# Patient Record
Sex: MALE | Race: BLACK OR AFRICAN AMERICAN | Employment: UNEMPLOYED | URBAN - METROPOLITAN AREA
[De-identification: names, ages, dates, MRNs, and addresses within clinical notes are randomized per-mention and may not be internally consistent; named-entity substitution may affect disease eponyms.]

---

## 2020-07-24 ENCOUNTER — APPOINTMENT (OUTPATIENT)
Dept: GENERAL RADIOLOGY | Age: 65
DRG: 004 | End: 2020-07-24
Payer: MEDICARE

## 2020-07-24 ENCOUNTER — APPOINTMENT (OUTPATIENT)
Dept: CT IMAGING | Age: 65
DRG: 004 | End: 2020-07-24
Payer: MEDICARE

## 2020-07-24 ENCOUNTER — HOSPITAL ENCOUNTER (INPATIENT)
Age: 65
LOS: 22 days | Discharge: NURSING, REHAB OR LONG-TERM CARE | DRG: 004 | End: 2020-08-15
Attending: EMERGENCY MEDICINE | Admitting: INTERNAL MEDICINE
Payer: MEDICARE

## 2020-07-24 PROBLEM — A41.9 SEPSIS (HCC): Status: ACTIVE | Noted: 2020-07-24

## 2020-07-24 LAB
ALBUMIN SERPL-MCNC: 2.3 GM/DL (ref 3.4–5)
ALP BLD-CCNC: 80 IU/L (ref 40–129)
ALT SERPL-CCNC: 16 U/L (ref 10–40)
ANION GAP SERPL CALCULATED.3IONS-SCNC: 15 MMOL/L (ref 4–16)
AST SERPL-CCNC: 67 IU/L (ref 15–37)
BASE EXCESS MIXED: 1.3 (ref 0–1.2)
BASOPHILS ABSOLUTE: 0 K/CU MM
BASOPHILS RELATIVE PERCENT: 0.2 % (ref 0–1)
BILIRUB SERPL-MCNC: 1.2 MG/DL (ref 0–1)
BUN BLDV-MCNC: 13 MG/DL (ref 6–23)
CALCIUM SERPL-MCNC: 8.6 MG/DL (ref 8.3–10.6)
CHLORIDE BLD-SCNC: 98 MMOL/L (ref 99–110)
CO2: 25 MMOL/L (ref 21–32)
COMMENT: ABNORMAL
CREAT SERPL-MCNC: 0.8 MG/DL (ref 0.9–1.3)
DIFFERENTIAL TYPE: ABNORMAL
EOSINOPHILS ABSOLUTE: 0 K/CU MM
EOSINOPHILS RELATIVE PERCENT: 0 % (ref 0–3)
GFR AFRICAN AMERICAN: >60 ML/MIN/1.73M2
GFR NON-AFRICAN AMERICAN: >60 ML/MIN/1.73M2
GLUCOSE BLD-MCNC: 116 MG/DL (ref 70–99)
HCO3 VENOUS: 25 MMOL/L (ref 19–25)
HCT VFR BLD CALC: 32.5 % (ref 42–52)
HEMOGLOBIN: 9.4 GM/DL (ref 13.5–18)
IMMATURE NEUTROPHIL %: 0.9 % (ref 0–0.43)
LACTATE: 3.7 MMOL/L (ref 0.4–2)
LYMPHOCYTES ABSOLUTE: 0.9 K/CU MM
LYMPHOCYTES RELATIVE PERCENT: 5.3 % (ref 24–44)
MAGNESIUM: 2 MG/DL (ref 1.8–2.4)
MCH RBC QN AUTO: 21.6 PG (ref 27–31)
MCHC RBC AUTO-ENTMCNC: 28.9 % (ref 32–36)
MCV RBC AUTO: 74.5 FL (ref 78–100)
MONOCYTES ABSOLUTE: 0.7 K/CU MM
MONOCYTES RELATIVE PERCENT: 3.8 % (ref 0–4)
NUCLEATED RBC %: 0 %
O2 SAT, VEN: 35.3 % (ref 50–70)
PCO2, VEN: 36 MMHG (ref 38–52)
PDW BLD-RTO: 24.5 % (ref 11.7–14.9)
PH VENOUS: 7.45 (ref 7.32–7.42)
PLATELET # BLD: 611 K/CU MM (ref 140–440)
PMV BLD AUTO: 9 FL (ref 7.5–11.1)
PO2, VEN: 23 MMHG (ref 28–48)
POTASSIUM SERPL-SCNC: 3.3 MMOL/L (ref 3.5–5.1)
RBC # BLD: 4.36 M/CU MM (ref 4.6–6.2)
SEGMENTED NEUTROPHILS ABSOLUTE COUNT: 15.9 K/CU MM
SEGMENTED NEUTROPHILS RELATIVE PERCENT: 89.8 % (ref 36–66)
SODIUM BLD-SCNC: 138 MMOL/L (ref 135–145)
TOTAL CK: 556 IU/L (ref 38–174)
TOTAL IMMATURE NEUTOROPHIL: 0.16 K/CU MM
TOTAL NUCLEATED RBC: 0 K/CU MM
TOTAL PROTEIN: 9.4 GM/DL (ref 6.4–8.2)
TROPONIN T: 0.22 NG/ML
WBC # BLD: 17.7 K/CU MM (ref 4–10.5)

## 2020-07-24 PROCEDURE — 80053 COMPREHEN METABOLIC PANEL: CPT

## 2020-07-24 PROCEDURE — 81001 URINALYSIS AUTO W/SCOPE: CPT

## 2020-07-24 PROCEDURE — 87147 CULTURE TYPE IMMUNOLOGIC: CPT

## 2020-07-24 PROCEDURE — 71045 X-RAY EXAM CHEST 1 VIEW: CPT

## 2020-07-24 PROCEDURE — 73501 X-RAY EXAM HIP UNI 1 VIEW: CPT

## 2020-07-24 PROCEDURE — 83735 ASSAY OF MAGNESIUM: CPT

## 2020-07-24 PROCEDURE — 87150 DNA/RNA AMPLIFIED PROBE: CPT

## 2020-07-24 PROCEDURE — 93005 ELECTROCARDIOGRAM TRACING: CPT | Performed by: EMERGENCY MEDICINE

## 2020-07-24 PROCEDURE — 87077 CULTURE AEROBIC IDENTIFY: CPT

## 2020-07-24 PROCEDURE — 02HV33Z INSERTION OF INFUSION DEVICE INTO SUPERIOR VENA CAVA, PERCUTANEOUS APPROACH: ICD-10-PCS | Performed by: EMERGENCY MEDICINE

## 2020-07-24 PROCEDURE — 82550 ASSAY OF CK (CPK): CPT

## 2020-07-24 PROCEDURE — 4500000027

## 2020-07-24 PROCEDURE — 94660 CPAP INITIATION&MGMT: CPT

## 2020-07-24 PROCEDURE — 84484 ASSAY OF TROPONIN QUANT: CPT

## 2020-07-24 PROCEDURE — 82805 BLOOD GASES W/O2 SATURATION: CPT

## 2020-07-24 PROCEDURE — 83880 ASSAY OF NATRIURETIC PEPTIDE: CPT

## 2020-07-24 PROCEDURE — 2580000003 HC RX 258: Performed by: PHYSICIAN ASSISTANT

## 2020-07-24 PROCEDURE — 2000000000 HC ICU R&B

## 2020-07-24 PROCEDURE — 85025 COMPLETE CBC W/AUTO DIFF WBC: CPT

## 2020-07-24 PROCEDURE — 87075 CULTR BACTERIA EXCEPT BLOOD: CPT

## 2020-07-24 PROCEDURE — 87070 CULTURE OTHR SPECIMN AEROBIC: CPT

## 2020-07-24 PROCEDURE — 99285 EMERGENCY DEPT VISIT HI MDM: CPT

## 2020-07-24 PROCEDURE — 87086 URINE CULTURE/COLONY COUNT: CPT

## 2020-07-24 PROCEDURE — 87040 BLOOD CULTURE FOR BACTERIA: CPT

## 2020-07-24 PROCEDURE — 2580000003 HC RX 258: Performed by: NURSE PRACTITIONER

## 2020-07-24 PROCEDURE — 6360000002 HC RX W HCPCS: Performed by: PHYSICIAN ASSISTANT

## 2020-07-24 PROCEDURE — 87186 SC STD MICRODIL/AGAR DIL: CPT

## 2020-07-24 PROCEDURE — 6360000002 HC RX W HCPCS: Performed by: NURSE PRACTITIONER

## 2020-07-24 PROCEDURE — 83605 ASSAY OF LACTIC ACID: CPT

## 2020-07-24 PROCEDURE — 80307 DRUG TEST PRSMV CHEM ANLYZR: CPT

## 2020-07-24 PROCEDURE — 4500000029 HC MAJOR PROCEDURE

## 2020-07-24 PROCEDURE — 93005 ELECTROCARDIOGRAM TRACING: CPT | Performed by: PHYSICIAN ASSISTANT

## 2020-07-24 RX ORDER — POLYETHYLENE GLYCOL 3350 17 G/17G
17 POWDER, FOR SOLUTION ORAL DAILY PRN
Status: DISCONTINUED | OUTPATIENT
Start: 2020-07-24 | End: 2020-08-15 | Stop reason: HOSPADM

## 2020-07-24 RX ORDER — NICOTINE 21 MG/24HR
1 PATCH, TRANSDERMAL 24 HOURS TRANSDERMAL DAILY
Status: DISCONTINUED | OUTPATIENT
Start: 2020-07-25 | End: 2020-08-15 | Stop reason: HOSPADM

## 2020-07-24 RX ORDER — SODIUM CHLORIDE 0.9 % (FLUSH) 0.9 %
10 SYRINGE (ML) INJECTION EVERY 12 HOURS SCHEDULED
Status: DISCONTINUED | OUTPATIENT
Start: 2020-07-24 | End: 2020-08-15 | Stop reason: HOSPADM

## 2020-07-24 RX ORDER — ONDANSETRON 2 MG/ML
4 INJECTION INTRAMUSCULAR; INTRAVENOUS EVERY 6 HOURS PRN
Status: DISCONTINUED | OUTPATIENT
Start: 2020-07-24 | End: 2020-08-15 | Stop reason: HOSPADM

## 2020-07-24 RX ORDER — IPRATROPIUM BROMIDE AND ALBUTEROL SULFATE 2.5; .5 MG/3ML; MG/3ML
1 SOLUTION RESPIRATORY (INHALATION) 4 TIMES DAILY
Status: DISCONTINUED | OUTPATIENT
Start: 2020-07-24 | End: 2020-07-25

## 2020-07-24 RX ORDER — POTASSIUM CHLORIDE 7.45 MG/ML
10 INJECTION INTRAVENOUS ONCE
Status: COMPLETED | OUTPATIENT
Start: 2020-07-24 | End: 2020-07-24

## 2020-07-24 RX ORDER — 0.9 % SODIUM CHLORIDE 0.9 %
1000 INTRAVENOUS SOLUTION INTRAVENOUS ONCE
Status: COMPLETED | OUTPATIENT
Start: 2020-07-24 | End: 2020-07-24

## 2020-07-24 RX ORDER — ONDANSETRON 2 MG/ML
4 INJECTION INTRAMUSCULAR; INTRAVENOUS ONCE
Status: COMPLETED | OUTPATIENT
Start: 2020-07-24 | End: 2020-07-24

## 2020-07-24 RX ORDER — ACETAMINOPHEN 650 MG/1
650 SUPPOSITORY RECTAL EVERY 6 HOURS PRN
Status: DISCONTINUED | OUTPATIENT
Start: 2020-07-24 | End: 2020-08-15 | Stop reason: HOSPADM

## 2020-07-24 RX ORDER — SODIUM CHLORIDE 9 MG/ML
INJECTION, SOLUTION INTRAVENOUS CONTINUOUS
Status: DISCONTINUED | OUTPATIENT
Start: 2020-07-24 | End: 2020-07-26

## 2020-07-24 RX ORDER — SODIUM CHLORIDE 0.9 % (FLUSH) 0.9 %
10 SYRINGE (ML) INJECTION PRN
Status: DISCONTINUED | OUTPATIENT
Start: 2020-07-24 | End: 2020-08-15 | Stop reason: HOSPADM

## 2020-07-24 RX ORDER — PROMETHAZINE HYDROCHLORIDE 25 MG/1
12.5 TABLET ORAL EVERY 6 HOURS PRN
Status: DISCONTINUED | OUTPATIENT
Start: 2020-07-24 | End: 2020-08-15 | Stop reason: HOSPADM

## 2020-07-24 RX ORDER — ACETAMINOPHEN 325 MG/1
650 TABLET ORAL EVERY 6 HOURS PRN
Status: DISCONTINUED | OUTPATIENT
Start: 2020-07-24 | End: 2020-08-15 | Stop reason: HOSPADM

## 2020-07-24 RX ORDER — MORPHINE SULFATE 4 MG/ML
4 INJECTION, SOLUTION INTRAMUSCULAR; INTRAVENOUS ONCE
Status: COMPLETED | OUTPATIENT
Start: 2020-07-24 | End: 2020-07-24

## 2020-07-24 RX ORDER — MORPHINE SULFATE 2 MG/ML
2 INJECTION, SOLUTION INTRAMUSCULAR; INTRAVENOUS EVERY 4 HOURS PRN
Status: DISCONTINUED | OUTPATIENT
Start: 2020-07-24 | End: 2020-08-15 | Stop reason: HOSPADM

## 2020-07-24 RX ORDER — DICYCLOMINE HCL 20 MG
20 TABLET ORAL EVERY 6 HOURS PRN
Status: DISCONTINUED | OUTPATIENT
Start: 2020-07-24 | End: 2020-08-15 | Stop reason: HOSPADM

## 2020-07-24 RX ORDER — DIPHENHYDRAMINE HYDROCHLORIDE 50 MG/ML
12.5 INJECTION INTRAMUSCULAR; INTRAVENOUS NIGHTLY PRN
Status: DISCONTINUED | OUTPATIENT
Start: 2020-07-24 | End: 2020-08-15 | Stop reason: HOSPADM

## 2020-07-24 RX ORDER — ASPIRIN 81 MG/1
324 TABLET, CHEWABLE ORAL ONCE
Status: DISCONTINUED | OUTPATIENT
Start: 2020-07-24 | End: 2020-08-15 | Stop reason: HOSPADM

## 2020-07-24 RX ORDER — PROMETHAZINE HYDROCHLORIDE 25 MG/1
25 TABLET ORAL EVERY 6 HOURS PRN
Status: DISCONTINUED | OUTPATIENT
Start: 2020-07-24 | End: 2020-08-15 | Stop reason: HOSPADM

## 2020-07-24 RX ORDER — VANCOMYCIN HYDROCHLORIDE 1 G/200ML
1000 INJECTION, SOLUTION INTRAVENOUS ONCE
Status: COMPLETED | OUTPATIENT
Start: 2020-07-24 | End: 2020-07-25

## 2020-07-24 RX ORDER — SODIUM CHLORIDE 0.9 % (FLUSH) 0.9 %
10 SYRINGE (ML) INJECTION 2 TIMES DAILY
Status: DISCONTINUED | OUTPATIENT
Start: 2020-07-24 | End: 2020-08-15 | Stop reason: HOSPADM

## 2020-07-24 RX ADMIN — MORPHINE SULFATE 4 MG: 4 INJECTION, SOLUTION INTRAMUSCULAR; INTRAVENOUS at 17:40

## 2020-07-24 RX ADMIN — SODIUM CHLORIDE: 9 INJECTION, SOLUTION INTRAVENOUS at 23:13

## 2020-07-24 RX ADMIN — CEFEPIME HYDROCHLORIDE 2 G: 2 INJECTION, POWDER, FOR SOLUTION INTRAVENOUS at 17:22

## 2020-07-24 RX ADMIN — SODIUM CHLORIDE, PRESERVATIVE FREE 10 ML: 5 INJECTION INTRAVENOUS at 23:15

## 2020-07-24 RX ADMIN — POTASSIUM CHLORIDE 10 MEQ: 7.46 INJECTION, SOLUTION INTRAVENOUS at 23:13

## 2020-07-24 RX ADMIN — SODIUM CHLORIDE 1000 ML: 9 INJECTION, SOLUTION INTRAVENOUS at 17:23

## 2020-07-24 RX ADMIN — ONDANSETRON 4 MG: 2 INJECTION INTRAMUSCULAR; INTRAVENOUS at 17:18

## 2020-07-24 RX ADMIN — VANCOMYCIN HYDROCHLORIDE 1000 MG: 1 INJECTION, SOLUTION INTRAVENOUS at 23:13

## 2020-07-24 RX ADMIN — MORPHINE SULFATE 2 MG: 2 INJECTION, SOLUTION INTRAMUSCULAR; INTRAVENOUS at 23:13

## 2020-07-24 RX ADMIN — ONDANSETRON 4 MG: 2 INJECTION INTRAMUSCULAR; INTRAVENOUS at 23:13

## 2020-07-24 RX ADMIN — SODIUM CHLORIDE, PRESERVATIVE FREE 10 ML: 5 INJECTION INTRAVENOUS at 23:14

## 2020-07-24 ASSESSMENT — PAIN SCALES - GENERAL
PAINLEVEL_OUTOF10: 10

## 2020-07-24 ASSESSMENT — PAIN DESCRIPTION - PAIN TYPE: TYPE: ACUTE PAIN

## 2020-07-24 ASSESSMENT — PAIN DESCRIPTION - ORIENTATION: ORIENTATION: OTHER (COMMENT)

## 2020-07-24 ASSESSMENT — PAIN DESCRIPTION - LOCATION: LOCATION: GENERALIZED

## 2020-07-24 NOTE — ED NOTES
Patient cleaned of stool and urine with soap and water. Sheets were also changed. Patient was pulled up in the bed. Roxann Walker.  NICOLE Nobles  07/24/20 [id number]

## 2020-07-24 NOTE — LETTER
[facility] ICU  [address] [address]  Phone: [phone]             August 3, 2020    Patient:  JANA Dela Cruz   Date of Birth:    Date of Visit:        To Whom It May Concern:    Nanette Dillon was seen in our facility with his father, Mr. Richard Dela Cruz, on 8/3/2020.       Sincerely,       Socorro Mccracken RN         Signature:__________________________________

## 2020-07-24 NOTE — ED PROVIDER NOTES
Allergies    SOCIAL AND FAMILY HISTORY    Social History     Socioeconomic History    Marital status:      Spouse name: None    Number of children: None    Years of education: None    Highest education level: None   Occupational History    None   Social Needs    Financial resource strain: None    Food insecurity     Worry: None     Inability: None    Transportation needs     Medical: None     Non-medical: None   Tobacco Use    Smoking status: Current Every Day Smoker     Packs/day: 1.50     Types: Cigarettes   Substance and Sexual Activity    Alcohol use: No    Drug use: No    Sexual activity: None   Lifestyle    Physical activity     Days per week: None     Minutes per session: None    Stress: None   Relationships    Social connections     Talks on phone: None     Gets together: None     Attends Druze service: None     Active member of club or organization: None     Attends meetings of clubs or organizations: None     Relationship status: None    Intimate partner violence     Fear of current or ex partner: None     Emotionally abused: None     Physically abused: None     Forced sexual activity: None   Other Topics Concern    None   Social History Narrative    None     History reviewed. No pertinent family history. PHYSICAL EXAM    VITAL SIGNS: BP (!) 128/95   Pulse 132   Temp 98.3 °F (36.8 °C)   Resp (!) 53   SpO2 (!) 74%    Constitutional: Frail elderly appearing male  HENT:  Normocephalic, Atraumatic, PERRL. EOMI. Neck/Lymphatics: supple, no JVD, no swollen nodes  Cardiovascular: Tachycardic, normal rhythm  Respiratory:  Nonlabored breathing. Normal breath sounds, No wheezing  Abdomen: Bowel sounds normal, Soft, No tenderness  Musculoskeletal: Moderate swelling to left hip. There is tenderness in bilateral hips. No obvious swelling to right hip. No knee tenderness. Distal sensation and pulses intact.   Integument: Open wounds to bilateral forearms with small amount of purulent drainage. Distal sensation and pulses intact. Neurologic:  Alert & oriented , evaluation of lower extremities limited due to injury. Sensation intact.   Psychiatric:  Affect normal, Mood normal.       Labs:  Results for orders placed or performed during the hospital encounter of 07/24/20   CBC Auto Differential   Result Value Ref Range    WBC 17.7 (H) 4.0 - 10.5 K/CU MM    RBC 4.36 (L) 4.6 - 6.2 M/CU MM    Hemoglobin 9.4 (L) 13.5 - 18.0 GM/DL    Hematocrit 32.5 (L) 42 - 52 %    MCV 74.5 (L) 78 - 100 FL    MCH 21.6 (L) 27 - 31 PG    MCHC 28.9 (L) 32.0 - 36.0 %    RDW 24.5 (H) 11.7 - 14.9 %    Platelets 984 (H) 668 - 440 K/CU MM    MPV 9.0 7.5 - 11.1 FL    Differential Type AUTOMATED DIFFERENTIAL     Segs Relative 89.8 (H) 36 - 66 %    Lymphocytes % 5.3 (L) 24 - 44 %    Monocytes % 3.8 0 - 4 %    Eosinophils % 0.0 0 - 3 %    Basophils % 0.2 0 - 1 %    Segs Absolute 15.9 K/CU MM    Lymphocytes Absolute 0.9 K/CU MM    Monocytes Absolute 0.7 K/CU MM    Eosinophils Absolute 0.0 K/CU MM    Basophils Absolute 0.0 K/CU MM    Nucleated RBC % 0.0 %    Total Nucleated RBC 0.0 K/CU MM    Total Immature Neutrophil 0.16 K/CU MM    Immature Neutrophil % 0.9 (H) 0 - 0.43 %   Comprehensive Metabolic Panel   Result Value Ref Range    Sodium 138 135 - 145 MMOL/L    Potassium 3.3 (L) 3.5 - 5.1 MMOL/L    Chloride 98 (L) 99 - 110 mMol/L    CO2 25 21 - 32 MMOL/L    BUN 13 6 - 23 MG/DL    CREATININE 0.8 (L) 0.9 - 1.3 MG/DL    Glucose 116 (H) 70 - 99 MG/DL    Calcium 8.6 8.3 - 10.6 MG/DL    Alb 2.3 (L) 3.4 - 5.0 GM/DL    Total Protein 9.4 (H) 6.4 - 8.2 GM/DL    Total Bilirubin 1.2 (H) 0.0 - 1.0 MG/DL    ALT 16 10 - 40 U/L    AST 67 (H) 15 - 37 IU/L    Alkaline Phosphatase 80 40 - 129 IU/L    GFR Non-African American >60 >60 mL/min/1.73m2    GFR African American >60 >60 mL/min/1.73m2    Anion Gap 15 4 - 16   Troponin   Result Value Ref Range    Troponin T 0.222 (HH) <0.01 NG/ML   CK   Result Value Ref Range    Total  (H) 38 - 174 IU/L   Lactic Acid, Plasma   Result Value Ref Range    Lactate 3.7 (HH) 0.4 - 2.0 mMOL/L   Blood Gas, Venous   Result Value Ref Range    pH, Nir 7.45 (H) 7.32 - 7.42    pCO2, Nir 36 (L) 38 - 52 mmHG    pO2, Nir 23 (L) 28 - 48 mmHG    Base Exc, Mixed 1.3 (H) 0 - 1.2    HCO3, Venous 25.0 19 - 25 MMOL/L    O2 Sat, Nir 35.3 (L) 50 - 70 %    Comment VBG    EKG 12 Lead   Result Value Ref Range    Ventricular Rate 117 BPM    Atrial Rate 117 BPM    P-R Interval 174 ms    QRS Duration 86 ms    Q-T Interval 328 ms    QTc Calculation (Bazett) 457 ms    P Axis 75 degrees    R Axis 74 degrees    T Axis 64 degrees    Diagnosis       Sinus tachycardia  Nonspecific ST abnormality  Abnormal ECG  No previous ECGs available             EKG      EKG Interpretation  Please see ED physician's note for EKG interpretation        RADIOLOGY    XR HIP RIGHT (1 VIEW)   Final Result   Unremarkable AP pelvis and bilateral hip radiographs. If pain persists or worsens, then additional evaluation with MRI is indicated   to ensure no underlying radiographically occult process such as fracture, AVN   or transient osteoporosis. XR HIP 1 VW W PELVIS LEFT   Final Result   Unremarkable AP pelvis and bilateral hip radiographs. If pain persists or worsens, then additional evaluation with MRI is indicated   to ensure no underlying radiographically occult process such as fracture, AVN   or transient osteoporosis. XR CHEST PORTABLE   Final Result   No acute abnormality detected. CTA PULMONARY W CONTRAST    (Results Pending)   CT ABDOMEN PELVIS W IV CONTRAST    (Results Pending)             ED [facility]      Patient presents as above. Patient denies any chest pain or shortness of breath during exam.  Patient is hypoxic and is on nasal cannula oxygen improves into the 90s. See physician note for EKG reading.   Patient denies head injury, due to patient age, fall and lying on the ground for 2 days CT head is ordered, patient taken for CT any declines this. CBC shows elevated white blood cell count of 17.7, hemoglobin 9.4 and hematocrit 32.5. Lactic acid is 3.7. IV vancomycin and cefepime as ordered. CMP shows mild hypokalemia at 3.3, mild elevation of total bili 1.2 and AST of 67.  Opponent is elevated 0.222. CKs 556. Chest x-ray shows no acute process. Pelvis and bilateral hip x-ray showed no acute osseous abnormality. Aspirin is ordered. Consult to cardiologist Dr. Keyonna Chong who agrees with plan for CT imaging of chest through pelvis and recommends admission to ICU and he will follow along with patient. Patient desaturates into the 70s, nonrebreather is placed and he improves in the 90s. While waiting for CT scan patient desaturates again, BiPAP is placed and patient improves into the 90s. Patient has poor venous access. Consult to hospitalist who accepts admission prior to CT imaging resulting. Hospitalist does request that central line blade placed. See physician note for details. CRITICAL CARE NOTE:  There was a high probability of clinically significant life-threatening deterioration of the patient's condition requiring my urgent intervention due to hypoxia, leukocytosis and elevated lactic acid, elevated troponin. IV fluids, IV antibiotics, BiPAP, consults was performed to address this. Total critical care time is at least  45 minutes. This includes vital sign monitoring, pulse oximetry monitoring, telemetry monitoring, clinical response to the IV medications, reviewing the nursing notes, consultation time, dictation/documentation time, and interpretation of the lab work. This time excludes time spent performing procedures and separately billable procedures and family discussion time. Clinical  IMPRESSION    1. Leukocytosis, unspecified type    2. Pain of both hip joints    3. Elevated troponin    4. Elevated lactic acid level    5.  Hypoxia        Patient admitted    Comment:

## 2020-07-24 NOTE — ED NOTES
Patient placed on bi pap after oxygen level dropped in the 70's after patient removed his NR to cough. Giacomo Curtis.  NICOLE Nobles  07/24/20 [id number]

## 2020-07-24 NOTE — ED NOTES
Unable to perform CT at this time;  Patient's IV is positional and appears infiltrated. CTA is ordered, and cannot be performed using this IV.   On hold until patient gets a new IV or central line that will hold up for a CTA

## 2020-07-24 NOTE — ED TRIAGE NOTES
Pt to ED with complaints of left hip pain after fall two days ago. Pt was 86% on room air, pt placed on O2 by Bayron Hernandez RN and placed in room.

## 2020-07-24 NOTE — ED PROVIDER NOTES
I independently examined and evaluated [address] In brief their history revealed a 60 y.o. male who presents with bilateral hip pain. Onset 2 days ago. Patient states he was using his walker when he tripped and fell. Patient states he was on the ground for 2 days until someone came to help him. Patient states he was having bowel movements on himself. Patient states he uses heroin daily and has not been able to use for the last 2 days and feels like he is going through withdrawal.  Patient is at associate vomiting diarrhea. Patient states he has history of cancer in his abdomen. Patient states he has chronic pain to abdomen and back with no new change. Patient denies head injury or loss consciousness. 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 Their focused exam revealed alert and oriented male cachectic ill-appearing normocephalic atraumatic sclera clear airway dry lungs have coarse breath sounds bilaterally he is tachypneic he is tachycardic regular rhythm 2+ pulses throughout abdomen soft cachectic tender diffusely no rebound guarding or rigidity. Bilateral hip pain left worse than right decrease range of motion of all extremities secondary to pain he is moving all extremities he does have a old wound on his right forearm but no crepitus no drainage. EOMI motion intact bilaterally he is ANO x3 talking complete sentences he does have back pain. ED course: Patient seen with PA please see his note. Patient here with illness apparently complains of hip pain left side after trip and fall has been on the ground for couple days. Patient is a history of abdominal cancer denies being on chemoradiation history of drug use. On arrival patient is ill-appearing he is on oxygen he is tachycardic pain everywhere he is tachypneic.   We did order labs imaging antibiotics fluids etc.  Her labs came back markedly abnormal including troponin elevation white count elevation lactic acid elevation. Again given IV fluids antibiotics we did talk to cardiology we did order imaging of his chest and pelvis we did order CT scans of his head chest and abdomen pelvis etc.  Patient was hypoxic without oxygen here we did have to put him on oxygen eventually had a put him on BiPAP. Patient is refusing CT scans at this time even though we did order them he is ANO x3 had a very long discussion with him he is aware the risk of death he states he does not care  . Patient did have an IV initially but it did blow several times I did have to put a emergent central line and after he consented. I tried in the femoral area first he refused after initial attempts that failed he would like it in his IJ which I did do successfully. X-ray shows good placement no pneumothorax. I did wear appropriate PPE including 95 mask gown and gloves. At this time he is protecting his airway he is tachypneic he has ill-appearing he has a very poor prognosis in my opinion again I did, we did order imaging of his head chest pelvis etc. he is refusing he is capable of making decisions he is aware the risk of death again I had a very long discussion with him about this. Hospital medicine informed as well. X-rays otherwise negative      12 lead EKG per my interpretation #1:  Sinus Tachycardia 117  Axis is   Normal  QTc is  457  There is no specific T wave changes appreciated. There is specific ST wave changes appreciated. ST depression lateral leads. Prior EKG to compare with was not available       12 lead EKG per my interpretation #2:  Sinus Tachycardia 122  Axis is   Right  QTc is  473  There is no specific T wave changes appreciated. There is specific ST wave changes appreciated.  Slight ST depression lateral leads  Prior EKG to compare with was available and similar to previous    Procedure Note - Central Line:  The benefits, risks, and alternatives of central venous access were discussed with the  patient and

## 2020-07-25 ENCOUNTER — ANESTHESIA (OUTPATIENT)
Dept: OPERATING ROOM | Age: 65
DRG: 004 | End: 2020-07-25
Payer: MEDICARE

## 2020-07-25 ENCOUNTER — APPOINTMENT (OUTPATIENT)
Dept: GENERAL RADIOLOGY | Age: 65
DRG: 004 | End: 2020-07-25
Payer: MEDICARE

## 2020-07-25 ENCOUNTER — APPOINTMENT (OUTPATIENT)
Dept: CT IMAGING | Age: 65
DRG: 004 | End: 2020-07-25
Payer: MEDICARE

## 2020-07-25 ENCOUNTER — ANESTHESIA EVENT (OUTPATIENT)
Dept: OPERATING ROOM | Age: 65
DRG: 004 | End: 2020-07-25
Payer: MEDICARE

## 2020-07-25 VITALS
OXYGEN SATURATION: 100 % | TEMPERATURE: 62.3 F | DIASTOLIC BLOOD PRESSURE: 88 MMHG | RESPIRATION RATE: 11 BRPM | SYSTOLIC BLOOD PRESSURE: 111 MMHG

## 2020-07-25 LAB
ALBUMIN SERPL-MCNC: 2.2 GM/DL (ref 3.4–5)
ALBUMIN SERPL-MCNC: 2.2 GM/DL (ref 3.4–5)
ALP BLD-CCNC: 71 IU/L (ref 40–128)
ALP BLD-CCNC: 71 IU/L (ref 40–129)
ALT SERPL-CCNC: 15 U/L (ref 10–40)
ALT SERPL-CCNC: 15 U/L (ref 10–40)
AMPHETAMINES: NEGATIVE
ANION GAP SERPL CALCULATED.3IONS-SCNC: 13 MMOL/L (ref 4–16)
ANISOCYTOSIS: ABNORMAL
APTT: 34.8 SECONDS (ref 25.1–37.1)
AST SERPL-CCNC: 52 IU/L (ref 15–37)
AST SERPL-CCNC: 52 IU/L (ref 15–37)
BACTERIA: ABNORMAL /HPF
BANDED NEUTROPHILS ABSOLUTE COUNT: 2.26 K/CU MM
BANDED NEUTROPHILS RELATIVE PERCENT: 12 % (ref 5–11)
BARBITURATE SCREEN URINE: NEGATIVE
BASE EXCESS: 2 (ref 0–3.3)
BASE EXCESS: 3 (ref 0–3.3)
BENZODIAZEPINE SCREEN, URINE: NEGATIVE
BILIRUB SERPL-MCNC: 0.8 MG/DL (ref 0–1)
BILIRUB SERPL-MCNC: 0.8 MG/DL (ref 0–1)
BILIRUBIN DIRECT: 0.5 MG/DL (ref 0–0.3)
BILIRUBIN URINE: NEGATIVE MG/DL
BILIRUBIN, INDIRECT: 0.3 MG/DL (ref 0–0.7)
BLOOD, URINE: ABNORMAL
BUN BLDV-MCNC: 23 MG/DL (ref 6–23)
CALCIUM IONIZED: 4.08 MG/DL (ref 4.48–5.28)
CALCIUM SERPL-MCNC: 8.4 MG/DL (ref 8.3–10.6)
CANNABINOID SCREEN URINE: NEGATIVE
CARBON MONOXIDE, BLOOD: 2.4 % (ref 0–5)
CHLORIDE BLD-SCNC: 99 MMOL/L (ref 99–110)
CLARITY: ABNORMAL
CO2 CONTENT: 27.8 MMOL/L (ref 19–24)
CO2: 26 MMOL/L (ref 21–32)
COCAINE METABOLITE: ABNORMAL
COLOR: ABNORMAL
COMMENT: ABNORMAL
COMMENT: ABNORMAL
CREAT SERPL-MCNC: 1 MG/DL (ref 0.9–1.3)
DIFFERENTIAL TYPE: ABNORMAL
GFR AFRICAN AMERICAN: >60 ML/MIN/1.73M2
GFR NON-AFRICAN AMERICAN: >60 ML/MIN/1.73M2
GLUCOSE BLD-MCNC: 108 MG/DL (ref 70–99)
GLUCOSE, URINE: NEGATIVE MG/DL
HCO3 ARTERIAL: 26 MMOL/L (ref 18–23)
HCO3 VENOUS: 22.7 MMOL/L (ref 19–25)
HCT VFR BLD CALC: 29.4 % (ref 42–52)
HEMOGLOBIN: 8.5 GM/DL (ref 13.5–18)
HYPOCHROMIA: ABNORMAL
INR BLD: 1.39 INDEX
IONIZED CA: 1.02 MMOL/L (ref 1.12–1.32)
KETONES, URINE: ABNORMAL MG/DL
LACTATE: 1.8 MMOL/L (ref 0.4–2)
LEUKOCYTE ESTERASE, URINE: NEGATIVE
LYMPHOCYTES ABSOLUTE: 0.6 K/CU MM
LYMPHOCYTES RELATIVE PERCENT: 3 % (ref 24–44)
MAGNESIUM: 2.2 MG/DL (ref 1.8–2.4)
MCH RBC QN AUTO: 21.4 PG (ref 27–31)
MCHC RBC AUTO-ENTMCNC: 28.9 % (ref 32–36)
MCV RBC AUTO: 73.9 FL (ref 78–100)
METAMYELOCYTES ABSOLUTE COUNT: 0.19 K/CU MM
METAMYELOCYTES PERCENT: 1 %
METHEMOGLOBIN ARTERIAL: 1 %
MONOCYTES ABSOLUTE: 1.1 K/CU MM
MONOCYTES RELATIVE PERCENT: 6 % (ref 0–4)
MUCUS: ABNORMAL HPF
NITRITE URINE, QUANTITATIVE: NEGATIVE
O2 SAT, VEN: 94.7 % (ref 50–70)
O2 SATURATION: 94.7 % (ref 96–97)
OPIATES, URINE: ABNORMAL
OXYCODONE: NEGATIVE
PCO2 ARTERIAL: 58 MMHG (ref 32–45)
PCO2, VEN: 42 MMHG (ref 38–52)
PDW BLD-RTO: 24.5 % (ref 11.7–14.9)
PH BLOOD: 7.26 (ref 7.34–7.45)
PH VENOUS: 7.34 (ref 7.32–7.42)
PH, URINE: 6 (ref 5–8)
PHENCYCLIDINE, URINE: NEGATIVE
PHOSPHORUS: 3.8 MG/DL (ref 2.5–4.9)
PLATELET # BLD: 549 K/CU MM (ref 140–440)
PMV BLD AUTO: 9.4 FL (ref 7.5–11.1)
PO2 ARTERIAL: 91 MMHG (ref 75–100)
PO2, VEN: 107 MMHG (ref 28–48)
POTASSIUM SERPL-SCNC: 3.7 MMOL/L (ref 3.5–5.1)
PRO-BNP: ABNORMAL PG/ML
PROTEIN UA: 100 MG/DL
PROTHROMBIN TIME: 16.9 SECONDS (ref 11.7–14.5)
RBC # BLD: 3.98 M/CU MM (ref 4.6–6.2)
RBC URINE: 348 /HPF (ref 0–3)
SEGMENTED NEUTROPHILS ABSOLUTE COUNT: 14.6 K/CU MM
SEGMENTED NEUTROPHILS RELATIVE PERCENT: 78 % (ref 36–66)
SODIUM BLD-SCNC: 138 MMOL/L (ref 135–145)
SPECIFIC GRAVITY UA: 1.02 (ref 1–1.03)
SQUAMOUS EPITHELIAL: <1 /HPF
TOTAL CK: 276 IU/L (ref 38–174)
TOTAL PROTEIN: 8 GM/DL (ref 6.4–8.2)
TOTAL PROTEIN: 8 GM/DL (ref 6.4–8.2)
TOXIC GRANULATION: PRESENT
TRICHOMONAS: ABNORMAL /HPF
TROPONIN T: 0.17 NG/ML
TROPONIN T: 0.21 NG/ML
UROBILINOGEN, URINE: 2 MG/DL (ref 0.2–1)
WBC # BLD: 18.8 K/CU MM (ref 4–10.5)
WBC # BLD: ABNORMAL 10*3/UL
WBC UA: 24 /HPF (ref 0–2)

## 2020-07-25 PROCEDURE — 85610 PROTHROMBIN TIME: CPT

## 2020-07-25 PROCEDURE — 2580000003 HC RX 258: Performed by: SURGERY

## 2020-07-25 PROCEDURE — 3700000000 HC ANESTHESIA ATTENDED CARE: Performed by: SURGERY

## 2020-07-25 PROCEDURE — 80053 COMPREHEN METABOLIC PANEL: CPT

## 2020-07-25 PROCEDURE — 6360000002 HC RX W HCPCS: Performed by: NURSE ANESTHETIST, CERTIFIED REGISTERED

## 2020-07-25 PROCEDURE — 6370000000 HC RX 637 (ALT 250 FOR IP): Performed by: NURSE PRACTITIONER

## 2020-07-25 PROCEDURE — 36600 WITHDRAWAL OF ARTERIAL BLOOD: CPT

## 2020-07-25 PROCEDURE — 83605 ASSAY OF LACTIC ACID: CPT

## 2020-07-25 PROCEDURE — 85730 THROMBOPLASTIN TIME PARTIAL: CPT

## 2020-07-25 PROCEDURE — 6360000002 HC RX W HCPCS: Performed by: INTERNAL MEDICINE

## 2020-07-25 PROCEDURE — 2709999900 HC NON-CHARGEABLE SUPPLY: Performed by: SURGERY

## 2020-07-25 PROCEDURE — 94664 DEMO&/EVAL PT USE INHALER: CPT

## 2020-07-25 PROCEDURE — 51702 INSERT TEMP BLADDER CATH: CPT

## 2020-07-25 PROCEDURE — 6360000002 HC RX W HCPCS: Performed by: NURSE PRACTITIONER

## 2020-07-25 PROCEDURE — 84100 ASSAY OF PHOSPHORUS: CPT

## 2020-07-25 PROCEDURE — 86922 COMPATIBILITY TEST ANTIGLOB: CPT

## 2020-07-25 PROCEDURE — 99291 CRITICAL CARE FIRST HOUR: CPT | Performed by: SURGERY

## 2020-07-25 PROCEDURE — 2580000003 HC RX 258: Performed by: NURSE ANESTHETIST, CERTIFIED REGISTERED

## 2020-07-25 PROCEDURE — 49000 EXPLORATION OF ABDOMEN: CPT | Performed by: SURGERY

## 2020-07-25 PROCEDURE — 84484 ASSAY OF TROPONIN QUANT: CPT

## 2020-07-25 PROCEDURE — 2580000003 HC RX 258: Performed by: NURSE PRACTITIONER

## 2020-07-25 PROCEDURE — 86850 RBC ANTIBODY SCREEN: CPT

## 2020-07-25 PROCEDURE — 2000000000 HC ICU R&B

## 2020-07-25 PROCEDURE — 6370000000 HC RX 637 (ALT 250 FOR IP): Performed by: INTERNAL MEDICINE

## 2020-07-25 PROCEDURE — 94002 VENT MGMT INPAT INIT DAY: CPT

## 2020-07-25 PROCEDURE — 2500000003 HC RX 250 WO HCPCS: Performed by: NURSE ANESTHETIST, CERTIFIED REGISTERED

## 2020-07-25 PROCEDURE — 99253 IP/OBS CNSLTJ NEW/EST LOW 45: CPT | Performed by: INTERNAL MEDICINE

## 2020-07-25 PROCEDURE — 94761 N-INVAS EAR/PLS OXIMETRY MLT: CPT

## 2020-07-25 PROCEDURE — 2700000000 HC OXYGEN THERAPY PER DAY

## 2020-07-25 PROCEDURE — 82248 BILIRUBIN DIRECT: CPT

## 2020-07-25 PROCEDURE — 86900 BLOOD TYPING SEROLOGIC ABO: CPT

## 2020-07-25 PROCEDURE — 6360000002 HC RX W HCPCS: Performed by: SURGERY

## 2020-07-25 PROCEDURE — 3600000004 HC SURGERY LEVEL 4 BASE: Performed by: SURGERY

## 2020-07-25 PROCEDURE — 83735 ASSAY OF MAGNESIUM: CPT

## 2020-07-25 PROCEDURE — 99222 1ST HOSP IP/OBS MODERATE 55: CPT | Performed by: INTERNAL MEDICINE

## 2020-07-25 PROCEDURE — 3700000001 HC ADD 15 MINUTES (ANESTHESIA): Performed by: SURGERY

## 2020-07-25 PROCEDURE — 6360000002 HC RX W HCPCS: Performed by: PHYSICIAN ASSISTANT

## 2020-07-25 PROCEDURE — P9045 ALBUMIN (HUMAN), 5%, 250 ML: HCPCS | Performed by: NURSE ANESTHETIST, CERTIFIED REGISTERED

## 2020-07-25 PROCEDURE — 82330 ASSAY OF CALCIUM: CPT

## 2020-07-25 PROCEDURE — 3600000014 HC SURGERY LEVEL 4 ADDTL 15MIN: Performed by: SURGERY

## 2020-07-25 PROCEDURE — 6360000004 HC RX CONTRAST MEDICATION: Performed by: INTERNAL MEDICINE

## 2020-07-25 PROCEDURE — 0DJD0ZZ INSPECTION OF LOWER INTESTINAL TRACT, OPEN APPROACH: ICD-10-PCS | Performed by: SURGERY

## 2020-07-25 PROCEDURE — 82550 ASSAY OF CK (CPK): CPT

## 2020-07-25 PROCEDURE — 71275 CT ANGIOGRAPHY CHEST: CPT

## 2020-07-25 PROCEDURE — 86901 BLOOD TYPING SEROLOGIC RH(D): CPT

## 2020-07-25 PROCEDURE — 7100000000 HC PACU RECOVERY - FIRST 15 MIN

## 2020-07-25 PROCEDURE — 71045 X-RAY EXAM CHEST 1 VIEW: CPT

## 2020-07-25 PROCEDURE — 85027 COMPLETE CBC AUTOMATED: CPT

## 2020-07-25 PROCEDURE — 94660 CPAP INITIATION&MGMT: CPT

## 2020-07-25 PROCEDURE — 85007 BL SMEAR W/DIFF WBC COUNT: CPT

## 2020-07-25 PROCEDURE — 2500000003 HC RX 250 WO HCPCS: Performed by: INTERNAL MEDICINE

## 2020-07-25 PROCEDURE — 82805 BLOOD GASES W/O2 SATURATION: CPT

## 2020-07-25 PROCEDURE — 2580000003 HC RX 258: Performed by: PHYSICIAN ASSISTANT

## 2020-07-25 PROCEDURE — 82803 BLOOD GASES ANY COMBINATION: CPT

## 2020-07-25 PROCEDURE — P9016 RBC LEUKOCYTES REDUCED: HCPCS

## 2020-07-25 PROCEDURE — 74177 CT ABD & PELVIS W/CONTRAST: CPT

## 2020-07-25 RX ORDER — METHADONE HYDROCHLORIDE 10 MG/1
60 TABLET ORAL DAILY
Status: DISCONTINUED | OUTPATIENT
Start: 2020-07-25 | End: 2020-07-26

## 2020-07-25 RX ORDER — DEXAMETHASONE SODIUM PHOSPHATE 4 MG/ML
INJECTION, SOLUTION INTRA-ARTICULAR; INTRALESIONAL; INTRAMUSCULAR; INTRAVENOUS; SOFT TISSUE PRN
Status: DISCONTINUED | OUTPATIENT
Start: 2020-07-25 | End: 2020-07-25 | Stop reason: SDUPTHER

## 2020-07-25 RX ORDER — PROPOFOL 10 MG/ML
INJECTION, EMULSION INTRAVENOUS PRN
Status: DISCONTINUED | OUTPATIENT
Start: 2020-07-25 | End: 2020-07-25 | Stop reason: SDUPTHER

## 2020-07-25 RX ORDER — MIDAZOLAM HYDROCHLORIDE 1 MG/ML
INJECTION INTRAMUSCULAR; INTRAVENOUS PRN
Status: DISCONTINUED | OUTPATIENT
Start: 2020-07-25 | End: 2020-07-25 | Stop reason: SDUPTHER

## 2020-07-25 RX ORDER — PROPOFOL 10 MG/ML
10 INJECTION, EMULSION INTRAVENOUS CONTINUOUS
Status: DISCONTINUED | OUTPATIENT
Start: 2020-07-25 | End: 2020-07-26 | Stop reason: SDUPTHER

## 2020-07-25 RX ORDER — ALBUMIN, HUMAN INJ 5% 5 %
SOLUTION INTRAVENOUS PRN
Status: DISCONTINUED | OUTPATIENT
Start: 2020-07-25 | End: 2020-07-25 | Stop reason: SDUPTHER

## 2020-07-25 RX ORDER — CHLORHEXIDINE GLUCONATE 0.12 MG/ML
15 RINSE ORAL 2 TIMES DAILY
Status: DISCONTINUED | OUTPATIENT
Start: 2020-07-25 | End: 2020-08-15 | Stop reason: HOSPADM

## 2020-07-25 RX ORDER — FENTANYL CITRATE 50 UG/ML
INJECTION, SOLUTION INTRAMUSCULAR; INTRAVENOUS PRN
Status: DISCONTINUED | OUTPATIENT
Start: 2020-07-25 | End: 2020-07-25 | Stop reason: SDUPTHER

## 2020-07-25 RX ORDER — SODIUM CHLORIDE 9 MG/ML
INJECTION, SOLUTION INTRAVENOUS CONTINUOUS PRN
Status: DISCONTINUED | OUTPATIENT
Start: 2020-07-25 | End: 2020-07-25 | Stop reason: SDUPTHER

## 2020-07-25 RX ORDER — ETOMIDATE 2 MG/ML
INJECTION INTRAVENOUS PRN
Status: DISCONTINUED | OUTPATIENT
Start: 2020-07-25 | End: 2020-07-25 | Stop reason: SDUPTHER

## 2020-07-25 RX ORDER — VANCOMYCIN HYDROCHLORIDE 1 G/200ML
1000 INJECTION, SOLUTION INTRAVENOUS EVERY 12 HOURS
Status: DISCONTINUED | OUTPATIENT
Start: 2020-07-25 | End: 2020-07-26

## 2020-07-25 RX ORDER — ONDANSETRON 2 MG/ML
INJECTION INTRAMUSCULAR; INTRAVENOUS PRN
Status: DISCONTINUED | OUTPATIENT
Start: 2020-07-25 | End: 2020-07-25 | Stop reason: SDUPTHER

## 2020-07-25 RX ORDER — ROCURONIUM BROMIDE 10 MG/ML
INJECTION, SOLUTION INTRAVENOUS PRN
Status: DISCONTINUED | OUTPATIENT
Start: 2020-07-25 | End: 2020-07-25 | Stop reason: SDUPTHER

## 2020-07-25 RX ORDER — IPRATROPIUM BROMIDE AND ALBUTEROL SULFATE 2.5; .5 MG/3ML; MG/3ML
1 SOLUTION RESPIRATORY (INHALATION) EVERY 4 HOURS PRN
Status: DISCONTINUED | OUTPATIENT
Start: 2020-07-25 | End: 2020-08-15 | Stop reason: HOSPADM

## 2020-07-25 RX ORDER — 0.9 % SODIUM CHLORIDE 0.9 %
20 INTRAVENOUS SOLUTION INTRAVENOUS ONCE
Status: DISCONTINUED | OUTPATIENT
Start: 2020-07-25 | End: 2020-07-26

## 2020-07-25 RX ORDER — ALBUTEROL SULFATE 90 UG/1
4 AEROSOL, METERED RESPIRATORY (INHALATION) EVERY 4 HOURS
Status: DISCONTINUED | OUTPATIENT
Start: 2020-07-25 | End: 2020-08-15 | Stop reason: HOSPADM

## 2020-07-25 RX ORDER — SUCCINYLCHOLINE/SOD CL,ISO/PF 100 MG/5ML
SYRINGE (ML) INTRAVENOUS PRN
Status: DISCONTINUED | OUTPATIENT
Start: 2020-07-25 | End: 2020-07-25 | Stop reason: SDUPTHER

## 2020-07-25 RX ORDER — METHADONE HYDROCHLORIDE 40 MG/1
60 TABLET ORAL DAILY
Status: DISCONTINUED | OUTPATIENT
Start: 2020-07-25 | End: 2020-07-25

## 2020-07-25 RX ADMIN — FENTANYL CITRATE 200 MCG: 50 INJECTION INTRAMUSCULAR; INTRAVENOUS at 17:44

## 2020-07-25 RX ADMIN — SODIUM CHLORIDE: 9 INJECTION, SOLUTION INTRAVENOUS at 14:46

## 2020-07-25 RX ADMIN — Medication 100 MG: at 17:45

## 2020-07-25 RX ADMIN — IOPAMIDOL 75 ML: 755 INJECTION, SOLUTION INTRAVENOUS at 11:00

## 2020-07-25 RX ADMIN — MIDAZOLAM 2 MG: 1 INJECTION INTRAMUSCULAR; INTRAVENOUS at 18:16

## 2020-07-25 RX ADMIN — SODIUM CHLORIDE, PRESERVATIVE FREE 10 ML: 5 INJECTION INTRAVENOUS at 08:00

## 2020-07-25 RX ADMIN — DEXAMETHASONE SODIUM PHOSPHATE 4 MG: 4 INJECTION, SOLUTION INTRAMUSCULAR; INTRAVENOUS at 18:17

## 2020-07-25 RX ADMIN — ROCURONIUM BROMIDE 50 MG: 10 INJECTION INTRAVENOUS at 17:50

## 2020-07-25 RX ADMIN — DEXMEDETOMIDINE 80 MCG: 100 INJECTION, SOLUTION, CONCENTRATE INTRAVENOUS at 17:57

## 2020-07-25 RX ADMIN — PROPOFOL 10 MCG/KG/MIN: 10 INJECTION, EMULSION INTRAVENOUS at 23:13

## 2020-07-25 RX ADMIN — DIPHENHYDRAMINE HYDROCHLORIDE 12.5 MG: 50 INJECTION, SOLUTION INTRAMUSCULAR; INTRAVENOUS at 02:23

## 2020-07-25 RX ADMIN — ENOXAPARIN SODIUM 60 MG: 60 INJECTION SUBCUTANEOUS at 08:00

## 2020-07-25 RX ADMIN — DEXMEDETOMIDINE 20 MCG: 100 INJECTION, SOLUTION, CONCENTRATE INTRAVENOUS at 18:12

## 2020-07-25 RX ADMIN — VANCOMYCIN HYDROCHLORIDE 1000 MG: 1 INJECTION, SOLUTION INTRAVENOUS at 11:04

## 2020-07-25 RX ADMIN — ETOMIDATE 10 MG: 2 INJECTION, SOLUTION INTRAVENOUS at 17:44

## 2020-07-25 RX ADMIN — ONDANSETRON 4 MG: 2 INJECTION INTRAMUSCULAR; INTRAVENOUS at 04:17

## 2020-07-25 RX ADMIN — SODIUM CHLORIDE, PRESERVATIVE FREE 10 ML: 5 INJECTION INTRAVENOUS at 23:02

## 2020-07-25 RX ADMIN — CEFEPIME HYDROCHLORIDE 2 G: 2 INJECTION, POWDER, FOR SOLUTION INTRAVENOUS at 23:34

## 2020-07-25 RX ADMIN — Medication 25 MCG/HR: at 23:13

## 2020-07-25 RX ADMIN — SODIUM CHLORIDE: 900 INJECTION INTRAVENOUS at 17:29

## 2020-07-25 RX ADMIN — VANCOMYCIN HYDROCHLORIDE 1000 MG: 1 INJECTION, SOLUTION INTRAVENOUS at 23:26

## 2020-07-25 RX ADMIN — PROPOFOL 40 MG: 10 INJECTION, EMULSION INTRAVENOUS at 17:45

## 2020-07-25 RX ADMIN — ALBUMIN (HUMAN) 250 ML: 12.5 INJECTION, SOLUTION INTRAVENOUS at 17:51

## 2020-07-25 RX ADMIN — CEFEPIME HYDROCHLORIDE 2 G: 2 INJECTION, POWDER, FOR SOLUTION INTRAVENOUS at 05:11

## 2020-07-25 RX ADMIN — MIDAZOLAM 2 MG: 1 INJECTION INTRAMUSCULAR; INTRAVENOUS at 17:57

## 2020-07-25 RX ADMIN — ROCURONIUM BROMIDE 20 MG: 10 INJECTION INTRAVENOUS at 18:06

## 2020-07-25 RX ADMIN — SODIUM CHLORIDE, PRESERVATIVE FREE 10 ML: 5 INJECTION INTRAVENOUS at 23:04

## 2020-07-25 RX ADMIN — PHENYLEPHRINE HYDROCHLORIDE 100 MCG/MIN: 10 INJECTION INTRAVENOUS at 23:30

## 2020-07-25 RX ADMIN — MORPHINE SULFATE 2 MG: 2 INJECTION, SOLUTION INTRAMUSCULAR; INTRAVENOUS at 11:04

## 2020-07-25 RX ADMIN — MORPHINE SULFATE 2 MG: 2 INJECTION, SOLUTION INTRAMUSCULAR; INTRAVENOUS at 03:15

## 2020-07-25 RX ADMIN — CHLORHEXIDINE GLUCONATE 0.12% ORAL RINSE 15 ML: 1.2 LIQUID ORAL at 23:14

## 2020-07-25 RX ADMIN — ONDANSETRON 4 MG: 2 INJECTION INTRAMUSCULAR; INTRAVENOUS at 18:14

## 2020-07-25 RX ADMIN — FENTANYL CITRATE 100 MCG: 50 INJECTION INTRAMUSCULAR; INTRAVENOUS at 18:10

## 2020-07-25 RX ADMIN — ALBUMIN (HUMAN) 250 ML: 12.5 INJECTION, SOLUTION INTRAVENOUS at 18:22

## 2020-07-25 ASSESSMENT — PULMONARY FUNCTION TESTS
PIF_VALUE: 14
PIF_VALUE: 12
PIF_VALUE: 13
PIF_VALUE: 11
PIF_VALUE: 13
PIF_VALUE: 11
PIF_VALUE: 12
PIF_VALUE: 3
PIF_VALUE: 13
PIF_VALUE: 11
PIF_VALUE: 13
PIF_VALUE: 12
PIF_VALUE: 13
PIF_VALUE: 5
PIF_VALUE: 15
PIF_VALUE: 16
PIF_VALUE: 15
PIF_VALUE: 14
PIF_VALUE: 12
PIF_VALUE: 11
PIF_VALUE: 13
PIF_VALUE: 0
PIF_VALUE: 13
PIF_VALUE: 0
PIF_VALUE: 12
PIF_VALUE: 14
PIF_VALUE: 11
PIF_VALUE: 12
PIF_VALUE: 6
PIF_VALUE: 0
PIF_VALUE: 12
PIF_VALUE: 16
PIF_VALUE: 4
PIF_VALUE: 12
PIF_VALUE: 13
PIF_VALUE: 12
PIF_VALUE: 0
PIF_VALUE: 13
PIF_VALUE: 0
PIF_VALUE: 1
PIF_VALUE: 13
PIF_VALUE: 11
PIF_VALUE: 12
PIF_VALUE: 0
PIF_VALUE: 11
PIF_VALUE: 0
PIF_VALUE: 13
PIF_VALUE: 14
PIF_VALUE: 0
PIF_VALUE: 18
PIF_VALUE: 11
PIF_VALUE: 0
PIF_VALUE: 11
PIF_VALUE: 13
PIF_VALUE: 0
PIF_VALUE: 0
PIF_VALUE: 13
PIF_VALUE: 13
PIF_VALUE: 0
PIF_VALUE: 12
PIF_VALUE: 4
PIF_VALUE: 0

## 2020-07-25 ASSESSMENT — PAIN SCALES - GENERAL
PAINLEVEL_OUTOF10: 10

## 2020-07-25 ASSESSMENT — PAIN DESCRIPTION - ORIENTATION: ORIENTATION: OTHER (COMMENT)

## 2020-07-25 ASSESSMENT — ENCOUNTER SYMPTOMS: SHORTNESS OF BREATH: 1

## 2020-07-25 ASSESSMENT — PAIN DESCRIPTION - LOCATION: LOCATION: GENERALIZED

## 2020-07-25 ASSESSMENT — LIFESTYLE VARIABLES: SMOKING_STATUS: 1

## 2020-07-25 ASSESSMENT — PAIN DESCRIPTION - PAIN TYPE: TYPE: ACUTE PAIN

## 2020-07-25 NOTE — PROGRESS NOTES
This RN sent perfect serve to Dr Sylwia Rivera, \"2/3 blood cultures came back positive. he is tolerating ICE chips well and would like a diet order if possible. he is also on 2L NC doing well but still breathing like 40-60 times per minute. He is not on COWS scale but has 2mg morphine every 4hrs. \" Dr replied to keep close monitoring but no new orders for now. This RN will continue to monitor.

## 2020-07-25 NOTE — ED NOTES
Irrigated and bandaged both forearms. Cleaned patient and put new linens, pad and depends on him.      Dmitriy White RN  07/24/20 7404

## 2020-07-25 NOTE — ANESTHESIA PRE PROCEDURE
Department of Anesthesiology  Preprocedure Note       Name:  Tomy Ricardo   Age:  60 y.o.  :  1955                                          MRN:  [id number]         Date:  2020      Surgeon: Carolina Haider):  Amari Saunders MD    Procedure: Procedure(s):  LAPAROTOMY EXPLORATORY    Medications prior to admission:   Prior to Admission medications    Medication Sig Start Date End Date Taking? Authorizing Provider   HYDROcodone-acetaminophen (NORCO) 5-325 MG per tablet Take 1-2 tablets by mouth every 4 hours as needed for Pain. 4/4/15   Maddie Dover MD   methadone (METHADOSE) 40 MG disintegrating tablet Take 60 mg by mouth daily.     Historical Provider, MD       Current medications:    Current Facility-Administered Medications   Medication Dose Route Frequency Provider Last Rate Last Dose    vancomycin (VANCOCIN) 1000 mg in dextrose 5% 200 mL IVPB  1,000 mg Intravenous Q12H Amari Saunders MD   Stopped at 20 1204    ipratropium-albuterol (DUONEB) nebulizer solution 1 ampule  1 ampule Inhalation Q4H PRN Monica Alexander MD        0.9 % sodium chloride bolus  20 mL Intravenous Once Amari Saunders MD        phenylephrine (IRENE-SYNEPHRINE) 50 mg in dextrose 5 % 250 mL infusion  100 mcg/min Intravenous Continuous PRN Amari Saunders MD        methadone (METHADOSE) disintegrating tablet 60 mg  60 mg Oral Daily Monica Alexander MD        fentanyl (SUBLIMAZE) 1,000 mcg in sodium chloride 0.9% 100 mL infusion  25 mcg/hr Intravenous Continuous Malinda Dennis MD        propofol injection  10 mcg/kg/min Intravenous Continuous Malinda Dennis MD        chlorhexidine (PERIDEX) 0.12 % solution 15 mL  15 mL Mouth/Throat BID Malinda Dennis MD        albuterol sulfate  (90 Base) MCG/ACT inhaler 4 puff  4 puff Inhalation Q4H Malinda Dennis MD        And    ipratropium (ATROVENT HFA) 17 MCG/ACT inhaler 4 puff  4 puff Inhalation Q4H Malinda Dennis MD        aspirin chewable tablet 324 mg  324 mg Oral Once Deejay Rodriguez MD        iopamidol (ISOVUE-370) 76 % injection 80 mL  80 mL Intravenous ONCE PRN Deejay Rodriguez MD        sodium chloride flush 0.9 % injection 10 mL  10 mL Intravenous BID Monica Alexander MD   10 mL at 07/25/20 0800    sodium chloride flush 0.9 % injection 10 mL  10 mL Intravenous 2 times per day Deejay Rodriguez MD   10 mL at 07/25/20 0800    sodium chloride flush 0.9 % injection 10 mL  10 mL Intravenous PRN Deejay Rodriguez MD        acetaminophen (TYLENOL) tablet 650 mg  650 mg Oral Q6H PRN Deejay Rodriguez MD        Or    acetaminophen (TYLENOL) suppository 650 mg  650 mg Rectal Q6H PRN Deejay Rodriguez MD        polyethylene glycol (GLYCOLAX) packet 17 g  17 g Oral Daily PRN Deejay Rodriguez MD        promethazine (PHENERGAN) tablet 12.5 mg  12.5 mg Oral Q6H PRN Deejay Rodriguez MD        Or    ondansetron (ZOFRAN) injection 4 mg  4 mg Intravenous Q6H PRN Monica Alexander MD   4 mg at 07/25/20 0417    enoxaparin (LOVENOX) injection 60 mg  1 mg/kg Subcutaneous Daily Monica Alexander MD   60 mg at 07/25/20 0800    cefepime (MAXIPIME) 2 g IVPB minibag  2 g Intravenous Q12H Monica Alexander MD   Stopped at 07/25/20 0534    0.9 % sodium chloride infusion   Intravenous Continuous Monica Alexander MD 75 mL/hr at 07/25/20 1446      promethazine (PHENERGAN) tablet 25 mg  25 mg Oral Q6H PRN Deejay Rodriguez MD        nicotine (NICODERM CQ) 21 MG/24HR 1 patch  1 patch Transdermal Daily Monica Alexander MD   1 patch at 07/25/20 0800    diphenhydrAMINE (BENADRYL) injection 12.5 mg  12.5 mg Intravenous Nightly PRN Monica Alexander MD   12.5 mg at 07/25/20 3390    dicyclomine (BENTYL) tablet 20 mg  20 mg Oral Q6H PRN Deejay Rodriguez MD        morphine injection 2 mg  2 mg Intravenous Q4H PRN Deejay Rodriguez MD   2 mg at 07/25/20 1104     Facility-Administered Medications Ordered in Other Encounters   Medication Dose Route Frequency Provider Last Rate Last Dose    fentaNYL (SUBLIMAZE) injection    PRN Tito Love, APRN - CRNA   100 mcg at 07/25/20 1810    propofol injection    PRN Yane Rober, APRN - CRNA   40 mg at 07/25/20 1745    midazolam (VERSED) injection    PRN Yane Rober, APRN - CRNA   2 mg at 07/25/20 1816    succinylcholine chloride (ANECTINE) injection    PRN Yane Rober, APRN - CRNA   100 mg at 07/25/20 1745    rocuronium (ZEMURON) injection    PRN Yane Rober, APRN - CRNA   20 mg at 07/25/20 1806    etomidate (AMIDATE) injection    PRN Yane Rober, APRN - CRNA   10 mg at 07/25/20 1744    dexmedetomidine (PRECEDEX) 200 mcg in sodium chloride 0.9 % 50 mL IV bolus    Continuous PRN Yane Rober, APRN - CRNA   20 mcg at 07/25/20 1812    [facility]etron [facility]) injection    PRN Yane Rober, APRN - CRNA   4 mg at 07/25/20 1814    albumin human 5 % IV solution    PRN Yane Rober, APRN - CRNA   250 mL at 07/25/20 1822    0.9 % sodium chloride infusion    Continuous PRN Yane Rober, APRN - CRNA 100 mL/hr at 07/25/20 1729      dexamethasone (DECADRON) injection    PRN Yane Rober, APRN - CRNA   4 mg at 07/25/20 1817       Allergies:  No Known Allergies    Problem List:    Patient Active Problem List   Diagnosis Code    [facility]is ([facility] A41.9    Leukocytosis D72.829       Past Medical History:        Diagnosis Date    Hypertension        Past Surgical History:        Procedure Laterality Date    BACK SURGERY         Social History:    Social History     Tobacco Use    Smoking status: Current Every Day Smoker     Packs/day: 1.50     Types: Cigarettes   Substance Use Topics    Alcohol use:  No                                Ready to quit: Not Answered  Counseling given: Not Answered      Vital Signs (Current):   Vitals:    07/25/20 1000 07/25/20 1100 07/25/20 1200 07/25/20 1600   BP: (!) 157/115 (!) 157/115 (!) 154/109 (!) 152/107   Pulse: 117 115 111 110   Resp: (!) 42 (!) 46 (!) 35 (!) 41   Temp:   36.4 °C (97.5 °F) 36.4 °C (97.6 °F)   TempSrc:   Oral Oral   SpO2: 100% 100% 96% 100%   Weight:       Height:                                                  BP Readings from Last 3 Encounters:   07/25/20 (!) 152/107   07/25/20 111/88       NPO Status: Time of last liquid consumption: 1100                        Time of last solid consumption: 1100                        Date of last liquid consumption: 07/25/20                        Date of last solid food consumption: 07/22/20    BMI:   Wt Readings from Last 3 Encounters:   07/25/20 127 lb (57.6 kg)     Body mass index is 19.31 kg/m². CBC:   Lab Results   Component Value Date    WBC 18.8 07/25/2020    RBC 3.98 07/25/2020    HGB 8.5 07/25/2020    HCT 29.4 07/25/2020    MCV 73.9 07/25/2020    RDW 24.5 07/25/2020     07/25/2020       CMP:   Lab Results   Component Value Date     07/25/2020    K 3.7 07/25/2020    CL 99 07/25/2020    CO2 26 07/25/2020    BUN 23 07/25/2020    CREATININE 1.0 07/25/2020    GFRAA >60 07/25/2020    LABGLOM >60 07/25/2020    GLUCOSE 108 07/25/2020    PROT 8.0 07/25/2020    PROT 8.0 07/25/2020    PROT 8.4 11/06/2010    CALCIUM 8.4 07/25/2020    BILITOT 0.8 07/25/2020    BILITOT 0.8 07/25/2020    ALKPHOS 71 07/25/2020    ALKPHOS 71 07/25/2020    AST 52 07/25/2020    AST 52 07/25/2020    ALT 15 07/25/2020    ALT 15 07/25/2020       POC Tests: No results for input(s): POCGLU, POCNA, POCK, POCCL, POCBUN, POCHEMO, POCHCT in the last 72 hours.     Coags:   Lab Results   Component Value Date    PROTIME 16.9 07/25/2020    INR 1.39 07/25/2020    APTT 34.8 07/25/2020       HCG (If Applicable): No results found for: PREGTESTUR, PREGSERUM, HCG, HCGQUANT     ABGs: No results found for: PHART, PO2ART, MFL0FGK, UOR4OMY, BEART, J0TRAEXR     Type & Screen (If Applicable):  No results found for: LABABO, LABRH    Drug/Infectious Status (If Applicable):  No results found for: HIV, HEPCAB    COVID-19 Screening (If Applicable): No results found for: COVID19      Anesthesia Evaluation  Patient summary reviewed no history of anesthetic complications:   Airway: Mallampati: II  TM distance: >3 FB   Neck ROM: limited  Mouth opening: < 3 FB Dental:      Comment: Poor dentition, denies loose teeth     Pulmonary:   (+) shortness of breath:  decreased breath sounds,  current smoker                          ROS comment: Positive cocaine, heroin   Cardiovascular:    (+) hypertension:,         Rhythm: regular  Rate: abnormal           Beta Blocker:  Not on Beta Blocker         Neuro/Psych:               GI/Hepatic/Renal:             Endo/Other:                     Abdominal:           Vascular:                                        Anesthesia Plan      general     ASA 4 - emergent       Induction: intravenous. MIPS: Postoperative opioids intended and Postoperative ventilation. Plan discussed with attending.                   MARLENI Lewis - CRNA   7/25/2020

## 2020-07-25 NOTE — ED NOTES
Central line placed in IJ on right side. Xray called for placement confirmation.      Dmitriy White RN  07/24/20 2040

## 2020-07-25 NOTE — PLAN OF CARE
Problem: Falls - Risk of:  Goal: Will remain free from falls  Description: Will remain free from falls  7/25/2020 0829 by Clifton Myers RN  Outcome: Ongoing  7/24/2020 2358 by Kathi Juarez RN  Outcome: Ongoing  Goal: Absence of physical injury  Description: Absence of physical injury  7/25/2020 0829 by Clifton Myers RN  Outcome: Ongoing  7/24/2020 2358 by Kathi Juarez RN  Outcome: Ongoing     Problem: Pain:  Goal: Pain level will decrease  Description: Pain level will decrease  7/25/2020 0829 by Clifton Myers RN  Outcome: Ongoing  7/24/2020 2358 by Kathi Juarez RN  Outcome: Ongoing  Goal: Control of acute pain  Description: Control of acute pain  7/25/2020 0829 by Clifton Myers RN  Outcome: Ongoing  7/24/2020 2358 by Kathi Juarez RN  Outcome: Ongoing  Goal: Control of chronic pain  Description: Control of chronic pain  7/25/2020 0829 by Clifton Myers RN  Outcome: Ongoing  7/24/2020 2358 by Kathi Juarez RN  Outcome: Ongoing     Problem: Diarrhea:  Goal: Bowel elimination is within specified parameters  Description: Bowel elimination is within specified parameters  7/25/2020 0829 by Clifton Myers RN  Outcome: Ongoing  7/24/2020 2358 by Kathi Juarez RN  Outcome: Ongoing  Goal: Passage of soft, formed stool  Description: Passage of soft, formed stool  7/25/2020 0829 by Clifton Myers RN  Outcome: Ongoing  7/24/2020 2358 by Kathi Juarez RN  Outcome: Ongoing  Goal: Establishment of normal bowel function will improve to within specified parameters  Description: Establishment of normal bowel function will improve to within specified parameters  7/25/2020 0829 by Clifton Myers RN  Outcome: Ongoing  7/24/2020 2358 by Kathi Juarez RN  Outcome: Ongoing     Problem: Nausea/Vomiting:  Goal: Absence of nausea/vomiting  Description: Absence of nausea/vomiting  7/25/2020 0829 by Clifton Myers RN  Outcome: Ongoing  7/24/2020 2358 by Kathi Juarez RN  Outcome: No

## 2020-07-25 NOTE — PROGRESS NOTES
Dr Edison Rosales at bedside to educate patient the importance of surgery. Patient is now agreeable to go for surgery, surgery team at bedside to transport patient at this time. Consent signed by patient.

## 2020-07-25 NOTE — ED NOTES
Called the charged nurse for ICU Arabella Roles for [address]. Informed her that the ASA, potassium and vancomycin that had been ordered had not been given to this patient since the central line was established. Pt was cleaned up, new linen, gown and depends had been put on the patient and was taken to CT and then brought straight to ICU since his bed was ready. The saline bolus had finished by the time when got up to ICU.      Favio Whitten RN  07/24/20 [id number]

## 2020-07-25 NOTE — CONSULTS
crackles  CARDIOVASCULAR:  normal S1 and S2, no edema and no JVD  ABDOMEN:  normal bowel sounds, non-distended and no masses palpated, and no tenderness to palpation. No hepatospleenomegaly  LYMPHADENOPATHY:  no axillary or supraclavicular adenopathy. No cervical adnenopathy  PSYCHIATRIC: Oriented to person place and time. No obvious depression or anxiety. MUSCULOSKELETAL: No obvious misalignment or effusion of the joints. No clubbing, cyanosis of the digits. SKIN:  normal skin color, texture, turgor and no redness, warmth, or swelling. No palpable nodules    DATA:    Old records have been reviewed  CBC with Differential:    Lab Results   Component Value Date    WBC 17.7 07/24/2020    RBC 4.36 07/24/2020    HGB 9.4 07/24/2020    HCT 32.5 07/24/2020     07/24/2020    MCV 74.5 07/24/2020    MCH 21.6 07/24/2020    MCHC 28.9 07/24/2020    RDW 24.5 07/24/2020    SEGSPCT 89.8 07/24/2020    LYMPHOPCT 5.3 07/24/2020    MONOPCT 3.8 07/24/2020    EOSPCT 0.8 03/11/2011    BASOPCT 0.2 07/24/2020    MONOSABS 0.7 07/24/2020    LYMPHSABS 0.9 07/24/2020    EOSABS 0.0 07/24/2020    BASOSABS 0.0 07/24/2020    DIFFTYPE AUTOMATED DIFFERENTIAL 07/24/2020     BMP:    Lab Results   Component Value Date     07/24/2020    K 3.3 07/24/2020    CL 98 07/24/2020    CO2 25 07/24/2020    BUN 13 07/24/2020    CREATININE 0.8 07/24/2020    CALCIUM 8.6 07/24/2020    GFRAA >60 07/24/2020    LABGLOM >60 07/24/2020    GLUCOSE 116 07/24/2020     Hepatic Function Panel:    Lab Results   Component Value Date    ALKPHOS 80 07/24/2020    ALT 16 07/24/2020    AST 67 07/24/2020    PROT 9.4 07/24/2020    PROT 8.4 11/06/2010    BILITOT 1.2 07/24/2020     ABG:  No results found for: KYN3LBQ, BEART, V5IKTVLW, PHART, THGBART, LAY8LTD, PO2ART, BFB6SLD    Cultures:   Pending    Radiology Review:  Reviewed    1. No evidence of pulmonary embolic disease.  Motion and streak artifact    limits the exam.    2. Minimal left basilar ground-glass opacity.  Mucous impaction involving    right lower lobe bronchi.  Consider aspiration pneumonitis. 3. Chronic appearing destructive change at the T12-L1 disc space.  Consider    prior infectious spondylitis.  Correlation with lab values may be helpful to    exclude acute discitis and osteomyelitis. 4. Minimal foci of hypoattenuation within the renal cortices, consider    pyelonephritis or small infarcts. 5. Diffuse 3rd spacing. 6. Lucency within the right colonic wall, most likely pseudo pneumatosis. Correlation with lactate levels recommended to exclude bowel ischemia. 7. Large decubitus ulcer involving the right posterior thigh and buttock.  No    defined abscess or evidence of osteomyelitis. 8. Nonobstructing left renal calculus         Assessment/Plan       Patient Active Problem List    Diagnosis Date Noted    Sepsis ([facility] [address] 07/24/2020     Leukocytosis  Anemia  Acute on chronic Resp failure  ? Staph Aureus bacteremia x 1 set  Sacral Decub  S/p fall with right hip pain  Lactic acidosis - improving  Pulmonary edema  Increased troponins likely in the setting of CHF  Smoker   Debility       PLAN  1. Abx  2. F/u C&S  3. Nebs  4. ECHO  5. Serial blood cultures  6. DVT and GI Prophylaxis  7. Keep sats > 92%  8.  Wound care  9.c/w present management    Electronically signed by Gisela Cr MD on 7/24/2020 at 10:51 PM

## 2020-07-25 NOTE — CONSULTS
CNP        nicotine (NICODERM CQ) 21 MG/24HR 1 patch  1 patch Transdermal Daily Lia Gray APRN - CNP   1 patch at 07/25/20 0800    diphenhydrAMINE (BENADRYL) injection 12.5 mg  12.5 mg Intravenous Nightly PRN Lia Gray, APRN - CNP   12.5 mg at 07/25/20 0223    dicyclomine (BENTYL) tablet 20 mg  20 mg Oral Q6H PRN Liaflores Georgesy, APRN - CNP        morphine injection 2 mg  2 mg Intravenous Q4H PRN Lia Fury, APRN - CNP   2 mg at 07/25/20 1104       Allergies:  Patient has no known allergies. Social History:   Social History     Socioeconomic History    Marital status:      Spouse name: None    Number of children: None    Years of education: None    Highest education level: None   Occupational History    None   Social Needs    Financial resource strain: None    Food insecurity     Worry: None     Inability: None    Transportation needs     Medical: None     Non-medical: None   Tobacco Use    Smoking status: Current Every Day Smoker     Packs/day: 1.50     Types: Cigarettes   Substance and Sexual Activity    Alcohol use: No    Drug use: No    Sexual activity: None   Lifestyle    Physical activity     Days per week: None     Minutes per session: None    Stress: None   Relationships    Social connections     Talks on phone: None     Gets together: None     Attends Sikhism service: None     Active member of club or organization: None     Attends meetings of clubs or organizations: None     Relationship status: None    Intimate partner violence     Fear of current or ex partner: None     Emotionally abused: None     Physically abused: None     Forced sexual activity: None   Other Topics Concern    None   Social History Narrative    None       Family History:   History reviewed. No pertinent family history.     REVIEW OFSYSTEMS:    Review of Systems   Unable to perform ROS: Acuity of condition       PHYSICAL EXAM:  Vitals:    07/25/20 1000 07/25/20 1100 07/25/20 1200 07/25/20 1600   BP: (!) 157/115 (!) 157/115 (!) 154/109 (!) 152/107   Pulse: 117 115 111 110   Resp: (!) 42 (!) 46 (!) 35 (!) 41   Temp:   97.5 °F (36.4 °C) 97.6 °F (36.4 °C)   TempSrc:   Oral Oral   SpO2: 100% 100% 96% 100%   Weight:       Height:           Physical Exam  Constitutional:       Appearance: He is well-developed. HENT:      Head: Normocephalic. Eyes:      Pupils: Pupils are equal, round, and reactive to light. Neck:      Musculoskeletal: Normal range of motion and neck supple. Cardiovascular:      Rate and Rhythm: Normal rate. Pulmonary:      Effort: Pulmonary effort is normal.   Abdominal:      General: There is no distension. Palpations: Abdomen is soft. There is no mass. Tenderness: There is abdominal tenderness. There is guarding. There is no rebound. Musculoskeletal: Normal range of motion. Skin:     General: Skin is warm. Neurological:      General: No focal deficit present. Mental Status: He is alert. Motor: Weakness present.            DATA:    CBC with Differential:    Lab Results   Component Value Date    WBC 18.8 07/25/2020    RBC 3.98 07/25/2020    HGB 8.5 07/25/2020    HCT 29.4 07/25/2020     07/25/2020    MCV 73.9 07/25/2020    MCH 21.4 07/25/2020    MCHC 28.9 07/25/2020    RDW 24.5 07/25/2020    SEGSPCT 78.0 07/25/2020    BANDSPCT 12 07/25/2020    LYMPHOPCT 3.0 07/25/2020    MONOPCT 6.0 07/25/2020    EOSPCT 0.8 03/11/2011    BASOPCT 0.2 07/24/2020    MONOSABS 1.1 07/25/2020    LYMPHSABS 0.6 07/25/2020    EOSABS 0.0 07/24/2020    BASOSABS 0.0 07/24/2020    DIFFTYPE MANUAL DIFFERENTIAL 07/25/2020     CMP:    Lab Results   Component Value Date     07/25/2020    K 3.7 07/25/2020    CL 99 07/25/2020    CO2 26 07/25/2020    BUN 23 07/25/2020    CREATININE 1.0 07/25/2020    GFRAA >60 07/25/2020    LABGLOM >60 07/25/2020    GLUCOSE 108 07/25/2020    PROT 8.0 07/25/2020    PROT 8.0 07/25/2020    PROT 8.4 11/06/2010    LABALBU 2.2 07/25/2020    LABALBU 2.2 07/25/2020 CALCIUM 8.4 07/25/2020    BILITOT 0.8 07/25/2020    BILITOT 0.8 07/25/2020    ALKPHOS 71 07/25/2020    ALKPHOS 71 07/25/2020    AST 52 07/25/2020    AST 52 07/25/2020    ALT 15 07/25/2020    ALT 15 07/25/2020       IMPRESSION:        Patient Active Problem List:     Sepsis ([address]     Leukocytosis    Ischemic bowel    PLAN:    Care discussed with patient which he initially refused surgery. After understanding the severity of his condition, patient agreed to proceed with abdominal exploration. We will proceed with exploratory laparotomy and possible bowel resection. Patient is high risk for sepsis due to his history of drug abuse. His prognosis is poor. I will discuss his condition postoperatively with his son which I have obtained the phone number. Branden Winslow MD

## 2020-07-25 NOTE — H&P
palpable. Cap refill <3 sec. Trace peripheral edema. GI -Abdomen is soft non distended and with significant TTP. + BS. No masses or guarding. Rectal exam deferred. No HSM   -No CVA/ flank tenderness. Ann catheter is not present. LYMPH-No palpable cervical lymphadenopathy and no hepatosplenomegaly. No petechiae or ecchymoses. MS -No gross joint deformities. SKIN -Normal coloration, warm, dry. Flaking  B/L LEs. B/L UEs  Large open wounds- stage 3- anterior aspect. Noted purulent drainage/malodorous. Multiple skin tears. Sacral skin tear per nursing- not visualized. NEURO-Cranial nerves appear grossly intact, normal speech, no lateralizing weakness. PSYC-Awake, alert, oriented x 4- person, place, time, situation,  Appropriate affect. 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 Issa     Other Surgical History 11/7/10   Cystoscopy for ann placement     Other Surgical History 11/15/10   Dr. Venice Lucas: I&D lumbar abscess with decompression L1 to S1     Cholecystectomy Laparoscopic Cholangiogram 10/23/2016 N/A CHOLECYSTECTOMY LAPAROSCOPIC CHOLANGIOGRAM performed by Drerick Dumont MD at [place] [address]        Social History:    FAM HX: Reviewed and noncontributory  family history is not on file. Soc HX:   Social History     Socioeconomic History    Marital status:       Spouse name: None    Number of children: None    Years of education: None    Highest education level: None   Occupational History    None   Social Needs    Financial resource strain: None    Food insecurity     Worry: None     Inability: None    Transportation needs     Medical: None     Non-medical: None   Tobacco Use    Smoking status: Current Every Day Smoker     Packs/day: 1.50     Types: Cigarettes   Substance and Sexual Activity    Alcohol use: No    Drug use: No    Sexual activity: None   Lifestyle    Physical activity     Days per week: None     Minutes per session: None    Stress: None   Relationships    Social connections     Talks on phone: None     Gets together: None     Attends Sikhism service: None     Active member of club or organization: None     Attends meetings of clubs or organizations: None     Relationship status: None    Intimate partner violence     Fear of current or ex partner: None     Emotionally abused: None     Physically abused: None     Forced sexual activity: None   Other Topics Concern    None   Social History Narrative    None     TOBACCO:   reports that he has been smoking cigarettes. He has been smoking about 1.50 packs per day. He does not have any smokeless tobacco history on file. ETOH:   reports no history of alcohol use. Drugs:  reports no history of drug use. Allergies: No Known Allergies    Home Medications:     Prior to Admission medications    Medication Sig Start Date End Date Taking? Authorizing Provider   HYDROcodone-acetaminophen (NORCO) 5-325 MG per tablet Take 1-2 tablets by mouth every 4 hours as needed for Pain. 4/4/15   Mojgan Cody MD   methadone (METHADOSE) 40 MG disintegrating tablet Take 60 mg by mouth daily.     Historical Provider, MD         Medications:   Medications:    vancomycin  1,000 mg Intravenous Once    aspirin  324 mg Oral Once    sodium chloride flush  10 mL Intravenous BID    potassium chloride  10 mEq Intravenous Once      Infusions:   PRN Meds: iopamidol, 80 mL, ONCE PRN        Data:     Laboratory this visit:  Reviewed  Recent Labs     07/24/20  1615   WBC 17.7*   HGB 9.4*   HCT 32.5*   *      Recent Labs     07/24/20  1615      K 3.3*   CL 98*   CO2 25   BUN 13   CREATININE 0.8*     Recent Labs     07/24/20  1615   AST 67*   ALT 16   BILITOT 1.2*   ALKPHOS 80     No results for input(s): INR in the last 72 hours. Radiology this visit:  Reviewed. Xr Hip Right (1 View)    Result Date: 7/24/2020  EXAMINATION: ONE XRAY VIEW OF THE RIGHT HIP; ONE XRAY VIEW OF THE PELVIS AND TWO XRAY VIEWS LEFT HIP 7/24/2020 4:55 pm COMPARISON: None. HISTORY: ORDERING SYSTEM PROVIDED HISTORY: fall TECHNOLOGIST PROVIDED HISTORY: Reason for exam:->fall Reason for Exam: pain Acuity: Acute Type of Exam: Initial Mechanism of Injury: patient was in kitchen and fell, was down for 2 days Relevant Medical/Surgical History: na FINDINGS: Unremarkable appearance of the right and left hemipelvis, articulating normally at the SI joints and pubic symphysis. Visualized portions of the right and left femurs are intact align normally at the right and left acetabulum. Right and left hip joints appear well maintained. Unremarkable AP pelvis and bilateral hip radiographs. If pain persists or worsens, then additional evaluation with MRI is indicated to ensure no underlying radiographically occult process such as fracture, AVN or transient osteoporosis. Xr Chest Portable    Result Date: 7/24/2020  EXAMINATION: ONE XRAY VIEW OF THE CHEST 7/24/2020 4:55 pm COMPARISON: None. HISTORY: ORDERING SYSTEM PROVIDED HISTORY: fall TECHNOLOGIST PROVIDED HISTORY: Reason for exam:->fall Reason for Exam: fall Acuity: Acute Type of Exam: Initial Mechanism of Injury: patient fell in the kitchen, was down for 2 days Relevant Medical/Surgical History: na FINDINGS: No pneumothorax or pulmonary contusion or effusion is identified. The heart size is normal.     No acute abnormality detected.      Xr Hip 1 Vw W Pelvis Left    Result Date: 7/24/2020  EXAMINATION: ONE XRAY VIEW OF THE RIGHT HIP; ONE XRAY VIEW OF THE PELVIS AND TWO XRAY VIEWS LEFT HIP 7/24/2020 4:55 pm COMPARISON: None. HISTORY: ORDERING SYSTEM PROVIDED HISTORY: fall TECHNOLOGIST PROVIDED HISTORY: Reason for exam:->fall Reason for Exam: pain Acuity: Acute Type of Exam: Initial Mechanism of Injury: patient was in kitchen and fell, was down for 2 days Relevant Medical/Surgical History: na FINDINGS: Unremarkable appearance of the right and left hemipelvis, articulating normally at the SI joints and pubic symphysis. Visualized portions of the right and left femurs are intact align normally at the right and left acetabulum. Right and left hip joints appear well maintained. Unremarkable AP pelvis and bilateral hip radiographs. If pain persists or worsens, then additional evaluation with MRI is indicated to ensure no underlying radiographically occult process such as fracture, AVN or transient osteoporosis.          EKG this visit:  Reviewed         Electronically signed by MARLNEI Smith CNP on 7/24/2020 at 8:01 PM

## 2020-07-25 NOTE — PROGRESS NOTES
This RN asked Dr Jaren Ortiz if he could come explain the CT results to the patient and that he needed to have surgery. The patient told Dr Jaren Ortiz that he was not going to surgery. This RN notified Dr Domingo Hunter of conversation and asked if Dr Domingo Hunter could please come and explain to patient.

## 2020-07-25 NOTE — CONSULTS
Or  ondansetron (ZOFRAN) injection 4 mg, Q6H PRN  enoxaparin (LOVENOX) injection 60 mg, Daily  cefepime (MAXIPIME) 2 g IVPB minibag, Q12H  0.9 % sodium chloride infusion, Continuous  promethazine (PHENERGAN) tablet 25 mg, Q6H PRN  nicotine (NICODERM CQ) 21 MG/24HR 1 patch, Daily  diphenhydrAMINE (BENADRYL) injection 12.5 mg, Nightly PRN  dicyclomine (BENTYL) tablet 20 mg, Q6H PRN  morphine injection 2 mg, Q4H PRN      Current Facility-Administered Medications   Medication Dose Route Frequency Provider Last Rate Last Dose    vancomycin (VANCOCIN) 1000 mg in dextrose 5% 200 mL IVPB  1,000 mg Intravenous Q12H Polina Sawyer MD   Stopped at 07/25/20 1204    ipratropium-albuterol (DUONEB) nebulizer solution 1 ampule  1 ampule Inhalation Q4H PRN Andrea Addison MD        0.9 % sodium chloride bolus  20 mL Intravenous Once Ilene Watson MD        aspirin chewable tablet 324 mg  324 mg Oral Once Sheldon Moreland APRN - CNP        iopamidol (ISOVUE-370) 76 % injection 80 mL  80 mL Intravenous ONCE PRN Sheldon Moreland, APRN - CNP        sodium chloride flush 0.9 % injection 10 mL  10 mL Intravenous BID Sheldon Aniceto, APRN - CNP   10 mL at 07/25/20 0800    sodium chloride flush 0.9 % injection 10 mL  10 mL Intravenous 2 times per day Sheldon Moreland, APRN - CNP   10 mL at 07/25/20 0800    sodium chloride flush 0.9 % injection 10 mL  10 mL Intravenous PRN Sheldon Cartojordon, APRN - CNP        acetaminophen (TYLENOL) tablet 650 mg  650 mg Oral Q6H PRN Sheldon Cartojordon, APRN - CNP        Or    acetaminophen (TYLENOL) suppository 650 mg  650 mg Rectal Q6H PRN Sheldon Cartojordon, APRN - CNP        polyethylene glycol (GLYCOLAX) packet 17 g  17 g Oral Daily PRN Sheldon Cartojordon, APRN - CNP        promethazine (PHENERGAN) tablet 12.5 mg  12.5 mg Oral Q6H PRN Sheldon Moreland, APRN - CNP        Or    ondansetron (ZOFRAN) injection 4 mg  4 mg Intravenous Q6H PRN MARLENI West - CNP   4 mg at 07/25/20 0417    enoxaparin (LOVENOX) injection 60 mg  1 mg/kg Subcutaneous Daily Aundra Chris, APRN - CNP   60 mg at 07/25/20 0800    cefepime (MAXIPIME) 2 g IVPB minibag  2 g Intravenous Q12H Aundra Chris, APRN - CNP   Stopped at 07/25/20 0534    0.9 % sodium chloride infusion   Intravenous Continuous Aundra Chris, APRN - CNP 75 mL/hr at 07/25/20 1446      promethazine (PHENERGAN) tablet 25 mg  25 mg Oral Q6H PRN Aundra Chris, APRN - CNP        nicotine (NICODERM CQ) 21 MG/24HR 1 patch  1 patch Transdermal Daily Aundra Chris, APRN - CNP   1 patch at 07/25/20 0800    diphenhydrAMINE (BENADRYL) injection 12.5 mg  12.5 mg Intravenous Nightly PRN Aundra Chris, APRN - CNP   12.5 mg at 07/25/20 3643    dicyclomine (BENTYL) tablet 20 mg  20 mg Oral Q6H PRN Aundra Chris, APRN - CNP        morphine injection 2 mg  2 mg Intravenous Q4H PRN Aundra Chris, APRN - CNP   2 mg at 07/25/20 1104     Review of Systems:   · Constitutional: No Fever or Weight Loss   · Eyes: No Decreased Vision  · ENT: No Headaches, Hearing Loss or Vertigo  · Cardiovascular: No chest pain, +dyspnea on exertion, palpitations or loss of consciousness  · Respiratory: No cough or wheezing    · Gastrointestinal: No abdominal pain, appetite loss, blood in stools, constipation, diarrhea or heartburn  · Genitourinary: No dysuria, trouble voiding, or hematuria  · Musculoskeletal:  No gait disturbance, weakness or joint complaints  · Integumentary: No rash or pruritis  · Neurological: No TIA or stroke symptoms  · Psychiatric: No anxiety or depression  · Endocrine: No malaise, fatigue or temperature intolerance  · Hematologic/Lymphatic: No bleeding problems, blood clots or swollen lymph nodes  · Allergic/Immunologic: No nasal congestion or hives  All systems negative except as marked.      ·   ·      Physical Examination:    Vitals:    07/25/20 1200   BP: (!) 154/109   Pulse: 111   Resp: (!) 35   Temp: 97.5 °F (36.4 °C)   SpO2: 96%      Wt Readings from Last 3 Encounters:   07/25/20 127 lb (57.6 kg)     Body mass index is 19.31 kg/m². General Appearance:  mild distress, conversant    Constitutional: cachectic and toxic appearance. HENT:  Normocephalic, Atraumatic, Bilateral external ears normal, Oropharynx moist, No oral exudates, Nose normal. Neck- Normal range of motion, No tenderness, Supple, No stridor,no apical-carotid delay, no carotid bruit  Eyes:  PERRL, EOMI, Conjunctiva normal, No discharge. Respiratory:  Normal breath sounds, No respiratory distress, No wheezing, No chest tenderness. ,no use of accessory muscles, diaphragm movement is normal  Cardiovascular: (PMI) apex non displaced,no lifts no thrills, no s3,no s4, Normal heart rate, Normal rhythm, No murmurs, No rubs, No gallops. Carotid arteries pulse and amplitude are normal no bruit, no abdominal bruit noted ( normal abdominal aorta ausculation), femoral arteries pulse and amplitude are normal no bruit, pedal pulses are normal  GI:  Bowel sounds normal, Soft, No tenderness, No masses, No pulsatile masses, no hepatosplenomegally, no bruits  : External genitalia appear normal, No masses or lesions. No discharge. No CVA tenderness. Musculoskeletal:  Intact distal pulses, No edema, No tenderness, No cyanosis, No clubbing. Good range of motion in all major joints. No tenderness to palpation or major deformities noted. Back- No tenderness. Integument:  Warm, Dry, No erythema, No rash. Skin: no rash, +ulcers   Lymphatic:  No lymphadenopathy noted. Neurologic:  Alert & oriented x 3, Normal motor function, Normal sensory function, No focal deficits noted.    Psychiatric:  Affect normal, Judgment normal, Mood normal.   Lab Review   Recent Labs     07/25/20 0400   WBC 18.8*   HGB 8.5*   HCT 29.4*   *      Recent Labs     07/25/20 0400      K 3.7   CL 99   CO2 26   PHOS 3.8   BUN 23   CREATININE 1.0     Recent Labs     07/25/20 0400   AST 52*  52*   ALT 15  15   BILIDIR 0.5*   BILITOT 0.8  0.8   ALKPHOS 71  71     No results for input(s): TROPONINI in the last 72 hours. No results found for: BNP  Lab Results   Component Value Date    INR 1.39 07/25/2020    PROTIME 16.9 (H) 07/25/2020         EKG:sinus    Chest CT: rt lung mucus plugging, aspiration pneumonitis, ground glass appearance    ECHO:pending  Labs, echo, meds reviewed  Assessment: 60 y. o.year old with PMH of  has a past medical history of Hypertension. Recommendations:    1. Elevated trop: Type II NSTEMI with sepsis and possible pneumonia and bronchitis, will recommend to get echo, add asrpirin  2. Sepsis and pneumonia and UTI: recommend treatment with broad spectrum antibioticds  3. Sacral decubitis: recommend wound consult  4. HTN: stable  5. Hypoxemia: in ICU on BIPAP, recommend to monitor oxygen sat. 6. Health maintenance: exerise and diet  All labs, medications and tests reviewed, continue all other medications of all above medical condition listed as is.          Cameron Rouse MD, 7/25/2020 3:02 PM

## 2020-07-25 NOTE — PROGRESS NOTES
[address]  consulted by Dr. Janeen Montenegro for monitoring and adjustment. Indication for treatment: Sepsis 2/2 chronic wounds  Goal trough: ~ 15 mcg/mL     Pertinent Laboratory Values:   Temp Readings from Last 3 Encounters:   07/24/20 97.7 °F (36.5 °C) (Axillary)     Recent Labs     07/24/20  1615   WBC 17.7*   LACTATE 3.7*     Recent Labs     07/24/20  1615   BUN 13   CREATININE 0.8*     Estimated Creatinine Clearance: 79 mL/min (A) (based on SCr of 0.8 mg/dL (L)). No intake or output data in the 24 hours ending 07/25/20 0123    Pertinent Cultures:  Date    Source    Results  7/24   Wound    Pending  7/24   Urine    Ordered  7/24   Blood    Ordered    Vancomycin level:   TROUGH:  No results for input(s): VANCOTROUGH in the last 72 hours. RANDOM:  No results for input(s): VANCORANDOM in the last 72 hours. Assessment:  WBC and temperature: WBC elevated; afebrile  SCr, BUN, and urine output: Scr = 0.8, normal  Hx of IVDU  Day(s) of therapy:1  Vancomycin level: To be collected    Plan:  Start Vancomycin 1000 mg IVPB every 12 hours  Check trough prior to the 4th dose  Pharmacy will continue to monitor patient and adjust therapy as indicated    Huyen 7/26 @1037    Thank you for the consult.   Davion Velazquez RPh  7/25/2020 1:23 AM

## 2020-07-25 NOTE — PROGRESS NOTES
Spoke with Dr Yamilka Plummer regarding patients decision, Dr Yamilka Plummer states he will come see patient at bedside to speak with patient regarding need for surgery ASAP

## 2020-07-25 NOTE — PLAN OF CARE
Problem: Falls - Risk of:  Goal: Will remain free from falls  Description: Will remain free from falls  Outcome: Ongoing  Goal: Absence of physical injury  Description: Absence of physical injury  Outcome: Ongoing     Problem: Pain:  Goal: Pain level will decrease  Description: Pain level will decrease  Outcome: Ongoing  Goal: Control of acute pain  Description: Control of acute pain  Outcome: Ongoing  Goal: Control of chronic pain  Description: Control of chronic pain  Outcome: Ongoing     Problem: Diarrhea:  Goal: Bowel elimination is within specified parameters  Description: Bowel elimination is within specified parameters  Outcome: Ongoing  Goal: Passage of soft, formed stool  Description: Passage of soft, formed stool  Outcome: Ongoing  Goal: Establishment of normal bowel function will improve to within specified parameters  Description: Establishment of normal bowel function will improve to within specified parameters  Outcome: Ongoing     Problem: Nausea/Vomiting:  Goal: Absence of nausea/vomiting  Description: Absence of nausea/vomiting  Outcome: Ongoing  Goal: Able to drink  Description: Able to drink  Outcome: Ongoing  Goal: Able to eat  Description: Able to eat  Outcome: Ongoing  Goal: Ability to achieve adequate nutritional intake will improve  Description: Ability to achieve adequate nutritional intake will improve  Outcome: Ongoing     Problem: Breathing Pattern - Ineffective:  Goal: Ability to achieve and maintain a regular respiratory rate will improve  Description: Ability to achieve and maintain a regular respiratory rate will improve  Outcome: Ongoing     Problem: Fluid Volume:  Goal: Ability to achieve a balanced intake and output will improve  Description: Ability to achieve a balanced intake and output will improve  Outcome: Ongoing     Problem: Physical Regulation:  Goal: Ability to maintain clinical measurements within normal limits will improve  Description: Ability to maintain clinical measurements within normal limits will improve  Outcome: Ongoing  Goal: Will show no signs and symptoms of electrolyte imbalance  Description: Will show no signs and symptoms of electrolyte imbalance  Outcome: Ongoing

## 2020-07-25 NOTE — PROGRESS NOTES
Patient arrived to room at around 2200. Patient was transferred to unit bed and two person skin assessment was completed with [facility]. Multiple skin issues noticed including dry and flaky skin on bilateral lower extremities, skin tear on Left hip, and open skin wounds scattered on top of bilateral forearms from IV drug use. Patient also has scattered scars, bruising, and scabs. Patient was hooked up to monitor and vital signs were taken. Call light is within reach, will continue to monitor.

## 2020-07-25 NOTE — ANESTHESIA POSTPROCEDURE EVALUATION
Department of Anesthesiology  Postprocedure Note    Patient: Zee Chou  MRN: [id number]  YOB: 1955  Date of evaluation: 7/25/2020  Time:  7:06 PM     Procedure Summary     Date:  07/25/20 Room / Location:  [address] [place] [address]    Anesthesia Start:  1729 Anesthesia Stop:  Kaya Roche    Procedure:  LAPAROTOMY EXPLORATORY (N/A Abdomen) Diagnosis:  (BOWEL OBSTRUCTION)    Surgeon:  Carmen Terrell MD Responsible Provider:  MARLENI Narayanan CRNA    Anesthesia Type:  general ASA Status:  4 - Emergent          Anesthesia Type: No value filed. Gabriella Phase I: Gabriella Score: 4    Gabriella Phase II:      Last vitals: Reviewed and per EMR flowsheets.        Anesthesia Post Evaluation    Patient location during evaluation: ICU  Patient participation: complete - patient cannot participate (intubated, sedated)  Level of consciousness: sedated and ventilated  Airway patency: patent  Nausea & Vomiting: no nausea and no vomiting  Complications: no  Cardiovascular status: hemodynamically stable  Respiratory status: ventilator and intubated  Hydration status: stable  Comments: Pt was remained on ventilator after procedure, never extubated

## 2020-07-25 NOTE — ED NOTES
Unsuccessful attempt of central line in left femoral.  Attempting central line in right side of neck.      Mariposa Adan RN  07/24/20 0086       Mariposa Adan RN  07/24/20 2023

## 2020-07-25 NOTE — ED NOTES
Report was given to [address] [place] [address] Rd S for bed 2108. Pt was changed before we went to CT.      Freddy Campoverde RN  07/24/20 8558

## 2020-07-25 NOTE — PROGRESS NOTES
Patient states he had gold chain on in ER and that staff in ER took it off. I called ER staff and they said they put it in his patient bag, but it is not in their. They stated they will look around for it, but state again that they put it in the bag.

## 2020-07-25 NOTE — PROGRESS NOTES
chloride 75 mL/hr at 07/24/20 2313     PRN Meds: ipratropium-albuterol, 1 ampule, Q4H PRN  iopamidol, 80 mL, ONCE PRN  sodium chloride flush, 10 mL, PRN  acetaminophen, 650 mg, Q6H PRN    Or  acetaminophen, 650 mg, Q6H PRN  polyethylene glycol, 17 g, Daily PRN  promethazine, 12.5 mg, Q6H PRN    Or  ondansetron, 4 mg, Q6H PRN  promethazine, 25 mg, Q6H PRN  diphenhydrAMINE, 12.5 mg, Nightly PRN  dicyclomine, 20 mg, Q6H PRN  morphine, 2 mg, Q4H PRN            Pertinent New Labs & Imaging Studies     CBC with Differential:    Lab Results   Component Value Date    WBC 18.8 07/25/2020    RBC 3.98 07/25/2020    HGB 8.5 07/25/2020    HCT 29.4 07/25/2020     07/25/2020    MCV 73.9 07/25/2020    MCH 21.4 07/25/2020    MCHC 28.9 07/25/2020    RDW 24.5 07/25/2020    SEGSPCT 78.0 07/25/2020    BANDSPCT 12 07/25/2020    LYMPHOPCT 3.0 07/25/2020    MONOPCT 6.0 07/25/2020    EOSPCT 0.8 03/11/2011    BASOPCT 0.2 07/24/2020    MONOSABS 1.1 07/25/2020    LYMPHSABS 0.6 07/25/2020    EOSABS 0.0 07/24/2020    BASOSABS 0.0 07/24/2020    DIFFTYPE MANUAL DIFFERENTIAL 07/25/2020     CMP:    Lab Results   Component Value Date     07/25/2020    K 3.7 07/25/2020    CL 99 07/25/2020    CO2 26 07/25/2020    BUN 23 07/25/2020    CREATININE 1.0 07/25/2020    GFRAA >60 07/25/2020    LABGLOM >60 07/25/2020    GLUCOSE 108 07/25/2020    PROT 8.0 07/25/2020    PROT 8.0 07/25/2020    PROT 8.4 11/06/2010    LABALBU 2.2 07/25/2020    LABALBU 2.2 07/25/2020    CALCIUM 8.4 07/25/2020    BILITOT 0.8 07/25/2020    BILITOT 0.8 07/25/2020    ALKPHOS 71 07/25/2020    ALKPHOS 71 07/25/2020    AST 52 07/25/2020    AST 52 07/25/2020    ALT 15 07/25/2020    ALT 15 07/25/2020     Xr Hip Right (1 View)    Result Date: 7/24/2020  EXAMINATION: ONE XRAY VIEW OF THE RIGHT HIP; ONE XRAY VIEW OF THE PELVIS AND TWO XRAY VIEWS LEFT HIP 7/24/2020 4:55 pm COMPARISON: None.  HISTORY: ORDERING SYSTEM PROVIDED HISTORY: fall TECHNOLOGIST PROVIDED HISTORY: Reason for exam:->fall Reason for Exam: pain Acuity: Acute Type of Exam: Initial Mechanism of Injury: patient was in kitchen and fell, was down for 2 days Relevant Medical/Surgical History: na FINDINGS: Unremarkable appearance of the right and left hemipelvis, articulating normally at the SI joints and pubic symphysis. Visualized portions of the right and left femurs are intact align normally at the right and left acetabulum. Right and left hip joints appear well maintained. Unremarkable AP pelvis and bilateral hip radiographs. If pain persists or worsens, then additional evaluation with MRI is indicated to ensure no underlying radiographically occult process such as fracture, AVN or transient osteoporosis. Ct Abdomen Pelvis W Iv Contrast    Result Date: 7/25/2020  EXAMINATION: CTA OF THE CHEST; CT OF THE ABDOMEN AND PELVIS WITH CONTRAST 7/25/2020 10:48 am TECHNIQUE: CTA of the chest was performed after the administration of intravenous contrast.  Multiplanar reformatted images are provided for review. MIP images are provided for review. Dose modulation, iterative reconstruction, and/or weight based adjustment of the mA/kV was utilized to reduce the radiation dose to as low as reasonably achievable.; CT of the abdomen and pelvis was performed with the administration of intravenous contrast. Multiplanar reformatted images are provided for review. Dose modulation, iterative reconstruction, and/or weight based adjustment of the mA/kV was utilized to reduce the radiation dose to as low as reasonably achievable.  COMPARISON: CT abdomen and pelvis dated 10/27/2010, portable chest radiograph dated 07/24/2010 HISTORY: ORDERING SYSTEM PROVIDED HISTORY: hypoxia TECHNOLOGIST PROVIDED HISTORY: Reason for exam:->hypoxia Reason for Exam: SHORTNESS OF BREATH/  R/O pe; ORDERING SYSTEM PROVIDED HISTORY: Abdominal pain TECHNOLOGIST PROVIDED HISTORY: Reason for exam:->Abdominal pain Reason for Exam: Abdominal pain FINDINGS: Pulmonary Arteries: There is adequate opacification of the pulmonary arteries. The pulmonary arteries are normal caliber. There are no pulmonary arterial filling defects. Streak artifact slightly limits the exam. Mediastinum: A right IJ catheter has its tip in the superior vena cava. The thoracic aorta is of normal caliber. There is no evidence of thoracic aortic dissection. The heart size is within normal limits. There is no pericardial effusion. Lungs/pleura: There is mild centrilobular emphysema. There is minor consolidation at the right lung base posteromedially. There is no pleural effusion, pneumothorax or evidence of edema. There is minimal atelectasis at the left lung base. Motion degrades the quality of the exam.  There are small foci of ground-glass opacity within the left lower lobe medially. There is mucous impaction within right lower lobe bronchi. Soft Tissues/Bones: 3rd spacing is noted. The bones are demineralized. There are endplate destructive changes at T12-L1. These are new when compared to the 2010 exam.  There is associated sclerosis of the vertebral bodies. No paravertebral fluid collection is appreciated. CT abdomen and pelvis: Organs: No focal hepatic abnormality is identified. No focal splenic abnormality is appreciated no focal pancreatic abnormality is identified. The adrenal glands are unremarkable. The gallbladder is absent. The kidneys are not obstructed. There are areas of cortical decreased attenuation within the right kidney. There is a rounded low-attenuation lesion within the left renal lower pole, likely a cyst.  There is nonobstructing left lower pole renal calculus. There are small foci of low attenuation within the left renal cortex. Bowel: The bowel is not obstructed. The appendix is believed to be within normal limits. There are air foci within the posterior wall of the right colon which is most likely reflects pseudo pneumatosis.   There is no mesenteric or portal venous gas. Pelvis: No free fluid is noted in the pelvis. A Singh catheter is in place. There is ectasia of the common iliac arteries. Retroperitoneum: The abdominal aorta is of normal caliber. There is no evidence of free intraperitoneal air. Bones and soft tissues: 3rd spacing is noted. There is a large decubitus ulcer involving the right buttock. A defined abscess is not identified. No bony destruction is appreciated. Postsurgical changes are noted in the lumbar spine. There is ankylosis L2-3. There is degenerative disc disease at L3-4.     1. No evidence of pulmonary embolic disease. Motion and streak artifact limits the exam. 2. Minimal left basilar ground-glass opacity. Mucous impaction involving right lower lobe bronchi. Consider aspiration pneumonitis. 3. Chronic appearing destructive change at the T12-L1 disc space. Consider prior infectious spondylitis. Correlation with lab values may be helpful to exclude acute discitis and osteomyelitis. 4. Minimal foci of hypoattenuation within the renal cortices, consider pyelonephritis or small infarcts. 5. Diffuse 3rd spacing. 6. Lucency within the right colonic wall, most likely pseudo pneumatosis. Correlation with lactate levels recommended to exclude bowel ischemia. 7. Large decubitus ulcer involving the right posterior thigh and buttock. No defined abscess or evidence of osteomyelitis. 8. Nonobstructing left renal calculus. Xr Chest Portable    Result Date: 7/24/2020  EXAMINATION: ONE XRAY VIEW OF THE CHEST 7/24/2020 9:02 pm COMPARISON: 7/24/2020 HISTORY: ORDERING SYSTEM PROVIDED HISTORY: central line placement TECHNOLOGIST PROVIDED HISTORY: Reason for exam:->central line placement Reason for Exam: central line placement Acuity: Acute Type of Exam: Initial Additional signs and symptoms: SOB Relevant Medical/Surgical History: none FINDINGS: Left costophrenic angle clipped from field of view.  Right IJ central venous catheter has been placed with tip in the distal SVC. No pneumothorax. Lungs remain clear. Cardiac and mediastinal silhouettes are stable. Stable mild blunting of right costophrenic angle compatible with trace to small right pleural effusion. Stable appearance to bony structures. Placement of right IJ central venous catheter with tip in the distal SVC. No pneumothorax. Otherwise stable exam with trace to small right pleural effusion. Xr Chest Portable    Result Date: 7/24/2020  EXAMINATION: ONE XRAY VIEW OF THE CHEST 7/24/2020 4:55 pm COMPARISON: None. HISTORY: ORDERING SYSTEM PROVIDED HISTORY: fall TECHNOLOGIST PROVIDED HISTORY: Reason for exam:->fall Reason for Exam: fall Acuity: Acute Type of Exam: Initial Mechanism of Injury: patient fell in the kitchen, was down for 2 days Relevant Medical/Surgical History: na FINDINGS: No pneumothorax or pulmonary contusion or effusion is identified. The heart size is normal.     No acute abnormality detected. Cta Pulmonary W Contrast    Result Date: 7/25/2020  EXAMINATION: CTA OF THE CHEST; CT OF THE ABDOMEN AND PELVIS WITH CONTRAST 7/25/2020 10:48 am TECHNIQUE: CTA of the chest was performed after the administration of intravenous contrast.  Multiplanar reformatted images are provided for review. MIP images are provided for review. Dose modulation, iterative reconstruction, and/or weight based adjustment of the mA/kV was utilized to reduce the radiation dose to as low as reasonably achievable.; CT of the abdomen and pelvis was performed with the administration of intravenous contrast. Multiplanar reformatted images are provided for review. Dose modulation, iterative reconstruction, and/or weight based adjustment of the mA/kV was utilized to reduce the radiation dose to as low as reasonably achievable.  COMPARISON: CT abdomen and pelvis dated 10/27/2010, portable chest radiograph dated 07/24/2010 HISTORY: ORDERING SYSTEM PROVIDED HISTORY: hypoxia TECHNOLOGIST PROVIDED HISTORY: Reason for exam:->hypoxia Reason for Exam: SHORTNESS OF BREATH/  R/O pe; ORDERING SYSTEM PROVIDED HISTORY: Abdominal pain TECHNOLOGIST PROVIDED HISTORY: Reason for exam:->Abdominal pain Reason for Exam: Abdominal pain FINDINGS: Pulmonary Arteries: There is adequate opacification of the pulmonary arteries. The pulmonary arteries are normal caliber. There are no pulmonary arterial filling defects. Streak artifact slightly limits the exam. Mediastinum: A right IJ catheter has its tip in the superior vena cava. The thoracic aorta is of normal caliber. There is no evidence of thoracic aortic dissection. The heart size is within normal limits. There is no pericardial effusion. Lungs/pleura: There is mild centrilobular emphysema. There is minor consolidation at the right lung base posteromedially. There is no pleural effusion, pneumothorax or evidence of edema. There is minimal atelectasis at the left lung base. Motion degrades the quality of the exam.  There are small foci of ground-glass opacity within the left lower lobe medially. There is mucous impaction within right lower lobe bronchi. Soft Tissues/Bones: 3rd spacing is noted. The bones are demineralized. There are endplate destructive changes at T12-L1. These are new when compared to the 2010 exam.  There is associated sclerosis of the vertebral bodies. No paravertebral fluid collection is appreciated. CT abdomen and pelvis: Organs: No focal hepatic abnormality is identified. No focal splenic abnormality is appreciated no focal pancreatic abnormality is identified. The adrenal glands are unremarkable. The gallbladder is absent. The kidneys are not obstructed. There are areas of cortical decreased attenuation within the right kidney. There is a rounded low-attenuation lesion within the left renal lower pole, likely a cyst.  There is nonobstructing left lower pole renal calculus. There are small foci of low attenuation within the left renal cortex. Bowel:  The Exam: pain Acuity: Acute Type of Exam: Initial Mechanism of Injury: patient was in kitchen and fell, was down for 2 days Relevant Medical/Surgical History: na FINDINGS: Unremarkable appearance of the right and left hemipelvis, articulating normally at the SI joints and pubic symphysis. Visualized portions of the right and left femurs are intact align normally at the right and left acetabulum. Right and left hip joints appear well maintained. Unremarkable AP pelvis and bilateral hip radiographs. If pain persists or worsens, then additional evaluation with MRI is indicated to ensure no underlying radiographically occult process such as fracture, AVN or transient osteoporosis.        Assessment and Plan:   Latoya Romero is a 60 y.o.  male  who presents with Fall    Sepsis  Gram-positive cocci bacteremia  Large decubitus ulcer unlikely be the source  Pneumonia on chest x-ray possibly aspiration  Monitor vitals closely  Continue empiric cefepime and vancomycin  Echo was ordered already  Awaiting for sensitivities  Monitored CBC  ID consult placed  Lactic acidosis resolved    Acute on chronic respiratory failure with hypoxemia  Likely from aspiration pneumonia  CTA with Multiple infiltrates on the left side of the lung  Swallow evaluation needed  Pulmonology recommendations appreciated  Continue antibiotics as above     NSTEMI suspected type II injury  Troponin trending down  Echo pending  Continue therapeutic Lovenox  Cardiology recommendations pending    Chronic wounds  Decubitus ulcer  Wound care following     Hypokalemia resolved    History of gastric cancer    Opiate abuse  Will change to Subutex with COWS after his pain is adequately controlled  For now continue morphine    Repeat falls  PT OT  Fall precautions    Diet Diet NPO Effective Now   DVT Prophylaxis [x] Lovenox, []  Heparin, [] SCDs, [] Ambulation   GI Prophylaxis [x] PPI,  [] H2 Blocker,  [] Carafate,  [] Diet/Tube Feeds   Code Status Full Code Disposition Patient requires continued admission due to sepsis   MDM [] Low, [x] Moderate,[]  High     Electronically signed by Brea Carrillo MD on 7/25/2020 at 1:41 PM

## 2020-07-25 NOTE — PROGRESS NOTES
Patient arrived back from [address] [place] [address] at 200. Gabriella score 4. Patient still intubated. This RN to recover patient until night shift RN arrives. SCDs in place. Patient still paralyzed and sedated from surgery per AARON Strickland. Noel Orellana RT at bedside to place on vent. Tube 21 at the lip. OGT at 55 at the lip. Chest Xray and Abd Xray ordered to verify lines. /98.  RR 14. Spo2 100% temp 97.6     Received vent orders and additional orders from Dr. Jose Antonio Bush. Exit Gabriella score 4 at 1900. RT Portia changed out taped tube to a commercial tube raza. Restraints applied and documented at 1909. MIVF restarted at 75 ml/hr. Island dressing in place. No drainage noted. Handoff to nightshift RN, Margoth Rivera by this RN and Lillie Resendiz RN at 0257.

## 2020-07-25 NOTE — ED NOTES
Dr. Ovidio Farfan and Mai De Paz RN, Rachell Candelario RN and a medical student in the room while Dr. Ovidio Farfan trying to put in a central line.      Emilia Redd RN  07/24/20 2005

## 2020-07-25 NOTE — PROGRESS NOTES
Gee AG was perfect served and spoken to verbally of patient's blood pressure being high. She stated she did not want to order anything for it at this time. Will continue to monitor.

## 2020-07-25 NOTE — PROGRESS NOTES
This RN sent perfect serve to Dr Edison Rosales regarding new consult, Dr Edison Rosales called and stated patient needed surgery today and to order 2 units of PRBc to be prepared for surgery. This RN answered all Dr Bill Lawson questions and placed orders. This RN will continue to monitor.

## 2020-07-26 ENCOUNTER — ANESTHESIA (OUTPATIENT)
Dept: ICU | Age: 65
DRG: 004 | End: 2020-07-26
Payer: MEDICARE

## 2020-07-26 ENCOUNTER — ANESTHESIA EVENT (OUTPATIENT)
Dept: ICU | Age: 65
DRG: 004 | End: 2020-07-26
Payer: MEDICARE

## 2020-07-26 ENCOUNTER — APPOINTMENT (OUTPATIENT)
Dept: GENERAL RADIOLOGY | Age: 65
DRG: 004 | End: 2020-07-26
Payer: MEDICARE

## 2020-07-26 ENCOUNTER — APPOINTMENT (OUTPATIENT)
Dept: INTERVENTIONAL RADIOLOGY/VASCULAR | Age: 65
DRG: 004 | End: 2020-07-26
Payer: MEDICARE

## 2020-07-26 LAB
ALBUMIN SERPL-MCNC: 2.1 GM/DL (ref 3.4–5)
ALBUMIN SERPL-MCNC: 2.1 GM/DL (ref 3.4–5)
ALP BLD-CCNC: 49 IU/L (ref 40–128)
ALP BLD-CCNC: 49 IU/L (ref 40–129)
ALT SERPL-CCNC: 11 U/L (ref 10–40)
ALT SERPL-CCNC: 11 U/L (ref 10–40)
ANION GAP SERPL CALCULATED.3IONS-SCNC: 21 MMOL/L (ref 4–16)
ANION GAP SERPL CALCULATED.3IONS-SCNC: 27 MMOL/L (ref 4–16)
APTT: 38.9 SECONDS (ref 25.1–37.1)
AST SERPL-CCNC: 34 IU/L (ref 15–37)
AST SERPL-CCNC: 34 IU/L (ref 15–37)
BASE EXCESS: 16 (ref 0–3.3)
BASE EXCESS: 21 (ref 0–3.3)
BASOPHILS ABSOLUTE: 0 K/CU MM
BASOPHILS RELATIVE PERCENT: 0.1 % (ref 0–1)
BILIRUB SERPL-MCNC: 0.5 MG/DL (ref 0–1)
BILIRUB SERPL-MCNC: 0.5 MG/DL (ref 0–1)
BILIRUBIN DIRECT: 0.3 MG/DL (ref 0–0.3)
BILIRUBIN, INDIRECT: 0.2 MG/DL (ref 0–0.7)
BUN BLDV-MCNC: 28 MG/DL (ref 6–23)
BUN BLDV-MCNC: 30 MG/DL (ref 6–23)
CALCIUM IONIZED: 3.84 MG/DL (ref 4.48–5.28)
CALCIUM IONIZED: 3.84 MG/DL (ref 4.48–5.28)
CALCIUM IONIZED: 4.56 MG/DL (ref 4.48–5.28)
CALCIUM SERPL-MCNC: 8 MG/DL (ref 8.3–10.6)
CALCIUM SERPL-MCNC: 8.4 MG/DL (ref 8.3–10.6)
CARBON MONOXIDE, BLOOD: 3.2 % (ref 0–5)
CARBON MONOXIDE, BLOOD: 4.3 % (ref 0–5)
CHLORIDE BLD-SCNC: 102 MMOL/L (ref 99–110)
CHLORIDE BLD-SCNC: 93 MMOL/L (ref 99–110)
CO2 CONTENT: 13.9 MMOL/L (ref 19–24)
CO2 CONTENT: 7.8 MMOL/L (ref 19–24)
CO2: 13 MMOL/L (ref 21–32)
CO2: 24 MMOL/L (ref 21–32)
COMMENT: ABNORMAL
COMMENT: ABNORMAL
CREAT SERPL-MCNC: 1.5 MG/DL (ref 0.9–1.3)
CREAT SERPL-MCNC: 1.6 MG/DL (ref 0.9–1.3)
DIFFERENTIAL TYPE: ABNORMAL
EOSINOPHILS ABSOLUTE: 0 K/CU MM
EOSINOPHILS RELATIVE PERCENT: 0 % (ref 0–3)
GFR AFRICAN AMERICAN: 53 ML/MIN/1.73M2
GFR AFRICAN AMERICAN: 57 ML/MIN/1.73M2
GFR NON-AFRICAN AMERICAN: 44 ML/MIN/1.73M2
GFR NON-AFRICAN AMERICAN: 47 ML/MIN/1.73M2
GLUCOSE BLD-MCNC: 108 MG/DL (ref 70–99)
GLUCOSE BLD-MCNC: 254 MG/DL (ref 70–99)
GLUCOSE BLD-MCNC: 267 MG/DL (ref 70–99)
HCO3 ARTERIAL: 12.7 MMOL/L (ref 18–23)
HCO3 ARTERIAL: 7.1 MMOL/L (ref 18–23)
HCT VFR BLD CALC: 21.6 % (ref 42–52)
HCT VFR BLD CALC: 26 % (ref 42–52)
HCT VFR BLD CALC: 26 % (ref 42–52)
HEMOCCULT STL QL: POSITIVE
HEMOGLOBIN: 5.6 GM/DL (ref 13.5–18)
HEMOGLOBIN: 7.7 GM/DL (ref 13.5–18)
HEMOGLOBIN: 7.8 GM/DL (ref 13.5–18)
IMMATURE NEUTROPHIL %: 1.5 % (ref 0–0.43)
INR BLD: 1.48 INDEX
IONIZED CA: 0.96 MMOL/L (ref 1.12–1.32)
IONIZED CA: 0.96 MMOL/L (ref 1.12–1.32)
IONIZED CA: 1.14 MMOL/L (ref 1.12–1.32)
LACTATE: 15.5 MMOL/L (ref 0.4–2)
LACTATE: 18.1 MMOL/L (ref 0.4–2)
LACTATE: 21.6 MMOL/L (ref 0.4–2)
LV EF: 60 %
LVEF MODALITY: NORMAL
LYMPHOCYTES ABSOLUTE: 0.8 K/CU MM
LYMPHOCYTES RELATIVE PERCENT: 6.6 % (ref 24–44)
MAGNESIUM: 2.6 MG/DL (ref 1.8–2.4)
MCH RBC QN AUTO: 21.6 PG (ref 27–31)
MCHC RBC AUTO-ENTMCNC: 25.9 % (ref 32–36)
MCV RBC AUTO: 83.4 FL (ref 78–100)
METHEMOGLOBIN ARTERIAL: 1 %
METHEMOGLOBIN ARTERIAL: 1.6 %
MONOCYTES ABSOLUTE: 0.4 K/CU MM
MONOCYTES RELATIVE PERCENT: 3.6 % (ref 0–4)
NUCLEATED RBC %: 0 %
O2 SATURATION: 89.2 % (ref 96–97)
O2 SATURATION: 96.2 % (ref 96–97)
OCCULT BLOOD, OTHER: POSITIVE
PCO2 ARTERIAL: 24 MMHG (ref 32–45)
PCO2 ARTERIAL: 39 MMHG (ref 32–45)
PDW BLD-RTO: 25.5 % (ref 11.7–14.9)
PH BLOOD: 7.08 (ref 7.34–7.45)
PH BLOOD: 7.12 (ref 7.34–7.45)
PHOSPHORUS: 7.9 MG/DL (ref 2.5–4.9)
PLATELET # BLD: 372 K/CU MM (ref 140–440)
PMV BLD AUTO: 9.1 FL (ref 7.5–11.1)
PO2 ARTERIAL: 108 MMHG (ref 75–100)
PO2 ARTERIAL: 70 MMHG (ref 75–100)
POTASSIUM SERPL-SCNC: 3.2 MMOL/L (ref 3.5–5.1)
POTASSIUM SERPL-SCNC: 3.8 MMOL/L (ref 3.5–5.1)
PROTHROMBIN TIME: 18 SECONDS (ref 11.7–14.5)
RBC # BLD: 2.59 M/CU MM (ref 4.6–6.2)
SEGMENTED NEUTROPHILS ABSOLUTE COUNT: 10.4 K/CU MM
SEGMENTED NEUTROPHILS RELATIVE PERCENT: 88.2 % (ref 36–66)
SODIUM BLD-SCNC: 138 MMOL/L (ref 135–145)
SODIUM BLD-SCNC: 142 MMOL/L (ref 135–145)
TOTAL CK: 63 IU/L (ref 38–174)
TOTAL IMMATURE NEUTOROPHIL: 0.18 K/CU MM
TOTAL NUCLEATED RBC: 0 K/CU MM
TOTAL PROTEIN: 6.3 GM/DL (ref 6.4–8.2)
TOTAL PROTEIN: 6.3 GM/DL (ref 6.4–8.2)
TOTAL RETICULOCYTE COUNT: 0.07 K/CU MM
TROPONIN T: 0.28 NG/ML
WBC # BLD: 11.8 K/CU MM (ref 4–10.5)

## 2020-07-26 PROCEDURE — 6360000002 HC RX W HCPCS: Performed by: INTERNAL MEDICINE

## 2020-07-26 PROCEDURE — 6370000000 HC RX 637 (ALT 250 FOR IP): Performed by: INTERNAL MEDICINE

## 2020-07-26 PROCEDURE — 06HM33Z INSERTION OF INFUSION DEVICE INTO RIGHT FEMORAL VEIN, PERCUTANEOUS APPROACH: ICD-10-PCS | Performed by: RADIOLOGY

## 2020-07-26 PROCEDURE — 6370000000 HC RX 637 (ALT 250 FOR IP): Performed by: SURGERY

## 2020-07-26 PROCEDURE — 36556 INSERT NON-TUNNEL CV CATH: CPT

## 2020-07-26 PROCEDURE — 2500000003 HC RX 250 WO HCPCS: Performed by: INTERNAL MEDICINE

## 2020-07-26 PROCEDURE — 2500000003 HC RX 250 WO HCPCS: Performed by: PHYSICIAN ASSISTANT

## 2020-07-26 PROCEDURE — C1894 INTRO/SHEATH, NON-LASER: HCPCS

## 2020-07-26 PROCEDURE — 82248 BILIRUBIN DIRECT: CPT

## 2020-07-26 PROCEDURE — 94750 HC PULMONARY COMPLIANCE STUDY: CPT

## 2020-07-26 PROCEDURE — 94003 VENT MGMT INPAT SUBQ DAY: CPT

## 2020-07-26 PROCEDURE — 80053 COMPREHEN METABOLIC PANEL: CPT

## 2020-07-26 PROCEDURE — 85730 THROMBOPLASTIN TIME PARTIAL: CPT

## 2020-07-26 PROCEDURE — 84484 ASSAY OF TROPONIN QUANT: CPT

## 2020-07-26 PROCEDURE — 94761 N-INVAS EAR/PLS OXIMETRY MLT: CPT

## 2020-07-26 PROCEDURE — 93306 TTE W/DOPPLER COMPLETE: CPT

## 2020-07-26 PROCEDURE — 5A1955Z RESPIRATORY VENTILATION, GREATER THAN 96 CONSECUTIVE HOURS: ICD-10-PCS | Performed by: FAMILY MEDICINE

## 2020-07-26 PROCEDURE — 83735 ASSAY OF MAGNESIUM: CPT

## 2020-07-26 PROCEDURE — 83605 ASSAY OF LACTIC ACID: CPT

## 2020-07-26 PROCEDURE — 03HY32Z INSERTION OF MONITORING DEVICE INTO UPPER ARTERY, PERCUTANEOUS APPROACH: ICD-10-PCS | Performed by: RADIOLOGY

## 2020-07-26 PROCEDURE — 2580000003 HC RX 258: Performed by: PHYSICIAN ASSISTANT

## 2020-07-26 PROCEDURE — 82550 ASSAY OF CK (CPK): CPT

## 2020-07-26 PROCEDURE — 71045 X-RAY EXAM CHEST 1 VIEW: CPT

## 2020-07-26 PROCEDURE — 2580000003 HC RX 258: Performed by: SURGERY

## 2020-07-26 PROCEDURE — 6360000002 HC RX W HCPCS: Performed by: SURGERY

## 2020-07-26 PROCEDURE — 36620 INSERTION CATHETER ARTERY: CPT

## 2020-07-26 PROCEDURE — 85025 COMPLETE CBC W/AUTO DIFF WBC: CPT

## 2020-07-26 PROCEDURE — 85018 HEMOGLOBIN: CPT

## 2020-07-26 PROCEDURE — 82962 GLUCOSE BLOOD TEST: CPT

## 2020-07-26 PROCEDURE — 2580000003 HC RX 258: Performed by: INTERNAL MEDICINE

## 2020-07-26 PROCEDURE — 5A1D90Z PERFORMANCE OF URINARY FILTRATION, CONTINUOUS, GREATER THAN 18 HOURS PER DAY: ICD-10-PCS | Performed by: INTERNAL MEDICINE

## 2020-07-26 PROCEDURE — 37799 UNLISTED PX VASCULAR SURGERY: CPT

## 2020-07-26 PROCEDURE — 2000000000 HC ICU R&B

## 2020-07-26 PROCEDURE — 90945 DIALYSIS ONE EVALUATION: CPT

## 2020-07-26 PROCEDURE — 30243N1 TRANSFUSION OF NONAUTOLOGOUS RED BLOOD CELLS INTO CENTRAL VEIN, PERCUTANEOUS APPROACH: ICD-10-PCS | Performed by: FAMILY MEDICINE

## 2020-07-26 PROCEDURE — 36430 TRANSFUSION BLD/BLD COMPNT: CPT

## 2020-07-26 PROCEDURE — 85014 HEMATOCRIT: CPT

## 2020-07-26 PROCEDURE — 84100 ASSAY OF PHOSPHORUS: CPT

## 2020-07-26 PROCEDURE — 89220 SPUTUM SPECIMEN COLLECTION: CPT

## 2020-07-26 PROCEDURE — 82803 BLOOD GASES ANY COMBINATION: CPT

## 2020-07-26 PROCEDURE — C1752 CATH,HEMODIALYSIS,SHORT-TERM: HCPCS

## 2020-07-26 PROCEDURE — 99024 POSTOP FOLLOW-UP VISIT: CPT | Performed by: SURGERY

## 2020-07-26 PROCEDURE — 36600 WITHDRAWAL OF ARTERIAL BLOOD: CPT

## 2020-07-26 PROCEDURE — 82330 ASSAY OF CALCIUM: CPT

## 2020-07-26 PROCEDURE — 82271 OCCULT BLOOD OTHER SOURCES: CPT

## 2020-07-26 PROCEDURE — P9016 RBC LEUKOCYTES REDUCED: HCPCS

## 2020-07-26 PROCEDURE — 2709999900 HC NON-CHARGEABLE SUPPLY

## 2020-07-26 PROCEDURE — 85610 PROTHROMBIN TIME: CPT

## 2020-07-26 PROCEDURE — 3E043XZ INTRODUCTION OF VASOPRESSOR INTO CENTRAL VEIN, PERCUTANEOUS APPROACH: ICD-10-PCS | Performed by: FAMILY MEDICINE

## 2020-07-26 PROCEDURE — 2700000000 HC OXYGEN THERAPY PER DAY

## 2020-07-26 PROCEDURE — 36620 INSERTION CATHETER ARTERY: CPT | Performed by: NURSE ANESTHETIST, CERTIFIED REGISTERED

## 2020-07-26 PROCEDURE — P9047 ALBUMIN (HUMAN), 25%, 50ML: HCPCS | Performed by: INTERNAL MEDICINE

## 2020-07-26 PROCEDURE — 99291 CRITICAL CARE FIRST HOUR: CPT | Performed by: INTERNAL MEDICINE

## 2020-07-26 PROCEDURE — C1769 GUIDE WIRE: HCPCS

## 2020-07-26 PROCEDURE — 76937 US GUIDE VASCULAR ACCESS: CPT

## 2020-07-26 PROCEDURE — 2500000003 HC RX 250 WO HCPCS

## 2020-07-26 PROCEDURE — 77001 FLUOROGUIDE FOR VEIN DEVICE: CPT

## 2020-07-26 PROCEDURE — 80202 ASSAY OF VANCOMYCIN: CPT

## 2020-07-26 PROCEDURE — 74018 RADEX ABDOMEN 1 VIEW: CPT

## 2020-07-26 PROCEDURE — 36592 COLLECT BLOOD FROM PICC: CPT

## 2020-07-26 PROCEDURE — 80048 BASIC METABOLIC PNL TOTAL CA: CPT

## 2020-07-26 PROCEDURE — 82272 OCCULT BLD FECES 1-3 TESTS: CPT

## 2020-07-26 PROCEDURE — 94640 AIRWAY INHALATION TREATMENT: CPT

## 2020-07-26 PROCEDURE — 6360000002 HC RX W HCPCS: Performed by: PHYSICIAN ASSISTANT

## 2020-07-26 RX ORDER — 0.9 % SODIUM CHLORIDE 0.9 %
20 INTRAVENOUS SOLUTION INTRAVENOUS ONCE
Status: COMPLETED | OUTPATIENT
Start: 2020-07-26 | End: 2020-07-26

## 2020-07-26 RX ORDER — THIAMINE HYDROCHLORIDE 100 MG/ML
200 INJECTION, SOLUTION INTRAMUSCULAR; INTRAVENOUS EVERY 12 HOURS
Status: DISCONTINUED | OUTPATIENT
Start: 2020-07-26 | End: 2020-07-26 | Stop reason: CLARIF

## 2020-07-26 RX ORDER — MAGNESIUM SULFATE 1 G/100ML
1 INJECTION INTRAVENOUS PRN
Status: DISCONTINUED | OUTPATIENT
Start: 2020-07-26 | End: 2020-07-30

## 2020-07-26 RX ORDER — ALBUMIN (HUMAN) 12.5 G/50ML
25 SOLUTION INTRAVENOUS EVERY 6 HOURS
Status: DISCONTINUED | OUTPATIENT
Start: 2020-07-26 | End: 2020-07-27

## 2020-07-26 RX ORDER — 0.9 % SODIUM CHLORIDE 0.9 %
2000 INTRAVENOUS SOLUTION INTRAVENOUS PRN
Status: DISCONTINUED | OUTPATIENT
Start: 2020-07-26 | End: 2020-07-30

## 2020-07-26 RX ORDER — PROPOFOL 10 MG/ML
10 INJECTION, EMULSION INTRAVENOUS
Status: DISCONTINUED | OUTPATIENT
Start: 2020-07-26 | End: 2020-08-12 | Stop reason: ALTCHOICE

## 2020-07-26 RX ORDER — POTASSIUM CHLORIDE 29.8 MG/ML
20 INJECTION INTRAVENOUS PRN
Status: DISCONTINUED | OUTPATIENT
Start: 2020-07-26 | End: 2020-07-30

## 2020-07-26 RX ADMIN — VASOPRESSIN 0.04 UNITS/MIN: 20 INJECTION INTRAVENOUS at 03:49

## 2020-07-26 RX ADMIN — SODIUM BICARBONATE: 84 INJECTION, SOLUTION INTRAVENOUS at 16:50

## 2020-07-26 RX ADMIN — ALBUTEROL SULFATE 4 PUFF: 90 AEROSOL, METERED RESPIRATORY (INHALATION) at 11:47

## 2020-07-26 RX ADMIN — CLINDAMYCIN PHOSPHATE 900 MG: 150 INJECTION, SOLUTION INTRAVENOUS at 22:46

## 2020-07-26 RX ADMIN — IPRATROPIUM BROMIDE 4 PUFF: 17 AEROSOL, METERED RESPIRATORY (INHALATION) at 08:02

## 2020-07-26 RX ADMIN — IPRATROPIUM BROMIDE 4 PUFF: 17 AEROSOL, METERED RESPIRATORY (INHALATION) at 19:24

## 2020-07-26 RX ADMIN — PROPOFOL 10 MCG/KG/MIN: 10 INJECTION, EMULSION INTRAVENOUS at 17:14

## 2020-07-26 RX ADMIN — HYDROCORTISONE SODIUM SUCCINATE 100 MG: 100 INJECTION, POWDER, FOR SOLUTION INTRAMUSCULAR; INTRAVENOUS at 21:16

## 2020-07-26 RX ADMIN — ALBUTEROL SULFATE 4 PUFF: 90 AEROSOL, METERED RESPIRATORY (INHALATION) at 00:14

## 2020-07-26 RX ADMIN — EPINEPHRINE 1 MCG/MIN: 1 INJECTION, SOLUTION, CONCENTRATE INTRAVENOUS at 08:41

## 2020-07-26 RX ADMIN — Medication 2 MCG/MIN: at 03:58

## 2020-07-26 RX ADMIN — CEFEPIME HYDROCHLORIDE 2 G: 2 INJECTION, POWDER, FOR SOLUTION INTRAVENOUS at 21:17

## 2020-07-26 RX ADMIN — SODIUM BICARBONATE 50 MEQ: 84 INJECTION INTRAVENOUS at 14:17

## 2020-07-26 RX ADMIN — IPRATROPIUM BROMIDE 4 PUFF: 17 AEROSOL, METERED RESPIRATORY (INHALATION) at 16:27

## 2020-07-26 RX ADMIN — Medication 2000 ML/HR: at 22:10

## 2020-07-26 RX ADMIN — ALBUTEROL SULFATE 4 PUFF: 90 AEROSOL, METERED RESPIRATORY (INHALATION) at 23:55

## 2020-07-26 RX ADMIN — THIAMINE HYDROCHLORIDE 200 MG: 100 INJECTION, SOLUTION INTRAMUSCULAR; INTRAVENOUS at 23:09

## 2020-07-26 RX ADMIN — PHENYLEPHRINE HYDROCHLORIDE 300 MCG/MIN: 10 INJECTION INTRAVENOUS at 04:21

## 2020-07-26 RX ADMIN — CEFEPIME HYDROCHLORIDE 2 G: 2 INJECTION, POWDER, FOR SOLUTION INTRAVENOUS at 13:24

## 2020-07-26 RX ADMIN — IPRATROPIUM BROMIDE 4 PUFF: 17 AEROSOL, METERED RESPIRATORY (INHALATION) at 23:55

## 2020-07-26 RX ADMIN — PHENYLEPHRINE HYDROCHLORIDE 250 MCG/MIN: 10 INJECTION INTRAVENOUS at 12:14

## 2020-07-26 RX ADMIN — SODIUM CHLORIDE: 9 INJECTION, SOLUTION INTRAVENOUS at 04:22

## 2020-07-26 RX ADMIN — ENOXAPARIN SODIUM 60 MG: 60 INJECTION SUBCUTANEOUS at 10:34

## 2020-07-26 RX ADMIN — Medication 2000 ML/HR: at 16:19

## 2020-07-26 RX ADMIN — EPINEPHRINE 26 MCG/MIN: 1 INJECTION, SOLUTION, CONCENTRATE INTRAVENOUS at 12:36

## 2020-07-26 RX ADMIN — IPRATROPIUM BROMIDE 4 PUFF: 17 AEROSOL, METERED RESPIRATORY (INHALATION) at 04:01

## 2020-07-26 RX ADMIN — Medication 500 ML/HR: at 16:19

## 2020-07-26 RX ADMIN — IPRATROPIUM BROMIDE 4 PUFF: 17 AEROSOL, METERED RESPIRATORY (INHALATION) at 11:48

## 2020-07-26 RX ADMIN — SODIUM CHLORIDE, PRESERVATIVE FREE 10 ML: 5 INJECTION INTRAVENOUS at 10:35

## 2020-07-26 RX ADMIN — SODIUM CHLORIDE 20 ML: 9 INJECTION, SOLUTION INTRAVENOUS at 09:09

## 2020-07-26 RX ADMIN — ALBUMIN (HUMAN) 25 G: 0.25 INJECTION, SOLUTION INTRAVENOUS at 18:15

## 2020-07-26 RX ADMIN — Medication 30 MCG/MIN: at 14:48

## 2020-07-26 RX ADMIN — CHLORHEXIDINE GLUCONATE 0.12% ORAL RINSE 15 ML: 1.2 LIQUID ORAL at 21:15

## 2020-07-26 RX ADMIN — SODIUM BICARBONATE 50 MEQ: 84 INJECTION INTRAVENOUS at 10:04

## 2020-07-26 RX ADMIN — CHLORHEXIDINE GLUCONATE 0.12% ORAL RINSE 15 ML: 1.2 LIQUID ORAL at 10:34

## 2020-07-26 RX ADMIN — SODIUM BICARBONATE 50 MEQ: 84 INJECTION INTRAVENOUS at 10:05

## 2020-07-26 RX ADMIN — SODIUM CHLORIDE: 9 INJECTION, SOLUTION INTRAVENOUS at 03:57

## 2020-07-26 RX ADMIN — THIAMINE HYDROCHLORIDE 200 MG: 100 INJECTION, SOLUTION INTRAMUSCULAR; INTRAVENOUS at 12:07

## 2020-07-26 RX ADMIN — ALBUTEROL SULFATE 4 PUFF: 90 AEROSOL, METERED RESPIRATORY (INHALATION) at 19:24

## 2020-07-26 RX ADMIN — IPRATROPIUM BROMIDE 4 PUFF: 17 AEROSOL, METERED RESPIRATORY (INHALATION) at 00:15

## 2020-07-26 RX ADMIN — VANCOMYCIN HYDROCHLORIDE 1000 MG: 1 INJECTION, SOLUTION INTRAVENOUS at 10:34

## 2020-07-26 RX ADMIN — CALCIUM GLUCONATE 2 G: 98 INJECTION, SOLUTION INTRAVENOUS at 12:07

## 2020-07-26 RX ADMIN — VASOPRESSIN 0.04 UNITS/MIN: 20 INJECTION INTRAVENOUS at 20:00

## 2020-07-26 RX ADMIN — VASOPRESSIN 0.04 UNITS/MIN: 20 INJECTION INTRAVENOUS at 10:50

## 2020-07-26 RX ADMIN — HYDROCORTISONE SODIUM SUCCINATE 100 MG: 100 INJECTION, POWDER, FOR SOLUTION INTRAMUSCULAR; INTRAVENOUS at 10:35

## 2020-07-26 RX ADMIN — ALBUTEROL SULFATE 4 PUFF: 90 AEROSOL, METERED RESPIRATORY (INHALATION) at 08:02

## 2020-07-26 RX ADMIN — PHENYLEPHRINE HYDROCHLORIDE 300 MCG/MIN: 10 INJECTION INTRAVENOUS at 09:56

## 2020-07-26 RX ADMIN — SODIUM CHLORIDE, PRESERVATIVE FREE 10 ML: 5 INJECTION INTRAVENOUS at 21:15

## 2020-07-26 RX ADMIN — CLINDAMYCIN PHOSPHATE 900 MG: 150 INJECTION, SOLUTION INTRAVENOUS at 15:22

## 2020-07-26 RX ADMIN — Medication 2000 ML/HR: at 19:10

## 2020-07-26 RX ADMIN — ALBUTEROL SULFATE 4 PUFF: 90 AEROSOL, METERED RESPIRATORY (INHALATION) at 16:27

## 2020-07-26 RX ADMIN — SODIUM BICARBONATE: 84 INJECTION, SOLUTION INTRAVENOUS at 10:45

## 2020-07-26 RX ADMIN — ALBUMIN (HUMAN) 25 G: 0.25 INJECTION, SOLUTION INTRAVENOUS at 14:18

## 2020-07-26 RX ADMIN — ALBUTEROL SULFATE 4 PUFF: 90 AEROSOL, METERED RESPIRATORY (INHALATION) at 04:01

## 2020-07-26 RX ADMIN — Medication 50 MEQ: at 15:24

## 2020-07-26 ASSESSMENT — PULMONARY FUNCTION TESTS
PIF_VALUE: 27
PIF_VALUE: 17
PIF_VALUE: 16
PIF_VALUE: 17
PIF_VALUE: 24
PIF_VALUE: 16
PIF_VALUE: 19
PIF_VALUE: 20
PIF_VALUE: 16
PIF_VALUE: 17
PIF_VALUE: 19
PIF_VALUE: 20
PIF_VALUE: 18
PIF_VALUE: 21
PIF_VALUE: 22
PIF_VALUE: 21

## 2020-07-26 NOTE — PROGRESS NOTES
Pulmonary and Critical Care  Progress Note      VITALS:  BP (!) 84/54   Pulse 130   Temp 99 °F (37.2 °C)   Resp 20   Ht 5' 8\" (1.727 m)   Wt 127 lb (57.6 kg)   SpO2 97%   BMI 19.31 kg/m²     Subjective:   CHIEF COMPLAINT :Fall with right hip pain     HPI:                The patient is a 60 y.o. male with significant past medical history of HTN,. Sacral decub, MSSA endocarditis, Septic PE, Hep C,  presents with complaints of fall and right hip pain. He was on the floor for more than 2 days. His CK is mildly elevated. He had mild lactic acidosis. His PBNPT is 11,000 with mildly elevated troponins. He is on 3 grams of Heroin per day. His U tocx was positive for Cocaine and Opiates. He had no fever, no headaches, no chest pain, no palpitations, no n/v, no diaphoresis. He has no sick exposure, no recent travel. His 1 blood culture set is positive for Staph. He is getting abx and IV fluids. He is lying in the the bed. He is in mild resp distress. He looks weak and fragile    Objective:   PHYSICAL EXAM:    LUNGS:Occasional basal crackles  Abd-soft, BS+,NT  Ext - no pedal edema  CVS-s1s2, no murmurs      DATA:    CBC:  Recent Labs     07/24/20 1615 07/25/20  0400 07/26/20  0555   WBC 17.7* 18.8* 11.8*   RBC 4.36* 3.98* 2.59*   HGB 9.4* 8.5* 5.6*   HCT 32.5* 29.4* 21.6*   * 549* 372   MCV 74.5* 73.9* 83.4   MCH 21.6* 21.4* 21.6*   MCHC 28.9* 28.9* 25.9*   RDW 24.5* 24.5* 25.5*   SEGSPCT 89.8* 78.0* 88.2*   BANDSPCT  --  12*  --       BMP:  Recent Labs     07/24/20  1615 07/25/20  0400 07/26/20  0555    138 142   K 3.3* 3.7 3.8   CL 98* 99 102   CO2 25 26 13*   BUN 13 23 28*   CREATININE 0.8* 1.0 1.5*   CALCIUM 8.6 8.4 8.4   GLUCOSE 116* 108* 108*      ABG:  Recent Labs     07/25/20 2000 07/26/20  0600   PH 7.26* 7.08*   PO2ART 91 70*   RFO2MGG 58.0* 24.0*   O2SAT 94.7* 89.2*     BNP  No results found for: BNP   D-Dimer:  No results found for: DDIMER   1.  Radiology: reviewed      Assessment/Plan     Patient

## 2020-07-26 NOTE — PROGRESS NOTES
Today's plan: severe septic and hypovolumic shock, stat echo ordered, Nursing supervisior called, remains intubated post explatory lapratomy, recommend to transfuse RBC, remains on multiple pressors      Admit Date:  7/24/2020    Subjective: intubated      Chief complaints on admission  Chief Complaint   Patient presents with    Fall     85% o2 sat in triage    Hip Pain         History of present illness:Chaim is a 60 y. o.year old who  presents with had concerns including Fall (85% o2 sat in triage) and Hip Pain. Past medical history:    has a past medical history of Hypertension. Past surgical history:   has a past surgical history that includes back surgery. Social History:   reports that he has been smoking cigarettes. He has been smoking about 1.50 packs per day. He does not have any smokeless tobacco history on file. He reports that he does not drink alcohol or use drugs. Family history:  family history is not on file. No Known Allergies      Objective:   BP (!) 74/47   Pulse 76   Temp 94.5 °F (34.7 °C)   Resp 29   Ht 5' 8\" (1.727 m)   Wt 127 lb (57.6 kg)   SpO2 92%   BMI 19.31 kg/m²       Intake/Output Summary (Last 24 hours) at 7/26/2020 1015  Last data filed at 7/26/2020 8161  Gross per 24 hour   Intake 3360 ml   Output 1445 ml   Net 1915 ml       TELEMETRY: sinus    Physical Exam:  Constitutional:  Toxic apperancem, cachectic  HENT:  Normocephalic, Atraumatic, Bilateral external ears normal, Oropharynx moist, No oral exudates, Nose normal. Neck- Normal range of motion, No tenderness, Supple, No stridor. Eyes:  PERRL, EOMI, Conjunctiva normal, No discharge. Respiratory:  Normal breath sounds, No respiratory distress, No wheezing, No chest tenderness. ,no use of accessory muscles, diaphragm movement is normal  Cardiovascular: (PMI) apex non displaced,no lifts no thrills, no s3,no s4, Normal heart rate, Normal rhythm, No murmurs, No rubs, No gallops.  Carotid arteries pulse and amplitude are normal no bruit, no abdominal bruit noted ( normal abdominal aorta ausculation), femoral arteries pulse and amplitude are normal no bruit, pedal pulses are normal  GI:  Bowel sounds normal, Soft, No tenderness, No masses, No pulsatile masses. : External genitalia appear normal, No masses or lesions. No discharge. No CVA tenderness. Musculoskeletal:  Intact distal pulses, No edema, No tenderness, No cyanosis, No clubbing. Good range of motion in all major joints. No tenderness to palpation or major deformities noted. Back- No tenderness. Integument: + sacral ulcer Warm, Dry, No erythema, No rash. Lymphatic:  No lymphadenopathy noted.    Neurologic:  intubated     Medications:    sodium chloride  20 mL Intravenous Once    vancomycin  1,000 mg Intravenous Q12H    sodium chloride  20 mL Intravenous Once    chlorhexidine  15 mL Mouth/Throat BID    albuterol sulfate HFA  4 puff Inhalation Q4H    And    ipratropium  4 puff Inhalation Q4H    methadone  60 mg Oral Daily    aspirin  324 mg Oral Once    sodium chloride flush  10 mL Intravenous BID    sodium chloride flush  10 mL Intravenous 2 times per day    enoxaparin  1 mg/kg Subcutaneous Daily    cefepime  2 g Intravenous Q12H    nicotine  1 patch Transdermal Daily      vasopressin (Septic Shock) infusion 0.04 Units/min (07/26/20 0349)    norepinephrine 30 mcg/min (07/26/20 0830)    EPINEPHrine 6 mcg/min (07/26/20 0910)    phenylephrine (IRENE-SYNEPHRINE) 50mg/250mL infusion 300 mcg/min (07/26/20 0956)    IV infusion builder      fentanyl Stopped (07/26/20 0526)    propofol Stopped (07/26/20 0400)     ipratropium-albuterol, iopamidol, sodium chloride flush, acetaminophen **OR** acetaminophen, polyethylene glycol, promethazine **OR** ondansetron, promethazine, diphenhydrAMINE, dicyclomine, morphine    Lab Data:  CBC:   Recent Labs     07/24/20  1615 07/25/20  0400 07/26/20  0555   WBC 17.7* 18.8* 11.8*   HGB 9.4* 8.5* 5.6*   HCT 32.5* 29.4* 21.6*   MCV 74.5* 73.9* 83.4   * 549* 372     BMP:   Recent Labs     07/24/20  1615 07/25/20  0400 07/26/20  0555    138 142   K 3.3* 3.7 3.8   CL 98* 99 102   CO2 25 26 13*   PHOS  --  3.8 7.9*   BUN 13 23 28*   CREATININE 0.8* 1.0 1.5*     LIVER PROFILE:   Recent Labs     07/24/20  1615 07/25/20  0400 07/26/20  0555   AST 67* 52*  52* 34  34   ALT 16 15  15 11  11   BILIDIR  --  0.5* 0.3   BILITOT 1.2* 0.8  0.8 0.5  0.5   ALKPHOS 80 71  71 49  49     PT/INR:   Recent Labs     07/25/20  0400 07/26/20  0555   PROTIME 16.9* 18.0*   INR 1.39 1.48     APTT:   Recent Labs     07/25/20  0400 07/26/20  0555   APTT 34.8 38.9*     BNP:  No results for input(s): BNP in the last 72 hours. TROPONIN: @TROPONINI:3@      Assessment:  60 y. o.year old who is admitted for          Plan:  1. Shock: ON MULTIPLE pressors,echo ordered, recommend Red blood cell transfusions, IVF, patient is very critical, may not make it  2. S/p explatory lap;' as per sx  3. Severe anemia: recommend GI evaluation  4. Elevated trop: Type II NSTEMI with sepsis and possible pneumonia and bronchitis, echo today   5. Sepsis and pneumonia and UTI: recommend treatment with broad spectrum antibioticds  6. Sacral decubitis: recommend wound consult  7. Critical care 35 mins  Hypoxemia: in ICU on BIPAP, recommend to monitor oxygen sat. all other medications of all above medical condition listed as is.       Jaida Howard MD 7/26/2020 10:15 AM

## 2020-07-26 NOTE — PROGRESS NOTES
Patient is in septic shock, requiring 4 pressors at this time. Dr Senthil Tadeo said the surgery was un eventful yesterday with not much of blood loss. He has severe metabolic acidosis on ABG, given 3 amp Bicarb, started on Bicarb drip, nephrology consulted for JERAMIE and low urine out put. DR Ashley Pina at bed side to get stat ECHO. His hemoglobin dropped to 5.4, ordered 2 units transfusion. Tried to reach son at [phone](RO called by Dr Senthil Tadeo for obtaining consent yesterday) and [phone](VHGIXJ in chart), not successful. Added Solucartef and Thiamine. Discussed with nurse at bed side.       Electronically signed by Ankush Mart MD on 7/26/2020 at 10:24 AM

## 2020-07-26 NOTE — PROGRESS NOTES
07/26/20 0406   Vent Information   Vent Type 980   Vent Mode AC/VC   Vt Ordered 450 mL   Rate Set 14 bmp   Peak Flow 50 L/min   Pressure Support 0 cmH20   FiO2  40 %   Sensitivity 3   PEEP/CPAP 5   I Time/ I Time % 0 s   Humidification Source HME   Vent Patient Data   High Peep/I Pressure 0   Peak Inspiratory Pressure 21 cmH2O   Mean Airway Pressure 11 cmH20   Rate Measured 26 br/min   Vt Exhaled 637 mL   Minute Volume 11.8 Liters   I:E Ratio 1:2.20   Cough/Sputum   Sputum How Obtained Endotracheal;Suctioned   $Obtained Sample $Induced Sputum   Cough Productive   Sputum Amount Moderate   Sputum Color Creamy; Yellow   Tenacity Thick   Spontaneous Breathing Trial (SBT) RT Doc   Pulse 92   Additional Respiratory  Assessments   Resp 26   Alarm Settings   High Pressure Alarm 40 cmH2O   Delay Alarm 20 sec(s)   Low Minute Volume Alarm 2.5 L/min   Apnea (secs) 20 secs   High Respiratory Rate 40 br/min   Low Exhaled Vt  250 mL   ETT (adult)   Placement Date/Time: 07/25/20 5338   Preoxygenation: Yes  Mask Ventilation: Mask ventilation not attempted (0)  Technique: Direct laryngoscopy  Type: Cuffed  Tube Size: 7 mm  Laryngoscope: Mac  Blade Size: 3  Location: Oral  Grade View: Full view of t. ..    Secured at 23 cm   Measured From Lips   ET Placement Right   Secured By Commercial tube raza   Site Condition Dry   Cuff Pressure   (minimal leak)

## 2020-07-26 NOTE — PROGRESS NOTES
NEPHROLOGY:  - PATIENT IS VERY CRITICALLY ILL  - ON MANY PRESSORS, AT LEAST 4  - WILL NOT TOLERATE CRRT IN THIS CURRENT STATE  - GIVE A BICARB DRIP FOR THE METABOLIC ACIDOSIS  - MONITOR IONIZED CALCIUM AND REPLETE AS NEEDED, WHILE ON BICARB GTT  - GIVE ALBUMIN  - PATIENT IN SEPTIC SHOCK WITH ISCHEMIC BOWEL S/P OR  - SUPPORTIVE MGMT  - PROGNOSIS GUARDED

## 2020-07-26 NOTE — PROGRESS NOTES
OLVIN AG messaged via perfect serve:    UO all night is only 125. ..dark lit. Have been increasing Levo. All night has needed Tucker gtt, that maxed, then Vaso started, then Levo started and still increasing. HR instead of being 120s is now 80s, good improvement. Temp is 33.9. Warming blanket is on pt.

## 2020-07-26 NOTE — PROGRESS NOTES
CT SCAN OF ABD/PEL W/O CONTRAST IS TO BE DONE AT A LATER DATE  NICOLE BASHIR SAID THERE WAS A NOTE FROM ORDERING PHYSICIAN THAT CT SCAN IS TO BE DONE WHEN PT IS MORE STABLE  PT IS UNSTABLE  E Hiren Ambrocio RN [id number]

## 2020-07-26 NOTE — PROGRESS NOTES
Messaged Perla Ocampo via perfect serve.      ABGs redone tonight:    Ref Range & Units  07/25/20 2000   pH, Bld  7.34 - 7.45  7. 26Low      pCO2, Arterial  32 - 45 MMHG  58.0High      pO2, Arterial  75 - 100 MMHG  91    Base Excess  0 - 3.3  2    Comment: MINUS    HCO3, Arterial  18 - 23 MMOL/L  26.0High      CO2 Content  19 - 24 MMOL/L  27.8High      O2 Sat  96 - 97 %  94.7Low      Carbon Monoxide, Blood  0 - 5 %  2.4    Methemoglobin, Arterial  <1.5 %  1.0    Comment   AC 14  PEEP 5 50%      Current vent settings: SIMV rate 14, peep 5, tv 450, O2 50%      XR done to confirm OG and ETT placement:  Impression    1. Endotracheal tube 4.5 cm above the navid. 2. Tip and side port of the enteric tube in the gastric body.     3. No acute process in the chest.    4. Bowel-gas pattern compatible with ileus.

## 2020-07-26 NOTE — PROGRESS NOTES
Spoke to Dr Orin Hendrix via phone. Discussed pt thoroughly. Order to d/c renata. Order to start epi gtt. Wants warming blanket in addition to clive hugger.

## 2020-07-26 NOTE — PROGRESS NOTES
Dr. Sylwia Rivera at bedside, multiple attempts made to contact pts son from number listed in Epic and additional phone number given previously from pt. Therefor unable to update family on pt condition.

## 2020-07-26 NOTE — PROGRESS NOTES
Pt noted to be profusely diaphoretic, Temp 37.3 warming blanket turned off at this time, HR noted to be 130s. EPOC . Dr. Ayaka Collier at bedside and notified.

## 2020-07-26 NOTE — PROGRESS NOTES
Pr Susana Denis, advance ETT by 2cm and recheck CXR    Alondra Mitchell called to come and advance tube

## 2020-07-26 NOTE — CONSULTS
Nephrology Service Consultation        [address], [address] [address]  Phone: [phone]  Office Hours: 8:30AM - 4:30PM  Monday - Friday           Patient:  Ben Harmon  MRN: [id number]  Consulting physician:  Pranav Mitchell,*  Reason for Consult: elevated creatinine      PCP: No primary care provider on file. HISTORY OF PRESENT ILLNESS:   The patient is a 60 y.o. male presented with a fall. He was found to have ischemic bowel ob ct a/p an was taken to the or. Renal consult for  metabolic acidosis. He is also oliguric. He is on many pressors. hgb was down to 5 this morning    REVIEW OF SYSTEMS:  Can not obtain due to mental status    Past Medical History:        Diagnosis Date    Hypertension        Past Surgical History:        Procedure Laterality Date    BACK SURGERY         Medications:   Prior to Admission medications    Medication Sig Start Date End Date Taking? Authorizing Provider   HYDROcodone-acetaminophen (NORCO) 5-325 MG per tablet Take 1-2 tablets by mouth every 4 hours as needed for Pain. 4/4/15   Lucia Valderrama MD   methadone (METHADOSE) 40 MG disintegrating tablet Take 60 mg by mouth daily. Historical Provider, MD        Allergies:  Patient has no known allergies. Social History:   TOBACCO:   reports that he has been smoking cigarettes. He has been smoking about 1.50 packs per day. He does not have any smokeless tobacco history on file. ETOH:   reports no history of alcohol use. OCCUPATION:      Family History:   History reviewed. No pertinent family history.         Physical Exam:    Vitals: BP (!) 84/54   Pulse 130   Temp 99 °F (37.2 °C)   Resp 20   Ht 5' 8\" (1.727 m)   Wt 127 lb (57.6 kg)   SpO2 97%   BMI 19.31 kg/m²   General appearance: in no acute distress, appears stated age  Skin: Skin color, texture, turgor normal. No rashes or lesions  HEENT: normocephalic, atraumatic, et tube in place  Neck: supple, trachea midline  Lungs: , breathing comfortably on mv  Heart[de-identified] regular rate and rhythm, S1, S2 normal,  Abdomen: soft, ventral incision is covered with dressing  Extremities: extremities normal, atraumatic, no cyanosis or edema  Neurologic: sedated  Psychiatric: mood and affect can not be assessed    CBC:   Recent Labs     07/24/20 1615 07/25/20  0400 07/26/20  0555   WBC 17.7* 18.8* 11.8*   HGB 9.4* 8.5* 5.6*   * 549* 372     BMP:    Recent Labs     07/24/20 1615 07/25/20  0400 07/26/20  0555    138 142   K 3.3* 3.7 3.8   CL 98* 99 102   CO2 25 26 13*   BUN 13 23 28*   CREATININE 0.8* 1.0 1.5*   GLUCOSE 116* 108* 108*     Hepatic:   Recent Labs     07/24/20 1615 07/25/20  0400 07/26/20  0555   AST 67* 52*  52* 34  34   ALT 16 15  15 11  11   BILITOT 1.2* 0.8  0.8 0.5  0.5   ALKPHOS 80 71  71 49  49     Troponin: No results for input(s): TROPONINI in the last 72 hours. BNP: No results for input(s): BNP in the last 72 hours. Lipids: No results for input(s): CHOL, HDL in the last 72 hours. Invalid input(s): LDLCALCU  ABGs:   Lab Results   Component Value Date    PO2ART 70 07/26/2020    OSZ4JPZ 24.0 07/26/2020     INR:   Recent Labs     07/25/20 0400 07/26/20  0555   INR 1.39 1.48       I/O last 3 completed shifts:   In: 5232 [I.V.:3360]  Out: 1979 [Urine:1025; Emesis/NG output:410; Blood:10]      I/O this shift:  In: -   Out: 210 [Emesis/NG output:210]     -----------------------------------------------------------------      Assessment and Recommendations     Patient Active Problem List    Diagnosis Date Noted    Leukocytosis     Sepsis ([facility] [address] 07/24/2020       JERAMIE FROM ATN  ANION GAP METABOLIC ACIDOSIS  LACTIC ACIDOSIS  ISCHEMIC BOWEL S/P SURGERY YESTERDAY  STAPH BACTEREMIA  ACUTE HYPOXIC RESP FAILURE  HEROIN AND COCAINE USE    BP IS BETTER THAN EARLIER THIS AM SO WILL START CRRT  WILL CONSULT IR FOR TEMP HD CATH  CRITICALLY ILL  GUARDED PROGNOSIS

## 2020-07-26 NOTE — PLAN OF CARE
Problem: Falls - Risk of:  Goal: Will remain free from falls  Description: Will remain free from falls  Outcome: Ongoing  Goal: Absence of physical injury  Description: Absence of physical injury  Outcome: Ongoing     Problem: Pain:  Goal: Pain level will decrease  Description: Pain level will decrease  Outcome: Ongoing  Goal: Control of acute pain  Description: Control of acute pain  Outcome: Ongoing  Goal: Control of chronic pain  Description: Control of chronic pain  Outcome: Ongoing     Problem: Diarrhea:  Goal: Bowel elimination is within specified parameters  Description: Bowel elimination is within specified parameters  Outcome: Ongoing  Goal: Passage of soft, formed stool  Description: Passage of soft, formed stool  Outcome: Ongoing  Goal: Establishment of normal bowel function will improve to within specified parameters  Description: Establishment of normal bowel function will improve to within specified parameters  Outcome: Ongoing     Problem: Nausea/Vomiting:  Goal: Absence of nausea/vomiting  Description: Absence of nausea/vomiting  Outcome: Ongoing  Goal: Able to drink  Description: Able to drink  Outcome: Ongoing  Goal: Able to eat  Description: Able to eat  Outcome: Ongoing  Goal: Ability to achieve adequate nutritional intake will improve  Description: Ability to achieve adequate nutritional intake will improve  Outcome: Ongoing     Problem: Breathing Pattern - Ineffective:  Goal: Ability to achieve and maintain a regular respiratory rate will improve  Description: Ability to achieve and maintain a regular respiratory rate will improve  Outcome: Ongoing     Problem: Fluid Volume:  Goal: Ability to achieve a balanced intake and output will improve  Description: Ability to achieve a balanced intake and output will improve  Outcome: Ongoing     Problem: Physical Regulation:  Goal: Ability to maintain clinical measurements within normal limits will improve  Description: Ability to maintain clinical measurements within normal limits will improve  Outcome: Ongoing  Goal: Will show no signs and symptoms of electrolyte imbalance  Description: Will show no signs and symptoms of electrolyte imbalance  Outcome: Ongoing     Problem: Restraint Use - Nonviolent/Non-Self-Destructive Behavior:  Goal: Absence of restraint indications  Description: Absence of restraint indications  Outcome: Ongoing  Goal: Absence of restraint-related injury  Description: Absence of restraint-related injury  Outcome: Ongoing     Problem: Skin Integrity:  Goal: Will show no infection signs and symptoms  Description: Will show no infection signs and symptoms  Outcome: Ongoing  Goal: Absence of new skin breakdown  Description: Absence of new skin breakdown  Outcome: Ongoing     Problem: Discharge Planning:  Goal: Participates in care planning  Description: Participates in care planning  Outcome: Ongoing  Goal: Discharged to appropriate level of care  Description: Discharged to appropriate level of care  Outcome: Ongoing     Problem: Airway Clearance - Ineffective:  Goal: Ability to maintain a clear airway will improve  Description: Ability to maintain a clear airway will improve  Outcome: Ongoing     Problem: Anxiety/Stress:  Goal: Level of anxiety will decrease  Description: Level of anxiety will decrease  Outcome: Ongoing     Problem: Aspiration:  Goal: Absence of aspiration  Description: Absence of aspiration  Outcome: Ongoing     Problem:  Bowel Function - Altered:  Goal: Bowel elimination is within specified parameters  Description: Bowel elimination is within specified parameters  Outcome: Ongoing     Problem: Cardiac Output - Decreased:  Goal: Hemodynamic stability will improve  Description: Hemodynamic stability will improve  Outcome: Ongoing     Problem: Fluid Volume - Imbalance:  Goal: Absence of imbalanced fluid volume signs and symptoms  Description: Absence of imbalanced fluid volume signs and symptoms  Outcome: Ongoing     Problem: Gas Exchange - Impaired:  Goal: Levels of oxygenation will improve  Description: Levels of oxygenation will improve  Outcome: Ongoing     Problem: Mental Status - Impaired:  Goal: Mental status will be restored to baseline  Description: Mental status will be restored to baseline  Outcome: Ongoing     Problem: Nutrition Deficit:  Goal: Ability to achieve adequate nutritional intake will improve  Description: Ability to achieve adequate nutritional intake will improve  Outcome: Ongoing     Problem: Pain:  Goal: Pain level will decrease  Description: Pain level will decrease  Outcome: Ongoing  Goal: Recognizes and communicates pain  Description: Recognizes and communicates pain  Outcome: Ongoing  Goal: Control of acute pain  Description: Control of acute pain  Outcome: Ongoing  Goal: Control of chronic pain  Description: Control of chronic pain  Outcome: Ongoing     Problem: Serum Glucose Level - Abnormal:  Goal: Ability to maintain appropriate glucose levels will improve to within specified parameters  Description: Ability to maintain appropriate glucose levels will improve to within specified parameters  Outcome: Ongoing     Problem: Skin Integrity - Impaired:  Goal: Will show no infection signs and symptoms  Description: Will show no infection signs and symptoms  Outcome: Ongoing  Goal: Absence of new skin breakdown  Description: Absence of new skin breakdown  Outcome: Ongoing     Problem: Sleep Pattern Disturbance:  Goal: Appears well-rested  Description: Appears well-rested  Outcome: Ongoing     Problem: Tissue Perfusion, Altered:  Goal: Circulatory function within specified parameters  Description: Circulatory function within specified parameters  Outcome: Ongoing     Problem: Tissue Perfusion - Cardiopulmonary, Altered:  Goal: Absence of angina  Description: Absence of angina  Outcome: Ongoing  Goal: Hemodynamic stability will improve  Description: Hemodynamic stability will improve  Outcome: Ongoing

## 2020-07-26 NOTE — PROGRESS NOTES
Tucker gtt maxed out, BP decreasing. Srinivasa AG messaged via perfect serve. Order placed for vaso gtt.

## 2020-07-26 NOTE — PROGRESS NOTES
Discussed pt condition with Dr. Matheus Agarwal MD to notify pts family of events overnight and current. See notes and flowsheets for details.

## 2020-07-26 NOTE — PROGRESS NOTES
Once    sodium chloride flush  10 mL Intravenous BID    sodium chloride flush  10 mL Intravenous 2 times per day    enoxaparin  1 mg/kg Subcutaneous Daily    cefepime  2 g Intravenous Q12H    nicotine  1 patch Transdermal Daily      Infusions:    vasopressin (Septic Shock) infusion 0.04 Units/min (07/26/20 1050)    norepinephrine 30 mcg/min (07/26/20 0830)    EPINEPHrine 26 mcg/min (07/26/20 1236)    phenylephrine (IRENE-SYNEPHRINE) 50mg/250mL infusion 100 mcg/min (07/26/20 1321)    IV infusion builder 200 mL/hr at 07/26/20 1157    prismaSATE BGK 4/2.5      prismaSATE BGK 4/2.5      prismaSATE BGK 4/2.5      fentanyl Stopped (07/26/20 0526)    propofol Stopped (07/26/20 0400)     PRN Meds: sodium chloride, 2,000 mL, PRN  potassium chloride, 20 mEq, PRN  magnesium sulfate, 1 g, PRN  calcium gluconate, 1 g, PRN    Or  calcium gluconate, 2 g, PRN    Or  calcium gluconate, 3 g, PRN    Or  calcium gluconate, 4 g, PRN  sodium phosphate IVPB, 6 mmol, PRN    Or  sodium phosphate IVPB, 12 mmol, PRN    Or  sodium phosphate IVPB, 18 mmol, PRN    Or  sodium phosphate IVPB, 24 mmol, PRN  ipratropium-albuterol, 1 ampule, Q4H PRN  iopamidol, 80 mL, ONCE PRN  sodium chloride flush, 10 mL, PRN  acetaminophen, 650 mg, Q6H PRN    Or  acetaminophen, 650 mg, Q6H PRN  polyethylene glycol, 17 g, Daily PRN  promethazine, 12.5 mg, Q6H PRN    Or  ondansetron, 4 mg, Q6H PRN  promethazine, 25 mg, Q6H PRN  diphenhydrAMINE, 12.5 mg, Nightly PRN  dicyclomine, 20 mg, Q6H PRN  morphine, 2 mg, Q4H PRN      Pertinent New Labs & Imaging Studies     CBC with Differential:    Lab Results   Component Value Date    WBC 11.8 07/26/2020    RBC 2.59 07/26/2020    HGB 5.6 07/26/2020    HCT 21.6 07/26/2020     07/26/2020    MCV 83.4 07/26/2020    MCH 21.6 07/26/2020    MCHC 25.9 07/26/2020    RDW 25.5 07/26/2020    SEGSPCT 88.2 07/26/2020    BANDSPCT 12 07/25/2020    LYMPHOPCT 6.6 07/26/2020    MONOPCT 3.6 07/26/2020    EOSPCT 0.8 03/11/2011    BASOPCT 0.1 07/26/2020    MONOSABS 0.4 07/26/2020    LYMPHSABS 0.8 07/26/2020    EOSABS 0.0 07/26/2020    BASOSABS 0.0 07/26/2020    DIFFTYPE AUTOMATED DIFFERENTIAL 07/26/2020     CMP:    Lab Results   Component Value Date     07/26/2020    K 3.8 07/26/2020     07/26/2020    CO2 13 07/26/2020    BUN 28 07/26/2020    CREATININE 1.5 07/26/2020    GFRAA 57 07/26/2020    LABGLOM 47 07/26/2020    GLUCOSE 108 07/26/2020    PROT 6.3 07/26/2020    PROT 6.3 07/26/2020    PROT 8.4 11/06/2010    LABALBU 2.1 07/26/2020    LABALBU 2.1 07/26/2020    CALCIUM 8.4 07/26/2020    BILITOT 0.5 07/26/2020    BILITOT 0.5 07/26/2020    ALKPHOS 49 07/26/2020    ALKPHOS 49 07/26/2020    AST 34 07/26/2020    AST 34 07/26/2020    ALT 11 07/26/2020    ALT 11 07/26/2020     Xr Hip Right (1 View)    Result Date: 7/24/2020  EXAMINATION: ONE XRAY VIEW OF THE RIGHT HIP; ONE XRAY VIEW OF THE PELVIS AND TWO XRAY VIEWS LEFT HIP 7/24/2020 4:55 pm COMPARISON: None. HISTORY: ORDERING SYSTEM PROVIDED HISTORY: fall TECHNOLOGIST PROVIDED HISTORY: Reason for exam:->fall Reason for Exam: pain Acuity: Acute Type of Exam: Initial Mechanism of Injury: patient was in kitchen and fell, was down for 2 days Relevant Medical/Surgical History: na FINDINGS: Unremarkable appearance of the right and left hemipelvis, articulating normally at the SI joints and pubic symphysis. Visualized portions of the right and left femurs are intact align normally at the right and left acetabulum. Right and left hip joints appear well maintained. Unremarkable AP pelvis and bilateral hip radiographs. If pain persists or worsens, then additional evaluation with MRI is indicated to ensure no underlying radiographically occult process such as fracture, AVN or transient osteoporosis.      Ct Abdomen Pelvis W Iv Contrast    Result Date: 7/25/2020  EXAMINATION: CTA OF THE CHEST; CT OF THE ABDOMEN AND PELVIS WITH CONTRAST 7/25/2020 10:48 am TECHNIQUE: CTA of the chest was performed after the administration of intravenous contrast.  Multiplanar reformatted images are provided for review. MIP images are provided for review. Dose modulation, iterative reconstruction, and/or weight based adjustment of the mA/kV was utilized to reduce the radiation dose to as low as reasonably achievable.; CT of the abdomen and pelvis was performed with the administration of intravenous contrast. Multiplanar reformatted images are provided for review. Dose modulation, iterative reconstruction, and/or weight based adjustment of the mA/kV was utilized to reduce the radiation dose to as low as reasonably achievable. COMPARISON: CT abdomen and pelvis dated 10/27/2010, portable chest radiograph dated 07/24/2010 HISTORY: ORDERING SYSTEM PROVIDED HISTORY: hypoxia TECHNOLOGIST PROVIDED HISTORY: Reason for exam:->hypoxia Reason for Exam: SHORTNESS OF BREATH/  R/O pe; ORDERING SYSTEM PROVIDED HISTORY: Abdominal pain TECHNOLOGIST PROVIDED HISTORY: Reason for exam:->Abdominal pain Reason for Exam: Abdominal pain FINDINGS: Pulmonary Arteries: There is adequate opacification of the pulmonary arteries. The pulmonary arteries are normal caliber. There are no pulmonary arterial filling defects. Streak artifact slightly limits the exam. Mediastinum: A right IJ catheter has its tip in the superior vena cava. The thoracic aorta is of normal caliber. There is no evidence of thoracic aortic dissection. The heart size is within normal limits. There is no pericardial effusion. Lungs/pleura: There is mild centrilobular emphysema. There is minor consolidation at the right lung base posteromedially. There is no pleural effusion, pneumothorax or evidence of edema. There is minimal atelectasis at the left lung base. Motion degrades the quality of the exam.  There are small foci of ground-glass opacity within the left lower lobe medially. There is mucous impaction within right lower lobe bronchi.  Soft Tissues/Bones: 3rd spacing is noted. The bones are demineralized. There are endplate destructive changes at T12-L1. These are new when compared to the 2010 exam.  There is associated sclerosis of the vertebral bodies. No paravertebral fluid collection is appreciated. CT abdomen and pelvis: Organs: No focal hepatic abnormality is identified. No focal splenic abnormality is appreciated no focal pancreatic abnormality is identified. The adrenal glands are unremarkable. The gallbladder is absent. The kidneys are not obstructed. There are areas of cortical decreased attenuation within the right kidney. There is a rounded low-attenuation lesion within the left renal lower pole, likely a cyst.  There is nonobstructing left lower pole renal calculus. There are small foci of low attenuation within the left renal cortex. Bowel: The bowel is not obstructed. The appendix is believed to be within normal limits. There are air foci within the posterior wall of the right colon which is most likely reflects pseudo pneumatosis. There is no mesenteric or portal venous gas. Pelvis: No free fluid is noted in the pelvis. A Singh catheter is in place. There is ectasia of the common iliac arteries. Retroperitoneum: The abdominal aorta is of normal caliber. There is no evidence of free intraperitoneal air. Bones and soft tissues: 3rd spacing is noted. There is a large decubitus ulcer involving the right buttock. A defined abscess is not identified. No bony destruction is appreciated. Postsurgical changes are noted in the lumbar spine. There is ankylosis L2-3. There is degenerative disc disease at L3-4.     1. No evidence of pulmonary embolic disease. Motion and streak artifact limits the exam. 2. Minimal left basilar ground-glass opacity. Mucous impaction involving right lower lobe bronchi. Consider aspiration pneumonitis. 3. Chronic appearing destructive change at the T12-L1 disc space.   Consider prior infectious spondylitis. Correlation with lab values may be helpful to exclude acute discitis and osteomyelitis. 4. Minimal foci of hypoattenuation within the renal cortices, consider pyelonephritis or small infarcts. 5. Diffuse 3rd spacing. 6. Lucency within the right colonic wall, most likely pseudo pneumatosis. Correlation with lactate levels recommended to exclude bowel ischemia. 7. Large decubitus ulcer involving the right posterior thigh and buttock. No defined abscess or evidence of osteomyelitis. 8. Nonobstructing left renal calculus. Xr Chest Portable    Result Date: 7/24/2020  EXAMINATION: ONE XRAY VIEW OF THE CHEST 7/24/2020 9:02 pm COMPARISON: 7/24/2020 HISTORY: ORDERING SYSTEM PROVIDED HISTORY: central line placement TECHNOLOGIST PROVIDED HISTORY: Reason for exam:->central line placement Reason for Exam: central line placement Acuity: Acute Type of Exam: Initial Additional signs and symptoms: SOB Relevant Medical/Surgical History: none FINDINGS: Left costophrenic angle clipped from field of view. Right IJ central venous catheter has been placed with tip in the distal SVC. No pneumothorax. Lungs remain clear. Cardiac and mediastinal silhouettes are stable. Stable mild blunting of right costophrenic angle compatible with trace to small right pleural effusion. Stable appearance to bony structures. Placement of right IJ central venous catheter with tip in the distal SVC. No pneumothorax. Otherwise stable exam with trace to small right pleural effusion. Xr Chest Portable    Result Date: 7/24/2020  EXAMINATION: ONE XRAY VIEW OF THE CHEST 7/24/2020 4:55 pm COMPARISON: None.  HISTORY: ORDERING SYSTEM PROVIDED HISTORY: fall TECHNOLOGIST PROVIDED HISTORY: Reason for exam:->fall Reason for Exam: fall Acuity: Acute Type of Exam: Initial Mechanism of Injury: patient fell in the kitchen, was down for 2 days Relevant Medical/Surgical History: na FINDINGS: No pneumothorax or pulmonary contusion or effusion is identified. The heart size is normal.     No acute abnormality detected. Cta Pulmonary W Contrast    Result Date: 7/25/2020  EXAMINATION: CTA OF THE CHEST; CT OF THE ABDOMEN AND PELVIS WITH CONTRAST 7/25/2020 10:48 am TECHNIQUE: CTA of the chest was performed after the administration of intravenous contrast.  Multiplanar reformatted images are provided for review. MIP images are provided for review. Dose modulation, iterative reconstruction, and/or weight based adjustment of the mA/kV was utilized to reduce the radiation dose to as low as reasonably achievable.; CT of the abdomen and pelvis was performed with the administration of intravenous contrast. Multiplanar reformatted images are provided for review. Dose modulation, iterative reconstruction, and/or weight based adjustment of the mA/kV was utilized to reduce the radiation dose to as low as reasonably achievable. COMPARISON: CT abdomen and pelvis dated 10/27/2010, portable chest radiograph dated 07/24/2010 HISTORY: ORDERING SYSTEM PROVIDED HISTORY: hypoxia TECHNOLOGIST PROVIDED HISTORY: Reason for exam:->hypoxia Reason for Exam: SHORTNESS OF BREATH/  R/O pe; ORDERING SYSTEM PROVIDED HISTORY: Abdominal pain TECHNOLOGIST PROVIDED HISTORY: Reason for exam:->Abdominal pain Reason for Exam: Abdominal pain FINDINGS: Pulmonary Arteries: There is adequate opacification of the pulmonary arteries. The pulmonary arteries are normal caliber. There are no pulmonary arterial filling defects. Streak artifact slightly limits the exam. Mediastinum: A right IJ catheter has its tip in the superior vena cava. The thoracic aorta is of normal caliber. There is no evidence of thoracic aortic dissection. The heart size is within normal limits. There is no pericardial effusion. Lungs/pleura: There is mild centrilobular emphysema. There is minor consolidation at the right lung base posteromedially. There is no pleural effusion, pneumothorax or evidence of edema.   There is minimal atelectasis at the left lung base. Motion degrades the quality of the exam.  There are small foci of ground-glass opacity within the left lower lobe medially. There is mucous impaction within right lower lobe bronchi. Soft Tissues/Bones: 3rd spacing is noted. The bones are demineralized. There are endplate destructive changes at T12-L1. These are new when compared to the 2010 exam.  There is associated sclerosis of the vertebral bodies. No paravertebral fluid collection is appreciated. CT abdomen and pelvis: Organs: No focal hepatic abnormality is identified. No focal splenic abnormality is appreciated no focal pancreatic abnormality is identified. The adrenal glands are unremarkable. The gallbladder is absent. The kidneys are not obstructed. There are areas of cortical decreased attenuation within the right kidney. There is a rounded low-attenuation lesion within the left renal lower pole, likely a cyst.  There is nonobstructing left lower pole renal calculus. There are small foci of low attenuation within the left renal cortex. Bowel: The bowel is not obstructed. The appendix is believed to be within normal limits. There are air foci within the posterior wall of the right colon which is most likely reflects pseudo pneumatosis. There is no mesenteric or portal venous gas. Pelvis: No free fluid is noted in the pelvis. A Singh catheter is in place. There is ectasia of the common iliac arteries. Retroperitoneum: The abdominal aorta is of normal caliber. There is no evidence of free intraperitoneal air. Bones and soft tissues: 3rd spacing is noted. There is a large decubitus ulcer involving the right buttock. A defined abscess is not identified. No bony destruction is appreciated. Postsurgical changes are noted in the lumbar spine. There is ankylosis L2-3. There is degenerative disc disease at L3-4.     1. No evidence of pulmonary embolic disease.   Motion and streak artifact limits the exam. 2. Minimal left basilar ground-glass opacity. Mucous impaction involving right lower lobe bronchi. Consider aspiration pneumonitis. 3. Chronic appearing destructive change at the T12-L1 disc space. Consider prior infectious spondylitis. Correlation with lab values may be helpful to exclude acute discitis and osteomyelitis. 4. Minimal foci of hypoattenuation within the renal cortices, consider pyelonephritis or small infarcts. 5. Diffuse 3rd spacing. 6. Lucency within the right colonic wall, most likely pseudo pneumatosis. Correlation with lactate levels recommended to exclude bowel ischemia. 7. Large decubitus ulcer involving the right posterior thigh and buttock. No defined abscess or evidence of osteomyelitis. 8. Nonobstructing left renal calculus. Xr Hip 1 Vw W Pelvis Left    Result Date: 7/24/2020  EXAMINATION: ONE XRAY VIEW OF THE RIGHT HIP; ONE XRAY VIEW OF THE PELVIS AND TWO XRAY VIEWS LEFT HIP 7/24/2020 4:55 pm COMPARISON: None. HISTORY: ORDERING SYSTEM PROVIDED HISTORY: fall TECHNOLOGIST PROVIDED HISTORY: Reason for exam:->fall Reason for Exam: pain Acuity: Acute Type of Exam: Initial Mechanism of Injury: patient was in kitchen and fell, was down for 2 days Relevant Medical/Surgical History: na FINDINGS: Unremarkable appearance of the right and left hemipelvis, articulating normally at the SI joints and pubic symphysis. Visualized portions of the right and left femurs are intact align normally at the right and left acetabulum. Right and left hip joints appear well maintained. Unremarkable AP pelvis and bilateral hip radiographs. If pain persists or worsens, then additional evaluation with MRI is indicated to ensure no underlying radiographically occult process such as fracture, AVN or transient osteoporosis.        Assessment and Plan:   Jorge Rosenberg is a 60 y.o.  male  who presents with Fall    Septic shock  Gram-positive cocci bacteremia  Possible TSS  ulcers unlikely be the source  Pneumonia on chest x-ray possibly aspiration  Monitor vitals closely  ECHO was ordered already  Awaiting for sensitivities  Monitored CBC  ID consult pending  Lactic acidosis resolved  Continue Vancomycin and Cefepime  Added Clindamycin  S/p exploratory laparotomy, surgery said was un eventful    HAGMA  JERAMIE likely ATN  Pre renal JERAMIE  Nephrology consulted, said would proceed with CRRT  HCO3 infusion and as needed bolus    Acute on chronic respiratory failure with hypoxemia  Likely from aspiration pneumonia  He was intubated after procedure  CTA with Multiple infiltrates on the left side of the lung  Pulmonology recommendations appreciated  Continue antibiotics as above   CT abdomen ordered by Dr Tyrel Lopez to r/o retroperitoneal bleeding    Acute blood loss anemia  Not sure about source  Ordered hemo occult  2 units transfusion ordered  Will monitor [place]    NSTEMI suspected type II injury  Troponin trending down  Echo pending  Continue therapeutic Lovenox  Cardiology recommendations pending    Chronic wounds  Decubitus ulcer  Wound care following     Hypokalemia resolved    History of gastric cancer likely stage 4    Opiate abuse    Repeat falls    Patient is in septic shock, requiring 4 pressors at this time. Dr Bigg Merino said the surgery was un eventful yesterday with not much of blood loss. He has severe metabolic acidosis on ABG, given 3 amp Bicarb, started on Bicarb drip, nephrology consulted for JERAMIE and low urine out put. DR Kye Monsivais at bed side to get stat ECHO. His hemoglobin dropped to 5.4, ordered 2 units transfusion. Tried to reach son at [phone]([facility] called by Dr Bigg Merino for obtaining consent yesterday) and [phone](XAIYPT in chart), not successful. Added Solucartef and Thiamine. Discussed with nurse at bed side. Prognosis is very poor. Dr Tyrel Lopez ordered Palliative care consult.     Critical time spent on patient ~60 min  Diet Diet NPO Effective Now   DVT Prophylaxis [] Lovenox, []  Heparin, [] SCDs, [x] Hold chemical prophylaxis   GI Prophylaxis [x] PPI,  [] H2 Blocker,  [] Carafate,  [] Diet/Tube Feeds   Code Status Full Code   Disposition Patient requires continued admission due to septic shock   MDM [] Low, [] Moderate,[x]  High     Electronically signed by Maksim Johnson MD on 7/26/2020 at 1:27 PM

## 2020-07-26 NOTE — PROGRESS NOTES
Arnol AG at bedside. Vaso on and not increasing BP. Order placed for levo gtt since pt did not have ischemic bowel.

## 2020-07-26 NOTE — ANESTHESIA PROCEDURE NOTES
Arterial Line:    An arterial line was placed using ultrasound guidance, in the ICU for the following indication(s): continuous blood pressure monitoring. A 20 gauge (size), (length), Arrow (type) catheter was placed, into the right radial artery, secured by tape and Tegaderm. Events:  patient tolerated procedure well with no complications and EBL < 5mL.   7/26/2020 8:56 AM7/26/2020 9:41 AM  Resident/CRNA: MARLENI Shah - CRNA  Performed: Resident/CRNA   Preanesthetic Checklist  Completed: patient identified, site marked, surgical consent, pre-op evaluation, timeout performed, IV checked, risks and benefits discussed, monitors and equipment checked, anesthesia consent given, oxygen available and patient being monitored

## 2020-07-26 NOTE — PROGRESS NOTES
ETT advanced by 2cm to 23 at the lip. OG staying at 55 at the lip.      Will recheck CXR for placement

## 2020-07-26 NOTE — PROGRESS NOTES
[address] [facility]  consulted by Dr. Collin Reyes for monitoring and adjustment. Indication for treatment: Septic shock, GPC bacteremia, Pneumonia, IVDU  Goal trough: 15 mcg/mL     Pertinent Laboratory Values:   Temp Readings from Last 3 Encounters:   07/26/20 99 °F (37.2 °C)   07/25/20 (!) 62.3 °F (16.8 °C)     Recent Labs     07/24/20  1615 07/25/20  0400 07/26/20  0555 07/26/20  1045 07/26/20  1300   WBC 17.7* 18.8* 11.8*  --   --    LACTATE 3.7* 1.8  --  21.6* 18.1*     Recent Labs     07/24/20  1615 07/25/20  0400 07/26/20  0555   BUN 13 23 28*   CREATININE 0.8* 1.0 1.5*     Estimated Creatinine Clearance: 42 mL/min (A) (based on SCr of 1.5 mg/dL (H)). Intake/Output Summary (Last 24 hours) at 7/26/2020 1417  Last data filed at 7/26/2020 1149  Gross per 24 hour   Intake 3360 ml   Output 1655 ml   Net 1705 ml       Pertinent Cultures:  Date    Source    Results  7/24   Wound    Ordered  7/24   Urine    Ordered  7/24   Blood    MSSA     Vancomycin level:   TROUGH:  No results for input(s): VANCOTROUGH in the last 72 hours. RANDOM:  No results for input(s): VANCORANDOM in the last 72 hours. Assessment:  · WBC and temperature: WBC elevated (started hydrocortisone); afebrile  · SCr, BUN, and urine output: CRRT started  · Day(s) of therapy: 3   · Vancomycin level: to be collected  · On four pressors, hydrocortisone, thiamine, and vit C     Plan:  · Mr. Aye Crystal was started on vancomycin 1000 mg q12h   · Renal function has worsened, starting CRRT   · Will proceed with intermittent dosing based on levels 2/2 RRT   · Random level tomorrow AM  · Pharmacy will continue to monitor patient and adjust therapy as indicated    Libby Elizabeth SCHEDULED FOR 7/27 @0600    Thank you for the consult.   Linwood Connell, [address]  7/26/2020 2:17 PM

## 2020-07-26 NOTE — PROGRESS NOTES
E. O. PA aware that CXR came back and states ETT is now 3.5 cm above navid. says \"increasing right basilar airspace opacity. \"    Will see what AM ABGs show and continue With the atb and pressors at this time.

## 2020-07-26 NOTE — PROGRESS NOTES
07/26/20 0803   Vent Information   $Ventilation $Subsequent Day   Skin Assessment Clean, dry, & intact   Vent Type 980   Vent Mode AC/VC   Vt Ordered 450 mL   Rate Set 14 bmp   Peak Flow 50 L/min   Pressure Support 0 cmH20   FiO2  40 %   Sensitivity 3   PEEP/CPAP 5   I Time/ I Time % 0 s   Vent Patient Data   High Peep/I Pressure 0   Peak Inspiratory Pressure 20 cmH2O   Mean Airway Pressure 12 cmH20   Rate Measured 31 br/min   Vt Exhaled 458 mL   Minute Volume 14 Liters   I:E Ratio 1.10:1   Plateau Pressure 17 ZDM38   Static Compliance 29 mL/cmH2O   Cough/Sputum   Sputum How Obtained Endotracheal   $Obtained Sample $Induced Sputum   Cough Productive   Frequency Infrequent   Sputum Amount Moderate   Spontaneous Breathing Trial (SBT) RT Doc   Pulse 90   Additional Respiratory  Assessments   Resp (!) 31   Alarm Settings   High Pressure Alarm 40 cmH2O   Press Low Alarm 10 cmH2O   Delay Alarm 20 sec(s)   Low Minute Volume Alarm 2.5 L/min   Apnea (secs) 20 secs   High Respiratory Rate 40 br/min   Low Exhaled Vt  250 mL   Patient Observation   Observations 7.0. ETT (adult)   Placement Date/Time: 07/25/20 3058   Preoxygenation: Yes  Mask Ventilation: Mask ventilation not attempted (0)  Technique: Direct laryngoscopy  Type: Cuffed  Tube Size: 7 mm  Laryngoscope: Mac  Blade Size: 3  Location: Oral  Grade View: Full view of t. ..    Secured at 23 cm   Measured From Lips   Secured By Commercial tube raza   Site Condition Dry

## 2020-07-26 NOTE — PROGRESS NOTES
General Surgery- [facility] & CLINICS Day: 3    ChiefComplaint on Admission: ischemic bowel      Subjective:     Kayden Alfonso is a 60 y.o. male with POD #1 sp negative exp lap . Pt remained on vent post op. He is hypotensive and on pressors this am Tolerating Diet NPO Effective Now. - BM.     ROS:  Review of Systems   Unable to perform ROS: Intubated       Allergies  Patient has no known allergies. Diagnosis Date    Hypertension        Objective:     Vitals:    20 1151   BP:    Pulse: 130   Resp: 20   Temp:    SpO2: 97%       TEMPERATURE:  Current - Temp: 99 °F (37.2 °C); Max - Temp  Av.2 °F (35.7 °C)  Min: 62.3 °F (16.8 °C)  Max: 99 °F (37.2 °C)    I/O this shift:  In: -   Out: 210 [Emesis/NG output:210]I/O last 3 completed shifts: In: 2958 [I.V.:3360]  Out: 4564 [Urine:1025; Emesis/NG output:410; Blood:10]      Physical Exam:    Physical Exam  Constitutional:       General: He is not in acute distress. Neck:      Musculoskeletal: Neck supple. Pulmonary:      Effort: Pulmonary effort is normal.   Abdominal:      General: There is no distension. Palpations: Abdomen is soft. Skin:     General: Skin is warm. Neurological:      Mental Status: He is alert.              Scheduled Meds:   hydrocortisone sodium succinate PF  100 mg Intravenous Q8H    thiamine (VITAMIN B1) IVPB  200 mg Intravenous Q12H    calcium gluconate IVPB  2 g Intravenous Once    albumin human  25 g Intravenous Q6H    vancomycin  1,000 mg Intravenous Q12H    sodium chloride  20 mL Intravenous Once    chlorhexidine  15 mL Mouth/Throat BID    albuterol sulfate HFA  4 puff Inhalation Q4H    And    ipratropium  4 puff Inhalation Q4H    methadone  60 mg Oral Daily    aspirin  324 mg Oral Once    sodium chloride flush  10 mL Intravenous BID    sodium chloride flush  10 mL Intravenous 2 times per day    enoxaparin  1 mg/kg Subcutaneous Daily    cefepime  2 g Intravenous Q12H    nicotine  1 patch Transdermal Daily     Continuous Infusions:   vasopressin (Septic Shock) infusion 0.04 Units/min (07/26/20 1050)    norepinephrine 30 mcg/min (07/26/20 0830)    EPINEPHrine 26 mcg/min (07/26/20 1010)    phenylephrine (IRENE-SYNEPHRINE) 50mg/250mL infusion 300 mcg/min (07/26/20 0956)    IV infusion builder 200 mL/hr at 07/26/20 1157    fentanyl Stopped (07/26/20 0526)    propofol Stopped (07/26/20 0400)     PRN Meds:ipratropium-albuterol, iopamidol, sodium chloride flush, acetaminophen **OR** acetaminophen, polyethylene glycol, promethazine **OR** ondansetron, promethazine, diphenhydrAMINE, dicyclomine, morphine      Labs/Imaging Results:   Lab Results   Component Value Date    WBC 11.8 (H) 07/26/2020    HGB 5.6 (LL) 07/26/2020    HCT 21.6 (L) 07/26/2020    MCV 83.4 07/26/2020     07/26/2020     Lab Results   Component Value Date     07/26/2020    K 3.8 07/26/2020     07/26/2020    CO2 13 (L) 07/26/2020    BUN 28 (H) 07/26/2020    CREATININE 1.5 (H) 07/26/2020    GLUCOSE 108 (H) 07/26/2020    CALCIUM 8.4 07/26/2020    PROT 6.3 (L) 07/26/2020    PROT 6.3 (L) 07/26/2020    LABALBU 2.1 (L) 07/26/2020    LABALBU 2.1 (L) 07/26/2020    BILITOT 0.5 07/26/2020    BILITOT 0.5 07/26/2020    ALKPHOS 49 07/26/2020    ALKPHOS 49 07/26/2020    AST 34 07/26/2020    AST 34 07/26/2020    ALT 11 07/26/2020    ALT 11 07/26/2020    LABGLOM 47 (L) 07/26/2020    GFRAA 57 (L) 07/26/2020       Assessment:     Patient Active Problem List:     Sepsis ([facility] [address]     Leukocytosis      Post ob anemia    Plan:       There is a drop in hemoglobin probably dilutional(min bld loss intra-op)  Getting bld txf now  Prognosis is guarded        Ricardo Rodriguez MD

## 2020-07-26 NOTE — PROGRESS NOTES
Messaged Dr Cleaning Bending via perfect serve:     \"need another pressor. max on renata, vaso, and now levo is at 20 and max is 30. need another. thanks  over 12 hours.  Temp is now 33.8\"

## 2020-07-26 NOTE — PROGRESS NOTES
Messaged Perla Ocampo via perfect serve. BP 79/64 (71). renata gtt ordered, about to start. order parameters are to keep MAP >65. want to also have parameter for SBP >90?      Order parameters updated per [facility] Keith

## 2020-07-26 NOTE — PROGRESS NOTES
Multiple attempts made to contact pts son to receive consent for PRBC's. [facility] and filled out emergency blood consent form. See soft chart for details.

## 2020-07-26 NOTE — PROGRESS NOTES
Upon performing wound care on pts bilateral forearms previously noted as \"scattered abrasions\" in charting, wounds noted to be large open areas with notable drainage. Dr. Evon Gordon and Dr. Dave Pete updated on findings. Per Dr. Evon Gordon apply xeroform, ABD, and kerlix to sites and will be in to see in the morning. Wound care consult placed as well. See wound documentation for details.

## 2020-07-26 NOTE — PROGRESS NOTES
Arrived to the patients bedside for a temporary femoral dialysis catheter insertion. Consent for medical necisity was obtained by Dr Penelope Davis and Dr Sunitha Rosas. The patient is prepped and draped in his bed. Dr Penelope Davis performing the procedure.     1450  Timeout complete  1451  Procedure started  4864  Procedure finished    Sterile dressing loki;ied by IR tech  Report given to Marsha Infante RN at bedside    STAT KUB ordered

## 2020-07-26 NOTE — PROGRESS NOTES
PHARMACY CONSULT FOR RENAL DOSING PER DR PAVON    RENAL LAB EVALUATION  Estimated Creatinine Clearance: 42 mL/min (A) (based on SCr of 1.5 mg/dL (H)). Recent Labs     07/24/20  1615 07/25/20  0400 07/26/20  0555   BUN 13 23 28*   CREATININE 0.8* 1.0 1.5*     Patient starting on CRRT. Cefepime dose adjusted to 2g q8h IVPB. Enoxaparin 1 mg/kg currently on hold, when resumed will need to consider heparin therapy as enoxaparin dosing has not been studied in CRRT. Vancomycin dose adjusted based on levels.      Thank you for the consult,    Frutoso Givens [address]  7/26/2020  2:27 PM

## 2020-07-27 ENCOUNTER — APPOINTMENT (OUTPATIENT)
Dept: GENERAL RADIOLOGY | Age: 65
DRG: 004 | End: 2020-07-27
Payer: MEDICARE

## 2020-07-27 LAB
ALBUMIN SERPL-MCNC: 3.2 GM/DL (ref 3.4–5)
ALBUMIN SERPL-MCNC: 3.2 GM/DL (ref 3.4–5)
ALP BLD-CCNC: 60 IU/L (ref 40–129)
ALP BLD-CCNC: 60 IU/L (ref 40–129)
ALT SERPL-CCNC: 207 U/L (ref 10–40)
ALT SERPL-CCNC: 207 U/L (ref 10–40)
AMMONIA: 31 UMOL/L (ref 16–60)
ANION GAP SERPL CALCULATED.3IONS-SCNC: 10 MMOL/L (ref 4–16)
ANION GAP SERPL CALCULATED.3IONS-SCNC: 11 MMOL/L (ref 4–16)
ANION GAP SERPL CALCULATED.3IONS-SCNC: 12 MMOL/L (ref 4–16)
ANION GAP SERPL CALCULATED.3IONS-SCNC: 15 MMOL/L (ref 4–16)
ANISOCYTOSIS: ABNORMAL
APTT: 42.4 SECONDS (ref 25.1–37.1)
AST SERPL-CCNC: 2470 IU/L (ref 8–33)
AST SERPL-CCNC: 2470 IU/L (ref 8–33)
BANDED NEUTROPHILS ABSOLUTE COUNT: 1.57 K/CU MM
BANDED NEUTROPHILS RELATIVE PERCENT: 10 % (ref 5–11)
BASE EXCESS MIXED: 5.1 (ref 0–1.2)
BASE EXCESS MIXED: 6.7 (ref 0–1.2)
BASE EXCESS MIXED: 8 (ref 0–1.2)
BILIRUB SERPL-MCNC: 1.8 MG/DL (ref 0–1)
BILIRUB SERPL-MCNC: 1.8 MG/DL (ref 0–1)
BILIRUBIN DIRECT: 1.1 MG/DL (ref 0–0.3)
BILIRUBIN, INDIRECT: 0.7 MG/DL (ref 0–0.7)
BUN BLDV-MCNC: 14 MG/DL (ref 6–23)
BUN BLDV-MCNC: 15 MG/DL (ref 6–23)
BUN BLDV-MCNC: 18 MG/DL (ref 6–23)
BUN BLDV-MCNC: 24 MG/DL (ref 6–23)
CALCIUM IONIZED: 3.96 MG/DL (ref 4.48–5.28)
CALCIUM IONIZED: 4.16 MG/DL (ref 4.48–5.28)
CALCIUM IONIZED: 4.16 MG/DL (ref 4.48–5.28)
CALCIUM SERPL-MCNC: 7.7 MG/DL (ref 8.3–10.6)
CALCIUM SERPL-MCNC: 7.9 MG/DL (ref 8.3–10.6)
CALCIUM SERPL-MCNC: 8.3 MG/DL (ref 8.3–10.6)
CALCIUM SERPL-MCNC: 8.4 MG/DL (ref 8.3–10.6)
CARBON MONOXIDE, BLOOD: 1.5 % (ref 0–5)
CARBON MONOXIDE, BLOOD: 1.5 % (ref 0–5)
CARBON MONOXIDE, BLOOD: 1.8 % (ref 0–5)
CHLORIDE BLD-SCNC: 100 MMOL/L (ref 99–110)
CHLORIDE BLD-SCNC: 98 MMOL/L (ref 99–110)
CHLORIDE BLD-SCNC: 98 MMOL/L (ref 99–110)
CHLORIDE BLD-SCNC: 99 MMOL/L (ref 99–110)
CO2 CONTENT: 28.8 MMOL/L (ref 19–24)
CO2 CONTENT: 29.7 MMOL/L (ref 19–24)
CO2 CONTENT: 31.7 MMOL/L (ref 19–24)
CO2: 25 MMOL/L (ref 21–32)
CO2: 27 MMOL/L (ref 21–32)
CO2: 28 MMOL/L (ref 21–32)
CO2: 28 MMOL/L (ref 21–32)
COMMENT: ABNORMAL
CREAT SERPL-MCNC: 1.1 MG/DL (ref 0.9–1.3)
CREAT SERPL-MCNC: 1.2 MG/DL (ref 0.9–1.3)
CREAT SERPL-MCNC: 1.2 MG/DL (ref 0.9–1.3)
CREAT SERPL-MCNC: 1.4 MG/DL (ref 0.9–1.3)
CULTURE: ABNORMAL
DIFFERENTIAL TYPE: ABNORMAL
DOSE AMOUNT: ABNORMAL
DOSE AMOUNT: NORMAL
DOSE TIME: ABNORMAL
DOSE TIME: NORMAL
EKG ATRIAL RATE: 117 BPM
EKG ATRIAL RATE: 122 BPM
EKG DIAGNOSIS: NORMAL
EKG DIAGNOSIS: NORMAL
EKG P AXIS: 74 DEGREES
EKG P AXIS: 75 DEGREES
EKG P-R INTERVAL: 156 MS
EKG P-R INTERVAL: 174 MS
EKG Q-T INTERVAL: 328 MS
EKG Q-T INTERVAL: 332 MS
EKG QRS DURATION: 86 MS
EKG QRS DURATION: 92 MS
EKG QTC CALCULATION (BAZETT): 457 MS
EKG QTC CALCULATION (BAZETT): 473 MS
EKG R AXIS: 74 DEGREES
EKG R AXIS: 90 DEGREES
EKG T AXIS: 63 DEGREES
EKG T AXIS: 64 DEGREES
EKG VENTRICULAR RATE: 117 BPM
EKG VENTRICULAR RATE: 122 BPM
GFR AFRICAN AMERICAN: >60 ML/MIN/1.73M2
GFR NON-AFRICAN AMERICAN: 51 ML/MIN/1.73M2
GFR NON-AFRICAN AMERICAN: >60 ML/MIN/1.73M2
GLUCOSE BLD-MCNC: 134 MG/DL (ref 70–99)
GLUCOSE BLD-MCNC: 138 MG/DL (ref 70–99)
GLUCOSE BLD-MCNC: 139 MG/DL (ref 70–99)
GLUCOSE BLD-MCNC: 195 MG/DL (ref 70–99)
HAV IGM SER IA-ACNC: NON REACTIVE
HBV SURFACE AB TITR SER: <3.5 {TITER}
HCO3 ARTERIAL: 27.8 MMOL/L (ref 18–23)
HCO3 ARTERIAL: 28.7 MMOL/L (ref 18–23)
HCO3 ARTERIAL: 30.6 MMOL/L (ref 18–23)
HCT VFR BLD CALC: 21 % (ref 42–52)
HCT VFR BLD CALC: 23.7 % (ref 42–52)
HCT VFR BLD CALC: 24 % (ref 42–52)
HCT VFR BLD CALC: 24.1 % (ref 42–52)
HEMOGLOBIN: 6.6 GM/DL (ref 13.5–18)
HEMOGLOBIN: 7.4 GM/DL (ref 13.5–18)
HEMOGLOBIN: 7.4 GM/DL (ref 13.5–18)
HEMOGLOBIN: 7.6 GM/DL (ref 13.5–18)
HEPATITIS B CORE IGM ANTIBODY: NON REACTIVE
HEPATITIS B SURFACE ANTIGEN: NON REACTIVE
HEPATITIS C ANTIBODY: ABNORMAL
HYPERSEGMENTED NEUTROPHILS: PRESENT
INR BLD: 1.9 INDEX
IONIZED CA: 0.99 MMOL/L (ref 1.12–1.32)
IONIZED CA: 1.04 MMOL/L (ref 1.12–1.32)
IONIZED CA: 1.04 MMOL/L (ref 1.12–1.32)
LACTATE: 2.9 MMOL/L (ref 0.4–2)
LYMPHOCYTES ABSOLUTE: 0.2 K/CU MM
LYMPHOCYTES RELATIVE PERCENT: 1 % (ref 24–44)
Lab: ABNORMAL
Lab: ABNORMAL
MAGNESIUM: 2.2 MG/DL (ref 1.8–2.4)
MAGNESIUM: 2.3 MG/DL (ref 1.8–2.4)
MAGNESIUM: 2.3 MG/DL (ref 1.8–2.4)
MAGNESIUM: 2.4 MG/DL (ref 1.8–2.4)
MCH RBC QN AUTO: 25 PG (ref 27–31)
MCHC RBC AUTO-ENTMCNC: 31.4 % (ref 32–36)
MCV RBC AUTO: 79.5 FL (ref 78–100)
METHEMOGLOBIN ARTERIAL: 0.8 %
METHEMOGLOBIN ARTERIAL: 0.8 %
METHEMOGLOBIN ARTERIAL: 1.3 %
MICROCYTES: ABNORMAL
MONOCYTES ABSOLUTE: 0.2 K/CU MM
MONOCYTES RELATIVE PERCENT: 1 % (ref 0–4)
O2 SATURATION: 96.2 % (ref 96–97)
O2 SATURATION: 96.5 % (ref 96–97)
O2 SATURATION: 97.5 % (ref 96–97)
PCO2 ARTERIAL: 32 MMHG (ref 32–45)
PCO2 ARTERIAL: 34 MMHG (ref 32–45)
PCO2 ARTERIAL: 35 MMHG (ref 32–45)
PDW BLD-RTO: 23.1 % (ref 11.7–14.9)
PH BLOOD: 7.52 (ref 7.34–7.45)
PH BLOOD: 7.55 (ref 7.34–7.45)
PH BLOOD: 7.56 (ref 7.34–7.45)
PHOSPHORUS: 1.8 MG/DL (ref 2.5–4.9)
PHOSPHORUS: 2.1 MG/DL (ref 2.5–4.9)
PHOSPHORUS: 2.3 MG/DL (ref 2.5–4.9)
PHOSPHORUS: 3.3 MG/DL (ref 2.5–4.9)
PLATELET # BLD: 240 K/CU MM (ref 140–440)
PMV BLD AUTO: 9.8 FL (ref 7.5–11.1)
PO2 ARTERIAL: 141 MMHG (ref 75–100)
PO2 ARTERIAL: 88 MMHG (ref 75–100)
PO2 ARTERIAL: 92 MMHG (ref 75–100)
POLYCHROMASIA: ABNORMAL
POTASSIUM SERPL-SCNC: 3.6 MMOL/L (ref 3.5–5.1)
POTASSIUM SERPL-SCNC: 3.7 MMOL/L (ref 3.5–5.1)
POTASSIUM SERPL-SCNC: 3.7 MMOL/L (ref 3.5–5.1)
POTASSIUM SERPL-SCNC: 3.8 MMOL/L (ref 3.5–5.1)
PROTHROMBIN TIME: 23.1 SECONDS (ref 11.7–14.5)
RBC # BLD: 2.64 M/CU MM (ref 4.6–6.2)
SEGMENTED NEUTROPHILS ABSOLUTE COUNT: 13.7 K/CU MM
SEGMENTED NEUTROPHILS RELATIVE PERCENT: 88 % (ref 36–66)
SODIUM BLD-SCNC: 136 MMOL/L (ref 135–145)
SODIUM BLD-SCNC: 138 MMOL/L (ref 135–145)
SODIUM BLD-SCNC: 138 MMOL/L (ref 135–145)
SODIUM BLD-SCNC: 139 MMOL/L (ref 135–145)
SPECIMEN: ABNORMAL
SPECIMEN: ABNORMAL
TOTAL CK: 456 IU/L (ref 38–174)
TOTAL PROTEIN: 6.4 GM/DL (ref 6.4–8.2)
TOTAL PROTEIN: 6.4 GM/DL (ref 6.4–8.2)
VANCOMYCIN RANDOM: 23.9 UG/ML
VANCOMYCIN TROUGH: 31.9 UG/ML (ref 10–20)
WBC # BLD: 15.7 K/CU MM (ref 4–10.5)

## 2020-07-27 PROCEDURE — 94640 AIRWAY INHALATION TREATMENT: CPT

## 2020-07-27 PROCEDURE — 2580000003 HC RX 258: Performed by: INTERNAL MEDICINE

## 2020-07-27 PROCEDURE — 86708 HEPATITIS A ANTIBODY: CPT

## 2020-07-27 PROCEDURE — 36430 TRANSFUSION BLD/BLD COMPNT: CPT

## 2020-07-27 PROCEDURE — 85610 PROTHROMBIN TIME: CPT

## 2020-07-27 PROCEDURE — 6360000002 HC RX W HCPCS: Performed by: SPECIALIST

## 2020-07-27 PROCEDURE — 37799 UNLISTED PX VASCULAR SURGERY: CPT

## 2020-07-27 PROCEDURE — 93010 ELECTROCARDIOGRAM REPORT: CPT | Performed by: INTERNAL MEDICINE

## 2020-07-27 PROCEDURE — 6360000002 HC RX W HCPCS: Performed by: INTERNAL MEDICINE

## 2020-07-27 PROCEDURE — 99291 CRITICAL CARE FIRST HOUR: CPT | Performed by: INTERNAL MEDICINE

## 2020-07-27 PROCEDURE — C9113 INJ PANTOPRAZOLE SODIUM, VIA: HCPCS | Performed by: SPECIALIST

## 2020-07-27 PROCEDURE — 84100 ASSAY OF PHOSPHORUS: CPT

## 2020-07-27 PROCEDURE — 2500000003 HC RX 250 WO HCPCS: Performed by: INTERNAL MEDICINE

## 2020-07-27 PROCEDURE — 84145 PROCALCITONIN (PCT): CPT

## 2020-07-27 PROCEDURE — 99254 IP/OBS CNSLTJ NEW/EST MOD 60: CPT | Performed by: INTERNAL MEDICINE

## 2020-07-27 PROCEDURE — 80202 ASSAY OF VANCOMYCIN: CPT

## 2020-07-27 PROCEDURE — 82803 BLOOD GASES ANY COMBINATION: CPT

## 2020-07-27 PROCEDURE — 80048 BASIC METABOLIC PNL TOTAL CA: CPT

## 2020-07-27 PROCEDURE — 82550 ASSAY OF CK (CPK): CPT

## 2020-07-27 PROCEDURE — 87040 BLOOD CULTURE FOR BACTERIA: CPT

## 2020-07-27 PROCEDURE — 94761 N-INVAS EAR/PLS OXIMETRY MLT: CPT

## 2020-07-27 PROCEDURE — 71045 X-RAY EXAM CHEST 1 VIEW: CPT

## 2020-07-27 PROCEDURE — 99222 1ST HOSP IP/OBS MODERATE 55: CPT | Performed by: OBSTETRICS & GYNECOLOGY

## 2020-07-27 PROCEDURE — 2580000003 HC RX 258: Performed by: SURGERY

## 2020-07-27 PROCEDURE — 99213 OFFICE O/P EST LOW 20 MIN: CPT

## 2020-07-27 PROCEDURE — 90945 DIALYSIS ONE EVALUATION: CPT

## 2020-07-27 PROCEDURE — 2700000000 HC OXYGEN THERAPY PER DAY

## 2020-07-27 PROCEDURE — 86803 HEPATITIS C AB TEST: CPT

## 2020-07-27 PROCEDURE — 85014 HEMATOCRIT: CPT

## 2020-07-27 PROCEDURE — 85018 HEMOGLOBIN: CPT

## 2020-07-27 PROCEDURE — P9047 ALBUMIN (HUMAN), 25%, 50ML: HCPCS | Performed by: INTERNAL MEDICINE

## 2020-07-27 PROCEDURE — 87340 HEPATITIS B SURFACE AG IA: CPT

## 2020-07-27 PROCEDURE — 2500000003 HC RX 250 WO HCPCS

## 2020-07-27 PROCEDURE — 86709 HEPATITIS A IGM ANTIBODY: CPT

## 2020-07-27 PROCEDURE — 99233 SBSQ HOSP IP/OBS HIGH 50: CPT | Performed by: INTERNAL MEDICINE

## 2020-07-27 PROCEDURE — 83735 ASSAY OF MAGNESIUM: CPT

## 2020-07-27 PROCEDURE — 87522 HEPATITIS C REVRS TRNSCRPJ: CPT

## 2020-07-27 PROCEDURE — 86705 HEP B CORE ANTIBODY IGM: CPT

## 2020-07-27 PROCEDURE — P9016 RBC LEUKOCYTES REDUCED: HCPCS

## 2020-07-27 PROCEDURE — 2000000000 HC ICU R&B

## 2020-07-27 PROCEDURE — 86704 HEP B CORE ANTIBODY TOTAL: CPT

## 2020-07-27 PROCEDURE — 80053 COMPREHEN METABOLIC PANEL: CPT

## 2020-07-27 PROCEDURE — 82330 ASSAY OF CALCIUM: CPT

## 2020-07-27 PROCEDURE — 89220 SPUTUM SPECIMEN COLLECTION: CPT

## 2020-07-27 PROCEDURE — 85730 THROMBOPLASTIN TIME PARTIAL: CPT

## 2020-07-27 PROCEDURE — 82140 ASSAY OF AMMONIA: CPT

## 2020-07-27 PROCEDURE — 94003 VENT MGMT INPAT SUBQ DAY: CPT

## 2020-07-27 PROCEDURE — 82248 BILIRUBIN DIRECT: CPT

## 2020-07-27 PROCEDURE — 85007 BL SMEAR W/DIFF WBC COUNT: CPT

## 2020-07-27 PROCEDURE — 6370000000 HC RX 637 (ALT 250 FOR IP): Performed by: INTERNAL MEDICINE

## 2020-07-27 PROCEDURE — 85027 COMPLETE CBC AUTOMATED: CPT

## 2020-07-27 PROCEDURE — 2500000003 HC RX 250 WO HCPCS: Performed by: PHYSICIAN ASSISTANT

## 2020-07-27 PROCEDURE — 6370000000 HC RX 637 (ALT 250 FOR IP): Performed by: SURGERY

## 2020-07-27 PROCEDURE — 83605 ASSAY OF LACTIC ACID: CPT

## 2020-07-27 PROCEDURE — 2720000010 HC SURG SUPPLY STERILE

## 2020-07-27 PROCEDURE — 94750 HC PULMONARY COMPLIANCE STUDY: CPT

## 2020-07-27 PROCEDURE — 86706 HEP B SURFACE ANTIBODY: CPT

## 2020-07-27 RX ORDER — CEFAZOLIN SODIUM 2 G/50ML
2 SOLUTION INTRAVENOUS EVERY 8 HOURS
Status: DISCONTINUED | OUTPATIENT
Start: 2020-07-27 | End: 2020-07-27 | Stop reason: CLARIF

## 2020-07-27 RX ORDER — 0.9 % SODIUM CHLORIDE 0.9 %
20 INTRAVENOUS SOLUTION INTRAVENOUS ONCE
Status: COMPLETED | OUTPATIENT
Start: 2020-07-27 | End: 2020-07-27

## 2020-07-27 RX ORDER — PANTOPRAZOLE SODIUM 40 MG/10ML
40 INJECTION, POWDER, LYOPHILIZED, FOR SOLUTION INTRAVENOUS DAILY
Status: DISCONTINUED | OUTPATIENT
Start: 2020-07-27 | End: 2020-08-15 | Stop reason: HOSPADM

## 2020-07-27 RX ORDER — VANCOMYCIN HYDROCHLORIDE 1 G/200ML
1000 INJECTION, SOLUTION INTRAVENOUS ONCE
Status: COMPLETED | OUTPATIENT
Start: 2020-07-27 | End: 2020-07-27

## 2020-07-27 RX ORDER — CEFAZOLIN SODIUM 2 G/100ML
2 INJECTION, SOLUTION INTRAVENOUS EVERY 8 HOURS
Status: DISCONTINUED | OUTPATIENT
Start: 2020-07-27 | End: 2020-07-28

## 2020-07-27 RX ORDER — 0.9 % SODIUM CHLORIDE 0.9 %
20 INTRAVENOUS SOLUTION INTRAVENOUS ONCE
Status: DISCONTINUED | OUTPATIENT
Start: 2020-07-27 | End: 2020-07-27

## 2020-07-27 RX ADMIN — IPRATROPIUM BROMIDE 4 PUFF: 17 AEROSOL, METERED RESPIRATORY (INHALATION) at 12:36

## 2020-07-27 RX ADMIN — Medication 500 ML/HR: at 13:48

## 2020-07-27 RX ADMIN — CEFEPIME HYDROCHLORIDE 2 G: 2 INJECTION, POWDER, FOR SOLUTION INTRAVENOUS at 14:58

## 2020-07-27 RX ADMIN — HYDROCORTISONE SODIUM SUCCINATE 100 MG: 100 INJECTION, POWDER, FOR SOLUTION INTRAMUSCULAR; INTRAVENOUS at 20:30

## 2020-07-27 RX ADMIN — CEFAZOLIN SODIUM 2 G: 2 INJECTION, SOLUTION INTRAVENOUS at 21:34

## 2020-07-27 RX ADMIN — CALCIUM GLUCONATE 1 G: 98 INJECTION, SOLUTION INTRAVENOUS at 15:40

## 2020-07-27 RX ADMIN — SODIUM CHLORIDE, PRESERVATIVE FREE 10 ML: 5 INJECTION INTRAVENOUS at 20:31

## 2020-07-27 RX ADMIN — CLINDAMYCIN PHOSPHATE 900 MG: 150 INJECTION, SOLUTION INTRAVENOUS at 06:35

## 2020-07-27 RX ADMIN — CALCIUM GLUCONATE 1 G: 98 INJECTION, SOLUTION INTRAVENOUS at 01:29

## 2020-07-27 RX ADMIN — SODIUM CHLORIDE, PRESERVATIVE FREE 10 ML: 5 INJECTION INTRAVENOUS at 20:36

## 2020-07-27 RX ADMIN — CALCIUM GLUCONATE 2 G: 98 INJECTION, SOLUTION INTRAVENOUS at 09:10

## 2020-07-27 RX ADMIN — SODIUM PHOSPHATE, MONOBASIC, MONOHYDRATE 12 MMOL: 276; 142 INJECTION, SOLUTION INTRAVENOUS at 17:45

## 2020-07-27 RX ADMIN — IPRATROPIUM BROMIDE 4 PUFF: 17 AEROSOL, METERED RESPIRATORY (INHALATION) at 07:33

## 2020-07-27 RX ADMIN — SODIUM PHOSPHATE, MONOBASIC, MONOHYDRATE 6 MMOL: 276; 142 INJECTION, SOLUTION INTRAVENOUS at 08:39

## 2020-07-27 RX ADMIN — Medication 1200 ML/HR: at 09:18

## 2020-07-27 RX ADMIN — THIAMINE HYDROCHLORIDE 200 MG: 100 INJECTION, SOLUTION INTRAMUSCULAR; INTRAVENOUS at 11:36

## 2020-07-27 RX ADMIN — SODIUM CHLORIDE, PRESERVATIVE FREE 10 ML: 5 INJECTION INTRAVENOUS at 08:59

## 2020-07-27 RX ADMIN — Medication 25 MCG/HR: at 04:44

## 2020-07-27 RX ADMIN — ALBUMIN (HUMAN) 25 G: 0.25 INJECTION, SOLUTION INTRAVENOUS at 00:24

## 2020-07-27 RX ADMIN — ALBUTEROL SULFATE 4 PUFF: 90 AEROSOL, METERED RESPIRATORY (INHALATION) at 04:28

## 2020-07-27 RX ADMIN — CHLORHEXIDINE GLUCONATE 0.12% ORAL RINSE 15 ML: 1.2 LIQUID ORAL at 08:54

## 2020-07-27 RX ADMIN — Medication 500 ML/HR: at 03:03

## 2020-07-27 RX ADMIN — IPRATROPIUM BROMIDE 4 PUFF: 17 AEROSOL, METERED RESPIRATORY (INHALATION) at 04:28

## 2020-07-27 RX ADMIN — IPRATROPIUM BROMIDE 4 PUFF: 17 AEROSOL, METERED RESPIRATORY (INHALATION) at 15:51

## 2020-07-27 RX ADMIN — ALBUTEROL SULFATE 4 PUFF: 90 AEROSOL, METERED RESPIRATORY (INHALATION) at 07:33

## 2020-07-27 RX ADMIN — Medication 500 ML/HR: at 13:38

## 2020-07-27 RX ADMIN — Medication 8 MCG/MIN: at 02:28

## 2020-07-27 RX ADMIN — SODIUM CHLORIDE 20 ML: 9 INJECTION, SOLUTION INTRAVENOUS at 11:29

## 2020-07-27 RX ADMIN — HYDROCORTISONE SODIUM SUCCINATE 100 MG: 100 INJECTION, POWDER, FOR SOLUTION INTRAMUSCULAR; INTRAVENOUS at 11:36

## 2020-07-27 RX ADMIN — CEFEPIME HYDROCHLORIDE 2 G: 2 INJECTION, POWDER, FOR SOLUTION INTRAVENOUS at 06:35

## 2020-07-27 RX ADMIN — PANTOPRAZOLE SODIUM 40 MG: 40 INJECTION, POWDER, FOR SOLUTION INTRAVENOUS at 16:56

## 2020-07-27 RX ADMIN — SODIUM PHOSPHATE, MONOBASIC, MONOHYDRATE 6 MMOL: 276; 142 INJECTION, SOLUTION INTRAVENOUS at 23:37

## 2020-07-27 RX ADMIN — VANCOMYCIN HYDROCHLORIDE 1000 MG: 1 INJECTION, SOLUTION INTRAVENOUS at 15:40

## 2020-07-27 RX ADMIN — Medication 2000 ML/HR: at 00:24

## 2020-07-27 RX ADMIN — Medication 2000 ML/HR: at 03:07

## 2020-07-27 RX ADMIN — THIAMINE HYDROCHLORIDE 200 MG: 100 INJECTION, SOLUTION INTRAMUSCULAR; INTRAVENOUS at 22:24

## 2020-07-27 RX ADMIN — HYDROCORTISONE SODIUM SUCCINATE 100 MG: 100 INJECTION, POWDER, FOR SOLUTION INTRAMUSCULAR; INTRAVENOUS at 02:28

## 2020-07-27 RX ADMIN — ALBUTEROL SULFATE 4 PUFF: 90 AEROSOL, METERED RESPIRATORY (INHALATION) at 15:51

## 2020-07-27 RX ADMIN — CLINDAMYCIN PHOSPHATE 900 MG: 150 INJECTION, SOLUTION INTRAVENOUS at 14:59

## 2020-07-27 RX ADMIN — Medication 500 ML/HR: at 03:11

## 2020-07-27 RX ADMIN — ALBUTEROL SULFATE 4 PUFF: 90 AEROSOL, METERED RESPIRATORY (INHALATION) at 12:36

## 2020-07-27 RX ADMIN — Medication 2000 ML/HR: at 05:30

## 2020-07-27 RX ADMIN — PROPOFOL 20 MCG/KG/MIN: 10 INJECTION, EMULSION INTRAVENOUS at 08:42

## 2020-07-27 RX ADMIN — CALCIUM GLUCONATE 1 G: 98 INJECTION, SOLUTION INTRAVENOUS at 23:32

## 2020-07-27 RX ADMIN — PROPOFOL 20 MCG/KG/MIN: 10 INJECTION, EMULSION INTRAVENOUS at 19:15

## 2020-07-27 RX ADMIN — CHLORHEXIDINE GLUCONATE 0.12% ORAL RINSE 15 ML: 1.2 LIQUID ORAL at 20:48

## 2020-07-27 RX ADMIN — ALBUMIN (HUMAN) 25 G: 0.25 INJECTION, SOLUTION INTRAVENOUS at 06:34

## 2020-07-27 RX ADMIN — Medication 1200 ML/HR: at 22:22

## 2020-07-27 ASSESSMENT — PULMONARY FUNCTION TESTS
PIF_VALUE: 19
PIF_VALUE: 23
PIF_VALUE: 20
PIF_VALUE: 17
PIF_VALUE: 19
PIF_VALUE: 16
PIF_VALUE: 19
PIF_VALUE: 16
PIF_VALUE: 19
PIF_VALUE: 21
PIF_VALUE: 19
PIF_VALUE: 17
PIF_VALUE: 18
PIF_VALUE: 19

## 2020-07-27 NOTE — PLAN OF CARE
Problem: Fluid Volume:  Goal: Ability to achieve a balanced intake and output will improve  Description: Ability to achieve a balanced intake and output will improve  Outcome: Ongoing  Goal: Will show no signs or symptoms of fluid imbalance  Description: Will show no signs or symptoms of fluid imbalance  Outcome: Ongoing     Problem: Skin Integrity:  Goal: Will show no infection signs and symptoms  Description: Will show no infection signs and symptoms  Outcome: Ongoing  Goal: Absence of new skin breakdown  Description: Absence of new skin breakdown  Outcome: Ongoing  Goal: Status of oral mucous membranes will improve  Description: Status of oral mucous membranes will improve  Outcome: Ongoing  Goal: Skin integrity will be maintained  Description: Skin integrity will be maintained  Outcome: Ongoing     Problem: Infection - Ventilator-Associated Pneumonia:  Goal: Prevent a ventilator associated event (VAE)  Description: Prevent a ventilator associated event (VAE)  Outcome: Ongoing  Goal: Absence of pulmonary infection  Description: Absence of pulmonary infection  Outcome: Ongoing     Problem: Health Behavior:  Goal: Ability to identify and utilize available support systems will improve  Description: Ability to identify and utilize available support systems will improve  Outcome: Ongoing  Goal: Compliance with therapeutic regimen will improve  Description: Compliance with therapeutic regimen will improve  Outcome: Ongoing  Goal: Ability to keep healthcare appointments will improve  Description: Ability to keep healthcare appointments will improve  Outcome: Ongoing

## 2020-07-27 NOTE — PROGRESS NOTES
Comprehensive Nutrition Assessment    Type and Reason for Visit:  Consult, Initial    Nutrition Recommendations/Plan: recommend continuing NPO with a goal of tolerating EN initiation within the next 24-48 hr    Nutrition Assessment:  pt sedated on vent, noted off pressors, POD #1 ex lap, +rectal tube, +CRRT    Malnutrition Assessment:  Malnutrition Status:  Insufficient data    Context:  Chronic Illness      Estimated Daily Nutrient Needs:  Energy (kcal):  4429-6501([facility] 2003b); Weight Used for Energy Requirements:  Current     Protein (g):  135-169(2-2.5 g/kg, CRRT); Weight Used for Protein Requirements:  Current        Fluid (ml/day):  (fluid management per nephrology); Nutrition Related Findings:  Labs; Phos 2.1, WBC 15.7, Hbg 6.6      Wounds:  (decubitus ulcers noted)       Current Nutrition Therapies:    Diet NPO Effective Now    Anthropometric Measures:  · Height: 5' 7.99\" (172.7 cm)  · Current Body Weight: 149 lb 4 oz (67.7 kg)   · Admission Body Weight: 149 lb 4 oz (67.7 kg)      · Ideal Body Weight: 154 lbs;  ·  % Ideal Body Weight 96.9 %   · BMI: 22.7  · Adjusted Body Weight: No Adjustment    · BMI Categories: Normal Weight (BMI 18.5-24. 9)       Nutrition Diagnosis:   · Inadequate oral intake related to impaired respiratory funtion as evidenced by NPO or clear liquid status due to medical condition    Nutrition Interventions:   Food and/or Nutrient Delivery:  Continue NPO  Nutrition Education/Counseling:  Education not indicated   Coordination of Nutrition Care:  No recommendation at this time, Continued Inpatient Monitoring    Goals:  pt will tolerate initiation of EN within the next 24-48 hr       Nutrition Monitoring and Evaluation:   Physical Signs/Symptoms Outcomes:  Hemodynamic Status, Biochemical Data     Discharge Planning:     Too soon to determine     Electronically signed by Cecelia Griffin, MS, RD, LD on 7/27/20 at 1:18 PM EDT    Contact: [phone]

## 2020-07-27 NOTE — PROGRESS NOTES
General Surgery- [facility] & CLINICS Day: 4    Chief Complaint on Admission: sepsis       Subjective:     Patient seen and examined. Doing well, off pressors, remains intubated on CRRT. Nursing reports some minimal blood from the rectal tube. Allergies  Patient has no known allergies. Diagnosis Date    Hypertension        Objective:     Vitals:    20 1117   BP: 124/66   Pulse: 99   Resp: 24   Temp: 96.8 °F (36 °C)   SpO2: 100%       TEMPERATURE:  Current -Temp: 96.8 °F (36 °C); Max - Temp  Av.4 °F (36.3 °C)  Min: 93.2 °F (34 °C)  Max: 98.8 °F (37.1 °C)    I/O this shift:  In: 653 [I.V.:653]  Out: 468 I/O last 3 completed shifts: In: 56 [I.V.:610]  Out: 2343 [Emesis/NG output:275]      Physical Exam:  Physical Exam  Constitutional:       Appearance: He is ill-appearing. Comments: Intubated and sedated   HENT:      Head: Normocephalic. Right Ear: External ear normal.      Left Ear: External ear normal.      Nose: Nose normal.      Mouth/Throat:      Comments: OGT in place to LIWS, 400cc gastric output in the last 4 hrs. Cardiovascular:      Rate and Rhythm: Normal rate and regular rhythm. Comments: Off pressor support. On CRRT. Pulmonary:      Effort: Pulmonary effort is normal.      Comments: Intubated, sating well  Abdominal:      Comments: Incisions well approximated with staples, minimal surrounding ecchymosis, no surrounding erythema or induration. Abd soft, appropriately tender to palpation, non-distended, no rebound. Rectal tube in place with scant bloody output. Genitourinary:     Penis: Normal.       Comments: Singh in place  Skin:     General: Skin is warm and dry.    Neurological:      Comments: sedated           Scheduled Meds:   vancomycin  1,000 mg Intravenous Once    hydrocortisone sodium succinate PF  100 mg Intravenous Q8H    thiamine (VITAMIN B1) IVPB  200 mg Intravenous Q12H    clindamycin (CLEOCIN) IV  900 mg Intravenous Q8H    vancomycin (VANCOCIN) intermittent dosing (placeholder)   Other RX Placeholder    cefepime  2 g Intravenous Q8H    chlorhexidine  15 mL Mouth/Throat BID    albuterol sulfate HFA  4 puff Inhalation Q4H    And    ipratropium  4 puff Inhalation Q4H    aspirin  324 mg Oral Once    sodium chloride flush  10 mL Intravenous BID    sodium chloride flush  10 mL Intravenous 2 times per day    [Held by provider] enoxaparin  1 mg/kg Subcutaneous Daily    nicotine  1 patch Transdermal Daily     ContinuousInfusions:   fentanyl 25 mcg/hr (07/27/20 0444)    vasopressin (Septic Shock) infusion Stopped (07/27/20 0500)    norepinephrine Stopped (07/27/20 0439)    prismaSATE BGK 4/2.5 500 mL/hr (07/27/20 0303)    prismaSATE BGK 4/2.5 500 mL/hr (07/27/20 0311)    prismaSATE BGK 4/2.5 1,200 mL/hr (07/27/20 2426)    propofol 20 mcg/kg/min (07/27/20 0842)     PRN Meds:sodium chloride, potassium chloride, magnesium sulfate, calcium gluconate **OR** calcium gluconate **OR** calcium gluconate **OR** calcium gluconate, sodium phosphate IVPB **OR** sodium phosphate IVPB **OR** sodium phosphate IVPB **OR** sodium phosphate IVPB, ipratropium-albuterol, iopamidol, sodium chloride flush, acetaminophen **OR** acetaminophen, polyethylene glycol, promethazine **OR** ondansetron, promethazine, diphenhydrAMINE, dicyclomine, morphine      Labs/Imaging Results:   Lab Results   Component Value Date    WBC 15.7 (H) 07/27/2020    HGB 6.6 (LL) 07/27/2020    HCT 21.0 (L) 07/27/2020    MCV 79.5 07/27/2020     07/27/2020     Lab Results   Component Value Date     07/27/2020    K 3.6 07/27/2020     07/27/2020    CO2 27 07/27/2020    BUN 18 07/27/2020    CREATININE 1.2 07/27/2020    GLUCOSE 138 (H) 07/27/2020    CALCIUM 7.7 (L) 07/27/2020    PROT 6.4 07/27/2020    PROT 6.4 07/27/2020    LABALBU 3.2 (L) 07/27/2020    LABALBU 3.2 (L) 07/27/2020    BILITOT 1.8 (H) 07/27/2020    BILITOT 1.8 (H) 07/27/2020    ALKPHOS 60 07/27/2020    ALKPHOS 60 07/27/2020    AST 2,470 (H) 07/27/2020    AST 2,470 (H) 07/27/2020     (H) 07/27/2020     (H) 07/27/2020    LABGLOM >60 07/27/2020    GFRAA >60 07/27/2020       :       62yoM with sepsis of unknown origin, concern for pneumatosis intestinalis on CT. POD1 s/p negative ex lap. Still with anemia, intubated off pressors, on CRRT. Plan:     - OK to remove rectal tube if not having diarrhea. Blood possibly d/t trauma vs sloughing  - Anemia: Not from surgical source. Could be d/t GI mucosal sloughing.  Management / work-up per primary team   - Continue OGT while intubated  - Continue NPO status post-extubation  - Medical management per primary team     D/w Dr. Abdon Thomas MD

## 2020-07-27 NOTE — PROGRESS NOTES
Pulmonary and Critical Care  Progress Note      VITALS:  BP (!) 95/55   Pulse 86   Temp 93.2 °F (34 °C)   Resp 25   Ht 5' 8\" (1.727 m)   Wt 149 lb 4 oz (67.7 kg)   SpO2 100%   BMI 22.69 kg/m²     Subjective:   CHIEF COMPLAINT :Fall with right hip pain     HPI:                The patient is a 60 y.o. male with significant past medical history of HTN,. Sacral decub, MSSA endocarditis, Septic PE, Hep C,  presents with complaints of fall and right hip pain. He was on the floor for more than 2 days. His CK is mildly elevated. He had mild lactic acidosis. His PBNPT is 11,000 with mildly elevated troponins. He is on 3 grams of Heroin per day. His U tocx was positive for Cocaine and Opiates. He had no fever, no headaches, no chest pain, no palpitations, no n/v, no diaphoresis. He has no sick exposure, no recent travel. His 1 blood culture set is positive for Staph. He is getting abx and IV fluids. He is on the vent and getting CRRT. Objective:   PHYSICAL EXAM:    LUNGS:Occasional basal crackles  Abd-soft, BS+,NT  Ext - no pedal edema  CVS-s1s2, no murmurs      DATA:    CBC:  Recent Labs     07/25/20  0400 07/26/20  0555  07/26/20  1923 07/26/20  2359 07/27/20  0620   WBC 18.8* 11.8*  --   --   --  15.7*   RBC 3.98* 2.59*  --   --   --  2.64*   HGB 8.5* 5.6*   < > 7.8* 7.4* 6.6*   HCT 29.4* 21.6*   < > 26.0* 24.0* 21.0*   * 372  --   --   --  240   MCV 73.9* 83.4  --   --   --  79.5   MCH 21.4* 21.6*  --   --   --  25.0*   MCHC 28.9* 25.9*  --   --   --  31.4*   RDW 24.5* 25.5*  --   --   --  23.1*   SEGSPCT 78.0* 88.2*  --   --   --  88.0*   BANDSPCT 12*  --   --   --   --  10    < > = values in this interval not displayed.       BMP:  Recent Labs     07/26/20  1923 07/26/20  2359 07/27/20  0620    138 139   K 3.2* 3.7 3.6   CL 93* 98* 100   CO2 24 25 27   BUN 30* 24* 18   CREATININE 1.6* 1.4* 1.2   CALCIUM 8.0* 7.9* 7.7*   GLUCOSE 267* 195* 138*      ABG:  Recent Labs     07/26/20  0600 07/26/20  1315 07/27/20  0600   PH 7.08* 7.12* 7.52*  7.56*   PO2ART 70* 108* 88  141*   FJS7QJC 24.0* 39.0 34.0  32.0   O2SAT 89.2* 96.2 96.5  97.5*     BNP  No results found for: BNP   D-Dimer:  No results found for: DDIMER   1. Radiology: Reviewed      Assessment/Plan     Patient Active Problem List    Diagnosis Date Noted    Leukocytosis     Sepsis ([facility] [address] 07/24/2020   Patient is on the vent\ and sedated. He has bloody stools. He dropped his Hb and is getting PRBC. Septic shock  Hemorrhagic shock  Acute hypoxic resp failure  Mild coagulopathy  Shock Liver  Lactic acidosis- improving\  VDRF  Anemia likely LGI bleed  Hypokalemia  Hypophosphatemia  Staph Aureus bacteremia  Severe Pulmonary HTN  Occult blood positive  JERAMIE likely sec to ATN on CRRT         1. 1 unit PRBC  2. F/u H&H  3. GI consult  4. Keep sats > 92%  5. CRRT per renal  6. K phos  7. CBC, CMP in am  8. Mg , Phos in am  9. CXR in am  10. Abx  11. F/u C&S  12. I/O chart  13. NPO for now  14. SCD's  15. GI Prophylaxis  16. Wean off pressors for a MAP > 65 mmhg  17. OP work up of Pulmonary HTN  18. Prognosis guarded  19. SAT and SBT trial when stable                                                                                                  Discussed with nursing and more than 30 minutes of CC time spent on the patient  No follow-ups on file.     Electronically signed by Estrella Sykes MD on 7/27/2020 at 11:16 AM

## 2020-07-27 NOTE — PROGRESS NOTES
Pt gurgling around ETT. Added 1ml of air at a time until the gurgling stopped for a total of 3 ml of air added to the cuff of the ETT. Will continue to monitor closely.

## 2020-07-27 NOTE — PROGRESS NOTES
PHARMACY CONSULT FOR RENAL DOSING PER DR PAVON    RENAL LAB EVALUATION  Estimated Creatinine Clearance: 61 mL/min (based on SCr of 1.2 mg/dL).   Recent Labs     07/26/20  1923 07/26/20  2359 07/27/20  0620   BUN 30* 24* 18   CREATININE 1.6* 1.4* 1.2     Patient continued on CRRT    Continue Cefepime 2g q8h IVPB    Enoxaparin 1 mg/kg currently on hold, when resumed will need to consider heparin therapy as enoxaparin dosing has not been studied in CRRT    Vancomycin dose adjusted based on levels    Thank you for the consult,    Shala Briceño, PharmD, [facility]

## 2020-07-27 NOTE — PROGRESS NOTES
Bridger RT at bedside. ETT ,aking gurgling sound again. Pt suctioned orally and via ETT by RN. RT put in 1 ml air into the ETT cuff. No more abnormal sounds. Will continue to monitor.

## 2020-07-27 NOTE — PROGRESS NOTES
Nephrology  Dialysis Note        [address], [address] [facility], [facility] [address]  Phone: [phone]  Office Hours: 8:30AM - 4:30PM  Monday - Friday       [facility]  MD Magen Qiu DO [address],  [place], [facility]  PHONE: [phone]  FAX: [phone]      PROCEDURE:  Patient seen during CRRT      PHYSICIAN:  SAVANAH      INDICATION:  Acute tubular necrosis      RX:  See dialysis flowsheet for specifics on access, blood flow rate, dialysate baths, duration of dialysis, anticoagulation and other technical information.       COMMENTS:    CONTINUE CRRT  START FLUID REMOVAL  DECREASE DIALYSATE  CRITICALLY ILL    Electronically signed by Xin Landers DO on 7/27/2020 at 6:57 AM    [facility]  MD M[address] Qiu DO [address]  [place], [facility]  PHONE: [phone]  FAX: [phone]

## 2020-07-27 NOTE — PROGRESS NOTES
[address] [facility]  consulted by Dr. Ana Epperson for monitoring and adjustment. Indication for treatment: Septic shock, GPC bacteremia, Pneumonia, IVDU  Goal trough: 15 mcg/mL     Pertinent Laboratory Values:   Temp Readings from Last 3 Encounters:   07/27/20 93.2 °F (34 °C)   07/25/20 (!) 62.3 °F (16.8 °C)     Recent Labs     07/25/20  0400 07/26/20  0555  07/26/20  1300 07/26/20  1532 07/27/20  0620   WBC 18.8* 11.8*  --   --   --  15.7*   LACTATE 1.8  --    < > 18.1* 15.5* 2.9*    < > = values in this interval not displayed. Recent Labs     07/26/20  1923 07/26/20  2359 07/27/20  0620   BUN 30* 24* 18   CREATININE 1.6* 1.4* 1.2     Estimated Creatinine Clearance: 61 mL/min (based on SCr of 1.2 mg/dL).     Intake/Output Summary (Last 24 hours) at 7/27/2020 1024  Last data filed at 7/27/2020 1004  Gross per 24 hour   Intake 1263 ml   Output 2534 ml   Net -1271 ml       Pertinent Cultures:  Date    Source    Results  7/24   Wound    Pending  7/24   Urine    Pending  7/24   Blood    MSSA     Vancomycin level:   TROUGH:    Recent Labs     07/26/20  2300   VANCOTROUGH 31.9*     RANDOM:    Recent Labs     07/27/20  0620   VANCORANDOM 23.9       Assessment:  · WBC and temperature: WBC elevated (receiving hydrocortisone); hypothermic 2/2 CRRT  · SCr, BUN, and urine output: CRRT continued  · Day(s) of therapy: 4  · Vancomycin level: 23.9, remains elevated  · On 2 pressors, hydrocortisone, thiamine    Plan:  · Vancomycin level remains elevated @ 23.9  · Based on trends, patient has been clearing vancomycin @ ~1 mg/dL/hr and level predicted to be ~15 around 1400 this afternoon  · Plan to re-dose with vancomycin 1000 mg IVPB x1 at 1400  · Continue intermittent dosing based on levels 2/2 RRT   · Repeat next random level tomorrow @ noon  · Pharmacy will continue to monitor patient and adjust therapy as indicated    VANCOMYCIN TROUGH SCHEDULED FOR 7/28 @ 1200    Thank you for the consult,  Sabina Dean

## 2020-07-27 NOTE — PROGRESS NOTES
Today's plan: blood pressure is better with albumin and blood transfusion, ECHO is normal, will continue supportive care      Admit Date:  7/24/2020    Subjective: intubated      Chief complaints on admission  Chief Complaint   Patient presents with    Fall     85% o2 sat in triage    Hip Pain         History of present illness:Chaim is a 60 y. o.year old who  presents with had concerns including Fall (85% o2 sat in triage) and Hip Pain. Past medical history:    has a past medical history of Hypertension. Past surgical history:   has a past surgical history that includes back surgery; IR NONTUNNELED VASCULAR CATHETER > 5 YEARS (7/26/2020); and laparotomy (N/A, 7/25/2020). Social History:   reports that he has been smoking cigarettes. He has been smoking about 1.50 packs per day. He does not have any smokeless tobacco history on file. He reports that he does not drink alcohol or use drugs. Family history:  family history is not on file. No Known Allergies      Objective:   /66   Pulse 99   Temp 96.8 °F (36 °C)   Resp 24   Ht 5' 8\" (1.727 m)   Wt 149 lb 4 oz (67.7 kg)   SpO2 100%   BMI 22.69 kg/m²       Intake/Output Summary (Last 24 hours) at 7/27/2020 1223  Last data filed at 7/27/2020 1202  Gross per 24 hour   Intake 1263 ml   Output 2601 ml   Net -1338 ml       TELEMETRY: sinus    Physical Exam:  Constitutional:  Toxic apperancem, cachectic  HENT:  Normocephalic, Atraumatic, Bilateral external ears normal, Oropharynx moist, No oral exudates, Nose normal. Neck- Normal range of motion, No tenderness, Supple, No stridor. Eyes:  PERRL, EOMI, Conjunctiva normal, No discharge. Respiratory:  Normal breath sounds, No respiratory distress, No wheezing, No chest tenderness. ,no use of accessory muscles, diaphragm movement is normal  Cardiovascular: (PMI) apex non displaced,no lifts no thrills, no s3,no s4, Normal heart rate, Normal rhythm, No murmurs, No rubs, No gallops.  Carotid arteries pulse and amplitude are normal no bruit, no abdominal bruit noted ( normal abdominal aorta ausculation), femoral arteries pulse and amplitude are normal no bruit, pedal pulses are normal  GI:  Bowel sounds normal, Soft, No tenderness, No masses, No pulsatile masses. : External genitalia appear normal, No masses or lesions. No discharge. No CVA tenderness. Musculoskeletal:  Intact distal pulses, No edema, No tenderness, No cyanosis, No clubbing. Good range of motion in all major joints. No tenderness to palpation or major deformities noted. Back- No tenderness. Integument: + sacral ulcer Warm, Dry, No erythema, No rash. Lymphatic:  No lymphadenopathy noted.    Neurologic:  intubated     Medications:    vancomycin  1,000 mg Intravenous Once    hydrocortisone sodium succinate PF  100 mg Intravenous Q8H    thiamine (VITAMIN B1) IVPB  200 mg Intravenous Q12H    clindamycin (CLEOCIN) IV  900 mg Intravenous Q8H    vancomycin (VANCOCIN) intermittent dosing (placeholder)   Other RX Placeholder    cefepime  2 g Intravenous Q8H    chlorhexidine  15 mL Mouth/Throat BID    albuterol sulfate HFA  4 puff Inhalation Q4H    And    ipratropium  4 puff Inhalation Q4H    aspirin  324 mg Oral Once    sodium chloride flush  10 mL Intravenous BID    sodium chloride flush  10 mL Intravenous 2 times per day    [Held by provider] enoxaparin  1 mg/kg Subcutaneous Daily    nicotine  1 patch Transdermal Daily      fentanyl 25 mcg/hr (07/27/20 0444)    vasopressin (Septic Shock) infusion Stopped (07/27/20 0500)    norepinephrine Stopped (07/27/20 0439)    prismaSATE BGK 4/2.5 500 mL/hr (07/27/20 0303)    prismaSATE BGK 4/2.5 500 mL/hr (07/27/20 0311)    prismaSATE BGK 4/2.5 1,200 mL/hr (07/27/20 4042)    propofol 20 mcg/kg/min (07/27/20 0842)     sodium chloride, potassium chloride, magnesium sulfate, calcium gluconate **OR** calcium gluconate **OR** calcium gluconate **OR** calcium gluconate, sodium phosphate IVPB **OR** sodium phosphate IVPB **OR** sodium phosphate IVPB **OR** sodium phosphate IVPB, ipratropium-albuterol, iopamidol, sodium chloride flush, acetaminophen **OR** acetaminophen, polyethylene glycol, promethazine **OR** ondansetron, promethazine, diphenhydrAMINE, dicyclomine, morphine    Lab Data:  CBC:   Recent Labs     07/25/20  0400 07/26/20 0555 07/26/20 1923 07/26/20 2359 07/27/20 0620   WBC 18.8* 11.8*  --   --   --  15.7*   HGB 8.5* 5.6*   < > 7.8* 7.4* 6.6*   HCT 29.4* 21.6*   < > 26.0* 24.0* 21.0*   MCV 73.9* 83.4  --   --   --  79.5   * 372  --   --   --  240    < > = values in this interval not displayed. BMP:   Recent Labs     07/26/20  0555 07/26/20 1923 07/26/20 2359 07/27/20  0000 07/27/20 0620    138 138  --  139   K 3.8 3.2* 3.7  --  3.6    93* 98*  --  100   CO2 13* 24 25  --  27   PHOS 7.9*  --   --  3.3 2.1*   BUN 28* 30* 24*  --  18   CREATININE 1.5* 1.6* 1.4*  --  1.2     LIVER PROFILE:   Recent Labs     07/25/20 0400 07/26/20 0555 07/27/20 0620   AST 52*  52* 34  34 2,470*  2,470*   ALT 15  15 11  11 207*  207*   BILIDIR 0.5* 0.3 1.1*   BILITOT 0.8  0.8 0.5  0.5 1.8*  1.8*   ALKPHOS 71  71 49  49 60  60     PT/INR:   Recent Labs     07/25/20 0400 07/26/20 0555 07/27/20 0620   PROTIME 16.9* 18.0* 23.1*   INR 1.39 1.48 1.90     APTT:   Recent Labs     07/25/20  0400 07/26/20  0555 07/27/20  0620   APTT 34.8 38.9* 42.4*     BNP:  No results for input(s): BNP in the last 72 hours. TROPONIN: @TROPONINI:3@      Assessment:  60 y. o.year old who is admitted for          Plan:  1. Shock: Off MULTIPLE pressors,echo is normal, recommend Red blood cell transfusions, IVF, patient is very critical, may not make it  2. S/p explatory lap;' as per sx  3. Severe anemia: recommend GI evaluation  4. Elevated trop: Type II NSTEMI with sepsis and possible pneumonia and bronchitis, echo today   5.  Sepsis and pneumonia and UTI: recommend treatment with broad spectrum antibioticds  6. Sacral decubitis: recommend wound consult  Hypoxemia: in ICU on BIPAP, recommend to monitor oxygen sat. all other medications of all above medical condition listed as is.       Trupti Lewis MD 7/27/2020 12:23 PM

## 2020-07-27 NOTE — PROGRESS NOTES
Per Report per doc, wean Levo gtt down to 10 first, then wean off vaso, once vaso gtt is off we can then wean Levo gtt off.

## 2020-07-27 NOTE — OP NOTE
Procedure Note:      Patient ID:  Matteo Anderson  [id number]  25 y.o.  1955    Indications: This is a 72-year-old heroin abuser patient who presented with sepsis. Patient was found down and had multiple wounds on his skin. Patient was complaining of abdominal pain and CT was obtained. CT scan showed pneumatosis of the cecal area. He was tachycardic and showing signs of symptoms of guarding. I proceeded with exploratory laparotomy. Pre-operative Diagnosis: Ischemic bowel    Post-operative Diagnosis: Negative laparotomy    Procedure: Exploratory laparotomy with lysis adhesion    Surgeon: Jacky Yoder MD    Findings: Viable cecum in the rest of bowels    Estimated Blood Loss:  Minimal           Total IV Fluids: 500 ml            Complications:  None; patient tolerated the procedure well. Disposition: PACU - hemodynamically stable. Condition: stable    Procedure Details   The patient was seen in the Holding Room. The risks, benefits, complications, treatment options, and expected outcomes were discussed with the patient. The possibilities of reaction to medication, pulmonary aspiration, perforation of viscus, bleeding, recurrent infection, finding a normal colon, the need for additional procedures, failure to diagnose a condition, and creating a complication requiring transfusion or operation were discussed with the patient. The patient concurred with the proposed plan, giving informed consent. The patient was taken to the operating room, identified and the procedure verified as the consent form. A Time Out was held and the above information confirmed. Procedure Description    The patient was brought into the operating room placed supine. After induction of anesthesia the abdomen was prepped and draped in a sterile fashion. Singh catheter was inserted aseptically. A midline incision was made using the scalpel.  A Bovie cautery was used to divided the subcutaneous tissue and the peritoneum was entered without incidence. Abdominal wall adhesions were encountered. There is minimal adhesions. Patient has had previous cholecystectomy. There is mild adhesions of the right upper quadrant. The right colon was mobilized from the white line of [place] medially. The right colon and cecum appeared pink and viable. There is no fluid in the abdomen. The transverse colon and descending colon appeared within normal limits. The small bowel appeared within normal limits. At this point the abdominal wall fascia was closed using #1 the PDS. Skin was stapled. The patient remains on the vent and was subsequently transferred to the ICU in stable condition.         Rey Mccollum MD

## 2020-07-27 NOTE — CONSULTS
[address] [place] [address], [address] [place] [address]                                  CONSULTATION    PATIENT NAME: Elda Nava                      :        1955  MED REC NO:   [id number]                          ROOM:       2108  ACCOUNT NO:   [de-identified]                           ADMIT DATE: 2020  PROVIDER:     Lauro Dorado MD    CONSULT DATE:  2020    CHIEF COMPLAINT:  1. History of anemia with rectal bleeding, etiology to be determined. 2.  Abnormal LFTs. HISTORY OF PRESENT ILLNESS:  The patient is a 81-year-old Replaced by [facility]  American gentleman, with past medical history significant for  hypertension, active IV drug abuse with multiple abscesses in the upper  extremity, who presented to the emergency room on 2020 after a  fall with hip pain and was noted to be septic. The patient had a CAT  scan done, which showed findings consistent with ischemic bowel with  pneumatosis and the patient underwent emergency exploratory laparotomy  by Dr. Casey Dumont, which was negative. The colon and the small bowel were  negative for ischemia. The patient also upon admission was noted to be  anemic with hemoglobin of 9.4 gm percent and then on 2020, it  dropped to 5.6 gm percent. He was transfused with 2 units of packed  RBCs and today the patient's hemoglobin is 6.8 gm percent. The patient  is passing a small amount of bright red blood per rectum. The patient  is intubated on a ventilator after the exploratory laparotomy. There is  no history of hematemesis. Orogastric tube is in place, which is  draining greenish brown material.  The patient has not had a recent EGD  or colonoscopy done. The patient's LFTs upon admission were within normal limits, but today,  the patient's LFTs have worsened with an AST of 2470, ALT of 207,  alkaline phosphatase of 60, and bilirubin of 1.8.   The patient had a CAT  scan done of the abdomen and bleeding. 2.  Abnormal LFTs, most likely secondary to recent fall and being  unconscious, rule out ischemic hepatopathy. RECOMMENDATIONS:  1. Agree with present management. 2.  We will monitor the patient's LFTs. 3.  We will check hepatitis profile also for A, B and C.  4.  The patient will need to join a drug rehab program after he  recuperates from this surgery. 5.  The patient will need a colonoscopy, EGD and a small bowel  evaluation after he recuperates from his surgery. He has a part of  workup of his anemia and GI bleeding. 6.  We will also start the patient on Protonix 40 mg q.12 h.  7.  Transfuse on a p.r.n. basis to keep hemoglobin above 7 gm percent.         Doe Arauz MD    D: 07/27/2020 15:15:31       T: 07/27/2020 16:09:54     [email or web address]  Job#: [id number]     Doc#: [id number]    CC:

## 2020-07-27 NOTE — CONSULTS
[facility] Continence Nurse  Consult Note       Jeanne Walter  AGE: 60 y.o. GENDER: male  : 1955  TODAY'S DATE:  2020    Subjective:     Reason for  Evaluation and Assessment: wound assessment      Jeanne Walter is a 60 y.o. male referred by:   [x] Physician  [] Nursing  [] Other:     Wound Identification:  Wound Type: pressure and traumatic  Contributing Factors: chronic pressure, decreased mobility, malnutrition, non-adherence and substance abuse        PAST MEDICAL HISTORY        Diagnosis Date    Hypertension        PAST SURGICAL HISTORY    Past Surgical History:   Procedure Laterality Date    BACK SURGERY      IR NONTUNNELED VASCULAR CATHETER  2020    IR NONTUNNELED VASCULAR CATHETER 2020 [facility] SPECIAL PROCEDURES    LAPAROTOMY N/A 2020    LAPAROTOMY EXPLORATORY performed by aYneli Ball MD at [address] [place] [address]    History reviewed. No pertinent family history. SOCIAL HISTORY    Social History     Tobacco Use    Smoking status: Current Every Day Smoker     Packs/day: 1.50     Types: Cigarettes   Substance Use Topics    Alcohol use: No    Drug use: No       ALLERGIES    No Known Allergies    MEDICATIONS    No current facility-administered medications on file prior to encounter. Current Outpatient Medications on File Prior to Encounter   Medication Sig Dispense Refill    HYDROcodone-acetaminophen (NORCO) 5-325 MG per tablet Take 1-2 tablets by mouth every 4 hours as needed for Pain. 15 tablet 0    methadone (METHADOSE) 40 MG disintegrating tablet Take 60 mg by mouth daily.            Objective:      /66   Pulse 95   Temp 96.8 °F (36 °C)   Resp 25   Ht 5' 7.99\" (1.727 m)   Wt 149 lb 4 oz (67.7 kg)   SpO2 100%   BMI 22.70 kg/m²   Hugh Risk Score: Hugh Scale Score: 10    LABS    CBC:   Lab Results   Component Value Date    WBC 15.7 2020    RBC 2.64 2020    HGB 7.6 2020    HCT 24.1 2020    MCV 79.5 2020 MCH 25.0 07/27/2020    MCHC 31.4 07/27/2020    RDW 23.1 07/27/2020     07/27/2020    MPV 9.8 07/27/2020     CMP:    Lab Results   Component Value Date     07/27/2020    K 3.6 07/27/2020     07/27/2020    CO2 27 07/27/2020    BUN 18 07/27/2020    CREATININE 1.2 07/27/2020    GFRAA >60 07/27/2020    LABGLOM >60 07/27/2020    GLUCOSE 138 07/27/2020    PROT 6.4 07/27/2020    PROT 6.4 07/27/2020    PROT 8.4 11/06/2010    LABALBU 3.2 07/27/2020    LABALBU 3.2 07/27/2020    CALCIUM 7.7 07/27/2020    BILITOT 1.8 07/27/2020    BILITOT 1.8 07/27/2020    ALKPHOS 60 07/27/2020    ALKPHOS 60 07/27/2020    AST 2,470 07/27/2020    AST 2,470 07/27/2020     07/27/2020     07/27/2020     Albumin:    Lab Results   Component Value Date    LABALBU 3.2 07/27/2020    LABALBU 3.2 07/27/2020     PT/INR:    Lab Results   Component Value Date    PROTIME 23.1 07/27/2020    INR 1.90 07/27/2020     HgBA1c:  No results found for: LABA1C      Assessment:     Patient Active Problem List   Diagnosis    Sepsis ([facility] [address]    Leukocytosis       Measurements:  Wound 07/26/20 Arm Left; Lower (Active)   Wound Other 07/27/20 1120   Dressing Status Changed 07/27/20 1120   Dressing Changed Changed/New 07/27/20 1120   Dressing/Treatment Xeroform;ABD;Other (comment) 07/27/20 0400   Wound Cleansed Rinsed/Irrigated with saline 07/27/20 1120   Dressing Change Due 07/27/20 07/27/20 0400   Wound Length (cm) 13 cm 07/27/20 1120   Wound Width (cm) 5 cm 07/27/20 1120   Wound Depth (cm) 0.3 cm 07/27/20 1120   Wound Surface Area (cm^2) 65 cm^2 07/27/20 1120   Wound Volume (cm^3) 19.5 cm^3 07/27/20 1120   Distance Tunneling (cm) 0 cm 07/27/20 1120   Tunneling Position ___ O'Clock 0 07/27/20 1120   Undermining Starts ___ O'Clock 0 07/27/20 1120   Undermining Ends___ O'Clock 0 07/27/20 1120   Undermining [place] Distance (cm) 0 07/27/20 1120   Wound Assessment Yellow;Pink 07/27/20 1120   Drainage Amount Moderate 07/27/20 1120   Drainage Description Serous; Serosanguinous 07/27/20 1120   Odor None 07/27/20 1120   Margins Defined edges 07/27/20 1120   Exposed structure Muscle 07/27/20 0000   Digna-wound Assessment Dark edges; Black 07/27/20 1120   Non-staged Wound Description Full thickness 07/27/20 1120   [place]%Wound Bed 80 07/27/20 1120   Yellow%Wound Bed 20 07/27/20 1120   Number of days: 0       Wound 07/26/20 Arm Right; Lower (Active)   Wound Other 07/27/20 1120   Dressing Status Changed 07/27/20 1120   Dressing Changed Changed/New 07/27/20 1120   Dressing/Treatment Xeroform;ABD;Other (comment) 07/27/20 0400   Wound Cleansed Rinsed/Irrigated with saline 07/27/20 1120   Dressing Change Due 07/27/20 07/27/20 0400   Wound Length (cm) 17.8 cm 07/27/20 1120   Wound Width (cm) 10.5 cm 07/27/20 1120   Wound Depth (cm) 0.3 cm 07/27/20 1120   Wound Surface Area (cm^2) 186.9 cm^2 07/27/20 1120   Wound Volume (cm^3) 56.07 cm^3 07/27/20 1120   Distance Tunneling (cm) 0 cm 07/27/20 1120   Tunneling Position ___ O'Clock 0 07/27/20 1120   Undermining Starts ___ O'Clock 0 07/27/20 1120   Undermining Ends___ O'Clock 0 07/27/20 1120   Undermining Maxium Distance (cm) 0 07/27/20 1120   Wound Assessment Black; Yellow;Pink 07/27/20 1120   Drainage Amount Moderate 07/27/20 1120   Drainage Description Serosanguinous 07/27/20 1120   Odor None 07/27/20 1120   Margins Defined edges 07/27/20 1120   Exposed structure Muscle 07/27/20 0000   Digna-wound Assessment Dark edges;Dry;Black 07/27/20 1120   Non-staged Wound Description Full thickness 07/27/20 1120   [place]%Wound Bed 60 07/27/20 1120   Yellow%Wound Bed 20 07/27/20 1120   Black%Wound Bed 20 07/27/20 1120   Number of days: 0       Wound 07/27/20 Shoulder Left (Active)   Wound Traumatic 07/27/20 1120   Dressing Status Changed 07/27/20 1120   Dressing Changed Changed/New 07/27/20 1120   Wound Cleansed Rinsed/Irrigated with saline 07/27/20 1120   Wound Length (cm) 2.3 cm 07/27/20 1120   Wound Width (cm) 4.2 cm 07/27/20 1120 Wound Depth (cm) 0.1 cm 07/27/20 1120   Wound Surface Area (cm^2) 9.66 cm^2 07/27/20 1120   Wound Volume (cm^3) 0.97 cm^3 07/27/20 1120   Distance Tunneling (cm) 0 cm 07/27/20 1120   Tunneling Position ___ O'Clock 0 07/27/20 1120   Undermining Starts ___ O'Clock 0 07/27/20 1120   Undermining Ends___ O'Clock 0 07/27/20 1120   Undermining Maxium Distance (cm) 0 07/27/20 1120   Wound Assessment Pink 07/27/20 1120   Drainage Amount Small 07/27/20 1120   Drainage Description Serous 07/27/20 1120   Odor None 07/27/20 1120   Margins Defined edges 07/27/20 1120   Digna-wound Assessment Intact 07/27/20 1120   Non-staged Wound Description Full thickness 07/27/20 1120   [place]%Wound Bed 100 07/27/20 1120   Number of days: 0       Wound 07/27/20 Hip Left (Active)   Wound Pressure Stage  2 07/27/20 1120   Dressing Status Changed 07/27/20 1120   Dressing Changed Changed/New 07/27/20 1120   Wound Cleansed Rinsed/Irrigated with saline 07/27/20 1120   Wound Length (cm) 4 cm 07/27/20 1120   Wound Width (cm) 2.3 cm 07/27/20 1120   Wound Depth (cm) 0.1 cm 07/27/20 1120   Wound Surface Area (cm^2) 9.2 cm^2 07/27/20 1120   Wound Volume (cm^3) 0.92 cm^3 07/27/20 1120   Distance Tunneling (cm) 0 cm 07/27/20 1120   Tunneling Position ___ O'Clock 0 07/27/20 1120   Undermining Starts ___ O'Clock 0 07/27/20 1120   Undermining Ends___ O'Clock 0 07/27/20 1120   Undermining Maxium Distance (cm) 0 07/27/20 1120   Wound Assessment Pink;Red 07/27/20 1120   Drainage Amount Small 07/27/20 1120   Drainage Description Serosanguinous 07/27/20 1120   Odor None 07/27/20 1120   Margins Defined edges 07/27/20 1120   Digna-wound Assessment Intact 07/27/20 1120   Non-staged Wound Description Partial thickness 07/27/20 1120   [place]%Wound Bed 50 07/27/20 1120   Red%Wound Bed 50 07/27/20 1120   Number of days: 0       Wound 07/27/20 Sacrum (Active)   Wound Pressure Stage  2 07/27/20 1120   Dressing Status Changed 07/27/20 1120   Dressing Changed Changed/New 07/27/20 1120   Wound Cleansed Rinsed/Irrigated with saline 07/27/20 1120   Wound Length (cm) 2.5 cm 07/27/20 1120   Wound Width (cm) 0.5 cm 07/27/20 1120   Wound Depth (cm) 0.1 cm 07/27/20 1120   Wound Surface Area (cm^2) 1.25 cm^2 07/27/20 1120   Wound Volume (cm^3) 0.12 cm^3 07/27/20 1120   Distance Tunneling (cm) 0 cm 07/27/20 1120   Tunneling Position ___ O'Clock 0 07/27/20 1120   Undermining Starts ___ O'Clock 0 07/27/20 1120   Undermining Ends___ O'Clock 0 07/27/20 1120   Undermining Maxium Distance (cm) 0 07/27/20 1120   Wound Assessment Red;Pink 07/27/20 1120   Drainage Amount Small 07/27/20 1120   Drainage Description Serosanguinous 07/27/20 1120   Odor None 07/27/20 1120   Margins Defined edges 07/27/20 1120   Digna-wound Assessment Intact 07/27/20 1120   Non-staged Wound Description Partial thickness 07/27/20 1120   [place]%Wound Bed 50 07/27/20 1120   Red%Wound Bed 50 07/27/20 1120   Number of days: 0       Response to treatment:  Well tolerated by patient. Pain Assessment:  Severity:  none  Quality of pain: na  Wound Pain Timing/Severity: na  Premedicated: no    Plan:     Plan of Care: Incision Abdomen Mid-Dressing/Treatment: Dry dressing  Wound 07/26/20 Arm Left; Lower-Dressing/Treatment: (aquacel AG, abd, kerlix)  Wound 07/26/20 Arm Right; Lower-Dressing/Treatment: (aquacel ag, abd, kerlix)  Wound 07/27/20 Shoulder Left-Dressing/Treatment: (collagen, optifoam border)  Wound 07/27/20 Hip Left-Dressing/Treatment: (puracol, optifoam border)  Wound 07/27/20 Sacrum-Dressing/Treatment: (puracol, optifoam border)     Pt in bed. Nurse with pt. Intubated. Wounds to bilateral lower arms probable from IV drug use. Edges dark with dry eschar. Wounds mostly pink. Pt found on left side per nurse. Wounds noted to left hip/left shoulder/sacrum. Wounds pictured and measured. Treatment as above. Bilateral lower legs dry. Heels intact. Positioned to left side with heels floated with pillow support.  Pt is at high risk for skin breakdown AEB Hugh. Follow Hugh orders. Specialty Bed Required : yes  [x] Low Air Loss   [x] Pressure Redistribution  [] Fluid Immersion  [] Bariatric  [] Total Pressure Relief  [] Other:     Discharge Plan:  Placement for patient upon discharge: tbd  Hospice Care: no  Patient appropriate for Outpatient [address]: [facility]    Patient/Caregiver Teaching:  Level of patient/caregiver understanding able to:   Not able to voice understanding at this time.         Electronically signed by Blake Luna RN,  on 7/27/2020 at 3:48 PM

## 2020-07-27 NOTE — CONSULTS
Infectious Disease Consult Note  2020   Patient Name: Zee Chou : 1955   Impression   Septic shock secondary to MSSA bacteremia due to bilateral upper extremity wound infection  o Heroin IDU with bilateral arm wounds  o ? Vertebral osteomyelitis as CT reports destructive changes at T12-L1   Rhabdomyolysis   JERAMIE with ATN  o On CRRT   Acute liver injury on chronic hepatitis C  o AST elevation out of proportion to ALT, perhaps secondary to rhabdomyolysis. ? Myocardial injury   Acute respiratory failure   NSTEMI type 2   Chronic tobacco use.  Multi-morbidity: per PMHx  Plan:   Therapeutic: d/c vancomycin and clindamycin, start cefazolin   Diagnostic: blood cx q 48 h, HIV screen, T-spot and Hepatitis C RNA, JASPAL   F/u test:     Thank you for allowing me to consult in the care of this patient.  ------------------------  REASON FOR CONSULT: Infective syndrome   Requested by: Dr. Brigido Thorpe  History obtained from chart review,as the patient is intubated  HPI:Patient is a 60 y.o. -American male with hypertension, hepatitis C, active heroin user who was admitted 2020 for further evaluation and management of bilateral hip pain that started 2 days prior to admission. He reports that he was walking with his walker tripped and fell. He was on the fall for 2 days before someone could help him. He also reported chronic abdominal pain secondary to an stage IV gastric cancer . He received a clinical diagnosis of sepsis suspected to be secondary to bilateral upper extremity wounds, acute on chronic respiratory failure, elevated troponin. On admission was noted to have purulent malodorous drainage from his upper extremity wound. He was treated empirically with vancomycin and cefepime. Later on clindamycin was added on .  2 out of 2 sets of blood cultures were positive on 2020. Transthoracic echocardiogram showed moderate to severe tricuspid regurgitation.   No obvious vegetation seen. ? Infectious diseases service was consulted to evaluate the pt, and recommend further investigative and therapeutic measures. Review and summary of old records:  ROS: Other systems reviewed Including eyes, ENT, respiratory, cardiovascular, GI, , dermatologic, neurologic, psych, hem/lymphatic, musculoskeletal and endocrine were negative other than what is mentioned above. Unable to obtain; pt on vent  Patient Active Problem List    Diagnosis Date Noted    Leukocytosis     Sepsis ([address] 07/24/2020     Past Medical History:   Diagnosis Date    Hypertension       Past Surgical History:   Procedure Laterality Date    BACK SURGERY      IR NONTUNNELED VASCULAR CATHETER  7/26/2020    IR NONTUNNELED VASCULAR CATHETER 7/26/2020 [facility] SPECIAL PROCEDURES    LAPAROTOMY N/A 7/25/2020    LAPAROTOMY EXPLORATORY performed by Amari Saunders MD at [facility]      History reviewed. No pertinent family history. Infectious disease related family history - not contibutory. SOCIAL HISTORY  Social History     Tobacco Use    Smoking status: Current Every Day Smoker     Packs/day: 1.50     Types: Cigarettes   Substance Use Topics    Alcohol use: No       [facility] Occupation:   No recent travel of significance.  No recent unusual exposures.  NO pets   ? ALLERGIES  No Known Allergies   MEDICATIONS  Reviewed and are per the chart/EMR. ? Antibiotics:   Vancomycin   Cefepime   Clindamycin   ?  -------------------------------------------------------------------------------------------------------------------    Vital Signs:  Vitals:    07/27/20 1117   BP: 124/66   Pulse: 99   Resp: 24   Temp: 96.8 °F (36 °C)   SpO2: 100%         Exam:    VS: noted; wt 67.7 kg  Gen: Intubated, sedated and mechanically ventilated  Skin: no stigmata of endocarditis  Wounds: Bilateral forearm dressing C/D/I  HEMT: AT/NC, OG tube, ETT  Eyes: PERRL  Neck: Supple. Trachea midline. No LAD. Chest: no distress and CTA.  Good air movement. Heart: RRR and no MRG. Abd: soft, non-distended, no tenderness, no hepatomegaly. Normoactive bowel sounds. Ext: no clubbing, cyanosis, or edema  Catheter Site: right IJ CVC, right femoral vein vascath without erythema or tenderness  Neuro: sedated    ? Diagnostic Studies: reviewed  ? CT angiogram of the chest, abdomen and pelvis 7/25/2020   Impression 1. No evidence of pulmonary embolic disease. Motion and streak artifact limits the exam. 2. Minimal left basilar ground-glass opacity. Mucous impaction involving right lower lobe bronchi. Consider aspiration pneumonitis. 3. Chronic appearing destructive change at the T12-L1 disc space. Consider prior infectious spondylitis. Correlation with lab values may be helpful to exclude acute discitis and osteomyelitis. 4. Minimal foci of hypoattenuation within the renal cortices, consider pyelonephritis or small infarcts. 5. Diffuse 3rd spacing. 6. Lucency within the right colonic wall, most likely pseudo pneumatosis. Correlation with lactate levels recommended to exclude bowel ischemia. 7. Large decubitus ulcer involving the right posterior thigh and buttock. No defined abscess or evidence of osteomyelitis. 8. Nonobstructing left renal calculus. ?  I have examined this patient and available medical records on this date and have made the above observations, conclusions and recommendations.   Electronically signed by: Electronically signed by Fina Dorman MD on 7/27/2020 at 12:14 PM

## 2020-07-27 NOTE — CONSULTS
Palliative Medicine Consultation    Reason for Consult:      _X____ Advance Care Planning  _X____Transition of Care Planning  _X____ Psychosocial Support  _____ Symptom Management:     Recommendations:    1. Continue with the excellent care of this patient with IVDU and sepsis. 2.  Attempted to call the son but there was no answer. Let a message to call my office. 3.  If patient improves needs advance directives completed. Requesting Physician:  Dr. Beth Ramos:  Status post fall    History Obtained From:  electronic medical record, NICOLE Veloz    HISTORY OF PRESENT ILLNESS:    60year old male who presented to the ED after a fall. He was presumed down for about 2 days. On presentation the patient was tachypneic and hypoxic and placed on bipap. The patient is a chronic IVDU of about 3 grams of heroin per day. He was noted to have multiple wounds on his upper extremities and decubitus ulcers as well. .  It was noted that he has advanced gastric cancer but I could not document this. The patient has an ECHO which did not show any vegetation but moderate to severe TR and severe pulmonary hypertension. The patient had been on three pressors which have been weaned off. There was concern for an acute abdomen and the patient underwent surgery but the abdomen was normal.  Palliative Care was asked to see the patient for goals of care and support.               Past Medical History:        Diagnosis Date    Hypertension        Past Surgical History:        Procedure Laterality Date    BACK SURGERY      IR NONTUNNELED VASCULAR CATHETER  7/26/2020    IR NONTUNNELED VASCULAR CATHETER 7/26/2020 [facility] SPECIAL PROCEDURES    LAPAROTOMY N/A 7/25/2020    LAPAROTOMY EXPLORATORY performed by Ilene Watson MD at [facility] OR       Current Medications:    Current Facility-Administered Medications: fentanyl (SUBLIMAZE) 1,000 mcg in sodium chloride 0.9% 100 mL infusion, 25 mcg/hr, Intravenous, Continuous  vancomycin (VANCOCIN) 1000 mg in dextrose 5% 200 mL IVPB, 1,000 mg, Intravenous, Once  vasopressin 20 Units in dextrose 5 % 100 mL infusion, 0.04 Units/min, Intravenous, Continuous  norepinephrine (LEVOPHED) 16 mg in dextrose 5% 250 mL infusion, 2 mcg/min, Intravenous, Continuous  hydrocortisone sodium succinate PF (SOLU-CORTEF) injection 100 mg, 100 mg, Intravenous, Q8H  thiamine (B-1) 200 mg in sodium chloride 0.9 % 100 mL IVPB, 200 mg, Intravenous, Q12H  clindamycin (CLEOCIN) 900 mg in dextrose 5 % 50 mL IVPB, 900 mg, Intravenous, Q8H  0.9 % sodium chloride bolus, 2,000 mL, Intravenous, PRN  prismaSATE BGK 4/2.5 dialysis solution, , Dialysis, Continuous  prismaSATE BGK 4/2.5 dialysis solution, , Dialysis, Continuous  potassium chloride 20 mEq/50 mL IVPB (Central Line), 20 mEq, Intravenous, PRN  magnesium sulfate 1 g in dextrose 5% 100 mL IVPB, 1 g, Intravenous, PRN  calcium gluconate 1 g in dextrose 5 % 100 mL IVPB, 1 g, Intravenous, PRN **OR** calcium gluconate 2 g in dextrose 5 % 100 mL IVPB, 2 g, Intravenous, PRN **OR** calcium gluconate 3 g in dextrose 5 % 100 mL IVPB, 3 g, Intravenous, PRN **OR** calcium gluconate 4 g in dextrose 5 % 100 mL IVPB, 4 g, Intravenous, PRN  sodium phosphate 6 mmol in dextrose 5 % 250 mL IVPB, 6 mmol, Intravenous, PRN **OR** sodium phosphate 12 mmol in dextrose 5 % 250 mL IVPB, 12 mmol, Intravenous, PRN **OR** sodium phosphate 18 mmol in dextrose 5 % 500 mL IVPB, 18 mmol, Intravenous, PRN **OR** sodium phosphate 24 mmol in dextrose 5 % 500 mL IVPB, 24 mmol, Intravenous, PRN  prismaSATE BGK 4/2.5 dialysis solution, , Dialysis, Continuous  vancomycin (VANCOCIN) intermittent dosing (placeholder), , Other, RX Placeholder  cefepime (MAXIPIME) 2 g IVPB minibag, 2 g, Intravenous, Q8H  propofol injection, 10 mcg/kg/min, Intravenous, Titrated  ipratropium-albuterol (DUONEB) nebulizer solution 1 ampule, 1 ampule, Inhalation, Q4H PRN  chlorhexidine (PERIDEX) 0.12 % solution 15 mL, 15 mL, Mouth/Throat, CBC:   Lab Results   Component Value Date    WBC 15.7 07/27/2020    RBC 2.64 07/27/2020    HGB 6.6 07/27/2020    HCT 21.0 07/27/2020    MCV 79.5 07/27/2020    MCH 25.0 07/27/2020    MCHC 31.4 07/27/2020    RDW 23.1 07/27/2020     07/27/2020    MPV 9.8 07/27/2020     CMP:    Lab Results   Component Value Date     07/27/2020    K 3.6 07/27/2020     07/27/2020    CO2 27 07/27/2020    BUN 18 07/27/2020    CREATININE 1.2 07/27/2020    GFRAA >60 07/27/2020    LABGLOM >60 07/27/2020    GLUCOSE 138 07/27/2020    PROT 6.4 07/27/2020    PROT 6.4 07/27/2020    PROT 8.4 11/06/2010    LABALBU 3.2 07/27/2020    LABALBU 3.2 07/27/2020    CALCIUM 7.7 07/27/2020    BILITOT 1.8 07/27/2020    BILITOT 1.8 07/27/2020    ALKPHOS 60 07/27/2020    ALKPHOS 60 07/27/2020    AST 2,470 07/27/2020    AST 2,470 07/27/2020     07/27/2020     07/27/2020     Albumin:    Lab Results   Component Value Date    LABALBU 3.2 07/27/2020    LABALBU 3.2 07/27/2020     CT ABD/Pelvis: 07/25/2020  1. No evidence of pulmonary embolic disease.  Motion and streak artifact    limits the exam.    2. Minimal left basilar ground-glass opacity.  Mucous impaction involving    right lower lobe bronchi.  Consider aspiration pneumonitis. 3. Chronic appearing destructive change at the T12-L1 disc space.  Consider    prior infectious spondylitis.  Correlation with lab values may be helpful to    exclude acute discitis and osteomyelitis. 4. Minimal foci of hypoattenuation within the renal cortices, consider    pyelonephritis or small infarcts. 5. Diffuse 3rd spacing. 6. Lucency within the right colonic wall, most likely pseudo pneumatosis. Correlation with lactate levels recommended to exclude bowel ischemia. 7. Large decubitus ulcer involving the right posterior thigh and buttock.  No    defined abscess or evidence of osteomyelitis. 8. Nonobstructing left renal calculus.       ECHO: 07/26/2020  Summary   Technically difficult study due to patient's intubation and tachycardia. Left ventricular systolic function is hyperdynamic. Ejection fraction is visually estimated at >60%. Small left ventricular cavity consistent with hypovolemia. Mild left ventricular hypertrophy. Moderate to severe tricuspid regurgitation. RVSP is 67 mmHg, consistent with severe PHTN. No evidence of any pericardial effusion. No obvious vegetations. IMPRESSION:    60year old IVDU admitted with sepsis, intubated and sedated for Palliative Care encounter. I tried to call the patient's son but there was not answer and I left a message for him to call me back in my office to discuss his father's goals at this point. The patient does not have advance directives but if he improves we can get them completed before he is discharged.       Electronically signed by Basilio Aase, MD on 7/27/2020 at 12:34 PM

## 2020-07-27 NOTE — PROGRESS NOTES
Hospitalist Progress Note      Name:  Keon Ruby /Age/Sex: 1955  (60 y.o. male)   MRN & CSN:  [id number] & [id number] Admission Date/Time: 2020  3:22 PM   Location:  A PCP: No primary care provider on file. Hospital Day: 4    History of Present Illness:     Chief Complaint: Keon Ruby is a 60 y.o.  male  who presents with fall     The patient seen and examined at bedside. He is currently on 4 pressors. Off sedation. Ten point ROS reviewed negative, unless as noted above    Objective:        Intake/Output Summary (Last 24 hours) at 2020 1534  Last data filed at 2020 1517  Gross per 24 hour   Intake 1263 ml   Output 2889 ml   Net -1626 ml      Vitals:   Vitals:    20 1503   BP:    Pulse: 95   Resp: 25   Temp:    SpO2: 100%     Physical Exam:   General Appearance: Intubated and sedated  Cardiovascular: normal rate, regular rhythm, normal S1 and S2  Pulmonary/Chest: Decreased breath sounds bilaterally  Abdomen: soft, non-tender, non-distended, normal bowel sounds, no masses   Extremities: no cyanosis, clubbing or edema, pulse   Skin: ulcers of the skin  Head: normocephalic and atraumatic  Eyes: pupils equal, round, and reactive to light  Neck: supple and non-tender without mass, no thyromegaly   Musculoskeletal: normal range of motion, no joint swelling, deformity or tenderness  Neurological: Intubated and sedated    Medications:   Medications:    vancomycin  1,000 mg Intravenous Once    pantoprazole  40 mg Intravenous Daily    hydrocortisone sodium succinate PF  100 mg Intravenous Q8H    thiamine (VITAMIN B1) IVPB  200 mg Intravenous Q12H    clindamycin (CLEOCIN) IV  900 mg Intravenous Q8H    vancomycin (VANCOCIN) intermittent dosing (placeholder)   Other RX Placeholder    cefepime  2 g Intravenous Q8H    chlorhexidine  15 mL Mouth/Throat BID    albuterol sulfate HFA  4 puff Inhalation Q4H    And    ipratropium  4 puff Inhalation Q4H    aspirin 324 mg Oral Once    sodium chloride flush  10 mL Intravenous BID    sodium chloride flush  10 mL Intravenous 2 times per day    [Held by provider] enoxaparin  1 mg/kg Subcutaneous Daily    nicotine  1 patch Transdermal Daily      Infusions:    fentanyl 25 mcg/hr (07/27/20 0444)    vasopressin (Septic Shock) infusion Stopped (07/27/20 0500)    norepinephrine Stopped (07/27/20 0439)    prismaSATE BGK 4/2.5 500 mL/hr (07/27/20 1338)    prismaSATE BGK 4/2.5 500 mL/hr (07/27/20 1348)    prismaSATE BGK 4/2.5 1,200 mL/hr (07/27/20 0918)    propofol 20 mcg/kg/min (07/27/20 0842)     PRN Meds: sodium chloride, 2,000 mL, PRN  potassium chloride, 20 mEq, PRN  magnesium sulfate, 1 g, PRN  calcium gluconate, 1 g, PRN    Or  calcium gluconate, 2 g, PRN    Or  calcium gluconate, 3 g, PRN    Or  calcium gluconate, 4 g, PRN  sodium phosphate IVPB, 6 mmol, PRN    Or  sodium phosphate IVPB, 12 mmol, PRN    Or  sodium phosphate IVPB, 18 mmol, PRN    Or  sodium phosphate IVPB, 24 mmol, PRN  ipratropium-albuterol, 1 ampule, Q4H PRN  iopamidol, 80 mL, ONCE PRN  sodium chloride flush, 10 mL, PRN  acetaminophen, 650 mg, Q6H PRN    Or  acetaminophen, 650 mg, Q6H PRN  polyethylene glycol, 17 g, Daily PRN  promethazine, 12.5 mg, Q6H PRN    Or  ondansetron, 4 mg, Q6H PRN  promethazine, 25 mg, Q6H PRN  diphenhydrAMINE, 12.5 mg, Nightly PRN  dicyclomine, 20 mg, Q6H PRN  morphine, 2 mg, Q4H PRN      Pertinent New Labs & Imaging Studies     CBC with Differential:    Lab Results   Component Value Date    WBC 15.7 07/27/2020    RBC 2.64 07/27/2020    HGB 6.6 07/27/2020    HCT 21.0 07/27/2020     07/27/2020    MCV 79.5 07/27/2020    MCH 25.0 07/27/2020    MCHC 31.4 07/27/2020    RDW 23.1 07/27/2020    SEGSPCT 88.0 07/27/2020    BANDSPCT 10 07/27/2020    LYMPHOPCT 1.0 07/27/2020    MONOPCT 1.0 07/27/2020    EOSPCT 0.8 03/11/2011    BASOPCT 0.1 07/26/2020    MONOSABS 0.2 07/27/2020    LYMPHSABS 0.2 07/27/2020    EOSABS 0.0 07/26/2020 provided for review. MIP images are provided for review. Dose modulation, iterative reconstruction, and/or weight based adjustment of the mA/kV was utilized to reduce the radiation dose to as low as reasonably achievable.; CT of the abdomen and pelvis was performed with the administration of intravenous contrast. Multiplanar reformatted images are provided for review. Dose modulation, iterative reconstruction, and/or weight based adjustment of the mA/kV was utilized to reduce the radiation dose to as low as reasonably achievable. COMPARISON: CT abdomen and pelvis dated 10/27/2010, portable chest radiograph dated 07/24/2010 HISTORY: ORDERING SYSTEM PROVIDED HISTORY: hypoxia TECHNOLOGIST PROVIDED HISTORY: Reason for exam:->hypoxia Reason for Exam: SHORTNESS OF BREATH/  R/O pe; ORDERING SYSTEM PROVIDED HISTORY: Abdominal pain TECHNOLOGIST PROVIDED HISTORY: Reason for exam:->Abdominal pain Reason for Exam: Abdominal pain FINDINGS: Pulmonary Arteries: There is adequate opacification of the pulmonary arteries. The pulmonary arteries are normal caliber. There are no pulmonary arterial filling defects. Streak artifact slightly limits the exam. Mediastinum: A right IJ catheter has its tip in the superior vena cava. The thoracic aorta is of normal caliber. There is no evidence of thoracic aortic dissection. The heart size is within normal limits. There is no pericardial effusion. Lungs/pleura: There is mild centrilobular emphysema. There is minor consolidation at the right lung base posteromedially. There is no pleural effusion, pneumothorax or evidence of edema. There is minimal atelectasis at the left lung base. Motion degrades the quality of the exam.  There are small foci of ground-glass opacity within the left lower lobe medially. There is mucous impaction within right lower lobe bronchi. Soft Tissues/Bones: 3rd spacing is noted. The bones are demineralized. There are endplate destructive changes at T12-L1. These are new when compared to the 2010 exam.  There is associated sclerosis of the vertebral bodies. No paravertebral fluid collection is appreciated. CT abdomen and pelvis: Organs: No focal hepatic abnormality is identified. No focal splenic abnormality is appreciated no focal pancreatic abnormality is identified. The adrenal glands are unremarkable. The gallbladder is absent. The kidneys are not obstructed. There are areas of cortical decreased attenuation within the right kidney. There is a rounded low-attenuation lesion within the left renal lower pole, likely a cyst.  There is nonobstructing left lower pole renal calculus. There are small foci of low attenuation within the left renal cortex. Bowel: The bowel is not obstructed. The appendix is believed to be within normal limits. There are air foci within the posterior wall of the right colon which is most likely reflects pseudo pneumatosis. There is no mesenteric or portal venous gas. Pelvis: No free fluid is noted in the pelvis. A Singh catheter is in place. There is ectasia of the common iliac arteries. Retroperitoneum: The abdominal aorta is of normal caliber. There is no evidence of free intraperitoneal air. Bones and soft tissues: 3rd spacing is noted. There is a large decubitus ulcer involving the right buttock. A defined abscess is not identified. No bony destruction is appreciated. Postsurgical changes are noted in the lumbar spine. There is ankylosis L2-3. There is degenerative disc disease at L3-4.     1. No evidence of pulmonary embolic disease. Motion and streak artifact limits the exam. 2. Minimal left basilar ground-glass opacity. Mucous impaction involving right lower lobe bronchi. Consider aspiration pneumonitis. 3. Chronic appearing destructive change at the T12-L1 disc space. Consider prior infectious spondylitis. Correlation with lab values may be helpful to exclude acute discitis and osteomyelitis.  4. Minimal foci of hypoattenuation within the renal cortices, consider pyelonephritis or small infarcts. 5. Diffuse 3rd spacing. 6. Lucency within the right colonic wall, most likely pseudo pneumatosis. Correlation with lactate levels recommended to exclude bowel ischemia. 7. Large decubitus ulcer involving the right posterior thigh and buttock. No defined abscess or evidence of osteomyelitis. 8. Nonobstructing left renal calculus. Xr Chest Portable    Result Date: 7/24/2020  EXAMINATION: ONE XRAY VIEW OF THE CHEST 7/24/2020 9:02 pm COMPARISON: 7/24/2020 HISTORY: ORDERING SYSTEM PROVIDED HISTORY: central line placement TECHNOLOGIST PROVIDED HISTORY: Reason for exam:->central line placement Reason for Exam: central line placement Acuity: Acute Type of Exam: Initial Additional signs and symptoms: SOB Relevant Medical/Surgical History: none FINDINGS: Left costophrenic angle clipped from field of view. Right IJ central venous catheter has been placed with tip in the distal SVC. No pneumothorax. Lungs remain clear. Cardiac and mediastinal silhouettes are stable. Stable mild blunting of right costophrenic angle compatible with trace to small right pleural effusion. Stable appearance to bony structures. Placement of right IJ central venous catheter with tip in the distal SVC. No pneumothorax. Otherwise stable exam with trace to small right pleural effusion. Xr Chest Portable    Result Date: 7/24/2020  EXAMINATION: ONE XRAY VIEW OF THE CHEST 7/24/2020 4:55 pm COMPARISON: None. HISTORY: ORDERING SYSTEM PROVIDED HISTORY: fall TECHNOLOGIST PROVIDED HISTORY: Reason for exam:->fall Reason for Exam: fall Acuity: Acute Type of Exam: Initial Mechanism of Injury: patient fell in the kitchen, was down for 2 days Relevant Medical/Surgical History: na FINDINGS: No pneumothorax or pulmonary contusion or effusion is identified. The heart size is normal.     No acute abnormality detected.      Cta Pulmonary W Contrast    Result Date: 7/25/2020  EXAMINATION: CTA OF THE CHEST; CT OF THE ABDOMEN AND PELVIS WITH CONTRAST 7/25/2020 10:48 am TECHNIQUE: CTA of the chest was performed after the administration of intravenous contrast.  Multiplanar reformatted images are provided for review. MIP images are provided for review. Dose modulation, iterative reconstruction, and/or weight based adjustment of the mA/kV was utilized to reduce the radiation dose to as low as reasonably achievable.; CT of the abdomen and pelvis was performed with the administration of intravenous contrast. Multiplanar reformatted images are provided for review. Dose modulation, iterative reconstruction, and/or weight based adjustment of the mA/kV was utilized to reduce the radiation dose to as low as reasonably achievable. COMPARISON: CT abdomen and pelvis dated 10/27/2010, portable chest radiograph dated 07/24/2010 HISTORY: ORDERING SYSTEM PROVIDED HISTORY: hypoxia TECHNOLOGIST PROVIDED HISTORY: Reason for exam:->hypoxia Reason for Exam: SHORTNESS OF BREATH/  R/O pe; ORDERING SYSTEM PROVIDED HISTORY: Abdominal pain TECHNOLOGIST PROVIDED HISTORY: Reason for exam:->Abdominal pain Reason for Exam: Abdominal pain FINDINGS: Pulmonary Arteries: There is adequate opacification of the pulmonary arteries. The pulmonary arteries are normal caliber. There are no pulmonary arterial filling defects. Streak artifact slightly limits the exam. Mediastinum: A right IJ catheter has its tip in the superior vena cava. The thoracic aorta is of normal caliber. There is no evidence of thoracic aortic dissection. The heart size is within normal limits. There is no pericardial effusion. Lungs/pleura: There is mild centrilobular emphysema. There is minor consolidation at the right lung base posteromedially. There is no pleural effusion, pneumothorax or evidence of edema. There is minimal atelectasis at the left lung base.   Motion degrades the quality of the exam.  There are small foci of ground-glass opacity within the left lower lobe medially. There is mucous impaction within right lower lobe bronchi. Soft Tissues/Bones: 3rd spacing is noted. The bones are demineralized. There are endplate destructive changes at T12-L1. These are new when compared to the 2010 exam.  There is associated sclerosis of the vertebral bodies. No paravertebral fluid collection is appreciated. CT abdomen and pelvis: Organs: No focal hepatic abnormality is identified. No focal splenic abnormality is appreciated no focal pancreatic abnormality is identified. The adrenal glands are unremarkable. The gallbladder is absent. The kidneys are not obstructed. There are areas of cortical decreased attenuation within the right kidney. There is a rounded low-attenuation lesion within the left renal lower pole, likely a cyst.  There is nonobstructing left lower pole renal calculus. There are small foci of low attenuation within the left renal cortex. Bowel: The bowel is not obstructed. The appendix is believed to be within normal limits. There are air foci within the posterior wall of the right colon which is most likely reflects pseudo pneumatosis. There is no mesenteric or portal venous gas. Pelvis: No free fluid is noted in the pelvis. A Singh catheter is in place. There is ectasia of the common iliac arteries. Retroperitoneum: The abdominal aorta is of normal caliber. There is no evidence of free intraperitoneal air. Bones and soft tissues: 3rd spacing is noted. There is a large decubitus ulcer involving the right buttock. A defined abscess is not identified. No bony destruction is appreciated. Postsurgical changes are noted in the lumbar spine. There is ankylosis L2-3. There is degenerative disc disease at L3-4.     1. No evidence of pulmonary embolic disease. Motion and streak artifact limits the exam. 2. Minimal left basilar ground-glass opacity. Mucous impaction involving right lower lobe bronchi.   Consider vitals closely  ECHO with hyperdynamic LV, likely dehydration  Awaiting for sensitivities  Monitored CBC  ID consult pending  Lactic acidosis improved after CRRT  Continue Vancomycin,Clindamycin and Cefepime   S/p exploratory laparotomy, surgery said was un eventful    HAGMA  JERAMIE likely ATN  Pre renal JERAMIE  Nephrology consulted, on CRRT  HCO3 infusion discontinued by nephrology    Acute on chronic respiratory failure with hypoxemia  Likely from aspiration pneumonia  He was intubated after procedure  CTA with Multiple infiltrates on the left side of the lung  Pulmonology recommendations appreciated  Continue antibiotics as above   CT abdomen ordered by Dr Miguel Sanchez which ruled out any hemorrhage  Hemooccult positive, GI consulted    Acute blood loss anemia  Not sure about source  Ordered hemo occult  2 units transfusion ordered  Will monitor [facility]    NSTEMI suspected type II injury  Troponin trending down  Echo pending  Holding therapeutic Lovenox due to dropping Hb  Cardiology recommendations appreciated    Chronic wounds  Decubitus ulcer  Wound care following     Elevated Liver enzymes  Likely in the setting of Hypotensive shock  Monitor CMP     Hypokalemia resolved    History of gastric cancer likely stage 4    Opiate abuse    Repeat falls    Diet Diet NPO Effective Now   DVT Prophylaxis [] Lovenox, []  Heparin, [] SCDs, [x] Hold chemical prophylaxis   GI Prophylaxis [x] PPI,  [] H2 Blocker,  [] Carafate,  [] Diet/Tube Feeds   Code Status Full Code   Disposition Patient requires continued admission due to septic shock   MDM [] Low, [] Moderate,[x]  High     Electronically signed by Walter Sewell MD on 7/27/2020 at 3:34 PM

## 2020-07-27 NOTE — PROGRESS NOTES
Son's phone number obtained from pts cell last night. Updated on emergency contacts. Per report, has not been updated since prior to surgery. Previous contact number in the computer would not connect, was attempted multiple times yesterday. Son needs called today with update. Henry Frankel RN aware.

## 2020-07-28 ENCOUNTER — APPOINTMENT (OUTPATIENT)
Dept: GENERAL RADIOLOGY | Age: 65
DRG: 004 | End: 2020-07-28
Payer: MEDICARE

## 2020-07-28 PROBLEM — B95.61 MSSA BACTEREMIA: Status: ACTIVE | Noted: 2020-07-28

## 2020-07-28 PROBLEM — R78.81 MSSA BACTEREMIA: Status: ACTIVE | Noted: 2020-07-28

## 2020-07-28 LAB
ALBUMIN SERPL-MCNC: 3.2 GM/DL (ref 3.4–5)
ALBUMIN SERPL-MCNC: 3.2 GM/DL (ref 3.4–5)
ALP BLD-CCNC: 70 IU/L (ref 40–128)
ALP BLD-CCNC: 70 IU/L (ref 40–129)
ALT SERPL-CCNC: 136 U/L (ref 10–40)
ALT SERPL-CCNC: 136 U/L (ref 10–40)
ANION GAP SERPL CALCULATED.3IONS-SCNC: 10 MMOL/L (ref 4–16)
ANISOCYTOSIS: ABNORMAL
APTT: 36.3 SECONDS (ref 25.1–37.1)
AST SERPL-CCNC: 1315 IU/L (ref 15–37)
AST SERPL-CCNC: 1315 IU/L (ref 15–37)
BANDED NEUTROPHILS ABSOLUTE COUNT: 1 K/CU MM
BANDED NEUTROPHILS RELATIVE PERCENT: 6 % (ref 5–11)
BASE EXCESS MIXED: 3.5 (ref 0–1.2)
BILIRUB SERPL-MCNC: 1.5 MG/DL (ref 0–1)
BILIRUB SERPL-MCNC: 1.5 MG/DL (ref 0–1)
BILIRUBIN DIRECT: 1 MG/DL (ref 0–0.3)
BILIRUBIN, INDIRECT: 0.5 MG/DL (ref 0–0.7)
BUN BLDV-MCNC: 13 MG/DL (ref 6–23)
BUN BLDV-MCNC: 13 MG/DL (ref 6–23)
BUN BLDV-MCNC: 14 MG/DL (ref 6–23)
BUN BLDV-MCNC: 14 MG/DL (ref 6–23)
CALCIUM IONIZED: 4.28 MG/DL (ref 4.48–5.28)
CALCIUM IONIZED: 4.36 MG/DL (ref 4.48–5.28)
CALCIUM IONIZED: 4.48 MG/DL (ref 4.48–5.28)
CALCIUM SERPL-MCNC: 8.2 MG/DL (ref 8.3–10.6)
CALCIUM SERPL-MCNC: 8.2 MG/DL (ref 8.3–10.6)
CALCIUM SERPL-MCNC: 8.3 MG/DL (ref 8.3–10.6)
CALCIUM SERPL-MCNC: 8.5 MG/DL (ref 8.3–10.6)
CARBON MONOXIDE, BLOOD: 1.9 % (ref 0–5)
CHLORIDE BLD-SCNC: 100 MMOL/L (ref 99–110)
CHLORIDE BLD-SCNC: 100 MMOL/L (ref 99–110)
CHLORIDE BLD-SCNC: 98 MMOL/L (ref 99–110)
CHLORIDE BLD-SCNC: 99 MMOL/L (ref 99–110)
CO2 CONTENT: 29 MMOL/L (ref 19–24)
CO2: 26 MMOL/L (ref 21–32)
CO2: 27 MMOL/L (ref 21–32)
CO2: 27 MMOL/L (ref 21–32)
CO2: 28 MMOL/L (ref 21–32)
COMMENT: ABNORMAL
CREAT SERPL-MCNC: 1.1 MG/DL (ref 0.9–1.3)
CREAT SERPL-MCNC: 1.2 MG/DL (ref 0.9–1.3)
CREAT SERPL-MCNC: 1.2 MG/DL (ref 0.9–1.3)
CREAT SERPL-MCNC: 1.3 MG/DL (ref 0.9–1.3)
CULTURE: ABNORMAL
DIFFERENTIAL TYPE: ABNORMAL
DOSE AMOUNT: NORMAL
DOSE AMOUNT: NORMAL
DOSE TIME: NORMAL
DOSE TIME: NORMAL
GFR AFRICAN AMERICAN: >60 ML/MIN/1.73M2
GFR NON-AFRICAN AMERICAN: 56 ML/MIN/1.73M2
GFR NON-AFRICAN AMERICAN: >60 ML/MIN/1.73M2
GLUCOSE BLD-MCNC: 131 MG/DL (ref 70–99)
GLUCOSE BLD-MCNC: 132 MG/DL (ref 70–99)
GLUCOSE BLD-MCNC: 135 MG/DL (ref 70–99)
GLUCOSE BLD-MCNC: 136 MG/DL (ref 70–99)
HBV SURFACE AB TITR SER: <3.5 {TITER}
HCO3 ARTERIAL: 27.8 MMOL/L (ref 18–23)
HCT VFR BLD CALC: 22.3 % (ref 42–52)
HCT VFR BLD CALC: 22.8 % (ref 42–52)
HCT VFR BLD CALC: 23.6 % (ref 42–52)
HCT VFR BLD CALC: 24.3 % (ref 42–52)
HEMOGLOBIN: 7 GM/DL (ref 13.5–18)
HEMOGLOBIN: 7.3 GM/DL (ref 13.5–18)
HEMOGLOBIN: 7.5 GM/DL (ref 13.5–18)
HEMOGLOBIN: 7.5 GM/DL (ref 13.5–18)
HEPATITIS B SURFACE ANTIGEN: NON REACTIVE
HIGH SENSITIVE C-REACTIVE PROTEIN: 55.9 MG/L
HIV SCREEN: NON REACTIVE
INR BLD: 1.4 INDEX
IONIZED CA: 1.07 MMOL/L (ref 1.12–1.32)
IONIZED CA: 1.09 MMOL/L (ref 1.12–1.32)
IONIZED CA: 1.12 MMOL/L (ref 1.12–1.32)
LYMPHOCYTES ABSOLUTE: 0.2 K/CU MM
LYMPHOCYTES RELATIVE PERCENT: 1 % (ref 24–44)
Lab: ABNORMAL
MAGNESIUM: 2.3 MG/DL (ref 1.8–2.4)
MAGNESIUM: 2.4 MG/DL (ref 1.8–2.4)
MAGNESIUM: 2.4 MG/DL (ref 1.8–2.4)
MCH RBC QN AUTO: 26 PG (ref 27–31)
MCHC RBC AUTO-ENTMCNC: 31.8 % (ref 32–36)
MCV RBC AUTO: 81.9 FL (ref 78–100)
METHEMOGLOBIN ARTERIAL: 0.9 %
NUCLEATED RED BLOOD CELLS: 1
O2 SATURATION: 97.2 % (ref 96–97)
PCO2 ARTERIAL: 40 MMHG (ref 32–45)
PDW BLD-RTO: 22.9 % (ref 11.7–14.9)
PH BLOOD: 7.45 (ref 7.34–7.45)
PHOSPHORUS: 2.3 MG/DL (ref 2.5–4.9)
PHOSPHORUS: 2.4 MG/DL (ref 2.5–4.9)
PHOSPHORUS: 2.7 MG/DL (ref 2.5–4.9)
PLATELET # BLD: 134 K/CU MM (ref 140–440)
PLT MORPHOLOGY: ABNORMAL
PMV BLD AUTO: 9.8 FL (ref 7.5–11.1)
PO2 ARTERIAL: 126 MMHG (ref 75–100)
POLYCHROMASIA: ABNORMAL
POTASSIUM SERPL-SCNC: 3.9 MMOL/L (ref 3.5–5.1)
POTASSIUM SERPL-SCNC: 4 MMOL/L (ref 3.5–5.1)
POTASSIUM SERPL-SCNC: 4.1 MMOL/L (ref 3.5–5.1)
POTASSIUM SERPL-SCNC: 4.2 MMOL/L (ref 3.5–5.1)
PROCALCITONIN: 1.65
PROTHROMBIN TIME: 17 SECONDS (ref 11.7–14.5)
RBC # BLD: 2.88 M/CU MM (ref 4.6–6.2)
RBC # BLD: ABNORMAL 10*6/UL
RPR: NON REACTIVE
SEGMENTED NEUTROPHILS ABSOLUTE COUNT: 15.5 K/CU MM
SEGMENTED NEUTROPHILS RELATIVE PERCENT: 93 % (ref 36–66)
SODIUM BLD-SCNC: 135 MMOL/L (ref 135–145)
SODIUM BLD-SCNC: 136 MMOL/L (ref 135–145)
SODIUM BLD-SCNC: 137 MMOL/L (ref 135–145)
SODIUM BLD-SCNC: 137 MMOL/L (ref 135–145)
SPECIMEN: ABNORMAL
TOTAL CK: 364 IU/L (ref 38–174)
TOTAL PROTEIN: 6.6 GM/DL (ref 6.4–8.2)
TOTAL PROTEIN: 6.6 GM/DL (ref 6.4–8.2)
VANCOMYCIN RANDOM: 22 UG/ML
VANCOMYCIN RANDOM: 24.3 UG/ML
WBC # BLD: 16.7 K/CU MM (ref 4–10.5)

## 2020-07-28 PROCEDURE — 6360000002 HC RX W HCPCS: Performed by: INTERNAL MEDICINE

## 2020-07-28 PROCEDURE — 86481 TB AG RESPONSE T-CELL SUSP: CPT

## 2020-07-28 PROCEDURE — 2580000003 HC RX 258: Performed by: INTERNAL MEDICINE

## 2020-07-28 PROCEDURE — 85007 BL SMEAR W/DIFF WBC COUNT: CPT

## 2020-07-28 PROCEDURE — 87340 HEPATITIS B SURFACE AG IA: CPT

## 2020-07-28 PROCEDURE — 82803 BLOOD GASES ANY COMBINATION: CPT

## 2020-07-28 PROCEDURE — 85610 PROTHROMBIN TIME: CPT

## 2020-07-28 PROCEDURE — 94003 VENT MGMT INPAT SUBQ DAY: CPT

## 2020-07-28 PROCEDURE — 89220 SPUTUM SPECIMEN COLLECTION: CPT

## 2020-07-28 PROCEDURE — 2580000003 HC RX 258: Performed by: SURGERY

## 2020-07-28 PROCEDURE — 82550 ASSAY OF CK (CPK): CPT

## 2020-07-28 PROCEDURE — 2500000003 HC RX 250 WO HCPCS: Performed by: PHYSICIAN ASSISTANT

## 2020-07-28 PROCEDURE — 80202 ASSAY OF VANCOMYCIN: CPT

## 2020-07-28 PROCEDURE — 84100 ASSAY OF PHOSPHORUS: CPT

## 2020-07-28 PROCEDURE — 2700000000 HC OXYGEN THERAPY PER DAY

## 2020-07-28 PROCEDURE — 82330 ASSAY OF CALCIUM: CPT

## 2020-07-28 PROCEDURE — 99233 SBSQ HOSP IP/OBS HIGH 50: CPT | Performed by: INTERNAL MEDICINE

## 2020-07-28 PROCEDURE — 94640 AIRWAY INHALATION TREATMENT: CPT

## 2020-07-28 PROCEDURE — 87389 HIV-1 AG W/HIV-1&-2 AB AG IA: CPT

## 2020-07-28 PROCEDURE — 2720000010 HC SURG SUPPLY STERILE

## 2020-07-28 PROCEDURE — 6360000002 HC RX W HCPCS: Performed by: SPECIALIST

## 2020-07-28 PROCEDURE — 6370000000 HC RX 637 (ALT 250 FOR IP): Performed by: SURGERY

## 2020-07-28 PROCEDURE — 85730 THROMBOPLASTIN TIME PARTIAL: CPT

## 2020-07-28 PROCEDURE — 94761 N-INVAS EAR/PLS OXIMETRY MLT: CPT

## 2020-07-28 PROCEDURE — 94750 HC PULMONARY COMPLIANCE STUDY: CPT

## 2020-07-28 PROCEDURE — 86706 HEP B SURFACE ANTIBODY: CPT

## 2020-07-28 PROCEDURE — 85018 HEMOGLOBIN: CPT

## 2020-07-28 PROCEDURE — 86141 C-REACTIVE PROTEIN HS: CPT

## 2020-07-28 PROCEDURE — 85027 COMPLETE CBC AUTOMATED: CPT

## 2020-07-28 PROCEDURE — 90945 DIALYSIS ONE EVALUATION: CPT

## 2020-07-28 PROCEDURE — 99232 SBSQ HOSP IP/OBS MODERATE 35: CPT | Performed by: INTERNAL MEDICINE

## 2020-07-28 PROCEDURE — 6370000000 HC RX 637 (ALT 250 FOR IP): Performed by: INTERNAL MEDICINE

## 2020-07-28 PROCEDURE — 36592 COLLECT BLOOD FROM PICC: CPT

## 2020-07-28 PROCEDURE — 2000000000 HC ICU R&B

## 2020-07-28 PROCEDURE — 99024 POSTOP FOLLOW-UP VISIT: CPT | Performed by: SURGERY

## 2020-07-28 PROCEDURE — 80048 BASIC METABOLIC PNL TOTAL CA: CPT

## 2020-07-28 PROCEDURE — 71045 X-RAY EXAM CHEST 1 VIEW: CPT

## 2020-07-28 PROCEDURE — 83735 ASSAY OF MAGNESIUM: CPT

## 2020-07-28 PROCEDURE — 86592 SYPHILIS TEST NON-TREP QUAL: CPT

## 2020-07-28 PROCEDURE — 2500000003 HC RX 250 WO HCPCS: Performed by: INTERNAL MEDICINE

## 2020-07-28 PROCEDURE — C9113 INJ PANTOPRAZOLE SODIUM, VIA: HCPCS | Performed by: SPECIALIST

## 2020-07-28 PROCEDURE — 37799 UNLISTED PX VASCULAR SURGERY: CPT

## 2020-07-28 PROCEDURE — 82248 BILIRUBIN DIRECT: CPT

## 2020-07-28 PROCEDURE — 80053 COMPREHEN METABOLIC PANEL: CPT

## 2020-07-28 PROCEDURE — 85014 HEMATOCRIT: CPT

## 2020-07-28 RX ORDER — CEFAZOLIN SODIUM 2 G/100ML
2 INJECTION, SOLUTION INTRAVENOUS EVERY 12 HOURS
Status: DISCONTINUED | OUTPATIENT
Start: 2020-07-28 | End: 2020-07-31

## 2020-07-28 RX ADMIN — Medication 1200 ML/HR: at 15:53

## 2020-07-28 RX ADMIN — CALCIUM GLUCONATE 1 G: 98 INJECTION, SOLUTION INTRAVENOUS at 11:55

## 2020-07-28 RX ADMIN — PROPOFOL 30.09 MCG/KG/MIN: 10 INJECTION, EMULSION INTRAVENOUS at 11:14

## 2020-07-28 RX ADMIN — Medication 1200 ML/HR: at 02:36

## 2020-07-28 RX ADMIN — SODIUM PHOSPHATE, MONOBASIC, MONOHYDRATE 6 MMOL: 276; 142 INJECTION, SOLUTION INTRAVENOUS at 21:00

## 2020-07-28 RX ADMIN — SODIUM PHOSPHATE, MONOBASIC, MONOHYDRATE 6 MMOL: 276; 142 INJECTION, SOLUTION INTRAVENOUS at 13:00

## 2020-07-28 RX ADMIN — Medication 500 ML/HR: at 10:15

## 2020-07-28 RX ADMIN — SODIUM CHLORIDE, PRESERVATIVE FREE 10 ML: 5 INJECTION INTRAVENOUS at 08:37

## 2020-07-28 RX ADMIN — Medication 1200 ML/HR: at 21:18

## 2020-07-28 RX ADMIN — PROPOFOL 25 MCG/KG/MIN: 10 INJECTION, EMULSION INTRAVENOUS at 21:17

## 2020-07-28 RX ADMIN — HYDROCORTISONE SODIUM SUCCINATE 100 MG: 100 INJECTION, POWDER, FOR SOLUTION INTRAMUSCULAR; INTRAVENOUS at 03:00

## 2020-07-28 RX ADMIN — ALBUTEROL SULFATE 4 PUFF: 90 AEROSOL, METERED RESPIRATORY (INHALATION) at 07:50

## 2020-07-28 RX ADMIN — IPRATROPIUM BROMIDE 4 PUFF: 17 AEROSOL, METERED RESPIRATORY (INHALATION) at 07:50

## 2020-07-28 RX ADMIN — PROPOFOL 35 MCG/KG/MIN: 10 INJECTION, EMULSION INTRAVENOUS at 05:18

## 2020-07-28 RX ADMIN — ALBUTEROL SULFATE 4 PUFF: 90 AEROSOL, METERED RESPIRATORY (INHALATION) at 12:06

## 2020-07-28 RX ADMIN — SODIUM CHLORIDE, PRESERVATIVE FREE 10 ML: 5 INJECTION INTRAVENOUS at 22:35

## 2020-07-28 RX ADMIN — HYDROCORTISONE SODIUM SUCCINATE 100 MG: 100 INJECTION, POWDER, FOR SOLUTION INTRAMUSCULAR; INTRAVENOUS at 19:22

## 2020-07-28 RX ADMIN — Medication 500 ML/HR: at 00:00

## 2020-07-28 RX ADMIN — CALCIUM GLUCONATE 1 G: 98 INJECTION, SOLUTION INTRAVENOUS at 19:58

## 2020-07-28 RX ADMIN — THIAMINE HYDROCHLORIDE 200 MG: 100 INJECTION, SOLUTION INTRAMUSCULAR; INTRAVENOUS at 23:39

## 2020-07-28 RX ADMIN — CEFAZOLIN SODIUM 2 G: 2 INJECTION, SOLUTION INTRAVENOUS at 06:06

## 2020-07-28 RX ADMIN — CALCIUM GLUCONATE 1 G: 98 INJECTION, SOLUTION INTRAVENOUS at 06:36

## 2020-07-28 RX ADMIN — Medication 500 ML/HR: at 21:18

## 2020-07-28 RX ADMIN — ALBUTEROL SULFATE 4 PUFF: 90 AEROSOL, METERED RESPIRATORY (INHALATION) at 20:48

## 2020-07-28 RX ADMIN — Medication 1200 ML/HR: at 11:02

## 2020-07-28 RX ADMIN — CEFAZOLIN SODIUM 2 G: 2 INJECTION, SOLUTION INTRAVENOUS at 19:15

## 2020-07-28 RX ADMIN — Medication 1200 ML/HR: at 06:51

## 2020-07-28 RX ADMIN — IPRATROPIUM BROMIDE 4 PUFF: 17 AEROSOL, METERED RESPIRATORY (INHALATION) at 12:06

## 2020-07-28 RX ADMIN — IPRATROPIUM BROMIDE 4 PUFF: 17 AEROSOL, METERED RESPIRATORY (INHALATION) at 20:50

## 2020-07-28 RX ADMIN — Medication 500 ML/HR: at 00:08

## 2020-07-28 RX ADMIN — IPRATROPIUM BROMIDE 4 PUFF: 17 AEROSOL, METERED RESPIRATORY (INHALATION) at 16:31

## 2020-07-28 RX ADMIN — HYDROCORTISONE SODIUM SUCCINATE 100 MG: 100 INJECTION, POWDER, FOR SOLUTION INTRAMUSCULAR; INTRAVENOUS at 11:15

## 2020-07-28 RX ADMIN — Medication 25 MCG/HR: at 15:36

## 2020-07-28 RX ADMIN — CHLORHEXIDINE GLUCONATE 0.12% ORAL RINSE 15 ML: 1.2 LIQUID ORAL at 20:15

## 2020-07-28 RX ADMIN — METRONIDAZOLE 500 MG: 500 INJECTION, SOLUTION INTRAVENOUS at 22:34

## 2020-07-28 RX ADMIN — THIAMINE HYDROCHLORIDE 200 MG: 100 INJECTION, SOLUTION INTRAMUSCULAR; INTRAVENOUS at 10:16

## 2020-07-28 RX ADMIN — SODIUM CHLORIDE, PRESERVATIVE FREE 10 ML: 5 INJECTION INTRAVENOUS at 22:34

## 2020-07-28 RX ADMIN — CHLORHEXIDINE GLUCONATE 0.12% ORAL RINSE 15 ML: 1.2 LIQUID ORAL at 07:53

## 2020-07-28 RX ADMIN — ALBUTEROL SULFATE 4 PUFF: 90 AEROSOL, METERED RESPIRATORY (INHALATION) at 16:30

## 2020-07-28 RX ADMIN — METRONIDAZOLE 500 MG: 500 INJECTION, SOLUTION INTRAVENOUS at 14:51

## 2020-07-28 RX ADMIN — Medication 500 ML/HR: at 10:16

## 2020-07-28 RX ADMIN — PANTOPRAZOLE SODIUM 40 MG: 40 INJECTION, POWDER, FOR SOLUTION INTRAVENOUS at 08:36

## 2020-07-28 ASSESSMENT — PULMONARY FUNCTION TESTS
PIF_VALUE: 21
PIF_VALUE: 22
PIF_VALUE: 33
PIF_VALUE: 18
PIF_VALUE: 25
PIF_VALUE: 20
PIF_VALUE: 31
PIF_VALUE: 20
PIF_VALUE: 19
PIF_VALUE: 21
PIF_VALUE: 27
PIF_VALUE: 26
PIF_VALUE: 22
PIF_VALUE: 23
PIF_VALUE: 24
PIF_VALUE: 23
PIF_VALUE: 22
PIF_VALUE: 19
PIF_VALUE: 23
PIF_VALUE: 22
PIF_VALUE: 25

## 2020-07-28 NOTE — PROGRESS NOTES
Pulmonary and Critical Care  Progress Note      VITALS:  /85   Pulse 107   Temp 98.1 °F (36.7 °C) (Rectal)   Resp 17   Ht 5' 7.99\" (1.727 m)   Wt 149 lb 4 oz (67.7 kg)   SpO2 100%   BMI 22.70 kg/m²     Subjective:   CHIEF COMPLAINT :Fall with right hip pain     HPI:                The patient is a 60 y.o. male with significant past medical history of HTN,. Sacral decub, MSSA endocarditis, Septic PE, Hep C,  presents with complaints of fall and right hip pain. He was on the floor for more than 2 days. His CK is mildly elevated. He had mild lactic acidosis. His PBNPT is 11,000 with mildly elevated troponins. He is on 3 grams of Heroin per day. His U tocx was positive for Cocaine and Opiates. He had no fever, no headaches, no chest pain, no palpitations, no n/v, no diaphoresis. He has no sick exposure, no recent travel. His 1 blood culture set is positive for Staph. He is getting abx and IV fluids. He is on the vent and getting CRRT    Objective:   PHYSICAL EXAM:    LUNGS:Occasional basal crackles  Abd-soft, BS+,NT  Ext - no pedal edema  CVS-s1s2, no murmurs      DATA:    CBC:  Recent Labs     07/26/20  0555  07/27/20  0620  07/28/20  0300 07/28/20  0630 07/28/20  0945   WBC 11.8*  --  15.7*  --   --  16.7*  --    RBC 2.59*  --  2.64*  --   --  2.88*  --    HGB 5.6*   < > 6.6*   < > 7.5* 7.5* 7.3*   HCT 21.6*   < > 21.0*   < > 24.3* 23.6* 22.8*     --  240  --   --  134*  --    MCV 83.4  --  79.5  --   --  81.9  --    MCH 21.6*  --  25.0*  --   --  26.0*  --    MCHC 25.9*  --  31.4*  --   --  31.8*  --    RDW 25.5*  --  23.1*  --   --  22.9*  --    NRBC  --   --   --   --   --  1  --    SEGSPCT 88.2*  --  88.0*  --   --  93.0*  --    BANDSPCT  --   --  10  --   --  6  --     < > = values in this interval not displayed.       BMP:  Recent Labs     07/28/20  0300 07/28/20  0630 07/28/20  0945    135 137   K 3.9 4.0 4.1   CL 99 98* 100   CO2 28 27 27   BUN 14 13 13   CREATININE 1.2 1.1 1.2   CALCIUM 8.5 8.2* 8.3   GLUCOSE 135* 131* 132*      ABG:  Recent Labs     07/27/20  0600 07/27/20  1600 07/28/20  0600   PH 7.52*  7.56* 7.55* 7.45   PO2ART 88  141* 92 126*   ENL1XBM 34.0  32.0 35.0 40.0   O2SAT 96.5  97.5* 96.2 97.2*     BNP  No results found for: BNP   D-Dimer:  No results found for: DDIMER   1. Radiology: Reviewed      Assessment/Plan     Patient Active Problem List    Diagnosis Date Noted    Ischemic bowel disease ([facility] [address]     Leukocytosis     Sepsis ([facility] [address] 07/24/2020   Patient is on the vent and sedated. He is off pressors. He has no urine output. He is on CRRT. His H&H has been stable. Septic shock- improved  Hemorrhagic shock- improved  Acute hypoxic resp failure  Mild coagulopathy  Shock Liver  Lactic acidosis- improving\  VDRF  Anemia likely LGI bleed  Hypokalemia  Hypophosphatemia  Staph Aureus bacteremia  Severe Pulmonary HTN  Occult blood positive  JERAMIE likely sec to ATN on CRRT       1. CRRT  2. Abx  3. F/u C&S  4. F/u H&H  5. Tube feeds if OK with GI  6. Keep sats > 92%  7. PT/OT  8. C/w present management  No follow-ups on file.     Electronically signed by Mounika Landis MD on 7/28/2020 at 11:19 AM

## 2020-07-28 NOTE — PROGRESS NOTES
Nephrology  Dialysis Note        [address], [address] [facility], [place] [address]  Phone: [phone]  Office Hours: 8:30AM - 4:30PM  Monday - Friday       [facility]  Marcy Shone, MD Margueritte Clubs, DO Onel Hylton,  Marquise Brar  [facility], [place] [address]  PHONE: [phone]  FAX: [phone]      PROCEDURE:  Patient seen during CVVHDF      PHYSICIAN:  Jeramy      INDICATION:  Acute tubular necrosis      RX:  See dialysis flowsheet for specifics on access, blood flow rate, dialysate baths, duration of dialysis, anticoagulation and other technical information.       COMMENTS:    - Continue crrt  - continue fluid removal    Electronically signed by Kirby Vallejo DO on 7/28/2020 at 8:09 MD Jose Luis Moraes, DO Onel Hylton,  Marquise rachel  [facility], [place] [address]  PHONE: [phone]  FAX: [phone]

## 2020-07-28 NOTE — PROGRESS NOTES
Comprehensive Nutrition Assessment    Type and Reason for Visit:  Reassess    Nutrition Recommendations/Plan:   · Continue NPO until GI cleared for EN    Nutrition Assessment:  Pt continues with GI bleed. Will assess tomorrow the ability to start EN     Malnutrition Assessment:  Malnutrition Status:  Insufficient data    Context:  Chronic Illness     Findings of the 6 clinical characteristics of malnutrition:  Energy Intake:  Unable to assess  Weight Loss:  Unable to assess     Body Fat Loss:  Unable to assess     Muscle Mass Loss:  Unable to assess    Fluid Accumulation:  No significant fluid accumulation Generalized   Strength:  Not Performed    Estimated Daily Nutrient Needs:  Energy (kcal):  1763; Weight Used for Energy Requirements:  Current     Protein (g):  135-169(2-2.5 g/kg, CRRT); Weight Used for Protein Requirements:  Current        Fluid (ml/day):  (fluid management per nephrology)    Nutrition Related Findings:  Labs; Phos 2.1, WBC 15.7, Hbg 6.6      Wounds:  Pressure Ulcer, Stage II       Current Nutrition Therapies:    Diet NPO Effective Now    Anthropometric Measures:  · Height: 5' 7.99\" (172.7 cm)  · Current Body Weight: 149 lb 4 oz (67.7 kg)   · Admission Body Weight: 149 lb 4 oz (67.7 kg)    · Usual Body Weight: AMBER  · Ideal Body Weight: 154 lbs; % Ideal Body Weight 96.9 %   · BMI: 22.7  · BMI Categories: Normal Weight (BMI 18.5-24. 9)       Nutrition Diagnosis:   · Inadequate oral intake related to impaired respiratory funtion as evidenced by NPO or clear liquid status due to medical condition, intubation      Nutrition Interventions:   Food and/or Nutrient Delivery:  Start Tube Feeding  Nutrition Education/Counseling:  Education not indicated   Coordination of Nutrition Care:  No recommendation at this time, Continued Inpatient Monitoring    Goals:  pt will tolerate initiation of EN within the next 24-48 hr       Nutrition Monitoring and Evaluation:   Food/Nutrient Intake Outcomes: Enteral Nutrition Intake/Tolerance  Physical Signs/Symptoms Outcomes:  Biochemical Data, Skin, Weight, Hemodynamic Status, Fluid Status or Edema     Discharge Planning:     Too soon to determine     Electronically signed by Jerrye Prader, RD, LD on 0/81/58 at 3:18 PM EDT    Contact: [phone]

## 2020-07-28 NOTE — PROGRESS NOTES
Tomy Ricardo is a 60 y.o. male patient intubated    Current Facility-Administered Medications   Medication Dose Route Frequency Provider Last Rate Last Dose    fentanyl (SUBLIMAZE) 1,000 mcg in sodium chloride 0.9% 100 mL infusion  25 mcg/hr Intravenous Continuous Minal Montaño PA-C 2.5 mL/hr at 07/27/20 2300 25 mcg/hr at 07/27/20 2300    pantoprazole (PROTONIX) injection 40 mg  40 mg Intravenous Daily Kenneth Stephens MD   40 mg at 07/27/20 1656    ceFAZolin (ANCEF) 2 g in dextrose 4 % 100 mL IVPB (premix)  2 g Intravenous Dk Myers  mL/hr at 07/28/20 0606 2 g at 07/28/20 0606    vasopressin 20 Units in dextrose 5 % 100 mL infusion  0.04 Units/min Intravenous Continuous Maksim Johnson MD   Stopped at 07/27/20 0500    norepinephrine (LEVOPHED) 16 mg in dextrose 5% 250 mL infusion  2 mcg/min Intravenous Continuous Minal Montaño PA-C   Stopped at 07/27/20 0439    hydrocortisone sodium succinate PF (SOLU-CORTEF) injection 100 mg  100 mg Intravenous Q8H Maksim Johnson MD   100 mg at 07/28/20 0300    thiamine (B-1) 200 mg in sodium chloride 0.9 % 100 mL IVPB  200 mg Intravenous Q12H Maksim Johnson MD   Stopped at 07/27/20 2306    0.9 % sodium chloride bolus  2,000 mL Intravenous PRN Christelle Sarkar [place], DO        prismaSATE BGK 4/2.5 dialysis solution   Dialysis Continuous Christelle Sarkar [place],  mL/hr at 07/28/20 0000 500 mL/hr at 07/28/20 0000    prismaSATE BGK 4/2.5 dialysis solution   Dialysis Continuous Adecammie Sarkar [place],  mL/hr at 07/28/20 0008 500 mL/hr at 07/28/20 0008    potassium chloride 20 mEq/50 mL IVPB (Central Line)  20 mEq Intravenous PRN Christelle Sarkar [place], DO        magnesium sulfate 1 g in dextrose 5% 100 mL IVPB  1 g Intravenous PRN Christelle Sarkar [place], DO        calcium gluconate 1 g in dextrose 5 % 100 mL IVPB  1 g Intravenous PRN Christelle Deshpande DO 0 mL/hr at 07/28/20 0041 1 g at 07/28/20 0636    Or    calcium gluconate 2 g in dextrose 5 % 100 mL IVPB  2 g Intravenous PRN Adeleye Antonina [place], DO        Or    calcium gluconate 3 g in dextrose 5 % 100 mL IVPB  3 g Intravenous PRN Adeleye Antonina [place], DO        Or    calcium gluconate 4 g in dextrose 5 % 100 mL IVPB  4 g Intravenous PRN Adeleye Antonina [place], DO        sodium phosphate 6 mmol in dextrose 5 % 250 mL IVPB  6 mmol Intravenous PRN Adeleye Antonina [place], DO   Stopped at 07/28/20 0153    Or    sodium phosphate 12 mmol in dextrose 5 % 250 mL IVPB  12 mmol Intravenous PRN Adeleye Antonina [place], DO   Stopped at 07/27/20 2228    Or    sodium phosphate 18 mmol in dextrose 5 % 500 mL IVPB  18 mmol Intravenous PRN Adeleye Antonina [place], DO        Or    sodium phosphate 24 mmol in dextrose 5 % 500 mL IVPB  24 mmol Intravenous PRN Adeleye Antonina [place], DO        prismaSATE BGK 4/2.5 dialysis solution   [facility] [place] 1,200 mL/hr at 07/28/20 0651 1,200 mL/hr at 07/28/20 0651    vancomycin (VANCOCIN) intermittent dosing (placeholder)   Other RX Placeholder Shanda Ryan MD        propofol injection  10 mcg/kg/min Intravenous Titrated Shanda Ryan MD 10.4 mL/hr at 07/28/20 0554 30 mcg/kg/min at 07/28/20 0554    ipratropium-albuterol (DUONEB) nebulizer solution 1 ampule  1 ampule Inhalation Q4H PRN Eleanor Jacobson MD        chlorhexidine (PERIDEX) 0.12 % solution 15 mL  15 mL Mouth/Throat BID Jennifer Campbell MD   15 mL at 07/27/20 2048    albuterol sulfate  (90 Base) MCG/ACT inhaler 4 puff  4 puff Inhalation Q4H Jennifer Campbell MD   4 puff at 07/27/20 1551    And    ipratropium (ATROVENT HFA) 17 MCG/ACT inhaler 4 puff  4 puff Inhalation Q4H Jennifer Campbell MD   4 puff at 07/27/20 1551    aspirin chewable tablet 324 mg  324 mg Oral Once Monica Alexander MD        iopamidol (ISOVUE-370) 76 % injection 80 mL  80 mL Intravenous ONCE PRN Eleanor Jacobson MD        sodium chloride flush 0.9 % injection 10 mL  10 mL Intravenous BID Luisa Kwok MD   10 mL at 07/27/20 2036    sodium chloride flush 0.9 % injection 10 mL  10 mL Intravenous 2 times per day Luisa Kwok MD   10 mL at 07/27/20 2031    sodium chloride flush 0.9 % injection 10 mL  10 mL Intravenous PRN Luisa Kwok MD        acetaminophen (TYLENOL) tablet 650 mg  650 mg Oral Q6H PRN Luisa Kwok MD        Or    acetaminophen (TYLENOL) suppository 650 mg  650 mg Rectal Q6H PRN Luisa Kwok MD        polyethylene glycol (GLYCOLAX) packet 17 g  17 g Oral Daily PRN Luisa Kwok MD        promethazine (PHENERGAN) tablet 12.5 mg  12.5 mg Oral Q6H PRN Luisa Kwok MD        Or    ondansetron (ZOFRAN) injection 4 mg  4 mg Intravenous Q6H PRN Monica Alexander MD   4 mg at 07/25/20 0417    [Held by provider] enoxaparin (LOVENOX) injection 60 mg  1 mg/kg Subcutaneous Daily Monica Alexander MD   60 mg at 07/26/20 1034    promethazine (PHENERGAN) tablet 25 mg  25 mg Oral Q6H PRN Luisa Kwok MD        nicotine (NICODERM CQ) 21 MG/24HR 1 patch  1 patch Transdermal Daily Monica Alexander MD   1 patch at 07/27/20 0854    diphenhydrAMINE (BENADRYL) injection 12.5 mg  12.5 mg Intravenous Nightly PRN Monica Alexander MD   12.5 mg at 07/25/20 6688    dicyclomine (BENTYL) tablet 20 mg  20 mg Oral Q6H PRN Luisa Kwok MD        morphine injection 2 mg  2 mg Intravenous Q4H PRN Luisa Kwok MD   2 mg at 07/25/20 1104     No Known Allergies  Active Problems:    Sepsis ([facility])    Leukocytosis    Ischemic bowel disease ([facility])  Resolved Problems:    * No resolved hospital problems. *    Blood pressure 119/67, pulse 88, temperature 95.7 °F (35.4 °C), temperature source Rectal, resp. rate 20, height 5' 7.99\" (1.727 m), weight 149 lb 4 oz (67.7 kg), SpO2 100 %. Subjective:  Diet:  NPO. Objective:  General Appearance:  General patient appearance: pressors. Vital signs: (most recent): Blood pressure 119/85, pulse 106, temperature 98.6 °F (37 °C), temperature source Rectal, resp.  rate 17, height 5' 7.99\" (1.727 m), weight 149 lb 4 oz (67.7 kg), SpO2 100 %. Vital signs are normal.  (Pressors). HEENT: Normal HEENT exam.    Lungs:  Normal effort. There are decreased breath sounds. Heart: Normal rate. Abdomen: Abdomen is soft. Hypoactive bowel sounds. (Surgical dressing). Extremities: Decreased range of motion. Neurological: (Sedated). Pupils:  Pupils are equal.   Skin:  Warm.   (Bilateral UE ulcers and wound large)    Assessment & Plan  Septic shock with gram pos MSSA bacteremia 2/2 bilateral UE wound and staph UTI/pyelonephritis  -repeat bld cx pending  -urine cx with staph and CT with possible pyelonephritis  -off pressors and on solucortef  Acute on chronic resp failure with suspected aspiration pneumonia  -CT PE neg for PE  -albuterol, IV vanc ancef  -Echo severe HTN, hyperdynamic EF  due to dehydration  JERAMIE 2/2 ATN  -CRRT  Skin wounds/right post thigh/buttock and bilateral UE  -no abscess or osteo as per imaging  -wound cx with staph  Destructive lesion T12-L1  -possible prior infectious spondyliits  Pneumonatosis intestinalis POD #3  -s/p ex lap and neg  Severe PCM  -NPO  Acute bld loss anemia  -hemoccult pos  -s/p 2PRBC  -CT abd neg  -lovenox held  Shock liver with chornic hep C  -hepatitis panel neg and follow LFTs  Gastric cancer stage 4  Polysubstance use  -UDS cocaine and opiate  -will need counseling once stable    Critical care time 30min from 10-1030am  Carlo Purvis MD  7/28/2020

## 2020-07-28 NOTE — PROGRESS NOTES
07/28/20 0445   Vent Information   Vent Type 980   Vent Mode AC/VC   Vt Ordered 450 mL   Rate Set 14 bmp   Peak Flow 50 L/min   Pressure Support 0 cmH20   FiO2  30 %   Sensitivity 3   PEEP/CPAP 5   I Time/ I Time % 0 s   Humidification Source HME   Nitric Oxide/Epoprostenol In Use? No   Vent Patient Data   High Peep/I Pressure 0   Peak Inspiratory Pressure 31 cmH2O   Mean Airway Pressure 13 cmH20   Rate Measured 22 br/min   Vt Exhaled 502 mL   Minute Volume 10.8 Liters   I:E Ratio 1:1.50   Plateau Pressure 19 RPE78   Static Compliance 33 mL/cmH2O   Spontaneous Breathing Trial (SBT) RT Doc   Pulse 86   Additional Respiratory  Assessments   Resp 25   Alarm Settings   High Pressure Alarm 40 cmH2O   Delay Alarm 20 sec(s)   Low Minute Volume Alarm 2.5 L/min   Apnea (secs) 20 secs   High Respiratory Rate 40 br/min   Low Exhaled Vt  250 mL   Patient Observation   Observations Pt is getting a bath   ETT (adult)   Placement Date/Time: 07/25/20 5397   Preoxygenation: Yes  Mask Ventilation: Mask ventilation not attempted (0)  Technique: Direct laryngoscopy  Type: Cuffed  Tube Size: 7 mm  Laryngoscope: Mac  Blade Size: 3  Location: Oral  Grade View: Full view of t. ..    Secured at 23 cm   Measured From Lips   ET Placement Left   Secured By Commercial tube raza   Site Condition Dry

## 2020-07-28 NOTE — PROGRESS NOTES
Infectious Disease Progress Note  2020   Patient Name: Nabil Kennedy : 1955       Reason for visit: F/u MSSA bacteremia secondary to infected wounds. ? History:? Interval history noted  Intubated and on mechanical ventilation and renal replacement therapy  Physical Exam:  Vital Signs: /85   Pulse 101   Temp 98.6 °F (37 °C) (Rectal)   Resp 25   Ht 5' 7.99\" (1.727 m)   Wt 149 lb 4 oz (67.7 kg)   SpO2 100%   BMI 22.70 kg/m²     Gen: Intubated, sedated and mechanically ventilated  Skin: no stigmata of endocarditis  Wounds: Bilateral forearm dressing C/D/I  HEMT: AT/NC, OG tube, ETT  Eyes: PERRL  Neck: Supple. Trachea midline. No LAD. Chest: no distress and CTA. Good air movement. Heart: RRR and no MRG. Abd: soft, non-distended, no tenderness, no hepatomegaly. Normoactive bowel sounds. Ext: no clubbing, cyanosis, or edema  Catheter Site: right IJ CVC, right femoral vein vascath without erythema or tenderness  Neuro: sedated     Radiologic / Imaging / TESTING  No results found.      Labs:    Recent Results (from the past 24 hour(s))   Hemoglobin and hematocrit, blood    Collection Time: 20  3:12 PM   Result Value Ref Range    Hemoglobin 7.6 (L) 13.5 - 18.0 GM/DL    Hematocrit 24.1 (L) 42 - 52 %   Basic metabolic panel    Collection Time: 20  3:12 PM   Result Value Ref Range    Sodium 138 135 - 145 MMOL/L    Potassium 3.7 3.5 - 5.1 MMOL/L    Chloride 99 99 - 110 mMol/L    CO2 28 21 - 32 MMOL/L    Anion Gap 11 4 - 16    BUN 15 6 - 23 MG/DL    CREATININE 1.1 0.9 - 1.3 MG/DL    Glucose 139 (H) 70 - 99 MG/DL    Calcium 8.4 8.3 - 10.6 MG/DL    GFR Non-African American >60 >60 mL/min/1.73m2    GFR African American >60 >60 mL/min/1.73m2   Phosphorus    Collection Time: 20  3:12 PM   Result Value Ref Range    Phosphorus 1.8 (L) 2.5 - 4.9 MG/DL   Calcium, Ionized    Collection Time: 20  3:12 PM   Result Value Ref Range    Ionized Ca 1.04 (L) 1.12 - 1.32 mMOL/L    Calcium, Ion 4.16 (L) 4.48 - 5.28 MG/DL   Blood gas, arterial    Collection Time: 07/27/20  4:00 PM   Result Value Ref Range    pH, Bld 7.55 (H) 7.34 - 7.45    pCO2, Arterial 35.0 32 - 45 MMHG    pO2, Arterial 92 75 - 100 MMHG    Base Exc, Mixed 8 (H) 0 - 1.2    HCO3, Arterial 30.6 (H) 18 - 23 MMOL/L    CO2 Content 31.7 (H) 19 - 24 MMOL/L    O2 Sat 96.2 96 - 97 %    Carbon Monoxide, Blood 1.8 0 - 5 %    Methemoglobin, Arterial 1.3 <1.5 %    Comment AC 14/450/30%/+5    Ammonia    Collection Time: 07/27/20  4:40 PM   Result Value Ref Range    Ammonia 31 16 - 60 UMOL/L   Hemoglobin and hematocrit, blood    Collection Time: 07/27/20  9:00 PM   Result Value Ref Range    Hemoglobin 7.4 (L) 13.5 - 18.0 GM/DL    Hematocrit 23.7 (L) 42 - 52 %   Procalcitonin    Collection Time: 07/27/20  9:00 PM   Result Value Ref Range    Procalcitonin 1.65    Critical Care Panel    Collection Time: 07/27/20  9:00 PM   Result Value Ref Range    Sodium 136 135 - 145 MMOL/L    Potassium 3.8 3.5 - 5.1 MMOL/L    Chloride 98 (L) 99 - 110 mMol/L    CO2 28 21 - 32 MMOL/L    Anion Gap 10 4 - 16    BUN 14 6 - 23 MG/DL    CREATININE 1.2 0.9 - 1.3 MG/DL    Glucose 134 (H) 70 - 99 MG/DL    Calcium 8.3 8.3 - 10.6 MG/DL    GFR Non-African American >60 >60 mL/min/1.73m2    GFR African American >60 >60 mL/min/1.73m2    Phosphorus 2.3 (L) 2.5 - 4.9 MG/DL    Magnesium 2.4 1.8 - 2.4 mg/dl   Calcium, Ionized    Collection Time: 07/27/20  9:00 PM   Result Value Ref Range    Ionized Ca 1.04 (L) 1.12 - 1.32 mMOL/L    Calcium, Ion 4.16 (L) 4.48 - 5.28 MG/DL   HIV ANTIGEN/ANTIBODY    Collection Time: 07/28/20  2:50 AM   Result Value Ref Range    HIV Screen NON REACTIVE NON REACTIVE   RPR Reflex to Titer and TPPA    Collection Time: 07/28/20  2:50 AM   Result Value Ref Range    RPR NON REACTIVE NON REACTIVE   Hemoglobin and hematocrit, blood    Collection Time: 07/28/20  3:00 AM   Result Value Ref Range    Hemoglobin 7.5 (L) 13.5 - 18.0 GM/DL    Hematocrit 24.3 (L) 42 - 52 % CK    Collection Time: 07/28/20  3:00 AM   Result Value Ref Range    Total  (H) 38 - 174 IU/L   Hepatitis B Surface Antigen    Collection Time: 07/28/20  3:00 AM   Result Value Ref Range    Hepatitis B Surface Ag NON REACTIVE NON REACTIVE   Hepatitis B Surface Antibody    Collection Time: 07/28/20  3:00 AM   Result Value Ref Range    Hep B S Ab <3.5    C-Reactive Protein    Collection Time: 07/28/20  3:00 AM   Result Value Ref Range    CRP, High Sensitivity 55.9 mg/L   Critical Care Panel    Collection Time: 07/28/20  3:00 AM   Result Value Ref Range    Sodium 137 135 - 145 MMOL/L    Potassium 3.9 3.5 - 5.1 MMOL/L    Chloride 99 99 - 110 mMol/L    CO2 28 21 - 32 MMOL/L    Anion Gap 10 4 - 16    BUN 14 6 - 23 MG/DL    CREATININE 1.2 0.9 - 1.3 MG/DL    Glucose 135 (H) 70 - 99 MG/DL    Calcium 8.5 8.3 - 10.6 MG/DL    GFR Non-African American >60 >60 mL/min/1.73m2    GFR African American >60 >60 mL/min/1.73m2    Phosphorus 2.7 2.5 - 4.9 MG/DL    Magnesium 2.4 1.8 - 2.4 mg/dl   Calcium, Ionized    Collection Time: 07/28/20  3:00 AM   Result Value Ref Range    Ionized Ca 1.09 (L) 1.12 - 1.32 mMOL/L    Calcium, Ion 4.36 (L) 4.48 - 5.28 MG/DL   Blood gases    Collection Time: 07/28/20  6:00 AM   Result Value Ref Range    pH, Bld 7.45 7.34 - 7.45    pCO2, Arterial 40.0 32 - 45 MMHG    pO2, Arterial 126 (H) 75 - 100 MMHG    Base Exc, Mixed 3.5 (H) 0 - 1.2    HCO3, Arterial 27.8 (H) 18 - 23 MMOL/L    CO2 Content 29.0 (H) 19 - 24 MMOL/L    O2 Sat 97.2 (H) 96 - 97 %    Carbon Monoxide, Blood 1.9 0 - 5 %    Methemoglobin, Arterial 0.9 <1.5 %    Comment AC 14  PEEP 5 30%    CBC auto differential    Collection Time: 07/28/20  6:30 AM   Result Value Ref Range    WBC 16.7 (H) 4.0 - 10.5 K/CU MM    RBC 2.88 (L) 4.6 - 6.2 M/CU MM    Hemoglobin 7.5 (L) 13.5 - 18.0 GM/DL    Hematocrit 23.6 (L) 42 - 52 %    MCV 81.9 78 - 100 FL    MCH 26.0 (L) 27 - 31 PG    MCHC 31.8 (L) 32.0 - 36.0 %    RDW 22.9 (H) 11.7 - 14.9 % Platelets 661 (L) 070 - 440 K/CU MM    MPV 9.8 7.5 - 11.1 FL    Bands Relative 6 5 - 11 %    Segs Relative 93.0 (H) 36 - 66 %    Lymphocytes % 1.0 (L) 24 - 44 %    nRBC 1     Bands Absolute 1.00 K/CU MM    Segs Absolute 15.5 K/CU MM    Lymphocytes Absolute 0.2 K/CU MM    RBC Morphology FEW     Differential Type MANUAL DIFFERENTIAL     Anisocytosis 2+     Polychromasia 1+     PLT Morphology FEW    Comprehensive Metabolic Panel w/ Reflex to MG    Collection Time: 07/28/20  6:30 AM   Result Value Ref Range    Sodium 135 135 - 145 MMOL/L    Potassium 4.0 3.5 - 5.1 MMOL/L    Chloride 98 (L) 99 - 110 mMol/L    CO2 27 21 - 32 MMOL/L    BUN 13 6 - 23 MG/DL    CREATININE 1.1 0.9 - 1.3 MG/DL    Glucose 131 (H) 70 - 99 MG/DL    Calcium 8.2 (L) 8.3 - 10.6 MG/DL    Alb 3.2 (L) 3.4 - 5.0 GM/DL    Total Protein 6.6 6.4 - 8.2 GM/DL    Total Bilirubin 1.5 (H) 0.0 - 1.0 MG/DL     (H) 10 - 40 U/L    AST 1,315 (H) 15 - 37 IU/L    Alkaline Phosphatase 70 40 - 128 IU/L    GFR Non-African American >60 >60 mL/min/1.73m2    GFR African American >60 >60 mL/min/1.73m2    Anion Gap 10 4 - 16   PT    Collection Time: 07/28/20  6:30 AM   Result Value Ref Range    Protime 17.0 (H) 11.7 - 14.5 SECONDS    INR 1.40 INDEX   PTT    Collection Time: 07/28/20  6:30 AM   Result Value Ref Range    aPTT 36.3 25.1 - 37.1 SECONDS   Hepatic Function Panel    Collection Time: 07/28/20  6:30 AM   Result Value Ref Range    Alb 3.2 (L) 3.4 - 5.0 GM/DL    Total Bilirubin 1.5 (H) 0.0 - 1.0 MG/DL    Bilirubin, Direct 1.0 (H) 0.0 - 0.3 MG/DL    Bilirubin, Indirect 0.5 0 - 0.7 MG/DL    Alkaline Phosphatase 70 40 - 129 IU/L    AST 1,315 (H) 15 - 37 IU/L     (H) 10 - 40 U/L    Total Protein 6.6 6.4 - 8.2 GM/DL   Vancomycin, random    Collection Time: 07/28/20  6:30 AM   Result Value Ref Range    Vancomycin Rm 22.0 UG/ML    DOSE AMOUNT DOSE AMT.  GIVEN - 1000     DOSE TIME DOSE TIME GIVEN - 1034    Hemoglobin and hematocrit, blood    Collection Time: 07/28/20  9:45 AM   Result Value Ref Range    Hemoglobin 7.3 (L) 13.5 - 18.0 GM/DL    Hematocrit 22.8 (L) 42 - 52 %   Critical Care Panel    Collection Time: 07/28/20  9:45 AM   Result Value Ref Range    Sodium 137 135 - 145 MMOL/L    Potassium 4.1 3.5 - 5.1 MMOL/L    Chloride 100 99 - 110 mMol/L    CO2 27 21 - 32 MMOL/L    Anion Gap 10 4 - 16    BUN 13 6 - 23 MG/DL    CREATININE 1.2 0.9 - 1.3 MG/DL    Glucose 132 (H) 70 - 99 MG/DL    Calcium 8.3 8.3 - 10.6 MG/DL    GFR Non-African American >60 >60 mL/min/1.73m2    GFR African American >60 >60 mL/min/1.73m2    Phosphorus 2.4 (L) 2.5 - 4.9 MG/DL    Magnesium 2.4 1.8 - 2.4 mg/dl   Calcium, Ionized    Collection Time: 07/28/20  9:45 AM   Result Value Ref Range    Ionized Ca 1.12 1.12 - 1.32 mMOL/L    Calcium, Ion 4.48 4.48 - 5.28 MG/DL     CULTURE results: Invalid input(s): BLOOD CULTURE,  URINE CULTURE, SURGICAL CULTURE    Diagnosis:  Patient Active Problem List   Diagnosis    Sepsis ([facility]    Leukocytosis    Ischemic bowel disease ([facility])    MSSA bacteremia       Active Problems  Active Problems:    Sepsis ([facility] [address]    Leukocytosis    Ischemic bowel disease ([facility])    MSSA bacteremia  Resolved Problems:    * No resolved hospital problems. *      Impression and plan   Clinical status: Not on vasopressors, continues on CRRT. Diagnosis of ischemic bowel was considered but exploratory laparotomy on 7/25 was negative, adhesions were lysed. .  Lactate is on a downward trend.  Therapeutic: Continue cefazolin, start metronidazole.    Diagnostic: Trend CRP, procalcitonin   F/u: Blood culture, hepatitis C RNA   Other:   Summary:      Electronically signed by: Electronically signed by Citlalli Forrest MD on 7/28/2020 at 2:23 PM

## 2020-07-28 NOTE — PLAN OF CARE
Problem: Falls - Risk of:  Goal: Will remain free from falls  Description: Will remain free from falls  7/28/2020 1017 by Milton Izquierdo RN  Outcome: Ongoing  7/28/2020 0011 by Mikie Heredia RN  Outcome: Ongoing  Goal: Absence of physical injury  Description: Absence of physical injury  7/28/2020 1017 by Milton Izquierdo RN  Outcome: Ongoing  7/28/2020 0011 by Mikie Heredia RN  Outcome: Ongoing     Problem: Pain:  Goal: Pain level will decrease  Description: Pain level will decrease  7/28/2020 1017 by Milton Izquierdo RN  Outcome: Ongoing  7/28/2020 0011 by Mikie Heredia RN  Outcome: Ongoing  Goal: Control of acute pain  Description: Control of acute pain  7/28/2020 1017 by Milton Izquierdo RN  Outcome: Ongoing  7/28/2020 0011 by Mikie Heredia RN  Outcome: Ongoing  Goal: Control of chronic pain  Description: Control of chronic pain  7/28/2020 1017 by Milton Izquierdo RN  Outcome: Ongoing  7/28/2020 0011 by Mikie Heredia RN  Outcome: Ongoing     Problem: Diarrhea:  Goal: Bowel elimination is within specified parameters  Description: Bowel elimination is within specified parameters  7/28/2020 1017 by Milton Izquierdo RN  Outcome: Ongoing  7/28/2020 0011 by Mikie Heredia RN  Outcome: Ongoing  Goal: Passage of soft, formed stool  Description: Passage of soft, formed stool  7/28/2020 1017 by Milton Izquierdo RN  Outcome: Ongoing  7/28/2020 0011 by Mikie Heredia RN  Outcome: Ongoing  Goal: Establishment of normal bowel function will improve to within specified parameters  Description: Establishment of normal bowel function will improve to within specified parameters  7/28/2020 1017 by Milton Izquierdo RN  Outcome: Ongoing  7/28/2020 0011 by Mikie Heredia RN  Outcome: Ongoing     Problem: Nausea/Vomiting:  Goal: Absence of nausea/vomiting  Description: Absence of nausea/vomiting  7/28/2020 1017 by Milton Izquierdo RN  Outcome: Ongoing  7/28/2020 0011 by Mikie Heredia RN  Outcome: Ongoing  Goal: Able to drink  Description: Able to drink  7/28/2020 1017 by Alyssa Hernandez RN  Outcome: Ongoing  7/28/2020 0011 by Russell Keys RN  Outcome: Ongoing  Goal: Able to eat  Description: Able to eat  7/28/2020 1017 by Alyssa Hernandez RN  Outcome: Ongoing  7/28/2020 0011 by Russell Keys RN  Outcome: Ongoing  Goal: Ability to achieve adequate nutritional intake will improve  Description: Ability to achieve adequate nutritional intake will improve  7/28/2020 1017 by Alyssa Hernandez RN  Outcome: Ongoing  7/28/2020 0011 by Russell Keys RN  Outcome: Ongoing     Problem: Breathing Pattern - Ineffective:  Goal: Ability to achieve and maintain a regular respiratory rate will improve  Description: Ability to achieve and maintain a regular respiratory rate will improve  7/28/2020 1017 by Alyssa Hernandez RN  Outcome: Ongoing  7/28/2020 0011 by Russell Keys RN  Outcome: Ongoing     Problem: Fluid Volume:  Goal: Ability to achieve a balanced intake and output will improve  Description: Ability to achieve a balanced intake and output will improve  7/28/2020 1017 by Alyssa Hernandez RN  Outcome: Ongoing  7/28/2020 0011 by Russell Keys RN  Outcome: Ongoing  Goal: Will show no signs or symptoms of fluid imbalance  Description: Will show no signs or symptoms of fluid imbalance  7/28/2020 1017 by Alyssa Hernandez RN  Outcome: Ongoing  7/28/2020 0011 by Russell Keys RN  Outcome: Ongoing     Problem: Physical Regulation:  Goal: Ability to maintain clinical measurements within normal limits will improve  Description: Ability to maintain clinical measurements within normal limits will improve  7/28/2020 1017 by Alyssa Hernandez RN  Outcome: Ongoing  7/28/2020 0011 by Russell Keys RN  Outcome: Ongoing  Goal: Will show no signs and symptoms of electrolyte imbalance  Description: Will show no signs and symptoms of electrolyte imbalance  7/28/2020 1017 by Alyssa Hernandez RN  Outcome: Ongoing  7/28/2020 0011 by Russell Keys RN  Outcome: Ongoing     Problem: Restraint Use - Nonviolent/Non-Self-Destructive Behavior:  Goal: Absence of restraint indications  Description: Absence of restraint indications  7/28/2020 1017 by Millie Devlin RN  Outcome: Ongoing  7/28/2020 0011 by Brigid River RN  Outcome: Ongoing  Goal: Absence of restraint-related injury  Description: Absence of restraint-related injury  7/28/2020 1017 by Millie Devlin RN  Outcome: Ongoing  7/28/2020 0011 by Brigid River RN  Outcome: Ongoing     Problem: Skin Integrity:  Goal: Will show no infection signs and symptoms  Description: Will show no infection signs and symptoms  7/28/2020 1017 by Millie Devlin RN  Outcome: Ongoing  7/28/2020 0011 by Brigid River RN  Outcome: Ongoing  Goal: Absence of new skin breakdown  Description: Absence of new skin breakdown  7/28/2020 1017 by Millie Devlin RN  Outcome: Ongoing  7/28/2020 0011 by Brigid River RN  Outcome: Ongoing  Goal: Status of oral mucous membranes will improve  Description: Status of oral mucous membranes will improve  7/28/2020 1017 by Millie Devlin RN  Outcome: Ongoing  7/28/2020 0011 by Brigid River RN  Outcome: Ongoing  Goal: Skin integrity will be maintained  Description: Skin integrity will be maintained  7/28/2020 1017 by Millie Devlin RN  Outcome: Ongoing  7/28/2020 0011 by Brigid River RN  Outcome: Ongoing     Problem: Discharge Planning:  Goal: Participates in care planning  Description: Participates in care planning  7/28/2020 1017 by Millie Devlin RN  Outcome: Ongoing  7/28/2020 0011 by Brigid River RN  Outcome: Ongoing  Goal: Discharged to appropriate level of care  Description: Discharged to appropriate level of care  7/28/2020 1017 by Millie Devlin RN  Outcome: Ongoing  7/28/2020 0011 by Brigid River RN  Outcome: Ongoing     Problem: Airway Clearance - Ineffective:  Goal: Ability to maintain a clear airway will improve  Description: Ability to maintain a clear airway will improve  7/28/2020 1017 by Tremaine Oakes RN  Outcome: Ongoing  7/28/2020 0011 by Yannick Jarquin RN  Outcome: Ongoing     Problem: Anxiety/Stress:  Goal: Level of anxiety will decrease  Description: Level of anxiety will decrease  7/28/2020 1017 by Tremaine Oakes RN  Outcome: Ongoing  7/28/2020 0011 by Yannick Jarquin RN  Outcome: Ongoing     Problem: Aspiration:  Goal: Absence of aspiration  Description: Absence of aspiration  7/28/2020 1017 by Tremaine Oakes RN  Outcome: Ongoing  7/28/2020 0011 by Yannick Jarquin RN  Outcome: Ongoing     Problem:  Bowel Function - Altered:  Goal: Bowel elimination is within specified parameters  Description: Bowel elimination is within specified parameters  7/28/2020 1017 by Tremaine Oakes RN  Outcome: Ongoing  7/28/2020 0011 by Yannick Jarquin RN  Outcome: Ongoing     Problem: Cardiac Output - Decreased:  Goal: Hemodynamic stability will improve  Description: Hemodynamic stability will improve  7/28/2020 1017 by Tremaine Oakes RN  Outcome: Ongoing  7/28/2020 0011 by Yannick Jarquin RN  Outcome: Ongoing     Problem: Fluid Volume - Imbalance:  Goal: Absence of imbalanced fluid volume signs and symptoms  Description: Absence of imbalanced fluid volume signs and symptoms  7/28/2020 1017 by Tremaine Oakes RN  Outcome: Ongoing  7/28/2020 0011 by Yannick Jarquin RN  Outcome: Ongoing     Problem: Gas Exchange - Impaired:  Goal: Levels of oxygenation will improve  Description: Levels of oxygenation will improve  7/28/2020 1017 by Tremaine Oakes RN  Outcome: Ongoing  7/28/2020 0011 by Yannick Jarquin RN  Outcome: Ongoing     Problem: Mental Status - Impaired:  Goal: Mental status will be restored to baseline  Description: Mental status will be restored to baseline  7/28/2020 1017 by Tremaine Oakes RN  Outcome: Ongoing  7/28/2020 0011 by Yannick Jarquin RN  Outcome: Ongoing     Problem: Nutrition Deficit:  Goal: Ability to achieve adequate nutritional intake will improve  Description: Ability to achieve adequate nutritional intake will improve  7/28/2020 1017 by Gildardo Ron RN  Outcome: Ongoing  7/28/2020 0011 by Shaquille Ro RN  Outcome: Ongoing     Problem: Pain:  Goal: Pain level will decrease  Description: Pain level will decrease  7/28/2020 1017 by Gildardo Ron RN  Outcome: Ongoing  7/28/2020 0011 by Shaquille Ro RN  Outcome: Ongoing  Goal: Recognizes and communicates pain  Description: Recognizes and communicates pain  7/28/2020 1017 by Gildardo Ron RN  Outcome: Ongoing  7/28/2020 0011 by Shaquille Ro RN  Outcome: Ongoing  Goal: Control of acute pain  Description: Control of acute pain  7/28/2020 1017 by Gildardo Ron RN  Outcome: Ongoing  7/28/2020 0011 by Shaquille Ro RN  Outcome: Ongoing  Goal: Control of chronic pain  Description: Control of chronic pain  7/28/2020 1017 by Gildardo Ron RN  Outcome: Ongoing  7/28/2020 0011 by Shaquille Ro RN  Outcome: Ongoing     Problem: Serum Glucose Level - Abnormal:  Goal: Ability to maintain appropriate glucose levels will improve to within specified parameters  Description: Ability to maintain appropriate glucose levels will improve to within specified parameters  7/28/2020 1017 by Gildardo Ron RN  Outcome: Ongoing  7/28/2020 0011 by Shaquille Ro RN  Outcome: Ongoing  Goal: Ability to maintain appropriate glucose levels will improve  Description: Ability to maintain appropriate glucose levels will improve  7/28/2020 1017 by Gildardo Ron RN  Outcome: Ongoing  7/28/2020 0011 by Shaquille Ro RN  Outcome: Ongoing     Problem: Skin Integrity - Impaired:  Goal: Will show no infection signs and symptoms  Description: Will show no infection signs and symptoms  7/28/2020 1017 by Gildardo Ron RN  Outcome: Ongoing  7/28/2020 0011 by Shaquille Ro RN  Outcome: Ongoing  Goal: Absence of new skin breakdown  Description: Absence of new skin breakdown  7/28/2020 1017 by Gildardo Ron RN  Outcome: Ongoing  7/28/2020 0011 by Komal Dumont RN  Outcome: Ongoing     Problem: Sleep Pattern Disturbance:  Goal: Appears well-rested  Description: Appears well-rested  7/28/2020 1017 by Mara Meza RN  Outcome: Ongoing  7/28/2020 0011 by Komal Dumont RN  Outcome: Ongoing     Problem: Tissue Perfusion, Altered:  Goal: Circulatory function within specified parameters  Description: Circulatory function within specified parameters  7/28/2020 1017 by Mara Meza RN  Outcome: Ongoing  7/28/2020 0011 by Komal Dumont RN  Outcome: Ongoing     Problem: Tissue Perfusion - Cardiopulmonary, Altered:  Goal: Absence of angina  Description: Absence of angina  7/28/2020 1017 by Mara Meza RN  Outcome: Ongoing  7/28/2020 0011 by Komal Dumont RN  Outcome: Ongoing  Goal: Hemodynamic stability will improve  Description: Hemodynamic stability will improve  7/28/2020 1017 by Mara Meza RN  Outcome: Ongoing  7/28/2020 0011 by Komal Dumont RN  Outcome: Ongoing     Problem: Infection - Ventilator-Associated Pneumonia:  Goal: Absence of pulmonary infection  Description: Absence of pulmonary infection  7/28/2020 1017 by Mara Meza RN  Outcome: Ongoing  7/28/2020 0011 by Komal Dumont RN  Outcome: Ongoing     Problem: Venous Thromboembolism:  Goal: Will show no signs or symptoms of venous thromboembolism  Description: Will show no signs or symptoms of venous thromboembolism  7/28/2020 1017 by Mara Meza RN  Outcome: Ongoing  7/28/2020 0011 by Komal Dumont RN  Outcome: Ongoing  Goal: Absence of signs or symptoms of impaired coagulation  Description: Absence of signs or symptoms of impaired coagulation  7/28/2020 1017 by Mara Meza RN  Outcome: Ongoing  7/28/2020 0011 by Komal Dumont RN  Outcome: Ongoing

## 2020-07-28 NOTE — PROGRESS NOTES
Today's plan: blood pressure is better with albumin and blood transfusion, ECHO is normal, will continue supportive care, will sign off      Admit Date:  7/24/2020    Subjective: intubated      Chief complaints on admission  Chief Complaint   Patient presents with    Fall     85% o2 sat in triage    Hip Pain         History of present illness:Chaim is a 60 y. o.year old who  presents with had concerns including Fall (85% o2 sat in triage) and Hip Pain. Past medical history:    has a past medical history of Hypertension. Past surgical history:   has a past surgical history that includes back surgery; IR NONTUNNELED VASCULAR CATHETER > 5 YEARS (7/26/2020); and laparotomy (N/A, 7/25/2020). Social History:   reports that he has been smoking cigarettes. He has been smoking about 1.50 packs per day. He does not have any smokeless tobacco history on file. He reports that he does not drink alcohol or use drugs. Family history:  family history is not on file. No Known Allergies      Objective:   /85   Pulse 107   Temp 98.1 °F (36.7 °C) (Rectal)   Resp 17   Ht 5' 7.99\" (1.727 m)   Wt 149 lb 4 oz (67.7 kg)   SpO2 100%   BMI 22.70 kg/m²       Intake/Output Summary (Last 24 hours) at 7/28/2020 1113  Last data filed at 7/28/2020 1007  Gross per 24 hour   Intake 2063.2 ml   Output 3464 ml   Net -1400.8 ml       TELEMETRY: sinus    Physical Exam:  Constitutional:  Toxic apperancem, cachectic  HENT:  Normocephalic, Atraumatic, Bilateral external ears normal, Oropharynx moist, No oral exudates, Nose normal. Neck- Normal range of motion, No tenderness, Supple, No stridor. Eyes:  PERRL, EOMI, Conjunctiva normal, No discharge. Respiratory:  Normal breath sounds, No respiratory distress, No wheezing, No chest tenderness. ,no use of accessory muscles, diaphragm movement is normal  Cardiovascular: (PMI) apex non displaced,no lifts no thrills, no s3,no s4, Normal heart rate, Normal rhythm, No murmurs, No rubs, No gallops. Carotid arteries pulse and amplitude are normal no bruit, no abdominal bruit noted ( normal abdominal aorta ausculation), femoral arteries pulse and amplitude are normal no bruit, pedal pulses are normal  GI:  Bowel sounds normal, Soft, No tenderness, No masses, No pulsatile masses. : External genitalia appear normal, No masses or lesions. No discharge. No CVA tenderness. Musculoskeletal:  Intact distal pulses, No edema, No tenderness, No cyanosis, No clubbing. Good range of motion in all major joints. No tenderness to palpation or major deformities noted. Back- No tenderness. Integument: + sacral ulcer Warm, Dry, No erythema, No rash. Lymphatic:  No lymphadenopathy noted.    Neurologic:  intubated     Medications:    pantoprazole  40 mg Intravenous Daily    ceFAZolin  2 g Intravenous Q8H    hydrocortisone sodium succinate PF  100 mg Intravenous Q8H    thiamine (VITAMIN B1) IVPB  200 mg Intravenous Q12H    vancomycin (VANCOCIN) intermittent dosing (placeholder)   Other RX Placeholder    chlorhexidine  15 mL Mouth/Throat BID    albuterol sulfate HFA  4 puff Inhalation Q4H    And    ipratropium  4 puff Inhalation Q4H    aspirin  324 mg Oral Once    sodium chloride flush  10 mL Intravenous BID    sodium chloride flush  10 mL Intravenous 2 times per day    [Held by provider] enoxaparin  1 mg/kg Subcutaneous Daily    nicotine  1 patch Transdermal Daily      fentanyl 25 mcg/hr (07/27/20 2300)    vasopressin (Septic Shock) infusion Stopped (07/27/20 0500)    norepinephrine Stopped (07/27/20 0439)    prismaSATE BGK 4/2.5 500 mL/hr (07/28/20 1016)    prismaSATE BGK 4/2.5 500 mL/hr (07/28/20 1015)    prismaSATE BGK 4/2.5 1,200 mL/hr (07/28/20 1102)    propofol 30 mcg/kg/min (07/28/20 0554)     sodium chloride, potassium chloride, magnesium sulfate, calcium gluconate **OR** calcium gluconate **OR** calcium gluconate **OR** calcium gluconate, sodium phosphate IVPB **OR** sodium phosphate IVPB **OR** sodium phosphate IVPB **OR** sodium phosphate IVPB, ipratropium-albuterol, iopamidol, sodium chloride flush, acetaminophen **OR** acetaminophen, polyethylene glycol, promethazine **OR** ondansetron, promethazine, diphenhydrAMINE, dicyclomine, morphine    Lab Data:  CBC:   Recent Labs     07/26/20  0555  07/27/20  0620 07/28/20  0300 07/28/20 0630 07/28/20  0945   WBC 11.8*  --  15.7*  --   --  16.7*  --    HGB 5.6*   < > 6.6*   < > 7.5* 7.5* 7.3*   HCT 21.6*   < > 21.0*   < > 24.3* 23.6* 22.8*   MCV 83.4  --  79.5  --   --  81.9  --      --  240  --   --  134*  --     < > = values in this interval not displayed. BMP:   Recent Labs     07/27/20  2100 07/28/20  0300 07/28/20  0630 07/28/20  0945    137 135 137   K 3.8 3.9 4.0 4.1   CL 98* 99 98* 100   CO2 28 28 27 27   PHOS 2.3* 2.7  --  2.4*   BUN 14 14 13 13   CREATININE 1.2 1.2 1.1 1.2     LIVER PROFILE:   Recent Labs     07/26/20  0555 07/27/20 0620 07/28/20  0630   AST 34  34 2,470*  2,470* 1,315*  1,315*   ALT 11  11 207*  207* 136*  136*   BILIDIR 0.3 1.1* 1.0*   BILITOT 0.5  0.5 1.8*  1.8* 1.5*  1.5*   ALKPHOS 49  49 60  60 70  70     PT/INR:   Recent Labs     07/26/20  0555 07/27/20 0620 07/28/20 0630   PROTIME 18.0* 23.1* 17.0*   INR 1.48 1.90 1.40     APTT:   Recent Labs     07/26/20  0555 07/27/20  0620 07/28/20  0630   APTT 38.9* 42.4* 36.3     BNP:  No results for input(s): BNP in the last 72 hours. TROPONIN: @TROPONINI:3@      Assessment:  60 y. o.year old who is admitted for          Plan:  1. Shock: Off MULTIPLE pressors,echo is normal, recommend Red blood cell transfusions, IVF, patient is very critical, may not make it  2. S/p explatory lap;' as per sx  3. Severe anemia: recommend GI evaluation  4. Elevated trop: Type II NSTEMI with sepsis and possible pneumonia and bronchitis, echo is normal  5. Sepsis and pneumonia and UTI: recommend treatment with broad spectrum antibioticds  6.  Sacral decubitis: recommend wound consult  Hypoxemia: in ICU on BIPAP, recommend to monitor oxygen sat. all other medications of all above medical condition listed as is.       Zachary Mayer MD 7/28/2020 11:13 AM

## 2020-07-29 ENCOUNTER — APPOINTMENT (OUTPATIENT)
Dept: GENERAL RADIOLOGY | Age: 65
DRG: 004 | End: 2020-07-29
Payer: MEDICARE

## 2020-07-29 LAB
ABO/RH: NORMAL
ALBUMIN SERPL-MCNC: 3.2 GM/DL (ref 3.4–5)
ALBUMIN SERPL-MCNC: 3.2 GM/DL (ref 3.4–5)
ALP BLD-CCNC: 80 IU/L (ref 40–128)
ALP BLD-CCNC: 80 IU/L (ref 40–129)
ALT SERPL-CCNC: 41 U/L (ref 10–40)
ALT SERPL-CCNC: 41 U/L (ref 10–40)
ANION GAP SERPL CALCULATED.3IONS-SCNC: 10 MMOL/L (ref 4–16)
ANION GAP SERPL CALCULATED.3IONS-SCNC: 12 MMOL/L (ref 4–16)
ANION GAP SERPL CALCULATED.3IONS-SCNC: 16 MMOL/L (ref 4–16)
ANION GAP SERPL CALCULATED.3IONS-SCNC: 17 MMOL/L (ref 4–16)
ANTIBODY SCREEN: NEGATIVE
APTT: 30.6 SECONDS (ref 25.1–37.1)
AST SERPL-CCNC: 458 IU/L (ref 15–37)
AST SERPL-CCNC: 458 IU/L (ref 15–37)
BASE EXCESS MIXED: 2.3 (ref 0–1.2)
BASE EXCESS: 7 (ref 0–3.3)
BASOPHILS ABSOLUTE: 0 K/CU MM
BASOPHILS RELATIVE PERCENT: 0.1 % (ref 0–1)
BILIRUB SERPL-MCNC: 1.1 MG/DL (ref 0–1)
BILIRUB SERPL-MCNC: 1.1 MG/DL (ref 0–1)
BILIRUBIN DIRECT: 0.8 MG/DL (ref 0–0.3)
BILIRUBIN, INDIRECT: 0.3 MG/DL (ref 0–0.7)
BUN BLDV-MCNC: 15 MG/DL (ref 6–23)
BUN BLDV-MCNC: 16 MG/DL (ref 6–23)
BUN BLDV-MCNC: 17 MG/DL (ref 6–23)
BUN BLDV-MCNC: 18 MG/DL (ref 6–23)
CALCIUM IONIZED: 4.28 MG/DL (ref 4.48–5.28)
CALCIUM IONIZED: 4.28 MG/DL (ref 4.48–5.28)
CALCIUM IONIZED: 4.48 MG/DL (ref 4.48–5.28)
CALCIUM IONIZED: 4.56 MG/DL (ref 4.48–5.28)
CALCIUM SERPL-MCNC: 8.4 MG/DL (ref 8.3–10.6)
CALCIUM SERPL-MCNC: 8.5 MG/DL (ref 8.3–10.6)
CALCIUM SERPL-MCNC: 8.8 MG/DL (ref 8.3–10.6)
CALCIUM SERPL-MCNC: 8.9 MG/DL (ref 8.3–10.6)
CARBON MONOXIDE, BLOOD: 1.4 % (ref 0–5)
CARBON MONOXIDE, BLOOD: 2 % (ref 0–5)
CHLORIDE BLD-SCNC: 98 MMOL/L (ref 99–110)
CHLORIDE BLD-SCNC: 98 MMOL/L (ref 99–110)
CHLORIDE BLD-SCNC: 99 MMOL/L (ref 99–110)
CHLORIDE BLD-SCNC: 99 MMOL/L (ref 99–110)
CO2 CONTENT: 18.9 MMOL/L (ref 19–24)
CO2 CONTENT: 25.7 MMOL/L (ref 19–24)
CO2: 20 MMOL/L (ref 21–32)
CO2: 21 MMOL/L (ref 21–32)
CO2: 24 MMOL/L (ref 21–32)
CO2: 25 MMOL/L (ref 21–32)
COMMENT: ABNORMAL
COMMENT: ABNORMAL
COMPONENT: NORMAL
CREAT SERPL-MCNC: 1.1 MG/DL (ref 0.9–1.3)
CREAT SERPL-MCNC: 1.2 MG/DL (ref 0.9–1.3)
CROSSMATCH RESULT: NORMAL
DIFFERENTIAL TYPE: ABNORMAL
DOSE AMOUNT: NORMAL
DOSE TIME: NORMAL
EOSINOPHILS ABSOLUTE: 0 K/CU MM
EOSINOPHILS RELATIVE PERCENT: 0 % (ref 0–3)
GFR AFRICAN AMERICAN: >60 ML/MIN/1.73M2
GFR NON-AFRICAN AMERICAN: >60 ML/MIN/1.73M2
GLUCOSE BLD-MCNC: 125 MG/DL (ref 70–99)
GLUCOSE BLD-MCNC: 136 MG/DL (ref 70–99)
GLUCOSE BLD-MCNC: 161 MG/DL (ref 70–99)
GLUCOSE BLD-MCNC: 169 MG/DL (ref 70–99)
HCO3 ARTERIAL: 17.9 MMOL/L (ref 18–23)
HCO3 ARTERIAL: 24.7 MMOL/L (ref 18–23)
HCT VFR BLD CALC: 21.6 % (ref 42–52)
HCT VFR BLD CALC: 28.6 % (ref 42–52)
HEMOGLOBIN: 6.8 GM/DL (ref 13.5–18)
HEMOGLOBIN: 8.8 GM/DL (ref 13.5–18)
HEPATITIS B CORE TOTAL ANTIBODY: POSITIVE
HIGH SENSITIVE C-REACTIVE PROTEIN: 37.1 MG/L
IMMATURE NEUTROPHIL %: 1.2 % (ref 0–0.43)
INR BLD: 1.31 INDEX
IONIZED CA: 1.07 MMOL/L (ref 1.12–1.32)
IONIZED CA: 1.07 MMOL/L (ref 1.12–1.32)
IONIZED CA: 1.12 MMOL/L (ref 1.12–1.32)
IONIZED CA: 1.14 MMOL/L (ref 1.12–1.32)
LYMPHOCYTES ABSOLUTE: 0.9 K/CU MM
LYMPHOCYTES RELATIVE PERCENT: 5.4 % (ref 24–44)
MAGNESIUM: 2.4 MG/DL (ref 1.8–2.4)
MAGNESIUM: 2.4 MG/DL (ref 1.8–2.4)
MAGNESIUM: 2.5 MG/DL (ref 1.8–2.4)
MAGNESIUM: 2.6 MG/DL (ref 1.8–2.4)
MCH RBC QN AUTO: 26.3 PG (ref 27–31)
MCHC RBC AUTO-ENTMCNC: 31.5 % (ref 32–36)
MCV RBC AUTO: 83.4 FL (ref 78–100)
METHEMOGLOBIN ARTERIAL: 0.8 %
METHEMOGLOBIN ARTERIAL: 1.4 %
MONOCYTES ABSOLUTE: 0.8 K/CU MM
MONOCYTES RELATIVE PERCENT: 4.6 % (ref 0–4)
NUCLEATED RBC %: 0.5 %
O2 SATURATION: 96.5 % (ref 96–97)
O2 SATURATION: 96.8 % (ref 96–97)
PCO2 ARTERIAL: 31 MMHG (ref 32–45)
PCO2 ARTERIAL: 34 MMHG (ref 32–45)
PDW BLD-RTO: 23.6 % (ref 11.7–14.9)
PH BLOOD: 7.33 (ref 7.34–7.45)
PH BLOOD: 7.51 (ref 7.34–7.45)
PHOSPHORUS: 2.5 MG/DL (ref 2.5–4.9)
PHOSPHORUS: 2.6 MG/DL (ref 2.5–4.9)
PHOSPHORUS: 2.9 MG/DL (ref 2.5–4.9)
PHOSPHORUS: 3.7 MG/DL (ref 2.5–4.9)
PLATELET # BLD: 139 K/CU MM (ref 140–440)
PMV BLD AUTO: 9.8 FL (ref 7.5–11.1)
PO2 ARTERIAL: 132 MMHG (ref 75–100)
PO2 ARTERIAL: 148 MMHG (ref 75–100)
POTASSIUM SERPL-SCNC: 4.3 MMOL/L (ref 3.5–5.1)
POTASSIUM SERPL-SCNC: 4.5 MMOL/L (ref 3.5–5.1)
POTASSIUM SERPL-SCNC: 4.9 MMOL/L (ref 3.5–5.1)
POTASSIUM SERPL-SCNC: 5.1 MMOL/L (ref 3.5–5.1)
PROTHROMBIN TIME: 15.9 SECONDS (ref 11.7–14.5)
RBC # BLD: 2.59 M/CU MM (ref 4.6–6.2)
SEGMENTED NEUTROPHILS ABSOLUTE COUNT: 14.9 K/CU MM
SEGMENTED NEUTROPHILS RELATIVE PERCENT: 88.7 % (ref 36–66)
SODIUM BLD-SCNC: 134 MMOL/L (ref 135–145)
SODIUM BLD-SCNC: 134 MMOL/L (ref 135–145)
SODIUM BLD-SCNC: 135 MMOL/L (ref 135–145)
SODIUM BLD-SCNC: 136 MMOL/L (ref 135–145)
STATUS: NORMAL
TOTAL CK: 104 IU/L (ref 38–174)
TOTAL IMMATURE NEUTOROPHIL: 0.2 K/CU MM
TOTAL NUCLEATED RBC: 0.1 K/CU MM
TOTAL PROTEIN: 7 GM/DL (ref 6.4–8.2)
TOTAL PROTEIN: 7 GM/DL (ref 6.4–8.2)
TRANSFUSION STATUS: NORMAL
UNIT DIVISION: 0
UNIT NUMBER: NORMAL
VANCOMYCIN RANDOM: 15 UG/ML
WBC # BLD: 16.8 K/CU MM (ref 4–10.5)

## 2020-07-29 PROCEDURE — 2580000003 HC RX 258: Performed by: INTERNAL MEDICINE

## 2020-07-29 PROCEDURE — APPNB15 APP NON BILLABLE TIME 0-15 MINS: Performed by: PHYSICIAN ASSISTANT

## 2020-07-29 PROCEDURE — 71045 X-RAY EXAM CHEST 1 VIEW: CPT

## 2020-07-29 PROCEDURE — 80048 BASIC METABOLIC PNL TOTAL CA: CPT

## 2020-07-29 PROCEDURE — 99024 POSTOP FOLLOW-UP VISIT: CPT | Performed by: PHYSICIAN ASSISTANT

## 2020-07-29 PROCEDURE — 6370000000 HC RX 637 (ALT 250 FOR IP): Performed by: SURGERY

## 2020-07-29 PROCEDURE — 82803 BLOOD GASES ANY COMBINATION: CPT

## 2020-07-29 PROCEDURE — 6360000002 HC RX W HCPCS: Performed by: SPECIALIST

## 2020-07-29 PROCEDURE — 6360000002 HC RX W HCPCS: Performed by: INTERNAL MEDICINE

## 2020-07-29 PROCEDURE — 84100 ASSAY OF PHOSPHORUS: CPT

## 2020-07-29 PROCEDURE — 83735 ASSAY OF MAGNESIUM: CPT

## 2020-07-29 PROCEDURE — 2580000003 HC RX 258: Performed by: NURSE PRACTITIONER

## 2020-07-29 PROCEDURE — 85025 COMPLETE CBC W/AUTO DIFF WBC: CPT

## 2020-07-29 PROCEDURE — 2500000003 HC RX 250 WO HCPCS: Performed by: INTERNAL MEDICINE

## 2020-07-29 PROCEDURE — 85018 HEMOGLOBIN: CPT

## 2020-07-29 PROCEDURE — 82550 ASSAY OF CK (CPK): CPT

## 2020-07-29 PROCEDURE — 80202 ASSAY OF VANCOMYCIN: CPT

## 2020-07-29 PROCEDURE — 90945 DIALYSIS ONE EVALUATION: CPT

## 2020-07-29 PROCEDURE — 85610 PROTHROMBIN TIME: CPT

## 2020-07-29 PROCEDURE — 99233 SBSQ HOSP IP/OBS HIGH 50: CPT | Performed by: INTERNAL MEDICINE

## 2020-07-29 PROCEDURE — 86850 RBC ANTIBODY SCREEN: CPT

## 2020-07-29 PROCEDURE — P9016 RBC LEUKOCYTES REDUCED: HCPCS

## 2020-07-29 PROCEDURE — 94750 HC PULMONARY COMPLIANCE STUDY: CPT

## 2020-07-29 PROCEDURE — 86922 COMPATIBILITY TEST ANTIGLOB: CPT

## 2020-07-29 PROCEDURE — 94640 AIRWAY INHALATION TREATMENT: CPT

## 2020-07-29 PROCEDURE — 82330 ASSAY OF CALCIUM: CPT

## 2020-07-29 PROCEDURE — C9113 INJ PANTOPRAZOLE SODIUM, VIA: HCPCS | Performed by: SPECIALIST

## 2020-07-29 PROCEDURE — 86901 BLOOD TYPING SEROLOGIC RH(D): CPT

## 2020-07-29 PROCEDURE — 82248 BILIRUBIN DIRECT: CPT

## 2020-07-29 PROCEDURE — 89220 SPUTUM SPECIMEN COLLECTION: CPT

## 2020-07-29 PROCEDURE — 80053 COMPREHEN METABOLIC PANEL: CPT

## 2020-07-29 PROCEDURE — 2580000003 HC RX 258: Performed by: SURGERY

## 2020-07-29 PROCEDURE — 94003 VENT MGMT INPAT SUBQ DAY: CPT

## 2020-07-29 PROCEDURE — 99231 SBSQ HOSP IP/OBS SF/LOW 25: CPT | Performed by: OBSTETRICS & GYNECOLOGY

## 2020-07-29 PROCEDURE — 36430 TRANSFUSION BLD/BLD COMPNT: CPT

## 2020-07-29 PROCEDURE — 6370000000 HC RX 637 (ALT 250 FOR IP): Performed by: INTERNAL MEDICINE

## 2020-07-29 PROCEDURE — 86900 BLOOD TYPING SEROLOGIC ABO: CPT

## 2020-07-29 PROCEDURE — 85730 THROMBOPLASTIN TIME PARTIAL: CPT

## 2020-07-29 PROCEDURE — 85014 HEMATOCRIT: CPT

## 2020-07-29 PROCEDURE — 31720 CLEARANCE OF AIRWAYS: CPT

## 2020-07-29 PROCEDURE — 2700000000 HC OXYGEN THERAPY PER DAY

## 2020-07-29 PROCEDURE — 2000000000 HC ICU R&B

## 2020-07-29 PROCEDURE — 37799 UNLISTED PX VASCULAR SURGERY: CPT

## 2020-07-29 PROCEDURE — 87040 BLOOD CULTURE FOR BACTERIA: CPT

## 2020-07-29 PROCEDURE — 94761 N-INVAS EAR/PLS OXIMETRY MLT: CPT

## 2020-07-29 PROCEDURE — 86141 C-REACTIVE PROTEIN HS: CPT

## 2020-07-29 RX ORDER — HEPARIN SODIUM 1000 [USP'U]/ML
3000 INJECTION, SOLUTION INTRAVENOUS; SUBCUTANEOUS PRN
Status: DISCONTINUED | OUTPATIENT
Start: 2020-07-29 | End: 2020-08-15 | Stop reason: HOSPADM

## 2020-07-29 RX ORDER — 0.9 % SODIUM CHLORIDE 0.9 %
250 INTRAVENOUS SOLUTION INTRAVENOUS ONCE
Status: COMPLETED | OUTPATIENT
Start: 2020-07-29 | End: 2020-07-29

## 2020-07-29 RX ADMIN — METRONIDAZOLE 500 MG: 500 INJECTION, SOLUTION INTRAVENOUS at 23:26

## 2020-07-29 RX ADMIN — Medication 500 ML/HR: at 09:58

## 2020-07-29 RX ADMIN — SODIUM CHLORIDE, PRESERVATIVE FREE 10 ML: 5 INJECTION INTRAVENOUS at 08:46

## 2020-07-29 RX ADMIN — METRONIDAZOLE 500 MG: 500 INJECTION, SOLUTION INTRAVENOUS at 06:48

## 2020-07-29 RX ADMIN — VANCOMYCIN HYDROCHLORIDE 1250 MG: 5 INJECTION, POWDER, LYOPHILIZED, FOR SOLUTION INTRAVENOUS at 15:31

## 2020-07-29 RX ADMIN — PANTOPRAZOLE SODIUM 40 MG: 40 INJECTION, POWDER, FOR SOLUTION INTRAVENOUS at 08:41

## 2020-07-29 RX ADMIN — IPRATROPIUM BROMIDE 4 PUFF: 17 AEROSOL, METERED RESPIRATORY (INHALATION) at 12:05

## 2020-07-29 RX ADMIN — IPRATROPIUM BROMIDE 4 PUFF: 17 AEROSOL, METERED RESPIRATORY (INHALATION) at 04:45

## 2020-07-29 RX ADMIN — THIAMINE HYDROCHLORIDE 200 MG: 100 INJECTION, SOLUTION INTRAMUSCULAR; INTRAVENOUS at 10:58

## 2020-07-29 RX ADMIN — IPRATROPIUM BROMIDE 4 PUFF: 17 AEROSOL, METERED RESPIRATORY (INHALATION) at 16:13

## 2020-07-29 RX ADMIN — CEFAZOLIN SODIUM 2 G: 2 INJECTION, SOLUTION INTRAVENOUS at 05:46

## 2020-07-29 RX ADMIN — IPRATROPIUM BROMIDE 4 PUFF: 17 AEROSOL, METERED RESPIRATORY (INHALATION) at 01:11

## 2020-07-29 RX ADMIN — Medication 1200 ML/HR: at 19:18

## 2020-07-29 RX ADMIN — CEFAZOLIN SODIUM 2 G: 2 INJECTION, SOLUTION INTRAVENOUS at 18:18

## 2020-07-29 RX ADMIN — HYDROCORTISONE SODIUM SUCCINATE 100 MG: 100 INJECTION, POWDER, FOR SOLUTION INTRAMUSCULAR; INTRAVENOUS at 18:20

## 2020-07-29 RX ADMIN — CALCIUM GLUCONATE 1 G: 98 INJECTION, SOLUTION INTRAVENOUS at 04:04

## 2020-07-29 RX ADMIN — SODIUM CHLORIDE, PRESERVATIVE FREE 10 ML: 5 INJECTION INTRAVENOUS at 20:12

## 2020-07-29 RX ADMIN — THIAMINE HYDROCHLORIDE 200 MG: 100 INJECTION, SOLUTION INTRAMUSCULAR; INTRAVENOUS at 22:36

## 2020-07-29 RX ADMIN — ALBUTEROL SULFATE 4 PUFF: 90 AEROSOL, METERED RESPIRATORY (INHALATION) at 16:13

## 2020-07-29 RX ADMIN — HYDROCORTISONE SODIUM SUCCINATE 100 MG: 100 INJECTION, POWDER, FOR SOLUTION INTRAMUSCULAR; INTRAVENOUS at 04:00

## 2020-07-29 RX ADMIN — Medication 1200 ML/HR: at 22:56

## 2020-07-29 RX ADMIN — ALBUTEROL SULFATE 4 PUFF: 90 AEROSOL, METERED RESPIRATORY (INHALATION) at 01:09

## 2020-07-29 RX ADMIN — CHLORHEXIDINE GLUCONATE 0.12% ORAL RINSE 15 ML: 1.2 LIQUID ORAL at 08:41

## 2020-07-29 RX ADMIN — Medication 500 ML/HR: at 19:18

## 2020-07-29 RX ADMIN — CALCIUM GLUCONATE 1 G: 98 INJECTION, SOLUTION INTRAVENOUS at 22:56

## 2020-07-29 RX ADMIN — IPRATROPIUM BROMIDE 4 PUFF: 17 AEROSOL, METERED RESPIRATORY (INHALATION) at 07:51

## 2020-07-29 RX ADMIN — Medication 1200 ML/HR: at 14:14

## 2020-07-29 RX ADMIN — PROPOFOL 30 MCG/KG/MIN: 10 INJECTION, EMULSION INTRAVENOUS at 07:16

## 2020-07-29 RX ADMIN — Medication 500 ML/HR: at 06:30

## 2020-07-29 RX ADMIN — SODIUM CHLORIDE 250 ML: 9 INJECTION, SOLUTION INTRAVENOUS at 08:46

## 2020-07-29 RX ADMIN — IPRATROPIUM BROMIDE 4 PUFF: 17 AEROSOL, METERED RESPIRATORY (INHALATION) at 20:37

## 2020-07-29 RX ADMIN — ALBUTEROL SULFATE 4 PUFF: 90 AEROSOL, METERED RESPIRATORY (INHALATION) at 04:43

## 2020-07-29 RX ADMIN — ALBUTEROL SULFATE 4 PUFF: 90 AEROSOL, METERED RESPIRATORY (INHALATION) at 20:37

## 2020-07-29 RX ADMIN — Medication 500 ML/HR: at 00:30

## 2020-07-29 RX ADMIN — CHLORHEXIDINE GLUCONATE 0.12% ORAL RINSE 15 ML: 1.2 LIQUID ORAL at 20:10

## 2020-07-29 RX ADMIN — Medication 1200 ML/HR: at 01:24

## 2020-07-29 RX ADMIN — SODIUM CHLORIDE, PRESERVATIVE FREE 10 ML: 5 INJECTION INTRAVENOUS at 20:11

## 2020-07-29 RX ADMIN — ALBUTEROL SULFATE 4 PUFF: 90 AEROSOL, METERED RESPIRATORY (INHALATION) at 07:51

## 2020-07-29 RX ADMIN — HYDROCORTISONE SODIUM SUCCINATE 100 MG: 100 INJECTION, POWDER, FOR SOLUTION INTRAMUSCULAR; INTRAVENOUS at 10:51

## 2020-07-29 RX ADMIN — Medication 1200 ML/HR: at 09:58

## 2020-07-29 RX ADMIN — ALBUTEROL SULFATE 4 PUFF: 90 AEROSOL, METERED RESPIRATORY (INHALATION) at 12:05

## 2020-07-29 RX ADMIN — METRONIDAZOLE 500 MG: 500 INJECTION, SOLUTION INTRAVENOUS at 14:14

## 2020-07-29 RX ADMIN — PROPOFOL 10 MCG/KG/MIN: 10 INJECTION, EMULSION INTRAVENOUS at 23:48

## 2020-07-29 RX ADMIN — Medication 500 ML/HR: at 20:15

## 2020-07-29 ASSESSMENT — PULMONARY FUNCTION TESTS
PIF_VALUE: 30
PIF_VALUE: 25
PIF_VALUE: 32
PIF_VALUE: 28
PIF_VALUE: 33
PIF_VALUE: 23
PIF_VALUE: 28
PIF_VALUE: 24
PIF_VALUE: 20
PIF_VALUE: 25
PIF_VALUE: 34
PIF_VALUE: 25
PIF_VALUE: 32
PIF_VALUE: 27
PIF_VALUE: 24
PIF_VALUE: 27
PIF_VALUE: 29
PIF_VALUE: 25
PIF_VALUE: 30
PIF_VALUE: 35
PIF_VALUE: 25
PIF_VALUE: 27
PIF_VALUE: 27
PIF_VALUE: 33
PIF_VALUE: 22

## 2020-07-29 NOTE — PROGRESS NOTES
Infectious Disease Progress Note  2020   Patient Name: Naya Martínez : 1955       Reason for visit: F/u MSSA bacteremia secondary to infected wounds. ? History:? Interval history noted  Intubated and on mechanical ventilation and renal replacement therapy  Physical Exam:  Vital Signs: /80   Pulse 95   Temp 97.2 °F (36.2 °C) (Rectal)   Resp 24   Ht 5' 7.99\" (1.727 m)   Wt 149 lb 4 oz (67.7 kg)   SpO2 100%   BMI 22.70 kg/m²     Gen: Intubated, sedated and mechanically ventilated  Skin: no stigmata of endocarditis  Wounds: Bilateral forearm dressing C/D/I  HEMT: AT/NC, OG tube, ETT  Eyes: PERRL  Neck: Supple. Trachea midline. No LAD. Chest: no distress and CTA. Good air movement. Heart: RRR and no MRG. Abd: soft, non-distended, no tenderness, no hepatomegaly. Normoactive bowel sounds. Ext: no clubbing, cyanosis, or edema  Catheter Site: right IJ CVC, right femoral vein vascath without erythema or tenderness  Neuro: sedated     Radiologic / Imaging / TESTING  No results found.      Labs:    Recent Results (from the past 24 hour(s))   Hemoglobin and hematocrit, blood    Collection Time: 20  9:45 AM   Result Value Ref Range    Hemoglobin 7.3 (L) 13.5 - 18.0 GM/DL    Hematocrit 22.8 (L) 42 - 52 %   Critical Care Panel    Collection Time: 20  9:45 AM   Result Value Ref Range    Sodium 137 135 - 145 MMOL/L    Potassium 4.1 3.5 - 5.1 MMOL/L    Chloride 100 99 - 110 mMol/L    CO2 27 21 - 32 MMOL/L    Anion Gap 10 4 - 16    BUN 13 6 - 23 MG/DL    CREATININE 1.2 0.9 - 1.3 MG/DL    Glucose 132 (H) 70 - 99 MG/DL    Calcium 8.3 8.3 - 10.6 MG/DL    GFR Non-African American >60 >60 mL/min/1.73m2    GFR African American >60 >60 mL/min/1.73m2    Phosphorus 2.4 (L) 2.5 - 4.9 MG/DL    Magnesium 2.4 1.8 - 2.4 mg/dl   Calcium, Ionized    Collection Time: 20  9:45 AM   Result Value Ref Range    Ionized Ca 1.12 1.12 - 1.32 mMOL/L    Calcium, Ion 4.48 4.48 - 5.28 MG/DL   Vancomycin, random Collection Time: 07/28/20 12:00 PM   Result Value Ref Range    Vancomycin Rm 24.3 UG/ML    DOSE AMOUNT DOSE AMT.  GIVEN - 1000     DOSE TIME DOSE TIME GIVEN - 1400    Calcium, Ionized    Collection Time: 07/28/20  5:15 PM   Result Value Ref Range    Ionized Ca 1.07 (L) 1.12 - 1.32 mMOL/L    Calcium, Ion 4.28 (L) 4.48 - 5.28 MG/DL   Critical Care Panel    Collection Time: 07/28/20  5:15 PM   Result Value Ref Range    Sodium 136 135 - 145 MMOL/L    Potassium 4.2 3.5 - 5.1 MMOL/L    Chloride 100 99 - 110 mMol/L    CO2 26 21 - 32 MMOL/L    Anion Gap 10 4 - 16    BUN 14 6 - 23 MG/DL    CREATININE 1.3 0.9 - 1.3 MG/DL    Glucose 136 (H) 70 - 99 MG/DL    Calcium 8.2 (L) 8.3 - 10.6 MG/DL    GFR Non- 56 (L) >60 mL/min/1.73m2    GFR African American >60 >60 mL/min/1.73m2    Phosphorus 2.3 (L) 2.5 - 4.9 MG/DL    Magnesium 2.3 1.8 - 2.4 mg/dl   Hemoglobin and hematocrit, blood    Collection Time: 07/28/20  5:15 PM   Result Value Ref Range    Hemoglobin 7.0 (L) 13.5 - 18.0 GM/DL    Hematocrit 22.3 (L) 42 - 52 %   Calcium, Ionized    Collection Time: 07/29/20 12:50 AM   Result Value Ref Range    Ionized Ca 1.07 (L) 1.12 - 1.32 mMOL/L    Calcium, Ion 4.28 (L) 4.48 - 5.28 MG/DL   Critical Care Panel    Collection Time: 07/29/20 12:50 AM   Result Value Ref Range    Sodium 134 (L) 135 - 145 MMOL/L    Potassium 4.3 3.5 - 5.1 MMOL/L    Chloride 99 99 - 110 mMol/L    CO2 25 21 - 32 MMOL/L    Anion Gap 10 4 - 16    BUN 15 6 - 23 MG/DL    CREATININE 1.2 0.9 - 1.3 MG/DL    Glucose 161 (H) 70 - 99 MG/DL    Calcium 8.4 8.3 - 10.6 MG/DL    GFR Non-African American >60 >60 mL/min/1.73m2    GFR African American >60 >60 mL/min/1.73m2    Phosphorus 2.5 2.5 - 4.9 MG/DL    Magnesium 2.4 1.8 - 2.4 mg/dl   CBC auto differential    Collection Time: 07/29/20  5:50 AM   Result Value Ref Range    WBC 16.8 (H) 4.0 - 10.5 K/CU MM    RBC 2.59 (L) 4.6 - 6.2 M/CU MM    Hemoglobin 6.8 (LL) 13.5 - 18.0 GM/DL    Hematocrit 21.6 (L) 42 - 52 %    MCV 83.4 78 - 100 FL    MCH 26.3 (L) 27 - 31 PG    MCHC 31.5 (L) 32.0 - 36.0 %    RDW 23.6 (H) 11.7 - 14.9 %    Platelets 010 (L) 496 - 440 K/CU MM    MPV 9.8 7.5 - 11.1 FL    Differential Type AUTOMATED DIFFERENTIAL     Segs Relative 88.7 (H) 36 - 66 %    Lymphocytes % 5.4 (L) 24 - 44 %    Monocytes % 4.6 (H) 0 - 4 %    Eosinophils % 0.0 0 - 3 %    Basophils % 0.1 0 - 1 %    Segs Absolute 14.9 K/CU MM    Lymphocytes Absolute 0.9 K/CU MM    Monocytes Absolute 0.8 K/CU MM    Eosinophils Absolute 0.0 K/CU MM    Basophils Absolute 0.0 K/CU MM    Nucleated RBC % 0.5 %    Total Nucleated RBC 0.1 K/CU MM    Total Immature Neutrophil 0.20 K/CU MM    Immature Neutrophil % 1.2 (H) 0 - 0.43 %   PT    Collection Time: 07/29/20  5:50 AM   Result Value Ref Range    Protime 15.9 (H) 11.7 - 14.5 SECONDS    INR 1.31 INDEX   PTT    Collection Time: 07/29/20  5:50 AM   Result Value Ref Range    aPTT 30.6 25.1 - 37.1 SECONDS   Blood gases    Collection Time: 07/29/20  6:00 AM   Result Value Ref Range    pH, Bld 7.51 (H) 7.34 - 7.45    pCO2, Arterial 31.0 (L) 32 - 45 MMHG    pO2, Arterial 148 (H) 75 - 100 MMHG    Base Exc, Mixed 2.3 (H) 0 - 1.2    HCO3, Arterial 24.7 (H) 18 - 23 MMOL/L    CO2 Content 25.7 (H) 19 - 24 MMOL/L    O2 Sat 96.5 96 - 97 %    Carbon Monoxide, Blood 1.4 0 - 5 %    Methemoglobin, Arterial 1.4 <1.5 %    Comment AC 14  PEEP 5 30%      CULTURE results: Invalid input(s): BLOOD CULTURE,  URINE CULTURE, SURGICAL CULTURE    Diagnosis:  Patient Active Problem List   Diagnosis    Sepsis ([facility] [address]    Leukocytosis    Ischemic bowel disease (HCC)    MSSA bacteremia       Active Problems  Active Problems:    Sepsis (HCC)    Leukocytosis    Ischemic bowel disease (HCC)    MSSA bacteremia  Resolved Problems:    * No resolved hospital problems. *      Impression and plan   Clinical status: Not on vasopressors, continues on CRRT.   Diagnosis of ischemic bowel was considered but exploratory laparotomy on 7/25 was negative, adhesions were lysed. 1 of 2 blood cx from 7/27: [facility]   Therapeutic: Continue cefazolin and metronidazole.  Start vancomycin    Diagnostic: Trend CRP, procalcitonin, repeat blood cx   F/u: Blood culture (7/27/2020), hepatitis C RNA   Other:   Summary:      Electronically signed by: Electronically signed by Monserrat León MD on 7/29/2020 at 6:45 AM

## 2020-07-29 NOTE — PROGRESS NOTES
General Surgery- [facility] Day: 6    Chief Complaint on Admission: Sepsis      Subjective:     Jorge Rosenberg is a 60 y.o. male with 4 Days Post-Op status post exploratory laparotomy with negative findings. Patient remains on the vent although he is off pressors. RN at bedside reports no drainage from midline incision. Tolerating Diet NPO Effective Now. + BM.     ROS:  Review of Systems   Unable to perform ROS: Intubated       Allergies  Patient has no known allergies. Diagnosis Date    Hypertension        Objective:     Vitals:    20 1207   BP:    Pulse: 107   Resp: (!) 37   Temp:    SpO2: 100%       TEMPERATURE:  Current - Temp: 99.3 °F (37.4 °C); Max - Temp  Av.5 °F (36.9 °C)  Min: 97.1 °F (36.2 °C)  Max: 99.3 °F (37.4 °C)    I/O this shift:  In: -   Out: 917 I/O last 3 completed shifts: In: 1563.8 [I.V.:563.8; IV Piggyback:1000]  Out: 4447 [Urine:14; Emesis/NG output:60]      Physical Exam:  Physical Exam  Constitutional:       Appearance: He is well-developed. Interventions: He is sedated and intubated. HENT:      Head: Normocephalic. Eyes:      Pupils: Pupils are equal, round, and reactive to light. Cardiovascular:      Rate and Rhythm: Normal rate. Pulmonary:      Effort: Pulmonary effort is normal. He is intubated. Abdominal:      General: There is no distension. Palpations: Abdomen is soft. There is no mass. Tenderness: There is no abdominal tenderness. There is no guarding or rebound. Comments: Midline incision well approximated with staples in place. No drainage noted. No evidence of infection.               Scheduled Meds:   ceFAZolin  2 g Intravenous Q12H    metroNIDAZOLE  500 mg Intravenous Q8H    pantoprazole  40 mg Intravenous Daily    hydrocortisone sodium succinate PF  100 mg Intravenous Q8H    thiamine (VITAMIN B1) IVPB  200 mg Intravenous Q12H    chlorhexidine  15 mL Mouth/Throat BID    albuterol sulfate HFA  4 puff Inhalation Q4H    And    ipratropium  4 puff Inhalation Q4H    aspirin  324 mg Oral Once    sodium chloride flush  10 mL Intravenous BID    sodium chloride flush  10 mL Intravenous 2 times per day    [Held by provider] enoxaparin  1 mg/kg Subcutaneous Daily    nicotine  1 patch Transdermal Daily     Continuous Infusions:   fentanyl 25 mcg/hr (07/28/20 9996)    vasopressin (Septic Shock) infusion Stopped (07/27/20 0500)    norepinephrine Stopped (07/27/20 0439)    prismaSATE BGK 4/2.5 500 mL/hr (07/29/20 0958)    prismaSATE BGK 4/2.5 500 mL/hr (07/29/20 0630)    prismaSATE BGK 4/2.5 1,200 mL/hr (07/29/20 2310)    propofol 30 mcg/kg/min (07/29/20 0920)     PRN Meds:sodium chloride, potassium chloride, magnesium sulfate, calcium gluconate **OR** calcium gluconate **OR** calcium gluconate **OR** calcium gluconate, sodium phosphate IVPB **OR** sodium phosphate IVPB **OR** sodium phosphate IVPB **OR** sodium phosphate IVPB, ipratropium-albuterol, iopamidol, sodium chloride flush, acetaminophen **OR** acetaminophen, polyethylene glycol, promethazine **OR** ondansetron, promethazine, diphenhydrAMINE, dicyclomine, morphine      Labs/Imaging Results:   Lab Results   Component Value Date    WBC 16.8 (H) 07/29/2020    HGB 6.8 (LL) 07/29/2020    HCT 21.6 (L) 07/29/2020    MCV 83.4 07/29/2020     (L) 07/29/2020     Lab Results   Component Value Date     (L) 07/29/2020    K 4.5 07/29/2020    CL 98 (L) 07/29/2020    CO2 24 07/29/2020    BUN 16 07/29/2020    CREATININE 1.2 07/29/2020    GLUCOSE 169 (H) 07/29/2020    CALCIUM 8.8 07/29/2020    PROT 7.0 07/29/2020    PROT 7.0 07/29/2020    LABALBU 3.2 (L) 07/29/2020    LABALBU 3.2 (L) 07/29/2020    BILITOT 1.1 (H) 07/29/2020    BILITOT 1.1 (H) 07/29/2020    ALKPHOS 80 07/29/2020    ALKPHOS 80 07/29/2020     (H) 07/29/2020     (H) 07/29/2020    ALT 41 (H) 07/29/2020    ALT 41 (H) 07/29/2020    LABGLOM >60 07/29/2020    GFRAA >60 07/29/2020       Assessment: Patient Active Problem List:     Sepsis ([facility] [address]     Leukocytosis     Ischemic bowel disease ([facility] [address]     MSSA bacteremia    Plan:     Continue weaning vent per pulmonary  Need to start tube feeds using the OG-tube - Awaiting Dietary TF recommndations. TF has not been started. Bilateral upper extremity wounds: continue local wound care using Santyl.       Tyrel Tracy PA-C

## 2020-07-29 NOTE — PROGRESS NOTES
[facility]  consulted by Dr. Madhav Morris for monitoring and adjustment. Indication for treatment: MSSA bacteremia, GPB in blood   Goal trough: 10-15 mcg/mL     Pertinent Laboratory Values:   Temp Readings from Last 3 Encounters:   07/29/20 99.3 °F (37.4 °C) (Rectal)   07/25/20 (!) 62.3 °F (16.8 °C)     Recent Labs     07/26/20  1532 07/27/20  0620 07/28/20  0630 07/29/20  0550   WBC  --  15.7* 16.7* 16.8*   LACTATE 15.5* 2.9*  --   --      Recent Labs     07/28/20  1715 07/29/20  0050 07/29/20  0550   BUN 14 15 16   CREATININE 1.3 1.2 1.2     Estimated Creatinine Clearance: 61 mL/min (based on SCr of 1.2 mg/dL). Intake/Output Summary (Last 24 hours) at 7/29/2020 1313  Last data filed at 7/29/2020 1204  Gross per 24 hour   Intake 1563.84 ml   Output 3998 ml   Net -2434.16 ml       Pertinent Cultures:  Date    Source    Results  7/24   Blood    MSSA  7/27   Blood    GPB- ID and sensitivity pending     Vancomycin level:   TROUGH:    Recent Labs     07/26/20  2300   VANCOTROUGH 31.9*     RANDOM:    Recent Labs     07/28/20  0630 07/28/20  1200 07/29/20  0550   VANCORANDOM 22.0 24.3 15.0       Assessment:  · WBC and temperature: WBC elevated despite therapy with cefazolin, afebrile  · SCr, BUN, and urine output: CVVHD-F continues   · Day(s) of therapy: 1   · Vancomycin level: to be collected    Plan:  · Intermittent dosing based on levels 2/2 RRT   · Vancomycin 1250 mg x 1 (20 mg/kg)   · Random concentration tomorrow AM   · Pharmacy will continue to monitor patient and adjust therapy as indicated    Thank you for the consult.   James Young [place]  7/29/2020 1:13 PM

## 2020-07-29 NOTE — PROGRESS NOTES
Palliative Care  I see patient for symptom management and goals of care related to IVDU and sepsis. Patient intubated and sedated. RN is just beginning a sedation vacation to see how the patient does. Physical Exam:  Vitals:    20 1317   BP:    Pulse:    Resp:    Temp: 99.3 °F (37.4 °C)   SpO2:      GENERAL: intubated and sedate  HEENT: No cervical adenopathy, MM moist, PERRL  COR: hyperdynamic chest  LUNGS: diminished breath coarse breath souds  ABD: softly distended, +BS  SKIN: wounds bandaged. PSYCH: intubated and sedated  NEURO: CN II-XII grossly intact    I attempted to call the patient's son again and left a message to call me back. The patient continues to be in critical condition. I have seen in multiple notes that the patient has stage IV gastric cancer but have been unable to document this diagnosis from any imaging or medical records. 14:30  I spoke with the patient's son Belkis Petty. He states that that his mother  and he is an only child. I asked if he was ok making decisions for his father and he stated \"yes. \"  I asked him to think about resuscitation efforts and what he feels his father would want at this point. I will meet Beliks Petty in the patient's room on Friday at 10 am as the son is currently out of town. Patient was seen with total face to face time of 15 minutes. More than 50% of this visit was counseling and education regarding palliative care as well as counseling on preventative health maintenance follow-up.   Electronically signed by Juana Ramirez MD on 2020 at 2:06 PM

## 2020-07-29 NOTE — PROGRESS NOTES
Comprehensive Nutrition Assessment    Type and Reason for Visit:  Reassess    Nutrition Recommendations/Plan:   Will order the following EN regimen:  Vital High Protein @ 75 ml/hr which will provide 1800 kcal and 157 g of protein, will closely monitor GI tolerance, sedation rate, poc     Nutrition Assessment:  received message from RN, pt ready for TF per Dr. Magan Gong, remains sedated on vent, +propofol ggt, +CRRT    Malnutrition Assessment:  Malnutrition Status:  Insufficient data    Context:  Acute Illness     Findings of the 6 clinical characteristics of malnutrition:  Energy Intake:  Unable to assess  Weight Loss:  Unable to assess     Body Fat Loss:  Unable to assess     Muscle Mass Loss:  Unable to assess    Fluid Accumulation:  Unable to assess     Strength:  Not Performed    Estimated Daily Nutrient Needs:  Energy (kcal):  1806([facility] 2003b); Weight Used for Energy Requirements:  Current     Protein (g):  140-175(2.0-2.5 g/kg); Fluid (ml/day):  (fluid management per nephrology);      Nutrition Related Findings:  Labs: elevated LFTs, Glucose 169, WBC 16.8, Hbg 6.8      Wounds:  Multiple       Current Nutrition Therapies:    Diet NPO Effective Now    Anthropometric Measures:  · Height: 5' 7.99\" (172.7 cm)  · Current Body Weight: 149 lb 4 oz (67.7 kg)   · Admission Body Weight: 149 lb 4 oz (67.7 kg)    · Usual Body Weight:       · Ideal Body Weight: 154 lbs; % Ideal Body Weight 96.9 %   · BMI: 22.7  · Adjusted Body Weight:  ; No Adjustment   · Adjusted BMI:      · BMI Categories: Normal Weight (BMI 22.0 to 24.9) age over 72       Nutrition Diagnosis:   · Inadequate oral intake related to impaired respiratory funtion as evidenced by intubation      Nutrition Interventions:   Food and/or Nutrient Delivery:  Start Tube Feeding  Nutrition Education/Counseling:  No recommendation at this time   Coordination of Nutrition Care:  Continued Inpatient Monitoring    Goals:  will tolerate EN initiation within the next 24 hr       Nutrition Monitoring and Evaluation:   Behavioral-Environmental Outcomes:      Food/Nutrient Intake Outcomes:  Enteral Nutrition Intake/Tolerance  Physical Signs/Symptoms Outcomes:  Biochemical Data, Weight     Discharge Planning:     Too soon to determine     Electronically signed by Amrik Tavares MS, RD, LD on 7/29/20 at 1:30 PM EDT    Contact: [phone]

## 2020-07-29 NOTE — PROGRESS NOTES
Pulmonary and Critical Care  Progress Note      VITALS:  BP 90/64   Pulse 106   Temp 99.3 °F (37.4 °C)   Resp 14   Ht 5' 7.99\" (1.727 m)   Wt 149 lb 4 oz (67.7 kg)   SpO2 100%   BMI 22.70 kg/m²     Subjective:   CHIEF COMPLAINT :Fall with right hip pain     HPI:                The patient is a 60 y.o. male with significant past medical history of HTN,. Sacral decub, MSSA endocarditis, Septic PE, Hep C,  presents with complaints of fall and right hip pain. He was on the floor for more than 2 days. His CK is mildly elevated. He had mild lactic acidosis. His PBNPT is 11,000 with mildly elevated troponins. He is on 3 grams of Heroin per day. His U tocx was positive for Cocaine and Opiates. He had no fever, no headaches, no chest pain, no palpitations, no n/v, no diaphoresis. He has no sick exposure, no recent travel. His 1 blood culture set is positive for Staph. He is getting abx and IV fluids. He is on the vent and getting CRRT    Objective:   PHYSICAL EXAM:    LUNGS:Occasional basal crackles  Abd-soft, BS+,NT  Ext - no pedal edema  CVS-s1s2, no murmurs      DATA:    CBC:  Recent Labs     07/27/20  0620  07/28/20  0630 07/28/20  0945 07/28/20  1715 07/29/20  0550   WBC 15.7*  --  16.7*  --   --  16.8*   RBC 2.64*  --  2.88*  --   --  2.59*   HGB 6.6*   < > 7.5* 7.3* 7.0* 6.8*   HCT 21.0*   < > 23.6* 22.8* 22.3* 21.6*     --  134*  --   --  139*   MCV 79.5  --  81.9  --   --  83.4   MCH 25.0*  --  26.0*  --   --  26.3*   MCHC 31.4*  --  31.8*  --   --  31.5*   RDW 23.1*  --  22.9*  --   --  23.6*   NRBC  --   --  1  --   --   --    SEGSPCT 88.0*  --  93.0*  --   --  88.7*   BANDSPCT 10  --  6  --   --   --     < > = values in this interval not displayed.       BMP:  Recent Labs     07/28/20  1715 07/29/20  0050 07/29/20  0550    134* 134*   K 4.2 4.3 4.5    99 98*   CO2 26 25 24   BUN 14 15 16   CREATININE 1.3 1.2 1.2   CALCIUM 8.2* 8.4 8.8   GLUCOSE 136* 161* 169*      ABG:  Recent Labs 07/27/20  1600 07/28/20  0600 07/29/20  0600   PH 7.55* 7.45 7.51*   PO2ART 92 126* 148*   ROP4DTK 35.0 40.0 31.0*   O2SAT 96.2 97.2* 96.5     BNP  No results found for: BNP   D-Dimer:  No results found for: DDIMER   1. Radiology: Reviewed      Assessment/Plan     Patient Active Problem List    Diagnosis Date Noted    MSSA bacteremia 07/28/2020    Ischemic bowel disease ([facility] [address]     Leukocytosis     Sepsis ([facility] [address] 07/24/2020   Patient is on the vent and sedated. He is off pressors. He has no urine output. He is on CRRT. His H&H has been stable.     Septic shock- improved  Hemorrhagic shock- improved  Acute hypoxic resp failure  Mild coagulopathy  Shock Liver  Lactic acidosis- improving  VDRF  Anemia likely LGI bleed  Hypokalemia  Hypophosphatemia  Staph Aureus bacteremia  Severe Pulmonary HTN  Occult blood positive  JERAMIE likely sec to ATN on CRRT       1. HD per renal  2. Abx  3. F/U C&S  4. Tube feeds  5 Keep sats > 92%  6. PT/OT  7. SAT and SBT trial when stable  8.  C/w present management    Electronically signed by Gisela Cr MD on 7/29/2020 at 11:46 AM

## 2020-07-29 NOTE — PROGRESS NOTES
Nephrology  Dialysis Note        [address], [address] [address]  Phone: [phone]  Office Hours: 8:30AM - 4:30PM  Monday - Friday       [facility]  MD Darron Leyva, DO Onel Hylton,  Marquise rachel  [facility], [place] [address]  PHONE: [phone]  FAX: [phone]      PROCEDURE:  Patient seen during CVVHDF      PHYSICIAN:  SAVANAH      INDICATION:  Acute tubular necrosis      RX:  See dialysis flowsheet for specifics on access, blood flow rate, dialysate baths, duration of dialysis, anticoagulation and other technical information.       COMMENTS:  CONTINUE CRRT  CRITICALLY ILL    Electronically signed by Jonny Love DO on 7/29/2020 at 7:06 AM    [address],  [facility], [place] [address]  PHONE: [phone]  FAX: [phone]

## 2020-07-29 NOTE — PLAN OF CARE
Problem: Falls - Risk of:  Goal: Will remain free from falls  Description: Will remain free from falls  7/29/2020 0925 by Semaj Doss RN  Outcome: Ongoing  7/29/2020 0925 by Semaj Doss RN  Outcome: Ongoing  Goal: Absence of physical injury  Description: Absence of physical injury  7/29/2020 0925 by Semaj Doss RN  Outcome: Ongoing  7/29/2020 0925 by Semaj Doss RN  Outcome: Ongoing     Problem: Pain:  Goal: Pain level will decrease  Description: Pain level will decrease  7/29/2020 0925 by Semaj Doss RN  Outcome: Ongoing  7/29/2020 0925 by Semaj Doss RN  Outcome: Ongoing  Goal: Control of acute pain  Description: Control of acute pain  7/29/2020 0925 by Semaj Doss RN  Outcome: Ongoing  7/29/2020 0925 by Semaj Doss RN  Outcome: Ongoing  Goal: Control of chronic pain  Description: Control of chronic pain  7/29/2020 0925 by Semaj Doss RN  Outcome: Ongoing  7/29/2020 0925 by Semaj Doss RN  Outcome: Ongoing     Problem: Diarrhea:  Goal: Bowel elimination is within specified parameters  Description: Bowel elimination is within specified parameters  7/29/2020 0925 by Semaj Doss RN  Outcome: Ongoing  7/29/2020 0925 by Semaj Doss RN  Outcome: Ongoing  Goal: Passage of soft, formed stool  Description: Passage of soft, formed stool  7/29/2020 0925 by Semaj Doss RN  Outcome: Ongoing  7/29/2020 0925 by Semaj Doss RN  Outcome: Ongoing  Goal: Establishment of normal bowel function will improve to within specified parameters  Description: Establishment of normal bowel function will improve to within specified parameters  7/29/2020 0925 by Semaj Doss RN  Outcome: Ongoing  7/29/2020 0925 by Semaj Doss RN  Outcome: Ongoing     Problem: Nausea/Vomiting:  Goal: Absence of nausea/vomiting  Description: Absence of nausea/vomiting  7/29/2020 0925 by Semaj Doss RN  Outcome: Ongoing  7/29/2020 0925 by Semaj Doss RN  Outcome: Ongoing  Goal: Able to drink  Description: Able to drink  7/29/2020 9990 by Candice Iyer RN  Outcome: Ongoing  7/29/2020 0925 by Candice Iyer RN  Outcome: Ongoing  Goal: Able to eat  Description: Able to eat  7/29/2020 0925 by Candice Iyer RN  Outcome: Ongoing  7/29/2020 0925 by Candice Iyer RN  Outcome: Ongoing  Goal: Ability to achieve adequate nutritional intake will improve  Description: Ability to achieve adequate nutritional intake will improve  7/29/2020 0925 by Candice Iyer RN  Outcome: Ongoing  7/29/2020 0925 by Candice Iyer RN  Outcome: Ongoing     Problem: Breathing Pattern - Ineffective:  Goal: Ability to achieve and maintain a regular respiratory rate will improve  Description: Ability to achieve and maintain a regular respiratory rate will improve  7/29/2020 0925 by Candice Iyer RN  Outcome: Ongoing  7/29/2020 0925 by Candice Iyer RN  Outcome: Ongoing     Problem: Fluid Volume:  Goal: Ability to achieve a balanced intake and output will improve  Description: Ability to achieve a balanced intake and output will improve  7/29/2020 0925 by Candice Iyer RN  Outcome: Ongoing  7/29/2020 0925 by Candice Iyer RN  Outcome: Ongoing  Goal: Will show no signs or symptoms of fluid imbalance  Description: Will show no signs or symptoms of fluid imbalance  7/29/2020 0925 by Candice Iyer RN  Outcome: Ongoing  7/29/2020 0925 by Candice Iyer RN  Outcome: Ongoing     Problem: Physical Regulation:  Goal: Ability to maintain clinical measurements within normal limits will improve  Description: Ability to maintain clinical measurements within normal limits will improve  7/29/2020 0925 by Candice Iyer RN  Outcome: Ongoing  7/29/2020 0925 by Candice Iyer RN  Outcome: Ongoing  Goal: Will show no signs and symptoms of electrolyte imbalance  Description: Will show no signs and symptoms of electrolyte imbalance  7/29/2020 0925 by Candice Iyer RN  Outcome: Ongoing  7/29/2020 0925 by Eleanor Ritchie RN  Outcome: Ongoing     Problem: Restraint Use - Nonviolent/Non-Self-Destructive Behavior:  Goal: Absence of restraint indications  Description: Absence of restraint indications  7/29/2020 0925 by Eleanor Ritchie RN  Outcome: Ongoing  7/29/2020 0925 by Eleanor Ritchie RN  Outcome: Ongoing  Goal: Absence of restraint-related injury  Description: Absence of restraint-related injury  7/29/2020 0925 by Eleanor Ritchie RN  Outcome: Ongoing  7/29/2020 0925 by Eleanor Ritchie RN  Outcome: Ongoing     Problem: Skin Integrity:  Goal: Will show no infection signs and symptoms  Description: Will show no infection signs and symptoms  7/29/2020 0925 by Eleanor Ritchie RN  Outcome: Ongoing  7/29/2020 0925 by Eleanor Ritchie RN  Outcome: Ongoing  Goal: Absence of new skin breakdown  Description: Absence of new skin breakdown  7/29/2020 0925 by Eleanor Ritchie RN  Outcome: Ongoing  7/29/2020 0925 by Eleanor Ritchie RN  Outcome: Ongoing  Goal: Status of oral mucous membranes will improve  Description: Status of oral mucous membranes will improve  Outcome: Ongoing  Goal: Skin integrity will be maintained  Description: Skin integrity will be maintained  Outcome: Ongoing     Problem: Discharge Planning:  Goal: Participates in care planning  Description: Participates in care planning  Outcome: Ongoing  Goal: Discharged to appropriate level of care  Description: Discharged to appropriate level of care  Outcome: Ongoing     Problem: Airway Clearance - Ineffective:  Goal: Ability to maintain a clear airway will improve  Description: Ability to maintain a clear airway will improve  Outcome: Ongoing     Problem: Anxiety/Stress:  Goal: Level of anxiety will decrease  Description: Level of anxiety will decrease  Outcome: Ongoing     Problem: Aspiration:  Goal: Absence of aspiration  Description: Absence of aspiration  Outcome: Ongoing     Problem:  Bowel Function - Altered:  Goal: Bowel elimination is within specified parameters  Description: Bowel elimination is within specified parameters  Outcome: Ongoing     Problem: Cardiac Output - Decreased:  Goal: Hemodynamic stability will improve  Description: Hemodynamic stability will improve  Outcome: Ongoing     Problem: Fluid Volume - Imbalance:  Goal: Absence of imbalanced fluid volume signs and symptoms  Description: Absence of imbalanced fluid volume signs and symptoms  Outcome: Ongoing     Problem: Gas Exchange - Impaired:  Goal: Levels of oxygenation will improve  Description: Levels of oxygenation will improve  Outcome: Ongoing     Problem: Mental Status - Impaired:  Goal: Mental status will be restored to baseline  Description: Mental status will be restored to baseline  Outcome: Ongoing     Problem: Nutrition Deficit:  Goal: Ability to achieve adequate nutritional intake will improve  Description: Ability to achieve adequate nutritional intake will improve  7/29/2020 0925 by Bradley Elaine RN  Outcome: Ongoing  7/29/2020 0925 by Bradley Elaine RN  Outcome: Ongoing     Problem: Pain:  Goal: Pain level will decrease  Description: Pain level will decrease  7/29/2020 0925 by Bradley Elaine RN  Outcome: Ongoing  7/29/2020 0925 by Bradley Elaine RN  Outcome: Ongoing  Goal: Recognizes and communicates pain  Description: Recognizes and communicates pain  Outcome: Ongoing  Goal: Control of acute pain  Description: Control of acute pain  Outcome: Ongoing  Goal: Control of chronic pain  Description: Control of chronic pain  Outcome: Ongoing     Problem: Serum Glucose Level - Abnormal:  Goal: Ability to maintain appropriate glucose levels will improve to within specified parameters  Description: Ability to maintain appropriate glucose levels will improve to within specified parameters  Outcome: Ongoing  Goal: Ability to maintain appropriate glucose levels will improve  Description: Ability to maintain appropriate glucose levels will

## 2020-07-29 NOTE — PROGRESS NOTES
Latoya Romero is a 60 y.o. male patient intubated    Current Facility-Administered Medications   Medication Dose Route Frequency Provider Last Rate Last Dose    0.9 % sodium chloride infusion 250 mL  250 mL Intravenous Once MARLENI Hammonds - CNP        ceFAZolin (ANCEF) 2 g in dextrose 4 % 100 mL IVPB (premix)  2 g Intravenous Q12H Shania Meehan MD   Stopped at 07/29/20 0616    metronidazole (FLAGYL) 500 mg in NaCl 100 mL IVPB premix  500 mg Intravenous Q8H Shania Meehan  mL/hr at 07/29/20 0648 500 mg at 07/29/20 0648    fentanyl (SUBLIMAZE) 1,000 mcg in sodium chloride 0.9% 100 mL infusion  25 mcg/hr Intravenous Continuous Gr[employer] Lackey PA-C 2.5 mL/hr at 07/28/20 2326 25 mcg/hr at 07/28/20 2326    pantoprazole (PROTONIX) injection 40 mg  40 mg Intravenous Daily Alessandra Woodall MD   40 mg at 07/28/20 0836    vasopressin 20 Units in dextrose 5 % 100 mL infusion  0.04 Units/min Intravenous Continuous Marin Rolle MD   Stopped at 07/27/20 0500    norepinephrine (LEVOPHED) 16 mg in dextrose 5% 250 mL infusion  2 mcg/min Intravenous Continuous Grupo Lackey PA-C   Stopped at 07/27/20 0439    hydrocortisone sodium succinate PF (SOLU-CORTEF) injection 100 mg  100 mg Intravenous Q8H Marin Rolle MD   100 mg at 07/29/20 0400    thiamine (B-1) 200 mg in sodium chloride 0.9 % 100 mL IVPB  200 mg Intravenous Q12H Marin Rolle MD   Stopped at 07/29/20 0009    0.9 % sodium chloride bolus  2,000 mL Intravenous PRN Adejosee Antonina [place], DO        prismaSATE BGK 4/2.5 dialysis solution   Dialysis Continuous Adecammie Sarkar Edon,  mL/hr at 07/29/20 0030 500 mL/hr at 07/29/20 0030    Marcola [place]anna 4/2.5 dialysis solution   Dialysis Continuous Adecammie Sarkar [place],  mL/hr at 07/28/20 2118 500 mL/hr at 07/28/20 2118    potassium chloride 20 mEq/50 mL IVPB (Central Line)  20 mEq Intravenous PRN Christelle Deshpande,         magnesium sulfate 1 g in dextrose 5% 100 ONCE PRN Yaneli Ball MD        sodium chloride flush 0.9 % injection 10 mL  10 mL Intravenous BID Monica Alexander MD   10 mL at 07/28/20 2235    sodium chloride flush 0.9 % injection 10 mL  10 mL Intravenous 2 times per day Yaneli Ball MD   10 mL at 07/28/20 2234    sodium chloride flush 0.9 % injection 10 mL  10 mL Intravenous PRN Yaneli Ball MD        acetaminophen (TYLENOL) tablet 650 mg  650 mg Oral Q6H PRN Yaneli Ball MD        Or    acetaminophen (TYLENOL) suppository 650 mg  650 mg Rectal Q6H PRN Yaneli Ball MD        polyethylene glycol (GLYCOLAX) packet 17 g  17 g Oral Daily PRN Yaneli Ball MD        promethazine (PHENERGAN) tablet 12.5 mg  12.5 mg Oral Q6H PRN Yaneli Ball MD        Or    ondansetron (ZOFRAN) injection 4 mg  4 mg Intravenous Q6H PRN Monica Alexander MD   4 mg at 07/25/20 0417    [Held by provider] enoxaparin (LOVENOX) injection 60 mg  1 mg/kg Subcutaneous Daily Monica Alexander MD   60 mg at 07/26/20 1034    promethazine (PHENERGAN) tablet 25 mg  25 mg Oral Q6H PRN Yaneli Ball MD        nicotine (NICODERM CQ) 21 MG/24HR 1 patch  1 patch Transdermal Daily Monica Alexander MD   1 patch at 07/28/20 0838    diphenhydrAMINE (BENADRYL) injection 12.5 mg  12.5 mg Intravenous Nightly PRN Monica Alexander MD   12.5 mg at 07/25/20 2191    dicyclomine (BENTYL) tablet 20 mg  20 mg Oral Q6H PRN Yaneli Ball MD        morphine injection 2 mg  2 mg Intravenous Q4H PRN Yaneli Ball MD   2 mg at 07/25/20 1104     No Known Allergies  Active Problems:    Sepsis ([facility] [address]    Leukocytosis    Ischemic bowel disease ([facility] [address]    MSSA bacteremia  Resolved Problems:    * No resolved hospital problems. *    Blood pressure 102/80, pulse 102, temperature 97.2 °F (36.2 °C), temperature source Rectal, resp. rate 22, height 5' 7.99\" (1.727 m), weight 149 lb 4 oz (67.7 kg), SpO2 100 %. Subjective:  Diet:  NPO. Objective:  General Appearance:  General patient appearance: pressors.     Vital signs: (most recent): Blood pressure 102/80, pulse 102, temperature 97.2 °F (36.2 °C), temperature source Rectal, resp. rate 22, height 5' 7.99\" (1.727 m), weight 149 lb 4 oz (67.7 kg), SpO2 100 %. Vital signs are normal.  (Pressors). HEENT: Normal HEENT exam.    Lungs:  Normal effort. There are decreased breath sounds. Heart: Normal rate. Abdomen: Abdomen is soft. Hypoactive bowel sounds. (Surgical dressing). Extremities: Decreased range of motion. Neurological: (Sedated). Pupils:  Pupils are equal.   Skin:  Warm.   (Bilateral UE ulcers and wound large)    Assessment & Plan  Septic shock with gram pos MSSA bacteremia 2/2 bilateral UE wound and staph UTI/pyelonephritis  -repeat bld cx pending  -urine cx with staph and CT with possible pyelonephritis  -off pressors and on solucortef  Acute on chronic resp failure with suspected aspiration pneumonia  -CT PE neg for PE  -albuterol, IV flagyl, ancef  -Echo severe HTN, hyperdynamic EF  due to dehydration  JERAMIE 2/2 ATN  -CRRT  Skin wounds/right post thigh/buttock and bilateral UE  -no abscess or osteo as per imaging  -wound cx with staph  Destructive lesion T12-L1  -possible prior infectious spondyliits  Pneumonatosis intestinalis POD #4  -s/p ex lap and neg  Severe PCM  -NPO  Acute bld loss anemia  -hemoccult pos  -s/p 2PRBC and another PRBC today  -CT abd neg  -lovenox held and GI  Consulted  -IV PPI  Shock liver with chornic hep C  -hepatitis panel neg and follow LFTs  Gastric cancer stage 4  Polysubstance use  -UDS cocaine and opiate  -will need counseling once stable    Critical care time 30min from 10-1030am  Nahun Mcginnis MD  7/29/2020

## 2020-07-30 ENCOUNTER — APPOINTMENT (OUTPATIENT)
Dept: GENERAL RADIOLOGY | Age: 65
DRG: 004 | End: 2020-07-30
Payer: MEDICARE

## 2020-07-30 LAB
ALBUMIN SERPL-MCNC: 2.9 GM/DL (ref 3.4–5)
ALP BLD-CCNC: 81 IU/L (ref 40–129)
ALT SERPL-CCNC: 15 U/L (ref 10–40)
ANION GAP SERPL CALCULATED.3IONS-SCNC: 13 MMOL/L (ref 4–16)
APTT: 33.3 SECONDS (ref 25.1–37.1)
AST SERPL-CCNC: 194 IU/L (ref 15–37)
BASE EXCESS MIXED: 1.7 (ref 0–1.2)
BASOPHILS ABSOLUTE: 0 K/CU MM
BASOPHILS RELATIVE PERCENT: 0.2 % (ref 0–1)
BILIRUB SERPL-MCNC: 2.1 MG/DL (ref 0–1)
BILIRUBIN DIRECT: 1.6 MG/DL (ref 0–0.3)
BILIRUBIN, INDIRECT: 0.5 MG/DL (ref 0–0.7)
BUN BLDV-MCNC: 26 MG/DL (ref 6–23)
CALCIUM IONIZED: 4.56 MG/DL (ref 4.48–5.28)
CALCIUM SERPL-MCNC: 8.6 MG/DL (ref 8.3–10.6)
CARBON MONOXIDE, BLOOD: 2 % (ref 0–5)
CHLORIDE BLD-SCNC: 98 MMOL/L (ref 99–110)
CO2 CONTENT: 25.2 MMOL/L (ref 19–24)
CO2: 23 MMOL/L (ref 21–32)
COMMENT: ABNORMAL
CREAT SERPL-MCNC: 1.2 MG/DL (ref 0.9–1.3)
CULTURE: ABNORMAL
CULTURE: ABNORMAL
DIFFERENTIAL TYPE: ABNORMAL
DOSE AMOUNT: NORMAL
DOSE TIME: NORMAL
EOSINOPHILS ABSOLUTE: 0 K/CU MM
EOSINOPHILS RELATIVE PERCENT: 0 % (ref 0–3)
GFR AFRICAN AMERICAN: >60 ML/MIN/1.73M2
GFR NON-AFRICAN AMERICAN: >60 ML/MIN/1.73M2
GLUCOSE BLD-MCNC: 162 MG/DL (ref 70–99)
HAV AB SERPL IA-ACNC: NEGATIVE
HCO3 ARTERIAL: 24.2 MMOL/L (ref 18–23)
HCT VFR BLD CALC: 25.7 % (ref 42–52)
HEMOGLOBIN: 8.1 GM/DL (ref 13.5–18)
HIGH SENSITIVE C-REACTIVE PROTEIN: 32.4 MG/L
IMMATURE NEUTROPHIL %: 1.8 % (ref 0–0.43)
INR BLD: 1.76 INDEX
IONIZED CA: 1.14 MMOL/L (ref 1.12–1.32)
LYMPHOCYTES ABSOLUTE: 1.5 K/CU MM
LYMPHOCYTES RELATIVE PERCENT: 7.7 % (ref 24–44)
Lab: ABNORMAL
MAGNESIUM: 2.5 MG/DL (ref 1.8–2.4)
MCH RBC QN AUTO: 26.9 PG (ref 27–31)
MCHC RBC AUTO-ENTMCNC: 31.5 % (ref 32–36)
MCV RBC AUTO: 85.4 FL (ref 78–100)
METHEMOGLOBIN ARTERIAL: 1.2 %
MONOCYTES ABSOLUTE: 1.7 K/CU MM
MONOCYTES RELATIVE PERCENT: 8.7 % (ref 0–4)
NUCLEATED RBC %: 2.5 %
O2 SATURATION: 96.2 % (ref 96–97)
PCO2 ARTERIAL: 31 MMHG (ref 32–45)
PDW BLD-RTO: 21.2 % (ref 11.7–14.9)
PH BLOOD: 7.5 (ref 7.34–7.45)
PHOSPHORUS: 2.6 MG/DL (ref 2.5–4.9)
PLATELET # BLD: 141 K/CU MM (ref 140–440)
PMV BLD AUTO: 10.2 FL (ref 7.5–11.1)
PO2 ARTERIAL: 122 MMHG (ref 75–100)
POTASSIUM SERPL-SCNC: 4.5 MMOL/L (ref 3.5–5.1)
PROCALCITONIN: 1.76
PROTHROMBIN TIME: 21.4 SECONDS (ref 11.7–14.5)
RBC # BLD: 3.01 M/CU MM (ref 4.6–6.2)
SEGMENTED NEUTROPHILS ABSOLUTE COUNT: 15.9 K/CU MM
SEGMENTED NEUTROPHILS RELATIVE PERCENT: 81.6 % (ref 36–66)
SODIUM BLD-SCNC: 134 MMOL/L (ref 135–145)
SPECIMEN: ABNORMAL
TOTAL CK: 88 IU/L (ref 38–174)
TOTAL IMMATURE NEUTOROPHIL: 0.35 K/CU MM
TOTAL NUCLEATED RBC: 0.5 K/CU MM
TOTAL PROTEIN: 7 GM/DL (ref 6.4–8.2)
VANCOMYCIN RANDOM: 20.8 UG/ML
WBC # BLD: 19.5 K/CU MM (ref 4–10.5)

## 2020-07-30 PROCEDURE — 80048 BASIC METABOLIC PNL TOTAL CA: CPT

## 2020-07-30 PROCEDURE — APPNB30 APP NON BILLABLE TIME 0-30 MINS: Performed by: PHYSICIAN ASSISTANT

## 2020-07-30 PROCEDURE — 84100 ASSAY OF PHOSPHORUS: CPT

## 2020-07-30 PROCEDURE — 94750 HC PULMONARY COMPLIANCE STUDY: CPT

## 2020-07-30 PROCEDURE — 85025 COMPLETE CBC W/AUTO DIFF WBC: CPT

## 2020-07-30 PROCEDURE — 71045 X-RAY EXAM CHEST 1 VIEW: CPT

## 2020-07-30 PROCEDURE — C9113 INJ PANTOPRAZOLE SODIUM, VIA: HCPCS | Performed by: SPECIALIST

## 2020-07-30 PROCEDURE — 84145 PROCALCITONIN (PCT): CPT

## 2020-07-30 PROCEDURE — 2500000003 HC RX 250 WO HCPCS: Performed by: PHYSICIAN ASSISTANT

## 2020-07-30 PROCEDURE — 85730 THROMBOPLASTIN TIME PARTIAL: CPT

## 2020-07-30 PROCEDURE — 6360000002 HC RX W HCPCS: Performed by: INTERNAL MEDICINE

## 2020-07-30 PROCEDURE — 6370000000 HC RX 637 (ALT 250 FOR IP): Performed by: INTERNAL MEDICINE

## 2020-07-30 PROCEDURE — 94640 AIRWAY INHALATION TREATMENT: CPT

## 2020-07-30 PROCEDURE — 2000000000 HC ICU R&B

## 2020-07-30 PROCEDURE — 80202 ASSAY OF VANCOMYCIN: CPT

## 2020-07-30 PROCEDURE — 86141 C-REACTIVE PROTEIN HS: CPT

## 2020-07-30 PROCEDURE — 99024 POSTOP FOLLOW-UP VISIT: CPT | Performed by: PHYSICIAN ASSISTANT

## 2020-07-30 PROCEDURE — 2580000003 HC RX 258: Performed by: INTERNAL MEDICINE

## 2020-07-30 PROCEDURE — 85610 PROTHROMBIN TIME: CPT

## 2020-07-30 PROCEDURE — 82550 ASSAY OF CK (CPK): CPT

## 2020-07-30 PROCEDURE — 2580000003 HC RX 258: Performed by: SURGERY

## 2020-07-30 PROCEDURE — 89220 SPUTUM SPECIMEN COLLECTION: CPT

## 2020-07-30 PROCEDURE — 94761 N-INVAS EAR/PLS OXIMETRY MLT: CPT

## 2020-07-30 PROCEDURE — 83735 ASSAY OF MAGNESIUM: CPT

## 2020-07-30 PROCEDURE — 2500000003 HC RX 250 WO HCPCS: Performed by: INTERNAL MEDICINE

## 2020-07-30 PROCEDURE — 6370000000 HC RX 637 (ALT 250 FOR IP): Performed by: SURGERY

## 2020-07-30 PROCEDURE — 31720 CLEARANCE OF AIRWAYS: CPT

## 2020-07-30 PROCEDURE — 82330 ASSAY OF CALCIUM: CPT

## 2020-07-30 PROCEDURE — 99233 SBSQ HOSP IP/OBS HIGH 50: CPT | Performed by: INTERNAL MEDICINE

## 2020-07-30 PROCEDURE — 6360000002 HC RX W HCPCS: Performed by: SPECIALIST

## 2020-07-30 PROCEDURE — 82803 BLOOD GASES ANY COMBINATION: CPT

## 2020-07-30 PROCEDURE — 2700000000 HC OXYGEN THERAPY PER DAY

## 2020-07-30 PROCEDURE — 94003 VENT MGMT INPAT SUBQ DAY: CPT

## 2020-07-30 PROCEDURE — 80076 HEPATIC FUNCTION PANEL: CPT

## 2020-07-30 RX ORDER — VANCOMYCIN HYDROCHLORIDE 1 G/200ML
1000 INJECTION, SOLUTION INTRAVENOUS ONCE
Status: COMPLETED | OUTPATIENT
Start: 2020-07-30 | End: 2020-07-30

## 2020-07-30 RX ADMIN — ALBUTEROL SULFATE 4 PUFF: 90 AEROSOL, METERED RESPIRATORY (INHALATION) at 23:40

## 2020-07-30 RX ADMIN — ALBUTEROL SULFATE 4 PUFF: 90 AEROSOL, METERED RESPIRATORY (INHALATION) at 04:30

## 2020-07-30 RX ADMIN — METRONIDAZOLE 500 MG: 500 INJECTION, SOLUTION INTRAVENOUS at 22:18

## 2020-07-30 RX ADMIN — CHLORHEXIDINE GLUCONATE 0.12% ORAL RINSE 15 ML: 1.2 LIQUID ORAL at 21:44

## 2020-07-30 RX ADMIN — SODIUM CHLORIDE, PRESERVATIVE FREE 10 ML: 5 INJECTION INTRAVENOUS at 09:02

## 2020-07-30 RX ADMIN — ALBUTEROL SULFATE 4 PUFF: 90 AEROSOL, METERED RESPIRATORY (INHALATION) at 15:18

## 2020-07-30 RX ADMIN — ALBUTEROL SULFATE 4 PUFF: 90 AEROSOL, METERED RESPIRATORY (INHALATION) at 19:34

## 2020-07-30 RX ADMIN — SODIUM CHLORIDE, PRESERVATIVE FREE 10 ML: 5 INJECTION INTRAVENOUS at 21:45

## 2020-07-30 RX ADMIN — METRONIDAZOLE 500 MG: 500 INJECTION, SOLUTION INTRAVENOUS at 07:01

## 2020-07-30 RX ADMIN — SODIUM CHLORIDE, PRESERVATIVE FREE 10 ML: 5 INJECTION INTRAVENOUS at 08:04

## 2020-07-30 RX ADMIN — PROPOFOL 10 MCG/KG/MIN: 10 INJECTION, EMULSION INTRAVENOUS at 23:59

## 2020-07-30 RX ADMIN — Medication 500 ML/HR: at 06:38

## 2020-07-30 RX ADMIN — Medication 1200 ML/HR: at 03:11

## 2020-07-30 RX ADMIN — IPRATROPIUM BROMIDE 4 PUFF: 17 AEROSOL, METERED RESPIRATORY (INHALATION) at 10:58

## 2020-07-30 RX ADMIN — METRONIDAZOLE 500 MG: 500 INJECTION, SOLUTION INTRAVENOUS at 18:37

## 2020-07-30 RX ADMIN — HYDROCORTISONE SODIUM SUCCINATE 100 MG: 100 INJECTION, POWDER, FOR SOLUTION INTRAMUSCULAR; INTRAVENOUS at 03:18

## 2020-07-30 RX ADMIN — PANTOPRAZOLE SODIUM 40 MG: 40 INJECTION, POWDER, FOR SOLUTION INTRAVENOUS at 08:05

## 2020-07-30 RX ADMIN — HEPARIN SODIUM 3000 UNITS: 1000 INJECTION, SOLUTION INTRAVENOUS; SUBCUTANEOUS at 08:05

## 2020-07-30 RX ADMIN — IPRATROPIUM BROMIDE 4 PUFF: 17 AEROSOL, METERED RESPIRATORY (INHALATION) at 19:34

## 2020-07-30 RX ADMIN — CEFAZOLIN SODIUM 2 G: 2 INJECTION, SOLUTION INTRAVENOUS at 22:14

## 2020-07-30 RX ADMIN — IPRATROPIUM BROMIDE 4 PUFF: 17 AEROSOL, METERED RESPIRATORY (INHALATION) at 01:00

## 2020-07-30 RX ADMIN — Medication 50 MCG/HR: at 09:17

## 2020-07-30 RX ADMIN — ALBUTEROL SULFATE 4 PUFF: 90 AEROSOL, METERED RESPIRATORY (INHALATION) at 07:22

## 2020-07-30 RX ADMIN — ALBUTEROL SULFATE 4 PUFF: 90 AEROSOL, METERED RESPIRATORY (INHALATION) at 01:00

## 2020-07-30 RX ADMIN — CEFAZOLIN SODIUM 2 G: 2 INJECTION, SOLUTION INTRAVENOUS at 06:29

## 2020-07-30 RX ADMIN — IPRATROPIUM BROMIDE 4 PUFF: 17 AEROSOL, METERED RESPIRATORY (INHALATION) at 07:22

## 2020-07-30 RX ADMIN — ALBUTEROL SULFATE 4 PUFF: 90 AEROSOL, METERED RESPIRATORY (INHALATION) at 10:58

## 2020-07-30 RX ADMIN — PROPOFOL 25 MCG/KG/MIN: 10 INJECTION, EMULSION INTRAVENOUS at 11:18

## 2020-07-30 RX ADMIN — SODIUM CHLORIDE, PRESERVATIVE FREE 10 ML: 5 INJECTION INTRAVENOUS at 21:44

## 2020-07-30 RX ADMIN — VANCOMYCIN HYDROCHLORIDE 1000 MG: 1 INJECTION, SOLUTION INTRAVENOUS at 11:43

## 2020-07-30 RX ADMIN — HYDROCORTISONE SODIUM SUCCINATE 100 MG: 100 INJECTION, POWDER, FOR SOLUTION INTRAMUSCULAR; INTRAVENOUS at 18:37

## 2020-07-30 RX ADMIN — IPRATROPIUM BROMIDE 4 PUFF: 17 AEROSOL, METERED RESPIRATORY (INHALATION) at 04:30

## 2020-07-30 RX ADMIN — CHLORHEXIDINE GLUCONATE 0.12% ORAL RINSE 15 ML: 1.2 LIQUID ORAL at 08:04

## 2020-07-30 RX ADMIN — Medication 500 ML/HR: at 03:56

## 2020-07-30 RX ADMIN — THIAMINE HYDROCHLORIDE 200 MG: 100 INJECTION, SOLUTION INTRAMUSCULAR; INTRAVENOUS at 11:43

## 2020-07-30 RX ADMIN — THIAMINE HYDROCHLORIDE 200 MG: 100 INJECTION, SOLUTION INTRAMUSCULAR; INTRAVENOUS at 21:46

## 2020-07-30 RX ADMIN — IPRATROPIUM BROMIDE 4 PUFF: 17 AEROSOL, METERED RESPIRATORY (INHALATION) at 15:18

## 2020-07-30 RX ADMIN — HYDROCORTISONE SODIUM SUCCINATE 100 MG: 100 INJECTION, POWDER, FOR SOLUTION INTRAMUSCULAR; INTRAVENOUS at 10:03

## 2020-07-30 RX ADMIN — IPRATROPIUM BROMIDE 4 PUFF: 17 AEROSOL, METERED RESPIRATORY (INHALATION) at 23:40

## 2020-07-30 ASSESSMENT — PULMONARY FUNCTION TESTS
PIF_VALUE: 21
PIF_VALUE: 21
PIF_VALUE: 18
PIF_VALUE: 22
PIF_VALUE: 20
PIF_VALUE: 19
PIF_VALUE: 18
PIF_VALUE: 18
PIF_VALUE: 17
PIF_VALUE: 19
PIF_VALUE: 21
PIF_VALUE: 19
PIF_VALUE: 21
PIF_VALUE: 23
PIF_VALUE: 21
PIF_VALUE: 18
PIF_VALUE: 21
PIF_VALUE: 19
PIF_VALUE: 18
PIF_VALUE: 18
PIF_VALUE: 26
PIF_VALUE: 21
PIF_VALUE: 18
PIF_VALUE: 21
PIF_VALUE: 22
PIF_VALUE: 23
PIF_VALUE: 23
PIF_VALUE: 22
PIF_VALUE: 19
PIF_VALUE: 21
PIF_VALUE: 21
PIF_VALUE: 19
PIF_VALUE: 24
PIF_VALUE: 20
PIF_VALUE: 21
PIF_VALUE: 21

## 2020-07-30 ASSESSMENT — PAIN SCALES - GENERAL
PAINLEVEL_OUTOF10: 0
PAINLEVEL_OUTOF10: 0

## 2020-07-30 NOTE — PROGRESS NOTES
Primo Kessler is a 60 y.o. male patient intubated    Current Facility-Administered Medications   Medication Dose Route Frequency Provider Last Rate Last Dose    vancomycin (VANCOCIN) 1000 mg in dextrose 5% 200 mL IVPB  1,000 mg Intravenous Once Mary Quiroz  mL/hr at 07/30/20 1143 1,000 mg at 07/30/20 1143    vancomycin (VANCOCIN) intermittent dosing (placeholder)   Other RX Placeholder Mary Quiroz MD        heparin (porcine) injection 3,000 Units  3,000 Units Intravenous PRN Christelle Deshpande DO   3,000 Units at 07/30/20 0805    ceFAZolin (ANCEF) 2 g in dextrose 4 % 100 mL IVPB (premix)  2 g Intravenous Q12H Mary Quiroz MD   Stopped at 07/30/20 0701    metronidazole (FLAGYL) 500 mg in NaCl 100 mL IVPB premix  500 mg Intravenous Raysa Castro MD   Stopped at 07/30/20 0801    fentanyl (SUBLIMAZE) 1,000 mcg in sodium chloride 0.9% 100 mL infusion  25 mcg/hr Intravenous Continuous Paula Schroeder PA-C 5 mL/hr at 07/30/20 0917 50 mcg/hr at 07/30/20 0917    pantoprazole (PROTONIX) injection 40 mg  40 mg Intravenous Daily Francia Gray MD   40 mg at 07/30/20 0805    hydrocortisone sodium succinate PF (SOLU-CORTEF) injection 100 mg  100 mg Intravenous Q8H Reece Schlatter, MD   100 mg at 07/30/20 1003    thiamine (B-1) 200 mg in sodium chloride 0.9 % 100 mL IVPB  200 mg Intravenous Q12H Reece Schlatter,  mL/hr at 07/30/20 1143 200 mg at 07/30/20 1143    propofol injection  10 mcg/kg/min Intravenous Titrated Reece Schlatter, MD 8.6 mL/hr at 07/30/20 1118 25 mcg/kg/min at 07/30/20 1118    ipratropium-albuterol (DUONEB) nebulizer solution 1 ampule  1 ampule Inhalation Q4H PRN Pebbles Coreas MD        chlorhexidine (PERIDEX) 0.12 % solution 15 mL  15 mL Mouth/Throat BID Jonathan Elliott MD   15 mL at 07/30/20 0804    albuterol sulfate  (90 Base) MCG/ACT inhaler 4 puff  4 puff Inhalation Q4H Jonathan Elliott MD   4 puff at 07/30/20 1058    And    temperature 98.8 °F (37.1 °C), temperature source Rectal, resp. rate 22, height 5' 7.99\" (1.727 m), weight 149 lb 4 oz (67.7 kg), SpO2 100 %. Subjective:  Diet:  NPO. Objective:  General Appearance:  General patient appearance: pressors. Vital signs: (most recent): Blood pressure 100/71, pulse 99, temperature 98.8 °F (37.1 °C), temperature source Rectal, resp. rate 22, height 5' 7.99\" (1.727 m), weight 149 lb 4 oz (67.7 kg), SpO2 100 %. Vital signs are normal.  (Pressors). HEENT: Normal HEENT exam.    Lungs:  Normal effort. There are decreased breath sounds. Heart: Normal rate. Abdomen: Abdomen is soft. Hypoactive bowel sounds. (Surgical dressing). Extremities: Decreased range of motion. Neurological: (Sedated). Pupils:  Pupils are equal.   Skin:  Warm.   (Bilateral UE ulcers and wound large)    Assessment & Plan  Septic shock with gram pos MSSA bacteremia 2/2 bilateral UE wound and staph UTI/pyelonephritis  -repeat bld cx pending  -urine cx with staph and CT with possible pyelonephritis  -off pressors and on solucortef  Acute on chronic resp failure with suspected aspiration pneumonia  -CT PE neg for PE  -albuterol, IV flagyl, ancef  -Echo severe HTN, hyperdynamic EF  due to dehydration  JERAMIE 2/2 ATN  -CRRT  Skin wounds/right post thigh/buttock and bilateral UE  -no abscess or osteo as per imaging  -wound cx with staph  Destructive lesion T12-L1  -possible prior infectious spondyliits  Pneumonatosis intestinalis POD #5  -s/p ex lap and neg  Severe PCM  -NPO  Acute bld loss anemia  -hemoccult pos  -s/p 3PRBC  -CT abd neg  -lovenox held and GI  Consulted  -IV PPI  Shock liver with chornic hep C  -hepatitis panel neg and follow LFTs  Gastric cancer stage 4  Polysubstance use  -UDS cocaine and opiate  -will need counseling once stable      Palliative care meeting with son as critically ill  Critical care time 30min from 10-1030am  Jamie Vila MD  7/30/2020

## 2020-07-30 NOTE — PROGRESS NOTES
General Surgery- [facility] Day: 7    Chief Complaint on Admission: Sepsis      Subjective:     Kathie Espinoza is a 60 y.o. male with 5 Days Post-Op status post exploratory laparotomy with negative findings. Patient remains on the vent although he is off pressors. CRRT stopped this AM. Tolerating DIET TUBE FEED CONTINUOUS/CYCLIC NPO; Low Calorie High Protein (Vital High Protein); Orogastric; Continuous; 20; 75; 24  Dietary Nutrition Supplements: Snack (see comment). + BM.     ROS:  Review of Systems   Unable to perform ROS: Intubated       Allergies  Patient has no known allergies. Diagnosis Date    Hypertension        Objective:     Vitals:    20 1200   BP: 113/76   Pulse: 96   Resp: 23   Temp: 98.2 °F (36.8 °C)   SpO2: 100%       TEMPERATURE:  Current - Temp: 98.2 °F (36.8 °C); Max - Temp  Av °F (36.7 °C)  Min: 96.8 °F (36 °C)  Max: 98.8 °F (37.1 °C)    I/O this shift: In: 514.5 [I.V.:74.5; NG/GT:140; IV Piggyback:300]  Out: - I/O last 3 completed shifts: In: 1603.3 [I.V.:219.8; Blood:389.6; NG/GT:144; IV Piggyback:850]  Out: 5274 [Emesis/NG output:50]      Physical Exam:  Physical Exam  Constitutional:       Appearance: He is well-developed. Interventions: He is sedated and intubated. HENT:      Head: Normocephalic. Eyes:      Pupils: Pupils are equal, round, and reactive to light. Cardiovascular:      Rate and Rhythm: Normal rate. Pulmonary:      Effort: Pulmonary effort is normal. He is intubated. Abdominal:      General: There is no distension. Palpations: Abdomen is soft. There is no mass. Tenderness: There is no abdominal tenderness. There is no guarding or rebound. Comments: Midline incision well approximated with staples in place. No active drainage noted. No evidence of infection. Some dry s/s drainage noted on dressing.               Scheduled Meds:   vancomycin (VANCOCIN) intermittent dosing (placeholder)   Other RX Placeholder    ceFAZolin 2 g Intravenous Q12H    metroNIDAZOLE  500 mg Intravenous Q8H    pantoprazole  40 mg Intravenous Daily    hydrocortisone sodium succinate PF  100 mg Intravenous Q8H    thiamine (VITAMIN B1) IVPB  200 mg Intravenous Q12H    chlorhexidine  15 mL Mouth/Throat BID    albuterol sulfate HFA  4 puff Inhalation Q4H    And    ipratropium  4 puff Inhalation Q4H    aspirin  324 mg Oral Once    sodium chloride flush  10 mL Intravenous BID    sodium chloride flush  10 mL Intravenous 2 times per day    [Held by provider] enoxaparin  1 mg/kg Subcutaneous Daily    nicotine  1 patch Transdermal Daily     Continuous Infusions:   fentanyl 50 mcg/hr (07/30/20 0917)    propofol 25 mcg/kg/min (07/30/20 1118)     PRN Meds:heparin (porcine), ipratropium-albuterol, iopamidol, sodium chloride flush, acetaminophen **OR** acetaminophen, polyethylene glycol, promethazine **OR** ondansetron, promethazine, diphenhydrAMINE, dicyclomine, morphine      Labs/Imaging Results:   Lab Results   Component Value Date    WBC 19.5 (H) 07/30/2020    HGB 8.1 (L) 07/30/2020    HCT 25.7 (L) 07/30/2020    MCV 85.4 07/30/2020     07/30/2020     Lab Results   Component Value Date     (L) 07/30/2020    K 4.5 07/30/2020    CL 98 (L) 07/30/2020    CO2 23 07/30/2020    BUN 26 (H) 07/30/2020    CREATININE 1.2 07/30/2020    GLUCOSE 162 (H) 07/30/2020    CALCIUM 8.6 07/30/2020    PROT 7.0 07/30/2020    LABALBU 2.9 (L) 07/30/2020    BILITOT 2.1 (H) 07/30/2020    ALKPHOS 81 07/30/2020     (H) 07/30/2020    ALT 15 07/30/2020    LABGLOM >60 07/30/2020    GFRAA >60 07/30/2020       Assessment:     Patient Active Problem List:     Sepsis ([facility] [address]     Leukocytosis     Ischemic bowel disease (HCC)     MSSA bacteremia    Plan:     Continue weaning vent per pulmonary  Tolerating TF well. Bilateral upper extremity wounds: continue local wound care using Santyl. Will continue to follow approximately every other day. Medical mgmt per primary.

## 2020-07-30 NOTE — PROGRESS NOTES
Result Value Ref Range    pH, Bld 7.50 (H) 7.34 - 7.45    pCO2, Arterial 31.0 (L) 32 - 45 MMHG    pO2, Arterial 122 (H) 75 - 100 MMHG    Base Exc, Mixed 1.7 (H) 0 - 1.2    HCO3, Arterial 24.2 (H) 18 - 23 MMOL/L    CO2 Content 25.2 (H) 19 - 24 MMOL/L    O2 Sat 96.2 96 - 97 %    Carbon Monoxide, Blood 2.0 0 - 5 %    Methemoglobin, Arterial 1.2 <1.5 %    Comment AC14 450 100 5PEEP      CULTURE results: Invalid input(s): BLOOD CULTURE,  URINE CULTURE, SURGICAL CULTURE    Diagnosis:  Patient Active Problem List   Diagnosis    Sepsis (HCC)    Leukocytosis    Ischemic bowel disease (HCC)    MSSA bacteremia       Active Problems  Active Problems:    Sepsis (HCC)    Leukocytosis    Ischemic bowel disease (HCC)    MSSA bacteremia  Resolved Problems:    * No resolved hospital problems. *      Impression and plan   Clinical status: Not on vasopressors, continues on CRRT. Diagnosis of ischemic bowel was considered but exploratory laparotomy on 7/25 was negative, adhesions were lysed. 1 of 2 blood cx from 7/27: Bacillus. May be contaminant.  Therapeutic: Continue cefazolin and metronidazole. continue vancomycin    Diagnostic: Trend CRP, procalcitonin, repeat blood cx   F/u: Blood culture (7/29/2020), hepatitis C RNA   Other:   Summary:      Electronically signed by: Electronically signed by Avinash Lucero MD on 7/30/2020 at 10:45 AM

## 2020-07-30 NOTE — PROGRESS NOTES
Pulmonary and Critical Care  Progress Note    Subjective: The patient is sedated on vent. Shortness of breath none  Chest pain none  Addressing respiratory complaints Patient is negative for  hemoptysis and cyanosis  CONSTITUTIONAL:  negative for fevers and chills      Past Medical History:     has a past medical history of Hypertension. has a past surgical history that includes back surgery; IR NONTUNNELED VASCULAR CATHETER > 5 YEARS (7/26/2020); and laparotomy (N/A, 7/25/2020). reports that he has been smoking cigarettes. He has been smoking about 1.50 packs per day. He does not have any smokeless tobacco history on file. He reports that he does not drink alcohol or use drugs. Family history:  family history is not on file. No Known Allergies  Social History:    Reviewed; no changes    Objective:   PHYSICAL EXAM:        VITALS:  /78   Pulse 101   Temp 98.2 °F (36.8 °C) (Rectal)   Resp 24   Ht 5' 7.99\" (1.727 m)   Wt 149 lb 4 oz (67.7 kg)   SpO2 100%   BMI 22.70 kg/m²     24HR INTAKE/OUTPUT:      Intake/Output Summary (Last 24 hours) at 7/30/2020 1705  Last data filed at 7/30/2020 0701  Gross per 24 hour   Intake 867.5 ml   Output 370 ml   Net 497.5 ml       CONSTITUTIONAL:  somnolent  LUNGS:  decreased breath sounds, occ basilar crackles. CARDIOVASCULAR:  normal S1 and S2 and negative JVD. DATA:    CBC:  Recent Labs     07/28/20  0630  07/29/20  0550 07/29/20  2200 07/30/20  0505   WBC 16.7*  --  16.8*  --  19.5*   RBC 2.88*  --  2.59*  --  3.01*   HGB 7.5*   < > 6.8* 8.8* 8.1*   HCT 23.6*   < > 21.6* 28.6* 25.7*   *  --  139*  --  141   MCV 81.9  --  83.4  --  85.4   MCH 26.0*  --  26.3*  --  26.9*   MCHC 31.8*  --  31.5*  --  31.5*   RDW 22.9*  --  23.6*  --  21.2*   NRBC 1  --   --   --   --    SEGSPCT 93.0*  --  88.7*  --  81.6*   BANDSPCT 6  --   --   --   --     < > = values in this interval not displayed.       BMP:  Recent Labs     07/29/20  1320 07/29/20 2050 07/30/20  0500    135 134*   K 4.9 5.1 4.5   CL 99 98* 98*   CO2 21 20* 23   BUN 17 18 26*   CREATININE 1.1 1.2 1.2   CALCIUM 8.5 8.9 8.6   GLUCOSE 136* 125* 162*      ABG:  Recent Labs     07/29/20  0600 07/29/20  1800 07/30/20  0600   PH 7.51* 7.33* 7.50*   PO2ART 148* 132* 122*   MVY9LSP 31.0* 34.0 31.0*   O2SAT 96.5 96.8 96.2     Lab Results   Component Value Date    PROBNP 11,142 (H) 07/24/2020     No results found for: [facility]    Radiology Review:  Pertinent images / reports were reviewed as a part of this visit. Assessment:     Patient Active Problem List   Diagnosis    Sepsis ([facility] [address]    Leukocytosis    Ischemic bowel disease ([facility] [address]    MSSA bacteremia       Plan:   1. Cont present vent support. 2. A/B as per ID.       Cliff Sesay MD  7/30/2020  5:05 PM

## 2020-07-30 NOTE — FLOWSHEET NOTE
Called lab due to results of chemistries not completed and were sent at [address] [place] [address]. Dr. Kristine Rivera aware of chemistries awaiting to be resulted. As long as labs are stable may D/C CRRT.

## 2020-07-30 NOTE — PROGRESS NOTES
Nephrology  Dialysis Note        [address], [address] [facility], [place] [address]  Phone: [phone]  Office Hours: 8:30AM - 4:30PM  Monday - Friday       [facility]  Leandra Dupes, MD Selma Schwab, DO [address],  [facility], [place] [address]  PHONE: [phone]  FAX: [phone]      PROCEDURE:  Patient seen during CVVHDF      PHYSICIAN:  SAVANAH      INDICATION:  End-stage renal disease, SEVERE METABOLIC ACIDOSIS      RX:  See dialysis flowsheet for specifics on access, blood flow rate, dialysate baths, duration of dialysis, anticoagulation and other technical information.       COMMENTS:  CONTINUE CVVHDF   AWAITING AM LABS AND AS LONG AS STABLE THEN DC CRRT  CRITICALLY ILL    Electronically signed by Eduarda Hopper DO on 7/30/2020 at 6:50 AM    [address], [place] Selma Schwab, DO [address],  [facility], [place] [address]  PHONE: [phone]  FAX: [phone]

## 2020-07-31 ENCOUNTER — APPOINTMENT (OUTPATIENT)
Dept: CT IMAGING | Age: 65
DRG: 004 | End: 2020-07-31
Payer: MEDICARE

## 2020-07-31 ENCOUNTER — APPOINTMENT (OUTPATIENT)
Dept: GENERAL RADIOLOGY | Age: 65
DRG: 004 | End: 2020-07-31
Payer: MEDICARE

## 2020-07-31 LAB
ALBUMIN SERPL-MCNC: 2.6 GM/DL (ref 3.4–5)
ALP BLD-CCNC: 89 IU/L (ref 40–129)
ALT SERPL-CCNC: <5 U/L (ref 10–40)
ANION GAP SERPL CALCULATED.3IONS-SCNC: 14 MMOL/L (ref 4–16)
APTT: 32.5 SECONDS (ref 25.1–37.1)
AST SERPL-CCNC: 92 IU/L (ref 15–37)
BASE EXCESS: 1 (ref 0–3.3)
BASOPHILS ABSOLUTE: 0.1 K/CU MM
BASOPHILS RELATIVE PERCENT: 0.2 % (ref 0–1)
BILIRUB SERPL-MCNC: 1.1 MG/DL (ref 0–1)
BUN BLDV-MCNC: 67 MG/DL (ref 6–23)
CALCIUM SERPL-MCNC: 8.9 MG/DL (ref 8.3–10.6)
CARBON MONOXIDE, BLOOD: 1.8 % (ref 0–5)
CHLORIDE BLD-SCNC: 101 MMOL/L (ref 99–110)
CO2 CONTENT: 23.6 MMOL/L (ref 19–24)
CO2: 21 MMOL/L (ref 21–32)
COMMENT: ABNORMAL
CREAT SERPL-MCNC: 2.5 MG/DL (ref 0.9–1.3)
CULTURE: ABNORMAL
CULTURE: ABNORMAL
DIFFERENTIAL TYPE: ABNORMAL
DOSE AMOUNT: NORMAL
DOSE TIME: NORMAL
EOSINOPHILS ABSOLUTE: 0 K/CU MM
EOSINOPHILS RELATIVE PERCENT: 0 % (ref 0–3)
GFR AFRICAN AMERICAN: 32 ML/MIN/1.73M2
GFR NON-AFRICAN AMERICAN: 26 ML/MIN/1.73M2
GLUCOSE BLD-MCNC: 218 MG/DL (ref 70–99)
HCO3 ARTERIAL: 22.6 MMOL/L (ref 18–23)
HCT VFR BLD CALC: 21.4 % (ref 42–52)
HCT VFR BLD CALC: 23.9 % (ref 42–52)
HCT VFR BLD CALC: 24.2 % (ref 42–52)
HCV QUANTITATIVE: ABNORMAL
HEMOGLOBIN: 6.8 GM/DL (ref 13.5–18)
HEMOGLOBIN: 7.4 GM/DL (ref 13.5–18)
HEMOGLOBIN: 7.7 GM/DL (ref 13.5–18)
HEP CRNA PCR QNT INTERP: DETECTED
HEPATITIS C RNA PCR QUANT: 6.62 LOG IU/ML
IMMATURE NEUTROPHIL %: 1.6 % (ref 0–0.43)
INR BLD: 1.42 INDEX
LYMPHOCYTES ABSOLUTE: 0.8 K/CU MM
LYMPHOCYTES RELATIVE PERCENT: 2.7 % (ref 24–44)
Lab: ABNORMAL
MCH RBC QN AUTO: 27.2 PG (ref 27–31)
MCHC RBC AUTO-ENTMCNC: 31 % (ref 32–36)
MCV RBC AUTO: 87.9 FL (ref 78–100)
METHEMOGLOBIN ARTERIAL: 1 %
MONOCYTES ABSOLUTE: 1.9 K/CU MM
MONOCYTES RELATIVE PERCENT: 6.4 % (ref 0–4)
NUCLEATED RBC %: 1.1 %
O2 SATURATION: 96.8 % (ref 96–97)
PCO2 ARTERIAL: 34 MMHG (ref 32–45)
PDW BLD-RTO: 22.3 % (ref 11.7–14.9)
PH BLOOD: 7.43 (ref 7.34–7.45)
PLATELET # BLD: 147 K/CU MM (ref 140–440)
PMV BLD AUTO: 9.5 FL (ref 7.5–11.1)
PO2 ARTERIAL: 109 MMHG (ref 75–100)
POTASSIUM SERPL-SCNC: 4 MMOL/L (ref 3.5–5.1)
PROTHROMBIN TIME: 17.2 SECONDS (ref 11.7–14.5)
RBC # BLD: 2.72 M/CU MM (ref 4.6–6.2)
SEGMENTED NEUTROPHILS ABSOLUTE COUNT: 26.7 K/CU MM
SEGMENTED NEUTROPHILS RELATIVE PERCENT: 89.1 % (ref 36–66)
SODIUM BLD-SCNC: 136 MMOL/L (ref 135–145)
SPECIMEN: ABNORMAL
TOTAL CK: 44 IU/L (ref 38–174)
TOTAL IMMATURE NEUTOROPHIL: 0.48 K/CU MM
TOTAL NUCLEATED RBC: 0.3 K/CU MM
TOTAL PROTEIN: 6.8 GM/DL (ref 6.4–8.2)
VANCOMYCIN RANDOM: 35.7 UG/ML
WBC # BLD: 30 K/CU MM (ref 4–10.5)

## 2020-07-31 PROCEDURE — 86927 PLASMA FRESH FROZEN: CPT

## 2020-07-31 PROCEDURE — 6360000002 HC RX W HCPCS: Performed by: SPECIALIST

## 2020-07-31 PROCEDURE — 85730 THROMBOPLASTIN TIME PARTIAL: CPT

## 2020-07-31 PROCEDURE — 80202 ASSAY OF VANCOMYCIN: CPT

## 2020-07-31 PROCEDURE — C9113 INJ PANTOPRAZOLE SODIUM, VIA: HCPCS | Performed by: SPECIALIST

## 2020-07-31 PROCEDURE — 6370000000 HC RX 637 (ALT 250 FOR IP): Performed by: INTERNAL MEDICINE

## 2020-07-31 PROCEDURE — 6360000002 HC RX W HCPCS: Performed by: INTERNAL MEDICINE

## 2020-07-31 PROCEDURE — 99233 SBSQ HOSP IP/OBS HIGH 50: CPT | Performed by: INTERNAL MEDICINE

## 2020-07-31 PROCEDURE — 94003 VENT MGMT INPAT SUBQ DAY: CPT

## 2020-07-31 PROCEDURE — P9016 RBC LEUKOCYTES REDUCED: HCPCS

## 2020-07-31 PROCEDURE — 6370000000 HC RX 637 (ALT 250 FOR IP): Performed by: NURSE PRACTITIONER

## 2020-07-31 PROCEDURE — 2580000003 HC RX 258: Performed by: SURGERY

## 2020-07-31 PROCEDURE — 30243L1 TRANSFUSION OF NONAUTOLOGOUS FRESH PLASMA INTO CENTRAL VEIN, PERCUTANEOUS APPROACH: ICD-10-PCS | Performed by: FAMILY MEDICINE

## 2020-07-31 PROCEDURE — 36430 TRANSFUSION BLD/BLD COMPNT: CPT

## 2020-07-31 PROCEDURE — 74176 CT ABD & PELVIS W/O CONTRAST: CPT

## 2020-07-31 PROCEDURE — 2000000000 HC ICU R&B

## 2020-07-31 PROCEDURE — 2500000003 HC RX 250 WO HCPCS: Performed by: INTERNAL MEDICINE

## 2020-07-31 PROCEDURE — 82803 BLOOD GASES ANY COMBINATION: CPT

## 2020-07-31 PROCEDURE — 6370000000 HC RX 637 (ALT 250 FOR IP): Performed by: SURGERY

## 2020-07-31 PROCEDURE — 6360000004 HC RX CONTRAST MEDICATION: Performed by: HOSPITALIST

## 2020-07-31 PROCEDURE — 85610 PROTHROMBIN TIME: CPT

## 2020-07-31 PROCEDURE — 80053 COMPREHEN METABOLIC PANEL: CPT

## 2020-07-31 PROCEDURE — 85018 HEMOGLOBIN: CPT

## 2020-07-31 PROCEDURE — 2580000003 HC RX 258: Performed by: INTERNAL MEDICINE

## 2020-07-31 PROCEDURE — 71045 X-RAY EXAM CHEST 1 VIEW: CPT

## 2020-07-31 PROCEDURE — 94761 N-INVAS EAR/PLS OXIMETRY MLT: CPT

## 2020-07-31 PROCEDURE — P9017 PLASMA 1 DONOR FRZ W/IN 8 HR: HCPCS

## 2020-07-31 PROCEDURE — 74174 CTA ABD&PLVS W/CONTRAST: CPT

## 2020-07-31 PROCEDURE — 36592 COLLECT BLOOD FROM PICC: CPT

## 2020-07-31 PROCEDURE — 86901 BLOOD TYPING SEROLOGIC RH(D): CPT

## 2020-07-31 PROCEDURE — 94640 AIRWAY INHALATION TREATMENT: CPT

## 2020-07-31 PROCEDURE — 2700000000 HC OXYGEN THERAPY PER DAY

## 2020-07-31 PROCEDURE — 86850 RBC ANTIBODY SCREEN: CPT

## 2020-07-31 PROCEDURE — 82550 ASSAY OF CK (CPK): CPT

## 2020-07-31 PROCEDURE — 85025 COMPLETE CBC W/AUTO DIFF WBC: CPT

## 2020-07-31 PROCEDURE — 2580000003 HC RX 258: Performed by: HOSPITALIST

## 2020-07-31 PROCEDURE — 89220 SPUTUM SPECIMEN COLLECTION: CPT

## 2020-07-31 PROCEDURE — 85014 HEMATOCRIT: CPT

## 2020-07-31 PROCEDURE — 86900 BLOOD TYPING SEROLOGIC ABO: CPT

## 2020-07-31 PROCEDURE — 99232 SBSQ HOSP IP/OBS MODERATE 35: CPT | Performed by: OBSTETRICS & GYNECOLOGY

## 2020-07-31 RX ORDER — 0.9 % SODIUM CHLORIDE 0.9 %
20 INTRAVENOUS SOLUTION INTRAVENOUS ONCE
Status: COMPLETED | OUTPATIENT
Start: 2020-07-31 | End: 2020-07-31

## 2020-07-31 RX ORDER — MIDODRINE HYDROCHLORIDE 5 MG/1
10 TABLET ORAL ONCE
Status: COMPLETED | OUTPATIENT
Start: 2020-07-31 | End: 2020-07-31

## 2020-07-31 RX ADMIN — HYDROCORTISONE SODIUM SUCCINATE 100 MG: 100 INJECTION, POWDER, FOR SOLUTION INTRAMUSCULAR; INTRAVENOUS at 11:56

## 2020-07-31 RX ADMIN — IPRATROPIUM BROMIDE 4 PUFF: 17 AEROSOL, METERED RESPIRATORY (INHALATION) at 15:25

## 2020-07-31 RX ADMIN — METRONIDAZOLE 500 MG: 500 INJECTION, SOLUTION INTRAVENOUS at 15:38

## 2020-07-31 RX ADMIN — CEFAZOLIN SODIUM 2 G: 2 INJECTION, SOLUTION INTRAVENOUS at 08:58

## 2020-07-31 RX ADMIN — SODIUM CHLORIDE 20 ML: 9 INJECTION, SOLUTION INTRAVENOUS at 06:24

## 2020-07-31 RX ADMIN — SODIUM CHLORIDE, PRESERVATIVE FREE 10 ML: 5 INJECTION INTRAVENOUS at 08:57

## 2020-07-31 RX ADMIN — SODIUM CHLORIDE 20 ML: 9 INJECTION, SOLUTION INTRAVENOUS at 18:10

## 2020-07-31 RX ADMIN — ALBUTEROL SULFATE 4 PUFF: 90 AEROSOL, METERED RESPIRATORY (INHALATION) at 11:16

## 2020-07-31 RX ADMIN — CHLORHEXIDINE GLUCONATE 0.12% ORAL RINSE 15 ML: 1.2 LIQUID ORAL at 08:59

## 2020-07-31 RX ADMIN — METRONIDAZOLE 500 MG: 500 INJECTION, SOLUTION INTRAVENOUS at 07:32

## 2020-07-31 RX ADMIN — HYDROCORTISONE SODIUM SUCCINATE 100 MG: 100 INJECTION, POWDER, FOR SOLUTION INTRAMUSCULAR; INTRAVENOUS at 20:27

## 2020-07-31 RX ADMIN — IPRATROPIUM BROMIDE 4 PUFF: 17 AEROSOL, METERED RESPIRATORY (INHALATION) at 07:38

## 2020-07-31 RX ADMIN — METRONIDAZOLE 500 MG: 500 INJECTION, SOLUTION INTRAVENOUS at 23:12

## 2020-07-31 RX ADMIN — IPRATROPIUM BROMIDE 4 PUFF: 17 AEROSOL, METERED RESPIRATORY (INHALATION) at 03:57

## 2020-07-31 RX ADMIN — SODIUM CHLORIDE, PRESERVATIVE FREE 10 ML: 5 INJECTION INTRAVENOUS at 08:58

## 2020-07-31 RX ADMIN — ALBUTEROL SULFATE 4 PUFF: 90 AEROSOL, METERED RESPIRATORY (INHALATION) at 03:57

## 2020-07-31 RX ADMIN — IOPAMIDOL 100 ML: 755 INJECTION, SOLUTION INTRAVENOUS at 17:57

## 2020-07-31 RX ADMIN — CHLORHEXIDINE GLUCONATE 0.12% ORAL RINSE 15 ML: 1.2 LIQUID ORAL at 20:27

## 2020-07-31 RX ADMIN — PROPOFOL 25 MCG/KG/MIN: 10 INJECTION, EMULSION INTRAVENOUS at 23:11

## 2020-07-31 RX ADMIN — PROPOFOL 20 MCG/KG/MIN: 10 INJECTION, EMULSION INTRAVENOUS at 11:24

## 2020-07-31 RX ADMIN — ALBUTEROL SULFATE 4 PUFF: 90 AEROSOL, METERED RESPIRATORY (INHALATION) at 15:25

## 2020-07-31 RX ADMIN — HYDROCORTISONE SODIUM SUCCINATE 100 MG: 100 INJECTION, POWDER, FOR SOLUTION INTRAMUSCULAR; INTRAVENOUS at 03:56

## 2020-07-31 RX ADMIN — THIAMINE HYDROCHLORIDE 200 MG: 100 INJECTION, SOLUTION INTRAMUSCULAR; INTRAVENOUS at 11:56

## 2020-07-31 RX ADMIN — Medication 75 MCG/HR: at 13:20

## 2020-07-31 RX ADMIN — PANTOPRAZOLE SODIUM 40 MG: 40 INJECTION, POWDER, FOR SOLUTION INTRAVENOUS at 08:56

## 2020-07-31 RX ADMIN — SODIUM CHLORIDE, PRESERVATIVE FREE 10 ML: 5 INJECTION INTRAVENOUS at 20:28

## 2020-07-31 RX ADMIN — ALBUTEROL SULFATE 4 PUFF: 90 AEROSOL, METERED RESPIRATORY (INHALATION) at 19:27

## 2020-07-31 RX ADMIN — MIDODRINE HYDROCHLORIDE 10 MG: 5 TABLET ORAL at 00:50

## 2020-07-31 RX ADMIN — IPRATROPIUM BROMIDE 4 PUFF: 17 AEROSOL, METERED RESPIRATORY (INHALATION) at 19:27

## 2020-07-31 RX ADMIN — THIAMINE HYDROCHLORIDE 200 MG: 100 INJECTION, SOLUTION INTRAMUSCULAR; INTRAVENOUS at 23:11

## 2020-07-31 RX ADMIN — SODIUM CHLORIDE, PRESERVATIVE FREE 10 ML: 5 INJECTION INTRAVENOUS at 20:27

## 2020-07-31 RX ADMIN — IPRATROPIUM BROMIDE 4 PUFF: 17 AEROSOL, METERED RESPIRATORY (INHALATION) at 11:17

## 2020-07-31 RX ADMIN — ALBUTEROL SULFATE 4 PUFF: 90 AEROSOL, METERED RESPIRATORY (INHALATION) at 07:38

## 2020-07-31 ASSESSMENT — PULMONARY FUNCTION TESTS
PIF_VALUE: 26
PIF_VALUE: 25
PIF_VALUE: 19
PIF_VALUE: 21
PIF_VALUE: 22
PIF_VALUE: 25
PIF_VALUE: 25
PIF_VALUE: 23
PIF_VALUE: 24
PIF_VALUE: 20
PIF_VALUE: 24
PIF_VALUE: 31
PIF_VALUE: 27
PIF_VALUE: 21
PIF_VALUE: 24
PIF_VALUE: 22
PIF_VALUE: 19
PIF_VALUE: 24
PIF_VALUE: 19
PIF_VALUE: 17
PIF_VALUE: 21
PIF_VALUE: 19
PIF_VALUE: 22
PIF_VALUE: 21
PIF_VALUE: 21
PIF_VALUE: 19
PIF_VALUE: 18
PIF_VALUE: 25
PIF_VALUE: 20
PIF_VALUE: 21
PIF_VALUE: 18
PIF_VALUE: 22
PIF_VALUE: 19
PIF_VALUE: 20
PIF_VALUE: 17
PIF_VALUE: 24
PIF_VALUE: 18
PIF_VALUE: 20
PIF_VALUE: 27
PIF_VALUE: 25
PIF_VALUE: 20
PIF_VALUE: 20
PIF_VALUE: 18
PIF_VALUE: 18
PIF_VALUE: 19
PIF_VALUE: 19
PIF_VALUE: 22
PIF_VALUE: 22
PIF_VALUE: 23
PIF_VALUE: 22
PIF_VALUE: 21
PIF_VALUE: 22
PIF_VALUE: 21
PIF_VALUE: 20
PIF_VALUE: 21
PIF_VALUE: 19
PIF_VALUE: 22
PIF_VALUE: 18
PIF_VALUE: 21
PIF_VALUE: 22
PIF_VALUE: 20
PIF_VALUE: 22
PIF_VALUE: 21

## 2020-07-31 ASSESSMENT — PAIN SCALES - GENERAL
PAINLEVEL_OUTOF10: 0

## 2020-07-31 NOTE — PROGRESS NOTES
Pulmonary and Critical Care  Progress Note      VITALS:  BP (!) 140/86   Pulse 79   Temp 96.4 °F (35.8 °C) (Rectal) Comment: warming blanket on, set to 37.0  Resp 21   Ht 5' 7.99\" (1.727 m)   Wt 134 lb 11.2 oz (61.1 kg)   SpO2 100%   BMI 20.49 kg/m²     Subjective:   Chief complaint: Sepsis, ischemic bowel disease with exploratory laparotomy and resection, MSSA bacteremia, acute hypoxemic respiratory failure, end-stage renal disease with severe metabolic acidosis, history of drug abuse  Mild resp distress  Remains orally intubated and on mechanical ventilation  Remains sedated on propofol and fentanyl for vent sedation  Palliative care seeing patient  Patient awakens on sedation vacation but no purposeful movement or follow commands    Objective:   PHYSICAL EXAM:    LUNGS: Coarse breath sounds bilaterally and a few scattered rhonchi.   No wheezing noted  Sodium 136, potassium 4.0, creatinine 2.5 with a BUN of 67  WBC 30.0 with hemoglobin 7.4  Most recent blood cultures negative but bacillus in 1 blood culture suggesting contamination  Arterial blood gasses on assist control rate 14, 30% FiO2 with 5 of PEEP with pH 7.43, PCO2 34, PO2 109  DATA:    CBC:  Recent Labs     07/29/20  0550 07/29/20 2200 07/30/20  0505 07/31/20  0505   WBC 16.8*  --  19.5* 30.0*   RBC 2.59*  --  3.01* 2.72*   HGB 6.8* 8.8* 8.1* 7.4*   HCT 21.6* 28.6* 25.7* 23.9*   *  --  141 147   MCV 83.4  --  85.4 87.9   MCH 26.3*  --  26.9* 27.2   MCHC 31.5*  --  31.5* 31.0*   RDW 23.6*  --  21.2* 22.3*   SEGSPCT 88.7*  --  81.6* 89.1*      BMP:  Recent Labs     07/29/20 2050 07/30/20  0500 07/31/20  0505    134* 136   K 5.1 4.5 4.0   CL 98* 98* 101   CO2 20* 23 21   BUN 18 26* 67*   CREATININE 1.2 1.2 2.5*   CALCIUM 8.9 8.6 8.9   GLUCOSE 125* 162* 218*      ABG:  Recent Labs     07/29/20  1800 07/30/20  0600 07/31/20  0600   PH 7.33* 7.50* 7.43   PO2ART 132* 122* 109*   LSY2MKC 34.0 31.0* 34.0   O2SAT 96.8 96.2 96.8     BNP  No results found for: BNP   D-Dimer:  No results found for: DDIMER   1. Radiology: Chest x-ray today  The endotracheal tube, nasogastric tube and right IJ catheter are in place. There is no pneumothorax, edema or focal consolidation.  The heart size is   normal.  The bony thorax is grossly intact. Impression:      No significant interval change.  No new abnormality.  No change in life   support. Assessment:     Patient Active Problem List   Diagnosis    Sepsis ([facility] [address]    Leukocytosis    Ischemic bowel disease ([facility] [address]    MSSA bacteremia       Plan:   1. continue present treatment  2. Wean assist-control rate to 12  3. Retest sedation vacation and if patient becomes more appropriate then consider weaning protocol  4. Continue aggressive bronchopulmonary toilet with bronchial secretion suctioning  5.  Overall prognosis guarded    Governor Camron MATOS  7/31/2020  9:37 AM

## 2020-07-31 NOTE — PROGRESS NOTES
Received phone call from Dr. Colton Scheuermann. CTA abd shows no active bleeding, no intervention from surgical standpoint. Blood transfusing at this time.

## 2020-07-31 NOTE — PROGRESS NOTES
output:350]  I/O this shift:  In: 267 [Blood:267]  Out: -     Intake/Output Summary (Last 24 hours) at 7/31/2020 0808  Last data filed at 7/31/2020 5182  Gross per 24 hour   Intake 2704.39 ml   Output 350 ml   Net 2354.39 ml       CBC:   Recent Labs     07/29/20  0550 07/29/20  2200 07/30/20  0505 07/31/20  0505   WBC 16.8*  --  19.5* 30.0*   HGB 6.8* 8.8* 8.1* 7.4*   *  --  141 147       BMP:    Recent Labs     07/29/20 2050 07/30/20  0500 07/31/20  0505    134* 136   K 5.1 4.5 4.0   CL 98* 98* 101   CO2 20* 23 21   BUN 18 26* 67*   CREATININE 1.2 1.2 2.5*   GLUCOSE 125* 162* 218*     Hepatic:   Recent Labs     07/29/20  0550 07/30/20  0505 07/31/20  0505   *  458* 194* 92*   ALT 41*  41* 15 <5*   BILITOT 1.1*  1.1* 2.1* 1.1*   ALKPHOS 80  80 81 89     Troponin: No results for input(s): TROPONINI in the last 72 hours. BNP: No results for input(s): BNP in the last 72 hours. Lipids: No results for input(s): CHOL, HDL in the last 72 hours.     Invalid input(s): LDLCALCU  ABGs:   Lab Results   Component Value Date    PO2ART 109 07/31/2020    LTN7EEX 34.0 07/31/2020     INR:   Recent Labs     07/29/20  0550 07/30/20  0505 07/31/20  0505   INR 1.31 1.76 1.42       Objective:   Vitals: BP (!) 186/73 Comment: arterial  Pulse 81   Temp 96.8 °F (36 °C) (Rectal)   Resp 22   Ht 5' 7.99\" (1.727 m)   Wt 134 lb 11.2 oz (61.1 kg)   SpO2 100%   BMI 20.49 kg/m²   General appearance: , in no acute distress  HEENT: normocephalic, atraumatic, et  tube in place  Neck: supple, trachea midline  Lungs: breathing comfortably on mv  Heart[de-identified] regular rate and rhythm, S1, S2 normal,  Abdomen:  non distended,  Extremities: no leg  edema      Assessment and Plan:      Leukocytosis      Sepsis ([facility] [address] 07/24/2020        JERAMIE FROM ATN; CRRT FROM FROM 7/5 TO 7/8  ANION GAP METABOLIC ACIDOSIS  LACTIC ACIDOSIS  ISCHEMIC BOWEL S/P SURGERY YESTERDAY  STAPH BACTEREMIA  ACUTE HYPOXIC RESP FAILURE  HEROIN AND COCAINE USE  Ischemic hepatitis    Suggest:  REMAINS ANURIC  WILL PLAN HD ON Saturday  AVOID NEPHROTOXINS                  Electronically signed by Santy Matthew DO on 7/31/2020 at 8:08 MD Jak Connors DO [address],  Marquise RIZZO [facility] [address]  PHONE: [phone]  FAX: [phone]

## 2020-07-31 NOTE — PROGRESS NOTES
ABD dressing changed, had mod amount of dark red drainage noted. Incision oozing/small steady stream of bright red blood between 8 and 9 staple. 23 staples all together. Pressure being held x 5 minutes. Saturated 4  4x4's with bright red drainage. This nurse will notify physician.   Corrie Glass RN

## 2020-07-31 NOTE — PROGRESS NOTES
Perfect serve sent to Charlotte Rodriguez NP regarding ABD incisional bleeding. Waiting for response. Continuing to hold pressure on ABD incision.    Cristofer Dennison RN

## 2020-07-31 NOTE — PROGRESS NOTES
Dr. Bigg Merino notified by dialing number provided by perfect serve.  update on condition given. Orders received.   Rodo Kinney RN

## 2020-07-31 NOTE — PROGRESS NOTES
[address] [facility]  consulted by Dr. Alcira Kelsey for monitoring and adjustment. Indication for treatment: MSSA bacteremia, GPB in blood   Goal trough: 10-15 mcg/mL     Pertinent Laboratory Values:   Temp Readings from Last 3 Encounters:   07/31/20 96.4 °F (35.8 °C)   07/25/20 (!) 62.3 °F (16.8 °C)     Recent Labs     07/29/20  0550 07/30/20  0505 07/31/20  0505   WBC 16.8* 19.5* 30.0*     Recent Labs     07/29/20  2050 07/30/20  0500 07/31/20  0505   BUN 18 26* 67*   CREATININE 1.2 1.2 2.5*     Estimated Creatinine Clearance: 26 mL/min (A) (based on SCr of 2.5 mg/dL (H)). Intake/Output Summary (Last 24 hours) at 7/31/2020 1210  Last data filed at 7/31/2020 0945  Gross per 24 hour   Intake 3063.39 ml   Output 350 ml   Net 2713.39 ml       Pertinent Cultures:  Date    Source    Results  7/24   Blood    MSSA  7/27   Blood    GPB- ID and sensitivity pending  7/29   Blood    NGTD     Vancomycin level:   TROUGH:    No results for input(s): VANCOTROUGH in the last 72 hours. RANDOM:    Recent Labs     07/29/20  0550 07/30/20  0505 07/31/20  0505   VANCORANDOM 15.0 20.8 35.7       Assessment:  · WBC and temperature: WBC increased; hypothermic  · SCr, BUN, and urine output:   · CRRT stopped  · Patient anuric, HD planned for tomorrow  · Day(s) of therapy: 8  · Vancomycin level: 35.7, supra-therapeutic 18h post-dose    Plan:  · Continue intermittent dosing based on levels 2/2 RRT   · Level this AM is elevated. Hold additional vancomycin at this time. · HD is planned for tomorrow AM. Repeat a level tomorrow and re-dose after HD if needed.   · Pharmacy will continue to monitor patient and adjust therapy as indicated    Thank you for the consult,  Lowell Marley, [address]  7/31/2020 12:10 PM

## 2020-07-31 NOTE — PLAN OF CARE
Problem: Falls - Risk of:  Goal: Will remain free from falls  Description: Will remain free from falls  Outcome: Ongoing  Goal: Absence of physical injury  Description: Absence of physical injury  Outcome: Ongoing     Problem: Pain:  Description: Pain management should include both nonpharmacologic and pharmacologic interventions.   Goal: Pain level will decrease  Description: Pain level will decrease  Outcome: Ongoing  Goal: Control of acute pain  Description: Control of acute pain  Outcome: Ongoing  Goal: Control of chronic pain  Description: Control of chronic pain  Outcome: Ongoing     Problem: Diarrhea:  Goal: Bowel elimination is within specified parameters  Description: Bowel elimination is within specified parameters  Outcome: Ongoing  Goal: Passage of soft, formed stool  Description: Passage of soft, formed stool  Outcome: Ongoing  Goal: Establishment of normal bowel function will improve to within specified parameters  Description: Establishment of normal bowel function will improve to within specified parameters  Outcome: Ongoing     Problem: Nausea/Vomiting:  Goal: Absence of nausea/vomiting  Description: Absence of nausea/vomiting  Outcome: Ongoing  Goal: Able to drink  Description: Able to drink  Outcome: Ongoing  Goal: Able to eat  Description: Able to eat  Outcome: Ongoing  Goal: Ability to achieve adequate nutritional intake will improve  Description: Ability to achieve adequate nutritional intake will improve  Outcome: Ongoing     Problem: Breathing Pattern - Ineffective:  Goal: Ability to achieve and maintain a regular respiratory rate will improve  Description: Ability to achieve and maintain a regular respiratory rate will improve  Outcome: Ongoing     Problem: Fluid Volume:  Goal: Ability to achieve a balanced intake and output will improve  Description: Ability to achieve a balanced intake and output will improve  Outcome: Ongoing  Goal: Will show no signs or symptoms of fluid Ongoing     Problem: Fluid Volume - Imbalance:  Goal: Absence of imbalanced fluid volume signs and symptoms  Description: Absence of imbalanced fluid volume signs and symptoms  Outcome: Ongoing     Problem: Gas Exchange - Impaired:  Goal: Levels of oxygenation will improve  Description: Levels of oxygenation will improve  Outcome: Ongoing     Problem: Mental Status - Impaired:  Goal: Mental status will be restored to baseline  Description: Mental status will be restored to baseline  Outcome: Ongoing     Problem: Nutrition Deficit:  Goal: Ability to achieve adequate nutritional intake will improve  Description: Ability to achieve adequate nutritional intake will improve  Outcome: Ongoing     Problem: Pain:  Description: Pain management should include both nonpharmacologic and pharmacologic interventions.   Goal: Pain level will decrease  Description: Pain level will decrease  Outcome: Ongoing  Goal: Recognizes and communicates pain  Description: Recognizes and communicates pain  Outcome: Ongoing  Goal: Control of acute pain  Description: Control of acute pain  Outcome: Ongoing  Goal: Control of chronic pain  Description: Control of chronic pain  Outcome: Ongoing     Problem: Serum Glucose Level - Abnormal:  Goal: Ability to maintain appropriate glucose levels will improve to within specified parameters  Description: Ability to maintain appropriate glucose levels will improve to within specified parameters  Outcome: Ongoing  Goal: Ability to maintain appropriate glucose levels will improve  Description: Ability to maintain appropriate glucose levels will improve  Outcome: Ongoing     Problem: Skin Integrity - Impaired:  Goal: Will show no infection signs and symptoms  Description: Will show no infection signs and symptoms  Outcome: Ongoing  Goal: Absence of new skin breakdown  Description: Absence of new skin breakdown  Outcome: Ongoing     Problem: Sleep Pattern Disturbance:  Goal: Appears well-rested  Description: Appears well-rested  Outcome: Ongoing     Problem: Tissue Perfusion, Altered:  Goal: Circulatory function within specified parameters  Description: Circulatory function within specified parameters  Outcome: Ongoing     Problem: Tissue Perfusion - Cardiopulmonary, Altered:  Goal: Absence of angina  Description: Absence of angina  Outcome: Ongoing  Goal: Hemodynamic stability will improve  Description: Hemodynamic stability will improve  Outcome: Ongoing     Problem: Infection - Ventilator-Associated Pneumonia:  Goal: Absence of pulmonary infection  Description: Absence of pulmonary infection  Outcome: Ongoing     Problem: Venous Thromboembolism:  Goal: Will show no signs or symptoms of venous thromboembolism  Description: Will show no signs or symptoms of venous thromboembolism  Outcome: Ongoing  Goal: Absence of signs or symptoms of impaired coagulation  Description: Absence of signs or symptoms of impaired coagulation  Outcome: Ongoing

## 2020-07-31 NOTE — PROGRESS NOTES
next 24 hr       Nutrition Monitoring and Evaluation:   Food/Nutrient Intake Outcomes:  Enteral Nutrition Intake/Tolerance  Physical Signs/Symptoms Outcomes:  Biochemical Data, Weight     Discharge Planning:     Too soon to determine     Electronically signed by Stan Shen RD, NORMAN on 5/54/90 at 10:58 AM EDT    Contact: [phone]

## 2020-07-31 NOTE — PROGRESS NOTES
07/31/20 0358   Vent Information   Vent Mode AC/VC   Vt Ordered 450 mL   Rate Set 14 bmp   Peak Flow 50 L/min   Pressure Support 0 cmH20   FiO2  30 %   SpO2 100 %   SpO2/FiO2 ratio 333.33   Sensitivity 3   PEEP/CPAP 5   I Time/ I Time % 0 s   Humidification Source HME   Nitric Oxide/Epoprostenol In Use? No   Vent Patient Data   High Peep/I Pressure 0   Peak Inspiratory Pressure 27 cmH2O   Mean Airway Pressure 13 cmH20   Rate Measured 28 br/min   Vt Exhaled 401 mL   Minute Volume 12 Liters   I:E Ratio 1.00:1   Cough/Sputum   Sputum How Obtained Endotracheal   Sputum Amount Small   Sputum Color Creamy   Tenacity Thick   Spontaneous Breathing Trial (SBT) RT Doc   Pulse 102   Breath Sounds   Right Upper Lobe Diminished   Right Middle Lobe Diminished   Right Lower Lobe Diminished   Left Upper Lobe Diminished   Left Lower Lobe Diminished   Additional Respiratory  Assessments   Resp 27   Position Semi-Salvador's   Oral Care Completed? No   Subglottic Suction Done? Yes   Alarm Settings   High Pressure Alarm 40 cmH2O   Low Minute Volume Alarm 2.5 L/min   Apnea (secs) 20 secs   High Respiratory Rate 40 br/min   Low Exhaled Vt  250 mL   ETT (adult)   Placement Date/Time: 07/25/20 1748   Preoxygenation: Yes  Mask Ventilation: Mask ventilation not attempted (0)  Technique: Direct laryngoscopy  Type: Cuffed  Tube Size: 7 mm  Laryngoscope: Mac  Blade Size: 3  Location: Oral  Grade View: Full view of t. ..    Secured at 23 cm   Measured From [address] [place] [address],[address] By [employer] tube raza   Site Condition Dry   Cuff Pressure 30 cm H2O

## 2020-07-31 NOTE — CARE COORDINATION
Patient remains critical. Son wishes to continue full code status. Anticipate trach/PEG when appropriate.  Warren Mcwilliams RN

## 2020-07-31 NOTE — PROGRESS NOTES
mg Oral Once Harvey Izquierdo MD        iopamidol (ISOVUE-370) 76 % injection 80 mL  80 mL Intravenous ONCE PRN Harvey Izquierdo MD        sodium chloride flush 0.9 % injection 10 mL  10 mL Intravenous BID Monica Alexander MD   10 mL at 07/31/20 0858    sodium chloride flush 0.9 % injection 10 mL  10 mL Intravenous 2 times per day Harvey Izquierdo MD   10 mL at 07/31/20 0857    sodium chloride flush 0.9 % injection 10 mL  10 mL Intravenous PRN Harvey Izquierdo MD        acetaminophen (TYLENOL) tablet 650 mg  650 mg Oral Q6H PRN Harvey Izquierdo MD        Or    acetaminophen (TYLENOL) suppository 650 mg  650 mg Rectal Q6H PRN Harvey Izquierdo MD        polyethylene glycol (GLYCOLAX) packet 17 g  17 g Oral Daily PRN Harvey Izquierdo MD        promethazine (PHENERGAN) tablet 12.5 mg  12.5 mg Oral Q6H PRN Harvey Izquierdo MD        Or    ondansetron (ZOFRAN) injection 4 mg  4 mg Intravenous Q6H PRN Monica Alexander MD   4 mg at 07/25/20 0417    [Held by provider] enoxaparin (LOVENOX) injection 60 mg  1 mg/kg Subcutaneous Daily Harvey Izquierdo MD   Stopped at 07/30/20 0900    promethazine (PHENERGAN) tablet 25 mg  25 mg Oral Q6H PRN Harvey Izquierdo MD        nicotine (NICODERM CQ) 21 MG/24HR 1 patch  1 patch Transdermal Daily Monica Alexander MD   1 patch at 07/31/20 0856    diphenhydrAMINE (BENADRYL) injection 12.5 mg  12.5 mg Intravenous Nightly PRN Monica Alexander MD   12.5 mg at 07/25/20 6210    dicyclomine (BENTYL) tablet 20 mg  20 mg Oral Q6H PRN Harvey Izquierdo MD        morphine injection 2 mg  2 mg Intravenous Q4H PRN Harvey Izquierdo MD   2 mg at 07/25/20 1104     No Known Allergies  Active Problems:    Sepsis ([address]    Leukocytosis    MSSA bacteremia  Resolved Problems:    * No resolved hospital problems. *    Blood pressure (!) 140/86, pulse 82, temperature 96.4 °F (35.8 °C), resp. rate 21, height 5' 8\" (1.727 m), weight 134 lb 11.2 oz (61.1 kg), SpO2 100 %. Subjective:  Diet:  NPO.       Objective:  General Appearance:  General patient appearance: pressors. Vital signs: (most recent): Blood pressure (!) 140/86, pulse 82, temperature 96.4 °F (35.8 °C), resp. rate 21, height 5' 8\" (1.727 m), weight 134 lb 11.2 oz (61.1 kg), SpO2 100 %. Vital signs are normal.  (Pressors). HEENT: Normal HEENT exam.    Lungs:  Normal effort. There are decreased breath sounds. Heart: Normal rate. Abdomen: Abdomen is soft. Hypoactive bowel sounds. (Staples and mild bloody drainage). Extremities: Decreased range of motion. Neurological: (Sedated). Pupils:  Pupils are equal.   Skin:  Warm.   (Bilateral UE ulcers and wound large)    Assessment & Plan  Septic shock with gram pos MSSA bacteremia 2/2 bilateral UE wound and staph UTI/pyelonephritis  -repeat bld cx with baciullus but suspect contaminant  -urine cx with staph and CT with possible pyelonephritis  -off pressors and on solucortef  Acute on chronic resp failure with suspected aspiration pneumonia  -CT PE neg for PE  -albuterol, IV flagyl, ancef  -Echo severe HTN, hyperdynamic EF  due to dehydration  JERAMIE 2/2 ATN  -CRRT  Skin wounds/right post thigh/buttock and bilateral UE  -no abscess or osteo as per imaging  -wound cx with staph  Destructive lesion T12-L1  -possible prior infectious spondyliits  Pneumonatosis intestinalis POD #6  -s/p ex lap and neg  Severe PCM  -NPO  Acute bld loss anemia  -hemoccult pos  -s/p 3PRBC  -CT abd neg  -lovenox held and GI  Consulted  -IV PPI  Shock liver with chornic hep C  -hepatitis panel neg and follow LFTs  Gastric cancer stage 4  Polysubstance use  -UDS cocaine and opiate  -will need counseling once stable      Leukocytosis increasing and if worsening tomorrow then consider repeat imaging  Critical care time 30min from 10-1030am  Debi Álvarez MD  7/31/2020

## 2020-07-31 NOTE — PROGRESS NOTES
Impression    1. Moderate volume hemoperitoneum. 2. Small pneumoperitoneum, likely postoperative. 3. Possible mild wall thickening of the colon diffusely.  Clinical    correlation advised. 4. Similar decubitus ulcer in the right ischial region. 5. Similar irregularity of the disc space at T12-L1. 6. Nonobstructive left nephrolithiasis. Critical results were called by Dr. Maxwell Aceves to [facility] on 7/31/2020 at    17:09.                CT results called to Dr. Alan Blankenship, on call for Dr. eLesa Frederick. Patient en route now to f/u CTA with Akira Avila RT and transport.

## 2020-07-31 NOTE — PROGRESS NOTES
Infectious Disease Progress Note  2020   Patient Name: Keon Ruby : 1955       Reason for visit: F/u MSSA bacteremia secondary to infected wounds. ? History:? Interval history noted  Intubated and on mechanical ventilation and renal replacement therapy  Physical Exam:  Vital Signs: BP (!) 157/65   Pulse 85   Temp 97 °F (36.1 °C) (Rectal)   Resp 21   Ht 5' 8\" (1.727 m)   Wt 134 lb 11.2 oz (61.1 kg)   SpO2 100%   BMI 20.48 kg/m²     Gen: Intubated, sedated and mechanically ventilated  Skin: no stigmata of endocarditis  Wounds: Bilateral forearm dressing C/D/I  HEMT: AT/NC, OG tube, ETT  Eyes: PERRL  Neck: Supple. Trachea midline. No LAD. Chest: no distress and CTA. Good air movement. Heart: RRR and no MRG. Abd: soft, non-distended, no tenderness, no hepatomegaly. Normoactive bowel sounds. Ext: no clubbing, cyanosis, or edema  Catheter Site: right IJ CVC, right femoral vein vascath without erythema or tenderness  Neuro: sedated     Radiologic / Imaging / TESTING  No results found.      Labs:    Recent Results (from the past 24 hour(s))   PREPARE FRESH FROZEN PLASMA, 2 Units    Collection Time: 20  5:00 AM   Result Value Ref Range    Unit Number [id number]     Component FRESH PLASMA     Unit Divison 00     Status ISSUED     Transfusion Status OK TO TRANSFUSE     Unit Number [id number]     Component FRESH PLASMA     Unit Divison 00     Status ISSUED     Transfusion Status OK TO TRANSFUSE    CBC auto differential    Collection Time: 20  5:05 AM   Result Value Ref Range    WBC 30.0 (H) 4.0 - 10.5 K/CU MM    RBC 2.72 (L) 4.6 - 6.2 M/CU MM    Hemoglobin 7.4 (L) 13.5 - 18.0 GM/DL    Hematocrit 23.9 (L) 42 - 52 %    MCV 87.9 78 - 100 FL    MCH 27.2 27 - 31 PG    MCHC 31.0 (L) 32.0 - 36.0 %    RDW 22.3 (H) 11.7 - 14.9 %    Platelets 851 704 - 560 K/CU MM    MPV 9.5 7.5 - 11.1 FL    Differential Type AUTOMATED DIFFERENTIAL     Segs Relative 89.1 (H) 36 - 66 %    Lymphocytes % 2.7 (L) 24 - 44 %    Monocytes % 6.4 (H) 0 - 4 %    Eosinophils % 0.0 0 - 3 %    Basophils % 0.2 0 - 1 %    Segs Absolute 26.7 K/CU MM    Lymphocytes Absolute 0.8 K/CU MM    Monocytes Absolute 1.9 K/CU MM    Eosinophils Absolute 0.0 K/CU MM    Basophils Absolute 0.1 K/CU MM    Nucleated RBC % 1.1 %    Total Nucleated RBC 0.3 K/CU MM    Total Immature Neutrophil 0.48 K/CU MM    Immature Neutrophil % 1.6 (H) 0 - 0.43 %   CK    Collection Time: 07/31/20  5:05 AM   Result Value Ref Range    Total CK 44 38 - 174 IU/L   PT    Collection Time: 07/31/20  5:05 AM   Result Value Ref Range    Protime 17.2 (H) 11.7 - 14.5 SECONDS    INR 1.42 INDEX   PTT    Collection Time: 07/31/20  5:05 AM   Result Value Ref Range    aPTT 32.5 25.1 - 37.1 SECONDS   Vancomycin, random    Collection Time: 07/31/20  5:05 AM   Result Value Ref Range    Vancomycin Rm 35.7 UG/ML    DOSE AMOUNT DOSE AMT.  GIVEN - 1000     DOSE TIME DOSE TIME GIVEN - 1034    Comprehensive Metabolic Panel    Collection Time: 07/31/20  5:05 AM   Result Value Ref Range    Sodium 136 135 - 145 MMOL/L    Potassium 4.0 3.5 - 5.1 MMOL/L    Chloride 101 99 - 110 mMol/L    CO2 21 21 - 32 MMOL/L    BUN 67 (H) 6 - 23 MG/DL    CREATININE 2.5 (H) 0.9 - 1.3 MG/DL    Glucose 218 (H) 70 - 99 MG/DL    Calcium 8.9 8.3 - 10.6 MG/DL    Alb 2.6 (L) 3.4 - 5.0 GM/DL    Total Protein 6.8 6.4 - 8.2 GM/DL    Total Bilirubin 1.1 (H) 0.0 - 1.0 MG/DL    ALT <5 (L) 10 - 40 U/L    AST 92 (H) 15 - 37 IU/L    Alkaline Phosphatase 89 40 - 129 IU/L    GFR Non- 26 (L) >60 mL/min/1.73m2    GFR  32 (L) >60 mL/min/1.73m2    Anion Gap 14 4 - 16   Blood gases    Collection Time: 07/31/20  6:00 AM   Result Value Ref Range    pH, Bld 7.43 7.34 - 7.45    pCO2, Arterial 34.0 32 - 45 MMHG    pO2, Arterial 109 (H) 75 - 100 MMHG    Base Excess 1 0 - 3.3    HCO3, Arterial 22.6 18 - 23 MMOL/L    CO2 Content 23.6 19 - 24 MMOL/L    O2 Sat 96.8 96 - 97 %    Carbon Monoxide, Blood 1.8 0 - 5 % Methemoglobin, Arterial 1.0 <1.5 %    Comment 14 450 .30 P5      CULTURE results: Invalid input(s): BLOOD CULTURE,  URINE CULTURE, SURGICAL CULTURE    Diagnosis:  Patient Active Problem List   Diagnosis    Sepsis ([facility] [address]    Leukocytosis    Ischemic bowel disease ([facility] [address]    MSSA bacteremia       Active Problems  Active Problems:    Sepsis ([facility] [address]    Leukocytosis    MSSA bacteremia  Resolved Problems:    * No resolved hospital problems. *      Impression and plan   Clinical status: Not on vasopressors, however, leukocytosis has worsened. 1 of 2 blood cx from 7/27: Bacillus. May be contaminant. Both feet have evidence cutaneous evidence of thrombi.  ? Alfreida Jamaican, ? endocarditis   Diarrhea: on tube feeds, ? C diff,    Therapeutic: d/c cefazolin, continue vancomycin and metronidazole; star cefepime   Diagnostic: check C diff, Trend CRP, procalcitonin, repeat blood cx, c diff   F/u: Blood culture (7/29/2020), hepatitis C RNA   Other:   Summary:      Electronically signed by: Electronically signed by Citlalli Forrest MD on 7/31/2020 at 2:01 PM

## 2020-08-01 ENCOUNTER — APPOINTMENT (OUTPATIENT)
Dept: GENERAL RADIOLOGY | Age: 65
DRG: 004 | End: 2020-08-01
Payer: MEDICARE

## 2020-08-01 LAB
ALBUMIN SERPL-MCNC: 2.8 GM/DL (ref 3.4–5)
ALP BLD-CCNC: 84 IU/L (ref 40–128)
ALT SERPL-CCNC: <5 U/L (ref 10–40)
ANION GAP SERPL CALCULATED.3IONS-SCNC: 16 MMOL/L (ref 4–16)
APTT: 30.8 SECONDS (ref 25.1–37.1)
AST SERPL-CCNC: 54 IU/L (ref 15–37)
BASE EXCESS: 2 (ref 0–3.3)
BASOPHILS ABSOLUTE: 0.1 K/CU MM
BASOPHILS RELATIVE PERCENT: 0.2 % (ref 0–1)
BILIRUB SERPL-MCNC: 1.3 MG/DL (ref 0–1)
BUN BLDV-MCNC: 101 MG/DL (ref 6–23)
CALCIUM SERPL-MCNC: 9.3 MG/DL (ref 8.3–10.6)
CARBON MONOXIDE, BLOOD: 2.1 % (ref 0–5)
CHLORIDE BLD-SCNC: 98 MMOL/L (ref 99–110)
CO2 CONTENT: 23.5 MMOL/L (ref 19–24)
CO2: 22 MMOL/L (ref 21–32)
COMPONENT: NORMAL
COMPONENT: NORMAL
CREAT SERPL-MCNC: 3.5 MG/DL (ref 0.9–1.3)
CULTURE: NORMAL
DAT IGG: POSITIVE
DIFFERENTIAL TYPE: ABNORMAL
DIFFERENTIAL TYPE: ABNORMAL
DOSE AMOUNT: NORMAL
DOSE TIME: NORMAL
EOSINOPHILS ABSOLUTE: 0 K/CU MM
EOSINOPHILS RELATIVE PERCENT: 0 % (ref 0–3)
FIBRINOGEN LEVEL: 160 MG/DL (ref 196.9–442.1)
FOLATE: 8.2 NG/ML (ref 3.1–17.5)
GFR AFRICAN AMERICAN: 22 ML/MIN/1.73M2
GFR NON-AFRICAN AMERICAN: 18 ML/MIN/1.73M2
GLUCOSE BLD-MCNC: 149 MG/DL (ref 70–99)
HCO3 ARTERIAL: 22.4 MMOL/L (ref 18–23)
HCT VFR BLD CALC: 25.4 % (ref 42–52)
HEMOGLOBIN: 7.9 GM/DL (ref 13.5–18)
HYPOCHROMIA: ABNORMAL
IMMATURE NEUTROPHIL %: 2.1 % (ref 0–0.43)
INR BLD: 1.25 INDEX
LACTATE DEHYDROGENASE: 713 IU/L (ref 120–246)
LYMPHOCYTES ABSOLUTE: 0.7 K/CU MM
LYMPHOCYTES RELATIVE PERCENT: 2.4 % (ref 24–44)
Lab: NORMAL
MCH RBC QN AUTO: 27.6 PG (ref 27–31)
MCHC RBC AUTO-ENTMCNC: 31.1 % (ref 32–36)
MCV RBC AUTO: 88.8 FL (ref 78–100)
METHEMOGLOBIN ARTERIAL: 1.4 %
MONOCYTES ABSOLUTE: 1.6 K/CU MM
MONOCYTES RELATIVE PERCENT: 5.7 % (ref 0–4)
NUCLEATED RBC %: 0.4 %
O2 SATURATION: 96.2 % (ref 96–97)
OVALOCYTES: ABNORMAL
PAPPENHEIMER BODIES: PRESENT
PCO2 ARTERIAL: 37 MMHG (ref 32–45)
PDW BLD-RTO: 21.4 % (ref 11.7–14.9)
PH BLOOD: 7.39 (ref 7.34–7.45)
PLATELET # BLD: 132 K/CU MM (ref 140–440)
PMV BLD AUTO: 10.3 FL (ref 7.5–11.1)
PO2 ARTERIAL: 106 MMHG (ref 75–100)
POLYCHROMASIA: ABNORMAL
POTASSIUM SERPL-SCNC: 5.1 MMOL/L (ref 3.5–5.1)
PROCALCITONIN: 2.61
PROTHROMBIN TIME: 15.1 SECONDS (ref 11.7–14.5)
RBC # BLD: 2.86 M/CU MM (ref 4.6–6.2)
RBC # BLD: ABNORMAL 10*6/UL
RETICULOCYTE COUNT PCT: 4.1 % (ref 0.2–2.2)
SEGMENTED NEUTROPHILS ABSOLUTE COUNT: 24.8 K/CU MM
SEGMENTED NEUTROPHILS RELATIVE PERCENT: 89.6 % (ref 36–66)
SODIUM BLD-SCNC: 136 MMOL/L (ref 135–145)
SPECIMEN: NORMAL
STATUS: NORMAL
STATUS: NORMAL
TARGET CELLS: ABNORMAL
TOTAL CK: 38 IU/L (ref 38–174)
TOTAL IMMATURE NEUTOROPHIL: 0.58 K/CU MM
TOTAL NUCLEATED RBC: 0.1 K/CU MM
TOTAL PROTEIN: 7 GM/DL (ref 6.4–8.2)
TRANSFUSION STATUS: NORMAL
TRANSFUSION STATUS: NORMAL
UNIT DIVISION: 0
UNIT DIVISION: 0
UNIT NUMBER: NORMAL
UNIT NUMBER: NORMAL
VANCOMYCIN RANDOM: 31.6 UG/ML
VITAMIN B-12: 1631 PG/ML (ref 211–911)
WBC # BLD: 27.7 K/CU MM (ref 4–10.5)

## 2020-08-01 PROCEDURE — 85045 AUTOMATED RETICULOCYTE COUNT: CPT

## 2020-08-01 PROCEDURE — 85610 PROTHROMBIN TIME: CPT

## 2020-08-01 PROCEDURE — 6370000000 HC RX 637 (ALT 250 FOR IP): Performed by: INTERNAL MEDICINE

## 2020-08-01 PROCEDURE — 82607 VITAMIN B-12: CPT

## 2020-08-01 PROCEDURE — 6360000002 HC RX W HCPCS: Performed by: SPECIALIST

## 2020-08-01 PROCEDURE — 2000000000 HC ICU R&B

## 2020-08-01 PROCEDURE — 2700000000 HC OXYGEN THERAPY PER DAY

## 2020-08-01 PROCEDURE — 86157 COLD AGGLUTININ TITER: CPT

## 2020-08-01 PROCEDURE — 94003 VENT MGMT INPAT SUBQ DAY: CPT

## 2020-08-01 PROCEDURE — 36592 COLLECT BLOOD FROM PICC: CPT

## 2020-08-01 PROCEDURE — 6360000002 HC RX W HCPCS: Performed by: NURSE PRACTITIONER

## 2020-08-01 PROCEDURE — 85384 FIBRINOGEN ACTIVITY: CPT

## 2020-08-01 PROCEDURE — 99233 SBSQ HOSP IP/OBS HIGH 50: CPT | Performed by: INTERNAL MEDICINE

## 2020-08-01 PROCEDURE — 99255 IP/OBS CONSLTJ NEW/EST HI 80: CPT | Performed by: INTERNAL MEDICINE

## 2020-08-01 PROCEDURE — 82550 ASSAY OF CK (CPK): CPT

## 2020-08-01 PROCEDURE — 94640 AIRWAY INHALATION TREATMENT: CPT

## 2020-08-01 PROCEDURE — 85730 THROMBOPLASTIN TIME PARTIAL: CPT

## 2020-08-01 PROCEDURE — 6370000000 HC RX 637 (ALT 250 FOR IP): Performed by: SURGERY

## 2020-08-01 PROCEDURE — 2580000003 HC RX 258: Performed by: SURGERY

## 2020-08-01 PROCEDURE — 2580000003 HC RX 258: Performed by: INTERNAL MEDICINE

## 2020-08-01 PROCEDURE — 86880 COOMBS TEST DIRECT: CPT

## 2020-08-01 PROCEDURE — 83615 LACTATE (LD) (LDH) ENZYME: CPT

## 2020-08-01 PROCEDURE — 2500000003 HC RX 250 WO HCPCS: Performed by: INTERNAL MEDICINE

## 2020-08-01 PROCEDURE — 6360000002 HC RX W HCPCS: Performed by: INTERNAL MEDICINE

## 2020-08-01 PROCEDURE — 80053 COMPREHEN METABOLIC PANEL: CPT

## 2020-08-01 PROCEDURE — 85025 COMPLETE CBC W/AUTO DIFF WBC: CPT

## 2020-08-01 PROCEDURE — 94761 N-INVAS EAR/PLS OXIMETRY MLT: CPT

## 2020-08-01 PROCEDURE — 94750 HC PULMONARY COMPLIANCE STUDY: CPT

## 2020-08-01 PROCEDURE — C9113 INJ PANTOPRAZOLE SODIUM, VIA: HCPCS | Performed by: SPECIALIST

## 2020-08-01 PROCEDURE — 84145 PROCALCITONIN (PCT): CPT

## 2020-08-01 PROCEDURE — 80202 ASSAY OF VANCOMYCIN: CPT

## 2020-08-01 PROCEDURE — 82746 ASSAY OF FOLIC ACID SERUM: CPT

## 2020-08-01 PROCEDURE — 83010 ASSAY OF HAPTOGLOBIN QUANT: CPT

## 2020-08-01 PROCEDURE — 89220 SPUTUM SPECIMEN COLLECTION: CPT

## 2020-08-01 PROCEDURE — 99024 POSTOP FOLLOW-UP VISIT: CPT | Performed by: SURGERY

## 2020-08-01 PROCEDURE — 71045 X-RAY EXAM CHEST 1 VIEW: CPT

## 2020-08-01 PROCEDURE — 82803 BLOOD GASES ANY COMBINATION: CPT

## 2020-08-01 RX ORDER — METHYLPREDNISOLONE SODIUM SUCCINATE 125 MG/2ML
60 INJECTION, POWDER, LYOPHILIZED, FOR SOLUTION INTRAMUSCULAR; INTRAVENOUS DAILY
Status: DISCONTINUED | OUTPATIENT
Start: 2020-08-01 | End: 2020-08-09

## 2020-08-01 RX ORDER — HEPARIN SODIUM 1000 [USP'U]/ML
4000 INJECTION, SOLUTION INTRAVENOUS; SUBCUTANEOUS
Status: DISPENSED | OUTPATIENT
Start: 2020-08-01 | End: 2020-08-02

## 2020-08-01 RX ADMIN — THIAMINE HYDROCHLORIDE 200 MG: 100 INJECTION, SOLUTION INTRAMUSCULAR; INTRAVENOUS at 12:36

## 2020-08-01 RX ADMIN — HYDROCORTISONE SODIUM SUCCINATE 100 MG: 100 INJECTION, POWDER, FOR SOLUTION INTRAMUSCULAR; INTRAVENOUS at 03:52

## 2020-08-01 RX ADMIN — CHLORHEXIDINE GLUCONATE 0.12% ORAL RINSE 15 ML: 1.2 LIQUID ORAL at 09:04

## 2020-08-01 RX ADMIN — PROPOFOL 25 MCG/KG/MIN: 10 INJECTION, EMULSION INTRAVENOUS at 07:53

## 2020-08-01 RX ADMIN — METHYLPREDNISOLONE SODIUM SUCCINATE 60 MG: 125 INJECTION, POWDER, FOR SOLUTION INTRAMUSCULAR; INTRAVENOUS at 18:26

## 2020-08-01 RX ADMIN — IPRATROPIUM BROMIDE 4 PUFF: 17 AEROSOL, METERED RESPIRATORY (INHALATION) at 11:20

## 2020-08-01 RX ADMIN — Medication 75 MCG/HR: at 03:52

## 2020-08-01 RX ADMIN — HYDROCORTISONE SODIUM SUCCINATE 100 MG: 100 INJECTION, POWDER, FOR SOLUTION INTRAMUSCULAR; INTRAVENOUS at 12:37

## 2020-08-01 RX ADMIN — METRONIDAZOLE 500 MG: 500 INJECTION, SOLUTION INTRAVENOUS at 14:09

## 2020-08-01 RX ADMIN — IPRATROPIUM BROMIDE 4 PUFF: 17 AEROSOL, METERED RESPIRATORY (INHALATION) at 00:32

## 2020-08-01 RX ADMIN — ALBUTEROL SULFATE 4 PUFF: 90 AEROSOL, METERED RESPIRATORY (INHALATION) at 11:19

## 2020-08-01 RX ADMIN — IPRATROPIUM BROMIDE 4 PUFF: 17 AEROSOL, METERED RESPIRATORY (INHALATION) at 20:19

## 2020-08-01 RX ADMIN — CHLORHEXIDINE GLUCONATE 0.12% ORAL RINSE 15 ML: 1.2 LIQUID ORAL at 20:38

## 2020-08-01 RX ADMIN — METRONIDAZOLE 500 MG: 500 INJECTION, SOLUTION INTRAVENOUS at 23:30

## 2020-08-01 RX ADMIN — Medication 75 MCG/HR: at 18:08

## 2020-08-01 RX ADMIN — THIAMINE HYDROCHLORIDE 200 MG: 100 INJECTION, SOLUTION INTRAMUSCULAR; INTRAVENOUS at 23:35

## 2020-08-01 RX ADMIN — PANTOPRAZOLE SODIUM 40 MG: 40 INJECTION, POWDER, FOR SOLUTION INTRAVENOUS at 09:04

## 2020-08-01 RX ADMIN — ALBUTEROL SULFATE 4 PUFF: 90 AEROSOL, METERED RESPIRATORY (INHALATION) at 15:22

## 2020-08-01 RX ADMIN — SODIUM CHLORIDE, PRESERVATIVE FREE 10 ML: 5 INJECTION INTRAVENOUS at 20:38

## 2020-08-01 RX ADMIN — IPRATROPIUM BROMIDE 4 PUFF: 17 AEROSOL, METERED RESPIRATORY (INHALATION) at 04:05

## 2020-08-01 RX ADMIN — ALBUTEROL SULFATE 4 PUFF: 90 AEROSOL, METERED RESPIRATORY (INHALATION) at 20:18

## 2020-08-01 RX ADMIN — IPRATROPIUM BROMIDE 4 PUFF: 17 AEROSOL, METERED RESPIRATORY (INHALATION) at 15:23

## 2020-08-01 RX ADMIN — ALBUTEROL SULFATE 4 PUFF: 90 AEROSOL, METERED RESPIRATORY (INHALATION) at 04:05

## 2020-08-01 RX ADMIN — ALBUTEROL SULFATE 4 PUFF: 90 AEROSOL, METERED RESPIRATORY (INHALATION) at 00:32

## 2020-08-01 RX ADMIN — ALBUTEROL SULFATE 4 PUFF: 90 AEROSOL, METERED RESPIRATORY (INHALATION) at 07:38

## 2020-08-01 RX ADMIN — SODIUM CHLORIDE, PRESERVATIVE FREE 10 ML: 5 INJECTION INTRAVENOUS at 09:05

## 2020-08-01 RX ADMIN — IPRATROPIUM BROMIDE 4 PUFF: 17 AEROSOL, METERED RESPIRATORY (INHALATION) at 07:39

## 2020-08-01 RX ADMIN — PROPOFOL 25 MCG/KG/MIN: 10 INJECTION, EMULSION INTRAVENOUS at 18:07

## 2020-08-01 RX ADMIN — SODIUM CHLORIDE, PRESERVATIVE FREE 10 ML: 5 INJECTION INTRAVENOUS at 20:39

## 2020-08-01 ASSESSMENT — PULMONARY FUNCTION TESTS
PIF_VALUE: 17
PIF_VALUE: 21
PIF_VALUE: 22
PIF_VALUE: 18
PIF_VALUE: 21
PIF_VALUE: 20
PIF_VALUE: 21
PIF_VALUE: 21
PIF_VALUE: 18
PIF_VALUE: 18
PIF_VALUE: 20
PIF_VALUE: 17
PIF_VALUE: 21
PIF_VALUE: 21
PIF_VALUE: 18
PIF_VALUE: 20
PIF_VALUE: 18
PIF_VALUE: 17
PIF_VALUE: 21
PIF_VALUE: 20
PIF_VALUE: 18
PIF_VALUE: 18
PIF_VALUE: 21
PIF_VALUE: 18
PIF_VALUE: 17
PIF_VALUE: 21
PIF_VALUE: 17
PIF_VALUE: 22
PIF_VALUE: 20
PIF_VALUE: 18
PIF_VALUE: 21
PIF_VALUE: 18
PIF_VALUE: 21
PIF_VALUE: 18
PIF_VALUE: 17

## 2020-08-01 ASSESSMENT — PAIN SCALES - GENERAL: PAINLEVEL_OUTOF10: 0

## 2020-08-01 NOTE — CONSULTS
Laterality Date    BACK SURGERY      IR NONTUNNELED VASCULAR CATHETER  7/26/2020    IR NONTUNNELED VASCULAR CATHETER 7/26/2020 [facility] SPECIAL PROCEDURES    LAPAROTOMY N/A 7/25/2020    LAPAROTOMY EXPLORATORY performed by Gildardo Ledezma MD at [facility] OR       Current Medications:    Medications    Scheduled Medications:    heparin (porcine)  4,000 Units Intercatheter Once in dialysis    cefepime  2 g Intravenous Once per day on Tue Thu Sat    vancomycin (VANCOCIN) intermittent dosing (placeholder)   Other RX Placeholder    metroNIDAZOLE  500 mg Intravenous Q8H    pantoprazole  40 mg Intravenous Daily    hydrocortisone sodium succinate PF  100 mg Intravenous Q8H    thiamine (VITAMIN B1) IVPB  200 mg Intravenous Q12H    chlorhexidine  15 mL Mouth/Throat BID    albuterol sulfate HFA  4 puff Inhalation Q4H    And    ipratropium  4 puff Inhalation Q4H    aspirin  324 mg Oral Once    sodium chloride flush  10 mL Intravenous BID    sodium chloride flush  10 mL Intravenous 2 times per day    [Held by provider] enoxaparin  1 mg/kg Subcutaneous Daily    nicotine  1 patch Transdermal Daily     PRN Medications: heparin (porcine), ipratropium-albuterol, iopamidol, sodium chloride flush, acetaminophen **OR** acetaminophen, polyethylene glycol, promethazine **OR** ondansetron, promethazine, diphenhydrAMINE, dicyclomine, morphine    Allergies:  Patient has no known allergies. FAMILY HISTORY    History reviewed. No pertinent family history. SOCIAL HISTORY    Social History     Socioeconomic History    Marital status:       Spouse name: None    Number of children: None    Years of education: None    Highest education level: None   Occupational History    None   Social Needs    Financial resource strain: None    Food insecurity     Worry: None     Inability: None    Transportation needs     Medical: None     Non-medical: None   Tobacco Use    Smoking status: Current Every Day Smoker     Packs/day: 1.50 but PTT normal, fibrinogen low. May have underlying DIC. Will review the peripheral smear. Note antigen screen negative but ANISHA positive. May have in vivo hemolysis. Reviewed the medications. Requested extended ANISHA panel, but not sure whether helpful as antigen screen negative. Cold agglutinin screen ordered but unlikely the cause. PNH flow ordered. Recommend methylprednisone 1 mg/kg. Transfusion support as needed. Consider cryoprecipitate if actively bleeding, anemia worsening. Hold all antiplatelet and anticoagulation. R/o nutritional def.     leukocytosis most probably secondary to infection. Flow cytometry ordered. Continue other medical care    This plan was discussed with Dr Brunilda Simmonds and NS    We will continue to follow the patient. Thank you for allowing us to participate in the care of this patient.     Poor prognosis     Osorio Gordon MD, 8/1/2020, 5:14 PM

## 2020-08-01 NOTE — PROGRESS NOTES
While turning patient during bath to change the sheets, the patient's ABD dressing with a large amount of bright red blood that saturated patients gown. Pressure held x 5 min and pressure dressing applied. This nurse will continue to monitor.   Kat Love RN

## 2020-08-01 NOTE — PROGRESS NOTES
2 gold tops, 2 red top, 1 purple, 1 green, 2 blue tops were sent to the lab for labx ordered by Dr. Bibi Núñez.

## 2020-08-01 NOTE — PROGRESS NOTES
[address] [place] [address]  Phone: [phone]  Office Hours: 8:30AM - 4:30PM  Monday - Friday      Nephrology  Dialysis Note        PROCEDURE:  Patient seen during hemodialysis      PHYSICIAN:  PK      INDICATION:  Acute renal failure, Azotemia, Metabolic acidosis (Anion gap)      RX:  See dialysis flowsheet for specifics on access, blood flow rate, dialysate baths, duration of dialysis, anticoagulation and other technical information.       COMMENTS:  Covering for Dr Naman Chandler

## 2020-08-01 NOTE — PROGRESS NOTES
Pulmonary and Critical Care  Progress Note      VITALS:  /73   Pulse 89   Temp 97.5 °F (36.4 °C) (Rectal)   Resp 18   Ht 5' 8\" (1.727 m)   Wt 134 lb 11.2 oz (61.1 kg)   SpO2 99%   BMI 20.48 kg/m²     Subjective:   CHIEF COMPLAINT :Fall with right hip pain     HPI:                The patient is a 60 y.o. male with significant past medical history of HTN,. Sacral decub, MSSA endocarditis, Septic PE, Hep C,  presents with complaints of fall and right hip pain. He was on the floor for more than 2 days. His CK is mildly elevated. He had mild lactic acidosis. His PBNPT is 11,000 with mildly elevated troponins. He is on 3 grams of Heroin per day. His U tocx was positive for Cocaine and Opiates. He had no fever, no headaches, no chest pain, no palpitations, no n/v, no diaphoresis. He has no sick exposure, no recent travel. His 1 blood culture set is positive for Staph. He is getting abx and IV fluids. He is on the vent and getting CRRT    Objective:   PHYSICAL EXAM:    LUNGS:Occasional basal crackles  Abd-soft, BS+,NT  Ext - no pedal edema  CVS-s1s2, no murmurs      DATA:    CBC:  Recent Labs     07/30/20  0505 07/31/20  0505 07/31/20  1533 07/31/20  2141 08/01/20  0510   WBC 19.5* 30.0*  --   --  27.7*   RBC 3.01* 2.72*  --   --  2.86*   HGB 8.1* 7.4* 6.8* 7.7* 7.9*   HCT 25.7* 23.9* 21.4* 24.2* 25.4*    147  --   --  132*   MCV 85.4 87.9  --   --  88.8   MCH 26.9* 27.2  --   --  27.6   MCHC 31.5* 31.0*  --   --  31.1*   RDW 21.2* 22.3*  --   --  21.4*   SEGSPCT 81.6* 89.1*  --   --  89.6*      BMP:  Recent Labs     07/30/20  0500 07/31/20  0505 08/01/20  0510   * 136 136   K 4.5 4.0 5.1   CL 98* 101 98*   CO2 23 21 22   BUN 26* 67* 101*   CREATININE 1.2 2.5* 3.5*   CALCIUM 8.6 8.9 9.3   GLUCOSE 162* 218* 149*      ABG:  Recent Labs     07/30/20  0600 07/31/20  0600 08/01/20  0600   PH 7.50* 7.43 7.39   PO2ART 122* 109* 106*   BQG3SWQ 31.0* 34.0 37.0   O2SAT 96.2 96.8 96.2     BNP  No results found for: BNP   D-Dimer:  No results found for: DDIMER   1. Radiology: Reviewed      Assessment/Plan     Patient Active Problem List    Diagnosis Date Noted    MSSA bacteremia 07/28/2020    Ischemic bowel disease ([facility]     Leukocytosis     Sepsis ([facility] 07/24/2020   Patient is on the vent and sedated. He is off pressors. His H&H has been stable     Septic shock- improved  Hemorrhagic shock- improved  Acute hypoxic resp failure  Mild coagulopathy- improved  Shock Liver- improved  Lactic acidosis- improved  VDRF  Anemia likely GI bleed  ? Staph Aureus bacteremia  Severe Pulmonary HTN  Occult blood positive  JERAMIE likely sec to ATN  Bilateral Arm wounds       1. Abx  2. F/u C&S  3. Tube feeds  4. SAT and SBT trial in am  5. PT/OT  6. F/u H&H  7. CBC, BMP in am  8. Prognosis guarded  9. C/w present management  10. Wound care  No follow-ups on file.     Electronically signed by Filiberto Garnica MD on 8/1/2020 at 12:07 PM

## 2020-08-01 NOTE — PROGRESS NOTES
Nena Whitehead is a 60 y.o. male patient intubated    Current Facility-Administered Medications   Medication Dose Route Frequency Provider Last Rate Last Dose    cefepime (MAXIPIME) 2 g IVPB minibag  2 g Intravenous Once per day on Tue Thu Sat Alem Rosas MD        vancomycin Georgie Zaragoza) intermittent dosing (placeholder)   Other RX Placeholder Alem Rosas MD        heparin (porcine) injection 3,000 Units  3,000 Units Intravenous PRN Christelle Deshpande DO   3,000 Units at 07/30/20 0805    metronidazole (FLAGYL) 500 mg in NaCl 100 mL IVPB premix  500 mg Intravenous Stephanie High MD   Stopped at 08/01/20 0012    fentanyl (SUBLIMAZE) 1,000 mcg in sodium chloride 0.9% 100 mL infusion  25 mcg/hr Intravenous Continuous Pinky Bond MD 7.5 mL/hr at 08/01/20 0352 75 mcg/hr at 08/01/20 0352    pantoprazole (PROTONIX) injection 40 mg  40 mg Intravenous Daily Kenneth Stephens MD   40 mg at 08/01/20 0904    hydrocortisone sodium succinate PF (SOLU-CORTEF) injection 100 mg  100 mg Intravenous Q8H Nkechi Ocampo MD   100 mg at 08/01/20 0352    thiamine (B-1) 200 mg in sodium chloride 0.9 % 100 mL IVPB  200 mg Intravenous Q12H Nkechi Ocampo MD   Stopped at 07/31/20 2341    propofol injection  10 mcg/kg/min Intravenous Titrated Nkechi Ocampo MD 8.6 mL/hr at 08/01/20 0753 25 mcg/kg/min at 08/01/20 0753    ipratropium-albuterol (DUONEB) nebulizer solution 1 ampule  1 ampule Inhalation Q4H PRN Harvey Izquierdo MD        chlorhexidine (PERIDEX) 0.12 % solution 15 mL  15 mL Mouth/Throat BID Pinky Bond MD   15 mL at 08/01/20 0904    albuterol sulfate  (90 Base) MCG/ACT inhaler 4 puff  4 puff Inhalation Q4H Pinky Bond MD   4 puff at 08/01/20 1119    And    ipratropium (ATROVENT HFA) 17 MCG/ACT inhaler 4 puff  4 puff Inhalation Q4H Pinky Bond MD   4 puff at 08/01/20 1120    aspirin chewable tablet 324 mg  324 mg Oral Once MD Angy Macario iopamidol (ISOVUE-370) 76 % injection 80 mL  80 mL Intravenous ONCE PRN Omayra Narvaez MD        sodium chloride flush 0.9 % injection 10 mL  10 mL Intravenous BID Omayra Narvaez MD   10 mL at 07/31/20 2028    sodium chloride flush 0.9 % injection 10 mL  10 mL Intravenous 2 times per day Omayra Narvaez MD   10 mL at 08/01/20 0905    sodium chloride flush 0.9 % injection 10 mL  10 mL Intravenous PRN Omayra Narvaez MD        acetaminophen (TYLENOL) tablet 650 mg  650 mg Oral Q6H PRN Omayra Narvaez MD        Or    acetaminophen (TYLENOL) suppository 650 mg  650 mg Rectal Q6H PRN Omayra Narvaez MD        polyethylene glycol (GLYCOLAX) packet 17 g  17 g Oral Daily PRN Omayra Narvaez MD        promethazine (PHENERGAN) tablet 12.5 mg  12.5 mg Oral Q6H PRN Omyara Narvaez MD        Or    ondansetron (ZOFRAN) injection 4 mg  4 mg Intravenous Q6H PRN Monica Alexander MD   4 mg at 07/25/20 0417    [Held by provider] enoxaparin (LOVENOX) injection 60 mg  1 mg/kg Subcutaneous Daily Omayra Narvaez MD   Stopped at 07/30/20 0900    promethazine (PHENERGAN) tablet 25 mg  25 mg Oral Q6H PRN Omayra Narvaez MD        nicotine (NICODERM CQ) 21 MG/24HR 1 patch  1 patch Transdermal Daily Monica Alexander MD   1 patch at 08/01/20 0904    diphenhydrAMINE (BENADRYL) injection 12.5 mg  12.5 mg Intravenous Nightly PRN Monica Alexander MD   12.5 mg at 07/25/20 3852    dicyclomine (BENTYL) tablet 20 mg  20 mg Oral Q6H PRN Omayra Narvaez MD        morphine injection 2 mg  2 mg Intravenous Q4H PRN Omayra Narvaez MD   2 mg at 07/25/20 1104     No Known Allergies  Active Problems:    Sepsis ([facility] [address]    Leukocytosis    MSSA bacteremia  Resolved Problems:    * No resolved hospital problems. *    Blood pressure 117/73, pulse 89, temperature 97.5 °F (36.4 °C), temperature source Rectal, resp. rate 18, height 5' 8\" (1.727 m), weight 134 lb 11.2 oz (61.1 kg), SpO2 99 %. Subjective:  Symptoms:  Worsening. Diet:  NPO.       Objective:  General Appearance: General patient appearance: pressors. Vital signs: (most recent): Blood pressure 117/73, pulse 89, temperature 97.5 °F (36.4 °C), temperature source Rectal, resp. rate 18, height 5' 8\" (1.727 m), weight 134 lb 11.2 oz (61.1 kg), SpO2 99 %. Vital signs are normal.  (Pressors). HEENT: Normal HEENT exam.    Lungs:  Normal effort. There are decreased breath sounds. Heart: Normal rate. Abdomen: Abdomen is soft. Hypoactive bowel sounds. (Staples and mild bloody drainage). Extremities: Decreased range of motion. Neurological: (Sedated). Pupils:  Pupils are equal.   Skin:  Warm. (Bilateral UE ulcers and wound large. Mild purpura foot)    Assessment & Plan  Septic shock with gram pos MSSA bacteremia 2/2 bilateral UE wound and staph UTI/pyelonephritis  -repeat bld cx with baciullus but suspect contaminant  -urine cx with staph and CT with possible pyelonephritis  -off pressors and on solucortef  Acute on chronic resp failure with suspected aspiration pneumonia  -CT PE neg for PE  -albuterol, IV flagyl, cefepime, vanc  -Echo severe HTN, hyperdynamic EF  due to dehydration  JERAMIE 2/2 ATN  -CRRT  Skin wounds/right post thigh/buttock and bilateral UE  -no abscess or osteo as per imaging  -wound cx with staph  Destructive lesion T12-L1  -possible prior infectious spondyliits  Pneumonatosis intestinalis POD #7  -s/p ex lap and neg  Severe PCM  -NPO  Acute bld loss anemia with suspected hemolytic anemia/hemoperitoneum  -hemoccult pos  -s/p 3PRBC  -CT abd with hemoperitoneum with no active extravastion  -lovenox held and GI  Consulted and hematology  -ANISHA positive, elevated retic and LDH and haptoglobin and cyroglobulin ordered  -IV PPI  Shock liver with chornic hep C  -hepatitis panel neg and follow LFTs  Gastric cancer stage 4  Polysubstance use  -UDS cocaine and opiate  -will need counseling once stable        Initial labs with marissa positive and elevated retic and LDH consistent with hemolytic anemia. Awaiting haptoglobin and add cryoglobulin with underlying hep C    Critical care time 30min from 10-1030am  Carlo Purvis MD  8/1/2020

## 2020-08-01 NOTE — PROGRESS NOTES
General Surgery- [facility] Day: 9    Chief Complaint on Admission: Sepsis      Subjective:     Had bleeding from incision site yesterday and had sutures placed at bedside. Went for CT A/P and CTA. Did receive blood products. Had dressing changed at 4 am with some soaking. Since that time, dressing is C/D/I.     ROS:  Review of Systems   Unable to perform ROS: Intubated       Allergies  Patient has no known allergies. Diagnosis Date    Hypertension        Objective:     Vitals:    20 1122   BP:    Pulse:    Resp: 18   Temp:    SpO2: 99%       TEMPERATURE:  Current - Temp: 97.5 °F (36.4 °C); Max - Temp  Av.4 °F (36.3 °C)  Min: 96.8 °F (36 °C)  Max: 98.2 °F (36.8 °C)    No intake/output data recorded. I/O last 3 completed shifts: In: 3.9 [I.V.:489.9; Blood:989; NG/GT:170; IV Piggyback:445]  Out: 210 [Emesis/NG output:210]      Physical Exam:  Physical Exam  Constitutional:       Appearance: He is well-developed. Interventions: He is sedated and intubated. HENT:      Head: Normocephalic. Eyes:      Pupils: Pupils are equal, round, and reactive to light. Cardiovascular:      Rate and Rhythm: Normal rate. Pulmonary:      Effort: Pulmonary effort is normal. He is intubated. Abdominal:      General: There is no distension. Palpations: Abdomen is soft. There is no mass. Tenderness: There is no abdominal tenderness. There is no guarding or rebound. Comments: Midline incision well approximated with staples in place. Very minimal s/s drainage when pressure applied.  Dressing is C/D/I and was changed around 430 am (7 hours ago)             Scheduled Meds:   cefepime  2 g Intravenous Once per day on  Sat    vancomycin (VANCOCIN) intermittent dosing (placeholder)   Other RX Placeholder    metroNIDAZOLE  500 mg Intravenous Q8H    pantoprazole  40 mg Intravenous Daily    hydrocortisone sodium succinate PF  100 mg Intravenous Q8H    thiamine (VITAMIN B1)

## 2020-08-01 NOTE — PROGRESS NOTES
[address] [facility]  consulted by Dr. Zoey Villa for monitoring and adjustment. Indication for treatment: MSSA bacteremia, GPB in blood   Goal trough: 10-15 mcg/mL     Pertinent Laboratory Values:   Temp Readings from Last 3 Encounters:   08/01/20 97.5 °F (36.4 °C) (Rectal)   07/25/20 (!) 62.3 °F (16.8 °C)     Recent Labs     07/30/20  0505 07/31/20  0505 08/01/20  0510   WBC 19.5* 30.0* 27.7*     Recent Labs     07/30/20  0500 07/31/20  0505 08/01/20  0510   BUN 26* 67* 101*   CREATININE 1.2 2.5* 3.5*     Estimated Creatinine Clearance: 19 mL/min (A) (based on SCr of 3.5 mg/dL (H)). Intake/Output Summary (Last 24 hours) at 8/1/2020 1210  Last data filed at 8/1/2020 0528  Gross per 24 hour   Intake 1437.89 ml   Output 210 ml   Net 1227.89 ml       Pertinent Cultures:  Date    Source    Results  7/24   Blood    MSSA  7/27   Blood    GPB- ID and sensitivity pending  7/29   Blood    NGTD     Vancomycin level:   TROUGH:    No results for input(s): VANCOTROUGH in the last 72 hours. RANDOM:    Recent Labs     07/30/20  0505 07/31/20  0505 08/01/20  0510   VANCORANDOM 20.8 35.7 31.6       Assessment:  · WBC and temperature: WBC slightly improved, pt afebrile  · SCr, BUN, and urine output:   · CRRT stopped  · Patient anuric, HD session today and scheduled for Tues-Thurs-Sat  · Day(s) of therapy: 9  · Vancomycin level: 7/31 - 35.7, supra-therapeutic 18h post-dose                                       8/1 - 31.6, supra-therapeutic    Plan:  · Continue intermittent dosing based on levels 2/2 RRT   · Level this AM remains elevated. Continue to Hold additional vancomycin at this time. · Will follow the vanco random level daily. · Pharmacy will continue to monitor patient and adjust therapy as indicated    [address] 8/2 @06:00    Thank you for the consult,  Kayce Macedo Milks  8/1/2020 12:10 PM

## 2020-08-01 NOTE — PLAN OF CARE
imbalance  Description: Will show no signs or symptoms of fluid imbalance  Outcome: Ongoing     Problem: Physical Regulation:  Goal: Ability to maintain clinical measurements within normal limits will improve  Description: Ability to maintain clinical measurements within normal limits will improve  Outcome: Ongoing  Goal: Will show no signs and symptoms of electrolyte imbalance  Description: Will show no signs and symptoms of electrolyte imbalance  Outcome: Ongoing     Problem: Skin Integrity:  Goal: Will show no infection signs and symptoms  Description: Will show no infection signs and symptoms  Outcome: Ongoing  Goal: Absence of new skin breakdown  Description: Absence of new skin breakdown  Outcome: Ongoing  Goal: Status of oral mucous membranes will improve  Description: Status of oral mucous membranes will improve  Outcome: Ongoing  Goal: Skin integrity will be maintained  Description: Skin integrity will be maintained  Outcome: Ongoing     Problem: Discharge Planning:  Goal: Participates in care planning  Description: Participates in care planning  Outcome: Ongoing  Goal: Discharged to appropriate level of care  Description: Discharged to appropriate level of care  Outcome: Ongoing     Problem: Airway Clearance - Ineffective:  Goal: Ability to maintain a clear airway will improve  Description: Ability to maintain a clear airway will improve  Outcome: Ongoing     Problem: Anxiety/Stress:  Goal: Level of anxiety will decrease  Description: Level of anxiety will decrease  Outcome: Ongoing     Problem: Aspiration:  Goal: Absence of aspiration  Description: Absence of aspiration  Outcome: Ongoing     Problem:  Bowel Function - Altered:  Goal: Bowel elimination is within specified parameters  Description: Bowel elimination is within specified parameters  Outcome: Ongoing     Problem: Cardiac Output - Decreased:  Goal: Hemodynamic stability will improve  Description: Hemodynamic stability will improve  Outcome: Appears well-rested  Outcome: Ongoing     Problem: Tissue Perfusion, Altered:  Goal: Circulatory function within specified parameters  Description: Circulatory function within specified parameters  Outcome: Ongoing     Problem: Tissue Perfusion - Cardiopulmonary, Altered:  Goal: Absence of angina  Description: Absence of angina  Outcome: Ongoing  Goal: Hemodynamic stability will improve  Description: Hemodynamic stability will improve  Outcome: Ongoing     Problem: Infection - Ventilator-Associated Pneumonia:  Goal: Absence of pulmonary infection  Description: Absence of pulmonary infection  Outcome: Ongoing     Problem: Venous Thromboembolism:  Goal: Will show no signs or symptoms of venous thromboembolism  Description: Will show no signs or symptoms of venous thromboembolism  Outcome: Ongoing  Goal: Absence of signs or symptoms of impaired coagulation  Description: Absence of signs or symptoms of impaired coagulation  Outcome: Ongoing

## 2020-08-02 LAB
ABO/RH: NORMAL
ALBUMIN SERPL-MCNC: 2.9 GM/DL (ref 3.4–5)
ALP BLD-CCNC: 86 IU/L (ref 40–128)
ALT SERPL-CCNC: <5 U/L (ref 10–40)
ANION GAP SERPL CALCULATED.3IONS-SCNC: 15 MMOL/L (ref 4–16)
ANTIBODY SCREEN: NEGATIVE
APTT: 28 SECONDS (ref 25.1–37.1)
AST SERPL-CCNC: 39 IU/L (ref 15–37)
BASE EXCESS: 3 (ref 0–3.3)
BASOPHILS ABSOLUTE: 0 K/CU MM
BASOPHILS RELATIVE PERCENT: 0.2 % (ref 0–1)
BILIRUB SERPL-MCNC: 1.5 MG/DL (ref 0–1)
BUN BLDV-MCNC: 67 MG/DL (ref 6–23)
CALCIUM SERPL-MCNC: 9 MG/DL (ref 8.3–10.6)
CARBON MONOXIDE, BLOOD: 1.8 % (ref 0–5)
CHLORIDE BLD-SCNC: 101 MMOL/L (ref 99–110)
CO2 CONTENT: 22.8 MMOL/L (ref 19–24)
CO2: 22 MMOL/L (ref 21–32)
COMMENT: ABNORMAL
COMPONENT: NORMAL
CREAT SERPL-MCNC: 2.8 MG/DL (ref 0.9–1.3)
CROSSMATCH RESULT: NORMAL
DIFFERENTIAL TYPE: ABNORMAL
DOSE AMOUNT: NORMAL
DOSE TIME: NORMAL
EOSINOPHILS ABSOLUTE: 0 K/CU MM
EOSINOPHILS RELATIVE PERCENT: 0 % (ref 0–3)
GFR AFRICAN AMERICAN: 28 ML/MIN/1.73M2
GFR NON-AFRICAN AMERICAN: 23 ML/MIN/1.73M2
GLUCOSE BLD-MCNC: 136 MG/DL (ref 70–99)
HCO3 ARTERIAL: 21.7 MMOL/L (ref 18–23)
HCT VFR BLD CALC: 27 % (ref 42–52)
HEMOGLOBIN: 8.1 GM/DL (ref 13.5–18)
IMMATURE NEUTROPHIL %: 1.3 % (ref 0–0.43)
INR BLD: 1.16 INDEX
LYMPHOCYTES ABSOLUTE: 0.5 K/CU MM
LYMPHOCYTES RELATIVE PERCENT: 2.5 % (ref 24–44)
MCH RBC QN AUTO: 27.1 PG (ref 27–31)
MCHC RBC AUTO-ENTMCNC: 30 % (ref 32–36)
MCV RBC AUTO: 90.3 FL (ref 78–100)
METHEMOGLOBIN ARTERIAL: 1.1 %
MONOCYTES ABSOLUTE: 0.8 K/CU MM
MONOCYTES RELATIVE PERCENT: 4.1 % (ref 0–4)
O2 SATURATION: 96 % (ref 96–97)
PCO2 ARTERIAL: 35 MMHG (ref 32–45)
PDW BLD-RTO: 22 % (ref 11.7–14.9)
PH BLOOD: 7.4 (ref 7.34–7.45)
PLATELET # BLD: 158 K/CU MM (ref 140–440)
PMV BLD AUTO: 10.5 FL (ref 7.5–11.1)
PO2 ARTERIAL: 108 MMHG (ref 75–100)
POTASSIUM SERPL-SCNC: 5 MMOL/L (ref 3.5–5.1)
PROTHROMBIN TIME: 14.1 SECONDS (ref 11.7–14.5)
RBC # BLD: 2.99 M/CU MM (ref 4.6–6.2)
SEGMENTED NEUTROPHILS ABSOLUTE COUNT: 17.5 K/CU MM
SEGMENTED NEUTROPHILS RELATIVE PERCENT: 91.9 % (ref 36–66)
SODIUM BLD-SCNC: 138 MMOL/L (ref 135–145)
STATUS: NORMAL
TOTAL CK: 44 IU/L (ref 38–174)
TOTAL IMMATURE NEUTOROPHIL: 0.25 K/CU MM
TOTAL PROTEIN: 7.1 GM/DL (ref 6.4–8.2)
TRANSFUSION STATUS: NORMAL
UNIT DIVISION: 0
UNIT NUMBER: NORMAL
VANCOMYCIN RANDOM: 24.1 UG/ML
WBC # BLD: 19 K/CU MM (ref 4–10.5)

## 2020-08-02 PROCEDURE — 82550 ASSAY OF CK (CPK): CPT

## 2020-08-02 PROCEDURE — 80053 COMPREHEN METABOLIC PANEL: CPT

## 2020-08-02 PROCEDURE — 99233 SBSQ HOSP IP/OBS HIGH 50: CPT | Performed by: INTERNAL MEDICINE

## 2020-08-02 PROCEDURE — 94640 AIRWAY INHALATION TREATMENT: CPT

## 2020-08-02 PROCEDURE — 2580000003 HC RX 258: Performed by: SURGERY

## 2020-08-02 PROCEDURE — 89220 SPUTUM SPECIMEN COLLECTION: CPT

## 2020-08-02 PROCEDURE — 85730 THROMBOPLASTIN TIME PARTIAL: CPT

## 2020-08-02 PROCEDURE — 85025 COMPLETE CBC W/AUTO DIFF WBC: CPT

## 2020-08-02 PROCEDURE — 2500000003 HC RX 250 WO HCPCS: Performed by: INTERNAL MEDICINE

## 2020-08-02 PROCEDURE — 99024 POSTOP FOLLOW-UP VISIT: CPT | Performed by: SURGERY

## 2020-08-02 PROCEDURE — 85610 PROTHROMBIN TIME: CPT

## 2020-08-02 PROCEDURE — 6370000000 HC RX 637 (ALT 250 FOR IP): Performed by: INTERNAL MEDICINE

## 2020-08-02 PROCEDURE — 94761 N-INVAS EAR/PLS OXIMETRY MLT: CPT

## 2020-08-02 PROCEDURE — 6360000002 HC RX W HCPCS: Performed by: SPECIALIST

## 2020-08-02 PROCEDURE — 2700000000 HC OXYGEN THERAPY PER DAY

## 2020-08-02 PROCEDURE — 94003 VENT MGMT INPAT SUBQ DAY: CPT

## 2020-08-02 PROCEDURE — 6360000002 HC RX W HCPCS: Performed by: INTERNAL MEDICINE

## 2020-08-02 PROCEDURE — C9113 INJ PANTOPRAZOLE SODIUM, VIA: HCPCS | Performed by: SPECIALIST

## 2020-08-02 PROCEDURE — 6360000002 HC RX W HCPCS: Performed by: NURSE PRACTITIONER

## 2020-08-02 PROCEDURE — 80202 ASSAY OF VANCOMYCIN: CPT

## 2020-08-02 PROCEDURE — 6370000000 HC RX 637 (ALT 250 FOR IP): Performed by: SURGERY

## 2020-08-02 PROCEDURE — 6370000000 HC RX 637 (ALT 250 FOR IP): Performed by: HOSPITALIST

## 2020-08-02 PROCEDURE — 2580000003 HC RX 258: Performed by: INTERNAL MEDICINE

## 2020-08-02 PROCEDURE — 82803 BLOOD GASES ANY COMBINATION: CPT

## 2020-08-02 PROCEDURE — 2000000000 HC ICU R&B

## 2020-08-02 RX ORDER — SCOLOPAMINE TRANSDERMAL SYSTEM 1 MG/1
1 PATCH, EXTENDED RELEASE TRANSDERMAL
Status: DISCONTINUED | OUTPATIENT
Start: 2020-08-02 | End: 2020-08-15 | Stop reason: HOSPADM

## 2020-08-02 RX ADMIN — ALBUTEROL SULFATE 4 PUFF: 90 AEROSOL, METERED RESPIRATORY (INHALATION) at 15:40

## 2020-08-02 RX ADMIN — SODIUM CHLORIDE, PRESERVATIVE FREE 10 ML: 5 INJECTION INTRAVENOUS at 10:34

## 2020-08-02 RX ADMIN — IPRATROPIUM BROMIDE 4 PUFF: 17 AEROSOL, METERED RESPIRATORY (INHALATION) at 11:38

## 2020-08-02 RX ADMIN — METRONIDAZOLE 500 MG: 500 INJECTION, SOLUTION INTRAVENOUS at 10:32

## 2020-08-02 RX ADMIN — ALBUTEROL SULFATE 4 PUFF: 90 AEROSOL, METERED RESPIRATORY (INHALATION) at 07:41

## 2020-08-02 RX ADMIN — ALBUTEROL SULFATE 4 PUFF: 90 AEROSOL, METERED RESPIRATORY (INHALATION) at 20:26

## 2020-08-02 RX ADMIN — THIAMINE HYDROCHLORIDE 200 MG: 100 INJECTION, SOLUTION INTRAMUSCULAR; INTRAVENOUS at 22:48

## 2020-08-02 RX ADMIN — ALBUTEROL SULFATE 4 PUFF: 90 AEROSOL, METERED RESPIRATORY (INHALATION) at 01:18

## 2020-08-02 RX ADMIN — CHLORHEXIDINE GLUCONATE 0.12% ORAL RINSE 15 ML: 1.2 LIQUID ORAL at 10:33

## 2020-08-02 RX ADMIN — METHYLPREDNISOLONE SODIUM SUCCINATE 60 MG: 125 INJECTION, POWDER, FOR SOLUTION INTRAMUSCULAR; INTRAVENOUS at 10:33

## 2020-08-02 RX ADMIN — ALBUTEROL SULFATE 4 PUFF: 90 AEROSOL, METERED RESPIRATORY (INHALATION) at 04:28

## 2020-08-02 RX ADMIN — IPRATROPIUM BROMIDE 4 PUFF: 17 AEROSOL, METERED RESPIRATORY (INHALATION) at 07:42

## 2020-08-02 RX ADMIN — SODIUM CHLORIDE, PRESERVATIVE FREE 10 ML: 5 INJECTION INTRAVENOUS at 20:57

## 2020-08-02 RX ADMIN — METRONIDAZOLE 500 MG: 500 INJECTION, SOLUTION INTRAVENOUS at 15:21

## 2020-08-02 RX ADMIN — SODIUM CHLORIDE, PRESERVATIVE FREE 10 ML: 5 INJECTION INTRAVENOUS at 20:58

## 2020-08-02 RX ADMIN — PROPOFOL 25 MCG/KG/MIN: 10 INJECTION, EMULSION INTRAVENOUS at 22:48

## 2020-08-02 RX ADMIN — ALBUTEROL SULFATE 4 PUFF: 90 AEROSOL, METERED RESPIRATORY (INHALATION) at 11:37

## 2020-08-02 RX ADMIN — CHLORHEXIDINE GLUCONATE 0.12% ORAL RINSE 15 ML: 1.2 LIQUID ORAL at 20:57

## 2020-08-02 RX ADMIN — Medication 75 MCG/HR: at 23:10

## 2020-08-02 RX ADMIN — IPRATROPIUM BROMIDE 4 PUFF: 17 AEROSOL, METERED RESPIRATORY (INHALATION) at 20:26

## 2020-08-02 RX ADMIN — THIAMINE HYDROCHLORIDE 200 MG: 100 INJECTION, SOLUTION INTRAMUSCULAR; INTRAVENOUS at 15:21

## 2020-08-02 RX ADMIN — METRONIDAZOLE 500 MG: 500 INJECTION, SOLUTION INTRAVENOUS at 22:48

## 2020-08-02 RX ADMIN — IPRATROPIUM BROMIDE 4 PUFF: 17 AEROSOL, METERED RESPIRATORY (INHALATION) at 01:18

## 2020-08-02 RX ADMIN — Medication 75 MCG/HR: at 12:43

## 2020-08-02 RX ADMIN — PROPOFOL 25 MCG/KG/MIN: 10 INJECTION, EMULSION INTRAVENOUS at 10:43

## 2020-08-02 RX ADMIN — IPRATROPIUM BROMIDE 4 PUFF: 17 AEROSOL, METERED RESPIRATORY (INHALATION) at 04:28

## 2020-08-02 RX ADMIN — IPRATROPIUM BROMIDE 4 PUFF: 17 AEROSOL, METERED RESPIRATORY (INHALATION) at 15:41

## 2020-08-02 RX ADMIN — PANTOPRAZOLE SODIUM 40 MG: 40 INJECTION, POWDER, FOR SOLUTION INTRAVENOUS at 10:33

## 2020-08-02 ASSESSMENT — PULMONARY FUNCTION TESTS
PIF_VALUE: 20
PIF_VALUE: 18
PIF_VALUE: 20
PIF_VALUE: 21
PIF_VALUE: 19
PIF_VALUE: 19
PIF_VALUE: 15
PIF_VALUE: 15
PIF_VALUE: 18
PIF_VALUE: 19
PIF_VALUE: 19

## 2020-08-02 NOTE — PROGRESS NOTES
Naya Martínez is a 60 y.o. male patient intubated and frothy secretion noted.  Opens eyes     Current Facility-Administered Medications   Medication Dose Route Frequency Provider Last Rate Last Dose    scopolamine (TRANSDERM-SCOP) transdermal patch 1 patch  1 patch Transdermal Q72H Kathy Chávez MD   1 patch at 08/02/20 1035    methylPREDNISolone sodium (SOLU-MEDROL) injection 60 mg  60 mg Intravenous Daily Cierra Villalta, APRN - CNP   60 mg at 08/02/20 1033    cefepime (MAXIPIME) 2 g IVPB minibag  2 g Intravenous Once per day on Tue Thu Sat Avinash Lucero MD   Stopped at 08/01/20 1300    vancomycin (VANCOCIN) intermittent dosing (placeholder)   Other RX Placeholder Avinash Lucero MD        heparin (porcine) injection 3,000 Units  3,000 Units Intravenous PRN Christelle Deshpande DO   3,000 Units at 07/30/20 0805    metronidazole (FLAGYL) 500 mg in NaCl 100 mL IVPB premix  500 mg Intravenous Q8H Avinash Lucero  mL/hr at 08/02/20 1032 500 mg at 08/02/20 1032    fentanyl (SUBLIMAZE) 1,000 mcg in sodium chloride 0.9% 100 mL infusion  25 mcg/hr Intravenous Continuous Igor Langley MD 2.5 mL/hr at 08/02/20 0823 25 mcg/hr at 08/02/20 0823    pantoprazole (PROTONIX) injection 40 mg  40 mg Intravenous Daily Kenneth Stephens MD   40 mg at 08/02/20 1033    thiamine (B-1) 200 mg in sodium chloride 0.9 % 100 mL IVPB  200 mg Intravenous Q12H Sathish Quintero MD   Stopped at 08/02/20 0005    propofol injection  10 mcg/kg/min Intravenous Titrated Sathish Quintero MD 8.6 mL/hr at 08/02/20 1043 25 mcg/kg/min at 08/02/20 1043    ipratropium-albuterol (DUONEB) nebulizer solution 1 ampule  1 ampule Inhalation Q4H PRN Charisse Johnson MD        chlorhexidine (PERIDEX) 0.12 % solution 15 mL  15 mL Mouth/Throat BID Igor Langley MD   15 mL at 08/02/20 1033    albuterol sulfate  (90 Base) MCG/ACT inhaler 4 puff  4 puff Inhalation Q4H Igor Langley MD   4 puff at 08/02/20 0741    And    ipratropium (ATROVENT HFA) 17 MCG/ACT inhaler 4 puff  4 puff Inhalation Q4H Rae Lindo MD   4 puff at 08/02/20 3038    aspirin chewable tablet 324 mg  324 mg Oral Once Julisa Mckee MD        iopamidol (ISOVUE-370) 76 % injection 80 mL  80 mL Intravenous ONCE PRN Julisa Mckee MD        sodium chloride flush 0.9 % injection 10 mL  10 mL Intravenous BID Julisa Mckee MD   10 mL at 08/01/20 2039    sodium chloride flush 0.9 % injection 10 mL  10 mL Intravenous 2 times per day Julisa Mckee MD   10 mL at 08/02/20 1034    sodium chloride flush 0.9 % injection 10 mL  10 mL Intravenous PRN Julisa Mckee MD        acetaminophen (TYLENOL) tablet 650 mg  650 mg Oral Q6H PRN Julisa Mckee MD        Or    acetaminophen (TYLENOL) suppository 650 mg  650 mg Rectal Q6H PRN Julisa Mckee MD        polyethylene glycol (GLYCOLAX) packet 17 g  17 g Oral Daily PRN Julisa Mckee MD        promethazine (PHENERGAN) tablet 12.5 mg  12.5 mg Oral Q6H PRN Julisa Mckee MD        Or    ondansetron (ZOFRAN) injection 4 mg  4 mg Intravenous Q6H PRN Julisa Mckee MD   4 mg at 07/25/20 0417    [Held by provider] enoxaparin (LOVENOX) injection 60 mg  1 mg/kg Subcutaneous Daily Julisa Mckee MD   Stopped at 07/30/20 0900    promethazine (PHENERGAN) tablet 25 mg  25 mg Oral Q6H PRN Julisa Mckee MD        nicotine (NICODERM CQ) 21 MG/24HR 1 patch  1 patch Transdermal Daily Monica Alexander MD   1 patch at 08/02/20 1034    diphenhydrAMINE (BENADRYL) injection 12.5 mg  12.5 mg Intravenous Nightly PRN Monica Alexander MD   12.5 mg at 07/25/20 0223    dicyclomine (BENTYL) tablet 20 mg  20 mg Oral Q6H PRN Julisa Mckee MD        morphine injection 2 mg  2 mg Intravenous Q4H PRN Julisa Mckee MD   2 mg at 07/25/20 1104     No Known Allergies  Active Problems:    Sepsis ([address]    Leukocytosis    MSSA bacteremia  Resolved Problems:    * No resolved hospital problems.  *    Blood pressure 112/74, pulse 94, temperature 97.9 °F (36.6 °C), temperature source Rectal, resp. rate (!) 32, height 5' 8\" (1.727 m), weight 134 lb 11.2 oz (61.1 kg), SpO2 99 %. Subjective:  Symptoms:  Worsening. Diet:  NPO. Objective:  General Appearance:  General patient appearance: pressors. Vital signs: (most recent): Blood pressure 112/74, pulse 94, temperature 97.9 °F (36.6 °C), temperature source Rectal, resp. rate (!) 32, height 5' 8\" (1.727 m), weight 134 lb 11.2 oz (61.1 kg), SpO2 99 %. Vital signs are normal.  (Pressors). HEENT: Normal HEENT exam.    Lungs:  Normal effort. There are decreased breath sounds. Heart: Normal rate. Abdomen: Abdomen is soft. Hypoactive bowel sounds. (Dressing). Extremities: Decreased range of motion. Neurological: (Opens eyes today). Pupils:  Pupils are equal.   Skin:  Warm. (Bilateral UE ulcers and wound large.  Mild purpura foot)    Assessment & Plan  Septic shock with gram pos MSSA bacteremia 2/2 bilateral UE wound and staph UTI/pyelonephritis  -repeat bld cx with baciullus but suspect contaminant  -urine cx with staph and CT with possible pyelonephritis  -off pressors   Acute on chronic resp failure with suspected aspiration pneumonia  -CT PE neg for PE  -albuterol, IV flagyl, cefepime, vanc  -Echo severe HTN, hyperdynamic EF  due to dehydration  JERAMIE 2/2 ATN  -CRRT  Skin wounds/right post thigh/buttock and bilateral UE  -no abscess or osteo as per imaging  -wound cx with staph  Destructive lesion T12-L1  -possible prior infectious spondyliits  Pneumonatosis intestinalis POD #8  -s/p ex lap and neg  Severe PCM  -NPO  Acute bld loss anemia with suspected hemolytic anemia/DIC/hemoperitoneum  -hemoccult pos  -s/p 3PRBC  -CT abd with hemoperitoneum with no active extravastion  -lovenox held and GI  Consulted and hematology  -ANISHA positive, elevated retic and LDH and haptoglobin and cyroglobulin ordered  -IV PPI  -on solumedrol 1mg/kg  Shock liver with chornic hep C  -hepatitis panel neg and follow LFTs  Gastric cancer stage 4  Polysubstance use  -UDS cocaine and opiate  -will need counseling once stable          Monitor and Hb stable. If hb drops again then cryoprecipitate as fibrinogen is low. Work up for anemia in process.      Critical care time 30min from 10-1030am  Nahun Mcginnis MD  8/2/2020

## 2020-08-02 NOTE — PROGRESS NOTES
Nephrology Progress Note        [address], [address] [facility], [facility] [address]  Phone: [phone]  Office Hours: 8:30AM - 4:30PM  Monday - Friday       [facility]  Acacia Dockery MD  [address], [address],  [place] [address]  PHONE: [phone]  FAX: [phone]    8/2/2020 10:13 AM  Subjective:   Admit Date: 7/24/2020  PCP: No primary care provider on file. Interval History: oligo anuric  Back on sedation as he became tachypneic  Intubated  Covering for Dr Kyara Higgins: DIET TUBE FEED CONTINUOUS/CYCLIC NPO; Low Calorie High Protein (Vital High Protein);  Orogastric; Continuous; 20; 75; 24  Dietary Nutrition Supplements: Snack (see comment)      Data:   Scheduled Meds:   scopolamine  1 patch Transdermal Q72H    methylPREDNISolone  60 mg Intravenous Daily    cefepime  2 g Intravenous Once per day on Tue Thu Sat    vancomycin (VANCOCIN) intermittent dosing (placeholder)   Other RX Placeholder    metroNIDAZOLE  500 mg Intravenous Q8H    pantoprazole  40 mg Intravenous Daily    thiamine (VITAMIN B1) IVPB  200 mg Intravenous Q12H    chlorhexidine  15 mL Mouth/Throat BID    albuterol sulfate HFA  4 puff Inhalation Q4H    And    ipratropium  4 puff Inhalation Q4H    aspirin  324 mg Oral Once    sodium chloride flush  10 mL Intravenous BID    sodium chloride flush  10 mL Intravenous 2 times per day    [Held by provider] enoxaparin  1 mg/kg Subcutaneous Daily    nicotine  1 patch Transdermal Daily     Continuous Infusions:   fentanyl 25 mcg/hr (08/02/20 0823)    propofol 20 mcg/kg/min (08/02/20 0837)     PRN Meds:heparin (porcine), ipratropium-albuterol, iopamidol, sodium chloride flush, acetaminophen **OR** acetaminophen, polyethylene glycol, promethazine **OR** ondansetron, promethazine, diphenhydrAMINE, dicyclomine, morphine  I/O last 3 completed shifts:  In: -   Out: 250 [Emesis/NG output:250]  No intake/output data recorded. Intake/Output Summary (Last 24 hours) at 8/2/2020 1013  Last data filed at 8/2/2020 0000  Gross per 24 hour   Intake --   Output 250 ml   Net -250 ml       CBC:   Recent Labs     07/31/20  0505  07/31/20  2141 08/01/20  0510 08/02/20  0436   WBC 30.0*  --   --  27.7* 19.0*   HGB 7.4*   < > 7.7* 7.9* 8.1*     --   --  132* 158    < > = values in this interval not displayed. BMP:    Recent Labs     07/31/20  0505 08/01/20  0510 08/02/20  0436    136 138   K 4.0 5.1 5.0    98* 101   CO2 21 22 22   BUN 67* 101* 67*   CREATININE 2.5* 3.5* 2.8*   GLUCOSE 218* 149* 136*     Hepatic:   Recent Labs     07/31/20  0505 08/01/20  0510 08/02/20  0436   AST 92* 54* 39*   ALT <5* <5* <5*   BILITOT 1.1* 1.3* 1.5*   ALKPHOS 89 84 86     Troponin: No results for input(s): TROPONINI in the last 72 hours. BNP: No results for input(s): BNP in the last 72 hours. Lipids: No results for input(s): CHOL, HDL in the last 72 hours.     Invalid input(s): LDLCALCU  ABGs:   Lab Results   Component Value Date    PO2ART 108 08/02/2020    NMX5KGD 35.0 08/02/2020     INR:   Recent Labs     07/31/20  0505 08/01/20  0510 08/02/20 0436   INR 1.42 1.25 1.16       Objective:   Vitals: /74   Pulse 94   Temp 97.9 °F (36.6 °C) (Rectal)   Resp (!) 32   Ht 5' 8\" (1.727 m)   Wt 134 lb 11.2 oz (61.1 kg)   SpO2 99%   BMI 20.48 kg/m²   General appearance: , in no acute distress  HEENT: normocephalic, atraumatic, et  tube in place  Neck: supple, trachea midline  Lungs: breathing comfortably on mv  Heart[de-identified] regular rate and rhythm, S1, S2 normal,  Abdomen:  non distended,  Extremities: no leg  edema      Assessment and Plan:      Leukocytosis      Sepsis ([facility] [address] 07/24/2020        JERAMIE FROM ATN; CRRT FROM FROM 7/5 TO 7/8  ANION GAP METABOLIC ACIDOSIS  LACTIC ACIDOSIS  ISCHEMIC BOWEL S/P SURGERY YESTERDAY  STAPH BACTEREMIA  ACUTE HYPOXIC RESP FAILURE  HEROIN AND COCAINE USE  Ischemic hepatitis    Suggest:  Cont

## 2020-08-02 NOTE — PROGRESS NOTES
[address] [facility]  consulted by Dr. Thang Cedeno for monitoring and adjustment. Indication for treatment: MSSA bacteremia, GPB in blood   Goal trough: 10-15 mcg/mL     Pertinent Laboratory Values:   Temp Readings from Last 3 Encounters:   08/02/20 97.9 °F (36.6 °C) (Rectal)   07/25/20 (!) 62.3 °F (16.8 °C)     Recent Labs     07/31/20  0505 08/01/20  0510 08/02/20  0436   WBC 30.0* 27.7* 19.0*     Recent Labs     07/31/20  0505 08/01/20  0510 08/02/20  0436   BUN 67* 101* 67*   CREATININE 2.5* 3.5* 2.8*     Estimated Creatinine Clearance: 24 mL/min (A) (based on SCr of 2.8 mg/dL (H)). Intake/Output Summary (Last 24 hours) at 8/2/2020 1113  Last data filed at 8/2/2020 0000  Gross per 24 hour   Intake --   Output 250 ml   Net -250 ml       Pertinent Cultures:  Date    Source    Results  7/24   Blood    MSSA  7/27   Blood    GPB- ID and sensitivity pending  7/29   Blood    NGTD     Vancomycin level:   TROUGH:    No results for input(s): VANCOTROUGH in the last 72 hours. RANDOM:    Recent Labs     07/31/20  0505 08/01/20  0510 08/02/20  0436   VANCORANDOM 35.7 31.6 24.1       Assessment:  · WBC and temperature: WBC improved down to 19, pt remains afebrile  · SCr, BUN, and urine output:   · CRRT stopped  · Patient anuric, HD session yesterday and scheduled for Tues-Thurs-Sat  · Day(s) of therapy: 10  · Vancomycin level: 7/31 - 35.7, supra-therapeutic 18h post-dose                                       8/1 - 31.6, supra-therapeutic                                       8/2 - 24.1, supra-therapeutic    Plan:  · Continue intermittent dosing based on levels 2/2 RRT   · Level this AM remains elevated. Continue to Hold additional vancomycin at this time. · Will follow the vanco random level daily. · Pharmacy will continue to monitor patient and adjust therapy as indicated    [address] 8/3 @06:00    Thank you for the consult,  Bety Championly  8/2/2020 11:13 AM

## 2020-08-02 NOTE — PROGRESS NOTES
Patient with increased respiratory rate, low tidal volumes, and increased agitation. Placed back on AC vent settings.

## 2020-08-02 NOTE — PROGRESS NOTES
General Surgery- [facility] Day: 10    Chief Complaint on Admission: Sepsis      Subjective:     Events reviewed. Seen by Monique. Concern that bleeding is from DIC 2/2 infection  Attempted to wean from vent but was unable. ROS:  Review of Systems   Unable to perform ROS: Intubated       Allergies  Patient has no known allergies. Diagnosis Date    Hypertension        Objective:     Vitals:    20 0803   BP: 112/74   Pulse: 94   Resp: (!) 32   Temp: 97.9 °F (36.6 °C)   SpO2: 99%       TEMPERATURE:  Current - Temp: 97.9 °F (36.6 °C); Max - Temp  Av.8 °F (36.6 °C)  Min: 97.5 °F (36.4 °C)  Max: 98 °F (36.7 °C)    No intake/output data recorded. I/O last 3 completed shifts:  In: -   Out: 250 [Emesis/NG output:250]      Physical Exam:  Physical Exam  Constitutional:       Appearance: He is well-developed. Interventions: He is sedated and intubated. HENT:      Head: Normocephalic. Eyes:      Pupils: Pupils are equal, round, and reactive to light. Cardiovascular:      Rate and Rhythm: Normal rate. Pulmonary:      Effort: Pulmonary effort is normal. He is intubated. Abdominal:      General: There is no distension. Palpations: Abdomen is soft. There is no mass. Tenderness: There is no abdominal tenderness. There is no guarding or rebound. Comments: Midline incision well approximated with staples in place.  Dressing C/D/I              Scheduled Meds:   scopolamine  1 patch Transdermal Q72H    methylPREDNISolone  60 mg Intravenous Daily    cefepime  2 g Intravenous Once per day on  Sat    vancomycin (VANCOCIN) intermittent dosing (placeholder)   Other RX Placeholder    metroNIDAZOLE  500 mg Intravenous Q8H    pantoprazole  40 mg Intravenous Daily    thiamine (VITAMIN B1) IVPB  200 mg Intravenous Q12H    chlorhexidine  15 mL Mouth/Throat BID    albuterol sulfate HFA  4 puff Inhalation Q4H    And    ipratropium  4 puff Inhalation Q4H    aspirin  324 mg Oral Once    sodium chloride flush  10 mL Intravenous BID    sodium chloride flush  10 mL Intravenous 2 times per day    [Held by provider] enoxaparin  1 mg/kg Subcutaneous Daily    nicotine  1 patch Transdermal Daily     Continuous Infusions:   fentanyl 25 mcg/hr (08/02/20 0823)    propofol 25 mcg/kg/min (08/02/20 1043)     PRN Meds:heparin (porcine), ipratropium-albuterol, iopamidol, sodium chloride flush, acetaminophen **OR** acetaminophen, polyethylene glycol, promethazine **OR** ondansetron, promethazine, diphenhydrAMINE, dicyclomine, morphine      Labs/Imaging Results:   Lab Results   Component Value Date    WBC 19.0 (H) 08/02/2020    HGB 8.1 (L) 08/02/2020    HCT 27.0 (L) 08/02/2020    MCV 90.3 08/02/2020     08/02/2020     Lab Results   Component Value Date     08/02/2020    K 5.0 08/02/2020     08/02/2020    CO2 22 08/02/2020    BUN 67 (H) 08/02/2020    CREATININE 2.8 (H) 08/02/2020    GLUCOSE 136 (H) 08/02/2020    CALCIUM 9.0 08/02/2020    PROT 7.1 08/02/2020    LABALBU 2.9 (L) 08/02/2020    BILITOT 1.5 (H) 08/02/2020    ALKPHOS 86 08/02/2020    AST 39 (H) 08/02/2020    ALT <5 (L) 08/02/2020    LABGLOM 23 (L) 08/02/2020    GFRAA 28 (L) 08/02/2020       Assessment:     Patient Active Problem List:     Sepsis ([facility] [address]     Leukocytosis     Ischemic bowel disease ([facility] [address]     MSSA bacteremia    Plan:     Continue weaning vent per pulmonary  Bilateral upper extremity wounds: continue local wound care using Santyl. Medical mgmt per primary. Transfuse PRN  Appreciate Heme eval and recs. Plan noted for Palliative Care to meet with son last Friday at 10 am; however, son did not show up. Agree with meeting to establish goals of care. Per nurse, son may come in today.      Kip Frye MD

## 2020-08-02 NOTE — PROGRESS NOTES
Pulmonary and Critical Care  Progress Note      VITALS:  /74   Pulse 78   Temp 97.9 °F (36.6 °C) (Rectal)   Resp 18   Ht 5' 8\" (1.727 m)   Wt 134 lb 11.2 oz (61.1 kg)   SpO2 100%   BMI 20.48 kg/m²     Subjective:   CHIEF COMPLAINT :Fall with right hip pain     HPI:                The patient is a 60 y.o. male with significant past medical history of  HTN,. Sacral decub, MSSA endocarditis, Septic PE, Hep C,  presents with complaints of fall and right hip pain. He was on the floor for more than 2 days. His CK is mildly elevated. He had mild lactic acidosis. His PBNPT is 11,000 with mildly elevated troponins. He is on 3 grams of Heroin per day. His U tocx was positive for Cocaine and Opiates. He had no fever, no headaches, no chest pain, no palpitations, no n/v, no diaphoresis. He has no sick exposure, no recent travel. His 1 blood culture set is positive for Staph. He is getting abx and IV fluids. He is on the vent and off CRRT    Objective:   PHYSICAL EXAM:    LUNGS:Occasional basal crackles  Abd-soft, BS+,NT  Ext - no pedal edema  CVS-s1s2, no murmurs      DATA:    CBC:  Recent Labs     07/31/20  0505  07/31/20  2141 08/01/20  0510 08/02/20  0436   WBC 30.0*  --   --  27.7* 19.0*   RBC 2.72*  --   --  2.86* 2.99*   HGB 7.4*   < > 7.7* 7.9* 8.1*   HCT 23.9*   < > 24.2* 25.4* 27.0*     --   --  132* 158   MCV 87.9  --   --  88.8 90.3   MCH 27.2  --   --  27.6 27.1   MCHC 31.0*  --   --  31.1* 30.0*   RDW 22.3*  --   --  21.4* 22.0*   SEGSPCT 89.1*  --   --  89.6* 91.9*    < > = values in this interval not displayed.       BMP:  Recent Labs     07/31/20  0505 08/01/20  0510 08/02/20  0436    136 138   K 4.0 5.1 5.0    98* 101   CO2 21 22 22   BUN 67* 101* 67*   CREATININE 2.5* 3.5* 2.8*   CALCIUM 8.9 9.3 9.0   GLUCOSE 218* 149* 136*      ABG:  Recent Labs     07/31/20  0600 08/01/20  0600 08/02/20  0600   PH 7.43 7.39 7.40   PO2ART 109* 106* 108*   XYJ9UMK 34.0 37.0 35.0   O2SAT 96.8 96.2 96.0     BNP  No results found for: BNP   D-Dimer:  No results found for: DDIMER   1. Radiology: None      Assessment/Plan     Patient Active Problem List    Diagnosis Date Noted    MSSA bacteremia 07/28/2020    Ischemic bowel disease ([facility] [address]     Leukocytosis     Sepsis ([facility] [address] 07/24/2020     Patient is on the vent and sedated. He is off pressors. His H&H has been stable     Septic shock- improved  Hemorrhagic shock- improved  Acute hypoxic resp failure  Mild coagulopathy- improved  Shock Liver- improved  Lactic acidosis- improved  VDRF  Anemia likely GI bleed  ? Staph Aureus bacteremia  Severe Pulmonary HTN  Occult blood positive  JERAMIE likely sec to ATN  Bilateral Arm wounds       1. Abx  2. Wound care  3. F/u H&H  4. SAT and SBT trial now  5. HD Perrenal  6. Prognosis guarded  7. CBC. BMP in am  8. C/w present management  No follow-ups on file.     Electronically signed by Hesham Shaikh MD on 8/2/2020 at 11:41 AM

## 2020-08-03 PROBLEM — B18.2 CHRONIC HEPATITIS C WITHOUT HEPATIC COMA (HCC): Status: ACTIVE | Noted: 2020-08-03

## 2020-08-03 LAB
ALBUMIN SERPL-MCNC: 2.9 GM/DL (ref 3.4–5)
ALP BLD-CCNC: 86 IU/L (ref 40–128)
ALT SERPL-CCNC: <5 U/L (ref 10–40)
ANION GAP SERPL CALCULATED.3IONS-SCNC: 18 MMOL/L (ref 4–16)
APTT: 29.4 SECONDS (ref 25.1–37.1)
AST SERPL-CCNC: 34 IU/L (ref 15–37)
BASE EXCESS: 5 (ref 0–3.3)
BASOPHILS ABSOLUTE: 0 K/CU MM
BASOPHILS RELATIVE PERCENT: 0.1 % (ref 0–1)
BILIRUB SERPL-MCNC: 1.4 MG/DL (ref 0–1)
BUN BLDV-MCNC: 98 MG/DL (ref 6–23)
CALCIUM SERPL-MCNC: 8.8 MG/DL (ref 8.3–10.6)
CARBON MONOXIDE, BLOOD: 1.9 % (ref 0–5)
CHLORIDE BLD-SCNC: 100 MMOL/L (ref 99–110)
CLOSTRIDIUM DIFFICILE, PCR: NORMAL
CO2 CONTENT: 21 MMOL/L (ref 19–24)
CO2: 20 MMOL/L (ref 21–32)
COMMENT: ABNORMAL
CREAT SERPL-MCNC: 3.9 MG/DL (ref 0.9–1.3)
CULTURE: NORMAL
CULTURE: NORMAL
DIFFERENTIAL TYPE: ABNORMAL
DOSE AMOUNT: NORMAL
DOSE TIME: NORMAL
EOSINOPHILS ABSOLUTE: 0 K/CU MM
EOSINOPHILS RELATIVE PERCENT: 0 % (ref 0–3)
GFR AFRICAN AMERICAN: 19 ML/MIN/1.73M2
GFR NON-AFRICAN AMERICAN: 16 ML/MIN/1.73M2
GLUCOSE BLD-MCNC: 112 MG/DL (ref 70–99)
GLUCOSE BLD-MCNC: 151 MG/DL (ref 70–99)
GLUCOSE BLD-MCNC: 99 MG/DL (ref 70–99)
HCO3 ARTERIAL: 19.9 MMOL/L (ref 18–23)
HCT VFR BLD CALC: 26.4 % (ref 42–52)
HEMOGLOBIN: 8.2 GM/DL (ref 13.5–18)
IMMATURE NEUTROPHIL %: 1 % (ref 0–0.43)
INR BLD: 1.2 INDEX
LYMPHOCYTES ABSOLUTE: 0.7 K/CU MM
LYMPHOCYTES RELATIVE PERCENT: 3.4 % (ref 24–44)
Lab: NORMAL
Lab: NORMAL
MCH RBC QN AUTO: 27.9 PG (ref 27–31)
MCHC RBC AUTO-ENTMCNC: 31.1 % (ref 32–36)
MCV RBC AUTO: 89.8 FL (ref 78–100)
METHEMOGLOBIN ARTERIAL: 1.5 %
MONOCYTES ABSOLUTE: 1.4 K/CU MM
MONOCYTES RELATIVE PERCENT: 7.3 % (ref 0–4)
NUCLEATED RBC %: 0.2 %
O2 SATURATION: 95.4 % (ref 96–97)
PCO2 ARTERIAL: 36 MMHG (ref 32–45)
PDW BLD-RTO: 22.4 % (ref 11.7–14.9)
PH BLOOD: 7.35 (ref 7.34–7.45)
PLATELET # BLD: 176 K/CU MM (ref 140–440)
PMV BLD AUTO: 9.9 FL (ref 7.5–11.1)
PO2 ARTERIAL: 100 MMHG (ref 75–100)
POTASSIUM SERPL-SCNC: 3.7 MMOL/L (ref 3.5–5.1)
POTASSIUM SERPL-SCNC: 5.7 MMOL/L (ref 3.5–5.1)
PROTHROMBIN TIME: 14.5 SECONDS (ref 11.7–14.5)
RBC # BLD: 2.94 M/CU MM (ref 4.6–6.2)
SEGMENTED NEUTROPHILS ABSOLUTE COUNT: 17.3 K/CU MM
SEGMENTED NEUTROPHILS RELATIVE PERCENT: 88.2 % (ref 36–66)
SODIUM BLD-SCNC: 138 MMOL/L (ref 135–145)
SPECIMEN: NORMAL
SPECIMEN: NORMAL
TB CELL IMMUNE MEASURE: NORMAL
TOTAL CK: 27 IU/L (ref 38–174)
TOTAL IMMATURE NEUTOROPHIL: 0.2 K/CU MM
TOTAL NUCLEATED RBC: 0 K/CU MM
TOTAL PROTEIN: 6.8 GM/DL (ref 6.4–8.2)
VANCOMYCIN RANDOM: 23.5 UG/ML
WBC # BLD: 19.6 K/CU MM (ref 4–10.5)

## 2020-08-03 PROCEDURE — 99233 SBSQ HOSP IP/OBS HIGH 50: CPT | Performed by: INTERNAL MEDICINE

## 2020-08-03 PROCEDURE — 80202 ASSAY OF VANCOMYCIN: CPT

## 2020-08-03 PROCEDURE — 94003 VENT MGMT INPAT SUBQ DAY: CPT

## 2020-08-03 PROCEDURE — 2500000003 HC RX 250 WO HCPCS: Performed by: INTERNAL MEDICINE

## 2020-08-03 PROCEDURE — 6360000002 HC RX W HCPCS: Performed by: NURSE PRACTITIONER

## 2020-08-03 PROCEDURE — 99231 SBSQ HOSP IP/OBS SF/LOW 25: CPT | Performed by: OBSTETRICS & GYNECOLOGY

## 2020-08-03 PROCEDURE — 2500000003 HC RX 250 WO HCPCS: Performed by: HOSPITALIST

## 2020-08-03 PROCEDURE — 82803 BLOOD GASES ANY COMBINATION: CPT

## 2020-08-03 PROCEDURE — 2580000003 HC RX 258: Performed by: INTERNAL MEDICINE

## 2020-08-03 PROCEDURE — 85610 PROTHROMBIN TIME: CPT

## 2020-08-03 PROCEDURE — C9113 INJ PANTOPRAZOLE SODIUM, VIA: HCPCS | Performed by: SPECIALIST

## 2020-08-03 PROCEDURE — 6370000000 HC RX 637 (ALT 250 FOR IP): Performed by: SURGERY

## 2020-08-03 PROCEDURE — 85730 THROMBOPLASTIN TIME PARTIAL: CPT

## 2020-08-03 PROCEDURE — 87324 CLOSTRIDIUM AG IA: CPT

## 2020-08-03 PROCEDURE — 85025 COMPLETE CBC W/AUTO DIFF WBC: CPT

## 2020-08-03 PROCEDURE — 94750 HC PULMONARY COMPLIANCE STUDY: CPT

## 2020-08-03 PROCEDURE — APPNB30 APP NON BILLABLE TIME 0-30 MINS: Performed by: PHYSICIAN ASSISTANT

## 2020-08-03 PROCEDURE — 89220 SPUTUM SPECIMEN COLLECTION: CPT

## 2020-08-03 PROCEDURE — 94640 AIRWAY INHALATION TREATMENT: CPT

## 2020-08-03 PROCEDURE — 6370000000 HC RX 637 (ALT 250 FOR IP): Performed by: INTERNAL MEDICINE

## 2020-08-03 PROCEDURE — 84132 ASSAY OF SERUM POTASSIUM: CPT

## 2020-08-03 PROCEDURE — 6370000000 HC RX 637 (ALT 250 FOR IP): Performed by: HOSPITALIST

## 2020-08-03 PROCEDURE — 6360000002 HC RX W HCPCS: Performed by: INTERNAL MEDICINE

## 2020-08-03 PROCEDURE — 80053 COMPREHEN METABOLIC PANEL: CPT

## 2020-08-03 PROCEDURE — 82550 ASSAY OF CK (CPK): CPT

## 2020-08-03 PROCEDURE — 82962 GLUCOSE BLOOD TEST: CPT

## 2020-08-03 PROCEDURE — 94761 N-INVAS EAR/PLS OXIMETRY MLT: CPT

## 2020-08-03 PROCEDURE — 99232 SBSQ HOSP IP/OBS MODERATE 35: CPT | Performed by: INTERNAL MEDICINE

## 2020-08-03 PROCEDURE — 2700000000 HC OXYGEN THERAPY PER DAY

## 2020-08-03 PROCEDURE — 2580000003 HC RX 258: Performed by: SURGERY

## 2020-08-03 PROCEDURE — 6370000000 HC RX 637 (ALT 250 FOR IP): Performed by: NURSE PRACTITIONER

## 2020-08-03 PROCEDURE — 2580000003 HC RX 258: Performed by: NURSE PRACTITIONER

## 2020-08-03 PROCEDURE — 2000000000 HC ICU R&B

## 2020-08-03 PROCEDURE — 6360000002 HC RX W HCPCS: Performed by: SPECIALIST

## 2020-08-03 PROCEDURE — 99024 POSTOP FOLLOW-UP VISIT: CPT | Performed by: PHYSICIAN ASSISTANT

## 2020-08-03 RX ORDER — DEXTROSE MONOHYDRATE 25 G/50ML
25 INJECTION, SOLUTION INTRAVENOUS ONCE
Status: COMPLETED | OUTPATIENT
Start: 2020-08-03 | End: 2020-08-03

## 2020-08-03 RX ORDER — NICOTINE POLACRILEX 4 MG
15 LOZENGE BUCCAL PRN
Status: DISCONTINUED | OUTPATIENT
Start: 2020-08-03 | End: 2020-08-15 | Stop reason: HOSPADM

## 2020-08-03 RX ORDER — MIDODRINE HYDROCHLORIDE 5 MG/1
10 TABLET ORAL ONCE
Status: COMPLETED | OUTPATIENT
Start: 2020-08-03 | End: 2020-08-03

## 2020-08-03 RX ORDER — HEPARIN SODIUM 1000 [USP'U]/ML
4000 INJECTION, SOLUTION INTRAVENOUS; SUBCUTANEOUS
Status: DISPENSED | OUTPATIENT
Start: 2020-08-03 | End: 2020-08-03

## 2020-08-03 RX ORDER — DEXTROSE MONOHYDRATE 50 MG/ML
100 INJECTION, SOLUTION INTRAVENOUS PRN
Status: DISCONTINUED | OUTPATIENT
Start: 2020-08-03 | End: 2020-08-15 | Stop reason: HOSPADM

## 2020-08-03 RX ORDER — DEXTROSE MONOHYDRATE 25 G/50ML
12.5 INJECTION, SOLUTION INTRAVENOUS PRN
Status: DISCONTINUED | OUTPATIENT
Start: 2020-08-03 | End: 2020-08-15 | Stop reason: HOSPADM

## 2020-08-03 RX ADMIN — SODIUM CHLORIDE, PRESERVATIVE FREE 10 ML: 5 INJECTION INTRAVENOUS at 08:18

## 2020-08-03 RX ADMIN — SODIUM BICARBONATE 25 MEQ: 84 INJECTION INTRAVENOUS at 08:14

## 2020-08-03 RX ADMIN — IPRATROPIUM BROMIDE 4 PUFF: 17 AEROSOL, METERED RESPIRATORY (INHALATION) at 01:20

## 2020-08-03 RX ADMIN — IPRATROPIUM BROMIDE 4 PUFF: 17 AEROSOL, METERED RESPIRATORY (INHALATION) at 12:05

## 2020-08-03 RX ADMIN — PANTOPRAZOLE SODIUM 40 MG: 40 INJECTION, POWDER, FOR SOLUTION INTRAVENOUS at 08:15

## 2020-08-03 RX ADMIN — METHYLPREDNISOLONE SODIUM SUCCINATE 60 MG: 125 INJECTION, POWDER, FOR SOLUTION INTRAMUSCULAR; INTRAVENOUS at 08:16

## 2020-08-03 RX ADMIN — THIAMINE HYDROCHLORIDE 200 MG: 100 INJECTION, SOLUTION INTRAMUSCULAR; INTRAVENOUS at 11:45

## 2020-08-03 RX ADMIN — ALBUTEROL SULFATE 4 PUFF: 90 AEROSOL, METERED RESPIRATORY (INHALATION) at 12:03

## 2020-08-03 RX ADMIN — IPRATROPIUM BROMIDE 4 PUFF: 17 AEROSOL, METERED RESPIRATORY (INHALATION) at 21:22

## 2020-08-03 RX ADMIN — METRONIDAZOLE 500 MG: 500 INJECTION, SOLUTION INTRAVENOUS at 23:14

## 2020-08-03 RX ADMIN — Medication 25 MCG/HR: at 11:36

## 2020-08-03 RX ADMIN — PROPOFOL 35 MCG/KG/MIN: 10 INJECTION, EMULSION INTRAVENOUS at 18:19

## 2020-08-03 RX ADMIN — SODIUM CHLORIDE, PRESERVATIVE FREE 10 ML: 5 INJECTION INTRAVENOUS at 20:48

## 2020-08-03 RX ADMIN — ALBUTEROL SULFATE 4 PUFF: 90 AEROSOL, METERED RESPIRATORY (INHALATION) at 21:21

## 2020-08-03 RX ADMIN — IPRATROPIUM BROMIDE 4 PUFF: 17 AEROSOL, METERED RESPIRATORY (INHALATION) at 05:17

## 2020-08-03 RX ADMIN — MIDODRINE HYDROCHLORIDE 10 MG: 5 TABLET ORAL at 08:15

## 2020-08-03 RX ADMIN — THIAMINE HYDROCHLORIDE 200 MG: 100 INJECTION, SOLUTION INTRAMUSCULAR; INTRAVENOUS at 23:14

## 2020-08-03 RX ADMIN — ALBUTEROL SULFATE 4 PUFF: 90 AEROSOL, METERED RESPIRATORY (INHALATION) at 05:17

## 2020-08-03 RX ADMIN — DEXTROSE MONOHYDRATE 25 G: 25 INJECTION, SOLUTION INTRAVENOUS at 06:56

## 2020-08-03 RX ADMIN — ALBUTEROL SULFATE 4 PUFF: 90 AEROSOL, METERED RESPIRATORY (INHALATION) at 15:21

## 2020-08-03 RX ADMIN — IPRATROPIUM BROMIDE 4 PUFF: 17 AEROSOL, METERED RESPIRATORY (INHALATION) at 07:20

## 2020-08-03 RX ADMIN — PROPOFOL 25 MCG/KG/MIN: 10 INJECTION, EMULSION INTRAVENOUS at 08:13

## 2020-08-03 RX ADMIN — IPRATROPIUM BROMIDE 4 PUFF: 17 AEROSOL, METERED RESPIRATORY (INHALATION) at 15:21

## 2020-08-03 RX ADMIN — CHLORHEXIDINE GLUCONATE 0.12% ORAL RINSE 15 ML: 1.2 LIQUID ORAL at 08:15

## 2020-08-03 RX ADMIN — INSULIN HUMAN 10 UNITS: 100 INJECTION, SOLUTION PARENTERAL at 06:55

## 2020-08-03 RX ADMIN — CHLORHEXIDINE GLUCONATE 0.12% ORAL RINSE 15 ML: 1.2 LIQUID ORAL at 20:47

## 2020-08-03 RX ADMIN — ALBUTEROL SULFATE 4 PUFF: 90 AEROSOL, METERED RESPIRATORY (INHALATION) at 01:20

## 2020-08-03 RX ADMIN — SODIUM CHLORIDE, PRESERVATIVE FREE 10 ML: 5 INJECTION INTRAVENOUS at 20:47

## 2020-08-03 RX ADMIN — CEFEPIME HYDROCHLORIDE 2 G: 2 INJECTION, POWDER, FOR SOLUTION INTRAVENOUS at 14:44

## 2020-08-03 RX ADMIN — METRONIDAZOLE 500 MG: 500 INJECTION, SOLUTION INTRAVENOUS at 14:48

## 2020-08-03 RX ADMIN — ALBUTEROL SULFATE 4 PUFF: 90 AEROSOL, METERED RESPIRATORY (INHALATION) at 07:27

## 2020-08-03 RX ADMIN — METRONIDAZOLE 500 MG: 500 INJECTION, SOLUTION INTRAVENOUS at 07:00

## 2020-08-03 ASSESSMENT — PULMONARY FUNCTION TESTS
PIF_VALUE: 20
PIF_VALUE: 25
PIF_VALUE: 23
PIF_VALUE: 21
PIF_VALUE: 23
PIF_VALUE: 21
PIF_VALUE: 24
PIF_VALUE: 18
PIF_VALUE: 24
PIF_VALUE: 18
PIF_VALUE: 16
PIF_VALUE: 22
PIF_VALUE: 17
PIF_VALUE: 21
PIF_VALUE: 24
PIF_VALUE: 21
PIF_VALUE: 19
PIF_VALUE: 19
PIF_VALUE: 21
PIF_VALUE: 22
PIF_VALUE: 19
PIF_VALUE: 21
PIF_VALUE: 21
PIF_VALUE: 16
PIF_VALUE: 24
PIF_VALUE: 23
PIF_VALUE: 18
PIF_VALUE: 23
PIF_VALUE: 23
PIF_VALUE: 22
PIF_VALUE: 22
PIF_VALUE: 21
PIF_VALUE: 20

## 2020-08-03 ASSESSMENT — PAIN SCALES - GENERAL
PAINLEVEL_OUTOF10: 0
PAINLEVEL_OUTOF10: 0

## 2020-08-03 NOTE — PROGRESS NOTES
Nephrology  Dialysis Note        [address], [address] [place] [address]  Phone: [phone]  Office Hours: 8:30AM - 4:30PM  Monday - Friday       [facility]  MD Marv Lara DO [address],  [facility]  PHONE: [phone]  FAX: [phone]      PROCEDURE:  Patient seen during hemodialysis      PHYSICIAN:  SAVANAH      INDICATION:  Acute tubular necrosis      RX:  See dialysis flowsheet for specifics on access, blood flow rate, dialysate baths, duration of dialysis, anticoagulation and other technical information.       COMMENTS:  CONTINUE HD  SET TO LOSE NO FLUID DUE TO LOW BP    Electronically signed by Katharina Mena DO on 8/3/2020 at 8:58 AM    MD Marv Rivas DO [address] ,  [facility] [address] [place], [facility] [address]  PHONE: [phone]  FAX: [phone]

## 2020-08-03 NOTE — PROGRESS NOTES
Palliative Care  I see patient for symptom management and goals of care related to  IVDU and sepsis with MSSA, severe metabolic acidosis and JERAMIE 2/2 ATN. Patient is intubated and sedated  Physical Exam:  Vitals:    08/03/20 1208   BP:    Pulse:    Resp: 23   Temp:    SpO2: 100%     GENERAL: lying in bed intubated  HEENT: No cervical adenopathy, MM moist, PERRL  COR: RRR  LUNGS: diminished breath sounds  ABD: soft, ND/NT, dressing dry  SKIN: no rashes  PSYCH: intubated and sedated does not follow commands  NEURO: CN II-XII grossly intact    Heme Onc was consulted over the weekend for hemolytic anemia and felt the patient may have underlying DIC. Patient with increasing leukocytosis as well. I spoke with the patient's son again today. He would like a little more time to make further decisions regarding his father's goals of care. He was appreciative of the physicians who met with him and explained what was happening with his father. We will continue to follow for goals of care and family support. Patient was seen with total face to face time of 15 minutes. More than 50% of this visit was counseling and education regarding palliative care as well as counseling on preventative health maintenance follow-up.   Electronically signed by Sonia Moise MD on 8/3/2020 at 2:25 PM

## 2020-08-03 NOTE — PROGRESS NOTES
5.7*   CL 98* 101 100   CO2 22 22 20*   * 67* 98*   CREATININE 3.5* 2.8* 3.9*   GLUCOSE 149* 136* 112*     Hepatic:   Recent Labs     08/01/20  0510 08/02/20  0436 08/03/20  0445   AST 54* 39* 34   ALT <5* <5* <5*   BILITOT 1.3* 1.5* 1.4*   ALKPHOS 84 86 86     INR:   Recent Labs     08/01/20  0510 08/02/20  0436 08/03/20  0445   INR 1.25 1.16 1.20         RADIOLOGY REPORTS  Noted    ASSESSMENT & RECOMMENDATIONS:   Anemia and mild thrombocytopenia: PT slightly elevated but PTT normal, fibrinogen low. May have underlying DIC. Will review the peripheral smear. Note antigen screen negative but ANISHA positive. May have in vivo hemolysis. Reviewed the medications. Requested extended ANISHA panel is pending, but not sure whether helpful as antigen screen negative. Cold agglutinin screen ordered but unlikely the cause. PNH flow ordered. Recommend methylprednisone 1 mg/kg. Transfusion support as needed. Consider cryoprecipitate if actively bleeding, anemia worsening. Hold all antiplatelet and anticoagulation. No  nutritional def.      leukocytosis most probably secondary to infection. Flow cytometry pending to r/o LPN.  LDH elevated     Continue other medical care    We will continue to follow the patient.     Thank you for allowing us to participate in the care of this patient.     Prognosis remains gaurded     Brandt Guzman MD, 8/3/2020, 8:27 AM

## 2020-08-03 NOTE — PROGRESS NOTES
08/03/20 0524   Vent Information   Vent Type 980   Vent Mode AC/VC   Vt Ordered 450 mL   Rate Set 14 bmp   Peak Flow 50 L/min   Pressure Support 0 cmH20   FiO2  30 %   SpO2 100 %   SpO2/FiO2 ratio 333.33   Sensitivity 3   PEEP/CPAP 5   I Time/ I Time % 0 s   Humidification Source HME   Nitric Oxide/Epoprostenol In Use? No   Vent Patient Data   High Peep/I Pressure 0   Peak Inspiratory Pressure 18 cmH2O   Mean Airway Pressure 9.6 cmH20   Rate Measured 19 br/min   Vt Exhaled 386 mL   Minute Volume 8.74 Liters   I:E Ratio 1:1.60   Plateau Pressure 10 SCM55   Static Compliance 32 mL/cmH2O   Spontaneous Breathing Trial (SBT) RT Doc   Pulse 98   Additional Respiratory  Assessments   Resp 18   Alarm Settings   High Pressure Alarm 40 cmH2O   Delay Alarm 20 sec(s)   Low Minute Volume Alarm 4 L/min   Apnea (secs) 20 secs   High Respiratory Rate 40 br/min   Low Exhaled Vt  250 mL   Patient Observation   Observations RRT changed ETT to the left side   ETT (adult)   Placement Date/Time: 07/25/20 8211   Preoxygenation: Yes  Mask Ventilation: Mask ventilation not attempted (0)  Technique: Direct laryngoscopy  Type: Cuffed  Tube Size: 7 mm  Laryngoscope: Mac  Blade Size: 3  Location: Oral  Grade View: Full view of t. ..    Secured at 23 cm   Measured From Lips   ET Placement Left   Secured By Commercial tube raza   Site Condition Dry

## 2020-08-03 NOTE — PROGRESS NOTES
Patient is now on VC/SIMV RR 8, PS 10, FIO2 30%, PEEP 5. Spontaneous RR is between 18-22, [address] 450's, SpO2 97%.  Will monitor closely

## 2020-08-03 NOTE — PROGRESS NOTES
Latoya Romero is a 60 y.o. male patient intubated and son at bedside    Current Facility-Administered Medications   Medication Dose Route Frequency Provider Last Rate Last Dose    glucose (GLUTOSE) 40 % oral gel 15 g  15 g Oral PRN Salina Dapilah, APRN - CNP        dextrose 50 % IV solution  12.5 g Intravenous PRN Salina Dapilah, APRN - CNP        glucagon (rDNA) injection 1 mg  1 mg Intramuscular PRN Salina Dapilah, APRN - CNP        dextrose 5 % solution  100 mL/hr Intravenous PRN Salina Dapilah, APRN - CNP        scopolamine (TRANSDERM-SCOP) transdermal patch 1 patch  1 patch Transdermal Q72H Luis M Godinez MD   1 patch at 08/02/20 1035    methylPREDNISolone sodium (SOLU-MEDROL) injection 60 mg  60 mg Intravenous Daily Cierra Villalta APRN - CNP   60 mg at 08/02/20 1033    cefepime (MAXIPIME) 2 g IVPB minibag  2 g Intravenous Once per day on Tue Thu Sat Shania Meehan MD   Stopped at 08/01/20 1300    vancomycin (VANCOCIN) intermittent dosing (placeholder)   Other RX Placeholder Shania Meehan MD        heparin (porcine) injection 3,000 Units  3,000 Units Intravenous PRN Christelle Deshpande DO   3,000 Units at 07/30/20 0805    metronidazole (FLAGYL) 500 mg in NaCl 100 mL IVPB premix  500 mg Intravenous Criss Loya MD   Stopped at 08/03/20 0137    fentanyl (SUBLIMAZE) 1,000 mcg in sodium chloride 0.9% 100 mL infusion  25 mcg/hr Intravenous Continuous Lisette Duque MD 7.5 mL/hr at 08/02/20 2310 75 mcg/hr at 08/02/20 2310    pantoprazole (PROTONIX) injection 40 mg  40 mg Intravenous Daily Kenneth Stephens MD   40 mg at 08/02/20 1033    thiamine (B-1) 200 mg in sodium chloride 0.9 % 100 mL IVPB  200 mg Intravenous Q12H Marin Rolle MD   Stopped at 08/03/20 0137    propofol injection  10 mcg/kg/min Intravenous Titrated Marin Rolle MD 8.6 mL/hr at 08/02/20 2248 25 mcg/kg/min at 08/02/20 2248    ipratropium-albuterol (DUONEB) nebulizer solution 1 ampule  1 ampule Inhalation Q4H PRN Letha Angel MD        chlorhexidine (PERIDEX) 0.12 % solution 15 mL  15 mL Mouth/Throat BID Libbie Boas, MD   15 mL at 08/02/20 2057    albuterol sulfate  (90 Base) MCG/ACT inhaler 4 puff  4 puff Inhalation Q4H Libbie Boas, MD   4 puff at 08/03/20 0517    And    ipratropium (ATROVENT HFA) 17 MCG/ACT inhaler 4 puff  4 puff Inhalation Q4H Libbie Boas, MD   4 puff at 08/03/20 0517    aspirin chewable tablet 324 mg  324 mg Oral Once Letha Angel MD        iopamidol (ISOVUE-370) 76 % injection 80 mL  80 mL Intravenous ONCE PRN Letha Angel MD        sodium chloride flush 0.9 % injection 10 mL  10 mL Intravenous BID Monica Alexander MD   10 mL at 08/02/20 2058    sodium chloride flush 0.9 % injection 10 mL  10 mL Intravenous 2 times per day Letha Angel MD   10 mL at 08/02/20 2057    sodium chloride flush 0.9 % injection 10 mL  10 mL Intravenous PRN Letha Angel MD        acetaminophen (TYLENOL) tablet 650 mg  650 mg Oral Q6H PRN Letha Angel MD        Or    acetaminophen (TYLENOL) suppository 650 mg  650 mg Rectal Q6H PRN Letha Angel MD        polyethylene glycol (GLYCOLAX) packet 17 g  17 g Oral Daily PRN Letha Angel MD        promethazine (PHENERGAN) tablet 12.5 mg  12.5 mg Oral Q6H PRN Letha Angel MD        Or    ondansetron (ZOFRAN) injection 4 mg  4 mg Intravenous Q6H PRN Monica Alexander MD   4 mg at 07/25/20 0417    [Held by provider] enoxaparin (LOVENOX) injection 60 mg  1 mg/kg Subcutaneous Daily Letha Angel MD   Stopped at 07/30/20 0900    promethazine (PHENERGAN) tablet 25 mg  25 mg Oral Q6H PRN Letha Angel MD        nicotine (NICODERM CQ) 21 MG/24HR 1 patch  1 patch Transdermal Daily Monica Alexander MD   1 patch at 08/02/20 1034    diphenhydrAMINE (BENADRYL) injection 12.5 mg  12.5 mg Intravenous Nightly PRN Monica Alexander MD   12.5 mg at 07/25/20 0223    dicyclomine (BENTYL) tablet 20 mg  20 mg Oral Q6H PRN Letha Angel MD       [place] morphine injection 2 mg  2 mg Intravenous Q4H PRN Shwetha Islas MD   2 mg at 07/25/20 1104     No Known Allergies  Active Problems:    Sepsis ([address]    Leukocytosis    MSSA bacteremia  Resolved Problems:    * No resolved hospital problems. *    Blood pressure 108/77, pulse 98, temperature 98.1 °F (36.7 °C), temperature source Rectal, resp. rate 18, height 5' 8\" (1.727 m), weight 134 lb 11.2 oz (61.1 kg), SpO2 100 %. Subjective:  Diet:  NPO. Objective:  General Appearance:  General patient appearance: pressors. Vital signs: (most recent): Blood pressure 108/77, pulse 98, temperature 98.1 °F (36.7 °C), temperature source Rectal, resp. rate 18, height 5' 8\" (1.727 m), weight 134 lb 11.2 oz (61.1 kg), SpO2 100 %. Vital signs are normal.  (Pressors). HEENT: Normal HEENT exam.    Lungs:  Normal effort. There are decreased breath sounds. Heart: Normal rate. Abdomen: Abdomen is soft. Hypoactive bowel sounds. (Dressing). Extremities: Decreased range of motion. Neurological: (Opens eyes today). Pupils:  Pupils are equal.   Skin:  Warm. (Bilateral UE ulcers and wound large.  Mild purpura foot)    Assessment & Plan  Septic shock with gram pos MSSA bacteremia 2/2 bilateral UE wound and staph UTI/pyelonephritis  -repeat bld cx with baciullus but suspect contaminant  -urine cx with staph and CT with possible pyelonephritis  -off pressors   Acute on chronic resp failure with suspected aspiration pneumonia  -CT PE neg for PE  -albuterol, IV flagyl, cefepime, vanc  -Echo severe HTN, hyperdynamic EF  due to dehydration  JERAMIE 2/2 ATN  -CRRT  Skin wounds/right post thigh/buttock and bilateral UE  -no abscess or osteo as per imaging  -wound cx with staph  Destructive lesion T12-L1  -possible prior infectious spondyliits  Pneumonatosis intestinalis POD #9  -s/p ex lap and neg  Severe PCM  -NPO  Acute bld loss anemia with suspected hemolytic anemia/DIC/hemoperitoneum  -hemoccult pos  -s/p 3PRBC  -CT abd with hemoperitoneum with no active extravastion  -lovenox held and GI  Consulted and hematology  -ANISHA positive, elevated retic and LDH and haptoglobin and cyroglobulin ordered  -if Hb drops again then transfuse cyroprecipitate as fibrinogen is low  -IV PPI  -on solumedrol 1mg/kg  Shock liver with chornic hep C  -hepatitis panel neg and follow LFTs  Gastric cancer stage 4  Polysubstance use  -UDS cocaine and opiate  -will need counseling once stable          Had meeting with son and discussed poor prognosis of his father. This was too much too handle for him and he will wait for a decision. Updated palliative care.    Critical care time 30min from 10-1030am  Mireille Mckenna MD  8/3/2020

## 2020-08-03 NOTE — PROGRESS NOTES
Immature Neutrophil % 1.0 (H) 0 - 0.43 %   CK    Collection Time: 08/03/20  4:45 AM   Result Value Ref Range    Total CK 27 (L) 38 - 174 IU/L   PT    Collection Time: 08/03/20  4:45 AM   Result Value Ref Range    Protime 14.5 11.7 - 14.5 SECONDS    INR 1.20 INDEX   PTT    Collection Time: 08/03/20  4:45 AM   Result Value Ref Range    aPTT 29.4 25.1 - 37.1 SECONDS   Vancomycin, random    Collection Time: 08/03/20  4:45 AM   Result Value Ref Range    Vancomycin Rm 23.5 UG/ML    DOSE AMOUNT DOSE AMT.  GIVEN - 1000     DOSE TIME DOSE TIME GIVEN - 1034    Comprehensive Metabolic Panel    Collection Time: 08/03/20  4:45 AM   Result Value Ref Range    Sodium 138 135 - 145 MMOL/L    Potassium 5.7 (HH) 3.5 - 5.1 MMOL/L    Chloride 100 99 - 110 mMol/L    CO2 20 (L) 21 - 32 MMOL/L    BUN 98 (H) 6 - 23 MG/DL    CREATININE 3.9 (H) 0.9 - 1.3 MG/DL    Glucose 112 (H) 70 - 99 MG/DL    Calcium 8.8 8.3 - 10.6 MG/DL    Alb 2.9 (L) 3.4 - 5.0 GM/DL    Total Protein 6.8 6.4 - 8.2 GM/DL    Total Bilirubin 1.4 (H) 0.0 - 1.0 MG/DL    ALT <5 (L) 10 - 40 U/L    AST 34 15 - 37 IU/L    Alkaline Phosphatase 86 40 - 128 IU/L    GFR Non- 16 (L) >60 mL/min/1.73m2    GFR  19 (L) >60 mL/min/1.73m2    Anion Gap 18 (H) 4 - 16   Blood gases    Collection Time: 08/03/20  6:00 AM   Result Value Ref Range    pH, Bld 7.35 7.34 - 7.45    pCO2, Arterial 36.0 32 - 45 MMHG    pO2, Arterial 100 75 - 100 MMHG    Base Excess 5 (H) 0 - 3.3    HCO3, Arterial 19.9 18 - 23 MMOL/L    CO2 Content 21.0 19 - 24 MMOL/L    O2 Sat 95.4 (L) 96 - 97 %    Carbon Monoxide, Blood 1.9 0 - 5 %    Methemoglobin, Arterial 1.5 (H) <1.5 %    Comment AC14 450 30% 5peep    C difficile Molecular/PCR    Collection Time: 08/03/20 11:00 AM    Specimen: Stool   Result Value Ref Range    Clostridium difficile, PCR PATIENT IS NEGATIVE FOR C DIFFICILE PATIENT IS NEGATIVE FOR C DIFFICILE   Potassium w/ Reflex to Magnesium    Collection Time: 08/03/20 11:30 AM

## 2020-08-03 NOTE — PROGRESS NOTES
[id number] [facility]  consulted by Dr. Laura Sena for monitoring and adjustment. Indication for treatment: MSSA bacteremia  Goal trough: 10-15 mcg/mL     Pertinent Laboratory Values:   Temp Readings from Last 3 Encounters:   08/03/20 98.1 °F (36.7 °C) (Rectal)   07/25/20 (!) 62.3 °F (16.8 °C)     Recent Labs     08/01/20  0510 08/02/20  0436 08/03/20  0445   WBC 27.7* 19.0* 19.6*     Recent Labs     08/01/20  0510 08/02/20  0436 08/03/20  0445   * 67* 98*   CREATININE 3.5* 2.8* 3.9*     Estimated Creatinine Clearance: 17 mL/min (A) (based on SCr of 3.9 mg/dL (H)). Intake/Output Summary (Last 24 hours) at 8/3/2020 0916  Last data filed at 8/3/2020 0400  Gross per 24 hour   Intake --   Output 150 ml   Net -150 ml       Pertinent Cultures:  Date    Source    Results  7/24   Blood    MSSA  7/27   Blood    Bacillus (1 of 2)  7/29   Blood    NGTD     Vancomycin level:   TROUGH:    No results for input(s): VANCOTROUGH in the last 72 hours.   RANDOM:    Recent Labs     08/01/20  0510 08/02/20  0436 08/03/20  0445   VANCORANDOM 31.6 24.1 23.5       Assessment:  · WBC and temperature: WBC improving, afebrile  · SCr, BUN, and urine output:   · CRRT stopped- transition to HD Mon/Wed/Fri   · Days of therapy: 11   · Vancomycin level: 23.5    Plan:  · Continue intermittent dosing based on levels 2/2 RRT   · Received vancomycin 1000 mg on 7/30   · Levels have cleared very slowly since CRRT stopped, hopefully HD today will help   · Repeat level Wed AM pre-HD   · Pharmacy will continue to monitor patient and adjust therapy as indicated    [email or web address] 8/5 @06:00    Thank you for the consult,  Rose Merrill, [address]  8/3/2020 9:16 AM

## 2020-08-03 NOTE — PROGRESS NOTES
Date Noted    MSSA bacteremia 07/28/2020    Ischemic bowel disease ([address]     Leukocytosis     Sepsis ([address] 07/24/2020   Patient is on the vent and sedated. Hyperkalemia  Patient is on the vent and sedated. He is off pressors. His H&H has been stable     Septic shock- improved  Hemorrhagic shock- improved  Acute hypoxic resp failure  Mild coagulopathy- improved  Shock Liver- improved  Lactic acidosis- improved  VDRF  Anemia likely GI bleed  ? Staph Aureus bacteremia  Severe Pulmonary HTN  Occult blood positive  JERAMIE likely sec to ATN  Bilateral Arm wounds       1. HD Per renal  2. Wound care  3. Abx  4. F/u C&S  5. CBC, BMP in am  6. Tube feeds till extubation  7. Prognosis guarded  8. C/w present management  9. SAT and SBT trial when stable  No follow-ups on file.     Electronically signed by Dheeraj Simms MD on 8/3/2020 at 11:24 AM

## 2020-08-03 NOTE — PROGRESS NOTES
General Surgery- [facility] Day: 11    Chief Complaint on Admission: Sepsis      Subjective:     Pt has been seen by Monique. Concern that bleeding is from DIC 2/2 infection  Attempted to wean from vent this weekend but was unable to tolerate. Continues to have some bloody drainage from incision. ROS:  Review of Systems   Unable to perform ROS: Intubated       Allergies  Patient has no known allergies. Diagnosis Date    Hypertension        Objective:     Vitals:    20 1000   BP: 97/75   Pulse: 90   Resp: 22   Temp:    SpO2: 99%       TEMPERATURE:  Current - Temp: 98.1 °F (36.7 °C); Max - Temp  Av.8 °F (36.6 °C)  Min: 97.5 °F (36.4 °C)  Max: 98.1 °F (36.7 °C)    I/O this shift:  In: 30 [NG/GT:30]  Out: - I/O last 3 completed shifts:  In: -   Out: 150 [Emesis/NG output:150]      Physical Exam:  Physical Exam  Constitutional:       Appearance: He is well-developed. Interventions: He is sedated and intubated. HENT:      Head: Normocephalic. Eyes:      Pupils: Pupils are equal, round, and reactive to light. Cardiovascular:      Rate and Rhythm: Normal rate. Pulmonary:      Effort: Pulmonary effort is normal. He is intubated. Abdominal:      General: There is no distension. Palpations: Abdomen is soft. There is no mass. Tenderness: There is no abdominal tenderness. There is no guarding or rebound. Comments: Midline incision well approximated with staples in place. Dressing with bloody drainage noted. Small amount of bleeding noted around staples.              Scheduled Meds:   heparin (porcine)  4,000 Units Intercatheter Once in dialysis    cefepime  2 g Intravenous Once per day on     scopolamine  1 patch Transdermal Q72H    methylPREDNISolone  60 mg Intravenous Daily    vancomycin (VANCOCIN) intermittent dosing (placeholder)   Other RX Placeholder    metroNIDAZOLE  500 mg Intravenous Q8H    pantoprazole  40 mg Intravenous Daily    thiamine (VITAMIN B1) IVPB  200 mg Intravenous Q12H    chlorhexidine  15 mL Mouth/Throat BID    albuterol sulfate HFA  4 puff Inhalation Q4H    And    ipratropium  4 puff Inhalation Q4H    aspirin  324 mg Oral Once    sodium chloride flush  10 mL Intravenous BID    sodium chloride flush  10 mL Intravenous 2 times per day    [Held by provider] enoxaparin  1 mg/kg Subcutaneous Daily    nicotine  1 patch Transdermal Daily     Continuous Infusions:   dextrose      fentanyl 50 mcg/hr (08/03/20 0758)    propofol 25 mcg/kg/min (08/03/20 0813)     PRN Meds:glucose, dextrose, glucagon (rDNA), dextrose, heparin (porcine), ipratropium-albuterol, iopamidol, sodium chloride flush, acetaminophen **OR** acetaminophen, polyethylene glycol, promethazine **OR** ondansetron, promethazine, diphenhydrAMINE, dicyclomine, morphine      Labs/Imaging Results:   Lab Results   Component Value Date    WBC 19.6 (H) 08/03/2020    HGB 8.2 (L) 08/03/2020    HCT 26.4 (L) 08/03/2020    MCV 89.8 08/03/2020     08/03/2020     Lab Results   Component Value Date     08/03/2020    K 5.7 (HH) 08/03/2020     08/03/2020    CO2 20 (L) 08/03/2020    BUN 98 (H) 08/03/2020    CREATININE 3.9 (H) 08/03/2020    GLUCOSE 112 (H) 08/03/2020    CALCIUM 8.8 08/03/2020    PROT 6.8 08/03/2020    LABALBU 2.9 (L) 08/03/2020    BILITOT 1.4 (H) 08/03/2020    ALKPHOS 86 08/03/2020    AST 34 08/03/2020    ALT <5 (L) 08/03/2020    LABGLOM 16 (L) 08/03/2020    GFRAA 19 (L) 08/03/2020       Assessment:     Patient Active Problem List:     Sepsis ([facility] [facility]     Leukocytosis     Ischemic bowel disease ([facility] [address] [facility]     MSSA bacteremia    Plan:     Continue weaning vent per pulmonary  Bilateral upper extremity wounds: continue local wound care using Santyl. Medical mgmt per primary. Transfuse PRN  Appreciate Heme eval and recs. Noted plan for palliative care to meet with son to establish goals of care.     Ashlee Olmstead PA-C

## 2020-08-04 ENCOUNTER — APPOINTMENT (OUTPATIENT)
Dept: CT IMAGING | Age: 65
DRG: 004 | End: 2020-08-04
Payer: MEDICARE

## 2020-08-04 ENCOUNTER — APPOINTMENT (OUTPATIENT)
Dept: GENERAL RADIOLOGY | Age: 65
DRG: 004 | End: 2020-08-04
Payer: MEDICARE

## 2020-08-04 LAB
ALBUMIN SERPL-MCNC: 2.8 GM/DL (ref 3.4–5)
ALP BLD-CCNC: 85 IU/L (ref 40–128)
ALT SERPL-CCNC: <5 U/L (ref 10–40)
ANION GAP SERPL CALCULATED.3IONS-SCNC: 14 MMOL/L (ref 4–16)
APTT: 28.7 SECONDS (ref 25.1–37.1)
AST SERPL-CCNC: 36 IU/L (ref 15–37)
BASE EXCESS: 4 (ref 0–3.3)
BASOPHILS ABSOLUTE: 0 K/CU MM
BASOPHILS RELATIVE PERCENT: 0.1 % (ref 0–1)
BILIRUB SERPL-MCNC: 1.3 MG/DL (ref 0–1)
BUN BLDV-MCNC: 74 MG/DL (ref 6–23)
CALCIUM SERPL-MCNC: 8.7 MG/DL (ref 8.3–10.6)
CARBON MONOXIDE, BLOOD: 2.1 % (ref 0–5)
CHLORIDE BLD-SCNC: 101 MMOL/L (ref 99–110)
CO2 CONTENT: 21.8 MMOL/L (ref 19–24)
CO2: 21 MMOL/L (ref 21–32)
COMMENT: ABNORMAL
CREAT SERPL-MCNC: 2.7 MG/DL (ref 0.9–1.3)
DIFFERENTIAL TYPE: ABNORMAL
DOSE AMOUNT: NORMAL
DOSE TIME: NORMAL
EOSINOPHILS ABSOLUTE: 0 K/CU MM
EOSINOPHILS RELATIVE PERCENT: 0 % (ref 0–3)
GFR AFRICAN AMERICAN: 29 ML/MIN/1.73M2
GFR NON-AFRICAN AMERICAN: 24 ML/MIN/1.73M2
GLUCOSE BLD-MCNC: 150 MG/DL (ref 70–99)
GLUCOSE BLD-MCNC: 196 MG/DL (ref 70–99)
HAPTOGLOBIN: 105 MG/DL (ref 30–200)
HCO3 ARTERIAL: 20.7 MMOL/L (ref 18–23)
HCT VFR BLD CALC: 25.9 % (ref 42–52)
HEMOGLOBIN: 8.1 GM/DL (ref 13.5–18)
IMMATURE NEUTROPHIL %: 0.9 % (ref 0–0.43)
INR BLD: 1.2 INDEX
LYMPHOCYTES ABSOLUTE: 0.7 K/CU MM
LYMPHOCYTES RELATIVE PERCENT: 3.5 % (ref 24–44)
MCH RBC QN AUTO: 28.2 PG (ref 27–31)
MCHC RBC AUTO-ENTMCNC: 31.3 % (ref 32–36)
MCV RBC AUTO: 90.2 FL (ref 78–100)
METHEMOGLOBIN ARTERIAL: 1.6 %
MONOCYTES ABSOLUTE: 1.4 K/CU MM
MONOCYTES RELATIVE PERCENT: 6.9 % (ref 0–4)
NUCLEATED RBC %: 0.1 %
O2 SATURATION: 95.6 % (ref 96–97)
PCO2 ARTERIAL: 35 MMHG (ref 32–45)
PDW BLD-RTO: 22.2 % (ref 11.7–14.9)
PH BLOOD: 7.38 (ref 7.34–7.45)
PLATELET # BLD: 200 K/CU MM (ref 140–440)
PMV BLD AUTO: 9.9 FL (ref 7.5–11.1)
PO2 ARTERIAL: 116 MMHG (ref 75–100)
POTASSIUM SERPL-SCNC: 4.8 MMOL/L (ref 3.5–5.1)
PROTHROMBIN TIME: 14.5 SECONDS (ref 11.7–14.5)
RBC # BLD: 2.87 M/CU MM (ref 4.6–6.2)
SEGMENTED NEUTROPHILS ABSOLUTE COUNT: 18.1 K/CU MM
SEGMENTED NEUTROPHILS RELATIVE PERCENT: 88.6 % (ref 36–66)
SODIUM BLD-SCNC: 136 MMOL/L (ref 135–145)
TOTAL CK: 30 IU/L (ref 38–174)
TOTAL IMMATURE NEUTOROPHIL: 0.19 K/CU MM
TOTAL NUCLEATED RBC: 0 K/CU MM
TOTAL PROTEIN: 6.6 GM/DL (ref 6.4–8.2)
TOTAL RETICULOCYTE COUNT: 0.16 K/CU MM
VANCOMYCIN RANDOM: 18.9 UG/ML
WBC # BLD: 20.4 K/CU MM (ref 4–10.5)

## 2020-08-04 PROCEDURE — 2580000003 HC RX 258: Performed by: SURGERY

## 2020-08-04 PROCEDURE — 82962 GLUCOSE BLOOD TEST: CPT

## 2020-08-04 PROCEDURE — 94003 VENT MGMT INPAT SUBQ DAY: CPT

## 2020-08-04 PROCEDURE — 99233 SBSQ HOSP IP/OBS HIGH 50: CPT | Performed by: INTERNAL MEDICINE

## 2020-08-04 PROCEDURE — 85610 PROTHROMBIN TIME: CPT

## 2020-08-04 PROCEDURE — 94761 N-INVAS EAR/PLS OXIMETRY MLT: CPT

## 2020-08-04 PROCEDURE — 82550 ASSAY OF CK (CPK): CPT

## 2020-08-04 PROCEDURE — 85730 THROMBOPLASTIN TIME PARTIAL: CPT

## 2020-08-04 PROCEDURE — 99232 SBSQ HOSP IP/OBS MODERATE 35: CPT | Performed by: INTERNAL MEDICINE

## 2020-08-04 PROCEDURE — 74176 CT ABD & PELVIS W/O CONTRAST: CPT

## 2020-08-04 PROCEDURE — 99231 SBSQ HOSP IP/OBS SF/LOW 25: CPT | Performed by: OBSTETRICS & GYNECOLOGY

## 2020-08-04 PROCEDURE — 94640 AIRWAY INHALATION TREATMENT: CPT

## 2020-08-04 PROCEDURE — 2700000000 HC OXYGEN THERAPY PER DAY

## 2020-08-04 PROCEDURE — 99024 POSTOP FOLLOW-UP VISIT: CPT | Performed by: SURGERY

## 2020-08-04 PROCEDURE — 80053 COMPREHEN METABOLIC PANEL: CPT

## 2020-08-04 PROCEDURE — 71045 X-RAY EXAM CHEST 1 VIEW: CPT

## 2020-08-04 PROCEDURE — 85025 COMPLETE CBC W/AUTO DIFF WBC: CPT

## 2020-08-04 PROCEDURE — C9113 INJ PANTOPRAZOLE SODIUM, VIA: HCPCS | Performed by: SPECIALIST

## 2020-08-04 PROCEDURE — 82803 BLOOD GASES ANY COMBINATION: CPT

## 2020-08-04 PROCEDURE — 71250 CT THORAX DX C-: CPT

## 2020-08-04 PROCEDURE — 6370000000 HC RX 637 (ALT 250 FOR IP): Performed by: INTERNAL MEDICINE

## 2020-08-04 PROCEDURE — 2500000003 HC RX 250 WO HCPCS: Performed by: INTERNAL MEDICINE

## 2020-08-04 PROCEDURE — 89220 SPUTUM SPECIMEN COLLECTION: CPT

## 2020-08-04 PROCEDURE — 6370000000 HC RX 637 (ALT 250 FOR IP): Performed by: SURGERY

## 2020-08-04 PROCEDURE — 2000000000 HC ICU R&B

## 2020-08-04 PROCEDURE — 2580000003 HC RX 258: Performed by: INTERNAL MEDICINE

## 2020-08-04 PROCEDURE — 6360000002 HC RX W HCPCS: Performed by: INTERNAL MEDICINE

## 2020-08-04 PROCEDURE — 6360000002 HC RX W HCPCS: Performed by: SPECIALIST

## 2020-08-04 PROCEDURE — 80202 ASSAY OF VANCOMYCIN: CPT

## 2020-08-04 PROCEDURE — 6360000002 HC RX W HCPCS: Performed by: NURSE PRACTITIONER

## 2020-08-04 PROCEDURE — 94750 HC PULMONARY COMPLIANCE STUDY: CPT

## 2020-08-04 RX ADMIN — IPRATROPIUM BROMIDE 4 PUFF: 17 AEROSOL, METERED RESPIRATORY (INHALATION) at 18:58

## 2020-08-04 RX ADMIN — ALBUTEROL SULFATE 4 PUFF: 90 AEROSOL, METERED RESPIRATORY (INHALATION) at 15:44

## 2020-08-04 RX ADMIN — IPRATROPIUM BROMIDE 4 PUFF: 17 AEROSOL, METERED RESPIRATORY (INHALATION) at 00:26

## 2020-08-04 RX ADMIN — ALBUTEROL SULFATE 4 PUFF: 90 AEROSOL, METERED RESPIRATORY (INHALATION) at 00:25

## 2020-08-04 RX ADMIN — ALBUTEROL SULFATE 4 PUFF: 90 AEROSOL, METERED RESPIRATORY (INHALATION) at 11:15

## 2020-08-04 RX ADMIN — PROPOFOL 35 MCG/KG/MIN: 10 INJECTION, EMULSION INTRAVENOUS at 18:35

## 2020-08-04 RX ADMIN — ALBUTEROL SULFATE 4 PUFF: 90 AEROSOL, METERED RESPIRATORY (INHALATION) at 04:22

## 2020-08-04 RX ADMIN — IPRATROPIUM BROMIDE 4 PUFF: 17 AEROSOL, METERED RESPIRATORY (INHALATION) at 23:40

## 2020-08-04 RX ADMIN — PROPOFOL 35 MCG/KG/MIN: 10 INJECTION, EMULSION INTRAVENOUS at 01:53

## 2020-08-04 RX ADMIN — PROPOFOL 35 MCG/KG/MIN: 10 INJECTION, EMULSION INTRAVENOUS at 08:56

## 2020-08-04 RX ADMIN — SODIUM CHLORIDE, PRESERVATIVE FREE 10 ML: 5 INJECTION INTRAVENOUS at 21:30

## 2020-08-04 RX ADMIN — THIAMINE HYDROCHLORIDE 200 MG: 100 INJECTION, SOLUTION INTRAMUSCULAR; INTRAVENOUS at 10:52

## 2020-08-04 RX ADMIN — METRONIDAZOLE 500 MG: 500 INJECTION, SOLUTION INTRAVENOUS at 15:53

## 2020-08-04 RX ADMIN — IPRATROPIUM BROMIDE 4 PUFF: 17 AEROSOL, METERED RESPIRATORY (INHALATION) at 15:44

## 2020-08-04 RX ADMIN — CHLORHEXIDINE GLUCONATE 0.12% ORAL RINSE 15 ML: 1.2 LIQUID ORAL at 21:30

## 2020-08-04 RX ADMIN — ALBUTEROL SULFATE 4 PUFF: 90 AEROSOL, METERED RESPIRATORY (INHALATION) at 07:06

## 2020-08-04 RX ADMIN — THIAMINE HYDROCHLORIDE 200 MG: 100 INJECTION, SOLUTION INTRAMUSCULAR; INTRAVENOUS at 22:35

## 2020-08-04 RX ADMIN — CHLORHEXIDINE GLUCONATE 0.12% ORAL RINSE 15 ML: 1.2 LIQUID ORAL at 08:58

## 2020-08-04 RX ADMIN — METHYLPREDNISOLONE SODIUM SUCCINATE 60 MG: 125 INJECTION, POWDER, FOR SOLUTION INTRAMUSCULAR; INTRAVENOUS at 08:58

## 2020-08-04 RX ADMIN — SODIUM CHLORIDE, PRESERVATIVE FREE 10 ML: 5 INJECTION INTRAVENOUS at 08:59

## 2020-08-04 RX ADMIN — SODIUM CHLORIDE, PRESERVATIVE FREE 10 ML: 5 INJECTION INTRAVENOUS at 08:58

## 2020-08-04 RX ADMIN — ALBUTEROL SULFATE 4 PUFF: 90 AEROSOL, METERED RESPIRATORY (INHALATION) at 23:39

## 2020-08-04 RX ADMIN — IPRATROPIUM BROMIDE 4 PUFF: 17 AEROSOL, METERED RESPIRATORY (INHALATION) at 07:07

## 2020-08-04 RX ADMIN — PANTOPRAZOLE SODIUM 40 MG: 40 INJECTION, POWDER, FOR SOLUTION INTRAVENOUS at 08:59

## 2020-08-04 RX ADMIN — IPRATROPIUM BROMIDE 4 PUFF: 17 AEROSOL, METERED RESPIRATORY (INHALATION) at 04:23

## 2020-08-04 RX ADMIN — METRONIDAZOLE 500 MG: 500 INJECTION, SOLUTION INTRAVENOUS at 07:04

## 2020-08-04 RX ADMIN — IPRATROPIUM BROMIDE 4 PUFF: 17 AEROSOL, METERED RESPIRATORY (INHALATION) at 11:15

## 2020-08-04 RX ADMIN — ALBUTEROL SULFATE 4 PUFF: 90 AEROSOL, METERED RESPIRATORY (INHALATION) at 18:58

## 2020-08-04 RX ADMIN — METRONIDAZOLE 500 MG: 500 INJECTION, SOLUTION INTRAVENOUS at 23:30

## 2020-08-04 ASSESSMENT — PULMONARY FUNCTION TESTS
PIF_VALUE: 22
PIF_VALUE: 21
PIF_VALUE: 22
PIF_VALUE: 23
PIF_VALUE: 21
PIF_VALUE: 22
PIF_VALUE: 23
PIF_VALUE: 21
PIF_VALUE: 22
PIF_VALUE: 23
PIF_VALUE: 21
PIF_VALUE: 23
PIF_VALUE: 22
PIF_VALUE: 25
PIF_VALUE: 22
PIF_VALUE: 24

## 2020-08-04 ASSESSMENT — PAIN SCALES - GENERAL: PAINLEVEL_OUTOF10: 0

## 2020-08-04 NOTE — PROGRESS NOTES
[address] [facility]  consulted by Dr. Shahrzad Manzanares for monitoring and adjustment. Indication for treatment: MSSA bacteremia, GPB in blood   Goal trough: 10-15 mcg/mL     Pertinent Laboratory Values:   Temp Readings from Last 3 Encounters:   08/04/20 98.4 °F (36.9 °C) (Rectal)   07/25/20 (!) 62.3 °F (16.8 °C)     Recent Labs     08/02/20  0436 08/03/20  0445 08/04/20  0420   WBC 19.0* 19.6* 20.4*     Recent Labs     08/02/20  0436 08/03/20  0445 08/04/20  0420   BUN 67* 98* 74*   CREATININE 2.8* 3.9* 2.7*     Estimated Creatinine Clearance: 27 mL/min (A) (based on SCr of 2.7 mg/dL (H)). Intake/Output Summary (Last 24 hours) at 8/4/2020 1047  Last data filed at 8/4/2020 0546  Gross per 24 hour   Intake 1808.07 ml   Output --   Net 1808.07 ml       Pertinent Cultures:  Date    Source    Results  7/24   Blood    MSSA  7/27   Blood    Bacillus (1 of 2)  7/29   Blood    NGTD     Vancomycin level:   TROUGH:    No results for input(s): VANCOTROUGH in the last 72 hours.   RANDOM:    Recent Labs     08/02/20  0436 08/03/20  0445 08/04/20  0420   VANCORANDOM 24.1 23.5 18.9       Assessment:  · WBC and temperature: WBC improving, afebrile  · SCr, BUN, and urine output:   · CRRT stopped- transition to HD Mon/Wed/Fri   · Days of therapy: 12  · Vancomycin level: 18.9    Plan:  · Continue intermittent dosing based on levels 2/2 RRT   · Received vancomycin 1000 mg on 7/30   · Levels have cleared very slowly since CRRT stopped  · Repeat level Wed AM pre-HD   · Pharmacy will continue to monitor patient and adjust therapy as indicated    VANCOMYCIN RANDOM LEVEL SCHEDULED FOR 8/5 @06:00    Thank you for the consult,  Tunde Rodriguez, [address]  8/4/2020 10:47 AM

## 2020-08-04 NOTE — PROGRESS NOTES
Comprehensive Nutrition Assessment    Type and Reason for Visit:  Reassess    Nutrition Recommendations/Plan:   Advance to goal rate as tolerated  Vital High Protein @ 70 ml/hr will provide 1680 kcal and 147 g protein  Will continue to monitor GI tolerance    Nutrition Assessment:  pt remains sedated on vent, +rectal tube, TF currently running @ 60 ml/hr per documentation, noted plan for HD tomorrow    Malnutrition Assessment:  Malnutrition Status:  Insufficient data    Context:  Acute Illness       Estimated Daily Nutrient Needs:  Energy (kcal):  3470;  ([place])  Protein (g):140-175  (2.0-2.5 g/kg); Fluid (ml/day):  (fluid management per nephrology); Nutrition Related Findings:      Wounds:  Multiple, Pressure Ulcer, Stage II(+unstageable pressure ulcer noted)       Current Nutrition Therapies:    DIET TUBE FEED CONTINUOUS/CYCLIC NPO; Low Calorie High Protein (Vital High Protein); Orogastric; Continuous; 20; 75; 24  Dietary Nutrition Supplements: Snack (see comment)    Anthropometric Measures:  · Height: 5' 7.99\" (172.7 cm)  · Current Body Weight: 147 lb 11.3 oz (67 kg)   · Admission Body Weight: 149 lb 4 oz (67.7 kg)    · Ideal Body Weight: 154 lbs;  ·  % Ideal Body Weight 95.9 %   · BMI: 22.5  · Adjusted Body Weight:  ; No Adjustment   · BMI Categories: Normal Weight (BMI 18.5-24. 9)       Nutrition Diagnosis:   · Inadequate oral intake related to impaired respiratory funtion as evidenced by intubation    Nutrition Interventions:   Food and/or Nutrient Delivery:  Change   Current Tube Feeding Regimen  Nutrition Education/Counseling:  No recommendation at this time   Coordination of Nutrition Care:  Continued Inpatient Monitoring    Goals:  pt will meet greater than 75% of estimated nutrient needs via EN       Nutrition Monitoring and Evaluation:   Food/Nutrient Intake Outcomes:  Enteral Nutrition Intake/Tolerance  Physical Signs/Symptoms Outcomes:  Biochemical Data, Weight, GI Status, Hemodynamic Status     Discharge Planning:     Too soon to determine     Electronically signed by Judd Dunaway MS, RD, LD on 8/4/20 at 2:12 PM EDT    Contact: [phone]

## 2020-08-04 NOTE — PROGRESS NOTES
Infectious Disease Progress Note  2020   Patient Name: Naya Martínez : 1955       Reason for visit: F/u MSSA bacteremia secondary to infected wounds. ? History:? Interval history noted  Intubated and on mechanical ventilation and renal replacement therapy  Physical Exam:  Vital Signs: /87   Pulse 106   Temp 98.4 °F (36.9 °C) (Rectal)   Resp 28   Ht 5' 8\" (1.727 m)   Wt 147 lb 11.3 oz (67 kg)   SpO2 100%   BMI 22.46 kg/m²     Gen: Intubated, sedated and mechanically ventilated  Skin: no stigmata of endocarditis  Wounds: Bilateral forearm dressing C/D/I  HEMT: AT/NC, OG tube, ETT  Eyes: PERRL  Neck: Supple. Trachea midline. No LAD. Chest: no distress and CTA. Good air movement. Heart: RRR and no MRG. Abd: midline abdominal dressing soaked with blood  Ext: no clubbing, cyanosis, or edema  Catheter Site: right IJ CVC, right femoral vein vascath without erythema or tenderness  Neuro: sedated     Radiologic / Imaging / TESTING  No results found.      Labs:    Recent Results (from the past 24 hour(s))   POCT Glucose    Collection Time: 20 11:32 AM   Result Value Ref Range    POC Glucose 99 70 - 99 MG/DL   POCT Glucose    Collection Time: 20  9:10 PM   Result Value Ref Range    POC Glucose 151 (H) 70 - 99 MG/DL   CBC auto differential    Collection Time: 20  4:20 AM   Result Value Ref Range    WBC 20.4 (H) 4.0 - 10.5 K/CU MM    RBC 2.87 (L) 4.6 - 6.2 M/CU MM    Hemoglobin 8.1 (L) 13.5 - 18.0 GM/DL    Hematocrit 25.9 (L) 42 - 52 %    MCV 90.2 78 - 100 FL    MCH 28.2 27 - 31 PG    MCHC 31.3 (L) 32.0 - 36.0 %    RDW 22.2 (H) 11.7 - 14.9 %    Platelets 252 868 - 735 K/CU MM    MPV 9.9 7.5 - 11.1 FL    Differential Type AUTOMATED DIFFERENTIAL     Segs Relative 88.6 (H) 36 - 66 %    Lymphocytes % 3.5 (L) 24 - 44 %    Monocytes % 6.9 (H) 0 - 4 %    Eosinophils % 0.0 0 - 3 %    Basophils % 0.1 0 - 1 %    Segs Absolute 18.1 K/CU MM    Lymphocytes Absolute 0.7 K/CU MM    Monocytes Absolute 1.4 K/CU MM    Eosinophils Absolute 0.0 K/CU MM    Basophils Absolute 0.0 K/CU MM    Nucleated RBC % 0.1 %    Total Nucleated RBC 0.0 K/CU MM    TRC 0.1556 K/CU MM    Total Immature Neutrophil 0.19 K/CU MM    Immature Neutrophil % 0.9 (H) 0 - 0.43 %   CK    Collection Time: 08/04/20  4:20 AM   Result Value Ref Range    Total CK 30 (L) 38 - 174 IU/L   PT    Collection Time: 08/04/20  4:20 AM   Result Value Ref Range    Protime 14.5 11.7 - 14.5 SECONDS    INR 1.20 INDEX   PTT    Collection Time: 08/04/20  4:20 AM   Result Value Ref Range    aPTT 28.7 25.1 - 37.1 SECONDS   Vancomycin, random    Collection Time: 08/04/20  4:20 AM   Result Value Ref Range    Vancomycin Rm 18.9 UG/ML    DOSE AMOUNT DOSE AMT.  GIVEN - 1000     DOSE TIME DOSE TIME GIVEN - 1034    Comprehensive Metabolic Panel    Collection Time: 08/04/20  4:20 AM   Result Value Ref Range    Sodium 136 135 - 145 MMOL/L    Potassium 4.8 3.5 - 5.1 MMOL/L    Chloride 101 99 - 110 mMol/L    CO2 21 21 - 32 MMOL/L    BUN 74 (H) 6 - 23 MG/DL    CREATININE 2.7 (H) 0.9 - 1.3 MG/DL    Glucose 150 (H) 70 - 99 MG/DL    Calcium 8.7 8.3 - 10.6 MG/DL    Alb 2.8 (L) 3.4 - 5.0 GM/DL    Total Protein 6.6 6.4 - 8.2 GM/DL    Total Bilirubin 1.3 (H) 0.0 - 1.0 MG/DL    ALT <5 (L) 10 - 40 U/L    AST 36 15 - 37 IU/L    Alkaline Phosphatase 85 40 - 128 IU/L    GFR Non- 24 (L) >60 mL/min/1.73m2    GFR  29 (L) >60 mL/min/1.73m2    Anion Gap 14 4 - 16   Blood gas, arterial    Collection Time: 08/04/20  6:00 AM   Result Value Ref Range    pH, Bld 7.38 7.34 - 7.45    pCO2, Arterial 35.0 32 - 45 MMHG    pO2, Arterial 116 (H) 75 - 100 MMHG    Base Excess 4 (H) 0 - 3.3    HCO3, Arterial 20.7 18 - 23 MMOL/L    CO2 Content 21.8 19 - 24 MMOL/L    O2 Sat 95.6 (L) 96 - 97 %    Carbon Monoxide, Blood 2.1 0 - 5 %    Methemoglobin, Arterial 1.6 (H) <1.5 %    Comment AC14 450 .30 P5      CULTURE results: Invalid input(s): BLOOD CULTURE,  URINE CULTURE, SURGICAL CULTURE    Diagnosis:  Patient Active Problem List   Diagnosis    Sepsis ([facility] [address]    Leukocytosis    Ischemic bowel disease ([facility] [address] [facility]    MSSA bacteremia    Chronic hepatitis C without hepatic coma ([facility] [address]       Active Problems  Active Problems:    Sepsis ([facility] [address]    Leukocytosis    MSSA bacteremia    Chronic hepatitis C without hepatic coma ([facility] [address]  Resolved Problems:    * No resolved hospital problems. *      Impression and plan   Clinical status: Not on vasopressors, however, leukocytosis improved but still elevated. 1 of 2 blood cx from 7/27: Bacillus. May be contaminant. Repeat blood cx on 7/29/20 ngtd.     Diarrhea:C diff pcr is negative,    Therapeutic: continue vancomycin, cefepime and metronidazole   Diagnostic:    F/u: Blood culture (7/29/2020)   Other:   Summary:      Electronically signed by: Electronically signed by Monserrat León MD on 8/4/2020 at 11:31 AM

## 2020-08-04 NOTE — PROGRESS NOTES
Increased pt tube feed rate to 60 per order. Pt was tolerating 40.      Electronically signed by Leslie Rocha RN on 8/4/2020 at 1:14 AM

## 2020-08-04 NOTE — PROGRESS NOTES
Message sent to hospitalist regarding blood pressure. Arterial line reading is 177/89  and cuff reading is 139/90 . Waiting on response.      Electronically signed by Telly Ag RN on 8/3/2020 at 8:44 PM

## 2020-08-04 NOTE — PROGRESS NOTES
ONCOLOGY HEMATOLOGY CARE ([facility])  PROGRESS NOTE      2020  8:57 AM    Patient:    Irma Saravia  : 1955   60 y.o. MRN: [id number]  Admitted: 2020  3:22 PM ATT: Luis M Dominguez MD   [id number]  AdmitDx: Hypoxia [R09.02]  Elevated troponin [R79.89]  Elevated lactic acid level [R79.89]  Sepsis ([address] [A41.9]  Pain of both hip joints [M25.551, M25.552]  Leukocytosis, unspecified type [T85.603]  PCP: No primary care provider on file. Patient was seen and examined today. NS reported bleeding at surgical site when they turn him, otherwise no other overt bleeding. Long term care has been discussed with son, need for trach and peg and awaiting his decision.          REVIEW OF SYSTEMS    Could not obtain    PHYSICAL EXAM :    Vitals: /85   Pulse 97   Temp 98.4 °F (36.9 °C) (Rectal)   Resp 24   Ht 5' 8\" (1.727 m)   Wt 147 lb 11.3 oz (67 kg)   SpO2 100%   BMI 22.46 kg/m²     CONSTITUTIONAL: intubated and sedatyed  LUNGS: Bilateral coarse bs and wheeze  CARDIOVASCULAR: s1s2 tachycardia  ABDOMEN: dressing on, bs pos  SKIN: discoloration shan upper and LE, RN rash LE  EXTREMITIES:bilateral lower ext edema    LABORATORY RESULTS  CBC:   Recent Labs     20  0420   WBC 19.0* 19.6* 20.4*   HGB 8.1* 8.2* 8.1*    176 200     BMP:    Recent Labs     205 20  1130 20  0420    138  --  136   K 5.0 5.7* 3.7 4.8    100  --  101   CO2 22 20*  --  21   BUN 67* 98*  --  74*   CREATININE 2.8* 3.9*  --  2.7*   GLUCOSE 136* 112*  --  150*     Hepatic:   Recent Labs     206 20  0445 20  0420   AST 39* 34 36   ALT <5* <5* <5*   BILITOT 1.5* 1.4* 1.3*   ALKPHOS 86 86 85     INR:   Recent Labs     20  0436 20  0445 20  0420   INR 1.16 1.20 1.20         RADIOLOGY REPORTS  Reviewed    ASSESSMENT & RECOMMENDATIONS:  Anemia and mild thrombocytopenia:Thrombocytopenia resolved and Hb stable. Is on methylprednisone      leukocytosis most probably secondary to infection. MOF.  Remains intubated and sedated on pressors     Continue other medical care     We will continue to follow the patient.     Thank you for allowing us to participate in the care of this patient.     Prognosis remains gaurded     Brandt Guzman MD, 8/4/2020, 8:57 AM

## 2020-08-04 NOTE — PROGRESS NOTES
General Surgery- [facility] Day: 12    Chief Complaint on Admission: Sepsis      Subjective:     Ross Man is a 60 y.o. male with postop day 9 status post negative laparotomy. Patient remains on the vent on no pressors. He did not tolerate vent weaning over the weekend. Continues to tolerate tube feeds through orogastric tube. Tolerating DIET TUBE FEED CONTINUOUS/CYCLIC NPO; Low Calorie High Protein (Vital High Protein); Orogastric; Continuous; 20; 70; 24. + BM.     ROS:  Review of Systems   Unable to perform ROS: Intubated       Allergies  Patient has no known allergies. Diagnosis Date    Drug abuse and dependence ([facility] [address]     Hypertension        Objective:     Vitals:    20 1603   BP: (!) 133/96   Pulse: 83   Resp: 23   Temp: 96.8 °F (36 °C)   SpO2: 100%       TEMPERATURE:  Current - Temp: 96.8 °F (36 °C); Max - Temp  Av.7 °F (36.5 °C)  Min: 96.8 °F (36 °C)  Max: 98.4 °F (36.9 °C)    I/O this shift: In: 549 [NG/GT:549]  Out: - I/O last 3 completed shifts: In: 2008.1 [I.V.:641.1; NG/GT:717; IV Piggyback:650]  Out: 1000 [Stool:1000]      Physical Exam:    Physical Exam  Constitutional:       General: He is not in acute distress. HENT:      Head: Normocephalic. Neck:      Musculoskeletal: Neck supple. Pulmonary:      Comments: On vent  Abdominal:      Palpations: Abdomen is soft. Tenderness: Tenderness: incision dry. Skin:     General: Skin is warm.              Scheduled Meds:   cefepime  2 g Intravenous Once per day on     scopolamine  1 patch Transdermal Q72H    methylPREDNISolone  60 mg Intravenous Daily    vancomycin (VANCOCIN) intermittent dosing (placeholder)   Other RX Placeholder    metroNIDAZOLE  500 mg Intravenous Q8H    pantoprazole  40 mg Intravenous Daily    thiamine (VITAMIN B1) IVPB  200 mg Intravenous Q12H    chlorhexidine  15 mL Mouth/Throat BID    albuterol sulfate HFA  4 puff Inhalation Q4H    And    ipratropium  4 puff Inhalation Q4H    aspirin  324 mg Oral Once    sodium chloride flush  10 mL Intravenous BID    sodium chloride flush  10 mL Intravenous 2 times per day    [Held by provider] enoxaparin  1 mg/kg Subcutaneous Daily    nicotine  1 patch Transdermal Daily     Continuous Infusions:   dextrose      fentanyl 25 mcg/hr (08/03/20 1136)    propofol 35 mcg/kg/min (08/04/20 0856)     PRN Meds:glucose, dextrose, glucagon (rDNA), dextrose, heparin (porcine), ipratropium-albuterol, iopamidol, sodium chloride flush, acetaminophen **OR** acetaminophen, polyethylene glycol, promethazine **OR** ondansetron, promethazine, diphenhydrAMINE, dicyclomine, morphine      Labs/Imaging Results:   Lab Results   Component Value Date    WBC 20.4 (H) 08/04/2020    HGB 8.1 (L) 08/04/2020    HCT 25.9 (L) 08/04/2020    MCV 90.2 08/04/2020     08/04/2020     Lab Results   Component Value Date     08/04/2020    K 4.8 08/04/2020     08/04/2020    CO2 21 08/04/2020    BUN 74 (H) 08/04/2020    CREATININE 2.7 (H) 08/04/2020    GLUCOSE 150 (H) 08/04/2020    CALCIUM 8.7 08/04/2020    PROT 6.6 08/04/2020    LABALBU 2.8 (L) 08/04/2020    BILITOT 1.3 (H) 08/04/2020    ALKPHOS 85 08/04/2020    AST 36 08/04/2020    ALT <5 (L) 08/04/2020    LABGLOM 24 (L) 08/04/2020    GFRAA 29 (L) 08/04/2020       Assessment:     Patient Active Problem List:     Sepsis ([facility] [address]     Leukocytosis     Ischemic bowel disease (HCC)     MSSA bacteremia     Chronic hepatitis C without hepatic coma ([facility] [address]      Plan:       His son is unable to make decisions regarding comfort care at this point. Continue current management. Patient may need tracheostomy and PEG tube placement if we are to continue aggressive treatment.     Jaylin Man MD

## 2020-08-04 NOTE — PLAN OF CARE
Regulation:  Goal: Ability to maintain clinical measurements within normal limits will improve  Description: Ability to maintain clinical measurements within normal limits will improve  Outcome: Ongoing  Goal: Will show no signs and symptoms of electrolyte imbalance  Description: Will show no signs and symptoms of electrolyte imbalance  Outcome: Ongoing     Problem: Restraint Use - Nonviolent/Non-Self-Destructive Behavior:  Goal: Absence of restraint indications  Description: Absence of restraint indications  Outcome: Ongoing  Goal: Absence of restraint-related injury  Description: Absence of restraint-related injury  Outcome: Ongoing     Problem: Skin Integrity:  Goal: Will show no infection signs and symptoms  Description: Will show no infection signs and symptoms  Outcome: Ongoing  Goal: Absence of new skin breakdown  Description: Absence of new skin breakdown  Outcome: Ongoing  Goal: Status of oral mucous membranes will improve  Description: Status of oral mucous membranes will improve  Outcome: Ongoing  Goal: Skin integrity will be maintained  Description: Skin integrity will be maintained  Outcome: Ongoing     Problem: Discharge Planning:  Goal: Participates in care planning  Description: Participates in care planning  Outcome: Ongoing  Goal: Discharged to appropriate level of care  Description: Discharged to appropriate level of care  Outcome: Ongoing  Goal: Ability to perform activities of daily living will improve  Description: Ability to perform activities of daily living will improve  Outcome: Ongoing     Problem: Airway Clearance - Ineffective:  Goal: Ability to maintain a clear airway will improve  Description: Ability to maintain a clear airway will improve  Outcome: Ongoing     Problem: Anxiety/Stress:  Goal: Level of anxiety will decrease  Description: Level of anxiety will decrease  Outcome: Ongoing     Problem: Aspiration:  Goal: Absence of aspiration  Description: Absence of aspiration  Outcome: Ongoing     Problem:  Bowel Function - Altered:  Goal: Bowel elimination is within specified parameters  Description: Bowel elimination is within specified parameters  Outcome: Ongoing     Problem: Cardiac Output - Decreased:  Goal: Hemodynamic stability will improve  Description: Hemodynamic stability will improve  Outcome: Ongoing     Problem: Fluid Volume - Imbalance:  Goal: Absence of imbalanced fluid volume signs and symptoms  Description: Absence of imbalanced fluid volume signs and symptoms  Outcome: Ongoing     Problem: Gas Exchange - Impaired:  Goal: Levels of oxygenation will improve  Description: Levels of oxygenation will improve  Outcome: Ongoing     Problem: Mental Status - Impaired:  Goal: Mental status will be restored to baseline  Description: Mental status will be restored to baseline  Outcome: Ongoing     Problem: Nutrition Deficit:  Goal: Ability to achieve adequate nutritional intake will improve  Description: Ability to achieve adequate nutritional intake will improve  Outcome: Ongoing     Problem: Pain:  Goal: Pain level will decrease  Description: Pain level will decrease  Outcome: Ongoing  Goal: Recognizes and communicates pain  Description: Recognizes and communicates pain  Outcome: Ongoing  Goal: Control of acute pain  Description: Control of acute pain  Outcome: Ongoing  Goal: Control of chronic pain  Description: Control of chronic pain  Outcome: Ongoing     Problem: Serum Glucose Level - Abnormal:  Goal: Ability to maintain appropriate glucose levels will improve to within specified parameters  Description: Ability to maintain appropriate glucose levels will improve to within specified parameters  Outcome: Ongoing  Goal: Ability to maintain appropriate glucose levels will improve  Description: Ability to maintain appropriate glucose levels will improve  Outcome: Ongoing     Problem: Skin Integrity - Impaired:  Goal: Will show no infection signs and symptoms  Description: Will show no infection signs and symptoms  Outcome: Ongoing  Goal: Absence of new skin breakdown  Description: Absence of new skin breakdown  Outcome: Ongoing     Problem: Sleep Pattern Disturbance:  Goal: Appears well-rested  Description: Appears well-rested  Outcome: Ongoing     Problem: Tissue Perfusion, Altered:  Goal: Circulatory function within specified parameters  Description: Circulatory function within specified parameters  Outcome: Ongoing     Problem: Tissue Perfusion - Cardiopulmonary, Altered:  Goal: Absence of angina  Description: Absence of angina  Outcome: Ongoing  Goal: Hemodynamic stability will improve  Description: Hemodynamic stability will improve  Outcome: Ongoing     Problem: Infection - Ventilator-Associated Pneumonia:  Goal: Absence of pulmonary infection  Description: Absence of pulmonary infection  Outcome: Ongoing     Problem: Venous Thromboembolism:  Goal: Will show no signs or symptoms of venous thromboembolism  Description: Will show no signs or symptoms of venous thromboembolism  Outcome: Ongoing  Goal: Absence of signs or symptoms of impaired coagulation  Description: Absence of signs or symptoms of impaired coagulation  Outcome: Ongoing     Problem: Infection - Ventilator-Associated Pneumonia:  Goal: Prevent a ventilator associated event (VAE)  Description: Prevent a ventilator associated event (VAE)  Outcome: Ongoing  Goal: Absence of pulmonary infection  Description: Absence of pulmonary infection  Outcome: Ongoing     Problem: Health Behavior:  Goal: Ability to identify and utilize available support systems will improve  Description: Ability to identify and utilize available support systems will improve  Outcome: Ongoing  Goal: Compliance with therapeutic regimen will improve  Description: Compliance with therapeutic regimen will improve  Outcome: Ongoing  Goal: Ability to keep healthcare appointments will improve  Description: Ability to keep healthcare appointments will improve  Outcome: Ongoing     Problem: Confusion - Acute:  Goal: Mental status will be restored to baseline  Description: Mental status will be restored to baseline  Outcome: Ongoing  Goal: Absence of continued neurological deterioration signs and symptoms  Description: Absence of continued neurological deterioration signs and symptoms  Outcome: Ongoing     Problem: Injury - Risk of, Physical Injury:  Goal: Will remain free from falls  Description: Will remain free from falls  Outcome: Ongoing  Goal: Absence of physical injury  Description: Absence of physical injury  Outcome: Ongoing     Problem: Mood - Altered:  Goal: Mood stable  Description: Mood stable  Outcome: Ongoing  Goal: Absence of abusive behavior  Description: Absence of abusive behavior  Outcome: Ongoing  Goal: Verbalizations of feeling emotionally comfortable while being cared for will increase  Description: Verbalizations of feeling emotionally comfortable while being cared for will increase  Outcome: Ongoing     Problem: Psychomotor Activity - Altered:  Goal: Absence of psychomotor disturbance signs and symptoms  Description: Absence of psychomotor disturbance signs and symptoms  Outcome: Ongoing     Problem: Sensory Perception - Impaired:  Goal: Demonstrations of improved sensory functioning will increase  Description: Demonstrations of improved sensory functioning will increase  Outcome: Ongoing  Goal: Decrease in sensory misperception frequency  Description: Decrease in sensory misperception frequency  Outcome: Ongoing  Goal: Able to refrain from responding to false sensory perceptions  Description: Able to refrain from responding to false sensory perceptions  Outcome: Ongoing  Goal: Demonstrates accurate environmental perceptions  Description: Demonstrates accurate environmental perceptions  Outcome: Ongoing  Goal: Able to distinguish between reality-based and nonreality-based thinking  Description: Able to distinguish between reality-based and nonreality-based thinking  Outcome: Ongoing  Goal: Able to interrupt nonreality-based thinking  Description: Able to interrupt nonreality-based thinking  Outcome: Ongoing    Electronically signed by Rj English RN on 8/3/2020 at 11:42 PM

## 2020-08-04 NOTE — PROGRESS NOTES
[address] [place] [address]  HOSPITALIST PROGRESS NOTE                       Name:  Cosmo Carpenter /Age/Sex: 1955  (60 y.o. male)   MRN & CSN:  [id number] & [id number] Admission Date/Time: 2020  3:22 PM   Location:  - Attending:  Gerry Castellanos MD                                                  [facility]  Cosmo Carpenter is a 60 y.o. male who was admitted on  with sepsis    SUBJECTIVE  -sedated on propofol and fentanyl. Has rectal tube in place. On tube feedings too    ROS- unable to complete as patient not able to participate at this time      ALLERGIES: No Known Allergies    PCP: No primary care provider on file. PAST MEDICAL HISTORY, SURGICAL HISTORY, SOCIAL HISTORY and  HOME MEDICATIONS all reviewed. OBJECTIVE  Vitals:    20 0831 20 0903 20 1003 20 1114   BP:  110/78 118/87    Pulse: 97 96 106 106   Resp:    Temp: 98.4 °F (36.9 °C)      TempSrc: Rectal      SpO2: 100% 100% 100% 100%   Weight:       Height:           PHYSICAL EXAM   GEN sedated  EYES Pupils are equally round. No scleral erythema, discharge, or conjunctivitis. HENT Mucous membranes are dry. Oral pharynx without exudates, no evidence of thrush. NECK Supple, no apparent thyromegaly or masses. RESP unlabored respiration, decreased breath sounds at the bases  CARDIO/VASC S1/S2 auscultated. Regular rate without appreciable murmurs, rubs, or gallops. No JVD or carotid bruits. Peripheral pulses equal bilaterally and palpable. GI Abdomen is soft- further exam limited due to sedation  HEME/LYMPH No palpable cervical lymphadenopathy and no hepatosplenomegaly. No petechiae or ecchymoses. SKIN Normal coloration, warm, dry.   NEURO Sedated    INTAKE: In: 8.1 [I.V.:641.1; NG/GT:717]  Out: -   OUTPUT: In: 1808.1   Out: -     LABS  Recent Labs     20  0436 20  0445 20  0420   WBC 19.0* 19.6* 20.4*   HGB 8.1* 8.2* 8.1*   HCT 27.0* 26.4* 25.9*    176 200      Recent Labs 08/02/20  0436 08/03/20  0445 08/03/20  1130 08/04/20  0420    138  --  136   K 5.0 5.7* 3.7 4.8    100  --  101   CO2 22 20*  --  21   BUN 67* 98*  --  74*   CREATININE 2.8* 3.9*  --  2.7*     Recent Labs     08/02/20  0436 08/03/20  0445 08/04/20  0420   AST 39* 34 36   ALT <5* <5* <5*   BILITOT 1.5* 1.4* 1.3*   ALKPHOS 86 86 85     Recent Labs     08/02/20  0436 08/03/20  0445 08/04/20  0420   INR 1.16 1.20 1.20     Recent Labs     08/02/20  0436 08/03/20 0445 08/04/20  0420   CKTOTAL 44 27* 30*          Abnormal labs for today noted      Imaging:     ECHO:    Microbiology:  Blood culture:    Urine culture:    Sputum culture:    Procedures done this admission:    MEDS  Scheduled Meds:   cefepime  2 g Intravenous Once per day on Mon Wed Fri    scopolamine  1 patch Transdermal Q72H    methylPREDNISolone  60 mg Intravenous Daily    vancomycin (VANCOCIN) intermittent dosing (placeholder)   Other RX Placeholder    metroNIDAZOLE  500 mg Intravenous Q8H    pantoprazole  40 mg Intravenous Daily    thiamine (VITAMIN B1) IVPB  200 mg Intravenous Q12H    chlorhexidine  15 mL Mouth/Throat BID    albuterol sulfate HFA  4 puff Inhalation Q4H    And    ipratropium  4 puff Inhalation Q4H    aspirin  324 mg Oral Once    sodium chloride flush  10 mL Intravenous BID    sodium chloride flush  10 mL Intravenous 2 times per day    [Held by provider] enoxaparin  1 mg/kg Subcutaneous Daily    nicotine  1 patch Transdermal Daily     Continuous Infusions:   dextrose      fentanyl 25 mcg/hr (08/03/20 1136)    propofol 35 mcg/kg/min (08/04/20 0856)     PRN Meds:glucose, dextrose, glucagon (rDNA), dextrose, heparin (porcine), ipratropium-albuterol, iopamidol, sodium chloride flush, acetaminophen **OR** acetaminophen, polyethylene glycol, promethazine **OR** ondansetron, promethazine, diphenhydrAMINE, dicyclomine, morphine        ASSESSMENT and PLAN  Hospital Day: 12    1-Septic shock with MSSA bacteremia due to wound-off pressors- repeat blood culture negative, one set growing bacilli- ?contaminant- ID on the case- noted still with significant leukocytosis  2-Probable staph UTI- abx per ID  3-Acute on chronic respiratory failure due to sepsis and suspected aspiration pneumonia- still vent dependent as of 7/25- with severe pulm HTN with RVSP of 67  4-JERAMIE due to ATN- dialysis per nephro  5-Skin wounds/right post thigh/buttock and bilateral UE- wound culture growing MSSA- surgery and ID following  6-Pneumatosis intestinalis s/p ex-lap on 7/25 negative for ischemic bowel, however CT on 7/31 showing hemoperitoneum and pneumoperitoneum- due to leukocytosis- will get repeat CT again today  7-Acute blood loss anemia due to above- s/p transfusion- monitor H/H  8-Destructive lesion T12-L1- possible prior infectious spondyliits  -no abscess or osteo as per imaging  9-?low DIC state with mildly low fibrinogen and platelets- noted hematology following- repeat levels again    Other issues  -Hx of gastric cancer stage 4  -polysubstance abuse- UDS positive for cocaine and opiate  -documented hep C    Noted ongoing goals of care discussion via palliative care    No pharmacological dvt prophylaxis due to retroperitoneal hemorrhage with acute blood loss anemia       Disp:     Diet DIET TUBE FEED CONTINUOUS/CYCLIC NPO; Low Calorie High Protein (Vital High Protein);  Orogastric; Continuous; 20; 75; 24  Dietary Nutrition Supplements: Snack (see comment)   DVT Prophylaxis [] Lovenox, []  Heparin, [] SCDs, [] Ambulation   GI Prophylaxis [] PPI,  [] H2 Blocker,  [] Carafate,  [] Diet/Tube Feeds   Code Status Full Code   Disposition Patient requires continued admission due to sepsis, respiratory failure   CMS Level of Risk [] Low, [] Moderate,[x]  High  Patient's risk as above due to sepsis respiratory failure     CARMINE WOOTEN MD 8/4/2020 12:10 PM

## 2020-08-04 NOTE — PROGRESS NOTES
Palliative Care  I see patient for symptom management and goals of care related to IVDU and sepsis with MSSA, severe metabolic acidosis and JERAMIE 2/2 ATN. Patient intubated and sedated  Physical Exam:  Vitals:    08/04/20 1114   BP:    Pulse: 106   Resp: 28   Temp:    SpO2: 100%     GENERAL: intubated  HEENT: No cervical adenopathy, MM moist, PERRL  COR: bounding heart rate, tachycardic  LUNGS: diminished breath sounds  ABD: softly distended, +BS  SKIN: bandaged upper extremities  PSYCH: intubated and sedated  NEURO: CN II-XII grossly intact    Patient remains intubated and sedated. His C. Diff is negative as are his most recent blood cultures. The patient's son is having a difficult time making decisions regarding his father's long term care and wanted a little more time when I spoke with him yesterday. The patient has been intubated since 07/25/2020 so decisions will need to be made soon. Patient was seen with total face to face time of 15 minutes. More than 50% of this visit was counseling and education regarding palliative care as well as counseling on preventative health maintenance follow-up.   Electronically signed by Mere You MD on 8/4/2020 at 11:47 AM

## 2020-08-04 NOTE — PROGRESS NOTES
Pulmonary and Critical Care  Progress Note      VITALS:  /87   Pulse 106   Temp 98.4 °F (36.9 °C) (Rectal)   Resp 28   Ht 5' 8\" (1.727 m)   Wt 147 lb 11.3 oz (67 kg)   SpO2 100%   BMI 22.46 kg/m²     Subjective:   CHIEF COMPLAINT :Fall with right hip pain     HPI:                The patient is a 60 y.o. male with significant past medical history of HTN,. Sacral decub, MSSA endocarditis, Septic PE, Hep C,  presents with complaints of fall and right hip pain. He was on the floor for more than 2 days. His CK is mildly elevated. He had mild lactic acidosis. His PBNPT is 11,000 with mildly elevated troponins. He is on 3 grams of Heroin per day. His U tocx was positive for Cocaine and Opiates. He had no fever, no headaches, no chest pain, no palpitations, no n/v, no diaphoresis. He has no sick exposure, no recent travel. His 1 blood culture set is positive for Staph. He is getting abx and IV fluids. He is on the vent and off CRRT    Objective:   PHYSICAL EXAM:    LUNGS:Occasional basal crackles  Abd-soft, BS+,NT  Ext - no pedal edema  CVS-s1s2, no murmurs      DATA:    CBC:  Recent Labs     08/02/20 0436 08/03/20 0445 08/04/20  0420   WBC 19.0* 19.6* 20.4*   RBC 2.99* 2.94* 2.87*   HGB 8.1* 8.2* 8.1*   HCT 27.0* 26.4* 25.9*    176 200   MCV 90.3 89.8 90.2   MCH 27.1 27.9 28.2   MCHC 30.0* 31.1* 31.3*   RDW 22.0* 22.4* 22.2*   SEGSPCT 91.9* 88.2* 88.6*      BMP:  Recent Labs     08/02/20  0436 08/03/20 0445 08/03/20  1130 08/04/20  0420    138  --  136   K 5.0 5.7* 3.7 4.8    100  --  101   CO2 22 20*  --  21   BUN 67* 98*  --  74*   CREATININE 2.8* 3.9*  --  2.7*   CALCIUM 9.0 8.8  --  8.7   GLUCOSE 136* 112*  --  150*      ABG:  Recent Labs     08/02/20  0600 08/03/20  0600 08/04/20  0600   PH 7.40 7.35 7.38   PO2ART 108* 100 116*   XBF4VGP 35.0 36.0 35.0   O2SAT 96.0 95.4* 95.6*     BNP  No results found for: BNP   D-Dimer:  No results found for: DDIMER   1.  Radiology: Pending      Assessment/Plan     Patient Active Problem List    Diagnosis Date Noted    Chronic hepatitis C without hepatic coma ([facility] 08/03/2020    MSSA bacteremia 07/28/2020    Ischemic bowel disease ([facility]     Leukocytosis     Sepsis ([facility] 07/24/2020   Patient is on the vent and sedated. He is off pressors. His H&H has been stable     Septic shock- improved  Hemorrhagic shock- improved  Acute hypoxic resp failure  Mild coagulopathy- improved  Shock Liver- improved  Lactic acidosis- improved  VDRF  Anemia likely GI bleed  ? Staph Aureus bacteremia  Severe Pulmonary HTN  Occult blood positive  JERAMIE likely sec to ATN  Bilateral Arm wounds       1. HD per renal  2. CXR now  3. ABx  4. F/u C&S  5. Tube feeds till extubation  6. Prognosis guarded  7. Daily SAT and SBT trial  8. C/w present management  No follow-ups on file.     Electronically signed by Filiberto Garnica MD on 8/4/2020 at 11:38 AM

## 2020-08-04 NOTE — PLAN OF CARE
Problem: Falls - Risk of:  Goal: Will remain free from falls  Description: Will remain free from falls  8/4/2020 0835 by Masood Ahumada RN  Outcome: Ongoing  8/3/2020 2342 by Kaylee Hinojosa RN  Outcome: Ongoing  Goal: Absence of physical injury  Description: Absence of physical injury  8/4/2020 0835 by Masood Ahumada RN  Outcome: Ongoing  8/3/2020 2342 by Kaylee Hinojosa RN  Outcome: Ongoing     Problem: Pain:  Goal: Pain level will decrease  Description: Pain level will decrease  8/4/2020 0835 by Masood Ahumada RN  Outcome: Ongoing  8/3/2020 2342 by Kaylee Hinojosa RN  Outcome: Ongoing  Goal: Control of acute pain  Description: Control of acute pain  8/4/2020 0835 by Masood Ahumada RN  Outcome: Ongoing  8/3/2020 2342 by Kaylee Hinojosa RN  Outcome: Ongoing  Goal: Control of chronic pain  Description: Control of chronic pain  8/4/2020 0835 by Masood Ahumada RN  Outcome: Ongoing  8/3/2020 2342 by Kaylee Hinojosa RN  Outcome: Ongoing     Problem: Diarrhea:  Goal: Bowel elimination is within specified parameters  Description: Bowel elimination is within specified parameters  8/4/2020 0835 by Masood Ahumada RN  Outcome: Ongoing  8/3/2020 2342 by Kaylee Hinojosa RN  Outcome: Ongoing  Goal: Passage of soft, formed stool  Description: Passage of soft, formed stool  8/4/2020 0835 by Masood Ahumada RN  Outcome: Ongoing  8/3/2020 2342 by Kaylee Hinojosa RN  Outcome: Ongoing  Goal: Establishment of normal bowel function will improve to within specified parameters  Description: Establishment of normal bowel function will improve to within specified parameters  8/4/2020 0835 by Masood Ahumada RN  Outcome: Ongoing  8/3/2020 2342 by Kaylee Hinojosa RN  Outcome: Ongoing     Problem: Nausea/Vomiting:  Goal: Absence of nausea/vomiting  Description: Absence of nausea/vomiting  8/4/2020 0835 by Masood Ahumada RN  Outcome: Ongoing  8/3/2020 2342 by Ralph Mcfadden RN  Outcome: Ongoing  Goal: Able to drink  Description: Able to drink  8/4/2020 0835 by Charli Talbert RN  Outcome: Ongoing  8/3/2020 2342 by Ralph Mcfadden RN  Outcome: Ongoing  Goal: Able to eat  Description: Able to eat  8/4/2020 0835 by Charli Talbert RN  Outcome: Ongoing  8/3/2020 2342 by Ralph Mcfadden RN  Outcome: Ongoing  Goal: Ability to achieve adequate nutritional intake will improve  Description: Ability to achieve adequate nutritional intake will improve  8/4/2020 0835 by Charli Talbert RN  Outcome: Ongoing  8/3/2020 2342 by Ralph Mcfadden RN  Outcome: Ongoing     Problem: Breathing Pattern - Ineffective:  Goal: Ability to achieve and maintain a regular respiratory rate will improve  Description: Ability to achieve and maintain a regular respiratory rate will improve  8/4/2020 0835 by Charli Talbert RN  Outcome: Ongoing  8/3/2020 2342 by Ralph Mcfadden RN  Outcome: Ongoing     Problem: Fluid Volume:  Goal: Ability to achieve a balanced intake and output will improve  Description: Ability to achieve a balanced intake and output will improve  8/4/2020 0835 by Charli Talbert RN  Outcome: Ongoing  8/3/2020 2342 by Ralph Mcfadden RN  Outcome: Ongoing  Goal: Will show no signs or symptoms of fluid imbalance  Description: Will show no signs or symptoms of fluid imbalance  8/4/2020 0835 by Charli Talbert RN  Outcome: Ongoing  8/3/2020 2342 by Ralph Mcfadden RN  Outcome: Ongoing     Problem: Physical Regulation:  Goal: Ability to maintain clinical measurements within normal limits will improve  Description: Ability to maintain clinical measurements within normal limits will improve  8/4/2020 0835 by Charli Talbert RN  Outcome: Ongoing  8/3/2020 2342 by Ralph Mcfadden RN  Outcome: Ongoing  Goal: Will show no signs and symptoms of electrolyte imbalance  Description: Will show no signs and symptoms of electrolyte imbalance  8/4/2020 0835 by Jeffrey Shepherd RN  Outcome: Ongoing  8/3/2020 2342 by Kleber Vyas RN  Outcome: Ongoing     Problem: Restraint Use - Nonviolent/Non-Self-Destructive Behavior:  Goal: Absence of restraint indications  Description: Absence of restraint indications  8/4/2020 0835 by Jeffrey Shepherd RN  Outcome: Ongoing  8/3/2020 2342 by Kleber Vyas RN  Outcome: Ongoing  Goal: Absence of restraint-related injury  Description: Absence of restraint-related injury  8/4/2020 0835 by Jeffrey Shepherd RN  Outcome: Ongoing  8/3/2020 2342 by Kleber Vyas RN  Outcome: Ongoing     Problem: Skin Integrity:  Goal: Will show no infection signs and symptoms  Description: Will show no infection signs and symptoms  8/4/2020 0835 by Jeffrey Shepherd RN  Outcome: Ongoing  8/3/2020 2342 by Kleber Vyas RN  Outcome: Ongoing  Goal: Absence of new skin breakdown  Description: Absence of new skin breakdown  8/4/2020 0835 by Jeffrey Shepherd RN  Outcome: Ongoing  8/3/2020 2342 by Kleber Vyas RN  Outcome: Ongoing  Goal: Status of oral mucous membranes will improve  Description: Status of oral mucous membranes will improve  8/4/2020 0835 by Jeffrey Shepherd RN  Outcome: Ongoing  8/3/2020 2342 by Kleber Vyas RN  Outcome: Ongoing  Goal: Skin integrity will be maintained  Description: Skin integrity will be maintained  8/4/2020 0835 by Jeffrey Shepherd RN  Outcome: Ongoing  8/3/2020 2342 by Kleber Vyas RN  Outcome: Ongoing     Problem: Discharge Planning:  Goal: Participates in care planning  Description: Participates in care planning  8/4/2020 0835 by Jeffrey Shepherd RN  Outcome: Ongoing  8/3/2020 2342 by Klbeer Vyas RN  Outcome: Ongoing  Goal: Discharged to appropriate level of care  Description: Discharged to appropriate level of care  8/4/2020 0835 by Jeffrey Shepherd RN  Outcome: Ongoing  8/3/2020 2342 by Ban Yee Mary Anne Patterson RN  Outcome: Ongoing  Goal: Ability to perform activities of daily living will improve  Description: Ability to perform activities of daily living will improve  8/4/2020 0835 by Brannon Gloria RN  Outcome: Ongoing  8/3/2020 2342 by Tasha Umana RN  Outcome: Ongoing     Problem: Airway Clearance - Ineffective:  Goal: Ability to maintain a clear airway will improve  Description: Ability to maintain a clear airway will improve  8/4/2020 0835 by Brannon Gloria RN  Outcome: Ongoing  8/3/2020 2342 by Tasha Umana RN  Outcome: Ongoing     Problem: Anxiety/Stress:  Goal: Level of anxiety will decrease  Description: Level of anxiety will decrease  8/4/2020 0835 by Brannon Gloria RN  Outcome: Ongoing  8/3/2020 2342 by Tasha Umana RN  Outcome: Ongoing     Problem: Aspiration:  Goal: Absence of aspiration  Description: Absence of aspiration  8/4/2020 0835 by Brannon Gloria RN  Outcome: Ongoing  8/3/2020 2342 by Tasha Umana RN  Outcome: Ongoing     Problem:  Bowel Function - Altered:  Goal: Bowel elimination is within specified parameters  Description: Bowel elimination is within specified parameters  8/4/2020 0835 by Brannon Gloria RN  Outcome: Ongoing  8/3/2020 2342 by Tasha Umana RN  Outcome: Ongoing     Problem: Cardiac Output - Decreased:  Goal: Hemodynamic stability will improve  Description: Hemodynamic stability will improve  8/4/2020 0835 by Brannon Gloria RN  Outcome: Ongoing  8/3/2020 2342 by Tasha Umana RN  Outcome: Ongoing     Problem: Fluid Volume - Imbalance:  Goal: Absence of imbalanced fluid volume signs and symptoms  Description: Absence of imbalanced fluid volume signs and symptoms  8/4/2020 0835 by Brannon Gloria RN  Outcome: Ongoing  8/3/2020 2342 by Tasha Umana RN  Outcome: Ongoing     Problem: Gas Exchange - Impaired:  Goal: Levels of oxygenation will improve  Description: Levels of oxygenation will improve  8/4/2020 0835 by Marvin Sheridan RN  Outcome: Ongoing  8/3/2020 2342 by James Allen RN  Outcome: Ongoing     Problem: Mental Status - Impaired:  Goal: Mental status will be restored to baseline  Description: Mental status will be restored to baseline  8/4/2020 0835 by Marvin Sheridan RN  Outcome: Ongoing  8/3/2020 2342 by James Allen RN  Outcome: Ongoing     Problem: Nutrition Deficit:  Goal: Ability to achieve adequate nutritional intake will improve  Description: Ability to achieve adequate nutritional intake will improve  8/4/2020 0835 by Marvin Sheridan RN  Outcome: Ongoing  8/3/2020 2342 by James Allen RN  Outcome: Ongoing     Problem: Pain:  Goal: Pain level will decrease  Description: Pain level will decrease  8/4/2020 0835 by Marvin Sheridan RN  Outcome: Ongoing  8/3/2020 2342 by James Allen RN  Outcome: Ongoing  Goal: Recognizes and communicates pain  Description: Recognizes and communicates pain  8/4/2020 0835 by Marvin Sheridan RN  Outcome: Ongoing  8/3/2020 2342 by James Allen RN  Outcome: Ongoing  Goal: Control of acute pain  Description: Control of acute pain  8/4/2020 0835 by Marvin Sheridan RN  Outcome: Ongoing  8/3/2020 2342 by James Allen RN  Outcome: Ongoing  Goal: Control of chronic pain  Description: Control of chronic pain  8/4/2020 0835 by Marvin Sheridan RN  Outcome: Ongoing  8/3/2020 2342 by James Allen RN  Outcome: Ongoing     Problem: Serum Glucose Level - Abnormal:  Goal: Ability to maintain appropriate glucose levels will improve to within specified parameters  Description: Ability to maintain appropriate glucose levels will improve to within specified parameters  8/4/2020 0835 by Marvin Sheridan RN  Outcome: Ongoing  8/3/2020 2342 by James Allen RN  Outcome: Ongoing  Goal: Ability to maintain appropriate glucose levels will improve  Description: thromboembolism  Description: Will show no signs or symptoms of venous thromboembolism  8/4/2020 0835 by Aliyah Sellers RN  Outcome: Ongoing  8/3/2020 2342 by Ariel Landaverde RN  Outcome: Ongoing  Goal: Absence of signs or symptoms of impaired coagulation  Description: Absence of signs or symptoms of impaired coagulation  8/4/2020 0835 by Aliyah Sellers RN  Outcome: Ongoing  8/3/2020 2342 by Ariel Landaverde RN  Outcome: Ongoing     Problem: Infection - Ventilator-Associated Pneumonia:  Goal: Prevent a ventilator associated event (VAE)  Description: Prevent a ventilator associated event (VAE)  8/3/2020 2342 by Ariel Landaverde RN  Outcome: Ongoing  Goal: Absence of pulmonary infection  Description: Absence of pulmonary infection  8/3/2020 2342 by Ariel Landaverde RN  Outcome: Ongoing     Problem: Health Behavior:  Goal: Ability to identify and utilize available support systems will improve  Description: Ability to identify and utilize available support systems will improve  8/3/2020 2342 by Ariel Landaverde RN  Outcome: Ongoing  Goal: Compliance with therapeutic regimen will improve  Description: Compliance with therapeutic regimen will improve  8/3/2020 2342 by Ariel Landaverde RN  Outcome: Ongoing  Goal: Ability to keep healthcare appointments will improve  Description: Ability to keep healthcare appointments will improve  8/3/2020 2342 by Ariel Landaverde RN  Outcome: Ongoing     Problem: Confusion - Acute:  Goal: Mental status will be restored to baseline  Description: Mental status will be restored to baseline  8/4/2020 0835 by Aliyah Sellers RN  Outcome: Ongoing  8/3/2020 2342 by Ariel Landaverde RN  Outcome: Ongoing  Goal: Absence of continued neurological deterioration signs and symptoms  Description: Absence of continued neurological deterioration signs and symptoms  8/3/2020 2342 by Ariel Landaverde RN  Outcome: Ongoing     Problem: Injury Rolando Elmore RN  Outcome: Ongoing  Goal: Able to distinguish between reality-based and nonreality-based thinking  Description: Able to distinguish between reality-based and nonreality-based thinking  8/3/2020 2342 by Rolando Elmore RN  Outcome: Ongoing  Goal: Able to interrupt nonreality-based thinking  Description: Able to interrupt nonreality-based thinking  8/3/2020 2342 by Rolando Elmore RN  Outcome: Ongoing

## 2020-08-05 LAB
ALBUMIN SERPL-MCNC: 2.6 GM/DL (ref 3.4–5)
ALP BLD-CCNC: 82 IU/L (ref 40–128)
ALT SERPL-CCNC: <5 U/L (ref 10–40)
ANION GAP SERPL CALCULATED.3IONS-SCNC: 18 MMOL/L (ref 4–16)
APTT: 28.4 SECONDS (ref 25.1–37.1)
AST SERPL-CCNC: 33 IU/L (ref 15–37)
BASE EXCESS: 9 (ref 0–3.3)
BASOPHILS ABSOLUTE: 0.1 K/CU MM
BASOPHILS RELATIVE PERCENT: 0.2 % (ref 0–1)
BILIRUB SERPL-MCNC: 1 MG/DL (ref 0–1)
BUN BLDV-MCNC: 112 MG/DL (ref 6–23)
CALCIUM SERPL-MCNC: 8.6 MG/DL (ref 8.3–10.6)
CARBON MONOXIDE, BLOOD: 1.9 % (ref 0–5)
CHLORIDE BLD-SCNC: 101 MMOL/L (ref 99–110)
CO2 CONTENT: 17.2 MMOL/L (ref 19–24)
CO2: 18 MMOL/L (ref 21–32)
COLD AGGLUTININ TITER: NORMAL
COMMENT: ABNORMAL
CREAT SERPL-MCNC: 3.6 MG/DL (ref 0.9–1.3)
DIFFERENTIAL TYPE: ABNORMAL
DOSE AMOUNT: NORMAL
DOSE TIME: NORMAL
EOSINOPHILS ABSOLUTE: 0 K/CU MM
EOSINOPHILS RELATIVE PERCENT: 0 % (ref 0–3)
GFR AFRICAN AMERICAN: 21 ML/MIN/1.73M2
GFR NON-AFRICAN AMERICAN: 17 ML/MIN/1.73M2
GLUCOSE BLD-MCNC: 142 MG/DL (ref 70–99)
GLUCOSE BLD-MCNC: 149 MG/DL (ref 70–99)
GLUCOSE BLD-MCNC: 166 MG/DL (ref 70–99)
HCO3 ARTERIAL: 16.2 MMOL/L (ref 18–23)
HCT VFR BLD CALC: 26.3 % (ref 42–52)
HEMOGLOBIN: 8.2 GM/DL (ref 13.5–18)
IMMATURE NEUTROPHIL %: 1.2 % (ref 0–0.43)
INR BLD: 1.13 INDEX
LYMPHOCYTES ABSOLUTE: 0.9 K/CU MM
LYMPHOCYTES RELATIVE PERCENT: 3.6 % (ref 24–44)
MCH RBC QN AUTO: 28.2 PG (ref 27–31)
MCHC RBC AUTO-ENTMCNC: 31.2 % (ref 32–36)
MCV RBC AUTO: 90.4 FL (ref 78–100)
METHEMOGLOBIN ARTERIAL: 1.3 %
MONOCYTES ABSOLUTE: 1.3 K/CU MM
MONOCYTES RELATIVE PERCENT: 5.1 % (ref 0–4)
NUCLEATED RBC %: 0 %
O2 SATURATION: 96.2 % (ref 96–97)
PCO2 ARTERIAL: 33 MMHG (ref 32–45)
PDW BLD-RTO: 22.1 % (ref 11.7–14.9)
PH BLOOD: 7.3 (ref 7.34–7.45)
PLATELET # BLD: 196 K/CU MM (ref 140–440)
PMV BLD AUTO: 10.6 FL (ref 7.5–11.1)
PO2 ARTERIAL: 131 MMHG (ref 75–100)
POTASSIUM SERPL-SCNC: 4.8 MMOL/L (ref 3.5–5.1)
PROCALCITONIN: 1.24
PROTHROMBIN TIME: 13.7 SECONDS (ref 11.7–14.5)
RBC # BLD: 2.91 M/CU MM (ref 4.6–6.2)
SEGMENTED NEUTROPHILS ABSOLUTE COUNT: 22.4 K/CU MM
SEGMENTED NEUTROPHILS RELATIVE PERCENT: 89.9 % (ref 36–66)
SODIUM BLD-SCNC: 137 MMOL/L (ref 135–145)
TOTAL CK: 42 IU/L (ref 38–174)
TOTAL IMMATURE NEUTOROPHIL: 0.29 K/CU MM
TOTAL NUCLEATED RBC: 0 K/CU MM
TOTAL PROTEIN: 6.2 GM/DL (ref 6.4–8.2)
VANCOMYCIN RANDOM: 17.5 UG/ML
WBC # BLD: 24.9 K/CU MM (ref 4–10.5)

## 2020-08-05 PROCEDURE — 99232 SBSQ HOSP IP/OBS MODERATE 35: CPT | Performed by: NURSE PRACTITIONER

## 2020-08-05 PROCEDURE — 85730 THROMBOPLASTIN TIME PARTIAL: CPT

## 2020-08-05 PROCEDURE — 89220 SPUTUM SPECIMEN COLLECTION: CPT

## 2020-08-05 PROCEDURE — APPNB30 APP NON BILLABLE TIME 0-30 MINS: Performed by: PHYSICIAN ASSISTANT

## 2020-08-05 PROCEDURE — 6360000002 HC RX W HCPCS: Performed by: INTERNAL MEDICINE

## 2020-08-05 PROCEDURE — C9113 INJ PANTOPRAZOLE SODIUM, VIA: HCPCS | Performed by: SPECIALIST

## 2020-08-05 PROCEDURE — 87205 SMEAR GRAM STAIN: CPT

## 2020-08-05 PROCEDURE — 84145 PROCALCITONIN (PCT): CPT

## 2020-08-05 PROCEDURE — 99233 SBSQ HOSP IP/OBS HIGH 50: CPT | Performed by: INTERNAL MEDICINE

## 2020-08-05 PROCEDURE — 82962 GLUCOSE BLOOD TEST: CPT

## 2020-08-05 PROCEDURE — 6360000002 HC RX W HCPCS: Performed by: NURSE PRACTITIONER

## 2020-08-05 PROCEDURE — 2580000003 HC RX 258: Performed by: INTERNAL MEDICINE

## 2020-08-05 PROCEDURE — 82550 ASSAY OF CK (CPK): CPT

## 2020-08-05 PROCEDURE — 82803 BLOOD GASES ANY COMBINATION: CPT

## 2020-08-05 PROCEDURE — 85025 COMPLETE CBC W/AUTO DIFF WBC: CPT

## 2020-08-05 PROCEDURE — 31720 CLEARANCE OF AIRWAYS: CPT

## 2020-08-05 PROCEDURE — 94003 VENT MGMT INPAT SUBQ DAY: CPT

## 2020-08-05 PROCEDURE — 2580000003 HC RX 258: Performed by: HOSPITALIST

## 2020-08-05 PROCEDURE — 2000000000 HC ICU R&B

## 2020-08-05 PROCEDURE — 6370000000 HC RX 637 (ALT 250 FOR IP): Performed by: SURGERY

## 2020-08-05 PROCEDURE — 2700000000 HC OXYGEN THERAPY PER DAY

## 2020-08-05 PROCEDURE — 37799 UNLISTED PX VASCULAR SURGERY: CPT

## 2020-08-05 PROCEDURE — 87070 CULTURE OTHR SPECIMN AEROBIC: CPT

## 2020-08-05 PROCEDURE — 85610 PROTHROMBIN TIME: CPT

## 2020-08-05 PROCEDURE — 80202 ASSAY OF VANCOMYCIN: CPT

## 2020-08-05 PROCEDURE — 87040 BLOOD CULTURE FOR BACTERIA: CPT

## 2020-08-05 PROCEDURE — 6360000002 HC RX W HCPCS: Performed by: SPECIALIST

## 2020-08-05 PROCEDURE — 80053 COMPREHEN METABOLIC PANEL: CPT

## 2020-08-05 PROCEDURE — 94750 HC PULMONARY COMPLIANCE STUDY: CPT

## 2020-08-05 PROCEDURE — 6370000000 HC RX 637 (ALT 250 FOR IP): Performed by: HOSPITALIST

## 2020-08-05 PROCEDURE — 2580000003 HC RX 258: Performed by: SURGERY

## 2020-08-05 PROCEDURE — 2500000003 HC RX 250 WO HCPCS: Performed by: INTERNAL MEDICINE

## 2020-08-05 PROCEDURE — 94640 AIRWAY INHALATION TREATMENT: CPT

## 2020-08-05 PROCEDURE — 99024 POSTOP FOLLOW-UP VISIT: CPT | Performed by: PHYSICIAN ASSISTANT

## 2020-08-05 PROCEDURE — 94761 N-INVAS EAR/PLS OXIMETRY MLT: CPT

## 2020-08-05 PROCEDURE — 6370000000 HC RX 637 (ALT 250 FOR IP): Performed by: INTERNAL MEDICINE

## 2020-08-05 PROCEDURE — 2500000003 HC RX 250 WO HCPCS: Performed by: HOSPITALIST

## 2020-08-05 RX ADMIN — METHYLPREDNISOLONE SODIUM SUCCINATE 60 MG: 125 INJECTION, POWDER, FOR SOLUTION INTRAMUSCULAR; INTRAVENOUS at 08:33

## 2020-08-05 RX ADMIN — ALBUTEROL SULFATE 4 PUFF: 90 AEROSOL, METERED RESPIRATORY (INHALATION) at 07:09

## 2020-08-05 RX ADMIN — SODIUM CHLORIDE, PRESERVATIVE FREE 10 ML: 5 INJECTION INTRAVENOUS at 08:44

## 2020-08-05 RX ADMIN — PANTOPRAZOLE SODIUM 40 MG: 40 INJECTION, POWDER, FOR SOLUTION INTRAVENOUS at 08:33

## 2020-08-05 RX ADMIN — IPRATROPIUM BROMIDE 4 PUFF: 17 AEROSOL, METERED RESPIRATORY (INHALATION) at 07:09

## 2020-08-05 RX ADMIN — IPRATROPIUM BROMIDE 4 PUFF: 17 AEROSOL, METERED RESPIRATORY (INHALATION) at 04:05

## 2020-08-05 RX ADMIN — METRONIDAZOLE 500 MG: 500 INJECTION, SOLUTION INTRAVENOUS at 16:34

## 2020-08-05 RX ADMIN — SODIUM CHLORIDE, PRESERVATIVE FREE 10 ML: 5 INJECTION INTRAVENOUS at 20:28

## 2020-08-05 RX ADMIN — PROPOFOL 30 MCG/KG/MIN: 10 INJECTION, EMULSION INTRAVENOUS at 08:37

## 2020-08-05 RX ADMIN — CEFEPIME HYDROCHLORIDE 2 G: 2 INJECTION, POWDER, FOR SOLUTION INTRAVENOUS at 14:26

## 2020-08-05 RX ADMIN — IPRATROPIUM BROMIDE 4 PUFF: 17 AEROSOL, METERED RESPIRATORY (INHALATION) at 15:11

## 2020-08-05 RX ADMIN — ALBUTEROL SULFATE 4 PUFF: 90 AEROSOL, METERED RESPIRATORY (INHALATION) at 04:05

## 2020-08-05 RX ADMIN — ALBUTEROL SULFATE 4 PUFF: 90 AEROSOL, METERED RESPIRATORY (INHALATION) at 19:41

## 2020-08-05 RX ADMIN — PROPOFOL 35 MCG/KG/MIN: 10 INJECTION, EMULSION INTRAVENOUS at 02:11

## 2020-08-05 RX ADMIN — ALBUTEROL SULFATE 4 PUFF: 90 AEROSOL, METERED RESPIRATORY (INHALATION) at 11:05

## 2020-08-05 RX ADMIN — NOREPINEPHRINE BITARTRATE 2 MCG/MIN: 1 INJECTION, SOLUTION, CONCENTRATE INTRAVENOUS at 08:52

## 2020-08-05 RX ADMIN — METRONIDAZOLE 500 MG: 500 INJECTION, SOLUTION INTRAVENOUS at 07:00

## 2020-08-05 RX ADMIN — Medication 25 MCG/HR: at 08:51

## 2020-08-05 RX ADMIN — SODIUM CHLORIDE, PRESERVATIVE FREE 10 ML: 5 INJECTION INTRAVENOUS at 08:57

## 2020-08-05 RX ADMIN — IPRATROPIUM BROMIDE 4 PUFF: 17 AEROSOL, METERED RESPIRATORY (INHALATION) at 19:41

## 2020-08-05 RX ADMIN — Medication 2 MCG/MIN: at 16:15

## 2020-08-05 RX ADMIN — SODIUM CHLORIDE, PRESERVATIVE FREE 10 ML: 5 INJECTION INTRAVENOUS at 20:13

## 2020-08-05 RX ADMIN — PROPOFOL 30 MCG/KG/MIN: 10 INJECTION, EMULSION INTRAVENOUS at 16:37

## 2020-08-05 RX ADMIN — CHLORHEXIDINE GLUCONATE 0.12% ORAL RINSE 15 ML: 1.2 LIQUID ORAL at 08:33

## 2020-08-05 RX ADMIN — IPRATROPIUM BROMIDE 4 PUFF: 17 AEROSOL, METERED RESPIRATORY (INHALATION) at 11:05

## 2020-08-05 RX ADMIN — THIAMINE HYDROCHLORIDE 200 MG: 100 INJECTION, SOLUTION INTRAMUSCULAR; INTRAVENOUS at 11:17

## 2020-08-05 RX ADMIN — CHLORHEXIDINE GLUCONATE 0.12% ORAL RINSE 15 ML: 1.2 LIQUID ORAL at 20:13

## 2020-08-05 RX ADMIN — ALBUTEROL SULFATE 4 PUFF: 90 AEROSOL, METERED RESPIRATORY (INHALATION) at 15:10

## 2020-08-05 ASSESSMENT — PULMONARY FUNCTION TESTS
PIF_VALUE: 30
PIF_VALUE: 25
PIF_VALUE: 29
PIF_VALUE: 30
PIF_VALUE: 40
PIF_VALUE: 22
PIF_VALUE: 23
PIF_VALUE: 27
PIF_VALUE: 22
PIF_VALUE: 24
PIF_VALUE: 30
PIF_VALUE: 22
PIF_VALUE: 28
PIF_VALUE: 27
PIF_VALUE: 36
PIF_VALUE: 25
PIF_VALUE: 27
PIF_VALUE: 25
PIF_VALUE: 27
PIF_VALUE: 32
PIF_VALUE: 25
PIF_VALUE: 27
PIF_VALUE: 33
PIF_VALUE: 26
PIF_VALUE: 25

## 2020-08-05 NOTE — PROGRESS NOTES
General Surgery  Dr. dEison Rosales and Lee Roblero, [facility] Day: 15    Chief Complaint on Admission: Sepsis      Subjective:     Edra Screen is a 60 y.o. male with 11 Days Post-Op negative laparotomy. Pt remains intubated and sedated. Tolerating TF via OG tube. Tolerating DIET TUBE FEED CONTINUOUS/CYCLIC NPO; Low Calorie High Protein (Vital High Protein); Orogastric; Continuous; 20; 70; 24. +ve BM.     ROS:  Review of Systems   Unable to perform ROS: Intubated       Allergies  Patient has no known allergies. Diagnosis Date    Drug abuse and dependence ([facility]     Hypertension        Objective:     Vitals:    20 1003   BP: 97/72   Pulse: 103   Resp: 30   Temp:    SpO2:        TEMPERATURE:  Current - Temp: 98.2 °F (36.8 °C); Max - Temp  Av.8 °F (36.6 °C)  Min: 96.8 °F (36 °C)  Max: 98.3 °F (36.8 °C)    I/O this shift:  In: 30 [NG/GT:30]  Out: - I/O last 3 completed shifts: In: 0569 [I.V.:562; NG/GT:549; IV Piggyback:300]  Out: 1000 [Stool:1000]      Physical Exam:  Physical Exam  Constitutional:       Interventions: He is sedated and intubated. Pulmonary:      Effort: He is intubated. Abdominal:      Palpations: Abdomen is soft. Comments: Midline incision well approximated with staples intact. No active bleeding. Dressing clean, dry and intact.            Scheduled Meds:   cefepime  2 g Intravenous Once per day on     scopolamine  1 patch Transdermal Q72H    methylPREDNISolone  60 mg Intravenous Daily    vancomycin (VANCOCIN) intermittent dosing (placeholder)   Other RX Placeholder    metroNIDAZOLE  500 mg Intravenous Q8H    pantoprazole  40 mg Intravenous Daily    thiamine (VITAMIN B1) IVPB  200 mg Intravenous Q12H    chlorhexidine  15 mL Mouth/Throat BID    albuterol sulfate HFA  4 puff Inhalation Q4H    And    ipratropium  4 puff Inhalation Q4H    aspirin  324 mg Oral Once    sodium chloride flush  10 mL Intravenous BID    sodium chloride flush  10 mL Intravenous 2 times per day    [Held by provider] enoxaparin  1 mg/kg Subcutaneous Daily    nicotine  1 patch Transdermal Daily     Continuous Infusions:   norepinephrine 6 mcg/min (08/05/20 0931)    dextrose      fentanyl 25 mcg/hr (08/05/20 0851)    propofol 30 mcg/kg/min (08/05/20 0837)     PRN Meds:glucose, dextrose, glucagon (rDNA), dextrose, heparin (porcine), ipratropium-albuterol, iopamidol, sodium chloride flush, acetaminophen **OR** acetaminophen, polyethylene glycol, promethazine **OR** ondansetron, promethazine, diphenhydrAMINE, dicyclomine, morphine      Labs/Imaging Results:   Lab Results   Component Value Date    WBC 24.9 (H) 08/05/2020    HGB 8.2 (L) 08/05/2020    HCT 26.3 (L) 08/05/2020    MCV 90.4 08/05/2020     08/05/2020     Lab Results   Component Value Date     08/05/2020    K 4.8 08/05/2020     08/05/2020    CO2 18 (L) 08/05/2020     (H) 08/05/2020    CREATININE 3.6 (H) 08/05/2020    GLUCOSE 142 (H) 08/05/2020    CALCIUM 8.6 08/05/2020    PROT 6.2 (L) 08/05/2020    LABALBU 2.6 (L) 08/05/2020    BILITOT 1.0 08/05/2020    ALKPHOS 82 08/05/2020    AST 33 08/05/2020    ALT <5 (L) 08/05/2020    LABGLOM 17 (L) 08/05/2020    GFRAA 21 (L) 08/05/2020       Assessment:     Patient Active Problem List:     Sepsis ([facility] [address]     Leukocytosis     Ischemic bowel disease ([facility]     MSSA bacteremia     Chronic hepatitis C without hepatic coma ([facility]      Plan:     Goals of care still to be determined. Son so far has been unable to make decisions for this patient. Patient has been intubated since 7/25. If the plan is for continued aggressive care, tracheostomy and PEG placement would be recommended. Continue to monitor midline incision. Wound care on board for mgmt of bilateral arm wounds.      Anabel Miramontes PA-C

## 2020-08-05 NOTE — PROGRESS NOTES
Nephrology  Dialysis Note        [address], [address] [facility], [facility] [address]  Phone: [phone]  Office Hours: 8:30AM - 4:30PM  Monday - Friday       [facility]  MD Theron Smith, DO Onel Hylton,  [place], [facility] [address]  PHONE: [phone]  FAX: [phone]      PROCEDURE:  Patient seen during hemodialysis      PHYSICIAN:  SAVANAH      INDICATION:  End-stage renal disease      RX:  See dialysis flowsheet for specifics on access, blood flow rate, dialysate baths, duration of dialysis, anticoagulation and other technical information.       COMMENTS:  TOLERATING HD WITH LEVOPHED  SET TO LOSE 1KG IF TOLERATED    Electronically signed by Guillaume Thurman DO on 8/5/2020 at 9:38 AM    [facility]  MD Theron Smith, DO Onel Hylton,  [place], [facility] [address]  PHONE: [phone]  FAX: [phone]

## 2020-08-05 NOTE — PROGRESS NOTES
Infectious Disease Progress Note  2020   Patient Name: Gisel Reasons : 1955       Reason for visit: F/u MSSA bacteremia secondary to infected wounds. ? History:? Interval history noted. Pressure dressing to abdomen has arrested bleeding  Intubated and on mechanical ventilation and renal replacement therapy  Physical Exam:  Vital Signs: /81   Pulse 96   Temp 98.3 °F (36.8 °C) (Rectal)   Resp 27   Ht 5' 7.99\" (1.727 m)   Wt 147 lb 11.3 oz (67 kg)   SpO2 99%   BMI 22.46 kg/m²     Gen: Intubated, sedated and mechanically ventilated  Skin: no stigmata of endocarditis  Wounds: Bilateral forearm dressing C/D/I  HEMT: AT/NC, OG tube, ETT  Eyes: PERRL  Neck: Supple. Trachea midline. No LAD. Chest: no distress and CTA. Good air movement. Heart: RRR and no MRG. Abd: midline abdominal dressingCDI  Ext: no clubbing, cyanosis, or edema  Catheter Site: right IJ CVC, right femoral vein vascath without erythema or tenderness  Neuro: sedated     Radiologic / Imaging / TESTING  CT abd pelvis 2020  Impression:   1. Bronchial wall thickening and centrilobular nodularity in the right lower   lobe, likely infectious/inflammatory airways process such as aspiration. 2. No significant change in moderate volume hemoperitoneum. 3. Similar small pneumoperitoneum, likely postoperative. 4. Similar to slightly improved mild diffuse colonic wall thickening.         Labs:    Recent Results (from the past 24 hour(s))   POCT Glucose    Collection Time: 20  5:36 PM   Result Value Ref Range    POC Glucose 196 (H) 70 - 99 MG/DL   CBC auto differential    Collection Time: 20  5:10 AM   Result Value Ref Range    WBC 24.9 (H) 4.0 - 10.5 K/CU MM    RBC 2.91 (L) 4.6 - 6.2 M/CU MM    Hemoglobin 8.2 (L) 13.5 - 18.0 GM/DL    Hematocrit 26.3 (L) 42 - 52 %    MCV 90.4 78 - 100 FL    MCH 28.2 27 - 31 PG    MCHC 31.2 (L) 32.0 - 36.0 %    RDW 22.1 (H) 11.7 - 14.9 %    Platelets 279 825 - 290 K/CU MM    MPV 10.6 7.5 - 11.1 FL    Differential Type AUTOMATED DIFFERENTIAL     Segs Relative 89.9 (H) 36 - 66 %    Lymphocytes % 3.6 (L) 24 - 44 %    Monocytes % 5.1 (H) 0 - 4 %    Eosinophils % 0.0 0 - 3 %    Basophils % 0.2 0 - 1 %    Segs Absolute 22.4 K/CU MM    Lymphocytes Absolute 0.9 K/CU MM    Monocytes Absolute 1.3 K/CU MM    Eosinophils Absolute 0.0 K/CU MM    Basophils Absolute 0.1 K/CU MM    Nucleated RBC % 0.0 %    Total Nucleated RBC 0.0 K/CU MM    Total Immature Neutrophil 0.29 K/CU MM    Immature Neutrophil % 1.2 (H) 0 - 0.43 %   CK    Collection Time: 08/05/20  5:10 AM   Result Value Ref Range    Total CK 42 38 - 174 IU/L   PT    Collection Time: 08/05/20  5:10 AM   Result Value Ref Range    Protime 13.7 11.7 - 14.5 SECONDS    INR 1.13 INDEX   PTT    Collection Time: 08/05/20  5:10 AM   Result Value Ref Range    aPTT 28.4 25.1 - 37.1 SECONDS   Comprehensive Metabolic Panel    Collection Time: 08/05/20  5:10 AM   Result Value Ref Range    Sodium 137 135 - 145 MMOL/L    Potassium 4.8 3.5 - 5.1 MMOL/L    Chloride 101 99 - 110 mMol/L    CO2 18 (L) 21 - 32 MMOL/L     (H) 6 - 23 MG/DL    CREATININE 3.6 (H) 0.9 - 1.3 MG/DL    Glucose 142 (H) 70 - 99 MG/DL    Calcium 8.6 8.3 - 10.6 MG/DL    Alb 2.6 (L) 3.4 - 5.0 GM/DL    Total Protein 6.2 (L) 6.4 - 8.2 GM/DL    Total Bilirubin 1.0 0.0 - 1.0 MG/DL    ALT <5 (L) 10 - 40 U/L    AST 33 15 - 37 IU/L    Alkaline Phosphatase 82 40 - 128 IU/L    GFR Non- 17 (L) >60 mL/min/1.73m2    GFR  21 (L) >60 mL/min/1.73m2    Anion Gap 18 (H) 4 - 16   Vancomycin, random    Collection Time: 08/05/20  5:10 AM   Result Value Ref Range    Vancomycin Rm 17.5 UG/ML    DOSE AMOUNT DOSE AMT.  GIVEN - =     DOSE TIME DOSE TIME GIVEN - =    Blood gas, arterial    Collection Time: 08/05/20  6:00 AM   Result Value Ref Range    pH, Bld 7.30 (L) 7.34 - 7.45    pCO2, Arterial 33.0 32 - 45 MMHG    pO2, Arterial 131 (H) 75 - 100 MMHG    Base Excess 9 (H) 0 - 3.3    HCO3, Arterial 16.2 (L) 18 - 23 MMOL/L    CO2 Content 17.2 (L) 19 - 24 MMOL/L    O2 Sat 96.2 96 - 97 %    Carbon Monoxide, Blood 1.9 0 - 5 %    Methemoglobin, Arterial 1.3 <1.5 %    Comment 14 450 .30 P5      CULTURE results: Invalid input(s): BLOOD CULTURE,  URINE CULTURE, SURGICAL CULTURE    Diagnosis:  Patient Active Problem List   Diagnosis    Sepsis ([facility] [address]    Leukocytosis    Ischemic bowel disease (HCC)    MSSA bacteremia    Chronic hepatitis C without hepatic coma (HCC)       Active Problems  Active Problems:    Sepsis (HCC)    Leukocytosis    MSSA bacteremia    Chronic hepatitis C without hepatic coma (HCC)  Resolved Problems:    * No resolved hospital problems. *      Impression and plan   Clinical status: Not on vasopressors, however, leukocytosis improved but still elevated. 1 of 2 blood cx from 7/27: Bacillus. contaminant. Repeat blood cx on 7/29/20 ngtd.  Repeat CT chest and abdomen reviewed: bronchial inflammation seen, ?  Aspiration or VAE   Diarrhea:C diff pcr is negative,    Therapeutic: continue vancomycin, cefepime and metronidazole   Diagnostic: repeat respiratory cx, pct, CRP   F/u: Blood culture (7/29/2020)   Other:   Summary:waiting on decision on advance directives from family      Electronically signed by: Electronically signed by Rosalee Sosa MD on 8/5/2020 at 7:39 AM

## 2020-08-05 NOTE — PROGRESS NOTES
[address] [place] [address]  HOSPITALIST PROGRESS NOTE                       Name:  Naya Martínez /Age/Sex: 1955  (60 y.o. male)   MRN & CSN:  [id number] & [id number] Admission Date/Time: 2020  3:22 PM   Location:  - Attending:  Mariana Bone MD                                                  HPI  Naya Martínez is a 60 y.o. male who was admitted on  with sepsis    SUBJECTIVE  -sedated on propofol and fentanyl. Has rectal tube in place. On tube feedings too    ROS- unable to complete as patient not able to participate at this time      ALLERGIES: No Known Allergies    PCP: No primary care provider on file. PAST MEDICAL HISTORY, SURGICAL HISTORY, SOCIAL HISTORY and  HOME MEDICATIONS all reviewed. OBJECTIVE  Vitals:    20 0803 20 0903 20 1003 20 1106   BP: 92/66 98/66 97/72    Pulse: 105 106 103    Resp: 27 30 30 29   Temp: 98.2 °F (36.8 °C)      TempSrc: Rectal      SpO2: 99% 100%     Weight:       Height:           PHYSICAL EXAM   GEN sedated  EYES Pupils are equally round. No scleral erythema, discharge, or conjunctivitis. HENT Mucous membranes are dry. Oral pharynx without exudates, no evidence of thrush. NECK Supple, no apparent thyromegaly or masses. RESP unlabored respiration, decreased breath sounds at the bases  CARDIO/VASC S1/S2 auscultated. Regular rate without appreciable murmurs, rubs, or gallops. No JVD or carotid bruits. Peripheral pulses equal bilaterally and palpable. GI Abdomen is soft- further exam limited due to sedation  HEME/LYMPH No palpable cervical lymphadenopathy and no hepatosplenomegaly. No petechiae or ecchymoses. SKIN Normal coloration, warm, dry.   NEURO Sedated    INTAKE: In: 7531 [I.V.:562; NG/GT:579]  Out: -   OUTPUT: In: 1241   Out: -     LABS  Recent Labs     20  0445 20  0420 20  0510   WBC 19.6* 20.4* 24.9*   HGB 8.2* 8.1* 8.2*   HCT 26.4* 25.9* 26.3*    200 196      Recent Labs     20  4747 08/03/20  1130 08/04/20  0420 08/05/20  0510     --  136 137   K 5.7* 3.7 4.8 4.8     --  101 101   CO2 20*  --  21 18*   BUN 98*  --  74* 112*   CREATININE 3.9*  --  2.7* 3.6*     Recent Labs     08/03/20  0445 08/04/20  0420 08/05/20  0510   AST 34 36 33   ALT <5* <5* <5*   BILITOT 1.4* 1.3* 1.0   ALKPHOS 86 85 82     Recent Labs     08/03/20  0445 08/04/20  0420 08/05/20  0510   INR 1.20 1.20 1.13     Recent Labs     08/03/20  0445 08/04/20  0420 08/05/20  0510   CKTOTAL 27* 30* 42          Abnormal labs for today noted      Imaging:     ECHO:    Microbiology:  Blood culture:    Urine culture:    Sputum culture:    Procedures done this admission:    MEDS  Scheduled Meds:   cefepime  2 g Intravenous Once per day on Mon Wed Fri    scopolamine  1 patch Transdermal Q72H    methylPREDNISolone  60 mg Intravenous Daily    vancomycin (VANCOCIN) intermittent dosing (placeholder)   Other RX Placeholder    metroNIDAZOLE  500 mg Intravenous Q8H    pantoprazole  40 mg Intravenous Daily    thiamine (VITAMIN B1) IVPB  200 mg Intravenous Q12H    chlorhexidine  15 mL Mouth/Throat BID    albuterol sulfate HFA  4 puff Inhalation Q4H    And    ipratropium  4 puff Inhalation Q4H    aspirin  324 mg Oral Once    sodium chloride flush  10 mL Intravenous BID    sodium chloride flush  10 mL Intravenous 2 times per day    [Held by provider] enoxaparin  1 mg/kg Subcutaneous Daily    nicotine  1 patch Transdermal Daily     Continuous Infusions:   norepinephrine 8 mcg/min (08/05/20 1050)    dextrose      fentanyl 25 mcg/hr (08/05/20 0851)    propofol 30 mcg/kg/min (08/05/20 0837)     PRN Meds:glucose, dextrose, glucagon (rDNA), dextrose, heparin (porcine), ipratropium-albuterol, iopamidol, sodium chloride flush, acetaminophen **OR** acetaminophen, polyethylene glycol, promethazine **OR** ondansetron, promethazine, diphenhydrAMINE, dicyclomine, morphine        ASSESSMENT and [facility] Day: 13    1-Septic shock with MSSA bacteremia due to wound-off pressors- repeat blood culture negative, one set growing bacilli- ?contaminant- ID on the case- noted still with significant leukocytosis- defer abx to ID  2-Probable staph UTI- abx per ID  3-Acute on chronic respiratory failure due to sepsis and suspected aspiration pneumonia- still vent dependent as of 7/25- with severe pulm HTN with RVSP of 67  4-JERAMIE due to ATN- dialysis per nephro  5-Skin wounds/right post thigh/buttock and bilateral UE- wound culture growing MSSA- surgery and ID following  6-Pneumatosis intestinalis s/p ex-lap on 7/25 negative for ischemic bowel, however CT on 7/31 showing hemoperitoneum and pneumoperitoneum- due to leukocytosis- repeated CTs which are stable. 7-Acute blood loss anemia due to above- s/p transfusion- monitor H/H  8-Destructive lesion T12-L1- possible prior infectious spondyliits  -no abscess or osteo as per imaging  9-?low DIC state with mildly low fibrinogen and platelets- noted hematology following- repeat levels again    Other issues  -Hx of gastric cancer stage 4  -polysubstance abuse- UDS positive for cocaine and opiate  -documented hep C    Noted ongoing goals of care discussion via palliative care- tried to call son- did not answer    No pharmacological dvt prophylaxis due to retroperitoneal hemorrhage with acute blood loss anemia       Disp:     Diet DIET TUBE FEED CONTINUOUS/CYCLIC NPO; Low Calorie High Protein (Vital High Protein);  Orogastric; Continuous; 20; 70; 24   DVT Prophylaxis [] Lovenox, []  Heparin, [] SCDs, [] Ambulation   GI Prophylaxis [] PPI,  [] H2 Blocker,  [] Carafate,  [] Diet/Tube Feeds   Code Status Full Code   Disposition Patient requires continued admission due to sepsis, respiratory failure   CMS Level of Risk [] Low, [] Moderate,[x]  High  Patient's risk as above due to sepsis respiratory failure     CARMINE WOOTEN MD 8/5/2020 11:07 AM

## 2020-08-05 NOTE — PROGRESS NOTES
Palliative Medicine Progress Note    Admit Date: 7/24/2020  Hospital Day:  Hospital Day: 13     CC: Sepsis  HPI: Tonny Mitchell is a 65-year-old -American male, admitted to hospital 7/25/2020 after presenting to the emergency room s/p fall. He had been down for presumably 2 days. Upon presentation he was found to be hypoxic and tachypneic, he was started on BiPAP. He was noted to have multiple wounds on his upper extremities and decubiti ulcers as well. He has a chronic IV drug user, heroin approximately 3 g/day. Concern for acute abdomen he underwent open exploratory but abdomen was normal.  He had an echocardiogram that showed no vegetation but mild to moderate TR and severe pulmonary hypertension. He is presently intubated, sedated on propofol and fentanyl, has rectal tube in place, and is receiving tube feedings. This is currently day 11 on the vent    Recommendations:     1. Continue with the excellent care of this patient with IVDU and sepsis. 2.  Spoke with Maegante Last son at bedside this afternoon. He seems to have an understanding of his father's current medical condition, he is aware today is day 11 of being on the vent and decisions need to be made in regards to movement towards trach/PEG/LTAC for compassionate extubation  3. If patient improves needs advance directives completed.     Subjective:     Scheduled Meds:   cefepime  2 g Intravenous Once per day on Mon Wed Fri    scopolamine  1 patch Transdermal Q72H    methylPREDNISolone  60 mg Intravenous Daily    vancomycin (VANCOCIN) intermittent dosing (placeholder)   Other RX Placeholder    metroNIDAZOLE  500 mg Intravenous Q8H    pantoprazole  40 mg Intravenous Daily    thiamine (VITAMIN B1) IVPB  200 mg Intravenous Q12H    chlorhexidine  15 mL Mouth/Throat BID    albuterol sulfate HFA  4 puff Inhalation Q4H    And    ipratropium  4 puff Inhalation Q4H    aspirin  324 mg Oral Once    sodium chloride flush  10 mL Intravenous (08/05 0510)     Assessment:     Active Problems:    Sepsis ([address]    Leukocytosis    MSSA bacteremia    Chronic hepatitis C without hepatic coma (HCC)  Resolved Problems:    * No resolved hospital problems. *      Assessment/plan:    1. Symptom Management/Pain Control:  Remains sedated and on vent, propofol and fentanyl infusing, nonresponsive during awakening trial    2. Plan/goals of care:  Patient lives at at home by himself. This is day 11 of the vent support/sedation. Decisions will need to be made soon in regards to trach/PEG/LTAC or compassionate extubation. Spoke with Chaim's son, Alisa Hess,  for over 40 minutes at the bedside. He is having difficulty making this decision as he is only living child of López. He lost his mother many years ago and his brother several years ago. He has 2 daughters 8 and 15 but he feels he is alone in making this decision. He states he knows his dad would not want to continue to live like this but he is unable to make the decision towards trach/PEG/LTAC or compassionate extubation. He states he was to come in today with a decision but he still is unable to decide. I have told him that Dr. Kayla Nguyen will follow up with him tomorrow. 3.  CODE STATUS:  Full  code  POA son Alisa Hess ([phone])    4. Other Recommendations:  Please call with any palliative questions or concerns, palliative care will continue to follow. A total of  minutes spent with the patient and family on unit greater than 50% face to face time in counseling regarding palliative care and goals of care for the patient.

## 2020-08-05 NOTE — PROGRESS NOTES
None      Assessment/Plan     Patient Active Problem List    Diagnosis Date Noted    Chronic hepatitis C without hepatic coma ([facility] [address] 08/03/2020    MSSA bacteremia 07/28/2020    Ischemic bowel disease ([facility]     Leukocytosis     Sepsis ([facility] 07/24/2020   Patient is on the vent and sedated. He is off pressors. His H&H has been stable     Septic shock- improved  Hemorrhagic shock- improved  Acute hypoxic resp failure  Mild coagulopathy- improved  Shock Liver- improved  Lactic acidosis- improved  VDRF  Anemia likely GI bleed  ? Staph Aureus bacteremia  Severe Pulmonary HTN  Occult blood positive  JERAMIE likely sec to ATN  Bilateral Arm wounds  Leukocytosis       1. Pan cx  2. Abx per ID  3. HD  4. F/u C&S  5. Tube feeds  6. SAT and SBT trial as tolerated  7. Consider for a trach a  PEG  8. Prognosis guarded  9. C/w present management  No follow-ups on file.     Electronically signed by Adrianna Dumont MD on 8/5/2020 at 1:43 PM

## 2020-08-06 ENCOUNTER — ANESTHESIA (OUTPATIENT)
Dept: ICU | Age: 65
DRG: 004 | End: 2020-08-06
Payer: MEDICARE

## 2020-08-06 ENCOUNTER — APPOINTMENT (OUTPATIENT)
Dept: GENERAL RADIOLOGY | Age: 65
DRG: 004 | End: 2020-08-06
Payer: MEDICARE

## 2020-08-06 ENCOUNTER — ANESTHESIA EVENT (OUTPATIENT)
Dept: ICU | Age: 65
DRG: 004 | End: 2020-08-06
Payer: MEDICARE

## 2020-08-06 ENCOUNTER — ANESTHESIA EVENT (OUTPATIENT)
Dept: OPERATING ROOM | Age: 65
End: 2020-08-06

## 2020-08-06 LAB
ALBUMIN SERPL-MCNC: 2.5 GM/DL (ref 3.4–5)
ALP BLD-CCNC: 80 IU/L (ref 40–128)
ALT SERPL-CCNC: <5 U/L (ref 10–40)
ANION GAP SERPL CALCULATED.3IONS-SCNC: 17 MMOL/L (ref 4–16)
APTT: 22.3 SECONDS (ref 25.1–37.1)
AST SERPL-CCNC: 35 IU/L (ref 15–37)
BASOPHILS ABSOLUTE: 0 K/CU MM
BASOPHILS RELATIVE PERCENT: 0.1 % (ref 0–1)
BILIRUB SERPL-MCNC: 1.1 MG/DL (ref 0–1)
BUN BLDV-MCNC: 90 MG/DL (ref 6–23)
CALCIUM SERPL-MCNC: 8.4 MG/DL (ref 8.3–10.6)
CHLORIDE BLD-SCNC: 99 MMOL/L (ref 99–110)
CO2: 19 MMOL/L (ref 21–32)
COMMENT: NORMAL
CREAT SERPL-MCNC: 2.6 MG/DL (ref 0.9–1.3)
DIFFERENTIAL TYPE: ABNORMAL
EOSINOPHILS ABSOLUTE: 0 K/CU MM
EOSINOPHILS RELATIVE PERCENT: 0 % (ref 0–3)
GFR AFRICAN AMERICAN: 30 ML/MIN/1.73M2
GFR NON-AFRICAN AMERICAN: 25 ML/MIN/1.73M2
GLUCOSE BLD-MCNC: 134 MG/DL (ref 70–99)
GLUCOSE BLD-MCNC: 151 MG/DL (ref 70–99)
GLUCOSE BLD-MCNC: 176 MG/DL (ref 70–99)
GLUCOSE BLD-MCNC: 273 MG/DL (ref 70–99)
HCT VFR BLD CALC: 26 % (ref 42–52)
HEMOGLOBIN: 8.2 GM/DL (ref 13.5–18)
HIGH SENSITIVE C-REACTIVE PROTEIN: 14.7 MG/L
IMMATURE NEUTROPHIL %: 1.2 % (ref 0–0.43)
INR BLD: 1.06 INDEX
LYMPHOCYTES ABSOLUTE: 0.9 K/CU MM
LYMPHOCYTES RELATIVE PERCENT: 3 % (ref 24–44)
MCH RBC QN AUTO: 28.3 PG (ref 27–31)
MCHC RBC AUTO-ENTMCNC: 31.5 % (ref 32–36)
MCV RBC AUTO: 89.7 FL (ref 78–100)
MONOCYTES ABSOLUTE: 1.4 K/CU MM
MONOCYTES RELATIVE PERCENT: 4.9 % (ref 0–4)
NUCLEATED RBC %: 0 %
PDW BLD-RTO: 22.4 % (ref 11.7–14.9)
PLATELET # BLD: 202 K/CU MM (ref 140–440)
PMV BLD AUTO: 10.6 FL (ref 7.5–11.1)
POTASSIUM SERPL-SCNC: 4.4 MMOL/L (ref 3.5–5.1)
PROCALCITONIN: 1.56
PROTHROMBIN TIME: 12.8 SECONDS (ref 11.7–14.5)
RBC # BLD: 2.9 M/CU MM (ref 4.6–6.2)
SEGMENTED NEUTROPHILS ABSOLUTE COUNT: 25.5 K/CU MM
SEGMENTED NEUTROPHILS RELATIVE PERCENT: 90.8 % (ref 36–66)
SODIUM BLD-SCNC: 135 MMOL/L (ref 135–145)
TOTAL CK: 50 IU/L (ref 38–174)
TOTAL IMMATURE NEUTOROPHIL: 0.33 K/CU MM
TOTAL NUCLEATED RBC: 0 K/CU MM
TOTAL PROTEIN: 6.2 GM/DL (ref 6.4–8.2)
WBC # BLD: 28.1 K/CU MM (ref 4–10.5)

## 2020-08-06 PROCEDURE — 2500000003 HC RX 250 WO HCPCS: Performed by: INTERNAL MEDICINE

## 2020-08-06 PROCEDURE — 86141 C-REACTIVE PROTEIN HS: CPT

## 2020-08-06 PROCEDURE — 85610 PROTHROMBIN TIME: CPT

## 2020-08-06 PROCEDURE — 6370000000 HC RX 637 (ALT 250 FOR IP): Performed by: SURGERY

## 2020-08-06 PROCEDURE — 6360000002 HC RX W HCPCS: Performed by: INTERNAL MEDICINE

## 2020-08-06 PROCEDURE — 99232 SBSQ HOSP IP/OBS MODERATE 35: CPT | Performed by: INTERNAL MEDICINE

## 2020-08-06 PROCEDURE — 2580000003 HC RX 258: Performed by: SURGERY

## 2020-08-06 PROCEDURE — 2000000000 HC ICU R&B

## 2020-08-06 PROCEDURE — 2580000003 HC RX 258: Performed by: INTERNAL MEDICINE

## 2020-08-06 PROCEDURE — 6360000002 HC RX W HCPCS: Performed by: SPECIALIST

## 2020-08-06 PROCEDURE — 85025 COMPLETE CBC W/AUTO DIFF WBC: CPT

## 2020-08-06 PROCEDURE — 82803 BLOOD GASES ANY COMBINATION: CPT

## 2020-08-06 PROCEDURE — 03HY32Z INSERTION OF MONITORING DEVICE INTO UPPER ARTERY, PERCUTANEOUS APPROACH: ICD-10-PCS | Performed by: FAMILY MEDICINE

## 2020-08-06 PROCEDURE — 6370000000 HC RX 637 (ALT 250 FOR IP): Performed by: INTERNAL MEDICINE

## 2020-08-06 PROCEDURE — 94761 N-INVAS EAR/PLS OXIMETRY MLT: CPT

## 2020-08-06 PROCEDURE — 94750 HC PULMONARY COMPLIANCE STUDY: CPT

## 2020-08-06 PROCEDURE — 82550 ASSAY OF CK (CPK): CPT

## 2020-08-06 PROCEDURE — 6360000002 HC RX W HCPCS: Performed by: NURSE PRACTITIONER

## 2020-08-06 PROCEDURE — 99233 SBSQ HOSP IP/OBS HIGH 50: CPT | Performed by: INTERNAL MEDICINE

## 2020-08-06 PROCEDURE — 2500000003 HC RX 250 WO HCPCS: Performed by: HOSPITALIST

## 2020-08-06 PROCEDURE — 36620 INSERTION CATHETER ARTERY: CPT | Performed by: NURSE ANESTHETIST, CERTIFIED REGISTERED

## 2020-08-06 PROCEDURE — 85730 THROMBOPLASTIN TIME PARTIAL: CPT

## 2020-08-06 PROCEDURE — 94003 VENT MGMT INPAT SUBQ DAY: CPT

## 2020-08-06 PROCEDURE — 84145 PROCALCITONIN (PCT): CPT

## 2020-08-06 PROCEDURE — 80053 COMPREHEN METABOLIC PANEL: CPT

## 2020-08-06 PROCEDURE — 82962 GLUCOSE BLOOD TEST: CPT

## 2020-08-06 PROCEDURE — 94640 AIRWAY INHALATION TREATMENT: CPT

## 2020-08-06 PROCEDURE — 2700000000 HC OXYGEN THERAPY PER DAY

## 2020-08-06 PROCEDURE — 31720 CLEARANCE OF AIRWAYS: CPT

## 2020-08-06 PROCEDURE — C9113 INJ PANTOPRAZOLE SODIUM, VIA: HCPCS | Performed by: SPECIALIST

## 2020-08-06 PROCEDURE — 71045 X-RAY EXAM CHEST 1 VIEW: CPT

## 2020-08-06 RX ADMIN — CHLORHEXIDINE GLUCONATE 0.12% ORAL RINSE 15 ML: 1.2 LIQUID ORAL at 22:04

## 2020-08-06 RX ADMIN — ALBUTEROL SULFATE 4 PUFF: 90 AEROSOL, METERED RESPIRATORY (INHALATION) at 00:17

## 2020-08-06 RX ADMIN — METRONIDAZOLE 500 MG: 500 INJECTION, SOLUTION INTRAVENOUS at 06:36

## 2020-08-06 RX ADMIN — METRONIDAZOLE 500 MG: 500 INJECTION, SOLUTION INTRAVENOUS at 22:04

## 2020-08-06 RX ADMIN — ALBUTEROL SULFATE 4 PUFF: 90 AEROSOL, METERED RESPIRATORY (INHALATION) at 15:22

## 2020-08-06 RX ADMIN — THIAMINE HYDROCHLORIDE 200 MG: 100 INJECTION, SOLUTION INTRAMUSCULAR; INTRAVENOUS at 00:26

## 2020-08-06 RX ADMIN — METRONIDAZOLE 500 MG: 500 INJECTION, SOLUTION INTRAVENOUS at 00:39

## 2020-08-06 RX ADMIN — IPRATROPIUM BROMIDE 4 PUFF: 17 AEROSOL, METERED RESPIRATORY (INHALATION) at 04:02

## 2020-08-06 RX ADMIN — THIAMINE HYDROCHLORIDE 200 MG: 100 INJECTION, SOLUTION INTRAMUSCULAR; INTRAVENOUS at 11:39

## 2020-08-06 RX ADMIN — IPRATROPIUM BROMIDE 4 PUFF: 17 AEROSOL, METERED RESPIRATORY (INHALATION) at 07:58

## 2020-08-06 RX ADMIN — PROPOFOL 10 MCG/KG/MIN: 10 INJECTION, EMULSION INTRAVENOUS at 17:58

## 2020-08-06 RX ADMIN — ALBUTEROL SULFATE 4 PUFF: 90 AEROSOL, METERED RESPIRATORY (INHALATION) at 19:53

## 2020-08-06 RX ADMIN — SODIUM CHLORIDE, PRESERVATIVE FREE 10 ML: 5 INJECTION INTRAVENOUS at 07:57

## 2020-08-06 RX ADMIN — IPRATROPIUM BROMIDE 4 PUFF: 17 AEROSOL, METERED RESPIRATORY (INHALATION) at 12:27

## 2020-08-06 RX ADMIN — SODIUM CHLORIDE, PRESERVATIVE FREE 10 ML: 5 INJECTION INTRAVENOUS at 22:07

## 2020-08-06 RX ADMIN — THIAMINE HYDROCHLORIDE 200 MG: 100 INJECTION, SOLUTION INTRAMUSCULAR; INTRAVENOUS at 23:07

## 2020-08-06 RX ADMIN — PROPOFOL 30 MG/HR: 10 INJECTION, EMULSION INTRAVENOUS at 01:30

## 2020-08-06 RX ADMIN — IPRATROPIUM BROMIDE 4 PUFF: 17 AEROSOL, METERED RESPIRATORY (INHALATION) at 19:53

## 2020-08-06 RX ADMIN — Medication 25 MCG/HR: at 17:58

## 2020-08-06 RX ADMIN — METHYLPREDNISOLONE SODIUM SUCCINATE 60 MG: 125 INJECTION, POWDER, FOR SOLUTION INTRAMUSCULAR; INTRAVENOUS at 07:55

## 2020-08-06 RX ADMIN — ALBUTEROL SULFATE 4 PUFF: 90 AEROSOL, METERED RESPIRATORY (INHALATION) at 07:58

## 2020-08-06 RX ADMIN — PANTOPRAZOLE SODIUM 40 MG: 40 INJECTION, POWDER, FOR SOLUTION INTRAVENOUS at 07:56

## 2020-08-06 RX ADMIN — SODIUM CHLORIDE, PRESERVATIVE FREE 10 ML: 5 INJECTION INTRAVENOUS at 07:56

## 2020-08-06 RX ADMIN — SODIUM CHLORIDE, PRESERVATIVE FREE 10 ML: 5 INJECTION INTRAVENOUS at 22:05

## 2020-08-06 RX ADMIN — ALBUTEROL SULFATE 4 PUFF: 90 AEROSOL, METERED RESPIRATORY (INHALATION) at 04:02

## 2020-08-06 RX ADMIN — Medication 8 MCG/MIN: at 08:33

## 2020-08-06 RX ADMIN — ALBUTEROL SULFATE 4 PUFF: 90 AEROSOL, METERED RESPIRATORY (INHALATION) at 12:26

## 2020-08-06 RX ADMIN — METRONIDAZOLE 500 MG: 500 INJECTION, SOLUTION INTRAVENOUS at 15:15

## 2020-08-06 RX ADMIN — IPRATROPIUM BROMIDE 4 PUFF: 17 AEROSOL, METERED RESPIRATORY (INHALATION) at 00:17

## 2020-08-06 RX ADMIN — IPRATROPIUM BROMIDE 4 PUFF: 17 AEROSOL, METERED RESPIRATORY (INHALATION) at 15:22

## 2020-08-06 RX ADMIN — CHLORHEXIDINE GLUCONATE 0.12% ORAL RINSE 15 ML: 1.2 LIQUID ORAL at 07:55

## 2020-08-06 ASSESSMENT — PULMONARY FUNCTION TESTS
PIF_VALUE: 26
PIF_VALUE: 27
PIF_VALUE: 30
PIF_VALUE: 30
PIF_VALUE: 31
PIF_VALUE: 28
PIF_VALUE: 26
PIF_VALUE: 26
PIF_VALUE: 29
PIF_VALUE: 27
PIF_VALUE: 25
PIF_VALUE: 33
PIF_VALUE: 31
PIF_VALUE: 30
PIF_VALUE: 28
PIF_VALUE: 27
PIF_VALUE: 31
PIF_VALUE: 31
PIF_VALUE: 28
PIF_VALUE: 26
PIF_VALUE: 26
PIF_VALUE: 25
PIF_VALUE: 26
PIF_VALUE: 36
PIF_VALUE: 26
PIF_VALUE: 31
PIF_VALUE: 28
PIF_VALUE: 26
PIF_VALUE: 27
PIF_VALUE: 31
PIF_VALUE: 31
PIF_VALUE: 23
PIF_VALUE: 23
PIF_VALUE: 25
PIF_VALUE: 26
PIF_VALUE: 24
PIF_VALUE: 40
PIF_VALUE: 24
PIF_VALUE: 33
PIF_VALUE: 27
PIF_VALUE: 27
PIF_VALUE: 26
PIF_VALUE: 25
PIF_VALUE: 28
PIF_VALUE: 32
PIF_VALUE: 28
PIF_VALUE: 28
PIF_VALUE: 27

## 2020-08-06 ASSESSMENT — ENCOUNTER SYMPTOMS: SHORTNESS OF BREATH: 1

## 2020-08-06 ASSESSMENT — LIFESTYLE VARIABLES: SMOKING_STATUS: 1

## 2020-08-06 NOTE — PLAN OF CARE
Problem: Gas Exchange - Impaired:  Goal: Levels of oxygenation will improve  Description: Levels of oxygenation will improve  Outcome: Ongoing  Note: Patient placed on SIMV today to transition for weaning trial. Patient began breathing over 40 times a minute. RT placed patient back on AC/VC.       Problem: Sleep Pattern Disturbance:  Goal: Appears well-rested  Description: Appears well-rested  Outcome: Ongoing  Note: Patient sedated at this time

## 2020-08-06 NOTE — PROGRESS NOTES
Attempted to call patient's son, no answer. Message left to call unit at [phone]. Will attempt again shortly.

## 2020-08-06 NOTE — PROGRESS NOTES
Pulmonary and Critical Care  Progress Note      VITALS:  /81   Pulse 94   Temp 99.5 °F (37.5 °C) (Rectal)   Resp 26   Ht 5' 7.99\" (1.727 m)   Wt 147 lb 11.3 oz (67 kg)   SpO2 100%   BMI 22.46 kg/m²     Subjective:   CHIEF COMPLAINT :Fall with right hip pain     HPI:                The patient is a 60 y.o. male with significant past medical history of HTN,. Sacral decub, MSSA endocarditis, Septic PE, Hep C,  presents with complaints of fall and right hip pain. He was on the floor for more than 2 days. His CK is mildly elevated. He had mild lactic acidosis. His PBNPT is 11,000 with mildly elevated troponins. He is on 3 grams of Heroin per day. His U tocx was positive for Cocaine and Opiates. He had no fever, no headaches, no chest pain, no palpitations, no n/v, no diaphoresis. He has no sick exposure, no recent travel. His 1 blood culture set is positive for Staph. He is getting abx and IV fluids. He is on the vent and on HD     Objective:   PHYSICAL EXAM:    LUNGS:Occasional basal crackles  Abd-soft, BS+,NT  Ext - no pedal edema  CVS-s1s2, no murmurs      DATA:    CBC:  Recent Labs     08/04/20  0420 08/05/20  0510 08/06/20  0414   WBC 20.4* 24.9* 28.1*   RBC 2.87* 2.91* 2.90*   HGB 8.1* 8.2* 8.2*   HCT 25.9* 26.3* 26.0*    196 202   MCV 90.2 90.4 89.7   MCH 28.2 28.2 28.3   MCHC 31.3* 31.2* 31.5*   RDW 22.2* 22.1* 22.4*   SEGSPCT 88.6* 89.9* 90.8*      BMP:  Recent Labs     08/04/20  0420 08/05/20  0510 08/06/20  0414    137 135   K 4.8 4.8 4.4    101 99   CO2 21 18* 19*   BUN 74* 112* 90*   CREATININE 2.7* 3.6* 2.6*   CALCIUM 8.7 8.6 8.4   GLUCOSE 150* 142* 151*      ABG:  Recent Labs     08/04/20  0600 08/05/20  0600   PH 7.38 7.30*   PO2ART 116* 131*   OKA4EGS 35.0 33.0   O2SAT 95.6* 96.2     BNP  No results found for: BNP   D-Dimer:  No results found for: DDIMER   1.  Radiology: Reviewed      Assessment/Plan     Patient Active Problem List    Diagnosis Date Noted    Chronic hepatitis C without hepatic coma ([facility] [address] 08/03/2020    MSSA bacteremia 07/28/2020    Ischemic bowel disease ([facility] [address]     Leukocytosis     Sepsis ([facility] [address] 07/24/2020   Patient is on the vent and sedated. He is off pressors. His H&H has been stable     Septic shock- improved  Hemorrhagic shock- improved  Acute hypoxic resp failure  Mild coagulopathy- improved  Shock Liver- improved  Lactic acidosis- improved  VDRF  Anemia likely GI bleed  ? Staph Aureus bacteremia  Severe Pulmonary HTN  Occult blood positive  JERAMIE likely sec to ATN  Bilateral Arm wounds  Leukocytosis  Hemoperitoneum       1. Abx per ID  2. HD per renal  3. F/u C&S  4. Tube feeds  5. Await family decision regarding trach and a PEG  6. Prognosis guarded  7. C.w present management  8. SAT and SBT trial daily as tolerated  No follow-ups on file.     Electronically signed by Yadiel Arreaga MD on 8/6/2020 at 11:51 AM

## 2020-08-06 NOTE — PROGRESS NOTES
Placed pt on SIMV 8 PS 12 at 0900. Pt began to have an increased WOB and increased RR 40-44bpm. Increased PS to 15 0902 with no improvement. Pt was placed back to full support at 0905.

## 2020-08-06 NOTE — PROGRESS NOTES
08/06/20 0403   Vent Information   Vent Type 980   Vent Mode AC/VC   Vt Ordered 450 mL   Rate Set 14 bmp   Peak Flow 60 L/min   Pressure Support 0 cmH20   FiO2  30 %   SpO2 100 %   SpO2/FiO2 ratio 333.33   Sensitivity 3   PEEP/CPAP 5   I Time/ I Time % 0 s   Humidification Source HME   Vent Patient Data   High Peep/I Pressure 0   Peak Inspiratory Pressure 27 cmH2O   Mean Airway Pressure 13 cmH20   Rate Measured 26 br/min   Vt Exhaled 592 mL   Minute Volume 11.7 Liters   I:E Ratio 1:2.30   Plateau Pressure 17 INB37   Static Compliance 26 mL/cmH2O   Cough/Sputum   Sputum How Obtained Endotracheal;Suctioned   $Obtained Sample $Nasotracheal Suction   Cough Productive   Sputum Amount Small   Sputum Color Creamy; White   Tenacity Thick   Spontaneous Breathing Trial (SBT) RT Doc   Pulse 101   Breath Sounds   Right Upper Lobe Diminished   Right Middle Lobe Diminished   Right Lower Lobe Diminished   Left Upper Lobe Diminished   Left Lower Lobe Diminished   Additional Respiratory  Assessments   Resp 26   Alarm Settings   High Pressure Alarm 40 cmH2O   Delay Alarm 20 sec(s)   Low Minute Volume Alarm 2.5 L/min   Apnea (secs) 20 secs   High Respiratory Rate 40 br/min   Low Exhaled Vt  250 mL   ETT (adult)   Placement Date/Time: 07/25/20 7118   Preoxygenation: Yes  Mask Ventilation: Mask ventilation not attempted (0)  Technique: Direct laryngoscopy  Type: Cuffed  Tube Size: 7 mm  Laryngoscope: Mac  Blade Size: 3  Location: Oral  Grade View: Full view of t. ..    Secured at 22 cm   Measured From Lips   ET Placement Left   Secured By Commercial tube raza   Site Condition Dry   Cuff Pressure   (minimal leak)

## 2020-08-06 NOTE — PROGRESS NOTES
Infectious Disease Progress Note  2020   Patient Name: Rober Mojica : 1955       Reason for visit: F/u MSSA bacteremia secondary to infected wounds. ? History:? Interval history noted. Pressure dressing to abdomen has arrested bleeding  Intubated and on mechanical ventilation and renal replacement therapy  Physical Exam:  Vital Signs: /83   Pulse 98   Temp 99.3 °F (37.4 °C) (Rectal)   Resp 27   Ht 5' 7.99\" (1.727 m)   Wt 147 lb 11.3 oz (67 kg)   SpO2 100%   BMI 22.46 kg/m²     Gen: Intubated, sedated and mechanically ventilated  Skin: no stigmata of endocarditis  Wounds: Bilateral forearm dressing C/D/I  HEMT: AT/NC, OG tube, ETT  Eyes: PERRL  Neck: Supple. Trachea midline. No LAD. Chest: no distress and CTA. Good air movement. Heart: RRR and no MRG. Abd: midline abdominal dressingCDI  Ext: no clubbing, cyanosis, or edema  Catheter Site: right IJ CVC, right femoral vein vascath without erythema or tenderness  Neuro: sedated     Radiologic / Imaging / TESTING  CT abd pelvis 2020  Impression:   1. Bronchial wall thickening and centrilobular nodularity in the right lower   lobe, likely infectious/inflammatory airways process such as aspiration. 2. No significant change in moderate volume hemoperitoneum. 3. Similar small pneumoperitoneum, likely postoperative. 4. Similar to slightly improved mild diffuse colonic wall thickening.         Labs:    Recent Results (from the past 24 hour(s))   Culture, Respiratory    Collection Time: 20  5:41 PM    Specimen: Sputum, Suctioned   Result Value Ref Range    Specimen SPUTUM SUCTIONED     Special Requests NONE     Gram Smear RARE  NECROTIC POLYS       Gram Smear MODERATE  YEAST      POCT Glucose    Collection Time: 20  1:45 AM   Result Value Ref Range    POC Glucose 134 (H) 70 - 99 MG/DL   Procalcitonin    Collection Time: 20  1:50 AM   Result Value Ref Range    Procalcitonin 1.56    CBC auto differential    Collection Result Value Ref Range    CRP, High Sensitivity 14.7 mg/L     CULTURE results: Invalid input(s): BLOOD CULTURE,  URINE CULTURE, SURGICAL CULTURE    Diagnosis:  Patient Active Problem List   Diagnosis    Sepsis ([facility]    Leukocytosis    Ischemic bowel disease ([facility]    MSSA bacteremia    Chronic hepatitis C without hepatic coma ([facility]       Active Problems  Active Problems:    Sepsis ([facility]    Leukocytosis    MSSA bacteremia    Chronic hepatitis C without hepatic coma ([facility]  Resolved Problems:    * No resolved hospital problems. *      Impression and plan   Clinical status: Not on vasopressors, remains intubated, leukocytosis is worsening,. However, CRP is not elevated. Leukocytosis is likely secondary to steroid use. It does muddle the a picture. earlier had bleeding from his surgical incision site. Suspect that leukocytosis is noninfectious. [place] Diarrhea:C diff pcr is negative,    Therapeutic: continue vancomycin, cefepime and metronidazole   Diagnostic: repeat respiratory cx, pct, CRP   F/u: Blood culture (7/29/2020)   Other:   Summary:waiting waiting for advanced directives decision from family. Leukocytosis may be secondary to noninfectious cause as the patient is on Solu-Medrol. .      Electronically signed by: Electronically signed by Fina Dorman MD on 8/6/2020 at 12:41 PM

## 2020-08-06 NOTE — ANESTHESIA PROCEDURE NOTES
Arterial Line:    An arterial line was placed using ultrasound guidance, in the ICU for the following indication(s): continuous blood pressure monitoring and blood sampling needed. A 22 gauge (size), 1 and 3/4 inch (length), Angiocath (type) catheter was placed, Seldinger technique used, into the left radial artery and vented, sedated, secured by Tegaderm. Events:  patient tolerated procedure well with no complications and EBL < 5mL.   8/6/2020 11:55 AM8/6/2020 12:00 PM  Resident/CRNA: MARLENI Martin - CRNA  Performed: Resident/CRNA   Preanesthetic Checklist  Completed: patient identified, site marked, surgical consent, pre-op evaluation, timeout performed, IV checked, risks and benefits discussed, monitors and equipment checked, anesthesia consent given, oxygen available and patient being monitored

## 2020-08-06 NOTE — PROGRESS NOTES
Dr. Sonu Yi instructed RT and this nurse to wean propofol and attempt to wean the vent. RT placed patient on SIMV to transition while Propofol is weaned, Patient began breathing over 40 times a minute. RT switched patient back to AC/VC. Dr. Sonu Yi paged and updated. Will continue to monitor.

## 2020-08-06 NOTE — PROGRESS NOTES
Nephrology Progress Note        [address], [address] [facility], [facility] [address]  Phone: [phone]  Office Hours: 8:30AM - 4:30PM  Monday - Friday       [facility]  Angela Zhou MD  [address],   [address]  [place], [place] [address]  PHONE: [phone]  FAX: [phone]    8/6/2020 7:04 AM  Subjective:   Admit Date: 7/24/2020  PCP: No primary care provider on file. Interval History: nonoliguric  Remains on sedation    Diet: DIET TUBE FEED CONTINUOUS/CYCLIC NPO; Low Calorie High Protein (Vital High Protein); Orogastric; Continuous; 20; 70; 24      Data:   Scheduled Meds:   cefepime  2 g Intravenous Once per day on Mon Wed Fri    scopolamine  1 patch Transdermal Q72H    methylPREDNISolone  60 mg Intravenous Daily    vancomycin (VANCOCIN) intermittent dosing (placeholder)   Other RX Placeholder    metroNIDAZOLE  500 mg Intravenous Q8H    pantoprazole  40 mg Intravenous Daily    thiamine (VITAMIN B1) IVPB  200 mg Intravenous Q12H    chlorhexidine  15 mL Mouth/Throat BID    albuterol sulfate HFA  4 puff Inhalation Q4H    And    ipratropium  4 puff Inhalation Q4H    aspirin  324 mg Oral Once    sodium chloride flush  10 mL Intravenous BID    sodium chloride flush  10 mL Intravenous 2 times per day    [Held by provider] enoxaparin  1 mg/kg Subcutaneous Daily    nicotine  1 patch Transdermal Daily     Continuous Infusions:   norepinephrine Stopped (08/05/20 1713)    dextrose      fentanyl 25 mcg/hr (08/05/20 0851)    propofol 30 mg/hr (08/06/20 0130)     PRN Meds:glucose, dextrose, glucagon (rDNA), dextrose, heparin (porcine), ipratropium-albuterol, iopamidol, sodium chloride flush, acetaminophen **OR** acetaminophen, polyethylene glycol, promethazine **OR** ondansetron, promethazine, diphenhydrAMINE, dicyclomine, morphine  I/O last 3 completed shifts:   In: 2458.7 [I.V.:120.7; NG/GT:1988; IV Piggyback:350]  Out: 900 [Stool:900]  No intake/output data recorded. Intake/Output Summary (Last 24 hours) at 8/6/2020 0704  Last data filed at 8/5/2020 2028  Gross per 24 hour   Intake 2458.71 ml   Output 900 ml   Net 1558.71 ml       CBC:   Recent Labs     08/04/20  0420 08/05/20  0510 08/06/20  0414   WBC 20.4* 24.9* 28.1*   HGB 8.1* 8.2* 8.2*    196 202       BMP:    Recent Labs     08/04/20 0420 08/05/20  0510 08/06/20  0414    137 135   K 4.8 4.8 4.4    101 99   CO2 21 18* 19*   BUN 74* 112* 90*   CREATININE 2.7* 3.6* 2.6*   GLUCOSE 150* 142* 151*     Hepatic:   Recent Labs     08/04/20 0420 08/05/20  0510 08/06/20  0414   AST 36 33 35   ALT <5* <5* <5*   BILITOT 1.3* 1.0 1.1*   ALKPHOS 85 82 80     Troponin: No results for input(s): TROPONINI in the last 72 hours. BNP: No results for input(s): BNP in the last 72 hours. Lipids: No results for input(s): CHOL, HDL in the last 72 hours.     Invalid input(s): LDLCALCU  ABGs:   Lab Results   Component Value Date    PO2ART 131 08/05/2020    ZYV5GUN 33.0 08/05/2020     INR:   Recent Labs     08/04/20 0420 08/05/20  0510 08/06/20 0414   INR 1.20 1.13 1.06       Objective:   Vitals: BP 99/65   Pulse 104   Temp 99.5 °F (37.5 °C) (Rectal)   Resp 27   Ht 5' 7.99\" (1.727 m)   Wt 147 lb 11.3 oz (67 kg)   SpO2 100%   BMI 22.46 kg/m²   General appearance: , in no acute distress  HEENT: normocephalic, atraumatic,   Neck: supple, trachea midline  Lungs:  breathing comfortably on mv  Heart[de-identified] regular rate and rhythm,  Abdomen: ventral dressing  Extremities: extremities atraumatic, no cyanosis or edema  Neurologic: sedated    Assessment and Plan:      Leukocytosis      Sepsis ([address] 07/24/2020         JERAMIE FROM ATN; CRRT FROM FROM 7/5 TO 7/8 and now no HD 3 times per week  ANION GAP METABOLIC ACIDOSIS  LACTIC ACIDOSIS  ISCHEMIC BOWEL S/P SURGERY   STAPH BACTEREMIA  ACUTE HYPOXIC RESP FAILURE  HEROIN AND COCAINE USE  Ischemic hepatitis  encephalopathy     Suggest:  Remains anuric  Will continue HD 3x/wk  Can not go to a regular outpt hd center on dc, consider ltach                    Electronically signed by Trisha Winston DO on 8/6/2020 at 7:04 AM    MD Alan Rivas DO [address]  [place], [place] [address]  PHONE: [phone]  FAX: [phone]

## 2020-08-06 NOTE — PROGRESS NOTES
Patient's son called back. Son stated he is on his way to the hospital. Dr. Carol Dakin paged and updated. Will continue to monitor.

## 2020-08-06 NOTE — PROGRESS NOTES
[address] [facility]  consulted by Dr. Ronda Menendez for monitoring and adjustment. Indication for treatment: MSSA bacteremia, GPB in blood   Goal trough: 10-15 mcg/mL     Pertinent Laboratory Values:   Temp Readings from Last 3 Encounters:   08/06/20 99.5 °F (37.5 °C) (Rectal)   07/25/20 (!) 62.3 °F (16.8 °C)     Recent Labs     08/04/20  0420 08/05/20  0510 08/06/20  0414   WBC 20.4* 24.9* 28.1*     Recent Labs     08/04/20  0420 08/05/20  0510 08/06/20  0414   BUN 74* 112* 90*   CREATININE 2.7* 3.6* 2.6*     Estimated Creatinine Clearance: 28 mL/min (A) (based on SCr of 2.6 mg/dL (H)). Intake/Output Summary (Last 24 hours) at 8/6/2020 0913  Last data filed at 8/5/2020 2028  Gross per 24 hour   Intake 2358.71 ml   Output 900 ml   Net 1458.71 ml       Pertinent Cultures:  Date    Source    Results  7/24   Blood    MSSA  7/27   Blood    Bacillus (1 of 2)  7/29   Blood    NGTD     Vancomycin level:   TROUGH:    No results for input(s): VANCOTROUGH in the last 72 hours.   RANDOM:    Recent Labs     08/04/20 0420 08/05/20  0510   VANCORANDOM 18.9 17.5       Assessment:  · WBC and temperature: WBC remains elevated, afebrile  · SCr, BUN, and urine output:   · CRRT stopped- transition to HD Mon/Wed/Fri   · Days of therapy: 14  · Vancomycin level: to be collected    Plan:  · Continue intermittent dosing based on levels 2/2 RRT   · Received vancomycin 1000 mg on 7/30 (8 days ago)  · Levels have cleared very slowly since CRRT stopped  · Goal to re-dose when level <15, patient will likely stay within range until Friday AM   · Repeat level pre-HD on Friday and re-dose with 10 mg/kg   · Pharmacy will continue to monitor patient and adjust therapy as indicated    [address] 8/7 @06:00    Thank you for the consult,  Geni Miller, [address]  8/6/2020 9:13 AM

## 2020-08-06 NOTE — PROGRESS NOTES
ONCOLOGY HEMATOLOGY CARE ([facility])  PROGRESS NOTE      2020  8:06 AM    Patient:    Irma Saravia  : 1955   60 y.o. MRN: [id number]  Admitted: 2020  3:22 PM ATT: Luis M Dominguez MD   [id number]/[id number]  AdmitDx: Hypoxia [R09.02]  Elevated troponin [R79.89]  Elevated lactic acid level [R79.89]  Sepsis ([address] [A41.9]  Pain of both hip joints [M25.551, M25.552]  Leukocytosis, unspecified type [T06.769]  PCP: No primary care provider on file. Patient was seen and examined today. Intubated and sedated        REVIEW OF SYSTEMS      Unobtainable    PHYSICAL EXAM :    Vitals: BP 99/65   Pulse 104   Temp 99.5 °F (37.5 °C) (Rectal)   Resp 27   Ht 5' 7.99\" (1.727 m)   Wt 147 lb 11.3 oz (67 kg)   SpO2 100%   BMI 22.46 kg/m²     CONSTITUTIONAL: intubated and sedatyed  LUNGS: Bilateral coarse bs   CARDIOVASCULAR: s1s2 tachycardia  ABDOMEN: feeble bs  SKIN: discoloration shan upper and LE, RN rash LE  EXTREMITIES:bilateral lower ext edema    LABORATORY RESULTS  CBC:   Recent Labs     20  0510 20  0414   WBC 20.4* 24.9* 28.1*   HGB 8.1* 8.2* 8.2*    196 202     BMP:    Recent Labs     20  0510 20  0414    137 135   K 4.8 4.8 4.4    101 99   CO2 21 18* 19*   BUN 74* 112* 90*   CREATININE 2.7* 3.6* 2.6*   GLUCOSE 150* 142* 151*     Hepatic:   Recent Labs     20  0510 20  0414   AST 36 33 35   ALT <5* <5* <5*   BILITOT 1.3* 1.0 1.1*   ALKPHOS 85 82 80     INR:   Recent Labs     20  0510 20  0414   INR 1.20 1.13 1.06         RADIOLOGY REPORTS  Noted    ASSESSMENT & RECOMMENDATIONS:  Anemia and mild thrombocytopenia:Thrombocytopenia resolved and Hb stable. Is on methylprednisone      leukocytosis most probably reactive vs infection     MOF.  Remains intubated and sedated on pressors     Continue other medical care     We will continue to follow the patient.     Thank you for allowing us to participate in the care of this patient.     Prognosis remains guarded     Latrice Padilla MD, 8/6/2020, 8:06 AM

## 2020-08-06 NOTE — PROGRESS NOTES
[address] [place] [address]  HOSPITALIST PROGRESS NOTE                       Name:  Rocehlle James /Age/Sex: 1955  (60 y.o. male)   MRN & CSN:  [id number] & [id number] Admission Date/Time: 2020  3:22 PM   Location:  - Attending:  Akshat Norman MD                                                  HPI  Rochelle James is a 60 y.o. male who was admitted on  with sepsis    SUBJECTIVE  -sedated and on levophed again, did not tolerate weaning at all    ROS- unable to complete as patient not able to participate at this time      ALLERGIES: No Known Allergies    PCP: No primary care provider on file. PAST MEDICAL HISTORY, SURGICAL HISTORY, SOCIAL HISTORY and  HOME MEDICATIONS all reviewed. OBJECTIVE  Vitals:    20 0845 20 0900 20 0915 20 0930   BP: (!) 86/58 124/87 131/78 119/81   Pulse: 102 92 92 94   Resp: 27 29 27 26   Temp:       TempSrc:       SpO2: 100% 100% 100% 100%   Weight:       Height:           PHYSICAL EXAM   GEN sedated  EYES Pupils are equally round. No scleral erythema, discharge, or conjunctivitis. HENT Mucous membranes are dry. Oral pharynx without exudates, no evidence of thrush. NECK Supple, no apparent thyromegaly or masses. RESP unlabored respiration, decreased breath sounds at the bases  CARDIO/VASC S1/S2 auscultated. Regular rate without appreciable murmurs, rubs, or gallops. No JVD or carotid bruits. Peripheral pulses equal bilaterally and palpable. GI Abdomen is soft- further exam limited due to sedation  HEME/LYMPH No palpable cervical lymphadenopathy and no hepatosplenomegaly. No petechiae or ecchymoses. SKIN Normal coloration, warm, dry.   NEURO Sedated    INTAKE: In: 622.7 [I.V.:120.7; NG/GT:402]  Out: 900   OUTPUT: In: 622.7   Out: 900     LABS  Recent Labs     20  0420 20  0510 20  0414   WBC 20.4* 24.9* 28.1*   HGB 8.1* 8.2* 8.2*   HCT 25.9* 26.3* 26.0*    196 202      Recent Labs     20  0420 20  0510 08/06/20  0414    137 135   K 4.8 4.8 4.4    101 99   CO2 21 18* 19*   BUN 74* 112* 90*   CREATININE 2.7* 3.6* 2.6*     Recent Labs     08/04/20  0420 08/05/20  0510 08/06/20  0414   AST 36 33 35   ALT <5* <5* <5*   BILITOT 1.3* 1.0 1.1*   ALKPHOS 85 82 80     Recent Labs     08/04/20  0420 08/05/20  0510 08/06/20  0414   INR 1.20 1.13 1.06     Recent Labs     08/04/20  0420 08/05/20  0510 08/06/20  0414   CKTOTAL 30* 42 50          Abnormal labs for today noted      Imaging:     ECHO:    Microbiology:  Blood culture:    Urine culture:    Sputum culture:    Procedures done this admission:    MEDS  Scheduled Meds:   cefepime  2 g Intravenous Once per day on Mon Wed Fri    scopolamine  1 patch Transdermal Q72H    methylPREDNISolone  60 mg Intravenous Daily    vancomycin (VANCOCIN) intermittent dosing (placeholder)   Other RX Placeholder    metroNIDAZOLE  500 mg Intravenous Q8H    pantoprazole  40 mg Intravenous Daily    thiamine (VITAMIN B1) IVPB  200 mg Intravenous Q12H    chlorhexidine  15 mL Mouth/Throat BID    albuterol sulfate HFA  4 puff Inhalation Q4H    And    ipratropium  4 puff Inhalation Q4H    aspirin  324 mg Oral Once    sodium chloride flush  10 mL Intravenous BID    sodium chloride flush  10 mL Intravenous 2 times per day    [Held by provider] enoxaparin  1 mg/kg Subcutaneous Daily    nicotine  1 patch Transdermal Daily     Continuous Infusions:   norepinephrine 6 mcg/min (08/06/20 0922)    dextrose      fentanyl 25 mcg/hr (08/05/20 0851)    propofol 30 mg/hr (08/06/20 0130)     PRN Meds:glucose, dextrose, glucagon (rDNA), dextrose, heparin (porcine), ipratropium-albuterol, iopamidol, sodium chloride flush, acetaminophen **OR** acetaminophen, polyethylene glycol, promethazine **OR** ondansetron, promethazine, diphenhydrAMINE, dicyclomine, morphine        ASSESSMENT and [address] [facility] Day: 14    1-Septic shock with MSSA bacteremia due to wound-off pressors- repeat blood culture negative, one set growing bacilli- ?contaminant- ID on the case- noted still with significant leukocytosis which is worse- defer abx to ID  2-Probable staph UTI- abx per ID  3-Acute on chronic respiratory failure due to sepsis and suspected aspiration pneumonia- still vent dependent as of 7/25- with severe pulm HTN with RVSP of 67  4-JERAMIE due to ATN- dialysis per nephro  5-Skin wounds/right post thigh/buttock and bilateral UE- wound culture growing MSSA- surgery and ID following  6-Pneumatosis intestinalis s/p ex-lap on 7/25 negative for ischemic bowel, however CT on 7/31 showing hemoperitoneum and pneumoperitoneum- due to leukocytosis- repeated CTs which are stable. 7-Acute blood loss anemia due to above- s/p transfusion- monitor H/H  8-Destructive lesion T12-L1- possible prior infectious spondyliits  -no abscess or osteo as per imaging  9-?low DIC state with mildly low fibrinogen and platelets- noted hematology following- repeat levels again    Other issues  -Hx of gastric cancer stage 4  -polysubstance abuse- UDS positive for cocaine and opiate  -documented hep C    No pharmacological dvt prophylaxis due to retroperitoneal hemorrhage with acute blood loss anemia    Discussed with son at the bedside- he will make up his mind by the end of the day.       Disp:     Diet DIET TUBE FEED CONTINUOUS/CYCLIC NPO; Low Calorie High Protein (Vital High Protein);  Orogastric; Continuous; 20; 70; 24   DVT Prophylaxis [] Lovenox, []  Heparin, [] SCDs, [] Ambulation   GI Prophylaxis [] PPI,  [] H2 Blocker,  [] Carafate,  [] Diet/Tube Feeds   Code Status Full Code   Disposition Patient requires continued admission due to sepsis, respiratory failure   CMS Level of Risk [] Low, [] Moderate,[x]  High  Patient's risk as above due to sepsis respiratory failure     CARMINE WOOTEN MD 8/6/2020 11:21 AM

## 2020-08-07 ENCOUNTER — ANESTHESIA (OUTPATIENT)
Dept: OPERATING ROOM | Age: 65
End: 2020-08-07

## 2020-08-07 LAB
ALBUMIN SERPL-MCNC: 2.3 GM/DL (ref 3.4–5)
ALP BLD-CCNC: 71 IU/L (ref 40–128)
ALT SERPL-CCNC: <5 U/L (ref 10–40)
ANION GAP SERPL CALCULATED.3IONS-SCNC: 21 MMOL/L (ref 4–16)
APTT: 26.3 SECONDS (ref 25.1–37.1)
AST SERPL-CCNC: 30 IU/L (ref 15–37)
BASOPHILS ABSOLUTE: 0.1 K/CU MM
BASOPHILS RELATIVE PERCENT: 0.2 % (ref 0–1)
BILIRUB SERPL-MCNC: 1.1 MG/DL (ref 0–1)
BUN BLDV-MCNC: 141 MG/DL (ref 6–23)
CALCIUM SERPL-MCNC: 8.5 MG/DL (ref 8.3–10.6)
CHLORIDE BLD-SCNC: 99 MMOL/L (ref 99–110)
CO2: 16 MMOL/L (ref 21–32)
CREAT SERPL-MCNC: 3.3 MG/DL (ref 0.9–1.3)
CULTURE: NORMAL
DIFFERENTIAL TYPE: ABNORMAL
DOSE AMOUNT: NORMAL
DOSE TIME: NORMAL
EOSINOPHILS ABSOLUTE: 0 K/CU MM
EOSINOPHILS RELATIVE PERCENT: 0 % (ref 0–3)
GFR AFRICAN AMERICAN: 23 ML/MIN/1.73M2
GFR NON-AFRICAN AMERICAN: 19 ML/MIN/1.73M2
GLUCOSE BLD-MCNC: 155 MG/DL (ref 70–99)
GLUCOSE BLD-MCNC: 160 MG/DL (ref 70–99)
GLUCOSE BLD-MCNC: 162 MG/DL (ref 70–99)
GLUCOSE BLD-MCNC: 164 MG/DL (ref 70–99)
GLUCOSE BLD-MCNC: 166 MG/DL (ref 70–99)
GLUCOSE BLD-MCNC: 180 MG/DL (ref 70–99)
GRAM SMEAR: NORMAL
GRAM SMEAR: NORMAL
HCT VFR BLD CALC: 24.7 % (ref 42–52)
HEMOGLOBIN: 7.6 GM/DL (ref 13.5–18)
HIGH SENSITIVE C-REACTIVE PROTEIN: 13.5 MG/L
IMMATURE NEUTROPHIL %: 1.1 % (ref 0–0.43)
INR BLD: 1.02 INDEX
LYMPHOCYTES ABSOLUTE: 0.7 K/CU MM
LYMPHOCYTES RELATIVE PERCENT: 2.8 % (ref 24–44)
Lab: NORMAL
MCH RBC QN AUTO: 27.7 PG (ref 27–31)
MCHC RBC AUTO-ENTMCNC: 30.8 % (ref 32–36)
MCV RBC AUTO: 90.1 FL (ref 78–100)
MONOCYTES ABSOLUTE: 1.2 K/CU MM
MONOCYTES RELATIVE PERCENT: 4.6 % (ref 0–4)
NUCLEATED RBC %: 0.1 %
PDW BLD-RTO: 22.7 % (ref 11.7–14.9)
PLATELET # BLD: 207 K/CU MM (ref 140–440)
PMV BLD AUTO: 10.7 FL (ref 7.5–11.1)
POTASSIUM SERPL-SCNC: 4.9 MMOL/L (ref 3.5–5.1)
PROTHROMBIN TIME: 12.3 SECONDS (ref 11.7–14.5)
RBC # BLD: 2.74 M/CU MM (ref 4.6–6.2)
SEGMENTED NEUTROPHILS ABSOLUTE COUNT: 24.4 K/CU MM
SEGMENTED NEUTROPHILS RELATIVE PERCENT: 91.3 % (ref 36–66)
SODIUM BLD-SCNC: 136 MMOL/L (ref 135–145)
SPECIMEN: NORMAL
TOTAL CK: 61 IU/L (ref 38–174)
TOTAL IMMATURE NEUTOROPHIL: 0.3 K/CU MM
TOTAL NUCLEATED RBC: 0 K/CU MM
TOTAL PROTEIN: 5.8 GM/DL (ref 6.4–8.2)
VANCOMYCIN RANDOM: 11.3 UG/ML
WBC # BLD: 26.7 K/CU MM (ref 4–10.5)

## 2020-08-07 PROCEDURE — 6370000000 HC RX 637 (ALT 250 FOR IP): Performed by: SURGERY

## 2020-08-07 PROCEDURE — 82803 BLOOD GASES ANY COMBINATION: CPT

## 2020-08-07 PROCEDURE — 2580000003 HC RX 258: Performed by: SURGERY

## 2020-08-07 PROCEDURE — 85025 COMPLETE CBC W/AUTO DIFF WBC: CPT

## 2020-08-07 PROCEDURE — 2500000003 HC RX 250 WO HCPCS: Performed by: INTERNAL MEDICINE

## 2020-08-07 PROCEDURE — 85730 THROMBOPLASTIN TIME PARTIAL: CPT

## 2020-08-07 PROCEDURE — 82962 GLUCOSE BLOOD TEST: CPT

## 2020-08-07 PROCEDURE — 85610 PROTHROMBIN TIME: CPT

## 2020-08-07 PROCEDURE — 6360000002 HC RX W HCPCS: Performed by: NURSE PRACTITIONER

## 2020-08-07 PROCEDURE — 99233 SBSQ HOSP IP/OBS HIGH 50: CPT | Performed by: INTERNAL MEDICINE

## 2020-08-07 PROCEDURE — 94640 AIRWAY INHALATION TREATMENT: CPT

## 2020-08-07 PROCEDURE — 6360000002 HC RX W HCPCS: Performed by: INTERNAL MEDICINE

## 2020-08-07 PROCEDURE — 89220 SPUTUM SPECIMEN COLLECTION: CPT

## 2020-08-07 PROCEDURE — 6360000002 HC RX W HCPCS: Performed by: SPECIALIST

## 2020-08-07 PROCEDURE — 31720 CLEARANCE OF AIRWAYS: CPT

## 2020-08-07 PROCEDURE — 90935 HEMODIALYSIS ONE EVALUATION: CPT

## 2020-08-07 PROCEDURE — 2580000003 HC RX 258: Performed by: INTERNAL MEDICINE

## 2020-08-07 PROCEDURE — 82550 ASSAY OF CK (CPK): CPT

## 2020-08-07 PROCEDURE — 80202 ASSAY OF VANCOMYCIN: CPT

## 2020-08-07 PROCEDURE — 80053 COMPREHEN METABOLIC PANEL: CPT

## 2020-08-07 PROCEDURE — C9113 INJ PANTOPRAZOLE SODIUM, VIA: HCPCS | Performed by: SPECIALIST

## 2020-08-07 PROCEDURE — 6370000000 HC RX 637 (ALT 250 FOR IP): Performed by: INTERNAL MEDICINE

## 2020-08-07 PROCEDURE — 86141 C-REACTIVE PROTEIN HS: CPT

## 2020-08-07 PROCEDURE — 2000000000 HC ICU R&B

## 2020-08-07 RX ORDER — HEPARIN SODIUM 1000 [USP'U]/ML
3000 INJECTION, SOLUTION INTRAVENOUS; SUBCUTANEOUS ONCE
Status: COMPLETED | OUTPATIENT
Start: 2020-08-07 | End: 2020-08-07

## 2020-08-07 RX ORDER — VANCOMYCIN HYDROCHLORIDE 1 G/200ML
1000 INJECTION, SOLUTION INTRAVENOUS ONCE
Status: COMPLETED | OUTPATIENT
Start: 2020-08-07 | End: 2020-08-07

## 2020-08-07 RX ADMIN — ALBUTEROL SULFATE 4 PUFF: 90 AEROSOL, METERED RESPIRATORY (INHALATION) at 21:04

## 2020-08-07 RX ADMIN — ALBUTEROL SULFATE 4 PUFF: 90 AEROSOL, METERED RESPIRATORY (INHALATION) at 04:29

## 2020-08-07 RX ADMIN — HEPARIN SODIUM 3000 UNITS: 1000 INJECTION INTRAVENOUS; SUBCUTANEOUS at 21:06

## 2020-08-07 RX ADMIN — ALBUTEROL SULFATE 4 PUFF: 90 AEROSOL, METERED RESPIRATORY (INHALATION) at 08:17

## 2020-08-07 RX ADMIN — PROPOFOL 15 MCG/KG/MIN: 10 INJECTION, EMULSION INTRAVENOUS at 12:53

## 2020-08-07 RX ADMIN — CHLORHEXIDINE GLUCONATE 0.12% ORAL RINSE 15 ML: 1.2 LIQUID ORAL at 09:08

## 2020-08-07 RX ADMIN — SODIUM CHLORIDE, PRESERVATIVE FREE 10 ML: 5 INJECTION INTRAVENOUS at 09:09

## 2020-08-07 RX ADMIN — THIAMINE HYDROCHLORIDE 200 MG: 100 INJECTION, SOLUTION INTRAMUSCULAR; INTRAVENOUS at 12:54

## 2020-08-07 RX ADMIN — THIAMINE HYDROCHLORIDE 200 MG: 100 INJECTION, SOLUTION INTRAMUSCULAR; INTRAVENOUS at 23:43

## 2020-08-07 RX ADMIN — CHLORHEXIDINE GLUCONATE 0.12% ORAL RINSE 15 ML: 1.2 LIQUID ORAL at 21:50

## 2020-08-07 RX ADMIN — Medication 2 MCG/MIN: at 19:15

## 2020-08-07 RX ADMIN — METRONIDAZOLE 500 MG: 500 INJECTION, SOLUTION INTRAVENOUS at 22:40

## 2020-08-07 RX ADMIN — CEFEPIME HYDROCHLORIDE 2 G: 2 INJECTION, POWDER, FOR SOLUTION INTRAVENOUS at 21:55

## 2020-08-07 RX ADMIN — IPRATROPIUM BROMIDE 4 PUFF: 17 AEROSOL, METERED RESPIRATORY (INHALATION) at 21:07

## 2020-08-07 RX ADMIN — PANTOPRAZOLE SODIUM 40 MG: 40 INJECTION, POWDER, FOR SOLUTION INTRAVENOUS at 09:08

## 2020-08-07 RX ADMIN — ALBUTEROL SULFATE 4 PUFF: 90 AEROSOL, METERED RESPIRATORY (INHALATION) at 00:52

## 2020-08-07 RX ADMIN — IPRATROPIUM BROMIDE 4 PUFF: 17 AEROSOL, METERED RESPIRATORY (INHALATION) at 00:52

## 2020-08-07 RX ADMIN — PROPOFOL 30 MCG/KG/MIN: 10 INJECTION, EMULSION INTRAVENOUS at 03:56

## 2020-08-07 RX ADMIN — SODIUM CHLORIDE, PRESERVATIVE FREE 10 ML: 5 INJECTION INTRAVENOUS at 21:49

## 2020-08-07 RX ADMIN — METRONIDAZOLE 500 MG: 500 INJECTION, SOLUTION INTRAVENOUS at 06:11

## 2020-08-07 RX ADMIN — METHYLPREDNISOLONE SODIUM SUCCINATE 60 MG: 125 INJECTION, POWDER, FOR SOLUTION INTRAMUSCULAR; INTRAVENOUS at 09:09

## 2020-08-07 RX ADMIN — VANCOMYCIN HYDROCHLORIDE 1000 MG: 1 INJECTION, SOLUTION INTRAVENOUS at 12:55

## 2020-08-07 RX ADMIN — IPRATROPIUM BROMIDE 4 PUFF: 17 AEROSOL, METERED RESPIRATORY (INHALATION) at 04:29

## 2020-08-07 RX ADMIN — EPOETIN ALFA-EPBX 10000 UNITS: 10000 INJECTION, SOLUTION INTRAVENOUS; SUBCUTANEOUS at 20:37

## 2020-08-07 RX ADMIN — METRONIDAZOLE 500 MG: 500 INJECTION, SOLUTION INTRAVENOUS at 16:39

## 2020-08-07 RX ADMIN — IPRATROPIUM BROMIDE 4 PUFF: 17 AEROSOL, METERED RESPIRATORY (INHALATION) at 08:17

## 2020-08-07 ASSESSMENT — PULMONARY FUNCTION TESTS
PIF_VALUE: 22
PIF_VALUE: 23
PIF_VALUE: 22
PIF_VALUE: 22
PIF_VALUE: 23
PIF_VALUE: 26
PIF_VALUE: 25
PIF_VALUE: 22
PIF_VALUE: 23
PIF_VALUE: 24
PIF_VALUE: 24
PIF_VALUE: 23
PIF_VALUE: 25
PIF_VALUE: 26
PIF_VALUE: 28
PIF_VALUE: 22
PIF_VALUE: 22
PIF_VALUE: 28
PIF_VALUE: 22
PIF_VALUE: 26
PIF_VALUE: 23
PIF_VALUE: 24
PIF_VALUE: 24
PIF_VALUE: 25
PIF_VALUE: 30
PIF_VALUE: 27
PIF_VALUE: 23
PIF_VALUE: 23
PIF_VALUE: 24

## 2020-08-07 ASSESSMENT — PAIN SCALES - GENERAL
PAINLEVEL_OUTOF10: 0

## 2020-08-07 NOTE — PROGRESS NOTES
08/07/20 0430   Vent Information   Vent Type 980   Vent Mode AC/VC   Vt Ordered 450 mL   Rate Set 14 bmp   Peak Flow 60 L/min   Pressure Support 0 cmH20   FiO2  30 %   SpO2 100 %   SpO2/FiO2 ratio 333.33   Sensitivity 3   PEEP/CPAP 5   I Time/ I Time % 0 s   Humidification Source HME   Vent Patient Data   High Peep/I Pressure 0   Peak Inspiratory Pressure 26 cmH2O   Mean Airway Pressure 12 cmH20   Rate Measured 26 br/min   Vt Exhaled 303 mL   Minute Volume 11.5 Liters   I:E Ratio 1:1.30   Plateau Pressure 20 NYN73   Static Compliance 40 mL/cmH2O   Cough/Sputum   Sputum How Obtained Endotracheal;Suctioned   $Obtained Sample $Nasotracheal Suction   Cough Productive   Sputum Amount Small   Sputum Color Yellow;Creamy   Tenacity Thick   Spontaneous Breathing Trial (SBT) RT Doc   Pulse 91   Breath Sounds   Right Upper Lobe Diminished   Right Middle Lobe Diminished   Right Lower Lobe Diminished   Left Upper Lobe Diminished   Left Lower Lobe Diminished   Additional Respiratory  Assessments   Resp 27   Alarm Settings   High Pressure Alarm 45 cmH2O   Delay Alarm 20 sec(s)   Low Minute Volume Alarm 2.5 L/min   Apnea (secs) 20 secs   High Respiratory Rate 40 br/min   Low Exhaled Vt  250 mL   ETT (adult)   Placement Date/Time: 07/25/20 5958   Preoxygenation: Yes  Mask Ventilation: Mask ventilation not attempted (0)  Technique: Direct laryngoscopy  Type: Cuffed  Tube Size: 7 mm  Laryngoscope: Mac  Blade Size: 3  Location: Oral  Grade View: Full view of t. ..    Secured at 22 cm   Measured From Lips   ET Placement Left   Secured By Commercial tube raza   Site Condition Dry   Cuff Pressure   (mlt)

## 2020-08-07 NOTE — PROGRESS NOTES
Infectious Disease Progress Note  2020   Patient Name: Jeanne Walter : 1955       Reason for visit: F/u MSSA bacteremia secondary to infected wounds. ? History:? Interval history noted. Pressure dressing to abdomen has arrested bleeding  Intubated and on mechanical ventilation and renal replacement therapy  Physical Exam:  Vital Signs: BP (!) 135/45   Pulse 86   Temp 96.8 °F (36 °C) (Rectal)   Resp 26   Ht 5' 7.99\" (1.727 m)   Wt 154 lb 12.2 oz (70.2 kg)   SpO2 100%   BMI 23.54 kg/m²     Gen: Intubated, sedated and mechanically ventilated  Skin: no stigmata of endocarditis  Wounds: Bilateral forearm dressing C/D/I  HEMT: AT/NC, OG tube, ETT  Eyes: PERRL  Neck: Supple. Trachea midline. No LAD. Chest: no distress and CTA. Good air movement. Heart: RRR and no MRG. Abd: midline abdominal dressingCDI  Ext: no clubbing, cyanosis, or edema  Catheter Site: right IJ CVC, right femoral vein vascath without erythema or tenderness  Neuro: sedated     Radiologic / Imaging / TESTING  CT abd pelvis 2020  Impression:   1. Bronchial wall thickening and centrilobular nodularity in the right lower   lobe, likely infectious/inflammatory airways process such as aspiration. 2. No significant change in moderate volume hemoperitoneum. 3. Similar small pneumoperitoneum, likely postoperative. 4. Similar to slightly improved mild diffuse colonic wall thickening.         Labs:    Recent Results (from the past 24 hour(s))   POCT Glucose    Collection Time: 20 12:46 PM   Result Value Ref Range    POC Glucose 273 (H) 70 - 99 MG/DL   POCT Glucose    Collection Time: 20  6:16 PM   Result Value Ref Range    POC Glucose 176 (H) 70 - 99 MG/DL   POCT Glucose    Collection Time: 20  1:08 AM   Result Value Ref Range    POC Glucose 155 (H) 70 - 99 MG/DL   CBC auto differential    Collection Time: 20  4:20 AM   Result Value Ref Range    WBC 26.7 (H) 4.0 - 10.5 K/CU MM    RBC 2.74 (L) 4.6 - 6.2 M/CU MM    Hemoglobin 7.6 (L) 13.5 - 18.0 GM/DL    Hematocrit 24.7 (L) 42 - 52 %    MCV 90.1 78 - 100 FL    MCH 27.7 27 - 31 PG    MCHC 30.8 (L) 32.0 - 36.0 %    RDW 22.7 (H) 11.7 - 14.9 %    Platelets 055 372 - 837 K/CU MM    MPV 10.7 7.5 - 11.1 FL    Differential Type AUTOMATED DIFFERENTIAL     Segs Relative 91.3 (H) 36 - 66 %    Lymphocytes % 2.8 (L) 24 - 44 %    Monocytes % 4.6 (H) 0 - 4 %    Eosinophils % 0.0 0 - 3 %    Basophils % 0.2 0 - 1 %    Segs Absolute 24.4 K/CU MM    Lymphocytes Absolute 0.7 K/CU MM    Monocytes Absolute 1.2 K/CU MM    Eosinophils Absolute 0.0 K/CU MM    Basophils Absolute 0.1 K/CU MM    Nucleated RBC % 0.1 %    Total Nucleated RBC 0.0 K/CU MM    Total Immature Neutrophil 0.30 K/CU MM    Immature Neutrophil % 1.1 (H) 0 - 0.43 %   CK    Collection Time: 08/07/20  4:20 AM   Result Value Ref Range    Total CK 61 38 - 174 IU/L   Comprehensive Metabolic Panel    Collection Time: 08/07/20  4:20 AM   Result Value Ref Range    Sodium 136 135 - 145 MMOL/L    Potassium 4.9 3.5 - 5.1 MMOL/L    Chloride 99 99 - 110 mMol/L    CO2 16 (L) 21 - 32 MMOL/L     (H) 6 - 23 MG/DL    CREATININE 3.3 (H) 0.9 - 1.3 MG/DL    Glucose 162 (H) 70 - 99 MG/DL    Calcium 8.5 8.3 - 10.6 MG/DL    Alb 2.3 (L) 3.4 - 5.0 GM/DL    Total Protein 5.8 (L) 6.4 - 8.2 GM/DL    Total Bilirubin 1.1 (H) 0.0 - 1.0 MG/DL    ALT <5 (L) 10 - 40 U/L    AST 30 15 - 37 IU/L    Alkaline Phosphatase 71 40 - 128 IU/L    GFR Non- 19 (L) >60 mL/min/1.73m2    GFR  23 (L) >60 mL/min/1.73m2    Anion Gap 21 (H) 4 - 16   C-Reactive Protein    Collection Time: 08/07/20  4:20 AM   Result Value Ref Range    CRP, High Sensitivity 13.5 mg/L   PT    Collection Time: 08/07/20  6:00 AM   Result Value Ref Range    Protime 12.3 11.7 - 14.5 SECONDS    INR 1.02 INDEX   PTT    Collection Time: 08/07/20  6:00 AM   Result Value Ref Range    aPTT 26.3 25.1 - 37.1 SECONDS   Vancomycin, random    Collection Time: 08/07/20  6:00 AM Result Value Ref Range    Vancomycin Rm 11.3 UG/ML    DOSE AMOUNT DOSE AMT. GIVEN - =     DOSE TIME DOSE TIME GIVEN - =    POCT Glucose    Collection Time: 08/07/20  6:05 AM   Result Value Ref Range    POC Glucose 166 (H) 70 - 99 MG/DL     CULTURE results: Invalid input(s): BLOOD CULTURE,  URINE CULTURE, SURGICAL CULTURE    Diagnosis:  Patient Active Problem List   Diagnosis    [facility]    Leukocytosis    Ischemic bowel disease ([facility]    MSSA bacteremia    Chronic hepatitis C without hepatic coma (HCC)       Active Problems  Active Problems:    Sepsis ([address]    Leukocytosis    MSSA bacteremia    Chronic hepatitis C without hepatic coma (HCC)  Resolved Problems:    * No resolved hospital problems. *      Impression and plan   Clinical status: Not on vasopressors, remains intubated, leukocytosis is worsening,. However, CRP is not elevated. Leukocytosis is likely secondary to steroid use. It does muddle the a picture. earlier had bleeding from his surgical incision site. Suspect that leukocytosis is noninfectious. Laverna Beath Diarrhea:C diff pcr is negative,    Therapeutic: continue vancomycin, cefepime and metronidazole   Diagnostic: repeat respiratory cx, pct, CRP   F/u: Blood culture (7/29/2020)   Other:   Summary:waiting waiting for advanced directives decision from family. Leukocytosis may be secondary to noninfectious cause as the patient is on Solu-Medrol. .      Electronically signed by: Electronically signed by Juliann Steiner MD on 8/7/2020 at 11:03 AM

## 2020-08-07 NOTE — PROGRESS NOTES
Nephrology Progress Note        [address] [facility]  Phone: [phone]  Office Hours: 8:30AM - 4:30PM  Monday - Friday       [facility]  MD Stanley Watts, DO [address]  [place], [place] [address]  PHONE: [phone]  FAX: [phone]    8/7/2020 6:32 AM  Subjective:   Admit Date: 7/24/2020  PCP: No primary care provider on file. Interval History: intubated  Anuric  On fentanyl    Diet: DIET TUBE FEED CONTINUOUS/CYCLIC NPO; Low Calorie High Protein (Vital High Protein); Orogastric; Continuous; 20; 70; 24      Data:   Scheduled Meds:   cefepime  2 g Intravenous Once per day on Mon Wed Fri    scopolamine  1 patch Transdermal Q72H    methylPREDNISolone  60 mg Intravenous Daily    vancomycin (VANCOCIN) intermittent dosing (placeholder)   Other RX Placeholder    metroNIDAZOLE  500 mg Intravenous Q8H    pantoprazole  40 mg Intravenous Daily    thiamine (VITAMIN B1) IVPB  200 mg Intravenous Q12H    chlorhexidine  15 mL Mouth/Throat BID    albuterol sulfate HFA  4 puff Inhalation Q4H    And    ipratropium  4 puff Inhalation Q4H    aspirin  324 mg Oral Once    sodium chloride flush  10 mL Intravenous BID    sodium chloride flush  10 mL Intravenous 2 times per day    [Held by provider] enoxaparin  1 mg/kg Subcutaneous Daily    nicotine  1 patch Transdermal Daily     Continuous Infusions:   norepinephrine Stopped (08/07/20 0512)    dextrose      fentanyl 25 mcg/hr (08/06/20 1758)    propofol 174.19 mcg/kg/min (08/07/20 0511)     PRN Meds:glucose, dextrose, glucagon (rDNA), dextrose, heparin (porcine), ipratropium-albuterol, iopamidol, sodium chloride flush, acetaminophen **OR** acetaminophen, polyethylene glycol, promethazine **OR** ondansetron, promethazine, diphenhydrAMINE, dicyclomine, morphine  I/O last 3 completed shifts:   In: 1325.7 [I.V.:185.7; NG/GT:840; IV Piggyback:300]  Out: -   I/O this FAILURE  HEROIN AND COCAINE USE  Ischemic hepatitis  encephalopathy     Suggest:  Remains anuric  Will continue HD 3x/wk, HD planned today                                     Electronically signed by Ashleigh Krause DO on 8/7/2020 at 6:32 AM    MD Edita Rivas DO [address]  [place], [place] [address]  PHONE: [phone]  FAX: [phone]

## 2020-08-07 NOTE — PROGRESS NOTES
Pulmonary and Critical Care  Progress Note      VITALS:  /83   Pulse 91   Temp 99.3 °F (37.4 °C) (Rectal)   Resp 27   Ht 5' 7.99\" (1.727 m)   Wt 154 lb 12.2 oz (70.2 kg)   SpO2 100%   BMI 23.54 kg/m²     Subjective:   CHIEF COMPLAINT :Fall with right hip pain     HPI:                The patient is a 60 y.o. male with significant past medical history of HTN,. Sacral decub, MSSA endocarditis, Septic PE, Hep C,  presents with complaints of fall and right hip pain. He was on the floor for more than 2 days. His CK is mildly elevated. He had mild lactic acidosis. His PBNPT is 11,000 with mildly elevated troponins. He is on 3 grams of Heroin per day. His U tocx was positive for Cocaine and Opiates. He had no fever, no headaches, no chest pain, no palpitations, no n/v, no diaphoresis. He has no sick exposure, no recent travel. His 1 blood culture set is positive for Staph. He is getting abx and IV fluids. He is on the vent and on HD     Objective:   PHYSICAL EXAM:    LUNGS:Occasional basal crackles  Abd-soft, BS+,NT  Ext - no pedal edema  CVS-s1s2, no murmurs      DATA:    CBC:  Recent Labs     08/05/20  0510 08/06/20 0414 08/07/20  0420   WBC 24.9* 28.1* 26.7*   RBC 2.91* 2.90* 2.74*   HGB 8.2* 8.2* 7.6*   HCT 26.3* 26.0* 24.7*    202 207   MCV 90.4 89.7 90.1   MCH 28.2 28.3 27.7   MCHC 31.2* 31.5* 30.8*   RDW 22.1* 22.4* 22.7*   SEGSPCT 89.9* 90.8* 91.3*      BMP:  Recent Labs     08/05/20  0510 08/06/20  0414 08/07/20  0420    135 136   K 4.8 4.4 4.9    99 99   CO2 18* 19* 16*   * 90* 141*   CREATININE 3.6* 2.6* 3.3*   CALCIUM 8.6 8.4 8.5   GLUCOSE 142* 151* 162*      ABG:  Recent Labs     08/05/20  0600   PH 7.30*   PO2ART 131*   [id number] 33.0   O2SAT 96.2     BNP  No results found for: BNP   D-Dimer:  No results found for: DDIMER   1.  Radiology: None      Assessment/Plan     Patient Active Problem List    Diagnosis Date Noted    [facility] 08/03/2020    MSSA bacteremia 07/28/2020    Ischemic bowel disease (HCC)     Leukocytosis     Sepsis ([facility] 07/24/2020   Patient is on the vent and sedated. He is off pressors. His H&H has been stable     Septic shock- improved  Hemorrhagic shock- improved  Acute hypoxic resp failure  Mild coagulopathy- improved  Shock Liver- improved  Lactic acidosis- improved  VDRF  Anemia likely GI bleed  ? Staph Aureus bacteremia  Severe Pulmonary HTN  Occult blood positive  JERAMIE likely sec to ATN  Bilateral Arm wounds  Leukocytosis  Hemoperitoneum       1. US guided paracentesis for fluid anlaysis  2. HD per renal  3. Abx  4. Tube feeds  5. Await Trach and PEG decision  6. Prognosis guarded  7. SAT and SBT trial daily as tolerared  8. C/w present management  No follow-ups on file.     Electronically signed by Venecia Iniguez MD on 8/7/2020 at 9:48 AM

## 2020-08-07 NOTE — ANESTHESIA PRE PROCEDURE
Department of Anesthesiology  Preprocedure Note       Name:  Georges Milner   Age:  60 y.o.  :  1955                                          MRN:  [id number]         Date:  2020      Surgeon: Jaquelin Rojas):  Alan Billings MD    Procedure: Procedure(s):  LAPAROTOMY EXPLORATORY    Medications prior to admission:   Prior to Admission medications    Medication Sig Start Date End Date Taking? Authorizing Provider   HYDROcodone-acetaminophen (NORCO) 5-325 MG per tablet Take 1-2 tablets by mouth every 4 hours as needed for Pain. 4/4/15   Donavon Andrews MD   methadone (METHADOSE) 40 MG disintegrating tablet Take 60 mg by mouth daily. Historical Provider, MD       Current medications:    No current facility-administered medications for this visit. No current outpatient medications on file.      Facility-Administered Medications Ordered in Other Visits   Medication Dose Route Frequency Provider Last Rate Last Dose    norepinephrine (LEVOPHED) 16 mg in dextrose 5% 250 mL infusion  2 mcg/min Intravenous Continuous Yanick Perkins MD   Stopped at 20 1212    glucose (GLUTOSE) 40 % oral gel 15 g  15 g Oral PRN Salina Dapilah, APRN - CNP        dextrose 50 % IV solution  12.5 g Intravenous PRN Salina Dapilah, APRN - CNP        glucagon (rDNA) injection 1 mg  1 mg Intramuscular PRN Salina Dapilah, APRN - CNP        dextrose 5 % solution  100 mL/hr Intravenous PRN Salina Dapilah, APRN - CNP        cefepime (MAXIPIME) 2 g IVPB minibag  2 g Intravenous Once per day on  Altha Habermann, MD   Stopped at 20 1456    scopolamine (TRANSDERM-SCOP) transdermal patch 1 patch  1 patch Transdermal Q72H Alison Gavin MD   1 patch at 20 0836    methylPREDNISolone sodium (SOLU-MEDROL) injection 60 mg  60 mg Intravenous Daily Rodríguez Westbrook, APRN - CNP   60 mg at 20 0755    vancomycin (VANCOCIN) intermittent dosing (placeholder)   Other RX Placeholder Altha Habermann, MD        heparin (porcine) injection 3,000 Units  3,000 Units Intravenous PRN Christelle Antoninakaroline Deshpande DO   3,000 Units at 07/30/20 0805    metronidazole (FLAGYL) 500 mg in NaCl 100 mL IVPB premix  500 mg Intravenous Wagner Perkins MD   Stopped at 08/06/20 1615    fentanyl (SUBLIMAZE) 1,000 mcg in sodium chloride 0.9% 100 mL infusion  25 mcg/hr Intravenous Continuous Ludy Alvarez MD 2.5 mL/hr at 08/06/20 1758 25 mcg/hr at 08/06/20 1758    pantoprazole (PROTONIX) injection 40 mg  40 mg Intravenous Daily Cindi Thakur MD   40 mg at 08/06/20 0756    thiamine (B-1) 200 mg in sodium chloride 0.9 % 100 mL IVPB  200 mg Intravenous Q12H Cecelia Hernández MD   Stopped at 08/06/20 1209    propofol injection  10 mcg/kg/min Intravenous Titrated Cecelia Hernández MD 3.5 mL/hr at 08/06/20 1758 10 mcg/kg/min at 08/06/20 1758    ipratropium-albuterol (DUONEB) nebulizer solution 1 ampule  1 ampule Inhalation Q4H PRN Vera Villavicencio MD        chlorhexidine (PERIDEX) 0.12 % solution 15 mL  15 mL Mouth/Throat BID Ludy Alvarez MD   15 mL at 08/06/20 0755    albuterol sulfate  (90 Base) MCG/ACT inhaler 4 puff  4 puff Inhalation Q4H Ludy Alvarez MD   4 puff at 08/06/20 1953    And    ipratropium (ATROVENT HFA) 17 MCG/ACT inhaler 4 puff  4 puff Inhalation Q4H Ludy Alvarez MD   4 puff at 08/06/20 1953    aspirin chewable tablet 324 mg  324 mg Oral Once Vera Villavicencio MD        iopamidol (ISOVUE-370) 76 % injection 80 mL  80 mL Intravenous ONCE PRN Vera Villavicencio MD        sodium chloride flush 0.9 % injection 10 mL  10 mL Intravenous BID Monica Alexander MD   10 mL at 08/06/20 0757    sodium chloride flush 0.9 % injection 10 mL  10 mL Intravenous 2 times per day Vera Villavicencio MD   10 mL at 08/06/20 0756    sodium chloride flush 0.9 % injection 10 mL  10 mL Intravenous PRN Vera Villavicencio MD        acetaminophen (TYLENOL) tablet 650 mg  650 mg Oral Q6H PRN Vera Villavicencio MD        Or    acetaminophen (TYLENOL) suppository 650 mg  650 mg Rectal Q6H PRN Alan Billings MD        polyethylene glycol (GLYCOLAX) packet 17 g  17 g Oral Daily PRN Alan Billings MD        promethazine (PHENERGAN) tablet 12.5 mg  12.5 mg Oral Q6H PRN Alan Billings MD        Or    ondansetron (ZOFRAN) injection 4 mg  4 mg Intravenous Q6H PRN Alan Billings MD   4 mg at 07/25/20 0417    [Held by provider] enoxaparin (LOVENOX) injection 60 mg  1 mg/kg Subcutaneous Daily Alan Billings MD   Stopped at 07/30/20 0900    promethazine (PHENERGAN) tablet 25 mg  25 mg Oral Q6H PRN Alan Billings MD        nicotine (NICODERM CQ) 21 MG/24HR 1 patch  1 patch Transdermal Daily Monica Alexander MD   1 patch at 08/06/20 0757    diphenhydrAMINE (BENADRYL) injection 12.5 mg  12.5 mg Intravenous Nightly PRN Monica Alexander MD   12.5 mg at 07/25/20 1867    dicyclomine (BENTYL) tablet 20 mg  20 mg Oral Q6H PRN Alan Billings MD        morphine injection 2 mg  2 mg Intravenous Q4H PRN Alan Billings MD   2 mg at 07/25/20 1104       Allergies:  No Known Allergies    Problem List:    Patient Active Problem List   Diagnosis Code    [facility] A41.9    Leukocytosis D72.829    Ischemic bowel disease ([facility] K55.9    MSSA bacteremia R78.81    Chronic hepatitis C without hepatic coma ([facility] B18.2       Past Medical History:        Diagnosis Date    Drug abuse and dependence ([facility]     Hypertension        Past Surgical History:        Procedure Laterality Date    BACK SURGERY      IR NONTUNNELED VASCULAR CATHETER  7/26/2020    IR NONTUNNELED VASCULAR CATHETER 7/26/2020 [facility] SPECIAL PROCEDURES    LAPAROTOMY N/A 7/25/2020    LAPAROTOMY EXPLORATORY performed by Alan Billings MD at [facility] OR       Social History:    Social History     Tobacco Use    Smoking status: Current Every Day Smoker     Packs/day: 1.50     Types: Cigarettes   Substance Use Topics    Alcohol use:  No                                Ready to quit: Not Answered  Counseling given: Not Answered      Vital Signs (Current): There were no vitals filed for this visit. BP Readings from Last 3 Encounters:   08/06/20 129/83   07/25/20 111/88       NPO Status:                                                                                 BMI:   Wt Readings from Last 3 Encounters:   08/06/20 155 lb 10.3 oz (70.6 kg)     There is no height or weight on file to calculate BMI.    CBC:   Lab Results   Component Value Date    WBC 28.1 08/06/2020    RBC 2.90 08/06/2020    HGB 8.2 08/06/2020    HCT 26.0 08/06/2020    MCV 89.7 08/06/2020    RDW 22.4 08/06/2020     08/06/2020       CMP:   Lab Results   Component Value Date     08/06/2020    K 4.4 08/06/2020    CL 99 08/06/2020    CO2 19 08/06/2020    BUN 90 08/06/2020    CREATININE 2.6 08/06/2020    GFRAA 30 08/06/2020    LABGLOM 25 08/06/2020    GLUCOSE 151 08/06/2020    PROT 6.2 08/06/2020    PROT 8.4 11/06/2010    CALCIUM 8.4 08/06/2020    BILITOT 1.1 08/06/2020    ALKPHOS 80 08/06/2020    AST 35 08/06/2020    ALT <5 08/06/2020       POC Tests:   Recent Labs     08/06/20  1816   POCGLU 176*       Coags:   Lab Results   Component Value Date    PROTIME 12.8 08/06/2020    INR 1.06 08/06/2020    APTT 22.3 08/06/2020       HCG (If Applicable): No results found for: PREGTESTUR, PREGSERUM, HCG, HCGQUANT     ABGs:   Lab Results   Component Value Date    PO2ART 131 08/05/2020    KLW5YOS 33.0 08/05/2020    ABA7ECO 16.2 08/05/2020        Type & Screen (If Applicable):  No results found for: [facility]    Drug/Infectious Status (If Applicable):  Lab Results   Component Value Date    HEPCAB REPEATEDLY REACTIVE 07/27/2020       COVID-19 Screening (If Applicable): No results found for: COVID19      Anesthesia Evaluation  Patient summary reviewed no history of anesthetic complications:   Airway: Mallampati: II  TM distance: >3 FB   Neck ROM: limited  Comment: Unable to access, ventilated .  Copy from prior chart Mouth opening: < 3 FB Dental:          Pulmonary:   (+) shortness of breath:  decreased breath sounds,  current smoker                          ROS comment: ventilated   Cardiovascular:    (+) hypertension:,          Beta Blocker:  Not on Beta Blocker         Neuro/Psych:                ROS comment: Hx of drug abuse Positive cocaine, heroin GI/Hepatic/Renal:   (+) liver disease:,          ROS comment: Hep c.   Endo/Other:                     Abdominal:           Vascular:                                          Anesthesia Plan      general     ASA 4     ( Pre Anesthesia Assessment complete. Chart reviewed on 8/6/2020)  Induction: intravenous. MIPS: Postoperative ventilation.                       MARLENI Gonzalez - CRNA   8/6/2020

## 2020-08-07 NOTE — PROGRESS NOTES
[address] [place] [address]  HOSPITALIST PROGRESS NOTE                       Name:  Juan Montelongo /Age/Sex: 1955  (60 y.o. male)   MRN & CSN:  [id number] & [id number] Admission Date/Time: 2020  3:22 PM   Location:  - Attending:  Doug Sandifer, MD                                                  HPI  Juan Montelongo is a 60 y.o. male who was admitted on  with sepsis    SUBJECTIVE  Sedated and not responsive, off levophed when seen    ROS- unable to complete as patient not able to participate at this time      ALLERGIES: No Known Allergies    PCP: No primary care provider on file. PAST MEDICAL HISTORY, SURGICAL HISTORY, SOCIAL HISTORY and  HOME MEDICATIONS all reviewed. OBJECTIVE  Vitals:    20 0700 20 0800 20 0900 20 1000   BP:  (!) 135/45     Pulse: 89 87 86 86   Resp: 29 29 24 26   Temp:  96.8 °F (36 °C)     TempSrc:  Rectal     SpO2: 100% 100% 100% 100%   Weight:       Height:           PHYSICAL EXAM   GEN sedated  EYES Pupils are equally round. No scleral erythema, discharge, or conjunctivitis. HENT Mucous membranes are dry. Oral pharynx without exudates, no evidence of thrush. NECK Supple, no apparent thyromegaly or masses. RESP unlabored respiration, decreased breath sounds at the bases  CARDIO/VASC S1/S2 auscultated. Regular rate without appreciable murmurs, rubs, or gallops. No JVD or carotid bruits. Peripheral pulses equal bilaterally and palpable. GI Abdomen is soft- further exam limited due to sedation  HEME/LYMPH No palpable cervical lymphadenopathy and no hepatosplenomegaly. No petechiae or ecchymoses. SKIN Normal coloration, warm, dry.   NEURO Sedated    INTAKE: In: 1508.4 [I.V.:272.6; NG/GT:840]  Out: -   OUTPUT: In: 1508.4   Out: -     LABS  Recent Labs     20  0510 20  0414 20  0420   WBC 24.9* 28.1* 26.7*   HGB 8.2* 8.2* 7.6*   HCT 26.3* 26.0* 24.7*    202 207      Recent Labs     20  0510 20  0414 08/07/20  0420    135 136   K 4.8 4.4 4.9    99 99   CO2 18* 19* 16*   * 90* 141*   CREATININE 3.6* 2.6* 3.3*     Recent Labs     08/05/20  0510 08/06/20  0414 08/07/20  0420   AST 33 35 30   ALT <5* <5* <5*   BILITOT 1.0 1.1* 1.1*   ALKPHOS 82 80 71     Recent Labs     08/05/20  0510 08/06/20  0414 08/07/20  0600   INR 1.13 1.06 1.02     Recent Labs     08/05/20  0510 08/06/20  0414 08/07/20  0420   CKTOTAL 42 50 61          Abnormal labs for today noted      Imaging:     ECHO:    Microbiology:  Blood culture:    Urine culture:    Sputum culture:    Procedures done this admission:    MEDS  Scheduled Meds:   vancomycin  1,000 mg Intravenous Once    cefepime  2 g Intravenous Once per day on Mon Wed Fri    scopolamine  1 patch Transdermal Q72H    methylPREDNISolone  60 mg Intravenous Daily    vancomycin (VANCOCIN) intermittent dosing (placeholder)   Other RX Placeholder    metroNIDAZOLE  500 mg Intravenous Q8H    pantoprazole  40 mg Intravenous Daily    thiamine (VITAMIN B1) IVPB  200 mg Intravenous Q12H    chlorhexidine  15 mL Mouth/Throat BID    albuterol sulfate HFA  4 puff Inhalation Q4H    And    ipratropium  4 puff Inhalation Q4H    aspirin  324 mg Oral Once    sodium chloride flush  10 mL Intravenous BID    sodium chloride flush  10 mL Intravenous 2 times per day    [Held by provider] enoxaparin  1 mg/kg Subcutaneous Daily    nicotine  1 patch Transdermal Daily     Continuous Infusions:   norepinephrine Stopped (08/07/20 0512)    dextrose      fentanyl 25 mcg/hr (08/06/20 1758)    propofol 25 mcg/kg/min (08/07/20 0910)     PRN Meds:glucose, dextrose, glucagon (rDNA), dextrose, heparin (porcine), ipratropium-albuterol, iopamidol, sodium chloride flush, acetaminophen **OR** acetaminophen, polyethylene glycol, promethazine **OR** ondansetron, promethazine, diphenhydrAMINE, dicyclomine, morphine        ASSESSMENT and [facility] Day: 15    1-Septic shock with MSSA bacteremia due to wound-off pressors- repeat blood culture negative, one set growing bacilli- ?contaminant- ID on the case- noted still with significant leukocytosis which is worse- defer abx to ID  2-Probable staph UTI- abx per ID  3-Acute on chronic respiratory failure due to sepsis and suspected aspiration pneumonia- still vent dependent as of 7/25- with severe pulm HTN with RVSP of 67  4-JERAMIE due to ATN- dialysis per nephro  5-Skin wounds/right post thigh/buttock and bilateral UE- wound culture growing MSSA- surgery and ID following  6-Pneumatosis intestinalis s/p ex-lap on 7/25 negative for ischemic bowel, however CT on 7/31 showing hemoperitoneum and pneumoperitoneum- due to leukocytosis- repeated CTs which are stable. 7-Acute blood loss anemia due to above- s/p transfusion- monitor H/H  8-Destructive lesion T12-L1- possible prior infectious spondyliits  -no abscess or osteo as per imaging  9-?low DIC state with mildly low fibrinogen and platelets- noted hematology following- repeat levels again    Other issues  -Hx of gastric cancer stage 4  -polysubstance abuse- UDS positive for cocaine and opiate  -documented hep C    No pharmacological dvt prophylaxis due to retroperitoneal hemorrhage with acute blood loss anemia    Ongoing discussions with son- tried to call him today but he did not answer, however, nurse did talk to him and verbalized to nurse that he is leaning towards comfort care- will come this afternoon to make that decision       Disp:     Diet DIET TUBE FEED CONTINUOUS/CYCLIC NPO; Low Calorie High Protein (Vital High Protein);  Orogastric; Continuous; 20; 70; 24   DVT Prophylaxis [] Lovenox, []  Heparin, [] SCDs, [] Ambulation   GI Prophylaxis [] PPI,  [] H2 Blocker,  [] Carafate,  [] Diet/Tube Feeds   Code Status Full Code   Disposition Patient requires continued admission due to sepsis, respiratory failure   CMS Level of Risk [] Low, [] Moderate,[x]  High  Patient's risk as above due to sepsis

## 2020-08-07 NOTE — PROGRESS NOTES
HD treatment at bedside for 3 hours, no fluid removed. Patient tolerated treatment fair, levophed started and titrated up to assist with hypotension. Retacrit 10,000 units IVP given during treatment. CVC flushed with saline, heparin locked, and capped. Patient Name: Rishi Grossman  Patient : 1955  MRN: [id number]     Acct: [de-identified]  Date of Admission: 2020  Room/Bed: [address] [place] [address]  Code Status:  Full Code  Allergies: No Known Allergies  Diagnosis:    Patient Active Problem List   Diagnosis    [facility]    Leukocytosis    Ischemic bowel disease [facility]    MSSA bacteremia    Chronic hepatitis C without hepatic coma ([facility]         Treatment:  Hemodialysis 1:1  Priority: Routine  Location: ICU    Diabetic: No  NPO: No  Isolation Precautions: Dialysis     Consent for Treatment Verified: Yes  Blood Consent Verified: Not Applicable     Safety Verified: Identify (I), Consent (C), Equipment (E), HepB Status (B), Orders Complete (O), Access Verified (A) and Timeliness (T)  Time out performed prior to access at 1820 hours. Report Received from Primary RN at 1730 hours.   Primary RN (First Initial, Last Name, Title): Curt Mackay RN  Incapacitated Nurse Education Completed: Not Applicable     HBsAg ONLY:  Date Drawn: 2020       Results: Negative  HBsAb:  Date Drawn:  2020       Results: Susceptible <10    Order  Dialysis Bath  K+ (Potassium): 2  Ca+ (Calcium): 2.5  Na+ (Sodium): 138  HCO3 (Bicarb): 40     Na+ Modeling: Not Applicable  Dialyzer: W088  Dialysate Temperature (C):  36  Blood Flow Rate (BFR):  300   Dialysate Flow Rate (DFR):   800        Access to be Utilized   Access: Non-tunneled Catheter  Location: Femoral  Side: Right   Needle gauge:  Not Applicable  + Bruit/Thrill: Not Applicable    First Use X-ray Verified: Yes  OK to use line order: Yes    Site Assessment:  Signs and Symptoms of Infection/Inflammation: None  If yes: Not Applicable  Dressing: Dry and Intact  Site Prep: Medical Aseptic Technique  Dressing Changed this Treatment: Yes  If yes, by whom: Ana Mcfarlane RN  Date of Last Dressing Change: Not Applicable  Antimicrobial Patch in place?: Yes  Blue Caps in place?: Yes  Gauze Dressing?: No  Non Dialysis Use?: No  Comment:    Flows: Good, Patent  If access problem, who was notified:     Pre and Post-Assessment  Patient Vitals for the past 8 hrs:   Level of Consciousness Heart Rhythm Respiratory Quality/Effort O2 Device Bilateral Breath Sounds Skin Color Skin Condition/Temp Abdomen Inspection Edema Edema Generalized RUE Edema LUE Edema RLE Edema LLE Edema Pain Level   08/07/20 1600 2 -- -- Ventilator -- -- -- -- -- -- -- -- -- -- 0   08/07/20 1602 2 -- Unlabored -- -- Appropriate for ethnicity Dry; Warm Flat;Other (Comment) Right lower extremity; Left lower extremity;Right upper extremity; Left upper extremity -- +3;Pitting +3;Pitting +3;Pitting +3;Pitting --   08/07/20 1800 3 Regular Unlabored Ventilator Diminished Appropriate for ethnicity Warm;Dry -- Generalized +3 -- -- -- -- 0   08/07/20 1945 2 -- Unlabored Ventilator -- -- -- Flat;Other (Comment) Right lower extremity; Left lower extremity;Right upper extremity; Left upper extremity -- +3;Pitting +3;Pitting +3;Pitting +3;Pitting 0   08/07/20 2129 2 -- Unlabored -- -- -- -- Flat;Other (Comment) Right lower extremity; Left lower extremity;Right upper extremity; Left upper extremity -- +3;Pitting +3;Pitting +3;Pitting +3;Pitting --   08/07/20 2136 2 Regular Unlabored Ventilator -- -- Warm;Dry -- Right lower extremity; Left lower extremity;Right upper extremity; Left upper extremity -- -- -- -- -- --     Labs  Recent Labs     08/05/20  0510 08/06/20  0414 08/07/20  0420   WBC 24.9* 28.1* 26.7*   HGB 8.2* 8.2* 7.6*   HCT 26.3* 26.0* 24.7*    202 207                                                                  Recent Labs     08/05/20  0510 08/06/20  0414 08/07/20  0420    135 136   K 4.8 4.4 4.9    99 99   CO2 18* 19* 16*   * 90* 141*   CREATININE 3.6* 2.6* 3.3*   GLUCOSE 142* 151* 162*     IV Drips and Rate/Dose   norepinephrine 12 mcg/min (08/07/20 2156)    dextrose      fentanyl 25 mcg/hr (08/06/20 1758)    propofol 15 mcg/kg/min (08/07/20 1253)      Safety - Before each treatment:   Dialysis Machine No.: [id number]  RO Machine No.: [id number]  Dialyzer Lot No.: [id number]  RO Machine Log Sheet Completed: Yes  Machine Alarm Self Test: Completed; Passed (08/07/20 1800)  Machine Autotest: Completed, Passed  Air Foam Detector: Tested, Proper Function  Extracorporeal Circuit Tested for Integrity: Yes  Machine Conductivity: 13.7, 13.7, 13.7  Manual Conductivity: 13.6, 13.4, 13.7     Bicarbonate Concentrate Lot No.: [id number]  Acid Concentrate Lot No.: [id number]  Manual Ph: 7.5, 7.5, 7.5  Bleach Test (Neg): Yes  Bath Temperature: 96.8 °F (36 °C)  Tubing Lot#: [id number]  Conductivity Meter Serial #: [id number]  All Connections Secure?: Yes  Venous Parameters Set?: Yes  Arterial Parameters Set?: Yes  Saline Line Double Clamped?: Yes  Air Foam Detector Engaged?: Yes  Machine Functioning Alarm Free?  Yes  Prime Given: 200ml    Chlorine Testing - Before each treatment and every 4 hours:   Treatment  Treatment Number: 1  Time On: 9365  Time Off: 2129  Treatment Goal: 0  Weight: 154 lb 12.2 oz (70.2 kg) (08/07/20 0516)  1st check: less than 0.1 ppm at: 1815 hours  2nd check: less than 0.1 ppm at: 2020 hours  3rd check: Not Applicable  (if greater than 0.1 ppm, then check every 30 minutes from secondary)    Access Flows and Pressures  Patient Vitals for the past 8 hrs:   ABP (Arterial line BP) Blood Flow Rate (ml/min) Ultrafiltration Rate (ml/hr) Ultrafiltration Total Arterial Pressure (mmHg) Venous Pressure (mmHg) TMP Hemodialysis Conductivity DFR Comments Access Visible   08/07/20 1500 112/58 -- -- -- -- -- -- -- -- -- --   08/07/20 1600 103/55 -- -- -- -- -- -- -- -- -- --   08/07/20 1800 108/54 -- -- -- -- -- -- -- -- Entered room. Patient non responsive to verbal and tactile stimuli. Will monitor. --   08/07/20 1815 -- -- -- -- -- -- -- -- -- Report from primary RN. --   08/07/20 1828 119/56 300 ml/min 170 ml/hr 0 ml -120 mmHg 100 50 13.6 800 Treatment initiated, prime given Yes   08/07/20 1845 104/53 300 ml/min 170 ml/hr 60 ml -150 mmHg 100 50 92.2 522 no complications Yes   08/32/56 1900 (!) 81/48 300 ml/min 0 ml/hr 40 ml -160 mmHg 100 40 13.6 800 low blood pressure reported to dr Pam Hernandez. Yes   08/07/20 1915 (!) 76/44 250 ml/min 0 ml/hr 89 ml -140 mmHg 80 40 13.6 800 Dr. Merritt Nagy notified of low b/p. MD ordered Levophed gtt.    Yes   08/07/20 1920 -- -- -- -- -- -- -- -- -- 200ml NS bolus --   08/07/20 1930 116/61 300 ml/min 200 ml/hr 98 ml -180 mmHg 100 50 13.6 800 primary RN at bedside Yes   08/07/20 1945 101/57 300 ml/min 200 ml/hr 150 ml -160 mmHg 110 40 13.6 800 Bicarb Jug change ph 7.5 conductivity 13.6 Yes   08/07/20 2000 (!) 87/53 300 ml/min 200 ml/hr 200 ml -160 mmHg 110 50 13.6 800 -- Yes   08/07/20 2015 91/55 300 ml/min 200 ml/hr 262 ml -160 mmHg 110 40 13.7 800 no signs of distress noted Yes   08/07/20 2030 97/57 300 ml/min 200 ml/hr 306 ml -150 mmHg 110 50 13.7 800 no change Yes   08/07/20 2045 (!) 85/53 300 ml/min 200 ml/hr 347 ml -50 mmHg 110 50 13.7 800 no change Yes   08/07/20 2100 116/64 300 ml/min 200 ml/hr 397 ml -150 mmHg 120 40 13.7 800 levophed increased, abp improving Yes   08/07/20 2115 (!) 164/91 300 ml/min 200 ml/hr 447 ml -150 mmHg 110 -150 13.7 800 abp increase, levophed titrated down by nurse Yes   08/07/20 2129 158/75 -- -- -- -- -- -- -- -- -- --   08/07/20 2130 170/80 -- -- -- -- -- -- -- -- -- --   08/07/20 2136 165/75 -- -- -- -- -- -- -- -- -- --     Vital Signs  Patient Vitals for the past 8 hrs:   BP Temp Pulse Resp SpO2   08/07/20 1500 -- -- 104 29 100 %   08/07/20 1600 (!) 103/55 98.2 °F (36.8 °C) 105 30 100 %   08/07/20 1800 (!) 108/54 98.6 °F (37 °C) 101 27 100 %   08/07/20 1828 (!) 119/56 -- 101 30 100 %   08/07/20 1845 (!) 104/53 -- 104 -- --   08/07/20 1900 (!) 81/48 -- 108 -- --   08/07/20 1915 (!) 76/44 -- 106 -- --   08/07/20 1930 112/60 -- 105 -- --   08/07/20 1945 (!) 101/57 98.2 °F (36.8 °C) 106 (!) 31 100 %   08/07/20 2000 (!) 87/53 -- 108 (!) 31 100 %   08/07/20 2015 (!) 87/53 -- 107 29 100 %   08/07/20 2030 (!) 91/55 -- 109 30 100 %   08/07/20 2045 -- -- 107 29 100 %   08/07/20 2100 -- -- 106 29 100 %   08/07/20 2106 -- -- -- 29 100 %   08/07/20 2109 -- -- -- 28 100 %   08/07/20 2115 -- -- 109 30 100 %   08/07/20 2129 -- -- 103 30 100 %   08/07/20 2130 -- -- 101 30 100 %   08/07/20 2136 -- -- 102 27 100 %     Post-Dialysis  Arterial Catheter Locking Solution: Heparin (1000units:1ml)    Volume (ml): 1.5  Venous Catheter Locking Solution: Heparin (1000units:1ml)    Volume (ml): 1.5  Post-Treatment Procedures: Blood returned, Catheter capped, clamped and heparinized x 2 ports  Machine Disinfection Process: Exterior Machine Disinfection, Acid/Vinegar Clean, Heat Disinfect  Rinseback Volume (ml): 300 ml  Total Liters Processed (l/min): 49.3 l/min  Dialyzer Clearance: Moderately streaked  Duration of Treatment (minutes): 180 minutes     Hemodialysis Intake (ml): 500 ml  Hemodialysis Output (ml): 500 ml  NET Removed (ml): 0 ml  Tolerated Treatment: Fair             Provider Notification        Handoff complete and report given to Primary RN at 2145 hours.   Primary RN (First Initial, Last Name, Title):  Flip Daley RN     Education  Person Educated: Patient   Knowledge Base: Substantial  Barriers to Learning?: None  Preferred method of Learning: Hands-on  Topic(s): Access Care, Signs and Symptoms of Infection, Fluid Management, Albumin, Procedural, Medications, Treatment Options, Potassium, Diet and Transplant   Teaching Tools: Video, Handout, Demonstration and Explanation   Response to Education: Verbalized Understanding, Return Demonstration, Teach Back and Requires Follow-up Electronically signed by Shannon Phelps RN on 8/7/2020 at 10:02 PM

## 2020-08-07 NOTE — PROGRESS NOTES
Comprehensive Nutrition Assessment    Type and Reason for Visit:  Reassess    Nutrition Recommendations/Plan:   · Please restart EN should the pt family want an PEG placed    Nutrition Assessment:  Pt EN was at goal until is was stopped for a family meeting later today for decisions on goals of care. Should the pt have a trach and PEG, please restart EN    Malnutrition Assessment:  Malnutrition Status:  Insufficient data    Context:  Acute Illness       Estimated Daily Nutrient Needs:  Energy (kcal):  1682; Weight Used for Energy Requirements:  Current     Protein (g):  140-175(2.0-2.5 g/kg); Weight Used for Protein Requirements:  Ideal        Fluid (ml/day):  (fluid management per nephrology); Weight Used for Fluid Requirements:  (fluid management per nephrology)      Wounds:  Multiple, Pressure Ulcer, Stage II(+unstageable pressure ulcer noted)       Current Nutrition Therapies:    DIET TUBE FEED CONTINUOUS/CYCLIC NPO; Low Calorie High Protein (Vital High Protein); Orogastric; Continuous; 20; 70; 24    Anthropometric Measures:  · Height: 5' 7.99\" (172.7 cm)  · Current Body Weight: 154 lb (69.9 kg)   · Admission Body Weight: 149 lb 4 oz (67.7 kg)    · Ideal Body Weight: 154 lbs; % Ideal Body Weight 95.9 %   · BMI: 23.4  · BMI Categories: Normal Weight (BMI 18.5-24. 9)       Nutrition Diagnosis:   · Inadequate oral intake related to impaired respiratory funtion as evidenced by intubation      Nutrition Interventions:   Food and/or Nutrient Delivery:  Continue Current Tube Feeding  Nutrition Education/Counseling:  No recommendation at this time   Coordination of Nutrition Care:  Continued Inpatient Monitoring    Goals:  pt will meet greater than 75% of estimated nutrient needs via EN       Nutrition Monitoring and Evaluation:   Food/Nutrient Intake Outcomes:  Enteral Nutrition Intake/Tolerance  Physical Signs/Symptoms Outcomes:  Biochemical Data, Weight, GI Status, Hemodynamic Status     Discharge Planning:     Too soon to determine     Electronically signed by Edgar Jordan RD, LD on 7/2/30 at 12:19 PM EDT    Contact: [phone]

## 2020-08-07 NOTE — PROGRESS NOTES
[address]  consulted by Dr. Danny Francis for monitoring and adjustment. Indication for treatment: MSSA bacteremia, GPB in blood   Goal trough: 10-15 mcg/mL     Pertinent Laboratory Values:   Temp Readings from Last 3 Encounters:   08/07/20 96.8 °F (36 °C) (Rectal)   07/25/20 (!) 62.3 °F (16.8 °C)     Recent Labs     08/05/20  0510 08/06/20  0414 08/07/20  0420   WBC 24.9* 28.1* 26.7*     Recent Labs     08/05/20  0510 08/06/20  0414 08/07/20  0420   * 90* 141*   CREATININE 3.6* 2.6* 3.3*     Estimated Creatinine Clearance: 22 mL/min (A) (based on SCr of 3.3 mg/dL (H)). Intake/Output Summary (Last 24 hours) at 8/7/2020 1006  Last data filed at 8/7/2020 0511  Gross per 24 hour   Intake 1508.42 ml   Output --   Net 1508.42 ml       Pertinent Cultures:  Date    Source    Results  7/24   Blood    MSSA  7/27   Blood    Bacillus (1 of 2)  7/29   Blood    NGTD   8/5   Sputum   Yeast     Vancomycin level:   TROUGH:    No results for input(s): VANCOTROUGH in the last 72 hours.   RANDOM:    Recent Labs     08/05/20  0510 08/07/20  0600   VANCORANDOM 17.5 11.3       Assessment:  · WBC and temperature: WBC remains elevated (receiving methylprednisolone), afebrile  · SCr, BUN, and urine output:   · CRRT stopped- transition to HD Mon/Wed/Fri   · Days of therapy: 15  · Vancomycin level: 11.3, ok to re-dose    Plan:  · Continue intermittent dosing based on levels 2/2 RRT   · Received vancomycin 1000 mg on 7/30 (9 days ago)  · Levels have cleared very slowly since CRRT stopped  · Pre-HD level is 11.3   · Will re-dose with 1000 mg x 1 and repeat level Mon AM   · Pharmacy will continue to monitor patient and adjust therapy as indicated    [address] 8/10 @06:00    Thank you for the consult,  Kayce Campos, [address]  8/7/2020 10:06 AM

## 2020-08-08 ENCOUNTER — APPOINTMENT (OUTPATIENT)
Dept: GENERAL RADIOLOGY | Age: 65
DRG: 004 | End: 2020-08-08
Payer: MEDICARE

## 2020-08-08 LAB
ALBUMIN SERPL-MCNC: 2.4 GM/DL (ref 3.4–5)
ALP BLD-CCNC: 73 IU/L (ref 40–128)
ALT SERPL-CCNC: <5 U/L (ref 10–40)
ANION GAP SERPL CALCULATED.3IONS-SCNC: 18 MMOL/L (ref 4–16)
APTT: 26 SECONDS (ref 25.1–37.1)
AST SERPL-CCNC: 37 IU/L (ref 15–37)
BASE EXCESS: 1 (ref 0–3.3)
BASE EXCESS: 1 (ref 0–3.3)
BASOPHILS ABSOLUTE: 0 K/CU MM
BASOPHILS RELATIVE PERCENT: 0.1 % (ref 0–1)
BILIRUB SERPL-MCNC: 1.3 MG/DL (ref 0–1)
BUN BLDV-MCNC: 89 MG/DL (ref 6–23)
CALCIUM SERPL-MCNC: 8 MG/DL (ref 8.3–10.6)
CARBON MONOXIDE, BLOOD: 2.4 % (ref 0–5)
CARBON MONOXIDE, BLOOD: 2.7 % (ref 0–5)
CHLORIDE BLD-SCNC: 93 MMOL/L (ref 99–110)
CO2 CONTENT: 20.8 MMOL/L (ref 19–24)
CO2 CONTENT: 24.6 MMOL/L (ref 19–24)
CO2: 23 MMOL/L (ref 21–32)
COMMENT: ABNORMAL
COMMENT: ABNORMAL
CREAT SERPL-MCNC: 2.2 MG/DL (ref 0.9–1.3)
CRYOGLOBULIN: NORMAL
DIFFERENTIAL TYPE: ABNORMAL
EOSINOPHILS ABSOLUTE: 0 K/CU MM
EOSINOPHILS RELATIVE PERCENT: 0 % (ref 0–3)
GFR AFRICAN AMERICAN: 37 ML/MIN/1.73M2
GFR NON-AFRICAN AMERICAN: 30 ML/MIN/1.73M2
GLUCOSE BLD-MCNC: 116 MG/DL (ref 70–99)
GLUCOSE BLD-MCNC: 120 MG/DL (ref 70–99)
GLUCOSE BLD-MCNC: 181 MG/DL (ref 70–99)
GLUCOSE BLD-MCNC: 211 MG/DL (ref 70–99)
HCO3 ARTERIAL: 20.1 MMOL/L (ref 18–23)
HCO3 ARTERIAL: 23.5 MMOL/L (ref 18–23)
HCT VFR BLD CALC: 23.2 % (ref 42–52)
HEMOGLOBIN: 7.6 GM/DL (ref 13.5–18)
HIGH SENSITIVE C-REACTIVE PROTEIN: 12.1 MG/L
IMMATURE NEUTROPHIL %: 1.2 % (ref 0–0.43)
INR BLD: 1.07 INDEX
LYMPHOCYTES ABSOLUTE: 0.7 K/CU MM
LYMPHOCYTES RELATIVE PERCENT: 3 % (ref 24–44)
MCH RBC QN AUTO: 28.6 PG (ref 27–31)
MCHC RBC AUTO-ENTMCNC: 32.8 % (ref 32–36)
MCV RBC AUTO: 87.2 FL (ref 78–100)
METHEMOGLOBIN ARTERIAL: 1 %
METHEMOGLOBIN ARTERIAL: 1.1 %
MONOCYTES ABSOLUTE: 1.3 K/CU MM
MONOCYTES RELATIVE PERCENT: 5.4 % (ref 0–4)
NUCLEATED RBC %: 0.1 %
O2 SATURATION: 95.9 % (ref 96–97)
O2 SATURATION: 96.9 % (ref 96–97)
PCO2 ARTERIAL: 23 MMHG (ref 32–45)
PCO2 ARTERIAL: 37 MMHG (ref 32–45)
PDW BLD-RTO: 22.6 % (ref 11.7–14.9)
PH BLOOD: 7.41 (ref 7.34–7.45)
PH BLOOD: 7.55 (ref 7.34–7.45)
PLATELET # BLD: 227 K/CU MM (ref 140–440)
PMV BLD AUTO: 10.7 FL (ref 7.5–11.1)
PO2 ARTERIAL: 111 MMHG (ref 75–100)
PO2 ARTERIAL: 179 MMHG (ref 75–100)
POTASSIUM SERPL-SCNC: 3.9 MMOL/L (ref 3.5–5.1)
PROCALCITONIN: 1.91
PROTHROMBIN TIME: 12.9 SECONDS (ref 11.7–14.5)
RBC # BLD: 2.66 M/CU MM (ref 4.6–6.2)
SEGMENTED NEUTROPHILS ABSOLUTE COUNT: 20.8 K/CU MM
SEGMENTED NEUTROPHILS RELATIVE PERCENT: 90.3 % (ref 36–66)
SODIUM BLD-SCNC: 134 MMOL/L (ref 135–145)
TOTAL CK: 73 IU/L (ref 38–174)
TOTAL IMMATURE NEUTOROPHIL: 0.28 K/CU MM
TOTAL NUCLEATED RBC: 0 K/CU MM
TOTAL PROTEIN: 5.8 GM/DL (ref 6.4–8.2)
WBC # BLD: 23.1 K/CU MM (ref 4–10.5)

## 2020-08-08 PROCEDURE — 87086 URINE CULTURE/COLONY COUNT: CPT

## 2020-08-08 PROCEDURE — 2700000000 HC OXYGEN THERAPY PER DAY

## 2020-08-08 PROCEDURE — 94003 VENT MGMT INPAT SUBQ DAY: CPT

## 2020-08-08 PROCEDURE — 37799 UNLISTED PX VASCULAR SURGERY: CPT

## 2020-08-08 PROCEDURE — 6360000002 HC RX W HCPCS: Performed by: SPECIALIST

## 2020-08-08 PROCEDURE — 2500000003 HC RX 250 WO HCPCS: Performed by: PHYSICIAN ASSISTANT

## 2020-08-08 PROCEDURE — 85025 COMPLETE CBC W/AUTO DIFF WBC: CPT

## 2020-08-08 PROCEDURE — 89220 SPUTUM SPECIMEN COLLECTION: CPT

## 2020-08-08 PROCEDURE — 2580000003 HC RX 258: Performed by: INTERNAL MEDICINE

## 2020-08-08 PROCEDURE — 85730 THROMBOPLASTIN TIME PARTIAL: CPT

## 2020-08-08 PROCEDURE — 6360000002 HC RX W HCPCS: Performed by: INTERNAL MEDICINE

## 2020-08-08 PROCEDURE — 71045 X-RAY EXAM CHEST 1 VIEW: CPT

## 2020-08-08 PROCEDURE — 99233 SBSQ HOSP IP/OBS HIGH 50: CPT | Performed by: INTERNAL MEDICINE

## 2020-08-08 PROCEDURE — 82550 ASSAY OF CK (CPK): CPT

## 2020-08-08 PROCEDURE — 94750 HC PULMONARY COMPLIANCE STUDY: CPT

## 2020-08-08 PROCEDURE — C9113 INJ PANTOPRAZOLE SODIUM, VIA: HCPCS | Performed by: SPECIALIST

## 2020-08-08 PROCEDURE — 6370000000 HC RX 637 (ALT 250 FOR IP): Performed by: SURGERY

## 2020-08-08 PROCEDURE — 80053 COMPREHEN METABOLIC PANEL: CPT

## 2020-08-08 PROCEDURE — 2500000003 HC RX 250 WO HCPCS: Performed by: INTERNAL MEDICINE

## 2020-08-08 PROCEDURE — 36592 COLLECT BLOOD FROM PICC: CPT

## 2020-08-08 PROCEDURE — 86141 C-REACTIVE PROTEIN HS: CPT

## 2020-08-08 PROCEDURE — 82803 BLOOD GASES ANY COMBINATION: CPT

## 2020-08-08 PROCEDURE — 6370000000 HC RX 637 (ALT 250 FOR IP): Performed by: HOSPITALIST

## 2020-08-08 PROCEDURE — 84145 PROCALCITONIN (PCT): CPT

## 2020-08-08 PROCEDURE — 94761 N-INVAS EAR/PLS OXIMETRY MLT: CPT

## 2020-08-08 PROCEDURE — 2000000000 HC ICU R&B

## 2020-08-08 PROCEDURE — 82962 GLUCOSE BLOOD TEST: CPT

## 2020-08-08 PROCEDURE — 6360000002 HC RX W HCPCS: Performed by: NURSE PRACTITIONER

## 2020-08-08 PROCEDURE — 2580000003 HC RX 258: Performed by: SURGERY

## 2020-08-08 PROCEDURE — 6370000000 HC RX 637 (ALT 250 FOR IP): Performed by: INTERNAL MEDICINE

## 2020-08-08 PROCEDURE — 94640 AIRWAY INHALATION TREATMENT: CPT

## 2020-08-08 PROCEDURE — 85610 PROTHROMBIN TIME: CPT

## 2020-08-08 RX ORDER — MIDODRINE HYDROCHLORIDE 5 MG/1
10 TABLET ORAL ONCE
Status: COMPLETED | OUTPATIENT
Start: 2020-08-08 | End: 2020-08-09

## 2020-08-08 RX ORDER — ACETYLCYSTEINE 100 MG/ML
4 SOLUTION ORAL; RESPIRATORY (INHALATION) ONCE
Status: COMPLETED | OUTPATIENT
Start: 2020-08-08 | End: 2020-08-09

## 2020-08-08 RX ADMIN — ALBUTEROL SULFATE 4 PUFF: 90 AEROSOL, METERED RESPIRATORY (INHALATION) at 15:20

## 2020-08-08 RX ADMIN — Medication 25 MCG/HR: at 13:43

## 2020-08-08 RX ADMIN — ALBUTEROL SULFATE 4 PUFF: 90 AEROSOL, METERED RESPIRATORY (INHALATION) at 15:19

## 2020-08-08 RX ADMIN — SODIUM CHLORIDE, PRESERVATIVE FREE 10 ML: 5 INJECTION INTRAVENOUS at 08:37

## 2020-08-08 RX ADMIN — SODIUM CHLORIDE, PRESERVATIVE FREE 10 ML: 5 INJECTION INTRAVENOUS at 08:36

## 2020-08-08 RX ADMIN — CHLORHEXIDINE GLUCONATE 0.12% ORAL RINSE 15 ML: 1.2 LIQUID ORAL at 21:44

## 2020-08-08 RX ADMIN — IPRATROPIUM BROMIDE 4 PUFF: 17 AEROSOL, METERED RESPIRATORY (INHALATION) at 11:17

## 2020-08-08 RX ADMIN — THIAMINE HYDROCHLORIDE 200 MG: 100 INJECTION, SOLUTION INTRAMUSCULAR; INTRAVENOUS at 23:00

## 2020-08-08 RX ADMIN — SODIUM CHLORIDE, PRESERVATIVE FREE 10 ML: 5 INJECTION INTRAVENOUS at 21:53

## 2020-08-08 RX ADMIN — METRONIDAZOLE 500 MG: 500 INJECTION, SOLUTION INTRAVENOUS at 05:55

## 2020-08-08 RX ADMIN — IPRATROPIUM BROMIDE 4 PUFF: 17 AEROSOL, METERED RESPIRATORY (INHALATION) at 15:20

## 2020-08-08 RX ADMIN — ALBUTEROL SULFATE 4 PUFF: 90 AEROSOL, METERED RESPIRATORY (INHALATION) at 20:51

## 2020-08-08 RX ADMIN — IPRATROPIUM BROMIDE 4 PUFF: 17 AEROSOL, METERED RESPIRATORY (INHALATION) at 08:03

## 2020-08-08 RX ADMIN — IPRATROPIUM BROMIDE 4 PUFF: 17 AEROSOL, METERED RESPIRATORY (INHALATION) at 00:06

## 2020-08-08 RX ADMIN — PROPOFOL 15 MCG/KG/MIN: 10 INJECTION, EMULSION INTRAVENOUS at 05:12

## 2020-08-08 RX ADMIN — ALBUTEROL SULFATE 4 PUFF: 90 AEROSOL, METERED RESPIRATORY (INHALATION) at 11:16

## 2020-08-08 RX ADMIN — IPRATROPIUM BROMIDE 4 PUFF: 17 AEROSOL, METERED RESPIRATORY (INHALATION) at 04:27

## 2020-08-08 RX ADMIN — PROPOFOL 25 MCG/KG/MIN: 10 INJECTION, EMULSION INTRAVENOUS at 16:02

## 2020-08-08 RX ADMIN — PANTOPRAZOLE SODIUM 40 MG: 40 INJECTION, POWDER, FOR SOLUTION INTRAVENOUS at 08:38

## 2020-08-08 RX ADMIN — CHLORHEXIDINE GLUCONATE 0.12% ORAL RINSE 15 ML: 1.2 LIQUID ORAL at 08:38

## 2020-08-08 RX ADMIN — METRONIDAZOLE 500 MG: 500 INJECTION, SOLUTION INTRAVENOUS at 16:00

## 2020-08-08 RX ADMIN — ALBUTEROL SULFATE 4 PUFF: 90 AEROSOL, METERED RESPIRATORY (INHALATION) at 04:26

## 2020-08-08 RX ADMIN — METHYLPREDNISOLONE SODIUM SUCCINATE 60 MG: 125 INJECTION, POWDER, FOR SOLUTION INTRAMUSCULAR; INTRAVENOUS at 08:38

## 2020-08-08 RX ADMIN — IPRATROPIUM BROMIDE 4 PUFF: 17 AEROSOL, METERED RESPIRATORY (INHALATION) at 20:51

## 2020-08-08 RX ADMIN — SODIUM CHLORIDE, PRESERVATIVE FREE 10 ML: 5 INJECTION INTRAVENOUS at 21:54

## 2020-08-08 RX ADMIN — METRONIDAZOLE 500 MG: 500 INJECTION, SOLUTION INTRAVENOUS at 23:38

## 2020-08-08 RX ADMIN — THIAMINE HYDROCHLORIDE 200 MG: 100 INJECTION, SOLUTION INTRAMUSCULAR; INTRAVENOUS at 10:45

## 2020-08-08 RX ADMIN — ALBUTEROL SULFATE 4 PUFF: 90 AEROSOL, METERED RESPIRATORY (INHALATION) at 00:05

## 2020-08-08 RX ADMIN — SODIUM CHLORIDE, PRESERVATIVE FREE 10 ML: 5 INJECTION INTRAVENOUS at 23:30

## 2020-08-08 RX ADMIN — ALBUTEROL SULFATE 4 PUFF: 90 AEROSOL, METERED RESPIRATORY (INHALATION) at 08:03

## 2020-08-08 RX ADMIN — Medication 2 MCG/MIN: at 02:42

## 2020-08-08 ASSESSMENT — PULMONARY FUNCTION TESTS
PIF_VALUE: 24
PIF_VALUE: 31
PIF_VALUE: 25
PIF_VALUE: 45
PIF_VALUE: 25
PIF_VALUE: 29
PIF_VALUE: 29
PIF_VALUE: 23
PIF_VALUE: 22
PIF_VALUE: 25
PIF_VALUE: 28
PIF_VALUE: 26
PIF_VALUE: 26
PIF_VALUE: 22
PIF_VALUE: 22
PIF_VALUE: 23
PIF_VALUE: 30
PIF_VALUE: 29
PIF_VALUE: 23
PIF_VALUE: 22
PIF_VALUE: 23
PIF_VALUE: 22
PIF_VALUE: 27
PIF_VALUE: 41
PIF_VALUE: 36
PIF_VALUE: 23
PIF_VALUE: 22
PIF_VALUE: 27
PIF_VALUE: 25
PIF_VALUE: 42
PIF_VALUE: 22
PIF_VALUE: 36
PIF_VALUE: 23
PIF_VALUE: 34
PIF_VALUE: 24
PIF_VALUE: 21
PIF_VALUE: 25
PIF_VALUE: 24
PIF_VALUE: 22
PIF_VALUE: 32
PIF_VALUE: 23
PIF_VALUE: 22
PIF_VALUE: 24
PIF_VALUE: 26
PIF_VALUE: 31
PIF_VALUE: 22
PIF_VALUE: 24
PIF_VALUE: 34
PIF_VALUE: 30
PIF_VALUE: 35
PIF_VALUE: 25
PIF_VALUE: 31
PIF_VALUE: 22
PIF_VALUE: 22
PIF_VALUE: 40
PIF_VALUE: 44
PIF_VALUE: 27
PIF_VALUE: 21
PIF_VALUE: 22
PIF_VALUE: 21
PIF_VALUE: 19
PIF_VALUE: 26
PIF_VALUE: 27
PIF_VALUE: 23
PIF_VALUE: 35
PIF_VALUE: 28
PIF_VALUE: 40
PIF_VALUE: 32
PIF_VALUE: 23
PIF_VALUE: 24
PIF_VALUE: 22
PIF_VALUE: 28
PIF_VALUE: 24
PIF_VALUE: 34
PIF_VALUE: 24
PIF_VALUE: 24
PIF_VALUE: 23
PIF_VALUE: 40
PIF_VALUE: 31
PIF_VALUE: 26
PIF_VALUE: 23
PIF_VALUE: 25
PIF_VALUE: 22
PIF_VALUE: 22
PIF_VALUE: 33
PIF_VALUE: 33
PIF_VALUE: 22

## 2020-08-08 ASSESSMENT — PAIN SCALES - GENERAL
PAINLEVEL_OUTOF10: 0

## 2020-08-08 NOTE — PROGRESS NOTES
EXAMINATION:    ONE XRAY VIEW OF THE CHEST         8/8/2020 11:16 am         COMPARISON:    08/08/2020         HISTORY:    ORDERING SYSTEM PROVIDED HISTORY: ETT Repositioning    TECHNOLOGIST PROVIDED HISTORY:    Reason for exam:->ETT Repositioning    Acuity: Acute    Type of Exam: Subsequent/Follow-up         FINDINGS:    Tip of the endotracheal tube now terminates approximately 4 cm above the    navid.  The NG tube tip is not appreciated.  Right IJ central catheter is    stable.  No consolidation, effusion or pneumothorax.  Stable cardiomediastinal    silhouette

## 2020-08-08 NOTE — PROGRESS NOTES
[address] [place] [address]  HOSPITALIST PROGRESS NOTE                       Name:  Tomy Ricardo /Age/Sex: 1955  (60 y.o. male)   MRN & CSN:  [id number] & [id number] Admission Date/Time: 2020  3:22 PM   Location:  - Attending:  Donny Jose MD                                                  HPI  Tomy Ricardo is a 60 y.o. male who was admitted on  with sepsis    SUBJECTIVE  Had RRT this morning due to mucus plug relieved with suctioning. On low dose levophed    ROS- unable to complete as patient not able to participate at this time      ALLERGIES: No Known Allergies    PCP: No primary care provider on file. PAST MEDICAL HISTORY, SURGICAL HISTORY, SOCIAL HISTORY and  HOME MEDICATIONS all reviewed. OBJECTIVE  Vitals:    20 0735 20 0753 20 0758 20 0800   BP: (!) 133/55      Pulse: 90 93 97    Resp: 23 (!) 35 23 26   Temp: 98.4 °F (36.9 °C)      TempSrc: Rectal      SpO2: 100% 100% 100% 100%   Weight:       Height:           PHYSICAL EXAM   GEN sedated  EYES Pupils are equally round. No scleral erythema, discharge, or conjunctivitis. HENT Mucous membranes are dry. Oral pharynx without exudates, no evidence of thrush. NECK Supple, no apparent thyromegaly or masses. RESP unlabored respiration, decreased breath sounds at the bases  CARDIO/VASC S1/S2 auscultated. Regular rate without appreciable murmurs, rubs, or gallops. No JVD or carotid bruits. Peripheral pulses equal bilaterally and palpable. GI Abdomen is soft- further exam limited due to sedation  HEME/LYMPH No palpable cervical lymphadenopathy and no hepatosplenomegaly. No petechiae or ecchymoses. SKIN Normal coloration, warm, dry.   NEURO Sedated    INTAKE: In: 2653.6 [I.V.:780.6; NG/GT:1023]  Out: 700   OUTPUT: In: 2653.6   Out: 700     LABS  Recent Labs     20  0414 20  0420 20  0400   WBC 28.1* 26.7* 23.1*   HGB 8.2* 7.6* 7.6*   HCT 26.0* 24.7* 23.2*    207 227      Recent Labs wound-off pressors- repeat blood culture negative, one set growing bacilli- ?contaminant- ID on the case- noted still with significant leukocytosis which is worse- defer abx to ID  2-Probable staph UTI- abx per ID  3-Acute on chronic respiratory failure due to sepsis and suspected aspiration pneumonia- still vent dependent as of 7/25- with severe pulm HTN with RVSP of 67  4-JERAMIE due to ATN- dialysis per nephro  5-Skin wounds/right post thigh/buttock and bilateral UE- wound culture growing MSSA- surgery and ID following  6-Pneumatosis intestinalis s/p ex-lap on 7/25 negative for ischemic bowel, however CT on 7/31 showing hemoperitoneum and pneumoperitoneum- due to leukocytosis- repeated CTs which are stable. 7-Acute blood loss anemia due to above- s/p transfusion- monitor H/H  8-Destructive lesion T12-L1- possible prior infectious spondyliits  -no abscess or osteo as per imaging  9-?low DIC state with mildly low fibrinogen and platelets- noted hematology following- repeat levels again    Other issues  -Hx of gastric cancer stage 4  -polysubstance abuse- UDS positive for cocaine and opiate  -documented hep C    No pharmacological dvt prophylaxis due to retroperitoneal hemorrhage with acute blood loss anemia    Family made decision to go ahead with trach/peg- to be done on Monday       Disp:     Diet DIET TUBE FEED CONTINUOUS/CYCLIC NPO; Low Calorie High Protein (Vital High Protein);  Orogastric; Continuous; 20; 70; 24   DVT Prophylaxis [] Lovenox, []  Heparin, [] SCDs, [] Ambulation   GI Prophylaxis [] PPI,  [] H2 Blocker,  [] Carafate,  [] Diet/Tube Feeds   Code Status Full Code   Disposition Patient requires continued admission due to sepsis, respiratory failure   CMS Level of Risk [] Low, [] Moderate,[x]  High  Patient's risk as above due to sepsis respiratory failure     CARMINE WOOTEN MD 8/8/2020 9:17 AM

## 2020-08-08 NOTE — CODE DOCUMENTATION
Ventilator stopped giving volumes d/t mucous plug; cleared plug at this time. Pt's SpO2 100% on ventilator; VSS.

## 2020-08-08 NOTE — PROGRESS NOTES
- s/p HD yesterday  - next HD will be Monday  - he has been requiring levophed for HD even with no fluid removal, will need LTACH then  - continue supportive mgmt     Leukocytosis      Sepsis ([facility] [address] 07/24/2020         JERAMIE FROM ATN; CRRT FROM FROM 7/5 TO 7/8 and now no HD 3 times per week  ANION GAP METABOLIC ACIDOSIS  LACTIC ACIDOSIS  ISCHEMIC BOWEL S/P SURGERY   STAPH BACTEREMIA  ACUTE HYPOXIC RESP FAILURE  HEROIN AND COCAINE USE  Ischemic hepatitis  encephalopathy     Suggest:  Remains anuric  Will continue HD 3x/wk,

## 2020-08-08 NOTE — PROGRESS NOTES
[address] [facility]  consulted by Dr. Vidal  for monitoring and adjustment. Indication for treatment: MSSA bacteremia, GPB in blood   Goal trough: 10-15 mcg/mL     Pertinent Laboratory Values:   Temp Readings from Last 3 Encounters:   08/08/20 97.9 °F (36.6 °C) (Rectal)   07/25/20 (!) 62.3 °F (16.8 °C)     Recent Labs     08/06/20  0414 08/07/20  0420 08/08/20  0400   WBC 28.1* 26.7* 23.1*     Recent Labs     08/06/20  0414 08/07/20  0420 08/08/20  0400   BUN 90* 141* 89*   CREATININE 2.6* 3.3* 2.2*     Estimated Creatinine Clearance: 31 mL/min (A) (based on SCr of 2.2 mg/dL (H)). Intake/Output Summary (Last 24 hours) at 8/8/2020 1628  Last data filed at 8/8/2020 0555  Gross per 24 hour   Intake 2653.63 ml   Output 700 ml   Net 1953.63 ml       Pertinent Cultures:  Date    Source    Results  7/24   Blood    MSSA  7/27   Blood    Bacillus (1 of 2)  7/29   Blood    NGTD   8/5   Sputum   Yeast     Vancomycin level:   TROUGH:    No results for input(s): VANCOTROUGH in the last 72 hours.   RANDOM:    Recent Labs     08/07/20  0600   VANCORANDOM 11.3       Assessment:  · WBC and temperature: WBC remains elevated (receiving methylprednisolone), afebrile  · SCr, BUN, and urine output:   · CRRT stopped- transition to HD Mon/Wed/Fri   · Days of therapy: 16  · Vancomycin level: 11.3, ok to re-dose    Plan:  · Continue intermittent dosing based on levels 2/2 RRT   · Received vancomycin 1000 mg on 7/30 (9 days ago)  · Levels have cleared very slowly since CRRT stopped  · Pre-HD level is 11.3   · Will re-dose with 1000 mg x 1 and repeat level Mon AM   · Pharmacy will continue to monitor patient and adjust therapy as indicated    [address] 8/10 @06:00    Thank you for the consult,  Olivia Alvarez, [address]  8/8/2020 4:28 PM

## 2020-08-08 NOTE — PROGRESS NOTES
Pulmonary and Critical Care  Progress Note      VITALS:  BP (!) 133/55   Pulse 86   Temp 98.4 °F (36.9 °C) (Rectal)   Resp 21   Ht 5' 7.99\" (1.727 m)   Wt 139 lb 12.4 oz (63.4 kg)   SpO2 100%   BMI 21.26 kg/m²     Subjective:   CHIEF COMPLAINT :Fall with right hip pain     HPI:                The patient is a 60 y.o. male with significant past medical history of HTN,. Sacral decub, MSSA endocarditis, Septic PE, Hep C,  presents with complaints of fall and right hip pain. He was on the floor for more than 2 days. His CK is mildly elevated. He had mild lactic acidosis. His PBNPT is 11,000 with mildly elevated troponins. He is on 3 grams of Heroin per day. His U tocx was positive for Cocaine and Opiates. He had no fever, no headaches, no chest pain, no palpitations, no n/v, no diaphoresis. He has no sick exposure, no recent travel. His 1 blood culture set is positive for Staph. He is getting abx and IV fluids. He is on the vent and on HD     Objective:   PHYSICAL EXAM:    LUNGS:Occasional basal crackles  Abd-soft, BS+,NT  Ext - no pedal edema  CVS-s1s2, no murmurs      DATA:    CBC:  Recent Labs     08/06/20 0414 08/07/20  0420 08/08/20  0400   WBC 28.1* 26.7* 23.1*   RBC 2.90* 2.74* 2.66*   HGB 8.2* 7.6* 7.6*   HCT 26.0* 24.7* 23.2*    207 227   MCV 89.7 90.1 87.2   MCH 28.3 27.7 28.6   MCHC 31.5* 30.8* 32.8   RDW 22.4* 22.7* 22.6*   SEGSPCT 90.8* 91.3* 90.3*      BMP:  Recent Labs     08/06/20  0414 08/07/20  0420 08/08/20  0400    136 134*   K 4.4 4.9 3.9   CL 99 99 93*   CO2 19* 16* 23   BUN 90* 141* 89*   CREATININE 2.6* 3.3* 2.2*   CALCIUM 8.4 8.5 8.0*   GLUCOSE 151* 162* 116*      ABG:  Recent Labs     08/08/20  0600 08/08/20  0807   PH 7.41 7.55*   PO2ART 111* 179*   [id number] 37.0 23.0*   O2SAT 95.9* 96.9     BNP  No results found for: BNP   D-Dimer:  No results found for: DDIMER   1.  Radiology: Reviewed      Assessment/Plan     Patient Active Problem List    Diagnosis Date Noted    Chronic hepatitis C without hepatic coma ([facility] [address] 08/03/2020    MSSA bacteremia 07/28/2020    Ischemic bowel disease ([facility] [address]     Leukocytosis     Sepsis ([facility] [address] 07/24/2020     Patient is on the vent and sedated. He is off pressors. His H&H has been stable     Septic shock- improved  Hemorrhagic shock- improved  Acute hypoxic resp failure  Mild coagulopathy- improved  Shock Liver- improved  Lactic acidosis- improved  VDRF  Anemia likely GI bleed  ? Staph Aureus bacteremia  Severe Pulmonary HTN  Occult blood positive  JERAMIE likely sec to ATN  Bilateral Arm wounds  Leukocytosis  Hemoperitoneum  Severe malnutrition       1. IR for Paracentesis  2. HD per renal  3. Abx  4. Tube feeds  5. Await trach and a PEG on Monday  6. LTAC eval  7. Prognosis  Guarded  8. C/w present management  No follow-ups on file.     Electronically signed by Igor Langley MD on 8/8/2020 at 11:15 AM

## 2020-08-08 NOTE — PROGRESS NOTES
Pulmonary and Critical Care  Progress Note    Subjective: The patient is sedated on vent. Shortness of breath none  Chest pain none  Addressing respiratory complaints Patient is negative for  hemoptysis and cyanosis  CONSTITUTIONAL:  negative for fevers and chills      Past Medical History:     has a past medical history of Drug abuse and dependence ([address] and Hypertension. has a past surgical history that includes back surgery; IR NONTUNNELED VASCULAR CATHETER > 5 YEARS (7/26/2020); and laparotomy (N/A, 7/25/2020). reports that he has been smoking cigarettes. He has been smoking about 1.50 packs per day. He does not have any smokeless tobacco history on file. He reports that he does not drink alcohol or use drugs. Family history:  family history is not on file. No Known Allergies  Social History:    Reviewed; no changes    Objective:   PHYSICAL EXAM:        VITALS:  BP (!) 137/54   Pulse 86   Temp 98.1 °F (36.7 °C) (Rectal)   Resp 17   Ht 5' 7.99\" (1.727 m)   Wt 139 lb 12.4 oz (63.4 kg)   SpO2 100%   BMI 21.26 kg/m²     24HR INTAKE/OUTPUT:      Intake/Output Summary (Last 24 hours) at 8/8/2020 1218  Last data filed at 8/8/2020 0555  Gross per 24 hour   Intake 2653.63 ml   Output 700 ml   Net 1953.63 ml       CONSTITUTIONAL:  somnolent  LUNGS:  decreased breath sounds, occ basilar crackles. CARDIOVASCULAR:  normal S1 and S2 and negative JVD.   DATA:    CBC:  Recent Labs     08/06/20 0414 08/07/20  0420 08/08/20  0400   WBC 28.1* 26.7* 23.1*   RBC 2.90* 2.74* 2.66*   HGB 8.2* 7.6* 7.6*   HCT 26.0* 24.7* 23.2*    207 227   MCV 89.7 90.1 87.2   MCH 28.3 27.7 28.6   MCHC 31.5* 30.8* 32.8   RDW 22.4* 22.7* 22.6*   SEGSPCT 90.8* 91.3* 90.3*      BMP:  Recent Labs     08/06/20  0414 08/07/20  0420 08/08/20  0400    136 134*   K 4.4 4.9 3.9   CL 99 99 93*   CO2 19* 16* 23   BUN 90* 141* 89*   CREATININE 2.6* 3.3* 2.2*   CALCIUM 8.4 8.5 8.0*   GLUCOSE 151* 162* 116*      ABG:  Recent Labs 08/08/20  0600 08/08/20  0807   PH 7.41 7.55*   PO2ART 111* 179*   [id number] 37.0 23.0*   O2SAT 95.9* 96.9     Lab Results   Component Value Date    PROBNP 11,142 (H) 07/24/2020     No results found for: [facility]    Radiology Review:  Pertinent images / reports were reviewed as a part of this visit. Assessment:     Patient Active Problem List   Diagnosis    Sepsis ([facility] [address]    Leukocytosis    Ischemic bowel disease ([facility] [address]    MSSA bacteremia    Chronic hepatitis C without hepatic coma ([facility] [address]       Plan:   1. Cont present vent support. 2. Adjust ET tube. 3. Reduce A/C to 12.     Laquita Mayorga MD  8/8/2020  12:18 PM

## 2020-08-08 NOTE — PLAN OF CARE
imbalance  Description: Will show no signs or symptoms of fluid imbalance  Outcome: Ongoing     Problem: Physical Regulation:  Goal: Ability to maintain clinical measurements within normal limits will improve  Description: Ability to maintain clinical measurements within normal limits will improve  Outcome: Ongoing  Goal: Will show no signs and symptoms of electrolyte imbalance  Description: Will show no signs and symptoms of electrolyte imbalance  Outcome: Ongoing     Problem: Skin Integrity:  Goal: Will show no infection signs and symptoms  Description: Will show no infection signs and symptoms  Outcome: Ongoing  Goal: Absence of new skin breakdown  Description: Absence of new skin breakdown  Outcome: Ongoing  Goal: Status of oral mucous membranes will improve  Description: Status of oral mucous membranes will improve  Outcome: Ongoing  Goal: Skin integrity will be maintained  Description: Skin integrity will be maintained  Outcome: Ongoing     Problem: Discharge Planning:  Goal: Participates in care planning  Description: Participates in care planning  Outcome: Ongoing  Goal: Discharged to appropriate level of care  Description: Discharged to appropriate level of care  Outcome: Ongoing  Goal: Ability to perform activities of daily living will improve  Description: Ability to perform activities of daily living will improve  Outcome: Ongoing     Problem: Airway Clearance - Ineffective:  Goal: Ability to maintain a clear airway will improve  Description: Ability to maintain a clear airway will improve  Outcome: Ongoing     Problem: Anxiety/Stress:  Goal: Level of anxiety will decrease  Description: Level of anxiety will decrease  Outcome: Ongoing     Problem: Aspiration:  Goal: Absence of aspiration  Description: Absence of aspiration  Outcome: Ongoing     Problem:  Bowel Function - Altered:  Goal: Bowel elimination is within specified parameters  Description: Bowel elimination is within specified parameters  Outcome: Ongoing     Problem: Cardiac Output - Decreased:  Goal: Hemodynamic stability will improve  Description: Hemodynamic stability will improve  Outcome: Ongoing     Problem: Fluid Volume - Imbalance:  Goal: Absence of imbalanced fluid volume signs and symptoms  Description: Absence of imbalanced fluid volume signs and symptoms  Outcome: Ongoing     Problem: Gas Exchange - Impaired:  Goal: Levels of oxygenation will improve  Description: Levels of oxygenation will improve  Outcome: Ongoing     Problem: Mental Status - Impaired:  Goal: Mental status will be restored to baseline  Description: Mental status will be restored to baseline  Outcome: Ongoing     Problem: Nutrition Deficit:  Goal: Ability to achieve adequate nutritional intake will improve  Description: Ability to achieve adequate nutritional intake will improve  Outcome: Ongoing     Problem: Pain:  Description: Pain management should include both nonpharmacologic and pharmacologic interventions.   Goal: Pain level will decrease  Description: Pain level will decrease  Outcome: Ongoing  Goal: Recognizes and communicates pain  Description: Recognizes and communicates pain  Outcome: Ongoing  Goal: Control of acute pain  Description: Control of acute pain  Outcome: Ongoing  Goal: Control of chronic pain  Description: Control of chronic pain  Outcome: Ongoing     Problem: Serum Glucose Level - Abnormal:  Goal: Ability to maintain appropriate glucose levels will improve to within specified parameters  Description: Ability to maintain appropriate glucose levels will improve to within specified parameters  Outcome: Ongoing  Goal: Ability to maintain appropriate glucose levels will improve  Description: Ability to maintain appropriate glucose levels will improve  Outcome: Ongoing     Problem: Skin Integrity - Impaired:  Goal: Will show no infection signs and symptoms  Description: Will show no infection signs and symptoms  Outcome: Ongoing  Goal: Absence of new skin breakdown  Description: Absence of new skin breakdown  Outcome: Ongoing     Problem: Sleep Pattern Disturbance:  Goal: Appears well-rested  Description: Appears well-rested  Outcome: Ongoing     Problem: Tissue Perfusion, Altered:  Goal: Circulatory function within specified parameters  Description: Circulatory function within specified parameters  Outcome: Ongoing     Problem: Tissue Perfusion - Cardiopulmonary, Altered:  Goal: Absence of angina  Description: Absence of angina  Outcome: Ongoing  Goal: Hemodynamic stability will improve  Description: Hemodynamic stability will improve  Outcome: Ongoing     Problem: Infection - Ventilator-Associated Pneumonia:  Goal: Absence of pulmonary infection  Description: Absence of pulmonary infection  Outcome: Ongoing     Problem: Venous Thromboembolism:  Goal: Will show no signs or symptoms of venous thromboembolism  Description: Will show no signs or symptoms of venous thromboembolism  Outcome: Ongoing  Goal: Absence of signs or symptoms of impaired coagulation  Description: Absence of signs or symptoms of impaired coagulation  Outcome: Ongoing     Problem: Confusion - Acute:  Goal: Mental status will be restored to baseline  Description: Mental status will be restored to baseline  Outcome: Ongoing     Problem: Injury - Risk of, Physical Injury:  Goal: Will remain free from falls  Description: Will remain free from falls  Outcome: Ongoing  Goal: Absence of physical injury  Description: Absence of physical injury  Outcome: Ongoing

## 2020-08-09 LAB
ALBUMIN SERPL-MCNC: 2.1 GM/DL (ref 3.4–5)
ALP BLD-CCNC: 74 IU/L (ref 40–129)
ALT SERPL-CCNC: 7 U/L (ref 10–40)
ANION GAP SERPL CALCULATED.3IONS-SCNC: 19 MMOL/L (ref 4–16)
ANION GAP SERPL CALCULATED.3IONS-SCNC: 24 MMOL/L (ref 4–16)
APTT: 26.2 SECONDS (ref 25.1–37.1)
AST SERPL-CCNC: 38 IU/L (ref 15–37)
BASE EXCESS: 4 (ref 0–3.3)
BASOPHILS ABSOLUTE: 0 K/CU MM
BASOPHILS RELATIVE PERCENT: 0.1 % (ref 0–1)
BILIRUB SERPL-MCNC: 1 MG/DL (ref 0–1)
BUN BLDV-MCNC: 125 MG/DL (ref 6–23)
BUN BLDV-MCNC: 142 MG/DL (ref 6–23)
CALCIUM IONIZED: 3.96 MG/DL (ref 4.48–5.28)
CALCIUM SERPL-MCNC: 8.1 MG/DL (ref 8.3–10.6)
CALCIUM SERPL-MCNC: 8.2 MG/DL (ref 8.3–10.6)
CARBON MONOXIDE, BLOOD: 3.4 % (ref 0–5)
CHLORIDE BLD-SCNC: 96 MMOL/L (ref 99–110)
CHLORIDE BLD-SCNC: 97 MMOL/L (ref 99–110)
CO2 CONTENT: 21.6 MMOL/L (ref 19–24)
CO2: 15 MMOL/L (ref 21–32)
CO2: 19 MMOL/L (ref 21–32)
COMMENT: ABNORMAL
CREAT SERPL-MCNC: 2.9 MG/DL (ref 0.9–1.3)
CREAT SERPL-MCNC: 3.2 MG/DL (ref 0.9–1.3)
CULTURE: NORMAL
DIFFERENTIAL TYPE: ABNORMAL
EOSINOPHILS ABSOLUTE: 0 K/CU MM
EOSINOPHILS RELATIVE PERCENT: 0 % (ref 0–3)
GFR AFRICAN AMERICAN: 24 ML/MIN/1.73M2
GFR AFRICAN AMERICAN: 27 ML/MIN/1.73M2
GFR NON-AFRICAN AMERICAN: 20 ML/MIN/1.73M2
GFR NON-AFRICAN AMERICAN: 22 ML/MIN/1.73M2
GLUCOSE BLD-MCNC: 144 MG/DL (ref 70–99)
GLUCOSE BLD-MCNC: 157 MG/DL (ref 70–99)
GLUCOSE BLD-MCNC: 207 MG/DL (ref 70–99)
HCO3 ARTERIAL: 20.6 MMOL/L (ref 18–23)
HCT VFR BLD CALC: 22.2 % (ref 42–52)
HCT VFR BLD CALC: 22.2 % (ref 42–52)
HEMOGLOBIN: 7 GM/DL (ref 13.5–18)
HEMOGLOBIN: 7.1 GM/DL (ref 13.5–18)
IMMATURE NEUTROPHIL %: 1 % (ref 0–0.43)
INR BLD: 1.05 INDEX
IONIZED CA: 0.99 MMOL/L (ref 1.12–1.32)
LYMPHOCYTES ABSOLUTE: 0.7 K/CU MM
LYMPHOCYTES RELATIVE PERCENT: 3.4 % (ref 24–44)
Lab: NORMAL
MAGNESIUM: 1.9 MG/DL (ref 1.8–2.4)
MCH RBC QN AUTO: 28.1 PG (ref 27–31)
MCH RBC QN AUTO: 28.2 PG (ref 27–31)
MCHC RBC AUTO-ENTMCNC: 31.5 % (ref 32–36)
MCHC RBC AUTO-ENTMCNC: 32 % (ref 32–36)
MCV RBC AUTO: 88.1 FL (ref 78–100)
MCV RBC AUTO: 89.2 FL (ref 78–100)
METHEMOGLOBIN ARTERIAL: 1.1 %
MONOCYTES ABSOLUTE: 0.9 K/CU MM
MONOCYTES RELATIVE PERCENT: 4.3 % (ref 0–4)
NUCLEATED RBC %: 0 %
O2 SATURATION: 96.6 % (ref 96–97)
PCO2 ARTERIAL: 34 MMHG (ref 32–45)
PDW BLD-RTO: 23.2 % (ref 11.7–14.9)
PDW BLD-RTO: 23.4 % (ref 11.7–14.9)
PH BLOOD: 7.39 (ref 7.34–7.45)
PHOSPHORUS: 9.6 MG/DL (ref 2.5–4.9)
PLATELET # BLD: 215 K/CU MM (ref 140–440)
PLATELET # BLD: 223 K/CU MM (ref 140–440)
PMV BLD AUTO: 10 FL (ref 7.5–11.1)
PMV BLD AUTO: 10.2 FL (ref 7.5–11.1)
PO2 ARTERIAL: 137 MMHG (ref 75–100)
POTASSIUM SERPL-SCNC: 4 MMOL/L (ref 3.5–5.1)
POTASSIUM SERPL-SCNC: 4.4 MMOL/L (ref 3.5–5.1)
PREALBUMIN: 22 MG/DL (ref 20–40)
PROCALCITONIN: 2.16
PROTHROMBIN TIME: 12.7 SECONDS (ref 11.7–14.5)
RBC # BLD: 2.49 M/CU MM (ref 4.6–6.2)
RBC # BLD: 2.52 M/CU MM (ref 4.6–6.2)
SEGMENTED NEUTROPHILS ABSOLUTE COUNT: 20.1 K/CU MM
SEGMENTED NEUTROPHILS RELATIVE PERCENT: 91.2 % (ref 36–66)
SODIUM BLD-SCNC: 135 MMOL/L (ref 135–145)
SODIUM BLD-SCNC: 135 MMOL/L (ref 135–145)
SPECIMEN: NORMAL
TOTAL CK: 58 IU/L (ref 38–174)
TOTAL IMMATURE NEUTOROPHIL: 0.22 K/CU MM
TOTAL NUCLEATED RBC: 0 K/CU MM
TOTAL PROTEIN: 5.2 GM/DL (ref 6.4–8.2)
WBC # BLD: 17.4 K/CU MM (ref 4–10.5)
WBC # BLD: 22.1 K/CU MM (ref 4–10.5)

## 2020-08-09 PROCEDURE — 85025 COMPLETE CBC W/AUTO DIFF WBC: CPT

## 2020-08-09 PROCEDURE — 89220 SPUTUM SPECIMEN COLLECTION: CPT

## 2020-08-09 PROCEDURE — 80048 BASIC METABOLIC PNL TOTAL CA: CPT

## 2020-08-09 PROCEDURE — 84134 ASSAY OF PREALBUMIN: CPT

## 2020-08-09 PROCEDURE — 2700000000 HC OXYGEN THERAPY PER DAY

## 2020-08-09 PROCEDURE — 6370000000 HC RX 637 (ALT 250 FOR IP): Performed by: INTERNAL MEDICINE

## 2020-08-09 PROCEDURE — 6360000002 HC RX W HCPCS: Performed by: PHYSICIAN ASSISTANT

## 2020-08-09 PROCEDURE — 2580000003 HC RX 258: Performed by: INTERNAL MEDICINE

## 2020-08-09 PROCEDURE — C9113 INJ PANTOPRAZOLE SODIUM, VIA: HCPCS | Performed by: SPECIALIST

## 2020-08-09 PROCEDURE — 85730 THROMBOPLASTIN TIME PARTIAL: CPT

## 2020-08-09 PROCEDURE — 82962 GLUCOSE BLOOD TEST: CPT

## 2020-08-09 PROCEDURE — 82330 ASSAY OF CALCIUM: CPT

## 2020-08-09 PROCEDURE — 82550 ASSAY OF CK (CPK): CPT

## 2020-08-09 PROCEDURE — 80053 COMPREHEN METABOLIC PANEL: CPT

## 2020-08-09 PROCEDURE — 37799 UNLISTED PX VASCULAR SURGERY: CPT

## 2020-08-09 PROCEDURE — 94750 HC PULMONARY COMPLIANCE STUDY: CPT

## 2020-08-09 PROCEDURE — 6370000000 HC RX 637 (ALT 250 FOR IP): Performed by: SURGERY

## 2020-08-09 PROCEDURE — 6360000002 HC RX W HCPCS: Performed by: SPECIALIST

## 2020-08-09 PROCEDURE — 6360000002 HC RX W HCPCS: Performed by: INTERNAL MEDICINE

## 2020-08-09 PROCEDURE — 6370000000 HC RX 637 (ALT 250 FOR IP): Performed by: PHYSICIAN ASSISTANT

## 2020-08-09 PROCEDURE — 94761 N-INVAS EAR/PLS OXIMETRY MLT: CPT

## 2020-08-09 PROCEDURE — 85027 COMPLETE CBC AUTOMATED: CPT

## 2020-08-09 PROCEDURE — 2580000003 HC RX 258: Performed by: PHYSICIAN ASSISTANT

## 2020-08-09 PROCEDURE — 82803 BLOOD GASES ANY COMBINATION: CPT

## 2020-08-09 PROCEDURE — 51702 INSERT TEMP BLADDER CATH: CPT

## 2020-08-09 PROCEDURE — 82805 BLOOD GASES W/O2 SATURATION: CPT

## 2020-08-09 PROCEDURE — 2580000003 HC RX 258: Performed by: SURGERY

## 2020-08-09 PROCEDURE — 2000000000 HC ICU R&B

## 2020-08-09 PROCEDURE — 83735 ASSAY OF MAGNESIUM: CPT

## 2020-08-09 PROCEDURE — 85610 PROTHROMBIN TIME: CPT

## 2020-08-09 PROCEDURE — 2500000003 HC RX 250 WO HCPCS: Performed by: INTERNAL MEDICINE

## 2020-08-09 PROCEDURE — 84100 ASSAY OF PHOSPHORUS: CPT

## 2020-08-09 PROCEDURE — 94003 VENT MGMT INPAT SUBQ DAY: CPT

## 2020-08-09 PROCEDURE — 94640 AIRWAY INHALATION TREATMENT: CPT

## 2020-08-09 PROCEDURE — 6360000002 HC RX W HCPCS: Performed by: NURSE PRACTITIONER

## 2020-08-09 PROCEDURE — 84145 PROCALCITONIN (PCT): CPT

## 2020-08-09 RX ORDER — 0.9 % SODIUM CHLORIDE 0.9 %
250 INTRAVENOUS SOLUTION INTRAVENOUS ONCE
Status: DISCONTINUED | OUTPATIENT
Start: 2020-08-09 | End: 2020-08-15 | Stop reason: HOSPADM

## 2020-08-09 RX ORDER — MIDODRINE HYDROCHLORIDE 5 MG/1
10 TABLET ORAL
Status: DISCONTINUED | OUTPATIENT
Start: 2020-08-10 | End: 2020-08-15 | Stop reason: HOSPADM

## 2020-08-09 RX ADMIN — METRONIDAZOLE 500 MG: 500 INJECTION, SOLUTION INTRAVENOUS at 19:17

## 2020-08-09 RX ADMIN — IPRATROPIUM BROMIDE 4 PUFF: 17 AEROSOL, METERED RESPIRATORY (INHALATION) at 07:40

## 2020-08-09 RX ADMIN — IPRATROPIUM BROMIDE 4 PUFF: 17 AEROSOL, METERED RESPIRATORY (INHALATION) at 16:02

## 2020-08-09 RX ADMIN — MIDODRINE HYDROCHLORIDE 10 MG: 5 TABLET ORAL at 04:34

## 2020-08-09 RX ADMIN — IPRATROPIUM BROMIDE 4 PUFF: 17 AEROSOL, METERED RESPIRATORY (INHALATION) at 04:08

## 2020-08-09 RX ADMIN — IPRATROPIUM BROMIDE AND ALBUTEROL SULFATE 1 AMPULE: .5; 3 SOLUTION RESPIRATORY (INHALATION) at 11:13

## 2020-08-09 RX ADMIN — PROPOFOL 25 MCG/KG/MIN: 10 INJECTION, EMULSION INTRAVENOUS at 10:34

## 2020-08-09 RX ADMIN — ALBUTEROL SULFATE 4 PUFF: 90 AEROSOL, METERED RESPIRATORY (INHALATION) at 04:08

## 2020-08-09 RX ADMIN — SODIUM CHLORIDE, PRESERVATIVE FREE 10 ML: 5 INJECTION INTRAVENOUS at 21:15

## 2020-08-09 RX ADMIN — ALBUTEROL SULFATE 4 PUFF: 90 AEROSOL, METERED RESPIRATORY (INHALATION) at 16:01

## 2020-08-09 RX ADMIN — ACETYLCYSTEINE 400 MG: 100 SOLUTION ORAL; RESPIRATORY (INHALATION) at 11:12

## 2020-08-09 RX ADMIN — CALCIUM GLUCONATE 1 G: 98 INJECTION, SOLUTION INTRAVENOUS at 23:56

## 2020-08-09 RX ADMIN — PROPOFOL 25 MCG/KG/MIN: 10 INJECTION, EMULSION INTRAVENOUS at 02:00

## 2020-08-09 RX ADMIN — CHLORHEXIDINE GLUCONATE 0.12% ORAL RINSE 15 ML: 1.2 LIQUID ORAL at 10:12

## 2020-08-09 RX ADMIN — SODIUM CHLORIDE, PRESERVATIVE FREE 10 ML: 5 INJECTION INTRAVENOUS at 10:14

## 2020-08-09 RX ADMIN — SODIUM CHLORIDE, PRESERVATIVE FREE 10 ML: 5 INJECTION INTRAVENOUS at 10:13

## 2020-08-09 RX ADMIN — IPRATROPIUM BROMIDE 4 PUFF: 17 AEROSOL, METERED RESPIRATORY (INHALATION) at 00:16

## 2020-08-09 RX ADMIN — ALBUTEROL SULFATE 4 PUFF: 90 AEROSOL, METERED RESPIRATORY (INHALATION) at 19:55

## 2020-08-09 RX ADMIN — METHYLPREDNISOLONE SODIUM SUCCINATE 60 MG: 125 INJECTION, POWDER, FOR SOLUTION INTRAMUSCULAR; INTRAVENOUS at 10:13

## 2020-08-09 RX ADMIN — SODIUM CHLORIDE, PRESERVATIVE FREE 10 ML: 5 INJECTION INTRAVENOUS at 21:16

## 2020-08-09 RX ADMIN — PROPOFOL 25 MCG/KG/MIN: 10 INJECTION, EMULSION INTRAVENOUS at 17:45

## 2020-08-09 RX ADMIN — CHLORHEXIDINE GLUCONATE 0.12% ORAL RINSE 15 ML: 1.2 LIQUID ORAL at 21:16

## 2020-08-09 RX ADMIN — METRONIDAZOLE 500 MG: 500 INJECTION, SOLUTION INTRAVENOUS at 22:30

## 2020-08-09 RX ADMIN — ALBUTEROL SULFATE 4 PUFF: 90 AEROSOL, METERED RESPIRATORY (INHALATION) at 07:41

## 2020-08-09 RX ADMIN — PANTOPRAZOLE SODIUM 40 MG: 40 INJECTION, POWDER, FOR SOLUTION INTRAVENOUS at 10:12

## 2020-08-09 RX ADMIN — ALBUTEROL SULFATE 4 PUFF: 90 AEROSOL, METERED RESPIRATORY (INHALATION) at 00:16

## 2020-08-09 RX ADMIN — THIAMINE HYDROCHLORIDE 200 MG: 100 INJECTION, SOLUTION INTRAMUSCULAR; INTRAVENOUS at 10:13

## 2020-08-09 RX ADMIN — METRONIDAZOLE 500 MG: 500 INJECTION, SOLUTION INTRAVENOUS at 07:01

## 2020-08-09 RX ADMIN — IPRATROPIUM BROMIDE 4 PUFF: 17 AEROSOL, METERED RESPIRATORY (INHALATION) at 19:55

## 2020-08-09 ASSESSMENT — PULMONARY FUNCTION TESTS
PIF_VALUE: 21
PIF_VALUE: 24
PIF_VALUE: 41
PIF_VALUE: 23
PIF_VALUE: 22
PIF_VALUE: 25
PIF_VALUE: 23
PIF_VALUE: 21

## 2020-08-09 ASSESSMENT — PAIN SCALES - GENERAL
PAINLEVEL_OUTOF10: 0

## 2020-08-09 NOTE — PROGRESS NOTES
Pulmonary and Critical Care  Progress Note    Subjective: The patient is sedated on vent. ET tube adjusted. Shortness of breath none  Chest pain none  Addressing respiratory complaints Patient is negative for  hemoptysis and cyanosis  CONSTITUTIONAL:  negative for fevers and chills      Past Medical History:     has a past medical history of Drug abuse and dependence ([address] and Hypertension. has a past surgical history that includes back surgery; IR NONTUNNELED VASCULAR CATHETER > 5 YEARS (7/26/2020); and laparotomy (N/A, 7/25/2020). reports that he has been smoking cigarettes. He has been smoking about 1.50 packs per day. He does not have any smokeless tobacco history on file. He reports that he does not drink alcohol or use drugs. Family history:  family history is not on file. No Known Allergies  Social History:    Reviewed; no changes    Objective:   PHYSICAL EXAM:        VITALS:  BP (!) 101/52   Pulse 103   Temp 98.4 °F (36.9 °C) (Rectal)   Resp 23   Ht 5' 7\" (1.702 m)   Wt 144 lb 13.5 oz (65.7 kg)   SpO2 100%   BMI 22.69 kg/m²     24HR INTAKE/OUTPUT:      Intake/Output Summary (Last 24 hours) at 8/9/2020 1327  Last data filed at 8/9/2020 0630  Gross per 24 hour   Intake 2305.51 ml   Output 1350 ml   Net 955.51 ml       CONSTITUTIONAL:  Somnolent. LUNGS:  decreased breath sounds, occ basilar crackles. CARDIOVASCULAR:  normal S1 and S2 and negative JVD.   DATA:    CBC:  Recent Labs     08/07/20  0420 08/08/20  0400 08/09/20  0600   WBC 26.7* 23.1* 22.1*   RBC 2.74* 2.66* 2.52*   HGB 7.6* 7.6* 7.1*   HCT 24.7* 23.2* 22.2*    227 215   MCV 90.1 87.2 88.1   MCH 27.7 28.6 28.2   MCHC 30.8* 32.8 32.0   RDW 22.7* 22.6* 23.4*   SEGSPCT 91.3* 90.3* 91.2*      BMP:  Recent Labs     08/07/20  0420 08/08/20  0400 08/09/20  0600    134* 135   K 4.9 3.9 4.0   CL 99 93* 97*   CO2 16* 23 19*   * 89* 125*   CREATININE 3.3* 2.2* 2.9*   CALCIUM 8.5 8.0* 8.2*   GLUCOSE 162* 116* 144*

## 2020-08-09 NOTE — PROGRESS NOTES
[address] [place] [address]  HOSPITALIST PROGRESS NOTE                       Name:  Kathie Espinoza /Age/Sex: 1955  (60 y.o. male)   MRN & CSN:  [id number] & [id number] Admission Date/Time: 2020  3:22 PM   Location:  - Attending:  Virgen Us MD                                                  [facility]  Kathie Espinoza is a 60 y.o. male who was admitted on  with sepsis    SUBJECTIVE  Per nurse had bleeding from the wound last night. On sedation but responsive    ROS- unable to complete as patient not able to participate at this time      ALLERGIES: No Known Allergies    PCP: No primary care provider on file. PAST MEDICAL HISTORY, SURGICAL HISTORY, SOCIAL HISTORY and  HOME MEDICATIONS all reviewed. OBJECTIVE  Vitals:    20 0600 20 0602 20 0803 20 0903   BP:       Pulse:  95 94 95   Resp:  23 25 30   Temp:       TempSrc:       SpO2:  100%     Weight: 144 lb 13.5 oz (65.7 kg)      Height: 5' 7\" (1.702 m)          PHYSICAL EXAM   GEN sedated  EYES Pupils are equally round. No scleral erythema, discharge, or conjunctivitis. HENT Mucous membranes are dry. Oral pharynx without exudates, no evidence of thrush. NECK Supple, no apparent thyromegaly or masses. RESP unlabored respiration, decreased breath sounds at the bases  CARDIO/VASC S1/S2 auscultated. Regular rate without appreciable murmurs, rubs, or gallops. No JVD or carotid bruits. Peripheral pulses equal bilaterally and palpable. GI Abdomen is soft- further exam limited due to sedation  HEME/LYMPH No palpable cervical lymphadenopathy and no hepatosplenomegaly. No petechiae or ecchymoses. SKIN Normal coloration, warm, dry.   NEURO Sedated    INTAKE: In: 2305.5 [I.V.:745.5; NG/GT:1560]  Out: 1350   OUTPUT: In: 2305.5   Out: 1350 [Urine:750]    LABS  Recent Labs     20  0420 20  0400 20  0600   WBC 26.7* 23.1* 22.1*   HGB 7.6* 7.6* 7.1*   HCT 24.7* 23.2* 22.2*    227 215      Recent Labs on the case- noted still with significant leukocytosis which is worse- defer abx to ID  -leukocytosis partly due to steroids which is stopped as of today- no longer on pressors. 2-Probable staph UTI- abx per ID  3-Acute on chronic respiratory failure due to sepsis and suspected aspiration pneumonia- still vent dependent as of 7/25- with severe pulm HTN with RVSP of 67  -plan for trach on Monday  4-JERAMIE due to ATN- dialysis per nephro  5-Skin wounds/right post thigh/buttock and bilateral UE- wound culture growing MSSA- surgery and ID following  6-Pneumatosis intestinalis s/p ex-lap on 7/25 negative for ischemic bowel, however CT on 7/31 showing hemoperitoneum and pneumoperitoneum- due to leukocytosis- repeated CTs which are stable. Also with bleeding from incision sites per nurse at times. 7-Acute blood loss anemia due to above- s/p transfusion- monitor H/H  8-Destructive lesion T12-L1- possible prior infectious spondyliits  -no abscess or osteo as per imaging  9-?low DIC state with mildly low fibrinogen and platelets- seen by hematology, no further suspicion of DIC    Other issues  -Hx of gastric cancer stage 4  -polysubstance abuse- UDS positive for cocaine and opiate  -documented hep C    No pharmacological dvt prophylaxis due to retroperitoneal hemorrhage with acute blood loss anemia      -plan for trach/peg on then LTAC       Disp:     Diet DIET TUBE FEED CONTINUOUS/CYCLIC NPO; Low Calorie High Protein (Vital High Protein);  Orogastric; Continuous; 20; 70; 24   DVT Prophylaxis [] Lovenox, []  Heparin, [] SCDs, [] Ambulation   GI Prophylaxis [] PPI,  [] H2 Blocker,  [] Carafate,  [] Diet/Tube Feeds   Code Status Full Code   Disposition Patient requires continued admission due to sepsis, respiratory failure   CMS Level of Risk [] Low, [] Moderate,[x]  High  Patient's risk as above due to sepsis respiratory failure     CARMINE WOOTEN MD 8/9/2020 11:18 AM

## 2020-08-09 NOTE — PROGRESS NOTES
08/09/20 1119   Vent Information   Equipment Changed HME   Vent Type 980   Vent Mode AC/VC   Vt Ordered 450 mL   Rate Set 12 bmp   Peak Flow 60 L/min   Pressure Support 0 cmH20   FiO2  30 %   SpO2 100 %   SpO2/FiO2 ratio 333.33   Sensitivity 3   PEEP/CPAP 5   I Time/ I Time % 0 s   Vent Patient Data   High Peep/I Pressure 0   Peak Inspiratory Pressure 23 cmH2O   Mean Airway Pressure 8.9 cmH20   Rate Measured 17 br/min   Vt Exhaled 465 mL   Minute Volume 7.94 Liters   I:E Ratio 1:3.50   Cough/Sputum   Sputum How Obtained Endotracheal   $Obtained Sample $Induced Sputum   Cough Productive   Frequency Infrequent   Sputum Amount Small   Sputum Color Creamy   Tenacity Thick   Spontaneous Breathing Trial (SBT) RT Doc   Pulse 88   Additional Respiratory  Assessments   Resp 17   Alarm Settings   High Pressure Alarm 45 cmH2O

## 2020-08-09 NOTE — PROGRESS NOTES
Nephrology Progress Note        [address], [address] [facility], [place] [address]  Phone: [phone]  Office Hours: 8:30AM - 4:30PM  Monday - Friday       [facility]  Nia Gaviria MD  [address],   [address],  [place], [facility]  PHONE: [phone]  FAX: [phone]    8/9/2020 12:07 PM  Subjective:   Admit Date: 7/24/2020  PCP: No primary care provider on file. Interval History:   - remains intubated  - anuric  - will plan HD tomorrow with midodrine before and after      Diet: DIET TUBE FEED CONTINUOUS/CYCLIC NPO; Low Calorie High Protein (Vital High Protein); Orogastric; Continuous; 20; 70; 24  Diet NPO, After Midnight      Data:   Scheduled Meds:   [START ON 8/10/2020] midodrine  10 mg Oral Q MWF    cefepime  2 g Intravenous Once per day on Mon Wed Fri    scopolamine  1 patch Transdermal Q72H    vancomycin (VANCOCIN) intermittent dosing (placeholder)   Other RX Placeholder    metroNIDAZOLE  500 mg Intravenous Q8H    pantoprazole  40 mg Intravenous Daily    thiamine (VITAMIN B1) IVPB  200 mg Intravenous Q12H    chlorhexidine  15 mL Mouth/Throat BID    albuterol sulfate HFA  4 puff Inhalation Q4H    And    ipratropium  4 puff Inhalation Q4H    aspirin  324 mg Oral Once    sodium chloride flush  10 mL Intravenous BID    sodium chloride flush  10 mL Intravenous 2 times per day    [Held by provider] enoxaparin  1 mg/kg Subcutaneous Daily    nicotine  1 patch Transdermal Daily     Continuous Infusions:   dextrose      fentanyl 25 mcg/hr (08/08/20 1343)    propofol 25 mcg/kg/min (08/09/20 1034)     PRN Meds:glucose, dextrose, glucagon (rDNA), dextrose, heparin (porcine), ipratropium-albuterol, iopamidol, sodium chloride flush, acetaminophen **OR** acetaminophen, polyethylene glycol, promethazine **OR** ondansetron, promethazine, diphenhydrAMINE, dicyclomine, morphine  I/O last 3 completed shifts:   In: 2305.5 [I.V.:745.5; hepatitis  Encephalopathy  Hx gastric cancer 4     Suggest:  Remains anuric  HD on Monday  Give midodrine before HD starting tomorrow                  Electronically signed by Jackson Blood DO on 8/9/2020 at 12:07 PM    [address] DO [address],  [address]  [place], [place] [address]  PHONE: [phone]  FAX: [phone]

## 2020-08-09 NOTE — PROGRESS NOTES
[address] [facility]  consulted by Dr. Madhav Morris for monitoring and adjustment. Indication for treatment: MSSA bacteremia, GPB in blood   Goal trough: 10-15 mcg/mL     Pertinent Laboratory Values:   Temp Readings from Last 3 Encounters:   08/09/20 98.4 °F (36.9 °C) (Rectal)   07/25/20 (!) 62.3 °F (16.8 °C)     Recent Labs     08/07/20  0420 08/08/20  0400 08/09/20  0600   WBC 26.7* 23.1* 22.1*     Recent Labs     08/07/20  0420 08/08/20  0400 08/09/20  0600   * 89* 125*   CREATININE 3.3* 2.2* 2.9*     Estimated Creatinine Clearance: 25 mL/min (A) (based on SCr of 2.9 mg/dL (H)). Intake/Output Summary (Last 24 hours) at 8/9/2020 1200  Last data filed at 8/9/2020 0630  Gross per 24 hour   Intake 2305.51 ml   Output 1350 ml   Net 955.51 ml       Pertinent Cultures:  Date    Source    Results  7/24   Blood    MSSA  7/27   Blood    Bacillus (1 of 2)  7/29   Blood    NGTD   8/5   Sputum   Yeast     Vancomycin level:   TROUGH:    No results for input(s): VANCOTROUGH in the last 72 hours.   RANDOM:    Recent Labs     08/07/20  0600   VANCORANDOM 11.3       Assessment:  · WBC and temperature: WBC remains elevated (receiving methylprednisolone), afebrile  · SCr, BUN, and urine output:   · CRRT stopped- transition to HD Mon/Wed/Fri   · Days of therapy: 17  · Vancomycin level: 11.3, ok to re-dose    Plan:  · Continue intermittent dosing based on levels 2/2 RRT   · Received vancomycin 1000 mg on 7/30 (9 days ago)  · Levels have cleared very slowly since CRRT stopped  · Pre-HD level is 11.3   · Will re-dose with 1000 mg x 1 and repeat level Mon AM   · Pharmacy will continue to monitor patient and adjust therapy as indicated    VANCOMYCIN RANDOM LEVEL SCHEDULED FOR 8/10 @06:00    Thank you for the consult,  Sal García, [address]  8/9/2020 12:00 PM

## 2020-08-10 ENCOUNTER — ANESTHESIA EVENT (OUTPATIENT)
Dept: OPERATING ROOM | Age: 65
DRG: 004 | End: 2020-08-10
Payer: MEDICARE

## 2020-08-10 ENCOUNTER — APPOINTMENT (OUTPATIENT)
Dept: GENERAL RADIOLOGY | Age: 65
DRG: 004 | End: 2020-08-10
Payer: MEDICARE

## 2020-08-10 ENCOUNTER — ANESTHESIA (OUTPATIENT)
Dept: OPERATING ROOM | Age: 65
DRG: 004 | End: 2020-08-10
Payer: MEDICARE

## 2020-08-10 VITALS — RESPIRATION RATE: 10 BRPM | OXYGEN SATURATION: 100 % | TEMPERATURE: 97.3 F

## 2020-08-10 PROBLEM — J96.90 RESPIRATORY FAILURE (HCC): Status: ACTIVE | Noted: 2020-08-10

## 2020-08-10 LAB
ALBUMIN SERPL-MCNC: 2.3 GM/DL (ref 3.4–5)
ALP BLD-CCNC: 75 IU/L (ref 40–128)
ALT SERPL-CCNC: 10 U/L (ref 10–40)
ANION GAP SERPL CALCULATED.3IONS-SCNC: 22 MMOL/L (ref 4–16)
APTT: 25.8 SECONDS (ref 25.1–37.1)
AST SERPL-CCNC: 35 IU/L (ref 15–37)
BASE EXCESS: 6 (ref 0–3.3)
BASE EXCESS: 7 (ref 0–3.3)
BASOPHILS ABSOLUTE: 0.1 K/CU MM
BASOPHILS RELATIVE PERCENT: 0.2 % (ref 0–1)
BILIRUB SERPL-MCNC: 1.1 MG/DL (ref 0–1)
BUN BLDV-MCNC: 157 MG/DL (ref 6–23)
CALCIUM IONIZED: 4.12 MG/DL (ref 4.48–5.28)
CALCIUM SERPL-MCNC: 8.5 MG/DL (ref 8.3–10.6)
CARBON MONOXIDE, BLOOD: 2.7 % (ref 0–5)
CARBON MONOXIDE, BLOOD: 2.9 % (ref 0–5)
CHLORIDE BLD-SCNC: 96 MMOL/L (ref 99–110)
CO2 CONTENT: 18.2 MMOL/L (ref 19–24)
CO2 CONTENT: 19.3 MMOL/L (ref 19–24)
CO2: 18 MMOL/L (ref 21–32)
COMMENT: ABNORMAL
COMMENT: ABNORMAL
CREAT SERPL-MCNC: 3.3 MG/DL (ref 0.9–1.3)
CULTURE: NORMAL
DIFFERENTIAL TYPE: ABNORMAL
DOSE AMOUNT: NORMAL
DOSE TIME: NORMAL
EOSINOPHILS ABSOLUTE: 0 K/CU MM
EOSINOPHILS RELATIVE PERCENT: 0 % (ref 0–3)
GFR AFRICAN AMERICAN: 23 ML/MIN/1.73M2
GFR NON-AFRICAN AMERICAN: 19 ML/MIN/1.73M2
GLUCOSE BLD-MCNC: 127 MG/DL (ref 70–99)
HCO3 ARTERIAL: 17.3 MMOL/L (ref 18–23)
HCO3 ARTERIAL: 18.3 MMOL/L (ref 18–23)
HCT VFR BLD CALC: 22.3 % (ref 42–52)
HEMOGLOBIN: 7.3 GM/DL (ref 13.5–18)
IMMATURE NEUTROPHIL %: 1.1 % (ref 0–0.43)
INR BLD: 1.02 INDEX
IONIZED CA: 1.03 MMOL/L (ref 1.12–1.32)
LYMPHOCYTES ABSOLUTE: 0.5 K/CU MM
LYMPHOCYTES RELATIVE PERCENT: 1.7 % (ref 24–44)
Lab: NORMAL
MCH RBC QN AUTO: 29.1 PG (ref 27–31)
MCHC RBC AUTO-ENTMCNC: 32.7 % (ref 32–36)
MCV RBC AUTO: 88.8 FL (ref 78–100)
METHEMOGLOBIN ARTERIAL: 0.9 %
METHEMOGLOBIN ARTERIAL: 2 %
MONOCYTES ABSOLUTE: 1.2 K/CU MM
MONOCYTES RELATIVE PERCENT: 4.3 % (ref 0–4)
NUCLEATED RBC %: 0 %
O2 SATURATION: 95.4 % (ref 96–97)
O2 SATURATION: 95.6 % (ref 96–97)
PCO2 ARTERIAL: 28 MMHG (ref 32–45)
PCO2 ARTERIAL: 34 MMHG (ref 32–45)
PDW BLD-RTO: 23.4 % (ref 11.7–14.9)
PH BLOOD: 7.34 (ref 7.34–7.45)
PH BLOOD: 7.4 (ref 7.34–7.45)
PLATELET # BLD: 238 K/CU MM (ref 140–440)
PMV BLD AUTO: 10.4 FL (ref 7.5–11.1)
PO2 ARTERIAL: 177 MMHG (ref 75–100)
PO2 ARTERIAL: 94 MMHG (ref 75–100)
POTASSIUM SERPL-SCNC: 4.2 MMOL/L (ref 3.5–5.1)
PROTHROMBIN TIME: 12.3 SECONDS (ref 11.7–14.5)
RBC # BLD: 2.51 M/CU MM (ref 4.6–6.2)
SEGMENTED NEUTROPHILS ABSOLUTE COUNT: 25.1 K/CU MM
SEGMENTED NEUTROPHILS RELATIVE PERCENT: 92.7 % (ref 36–66)
SODIUM BLD-SCNC: 136 MMOL/L (ref 135–145)
SPECIMEN: NORMAL
TOTAL CK: 76 IU/L (ref 38–174)
TOTAL IMMATURE NEUTOROPHIL: 0.3 K/CU MM
TOTAL NUCLEATED RBC: 0 K/CU MM
TOTAL PROTEIN: 5.6 GM/DL (ref 6.4–8.2)
VANCOMYCIN RANDOM: 16.3 UG/ML
WBC # BLD: 27.1 K/CU MM (ref 4–10.5)

## 2020-08-10 PROCEDURE — 2500000003 HC RX 250 WO HCPCS: Performed by: INTERNAL MEDICINE

## 2020-08-10 PROCEDURE — 6360000002 HC RX W HCPCS: Performed by: INTERNAL MEDICINE

## 2020-08-10 PROCEDURE — 2580000003 HC RX 258: Performed by: INTERNAL MEDICINE

## 2020-08-10 PROCEDURE — 2500000003 HC RX 250 WO HCPCS: Performed by: HOSPITALIST

## 2020-08-10 PROCEDURE — 86900 BLOOD TYPING SEROLOGIC ABO: CPT

## 2020-08-10 PROCEDURE — 94003 VENT MGMT INPAT SUBQ DAY: CPT

## 2020-08-10 PROCEDURE — 2500000003 HC RX 250 WO HCPCS: Performed by: NURSE ANESTHETIST, CERTIFIED REGISTERED

## 2020-08-10 PROCEDURE — 2580000003 HC RX 258: Performed by: SURGERY

## 2020-08-10 PROCEDURE — 86922 COMPATIBILITY TEST ANTIGLOB: CPT

## 2020-08-10 PROCEDURE — 43246 EGD PLACE GASTROSTOMY TUBE: CPT | Performed by: SURGERY

## 2020-08-10 PROCEDURE — 85610 PROTHROMBIN TIME: CPT

## 2020-08-10 PROCEDURE — 94640 AIRWAY INHALATION TREATMENT: CPT

## 2020-08-10 PROCEDURE — 80053 COMPREHEN METABOLIC PANEL: CPT

## 2020-08-10 PROCEDURE — 0B110F4 BYPASS TRACHEA TO CUTANEOUS WITH TRACHEOSTOMY DEVICE, OPEN APPROACH: ICD-10-PCS | Performed by: SURGERY

## 2020-08-10 PROCEDURE — 2500000003 HC RX 250 WO HCPCS: Performed by: SURGERY

## 2020-08-10 PROCEDURE — 99233 SBSQ HOSP IP/OBS HIGH 50: CPT | Performed by: INTERNAL MEDICINE

## 2020-08-10 PROCEDURE — 99999 PR OFFICE/OUTPT VISIT,PROCEDURE ONLY: CPT | Performed by: PHYSICIAN ASSISTANT

## 2020-08-10 PROCEDURE — 3700000001 HC ADD 15 MINUTES (ANESTHESIA): Performed by: SURGERY

## 2020-08-10 PROCEDURE — 82803 BLOOD GASES ANY COMBINATION: CPT

## 2020-08-10 PROCEDURE — 37799 UNLISTED PX VASCULAR SURGERY: CPT

## 2020-08-10 PROCEDURE — 3600000013 HC SURGERY LEVEL 3 ADDTL 15MIN: Performed by: SURGERY

## 2020-08-10 PROCEDURE — 89220 SPUTUM SPECIMEN COLLECTION: CPT

## 2020-08-10 PROCEDURE — 86850 RBC ANTIBODY SCREEN: CPT

## 2020-08-10 PROCEDURE — 6370000000 HC RX 637 (ALT 250 FOR IP): Performed by: SURGERY

## 2020-08-10 PROCEDURE — 82330 ASSAY OF CALCIUM: CPT

## 2020-08-10 PROCEDURE — 2580000003 HC RX 258: Performed by: NURSE ANESTHETIST, CERTIFIED REGISTERED

## 2020-08-10 PROCEDURE — 94750 HC PULMONARY COMPLIANCE STUDY: CPT

## 2020-08-10 PROCEDURE — 2000000000 HC ICU R&B

## 2020-08-10 PROCEDURE — P9016 RBC LEUKOCYTES REDUCED: HCPCS

## 2020-08-10 PROCEDURE — 31600 PLANNED TRACHEOSTOMY: CPT | Performed by: SURGERY

## 2020-08-10 PROCEDURE — 2709999900 HC NON-CHARGEABLE SUPPLY: Performed by: SURGERY

## 2020-08-10 PROCEDURE — 71045 X-RAY EXAM CHEST 1 VIEW: CPT

## 2020-08-10 PROCEDURE — 6370000000 HC RX 637 (ALT 250 FOR IP): Performed by: INTERNAL MEDICINE

## 2020-08-10 PROCEDURE — 82550 ASSAY OF CK (CPK): CPT

## 2020-08-10 PROCEDURE — 6360000002 HC RX W HCPCS: Performed by: SPECIALIST

## 2020-08-10 PROCEDURE — 85730 THROMBOPLASTIN TIME PARTIAL: CPT

## 2020-08-10 PROCEDURE — 85025 COMPLETE CBC W/AUTO DIFF WBC: CPT

## 2020-08-10 PROCEDURE — 80202 ASSAY OF VANCOMYCIN: CPT

## 2020-08-10 PROCEDURE — 86901 BLOOD TYPING SEROLOGIC RH(D): CPT

## 2020-08-10 PROCEDURE — 3700000000 HC ANESTHESIA ATTENDED CARE: Performed by: SURGERY

## 2020-08-10 PROCEDURE — C9113 INJ PANTOPRAZOLE SODIUM, VIA: HCPCS | Performed by: SPECIALIST

## 2020-08-10 PROCEDURE — 3600000003 HC SURGERY LEVEL 3 BASE: Performed by: SURGERY

## 2020-08-10 PROCEDURE — 0DH63UZ INSERTION OF FEEDING DEVICE INTO STOMACH, PERCUTANEOUS APPROACH: ICD-10-PCS | Performed by: SURGERY

## 2020-08-10 PROCEDURE — 2720000010 HC SURG SUPPLY STERILE: Performed by: SURGERY

## 2020-08-10 PROCEDURE — 90935 HEMODIALYSIS ONE EVALUATION: CPT

## 2020-08-10 RX ORDER — SODIUM CHLORIDE, SODIUM LACTATE, POTASSIUM CHLORIDE, CALCIUM CHLORIDE 600; 310; 30; 20 MG/100ML; MG/100ML; MG/100ML; MG/100ML
INJECTION, SOLUTION INTRAVENOUS CONTINUOUS PRN
Status: DISCONTINUED | OUTPATIENT
Start: 2020-08-10 | End: 2020-08-10 | Stop reason: SDUPTHER

## 2020-08-10 RX ORDER — LIDOCAINE HYDROCHLORIDE 10 MG/ML
INJECTION, SOLUTION INFILTRATION; PERINEURAL
Status: COMPLETED | OUTPATIENT
Start: 2020-08-10 | End: 2020-08-10

## 2020-08-10 RX ORDER — ROCURONIUM BROMIDE 10 MG/ML
INJECTION, SOLUTION INTRAVENOUS PRN
Status: DISCONTINUED | OUTPATIENT
Start: 2020-08-10 | End: 2020-08-10 | Stop reason: SDUPTHER

## 2020-08-10 RX ORDER — HEPARIN SODIUM 1000 [USP'U]/ML
3000 INJECTION, SOLUTION INTRAVENOUS; SUBCUTANEOUS
Status: COMPLETED | OUTPATIENT
Start: 2020-08-10 | End: 2020-08-10

## 2020-08-10 RX ADMIN — IPRATROPIUM BROMIDE 4 PUFF: 17 AEROSOL, METERED RESPIRATORY (INHALATION) at 23:29

## 2020-08-10 RX ADMIN — ALBUTEROL SULFATE 4 PUFF: 90 AEROSOL, METERED RESPIRATORY (INHALATION) at 23:28

## 2020-08-10 RX ADMIN — IPRATROPIUM BROMIDE 4 PUFF: 17 AEROSOL, METERED RESPIRATORY (INHALATION) at 04:16

## 2020-08-10 RX ADMIN — ROCURONIUM BROMIDE 30 MG: 10 INJECTION INTRAVENOUS at 08:34

## 2020-08-10 RX ADMIN — Medication 2 MCG/MIN: at 13:47

## 2020-08-10 RX ADMIN — METRONIDAZOLE 500 MG: 500 INJECTION, SOLUTION INTRAVENOUS at 15:37

## 2020-08-10 RX ADMIN — PROPOFOL 25 MCG/KG/MIN: 10 INJECTION, EMULSION INTRAVENOUS at 04:30

## 2020-08-10 RX ADMIN — CHLORHEXIDINE GLUCONATE 0.12% ORAL RINSE 15 ML: 1.2 LIQUID ORAL at 21:00

## 2020-08-10 RX ADMIN — Medication 25 MCG/HR: at 07:39

## 2020-08-10 RX ADMIN — SODIUM CHLORIDE, PRESERVATIVE FREE 10 ML: 5 INJECTION INTRAVENOUS at 09:30

## 2020-08-10 RX ADMIN — METRONIDAZOLE 500 MG: 500 INJECTION, SOLUTION INTRAVENOUS at 06:41

## 2020-08-10 RX ADMIN — CHLORHEXIDINE GLUCONATE 0.12% ORAL RINSE 15 ML: 1.2 LIQUID ORAL at 09:30

## 2020-08-10 RX ADMIN — IPRATROPIUM BROMIDE 4 PUFF: 17 AEROSOL, METERED RESPIRATORY (INHALATION) at 15:11

## 2020-08-10 RX ADMIN — ALBUTEROL SULFATE 4 PUFF: 90 AEROSOL, METERED RESPIRATORY (INHALATION) at 00:16

## 2020-08-10 RX ADMIN — IPRATROPIUM BROMIDE 4 PUFF: 17 AEROSOL, METERED RESPIRATORY (INHALATION) at 19:59

## 2020-08-10 RX ADMIN — HEPARIN SODIUM 3000 UNITS: 1000 INJECTION INTRAVENOUS; SUBCUTANEOUS at 16:34

## 2020-08-10 RX ADMIN — ALBUTEROL SULFATE 4 PUFF: 90 AEROSOL, METERED RESPIRATORY (INHALATION) at 04:16

## 2020-08-10 RX ADMIN — IPRATROPIUM BROMIDE 4 PUFF: 17 AEROSOL, METERED RESPIRATORY (INHALATION) at 00:16

## 2020-08-10 RX ADMIN — THIAMINE HYDROCHLORIDE 200 MG: 100 INJECTION, SOLUTION INTRAMUSCULAR; INTRAVENOUS at 11:09

## 2020-08-10 RX ADMIN — SODIUM CHLORIDE, POTASSIUM CHLORIDE, SODIUM LACTATE AND CALCIUM CHLORIDE: 600; 310; 30; 20 INJECTION, SOLUTION INTRAVENOUS at 07:55

## 2020-08-10 RX ADMIN — VANCOMYCIN HYDROCHLORIDE 500 MG: 500 INJECTION, POWDER, LYOPHILIZED, FOR SOLUTION INTRAVENOUS at 13:40

## 2020-08-10 RX ADMIN — PANTOPRAZOLE SODIUM 40 MG: 40 INJECTION, POWDER, FOR SOLUTION INTRAVENOUS at 09:30

## 2020-08-10 RX ADMIN — THIAMINE HYDROCHLORIDE 200 MG: 100 INJECTION, SOLUTION INTRAMUSCULAR; INTRAVENOUS at 00:00

## 2020-08-10 RX ADMIN — METRONIDAZOLE 500 MG: 500 INJECTION, SOLUTION INTRAVENOUS at 23:40

## 2020-08-10 RX ADMIN — PROPOFOL 25 MCG/KG/MIN: 10 INJECTION, EMULSION INTRAVENOUS at 23:35

## 2020-08-10 RX ADMIN — SODIUM CHLORIDE, PRESERVATIVE FREE 10 ML: 5 INJECTION INTRAVENOUS at 09:31

## 2020-08-10 RX ADMIN — ALBUTEROL SULFATE 4 PUFF: 90 AEROSOL, METERED RESPIRATORY (INHALATION) at 19:58

## 2020-08-10 RX ADMIN — SODIUM CHLORIDE, PRESERVATIVE FREE 10 ML: 5 INJECTION INTRAVENOUS at 21:00

## 2020-08-10 RX ADMIN — ALBUTEROL SULFATE 4 PUFF: 90 AEROSOL, METERED RESPIRATORY (INHALATION) at 15:10

## 2020-08-10 RX ADMIN — ROCURONIUM BROMIDE 50 MG: 10 INJECTION INTRAVENOUS at 07:50

## 2020-08-10 RX ADMIN — CEFEPIME HYDROCHLORIDE 2 G: 2 INJECTION, POWDER, FOR SOLUTION INTRAVENOUS at 14:23

## 2020-08-10 RX ADMIN — EPOETIN ALFA-EPBX 10000 UNITS: 10000 INJECTION, SOLUTION INTRAVENOUS; SUBCUTANEOUS at 13:41

## 2020-08-10 RX ADMIN — THIAMINE HYDROCHLORIDE 200 MG: 100 INJECTION, SOLUTION INTRAMUSCULAR; INTRAVENOUS at 23:40

## 2020-08-10 ASSESSMENT — PULMONARY FUNCTION TESTS
PIF_VALUE: 16
PIF_VALUE: 17
PIF_VALUE: 15
PIF_VALUE: 3
PIF_VALUE: 16
PIF_VALUE: 14
PIF_VALUE: 18
PIF_VALUE: 17
PIF_VALUE: 14
PIF_VALUE: 17
PIF_VALUE: 17
PIF_VALUE: 15
PIF_VALUE: 17
PIF_VALUE: 16
PIF_VALUE: 18
PIF_VALUE: 16
PIF_VALUE: 14
PIF_VALUE: 7
PIF_VALUE: 17
PIF_VALUE: 18
PIF_VALUE: 2
PIF_VALUE: 0
PIF_VALUE: 15
PIF_VALUE: 20
PIF_VALUE: 18
PIF_VALUE: 18
PIF_VALUE: 17
PIF_VALUE: 2
PIF_VALUE: 17
PIF_VALUE: 20
PIF_VALUE: 17
PIF_VALUE: 18
PIF_VALUE: 16
PIF_VALUE: 15
PIF_VALUE: 15
PIF_VALUE: 17
PIF_VALUE: 15
PIF_VALUE: 16
PIF_VALUE: 2
PIF_VALUE: 18
PIF_VALUE: 16
PIF_VALUE: 15
PIF_VALUE: 15
PIF_VALUE: 14
PIF_VALUE: 18
PIF_VALUE: 21
PIF_VALUE: 1
PIF_VALUE: 18
PIF_VALUE: 17
PIF_VALUE: 17
PIF_VALUE: 16
PIF_VALUE: 15
PIF_VALUE: 18
PIF_VALUE: 0
PIF_VALUE: 13
PIF_VALUE: 17
PIF_VALUE: 17
PIF_VALUE: 16
PIF_VALUE: 2
PIF_VALUE: 15
PIF_VALUE: 17
PIF_VALUE: 17
PIF_VALUE: 18
PIF_VALUE: 15
PIF_VALUE: 9
PIF_VALUE: 18
PIF_VALUE: 21
PIF_VALUE: 15
PIF_VALUE: 17
PIF_VALUE: 25
PIF_VALUE: 16
PIF_VALUE: 14
PIF_VALUE: 18
PIF_VALUE: 17
PIF_VALUE: 12
PIF_VALUE: 17
PIF_VALUE: 15
PIF_VALUE: 16
PIF_VALUE: 15
PIF_VALUE: 2
PIF_VALUE: 17

## 2020-08-10 ASSESSMENT — PAIN SCALES - GENERAL
PAINLEVEL_OUTOF10: 0

## 2020-08-10 NOTE — PROGRESS NOTES
This note also relates to the following rows which could not be included:  Pulse - Cannot attach notes to unvalidated device data  Resp - Cannot attach notes to unvalidated device data  SpO2 - Cannot attach notes to unvalidated device data

## 2020-08-10 NOTE — PROGRESS NOTES
[address] [place] [address]  HOSPITALIST PROGRESS NOTE                       Name:  Gisel Spann /Age/Sex: 1955  (60 y.o. male)   MRN & CSN:  [id number] & [id number] Admission Date/Time: 2020  3:22 PM   Location:  - Attending:  Olivia Esparza MD                                                  [facility]  Gisel Spann is a 60 y.o. male who was admitted on  with sepsis    SUBJECTIVE  Seen early this morning, plan for trach/peg today- still with intermittent bleed from surgical incision    ROS- unable to complete as patient not able to participate at this time      ALLERGIES: No Known Allergies    PCP: No primary care provider on file. PAST MEDICAL HISTORY, SURGICAL HISTORY, SOCIAL HISTORY and  HOME MEDICATIONS all reviewed. OBJECTIVE  Vitals:    08/10/20 0608 08/10/20 0610 08/10/20 0915 08/10/20 1000   BP:       Pulse: 100 100 95 92   Resp: 23 24 16 20   Temp:   96.6 °F (35.9 °C)    TempSrc:   Rectal    SpO2: 99% 100% 100% 99%   Weight:       Height:           PHYSICAL EXAM   GEN sedated  EYES Pupils are equally round. No scleral erythema, discharge, or conjunctivitis. HENT Mucous membranes are dry. Oral pharynx without exudates, no evidence of thrush. NECK Supple, no apparent thyromegaly or masses. RESP unlabored respiration, decreased breath sounds at the bases  CARDIO/VASC S1/S2 auscultated. Regular rate without appreciable murmurs, rubs, or gallops. No JVD or carotid bruits. Peripheral pulses equal bilaterally and palpable. GI Abdomen is soft- further exam limited due to sedation  HEME/LYMPH No palpable cervical lymphadenopathy and no hepatosplenomegaly. No petechiae or ecchymoses. SKIN Normal coloration, warm, dry.   NEURO Sedated    INTAKE: In: 964.4 [I.V.:714.4]  Out: 1220   OUTPUT: In: 964.4   Out: 1220 [Urine:1000]    LABS  Recent Labs     20  0600 20  2040 08/10/20  0600   WBC 22.1* 17.4* 27.1*   HGB 7.1* 7.0* 7.3*   HCT 22.2* 22.2* 22.3*    223 238      Recent Labs     08/09/20  0600 08/09/20  2040 08/10/20  0600    135 136   K 4.0 4.4 4.2   CL 97* 96* 96*   CO2 19* 15* 18*   PHOS  --  9.6*  --    * 142* 157*   CREATININE 2.9* 3.2* 3.3*     Recent Labs     08/08/20  0400 08/09/20  0600 08/10/20  0600   AST 37 38* 35   ALT <5* 7* 10   BILITOT 1.3* 1.0 1.1*   ALKPHOS 73 74 75     Recent Labs     08/08/20  0400 08/09/20  0600 08/10/20  0600   INR 1.07 1.05 1.02     Recent Labs     08/08/20  0400 08/09/20  0600 08/10/20  0600   CKTOTAL 73 58 76          Abnormal labs for today noted      Imaging:     ECHO:    Microbiology:  Blood culture:    Urine culture:    Sputum culture:    Procedures done this admission:    MEDS  Scheduled Meds:   vancomycin  500 mg Intravenous Once in dialysis    midodrine  10 mg Oral Q MWF    sodium chloride  250 mL Intravenous Once    cefepime  2 g Intravenous Once per day on Mon Wed Fri    scopolamine  1 patch Transdermal Q72H    vancomycin (VANCOCIN) intermittent dosing (placeholder)   Other RX Placeholder    metroNIDAZOLE  500 mg Intravenous Q8H    pantoprazole  40 mg Intravenous Daily    thiamine (VITAMIN B1) IVPB  200 mg Intravenous Q12H    chlorhexidine  15 mL Mouth/Throat BID    albuterol sulfate HFA  4 puff Inhalation Q4H    And    ipratropium  4 puff Inhalation Q4H    aspirin  324 mg Oral Once    sodium chloride flush  10 mL Intravenous BID    sodium chloride flush  10 mL Intravenous 2 times per day    [Held by provider] enoxaparin  1 mg/kg Subcutaneous Daily    nicotine  1 patch Transdermal Daily     Continuous Infusions:   dextrose      fentanyl 25 mcg/hr (08/10/20 0752)    propofol 50 mcg/kg/min (08/10/20 0752)     PRN Meds:glucose, dextrose, glucagon (rDNA), dextrose, heparin (porcine), ipratropium-albuterol, iopamidol, sodium chloride flush, acetaminophen **OR** acetaminophen, polyethylene glycol, promethazine **OR** ondansetron, promethazine, diphenhydrAMINE, dicyclomine, morphine        ASSESSMENT and PLAN  Hospital Day: 18    1-Septic shock with MSSA bacteremia due to wound-off pressors- repeat blood culture negative, one set growing bacilli- ?contaminant- ID on the case- noted still with significant leukocytosis which is worse- defer abx to ID  -leukocytosis partly due to steroids which is stopped as of 8/9- no longer on pressors. 2-Probable staph UTI- abx per ID  3-Acute on chronic respiratory failure due to sepsis and suspected aspiration pneumonia- still vent dependent as of 7/25- with severe pulm HTN with RVSP of 67  -s/p trach/peg on 8/10  4-JERAMIE due to ATN- dialysis per nephro  5-Skin wounds/right post thigh/buttock and bilateral UE- wound culture growing MSSA- surgery and ID following  6-Pneumatosis intestinalis s/p ex-lap on 7/25 negative for ischemic bowel, however CT on 7/31 showing hemoperitoneum and pneumoperitoneum- due to leukocytosis- repeated CTs which are stable. Also with bleeding from incision sites per nurse at times.  So far H/H stable  7-Acute blood loss anemia due to above- s/p transfusion- monitor H/H  8-Destructive lesion T12-L1- possible prior infectious spondyliits  -no abscess or osteo as per imaging  9-?low DIC state with mildly low fibrinogen and platelets- seen by hematology, no further suspicion of DIC    Other issues  -Hx of gastric cancer stage 4  -polysubstance abuse- UDS positive for cocaine and opiate  -documented hep C    No pharmacological dvt prophylaxis due to retroperitoneal hemorrhage with acute blood loss anemia      -s/p trach/peg today- then plan for LTAC- CM to follow up- will await final abx recommendation per ID as well       Disp:     Diet Diet NPO Effective Now   DVT Prophylaxis [] Lovenox, []  Heparin, [] SCDs, [] Ambulation   GI Prophylaxis [] PPI,  [] H2 Blocker,  [] Carafate,  [] Diet/Tube Feeds   Code Status Full Code   Disposition Patient requires continued admission due to sepsis, respiratory failure   CMS Level of Risk [] Low, [] Moderate,[x] High  Patient's risk as above due to sepsis respiratory failure     CARMINE WOOTEN MD 8/10/2020 11:05 AM

## 2020-08-10 NOTE — PROGRESS NOTES
[address] [facility]  consulted by Dr. Gustavo Rangel for monitoring and adjustment. Indication for treatment: MSSA bacteremia, GPB in blood   Goal trough: 10-15 mcg/mL     Pertinent Laboratory Values:   Temp Readings from Last 3 Encounters:   08/10/20 96.6 °F (35.9 °C) (Rectal)   08/10/20 97.3 °F (36.3 °C)   07/25/20 (!) 62.3 °F (16.8 °C)     Recent Labs     08/09/20  0600 08/09/20  2040 08/10/20  0600   WBC 22.1* 17.4* 27.1*     Recent Labs     08/09/20  0600 08/09/20  2040 08/10/20  0600   * 142* 157*   CREATININE 2.9* 3.2* 3.3*     Estimated Creatinine Clearance: 22 mL/min (A) (based on SCr of 3.3 mg/dL (H)). Intake/Output Summary (Last 24 hours) at 8/10/2020 1144  Last data filed at 8/10/2020 0629  Gross per 24 hour   Intake 964.36 ml   Output 1220 ml   Net -255.64 ml       Pertinent Cultures:  Date    Source    Results  7/24   Blood    MSSA  7/27   Blood    Bacillus (1 of 2)  7/29   Blood    NGTD   8/5   Sputum   Yeast   8/5   Blood    NGTD    Vancomycin level:   TROUGH:    No results for input(s): VANCOTROUGH in the last 72 hours. RANDOM:    Recent Labs     08/10/20  0600   VANCORANDOM 16.3       Assessment:  · WBC and temperature: WBC remains elevated (receiving methylprednisolone), 24-hr Tmax = 99.1F.  · SCr, BUN, and urine output:   · CRRT stopped- transition to HD Mon/Wed/Fri   · Days of therapy: 18  · Vancomycin level: 16.3, ok to re-dose after HD    Plan:  · Continue intermittent dosing based on levels 2/2 RRT   · Received last dose of vancomycin 1000 mg on 08/07  · Based on pre-HD level, will plan to re-dose with vancomycin 500 mg IVPB x 1 dose. · Repeat next random level prior to next HD session. · Pharmacy will continue to monitor patient and adjust therapy as indicated.     VANCOMYCIN RANDOM LEVEL SCHEDULED FOR 8/12 @06:00    Thank you for the consult,  [facility]  8/10/2020 11:44 AM

## 2020-08-10 NOTE — PROGRESS NOTES
RN was turning patient and large amount of blood started to actively drain from abdominal surgical incision just superior to umbilicus. Improves once patient is supine. Patient is anemic, hgb 7 and has borderline BP with tachycardia. 1 unit of pRBC has been ordered. DVT prophylaxis is on hold. Day hospitalist noted bleeding from incision as well.      Bouchra Peck [facility]  Hospitalist

## 2020-08-10 NOTE — ANESTHESIA POSTPROCEDURE EVALUATION
Department of Anesthesiology  Postprocedure Note    Patient: Tomy Ricardo  MRN: [id number]  YOB: 1955  Date of evaluation: 8/10/2020  Time:  12:34 PM     Procedure Summary     Date:  08/10/20 Room / Location:  [address]    Anesthesia Start:  9744 Anesthesia Stop:  0920    Procedures:       TRACHEOTOMY (N/A Neck)      EGD PEG TUBE PLACEMENT (N/A Abdomen) Diagnosis:  (malnutrition)    Surgeon:  Amari Saunders MD Responsible Provider:  MARLENI Garcia CRNA    Anesthesia Type:  general ASA Status:  4 - Emergent          Anesthesia Type: No value filed. Gabriella Phase I: Gabriella Score: 6    Gabriella Phase II:      Last vitals: Reviewed and per EMR flowsheets.        Anesthesia Post Evaluation    Patient location during evaluation: ICU  Patient participation: complete - patient cannot participate  Level of consciousness: sedated and ventilated  Pain score: 0  Airway patency: patent  Nausea & Vomiting: no nausea and no vomiting  Complications: no  Cardiovascular status: hemodynamically stable  Respiratory status: acceptable, ventilator and spontaneous ventilation  Hydration status: euvolemic

## 2020-08-10 NOTE — PLAN OF CARE
Problem: Falls - Risk of:  Goal: Will remain free from falls  Description: Will remain free from falls  8/10/2020 1729 by Marcus Ireland RN  Outcome: Ongoing  8/10/2020 0634 by Maria Fernanda Fuller RN  Outcome: Ongoing  Goal: Absence of physical injury  Description: Absence of physical injury  8/10/2020 1729 by Marcus Ireland RN  Outcome: Ongoing  8/10/2020 0634 by Maria Fernanda Fuller RN  Outcome: Ongoing     Problem: Pain:  Goal: Pain level will decrease  Description: Pain level will decrease  8/10/2020 1729 by Marcus Ireland RN  Outcome: Ongoing  8/10/2020 0634 by Maria Fernanda Fuller RN  Outcome: Ongoing  Goal: Control of acute pain  Description: Control of acute pain  8/10/2020 1729 by Marcus Ireland RN  Outcome: Ongoing  8/10/2020 0634 by Maria Fernanda Fuller RN  Outcome: Ongoing  Goal: Control of chronic pain  Description: Control of chronic pain  8/10/2020 1729 by Marcus Ireland RN  Outcome: Ongoing  8/10/2020 0634 by Maria Fernanda Fuller RN  Outcome: Ongoing     Problem: Diarrhea:  Goal: Bowel elimination is within specified parameters  Description: Bowel elimination is within specified parameters  8/10/2020 1729 by Marcus Ireland RN  Outcome: Ongoing  8/10/2020 0634 by Maria Fernanda Fuller RN  Outcome: Ongoing  Goal: Passage of soft, formed stool  Description: Passage of soft, formed stool  8/10/2020 1729 by Marcus Ireland RN  Outcome: Ongoing  8/10/2020 0634 by Maria Fernanda Fuller RN  Outcome: Ongoing  Goal: Establishment of normal bowel function will improve to within specified parameters  Description: Establishment of normal bowel function will improve to within specified parameters  8/10/2020 1729 by Marcus Ireland RN  Outcome: Ongoing  8/10/2020 0634 by Maria Fernanda Fuller RN  Outcome: Ongoing     Problem: Nausea/Vomiting:  Goal: Absence of nausea/vomiting  Description: Absence of nausea/vomiting  8/10/2020 1729 by Marcus Ireland RN  Outcome: Ongoing  8/10/2020 0634 by Maria Fernanda Fuller RN  Outcome: Ongoing  Goal: Able to drink  Description: Able to drink  8/10/2020 1729 by Suzanne Mohr RN  Outcome: Ongoing  8/10/2020 0634 by Claudette Ruff, RN  Outcome: Ongoing  Goal: Able to eat  Description: Able to eat  8/10/2020 1729 by Suzanne Mohr RN  Outcome: Ongoing  8/10/2020 0634 by Claudette Ruff, RN  Outcome: Ongoing  Goal: Ability to achieve adequate nutritional intake will improve  Description: Ability to achieve adequate nutritional intake will improve  8/10/2020 1729 by Suzanne Mohr RN  Outcome: Ongoing  8/10/2020 0634 by Claudette Ruff, RN  Outcome: Ongoing     Problem: Breathing Pattern - Ineffective:  Goal: Ability to achieve and maintain a regular respiratory rate will improve  Description: Ability to achieve and maintain a regular respiratory rate will improve  8/10/2020 1729 by Suzanne Mohr RN  Outcome: Ongoing  8/10/2020 0634 by Claudette Ruff, RN  Outcome: Ongoing     Problem: Fluid Volume:  Goal: Ability to achieve a balanced intake and output will improve  Description: Ability to achieve a balanced intake and output will improve  8/10/2020 1729 by Suzanne Mohr RN  Outcome: Ongoing  8/10/2020 0634 by Claudette Ruff, RN  Outcome: Ongoing  Goal: Will show no signs or symptoms of fluid imbalance  Description: Will show no signs or symptoms of fluid imbalance  8/10/2020 1729 by Suzanne Mohr RN  Outcome: Ongoing  8/10/2020 0634 by Claudette Ruff, RN  Outcome: Ongoing     Problem: Physical Regulation:  Goal: Ability to maintain clinical measurements within normal limits will improve  Description: Ability to maintain clinical measurements within normal limits will improve  8/10/2020 1729 by Suzanne Mohr RN  Outcome: Ongoing  8/10/2020 0634 by Claudette Ruff, RN  Outcome: Ongoing  Goal: Will show no signs and symptoms of electrolyte imbalance  Description: Will show no signs and symptoms of electrolyte imbalance  8/10/2020 1729 by Suzanne Mohr RN  Outcome: Ongoing  8/10/2020 0634 by Claudette Ruff, RN  Outcome: Ongoing     Problem: Restraint Use - Nonviolent/Non-Self-Destructive Behavior:  Goal: Absence of restraint indications  Description: Absence of restraint indications  8/10/2020 1729 by Bradley Gil RN  Outcome: Ongoing  8/10/2020 0634 by Gato Garcia RN  Outcome: Ongoing  Goal: Absence of restraint-related injury  Description: Absence of restraint-related injury  8/10/2020 1729 by Bradley Gil RN  Outcome: Ongoing  8/10/2020 0634 by Gato Garcia RN  Outcome: Ongoing     Problem: Skin Integrity:  Goal: Will show no infection signs and symptoms  Description: Will show no infection signs and symptoms  8/10/2020 1729 by Bradley Gil RN  Outcome: Ongoing  8/10/2020 0634 by Gato Garcia RN  Outcome: Ongoing  Goal: Absence of new skin breakdown  Description: Absence of new skin breakdown  8/10/2020 1729 by Bradley Gil RN  Outcome: Ongoing  8/10/2020 0634 by Gato Garcia RN  Outcome: Ongoing  Goal: Status of oral mucous membranes will improve  Description: Status of oral mucous membranes will improve  8/10/2020 1729 by Bradley Gil RN  Outcome: Ongoing  8/10/2020 0634 by Gato Garcia RN  Outcome: Ongoing  Goal: Skin integrity will be maintained  Description: Skin integrity will be maintained  8/10/2020 1729 by Bradley Gil RN  Outcome: Ongoing  8/10/2020 0634 by Gato Garcia RN  Outcome: Ongoing     Problem: Discharge Planning:  Goal: Participates in care planning  Description: Participates in care planning  8/10/2020 1729 by Bradley Gil RN  Outcome: Ongoing  8/10/2020 0634 by Gato Garcia RN  Outcome: Ongoing  Goal: Discharged to appropriate level of care  Description: Discharged to appropriate level of care  8/10/2020 1729 by Bradley Gil RN  Outcome: Ongoing  8/10/2020 0634 by Gato Garcia RN  Outcome: Ongoing  Goal: Ability to perform activities of daily living will improve  Description: Ability to perform activities of daily living will improve  8/10/2020 1729 by Bradley Gil RN  Outcome: Ongoing  8/10/2020 0634 by Fern Ritchie RN  Outcome: Ongoing     Problem: Airway Clearance - Ineffective:  Goal: Ability to maintain a clear airway will improve  Description: Ability to maintain a clear airway will improve  8/10/2020 1729 by Marcus Ireland RN  Outcome: Ongoing  8/10/2020 0634 by Maria Fernanda Fuller RN  Outcome: Ongoing     Problem: Anxiety/Stress:  Goal: Level of anxiety will decrease  Description: Level of anxiety will decrease  8/10/2020 1729 by Marcus Ireland RN  Outcome: Ongoing  8/10/2020 0634 by Maria Fernanda Fuller RN  Outcome: Ongoing     Problem: Aspiration:  Goal: Absence of aspiration  Description: Absence of aspiration  8/10/2020 1729 by Marcus Ireland RN  Outcome: Ongoing  8/10/2020 0634 by Maria Fernanda Fuller RN  Outcome: Ongoing     Problem:  Bowel Function - Altered:  Goal: Bowel elimination is within specified parameters  Description: Bowel elimination is within specified parameters  8/10/2020 1729 by Marcus Ireland RN  Outcome: Ongoing  8/10/2020 0634 by Maria Fernanda Fuller RN  Outcome: Ongoing     Problem: Cardiac Output - Decreased:  Goal: Hemodynamic stability will improve  Description: Hemodynamic stability will improve  8/10/2020 1729 by Marcus Ireland RN  Outcome: Ongoing  8/10/2020 0634 by Maria Fernanda Fuller RN  Outcome: Ongoing     Problem: Fluid Volume - Imbalance:  Goal: Absence of imbalanced fluid volume signs and symptoms  Description: Absence of imbalanced fluid volume signs and symptoms  8/10/2020 1729 by Marcus Ireland RN  Outcome: Ongoing  8/10/2020 0634 by Maria Fernanda Fuller RN  Outcome: Ongoing     Problem: Gas Exchange - Impaired:  Goal: Levels of oxygenation will improve  Description: Levels of oxygenation will improve  8/10/2020 1729 by Marcus Ireland RN  Outcome: Ongoing  8/10/2020 0634 by Maria Fernanda Fuller RN  Outcome: Ongoing     Problem: Mental Status - Impaired:  Goal: Mental status will be restored to baseline  Description: Mental status will be restored to baseline  8/10/2020 1729 by Marcus Ireland RN  Outcome: Ongoing  8/10/2020 0634 by Ramirez Orr RN  Outcome: Ongoing     Problem: Nutrition Deficit:  Goal: Ability to achieve adequate nutritional intake will improve  Description: Ability to achieve adequate nutritional intake will improve  8/10/2020 1729 by Shaunna Machado RN  Outcome: Ongoing  8/10/2020 0634 by Ramirez Orr RN  Outcome: Ongoing     Problem: Pain:  Goal: Pain level will decrease  Description: Pain level will decrease  8/10/2020 1729 by Shaunna Machado RN  Outcome: Ongoing  8/10/2020 0634 by Ramirez Orr RN  Outcome: Ongoing  Goal: Recognizes and communicates pain  Description: Recognizes and communicates pain  8/10/2020 1729 by Shaunna Machado RN  Outcome: Ongoing  8/10/2020 0634 by Ramirez Orr RN  Outcome: Ongoing  Goal: Control of acute pain  Description: Control of acute pain  8/10/2020 1729 by Shaunna aMchado RN  Outcome: Ongoing  8/10/2020 0634 by Ramirez Orr RN  Outcome: Ongoing  Goal: Control of chronic pain  Description: Control of chronic pain  8/10/2020 1729 by Shaunna Machado RN  Outcome: Ongoing  8/10/2020 0634 by Ramirez Orr RN  Outcome: Ongoing     Problem: Serum Glucose Level - Abnormal:  Goal: Ability to maintain appropriate glucose levels will improve to within specified parameters  Description: Ability to maintain appropriate glucose levels will improve to within specified parameters  8/10/2020 1729 by Shaunna Machado RN  Outcome: Ongoing  8/10/2020 0634 by Ramirez Orr RN  Outcome: Ongoing  Goal: Ability to maintain appropriate glucose levels will improve  Description: Ability to maintain appropriate glucose levels will improve  8/10/2020 1729 by Shaunna Machado RN  Outcome: Ongoing  8/10/2020 0634 by Ramirez Orr RN  Outcome: Ongoing     Problem: Skin Integrity - Impaired:  Goal: Will show no infection signs and symptoms  Description: Will show no infection signs and symptoms  8/10/2020 1729 by Shaunna Machado RN  Outcome: Ongoing  8/10/2020 0634 by Ramirez Orr RN  Outcome: Ongoing  Goal: Absence of new skin breakdown  Description: Absence of new skin breakdown  8/10/2020 1729 by Filiberto Haynes RN  Outcome: Ongoing  8/10/2020 0634 by Jennifer Abad RN  Outcome: Ongoing     Problem: Sleep Pattern Disturbance:  Goal: Appears well-rested  Description: Appears well-rested  8/10/2020 1729 by Filiberto Haynes RN  Outcome: Ongoing  8/10/2020 0634 by Jennifer Abad RN  Outcome: Ongoing     Problem: Tissue Perfusion, Altered:  Goal: Circulatory function within specified parameters  Description: Circulatory function within specified parameters  8/10/2020 1729 by Filiberto Haynes RN  Outcome: Ongoing  8/10/2020 0634 by Jennifer Abad RN  Outcome: Ongoing     Problem: Tissue Perfusion - Cardiopulmonary, Altered:  Goal: Absence of angina  Description: Absence of angina  8/10/2020 1729 by Filiberto Haynes RN  Outcome: Ongoing  8/10/2020 0634 by Jennifer Abad RN  Outcome: Ongoing  Goal: Hemodynamic stability will improve  Description: Hemodynamic stability will improve  8/10/2020 1729 by Filiberto Haynes RN  Outcome: Ongoing  8/10/2020 0634 by Jennifer Abad RN  Outcome: Ongoing     Problem: Infection - Ventilator-Associated Pneumonia:  Goal: Absence of pulmonary infection  Description: Absence of pulmonary infection  8/10/2020 1729 by Filiberto Haynes RN  Outcome: Ongoing  8/10/2020 0634 by Jennifre Abad RN  Outcome: Ongoing     Problem: Venous Thromboembolism:  Goal: Will show no signs or symptoms of venous thromboembolism  Description: Will show no signs or symptoms of venous thromboembolism  8/10/2020 1729 by Filiberto Haynes RN  Outcome: Ongoing  8/10/2020 0634 by Jennifer Abad RN  Outcome: Ongoing  Goal: Absence of signs or symptoms of impaired coagulation  Description: Absence of signs or symptoms of impaired coagulation  8/10/2020 1729 by Filiberto Haynes RN  Outcome: Ongoing  8/10/2020 0634 by Jennifer Abad RN  Outcome: Ongoing     Problem: Confusion - Acute:  Goal: Mental status will be restored to baseline  Description: Mental status will be restored to baseline  8/10/2020 1729 by Salbador Carreon RN  Outcome: Ongoing  8/10/2020 0634 by Cecily Marie RN  Outcome: Ongoing     Problem: Injury - Risk of, Physical Injury:  Goal: Will remain free from falls  Description: Will remain free from falls  8/10/2020 1729 by Salbador Carreon RN  Outcome: Ongoing  8/10/2020 0634 by Cecily Marie RN  Outcome: Ongoing  Goal: Absence of physical injury  Description: Absence of physical injury  8/10/2020 1729 by Salbador Carreon RN  Outcome: Ongoing  8/10/2020 0634 by Cecily Marie RN  Outcome: Ongoing

## 2020-08-10 NOTE — BRIEF OP NOTE
Brief Postoperative Note      Patient: Rober Mojica  YOB: 1955  MRN: [id number]    Date of Procedure: 8/10/2020    Pre-Op Diagnosis: malnutrition, Respiratory failure    Post-Op Diagnosis: Same       Procedure(s):  TRACHEOSTOMY  EGD PEG TUBE PLACEMENT    Surgeon(s):  Meaghan Kasper MD    Assistant:  Physician Assistant: Jeniffer Warner PA-C    Anesthesia: General    Estimated Blood Loss (mL): Minimal    Complications: None    Specimens:   * No specimens in log *    Implants:  * No implants in log *      Drains:   NG/OG/NJ/NE Tube Orogastric Right mouth (Active)   Surrounding Skin Dry; Intact 08/10/20 0610   Securement device Yes 08/10/20 0020   Status Other (Comment) 08/10/20 0020   Placement Verified by External Catheter Length 08/10/20 0020   NG/OG/NJ/NE External Measurement (cm) 55 cm 08/10/20 0020   Drainage Appearance Sarahy Elijah; David 08/08/20 1606   Tube Feeding High Protein 08/09/20 0627   Tube Feeding Status Stopped 08/10/20 0020   Rate/Schedule 70 mL/hr 08/09/20 0627   Tube Feeding Supplement Amount (mL) 240 08/03/20 1200   Tube Feeding Intake (mL) 655 ml 08/09/20 0627   Free Water Flush (mL) 55 mL 08/09/20 0627   Free Water Rate 30mL Q4hrs 08/09/20 0627   Residual Volume (ml) 175 ml 08/10/20 0020   Output (mL) 170 ml 08/10/20 0610       Gastrostomy/Enterostomy/Jejunostomy LUQ (Active)       Rectal Tube (Active)   Stool Appearance Mucous; Watery 08/10/20 0629   Stool Color Brown 08/10/20 0629   Stool Amount Small 08/08/20 1830   Rectal Tube Output 50 ml 08/10/20 0629       Urethral Catheter Non-latex (Active)   $ Urethral catheter insertion $ Not inserted for procedure 08/09/20 6337   Catheter Indications Need for fluid management in critically ill patients in a critical care setting not able to be managed by other means such as BSC with hat, bedpan, urinal, condom catheter, or short term intermittent urethral catherization 08/10/20 0629   Site Assessment No urethral drainage 08/10/20 3279   Urine Color Yellow; Lilli 08/10/20 0629   Urine Appearance Clear 08/10/20 0629   Output (mL) 325 mL 08/10/20 0629       [REMOVED] Urethral Catheter Non-latex 16 fr (Removed)   $ Urethral catheter insertion $ Not inserted for procedure 07/25/20 0121   Catheter Indications Need for fluid management in critically ill patients in a critical care setting not able to be managed by other means such as BSC with hat, bedpan, urinal, condom catheter, or short term intermittent urethral catherization 07/29/20 1200   Site Assessment Pink; No urethral drainage 07/29/20 1200   Urine Color Lilli 07/29/20 1200   Urine Appearance Clear 07/29/20 1200   Urine Odor Malodorous 07/29/20 1200   Output (mL) 0 mL 07/29/20 1417       Findings: Same.      Electronically signed by Manny Hinojosa PA-C on 8/10/2020 at 8:55 AM

## 2020-08-10 NOTE — ANESTHESIA PRE PROCEDURE
08/10/20 0641 500 mg at 08/10/20 0641    fentanyl (SUBLIMAZE) 1,000 mcg in sodium chloride 0.9% 100 mL infusion  25 mcg/hr Intravenous Continuous Kiko Encinas MD 2.5 mL/hr at 08/10/20 0739 25 mcg/hr at 08/10/20 0739    pantoprazole (PROTONIX) injection 40 mg  40 mg Intravenous Daily Kenneth Stephens MD   40 mg at 08/09/20 1012    thiamine (B-1) 200 mg in sodium chloride 0.9 % 100 mL IVPB  200 mg Intravenous Q12H Leopoldo Richards MD   Stopped at 08/10/20 0030    propofol injection  10 mcg/kg/min Intravenous Titrated Leopoldo Richards MD 8.6 mL/hr at 08/10/20 0430 25 mcg/kg/min at 08/10/20 0430    ipratropium-albuterol (DUONEB) nebulizer solution 1 ampule  1 ampule Inhalation Q4H PRN Andrew Blair MD   1 ampule at 08/09/20 1113    chlorhexidine (PERIDEX) 0.12 % solution 15 mL  15 mL Mouth/Throat BID Kiko Encinas MD   15 mL at 08/09/20 2116    albuterol sulfate  (90 Base) MCG/ACT inhaler 4 puff  4 puff Inhalation Q4H Kiko Encinas MD   4 puff at 08/10/20 0416    And    ipratropium (ATROVENT HFA) 17 MCG/ACT inhaler 4 puff  4 puff Inhalation Q4H Kiko Encinas MD   4 puff at 08/10/20 0416    aspirin chewable tablet 324 mg  324 mg Oral Once Andrew Blair MD        iopamidol (ISOVUE-370) 76 % injection 80 mL  80 mL Intravenous ONCE PRN Andrew Blair MD        sodium chloride flush 0.9 % injection 10 mL  10 mL Intravenous BID Monica Alexander MD   10 mL at 08/09/20 2115    sodium chloride flush 0.9 % injection 10 mL  10 mL Intravenous 2 times per day Andrew Blair MD   10 mL at 08/09/20 2116    sodium chloride flush 0.9 % injection 10 mL  10 mL Intravenous PRN Andrew Blair MD   10 mL at 08/08/20 2330    acetaminophen (TYLENOL) tablet 650 mg  650 mg Oral Q6H PRN Andrew Blair MD        Or    acetaminophen (TYLENOL) suppository 650 mg  650 mg Rectal Q6H PRN Monica Alexander MD        polyethylene glycol (GLYCOLAX) packet 17 g  17 g Oral Daily PRN Andrew Blair MD       [facility] promethazine (PHENERGAN) tablet 12.5 mg  12.5 mg Oral Q6H PRN Julisa Mckee MD        Or    ondansetron (ZOFRAN) injection 4 mg  4 mg Intravenous Q6H PRN Julisa Mckee MD   4 mg at 07/25/20 0417    [Held by provider] enoxaparin (LOVENOX) injection 60 mg  1 mg/kg Subcutaneous Daily Julisa Mckee MD   Stopped at 07/30/20 0900    promethazine (PHENERGAN) tablet 25 mg  25 mg Oral Q6H PRN Julisa Mckee MD        nicotine (NICODERM CQ) 21 MG/24HR 1 patch  1 patch Transdermal Daily Monica Alexander MD   1 patch at 08/09/20 1021    diphenhydrAMINE (BENADRYL) injection 12.5 mg  12.5 mg Intravenous Nightly PRN Monica Alexander MD   12.5 mg at 07/25/20 0223    dicyclomine (BENTYL) tablet 20 mg  20 mg Oral Q6H PRN Julisa Mckee MD        morphine injection 2 mg  2 mg Intravenous Q4H PRN Julisa Mckee MD   2 mg at 07/25/20 1104       Allergies:  No Known Allergies    Problem List:    Patient Active Problem List   Diagnosis Code    Sepsis ([facility] A41.9    Leukocytosis D72.829    Ischemic bowel disease ([facility] K55.9    MSSA bacteremia R78.81    Chronic hepatitis C without hepatic coma ([facility] B18.2       Past Medical History:        Diagnosis Date    Drug abuse and dependence ([facility] [address] [facility]     Hypertension        Past Surgical History:        Procedure Laterality Date    BACK SURGERY      IR NONTUNNELED VASCULAR CATHETER  7/26/2020    IR NONTUNNELED VASCULAR CATHETER 7/26/2020 [facility] SPECIAL PROCEDURES    LAPAROTOMY N/A 7/25/2020    LAPAROTOMY EXPLORATORY performed by Julisa Mckee MD at [facility] OR       Social History:    Social History     Tobacco Use    Smoking status: Current Every Day Smoker     Packs/day: 1.50     Types: Cigarettes   Substance Use Topics    Alcohol use:  No                                Ready to quit: Not Answered  Counseling given: Not Answered      Vital Signs (Current):   Vitals:    08/10/20 0606 08/10/20 0607 08/10/20 0608 08/10/20 0610   BP:       Pulse: 101 100 100 100   Resp: 16 21 23 24   Temp: TempSrc:       SpO2: 100% 100% 99% 100%   Weight:       Height:                                                  BP Readings from Last 3 Encounters:   08/10/20 115/71   07/25/20 111/88       NPO Status: Time of last liquid consumption: 1100                        Time of last solid consumption: 1100                        Date of last liquid consumption: 07/25/20                        Date of last solid food consumption: 07/22/20    BMI:   Wt Readings from Last 3 Encounters:   08/10/20 153 lb 3.5 oz (69.5 kg)     Body mass index is 24 kg/m².     CBC:   Lab Results   Component Value Date    WBC 27.1 08/10/2020    RBC 2.51 08/10/2020    HGB 7.3 08/10/2020    HCT 22.3 08/10/2020    MCV 88.8 08/10/2020    RDW 23.4 08/10/2020     08/10/2020       CMP:   Lab Results   Component Value Date     08/10/2020    K 4.2 08/10/2020    CL 96 08/10/2020    CO2 18 08/10/2020     08/10/2020    CREATININE 3.3 08/10/2020    GFRAA 23 08/10/2020    LABGLOM 19 08/10/2020    GLUCOSE 127 08/10/2020    PROT 5.6 08/10/2020    PROT 8.4 11/06/2010    CALCIUM 8.5 08/10/2020    BILITOT 1.1 08/10/2020    ALKPHOS 75 08/10/2020    AST 35 08/10/2020    ALT 10 08/10/2020       POC Tests:   Recent Labs     08/09/20  0706   POCGLU 157*       Coags:   Lab Results   Component Value Date    PROTIME 12.3 08/10/2020    INR 1.02 08/10/2020    APTT 25.8 08/10/2020       HCG (If Applicable): No results found for: PREGTESTUR, PREGSERUM, HCG, HCGQUANT     ABGs:   Lab Results   Component Value Date    PO2ART 94 08/10/2020    VNJ7FIE 34.0 08/10/2020    QMY4NEV 18.3 08/10/2020        Type & Screen (If Applicable):  No results found for: LABABO, LABRH    Drug/Infectious Status (If Applicable):  Lab Results   Component Value Date    HEPCAB REPEATEDLY REACTIVE 07/27/2020       COVID-19 Screening (If Applicable): No results found for: COVID19      Anesthesia Evaluation  Patient summary reviewed  Airway: Mallampati: Unable to assess / NA

## 2020-08-10 NOTE — PROGRESS NOTES
08/10/20 0419   Vent Information   Vent Type 980   Vent Mode AC/VC   Vt Ordered 450 mL   Rate Set 12 bmp   Peak Flow 60 L/min   Pressure Support 0 cmH20   FiO2  24 %   SpO2 100 %   SpO2/FiO2 ratio 416.67   Sensitivity 3   PEEP/CPAP 5   I Time/ I Time % 0 s   Humidification Source HME   Vent Patient Data   High Peep/I Pressure 0   Peak Inspiratory Pressure 20 cmH2O   Mean Airway Pressure 9.8 cmH20   Rate Measured 24 br/min   Vt Exhaled 450 mL   Minute Volume 11 Liters   I:E Ratio 1:1.80   Cough/Sputum   Sputum How Obtained Endotracheal;Suctioned   $Obtained Sample $Induced Sputum   Cough Productive   Sputum Amount Small   Sputum Color Creamy   Tenacity Thick   Spontaneous Breathing Trial (SBT) RT Doc   Pulse 94   Additional Respiratory  Assessments   Resp 24   Alarm Settings   High Pressure Alarm 45 cmH2O   Delay Alarm 20 sec(s)   Low Minute Volume Alarm 4 L/min   Apnea (secs) 20 secs   High Respiratory Rate 40 br/min   Low Exhaled Vt  250 mL   ETT (adult)   Placement Date/Time: 07/25/20 1748   Preoxygenation: Yes  Mask Ventilation: Mask ventilation not attempted (0)  Technique: Direct laryngoscopy  Type: Cuffed  Tube Size: 7 mm  Laryngoscope: Mac  Blade Size: 3  Location: Oral  Grade View: Full view of t. ..    Measured From [address] [place] [address],[address] By [employer] tube raza   Site Condition Dry   Cuff Pressure   (minimal leak)        08/10/20 0419   Vent Information   Vent Type 980   Vent Mode AC/VC   Vt Ordered 450 mL   Rate Set 12 bmp   Peak Flow 60 L/min   Pressure Support 0 cmH20   FiO2  24 %   SpO2 100 %   SpO2/FiO2 ratio 416.67   Sensitivity 3   PEEP/CPAP 5   I Time/ I Time % 0 s   Humidification Source HME   Vent Patient Data   High Peep/I Pressure 0   Peak Inspiratory Pressure 20 cmH2O   Mean Airway Pressure 9.8 cmH20   Rate Measured 24 br/min   Vt Exhaled 450 mL   Minute Volume 11 Liters   I:E Ratio 1:1.80   Cough/Sputum   Sputum How Obtained Endotracheal;Suctioned   $Obtained Sample $Induced Sputum   Cough Productive   Sputum Amount Small   Sputum Color Creamy   Tenacity Thick   Spontaneous Breathing Trial (SBT) RT Doc   Pulse 94   Additional Respiratory  Assessments   Resp 24   Alarm Settings   High Pressure Alarm 45 cmH2O   Delay Alarm 20 sec(s)   Low Minute Volume Alarm 4 L/min   Apnea (secs) 20 secs   High Respiratory Rate 40 br/min   Low Exhaled Vt  250 mL   ETT (adult)   Placement Date/Time: 07/25/20 1748   Preoxygenation: Yes  Mask Ventilation: Mask ventilation not attempted (0)  Technique: Direct laryngoscopy  Type: Cuffed  Tube Size: 7 mm  Laryngoscope: Mac  Blade Size: 3  Location: Oral  Grade View: Full view of t. ..    Measured From [address] [place] [address],[address] By [employer] tube raza   Site Condition Dry   Cuff Pressure   (minimal leak)

## 2020-08-10 NOTE — PROGRESS NOTES
Applicable  Dressing: Dry and Intact  Site Prep: Medical Aseptic Technique  Dressing Changed this Treatment: No  If yes, by whom: NA - not changed today  Date of Last Dressing Change: August 8, 2020  Antimicrobial Patch in place?: Yes  Red Alcohol Caps in place?: Blue caps  Gauze Dressing?: No  Non Dialysis Use?: No  Comment:    Flows: Good, Patent  If access problem, who was notified:     Pre and Post-Assessment  Patient Vitals for the past 8 hrs:   Level of Consciousness Heart Rhythm Respiratory Quality/Effort O2 Device Bilateral Breath Sounds Skin Color Skin Condition/Temp Abdomen Inspection Edema Edema Generalized RUE Edema LUE Edema RLE Edema LLE Edema Pain Level   08/10/20 0915 -- -- -- Ventilator -- -- -- -- -- -- -- -- -- -- --   08/10/20 1200 1 -- Unlabored -- -- -- -- Flat;Other (Comment) Right lower extremity; Left lower extremity;Right upper extremity; Left upper extremity -- Pitting;+2 Pitting;+2 +3;Pitting +3;Pitting --   08/10/20 1256 2 Regular Unlabored Ventilator Diminished Appropriate for ethnicity Warm;Dry Flat;Other (Comment) Right upper extremity; Left upper extremity;Right lower extremity; Left lower extremity +3 Pitting;+2 Pitting;+2 +3;Pitting +3;Pitting 0   08/10/20 1511 -- -- -- Ventilator -- -- -- -- -- -- -- -- -- -- --   08/10/20 1600 1 -- Unlabored -- -- -- -- Flat;Other (Comment) Right lower extremity; Left lower extremity;Right upper extremity; Left upper extremity -- Pitting;+2 Pitting;+2 +3;Pitting +3;Pitting --   08/10/20 1620 2 Irregular Unlabored Ventilator Diminished Appropriate for ethnicity Warm;Dry Flat;Other (Comment) Right upper extremity; Left upper extremity;Right lower extremity; Left lower extremity -- Pitting;+2 Pitting;+2 +3;Pitting -- 0     Labs  Recent Labs     08/09/20  0600 08/09/20  2040 08/10/20  0600   WBC 22.1* 17.4* 27.1*   HGB 7.1* 7.0* 7.3*   HCT 22.2* 22.2* 22.3*    223 238                                                                  Recent Labs 08/09/20  0600 08/09/20  2040 08/10/20  0600    135 136   K 4.0 4.4 4.2   CL 97* 96* 96*   CO2 19* 15* 18*   * 142* 157*   CREATININE 2.9* 3.2* 3.3*   GLUCOSE 144* 207* 127*     IV Drips and Rate/Dose   norepinephrine 7 mcg/min (08/10/20 1540)    dextrose      fentanyl 25 mcg/hr (08/10/20 0752)    propofol 25 mcg/kg/min (08/10/20 0905)      Safety - Before each treatment:   Dialysis Machine No.: [id number]  RO Machine No.: [id number]  Dialyzer Lot No.: [id number]  RO Machine Log Sheet Completed: Yes  Machine Alarm Self Test: Completed; Passed (08/10/20 1256)  Machine Autotest: Completed, Passed  Air Foam Detector: Tested, Proper Function  Extracorporeal Circuit Tested for Integrity: Yes  Machine Conductivity: 13.7  Manual Conductivity: 13.6  Machine Ph: 7.5  Bicarbonate Concentrate Lot No.: [id number]  Acid Concentrate Lot No.: [id number]  Manual Ph: 7.5  Bleach Test (Neg): Yes  Bath Temperature: 96.8 °F (36 °C)  Tubing Lot#: [id number]  Conductivity Meter Serial #: [id number]  All Connections Secure?: Yes  Venous Parameters Set?: Yes  Arterial Parameters Set?: Yes  Saline Line Double Clamped?: Yes  Air Foam Detector Engaged?: Yes  Machine Functioning Alarm Free?  Yes  Prime Given: 200ml    Chlorine Testing - Before each treatment and every 4 hours:   Treatment  Treatment Number: 1  Time On: 1320  Time Off: 1620  Treatment Goal: 1kg if tolerated  Weight: 153 lb 3.5 oz (69.5 kg) (08/10/20 0600)  1st check: less than 0.1 ppm at: 1300 hours  2nd check: less than 0.1 ppm at: 1545 hours  3rd check: Not Applicable  (if greater than 0.1 ppm, then check every 30 minutes from secondary)    Access Flows and Pressures  Patient Vitals for the past 8 hrs:   ABP (Arterial line BP) Blood Flow Rate (ml/min) Ultrafiltration Rate (ml/hr) Ultrafiltration Total Arterial Pressure (mmHg) Venous Pressure (mmHg) TMP Hemodialysis Conductivity DFR Comments Access Visible   08/10/20 0915 137/61 -- -- -- -- -- -- -- -- -- --   08/10/20 1000 122/58 97.3 °F (36.3 °C) 90 22 98 %   08/10/20 1320 (!) 103/55 -- 91 -- --   08/10/20 1330 (!) 95/53 -- -- -- --   08/10/20 1345 (!) 78/49 -- 92 -- --   08/10/20 1400 (!) 99/57 -- 91 28 98 %   08/10/20 1415 106/61 -- 87 -- --   08/10/20 1430 107/60 -- 88 -- --   08/10/20 1445 116/65 -- 91 -- --   08/10/20 1500 107/62 -- 90 -- --   08/10/20 1515 107/62 -- 94 -- --   08/10/20 1545 102/60 -- 92 -- --   08/10/20 1600 105/61 -- 93 -- --   08/10/20 1615 108/63 -- 93 -- --   08/10/20 1620 114/62 98.1 °F (36.7 °C) 92 24 99 %   08/10/20 1630 108/60 -- 93 24 98 %   08/10/20 1700 -- -- 92 21 98 %     Post-Dialysis  Arterial Catheter Locking Solution: Heparin (1000units:1ml)    Volume (ml): 1.5  Venous Catheter Locking Solution: Heparin (1000units:1ml)    Volume (ml): 1.5  Post-Treatment Procedures: Blood returned, Catheter capped, clamped and heparinized x 2 ports  Machine Disinfection Process: Acid/Vinegar Clean, Heat Disinfect  Rinseback Volume (ml): 300 ml  Total Liters Processed (l/min): 51.5 l/min  Dialyzer Clearance: Clear  Duration of Treatment (minutes): 180 minutes     Hemodialysis Intake (ml): 500 ml  Hemodialysis Output (ml): 110 ml  NET Removed (ml): 0 ml  Tolerated Treatment: Poor  Patient Response to Treatment: Needed Levophed support. No UF. Physician Notified?: Yes       Provider Notification  Provider Notification  Reason for Communication: Critical Value (comment)(low b/p) (08/10/20 0061)  Provider Name: Shilpi Salvador (08/10/20 [id number])  Provider Notification: Physician (08/10/20 1345)  Method of Communication: Call (08/10/20 1345)  Response: See orders(no UF, initiate levophed) (08/10/20 1345)  Notification Time: [phone] (08/10/20 1345)     Handoff complete and report given to Primary RN at 1630 hours. Primary RN (First Initial, Last Name, Title):   Elizabeth Jarvis RN     Education  Person Educated: Patient   Knowledge Base: None  Barriers to Learning?: Yes, including sedated  Preferred method of Learning: Oral  Topic(s): Access Care, Signs and Symptoms of Infection, Fluid Management, Procedural, Potassium and Diet   Teaching Tools: Explanation   Response to Education: Requires Follow-up     Electronically signed by Amber Rodríguez RN on 8/10/2020 at 5:09 PM

## 2020-08-10 NOTE — PROGRESS NOTES
Pt received from OR. Pt responsive to voice. Pt p/w/d. Pt received an 8 shiley that has four suture sites. Will continue to monitor.

## 2020-08-11 ENCOUNTER — APPOINTMENT (OUTPATIENT)
Dept: GENERAL RADIOLOGY | Age: 65
DRG: 004 | End: 2020-08-11
Payer: MEDICARE

## 2020-08-11 LAB
ALBUMIN SERPL-MCNC: 1.9 GM/DL (ref 3.4–5)
ALP BLD-CCNC: 67 IU/L (ref 40–129)
ALT SERPL-CCNC: 11 U/L (ref 10–40)
ANION GAP SERPL CALCULATED.3IONS-SCNC: 14 MMOL/L (ref 4–16)
APTT: 27.8 SECONDS (ref 25.1–37.1)
AST SERPL-CCNC: 40 IU/L (ref 15–37)
BASE EXCESS MIXED: 1.3 (ref 0–1.2)
BASOPHILS ABSOLUTE: 0 K/CU MM
BASOPHILS RELATIVE PERCENT: 0.1 % (ref 0–1)
BILIRUB SERPL-MCNC: 1.3 MG/DL (ref 0–1)
BUN BLDV-MCNC: 75 MG/DL (ref 6–23)
CALCIUM SERPL-MCNC: 7.4 MG/DL (ref 8.3–10.6)
CARBON MONOXIDE, BLOOD: 2.8 % (ref 0–5)
CHLORIDE BLD-SCNC: 98 MMOL/L (ref 99–110)
CO2 CONTENT: 26.1 MMOL/L (ref 19–24)
CO2: 23 MMOL/L (ref 21–32)
COMMENT: ABNORMAL
CREAT SERPL-MCNC: 2.1 MG/DL (ref 0.9–1.3)
DIFFERENTIAL TYPE: ABNORMAL
EOSINOPHILS ABSOLUTE: 0 K/CU MM
EOSINOPHILS RELATIVE PERCENT: 0.1 % (ref 0–3)
GFR AFRICAN AMERICAN: 39 ML/MIN/1.73M2
GFR NON-AFRICAN AMERICAN: 32 ML/MIN/1.73M2
GLUCOSE BLD-MCNC: 96 MG/DL (ref 70–99)
GLUCOSE BLD-MCNC: 98 MG/DL (ref 70–99)
GLUCOSE BLD-MCNC: 99 MG/DL (ref 70–99)
HCO3 ARTERIAL: 25 MMOL/L (ref 18–23)
HCT VFR BLD CALC: 21.1 % (ref 42–52)
HCT VFR BLD CALC: 25.9 % (ref 42–52)
HEMOGLOBIN: 6.6 GM/DL (ref 13.5–18)
HEMOGLOBIN: 8.3 GM/DL (ref 13.5–18)
IMMATURE NEUTROPHIL %: 0.8 % (ref 0–0.43)
INR BLD: 1.16 INDEX
LYMPHOCYTES ABSOLUTE: 0.7 K/CU MM
LYMPHOCYTES RELATIVE PERCENT: 3.6 % (ref 24–44)
MAGNESIUM: 1.7 MG/DL (ref 1.8–2.4)
MCH RBC QN AUTO: 28.7 PG (ref 27–31)
MCHC RBC AUTO-ENTMCNC: 31.3 % (ref 32–36)
MCV RBC AUTO: 91.7 FL (ref 78–100)
METHEMOGLOBIN ARTERIAL: 1 %
MONOCYTES ABSOLUTE: 0.6 K/CU MM
MONOCYTES RELATIVE PERCENT: 3.2 % (ref 0–4)
NUCLEATED RBC %: 0 %
O2 SATURATION: 95.3 % (ref 96–97)
PCO2 ARTERIAL: 36 MMHG (ref 32–45)
PDW BLD-RTO: 24.1 % (ref 11.7–14.9)
PH BLOOD: 7.45 (ref 7.34–7.45)
PLATELET # BLD: 180 K/CU MM (ref 140–440)
PMV BLD AUTO: 9.6 FL (ref 7.5–11.1)
PO2 ARTERIAL: 76 MMHG (ref 75–100)
POTASSIUM SERPL-SCNC: 2.8 MMOL/L (ref 3.5–5.1)
POTASSIUM SERPL-SCNC: 3.6 MMOL/L (ref 3.5–5.1)
PROTHROMBIN TIME: 14.1 SECONDS (ref 11.7–14.5)
RBC # BLD: 2.3 M/CU MM (ref 4.6–6.2)
SEGMENTED NEUTROPHILS ABSOLUTE COUNT: 17.6 K/CU MM
SEGMENTED NEUTROPHILS RELATIVE PERCENT: 92.2 % (ref 36–66)
SODIUM BLD-SCNC: 135 MMOL/L (ref 135–145)
TOTAL CK: 82 IU/L (ref 38–174)
TOTAL IMMATURE NEUTOROPHIL: 0.15 K/CU MM
TOTAL NUCLEATED RBC: 0 K/CU MM
TOTAL PROTEIN: 4.5 GM/DL (ref 6.4–8.2)
WBC # BLD: 19.1 K/CU MM (ref 4–10.5)

## 2020-08-11 PROCEDURE — 83735 ASSAY OF MAGNESIUM: CPT

## 2020-08-11 PROCEDURE — 2580000003 HC RX 258: Performed by: SURGERY

## 2020-08-11 PROCEDURE — 6360000002 HC RX W HCPCS: Performed by: INTERNAL MEDICINE

## 2020-08-11 PROCEDURE — 82550 ASSAY OF CK (CPK): CPT

## 2020-08-11 PROCEDURE — 85610 PROTHROMBIN TIME: CPT

## 2020-08-11 PROCEDURE — 87070 CULTURE OTHR SPECIMN AEROBIC: CPT

## 2020-08-11 PROCEDURE — 6370000000 HC RX 637 (ALT 250 FOR IP): Performed by: SURGERY

## 2020-08-11 PROCEDURE — 94750 HC PULMONARY COMPLIANCE STUDY: CPT

## 2020-08-11 PROCEDURE — 37799 UNLISTED PX VASCULAR SURGERY: CPT

## 2020-08-11 PROCEDURE — 94003 VENT MGMT INPAT SUBQ DAY: CPT

## 2020-08-11 PROCEDURE — 94640 AIRWAY INHALATION TREATMENT: CPT

## 2020-08-11 PROCEDURE — 2580000003 HC RX 258: Performed by: INTERNAL MEDICINE

## 2020-08-11 PROCEDURE — 84132 ASSAY OF SERUM POTASSIUM: CPT

## 2020-08-11 PROCEDURE — 82962 GLUCOSE BLOOD TEST: CPT

## 2020-08-11 PROCEDURE — 6360000002 HC RX W HCPCS: Performed by: SPECIALIST

## 2020-08-11 PROCEDURE — 85730 THROMBOPLASTIN TIME PARTIAL: CPT

## 2020-08-11 PROCEDURE — 99213 OFFICE O/P EST LOW 20 MIN: CPT

## 2020-08-11 PROCEDURE — C9113 INJ PANTOPRAZOLE SODIUM, VIA: HCPCS | Performed by: SPECIALIST

## 2020-08-11 PROCEDURE — 87186 SC STD MICRODIL/AGAR DIL: CPT

## 2020-08-11 PROCEDURE — 89220 SPUTUM SPECIMEN COLLECTION: CPT

## 2020-08-11 PROCEDURE — 2700000000 HC OXYGEN THERAPY PER DAY

## 2020-08-11 PROCEDURE — 87077 CULTURE AEROBIC IDENTIFY: CPT

## 2020-08-11 PROCEDURE — 99024 POSTOP FOLLOW-UP VISIT: CPT | Performed by: PHYSICIAN ASSISTANT

## 2020-08-11 PROCEDURE — 2000000000 HC ICU R&B

## 2020-08-11 PROCEDURE — 6370000000 HC RX 637 (ALT 250 FOR IP): Performed by: INTERNAL MEDICINE

## 2020-08-11 PROCEDURE — 85014 HEMATOCRIT: CPT

## 2020-08-11 PROCEDURE — 87106 FUNGI IDENTIFICATION YEAST: CPT

## 2020-08-11 PROCEDURE — 82803 BLOOD GASES ANY COMBINATION: CPT

## 2020-08-11 PROCEDURE — 36430 TRANSFUSION BLD/BLD COMPNT: CPT

## 2020-08-11 PROCEDURE — 85018 HEMOGLOBIN: CPT

## 2020-08-11 PROCEDURE — APPNB30 APP NON BILLABLE TIME 0-30 MINS: Performed by: PHYSICIAN ASSISTANT

## 2020-08-11 PROCEDURE — 2500000003 HC RX 250 WO HCPCS: Performed by: INTERNAL MEDICINE

## 2020-08-11 PROCEDURE — 85025 COMPLETE CBC W/AUTO DIFF WBC: CPT

## 2020-08-11 PROCEDURE — 99233 SBSQ HOSP IP/OBS HIGH 50: CPT | Performed by: INTERNAL MEDICINE

## 2020-08-11 PROCEDURE — 87075 CULTR BACTERIA EXCEPT BLOOD: CPT

## 2020-08-11 PROCEDURE — 6370000000 HC RX 637 (ALT 250 FOR IP): Performed by: HOSPITALIST

## 2020-08-11 PROCEDURE — 94761 N-INVAS EAR/PLS OXIMETRY MLT: CPT

## 2020-08-11 PROCEDURE — 71045 X-RAY EXAM CHEST 1 VIEW: CPT

## 2020-08-11 PROCEDURE — 80053 COMPREHEN METABOLIC PANEL: CPT

## 2020-08-11 PROCEDURE — P9016 RBC LEUKOCYTES REDUCED: HCPCS

## 2020-08-11 RX ORDER — 0.9 % SODIUM CHLORIDE 0.9 %
20 INTRAVENOUS SOLUTION INTRAVENOUS ONCE
Status: COMPLETED | OUTPATIENT
Start: 2020-08-11 | End: 2020-08-11

## 2020-08-11 RX ORDER — MAGNESIUM OXIDE 400 MG/1
400 TABLET ORAL DAILY
Status: COMPLETED | OUTPATIENT
Start: 2020-08-11 | End: 2020-08-15

## 2020-08-11 RX ORDER — POTASSIUM CHLORIDE 29.8 MG/ML
20 INJECTION INTRAVENOUS PRN
Status: DISCONTINUED | OUTPATIENT
Start: 2020-08-11 | End: 2020-08-15 | Stop reason: HOSPADM

## 2020-08-11 RX ADMIN — ALBUTEROL SULFATE 4 PUFF: 90 AEROSOL, METERED RESPIRATORY (INHALATION) at 15:50

## 2020-08-11 RX ADMIN — CALCIUM GLUCONATE 2 G: 98 INJECTION, SOLUTION INTRAVENOUS at 11:50

## 2020-08-11 RX ADMIN — MAGNESIUM OXIDE 400 MG (241.3 MG MAGNESIUM) TABLET 400 MG: TABLET at 21:59

## 2020-08-11 RX ADMIN — THIAMINE HYDROCHLORIDE 200 MG: 100 INJECTION, SOLUTION INTRAMUSCULAR; INTRAVENOUS at 22:03

## 2020-08-11 RX ADMIN — POTASSIUM CHLORIDE 20 MEQ: 400 INJECTION, SOLUTION INTRAVENOUS at 10:36

## 2020-08-11 RX ADMIN — SODIUM CHLORIDE, PRESERVATIVE FREE 10 ML: 5 INJECTION INTRAVENOUS at 08:18

## 2020-08-11 RX ADMIN — POTASSIUM CHLORIDE 20 MEQ: 400 INJECTION, SOLUTION INTRAVENOUS at 11:50

## 2020-08-11 RX ADMIN — SODIUM CHLORIDE, PRESERVATIVE FREE 10 ML: 5 INJECTION INTRAVENOUS at 08:17

## 2020-08-11 RX ADMIN — METRONIDAZOLE 500 MG: 500 INJECTION, SOLUTION INTRAVENOUS at 14:49

## 2020-08-11 RX ADMIN — Medication 50 MCG/HR: at 07:35

## 2020-08-11 RX ADMIN — IPRATROPIUM BROMIDE 4 PUFF: 17 AEROSOL, METERED RESPIRATORY (INHALATION) at 23:42

## 2020-08-11 RX ADMIN — SODIUM CHLORIDE, PRESERVATIVE FREE 10 ML: 5 INJECTION INTRAVENOUS at 00:10

## 2020-08-11 RX ADMIN — PANTOPRAZOLE SODIUM 40 MG: 40 INJECTION, POWDER, FOR SOLUTION INTRAVENOUS at 08:16

## 2020-08-11 RX ADMIN — ALBUTEROL SULFATE 4 PUFF: 90 AEROSOL, METERED RESPIRATORY (INHALATION) at 03:06

## 2020-08-11 RX ADMIN — SODIUM CHLORIDE 20 ML: 9 INJECTION, SOLUTION INTRAVENOUS at 13:00

## 2020-08-11 RX ADMIN — THIAMINE HYDROCHLORIDE 200 MG: 100 INJECTION, SOLUTION INTRAMUSCULAR; INTRAVENOUS at 10:41

## 2020-08-11 RX ADMIN — CHLORHEXIDINE GLUCONATE 0.12% ORAL RINSE 15 ML: 1.2 LIQUID ORAL at 21:59

## 2020-08-11 RX ADMIN — METRONIDAZOLE 500 MG: 500 INJECTION, SOLUTION INTRAVENOUS at 08:16

## 2020-08-11 RX ADMIN — ALBUTEROL SULFATE 4 PUFF: 90 AEROSOL, METERED RESPIRATORY (INHALATION) at 08:05

## 2020-08-11 RX ADMIN — IPRATROPIUM BROMIDE 4 PUFF: 17 AEROSOL, METERED RESPIRATORY (INHALATION) at 20:02

## 2020-08-11 RX ADMIN — PROPOFOL 30 MCG/KG/MIN: 10 INJECTION, EMULSION INTRAVENOUS at 07:35

## 2020-08-11 RX ADMIN — IPRATROPIUM BROMIDE 4 PUFF: 17 AEROSOL, METERED RESPIRATORY (INHALATION) at 15:50

## 2020-08-11 RX ADMIN — ALBUTEROL SULFATE 4 PUFF: 90 AEROSOL, METERED RESPIRATORY (INHALATION) at 11:28

## 2020-08-11 RX ADMIN — ALBUTEROL SULFATE 4 PUFF: 90 AEROSOL, METERED RESPIRATORY (INHALATION) at 20:03

## 2020-08-11 RX ADMIN — METRONIDAZOLE 500 MG: 500 INJECTION, SOLUTION INTRAVENOUS at 22:44

## 2020-08-11 RX ADMIN — CHLORHEXIDINE GLUCONATE 0.12% ORAL RINSE 15 ML: 1.2 LIQUID ORAL at 08:16

## 2020-08-11 RX ADMIN — IPRATROPIUM BROMIDE 4 PUFF: 17 AEROSOL, METERED RESPIRATORY (INHALATION) at 11:28

## 2020-08-11 RX ADMIN — ALBUTEROL SULFATE 4 PUFF: 90 AEROSOL, METERED RESPIRATORY (INHALATION) at 23:41

## 2020-08-11 RX ADMIN — IPRATROPIUM BROMIDE 4 PUFF: 17 AEROSOL, METERED RESPIRATORY (INHALATION) at 03:07

## 2020-08-11 RX ADMIN — POTASSIUM CHLORIDE 20 MEQ: 400 INJECTION, SOLUTION INTRAVENOUS at 09:48

## 2020-08-11 RX ADMIN — IPRATROPIUM BROMIDE 4 PUFF: 17 AEROSOL, METERED RESPIRATORY (INHALATION) at 08:05

## 2020-08-11 RX ADMIN — SODIUM CHLORIDE, PRESERVATIVE FREE 10 ML: 5 INJECTION INTRAVENOUS at 21:59

## 2020-08-11 RX ADMIN — SODIUM CHLORIDE, PRESERVATIVE FREE 10 ML: 5 INJECTION INTRAVENOUS at 21:58

## 2020-08-11 ASSESSMENT — PULMONARY FUNCTION TESTS
PIF_VALUE: 17
PIF_VALUE: 16
PIF_VALUE: 17
PIF_VALUE: 16
PIF_VALUE: 17
PIF_VALUE: 15
PIF_VALUE: 16
PIF_VALUE: 17
PIF_VALUE: 17
PIF_VALUE: 16
PIF_VALUE: 16
PIF_VALUE: 17
PIF_VALUE: 16
PIF_VALUE: 16
PIF_VALUE: 17
PIF_VALUE: 16
PIF_VALUE: 17
PIF_VALUE: 16
PIF_VALUE: 16
PIF_VALUE: 17
PIF_VALUE: 20
PIF_VALUE: 17
PIF_VALUE: 16
PIF_VALUE: 18
PIF_VALUE: 17
PIF_VALUE: 17
PIF_VALUE: 16
PIF_VALUE: 18
PIF_VALUE: 16
PIF_VALUE: 16
PIF_VALUE: 17
PIF_VALUE: 17
PIF_VALUE: 16

## 2020-08-11 ASSESSMENT — PAIN SCALES - GENERAL
PAINLEVEL_OUTOF10: 0

## 2020-08-11 NOTE — CONSULTS
[facility] Continence Nurse  Consult Note       Erica Velasco  AGE: 60 y.o. GENDER: male  : 1955  TODAY'S DATE:  2020    Subjective:     Reason for  Evaluation and Assessment: wound reassessment      Erica Velasco is a 60 y.o. male referred by:   [x] Physician  [] Nursing  [] Other:     Wound Identification:  Wound Type: pressure, traumatic and undetermined  Contributing Factors: chronic pressure, decreased mobility, malnutrition, incontinence of stool, non-adherence and substance abuse        PAST MEDICAL HISTORY        Diagnosis Date    Drug abuse and dependence ([facility] [address]     Hypertension        PAST SURGICAL HISTORY    Past Surgical History:   Procedure Laterality Date    BACK SURGERY      IR NONTUNNELED VASCULAR CATHETER  2020    IR NONTUNNELED VASCULAR CATHETER 2020 [facility] SPECIAL PROCEDURES    LAPAROTOMY N/A 2020    LAPAROTOMY EXPLORATORY performed by Valorie Valero MD at [address]    History reviewed. No pertinent family history. SOCIAL HISTORY    Social History     Tobacco Use    Smoking status: Current Every Day Smoker     Packs/day: 1.50     Types: Cigarettes   Substance Use Topics    Alcohol use: No    Drug use: No       ALLERGIES    No Known Allergies    MEDICATIONS    No current facility-administered medications on file prior to encounter. Current Outpatient Medications on File Prior to Encounter   Medication Sig Dispense Refill    HYDROcodone-acetaminophen (NORCO) 5-325 MG per tablet Take 1-2 tablets by mouth every 4 hours as needed for Pain. 15 tablet 0    methadone (METHADOSE) 40 MG disintegrating tablet Take 60 mg by mouth daily.            Objective:      BP (!) 125/55   Pulse 96   Temp 97.9 °F (36.6 °C) (Rectal)   Resp 25   Ht (S) 5' 7\" (1.702 m)   Wt (S) 151 lb 3.8 oz (68.6 kg)   SpO2 97%   BMI 23.69 kg/m²   Hugh Risk Score: Hugh Scale Score: 11    LABS    CBC:   Lab Results   Component Value Date    WBC 19.1 0900   Undermining Maxium Distance (cm) 0 08/11/20 0900   Wound Assessment Yellow;Red;Pink 08/11/20 0900   Drainage Amount Small 08/11/20 0900   Drainage Description Serosanguinous 08/11/20 0900   Odor None 08/11/20 0900   Margins Defined edges 08/11/20 0900   Exposed structure Muscle 08/11/20 0754   Digna-wound Assessment Dry 08/11/20 0900   Non-staged Wound Description Full thickness 08/11/20 0900   [place]%Wound Bed 60 08/11/20 0900   Red%Wound Bed 10 08/11/20 0900   Yellow%Wound Bed 30 08/11/20 0900   Number of days: 15       Wound 07/26/20 Arm Right; Lower (Active)   Wound Traumatic 08/11/20 0900   Dressing Status Changed 08/11/20 0900   Dressing Changed Changed/New 08/11/20 0900   Dressing/Treatment ABD;Roll gauze; Alginate with Ag 08/11/20 0754   Wound Cleansed Rinsed/Irrigated with saline 08/11/20 0900   Dressing Change Due 08/12/20 08/11/20 0754   Wound Length (cm) 17 cm 08/11/20 0900   Wound Width (cm) 8.5 cm 08/11/20 0900   Wound Depth (cm) 0.2 cm 08/11/20 0900   Wound Surface Area (cm^2) 144.5 cm^2 08/11/20 0900   Change in Wound Size % (l*w) 22.69 08/11/20 0900   Wound Volume (cm^3) 28.9 cm^3 08/11/20 0900   Wound Healing % 48 08/11/20 0900   Distance Tunneling (cm) 0 cm 08/11/20 0900   Tunneling Position ___ O'Clock 0 08/11/20 0900   Undermining Starts ___ O'Clock 0 08/11/20 0900   Undermining Ends___ O'Clock 0 08/11/20 0900   Undermining Maxium Distance (cm) 0 08/11/20 0900   Wound Assessment Yellow;Red;Pink 08/11/20 0900   Drainage Amount Small 08/11/20 0900   Drainage Description Serosanguinous 08/11/20 0900   Odor None 08/11/20 0900   Margins Defined edges 08/11/20 0900   Exposed structure Muscle 08/11/20 0754   Digna-wound Assessment Dry 08/11/20 0900   Non-staged Wound Description Full thickness 08/11/20 0900   Pink%Wound Bed 50 08/11/20 0900   Red%Wound Bed 10 08/11/20 0900   Yellow%Wound Bed 40 08/11/20 0900   Black%Wound Bed 20 07/29/20 1200   Number of days: 15       Wound 07/27/20 Shoulder Left (Active)   Wound Traumatic 08/11/20 0900   Dressing Status Changed 08/11/20 0900   Dressing Changed Changed/New 08/11/20 0900   Dressing/Treatment Foam 08/11/20 0754   Wound Cleansed Rinsed/Irrigated with saline 08/11/20 0900   Dressing Change Due 08/08/20 08/06/20 1241   Wound Length (cm) 0 cm 08/11/20 0900   Wound Width (cm) 0 cm 08/11/20 0900   Wound Depth (cm) 0 cm 08/11/20 0900   Wound Surface Area (cm^2) 0 cm^2 08/11/20 0900   Change in Wound Size % (l*w) 100 08/11/20 0900   Wound Volume (cm^3) 0 cm^3 08/11/20 0900   Wound Healing % 100 08/11/20 0900   Distance Tunneling (cm) 0 cm 08/11/20 0900   Tunneling Position ___ O'Clock 0 08/11/20 0900   Undermining Starts ___ O'Clock 0 08/11/20 0900   Undermining Ends___ O'Clock 0 08/11/20 0900   Undermining Maxium Distance (cm) 0 08/11/20 0900   Wound Assessment Intact;Dry;Pink 08/11/20 0900   Drainage Amount None 08/11/20 0900   Drainage Description Serous 08/11/20 0754   Odor None 08/11/20 0900   Margins Attached edges 08/11/20 0900   Digna-wound Assessment Intact 08/11/20 0900   Non-staged Wound Description Not applicable 27/86/59 4079   [place]%Wound Bed 100 07/29/20 1200   Number of days: 15       Wound 07/27/20 Hip Left (Active)   Wound Pressure Stage  2 08/11/20 0754   Dressing Status Changed 08/11/20 0900   Dressing Changed Changed/New 08/11/20 0900   Dressing/Treatment Foam 08/11/20 0754   Wound Cleansed Rinsed/Irrigated with saline 08/11/20 0900   Dressing Change Due 08/08/20 08/06/20 1241   Wound Length (cm) 4 cm 08/11/20 0900   Wound Width (cm) 3 cm 08/11/20 0900   Wound Depth (cm) 0.1 cm 08/11/20 0900   Wound Surface Area (cm^2) 12 cm^2 08/11/20 0900   Change in Wound Size % (l*w) -30.43 08/11/20 0900   Wound Volume (cm^3) 1.2 cm^3 08/11/20 0900   Wound Healing % -30 08/11/20 0900   Distance Tunneling (cm) 0 cm 08/11/20 0900   Tunneling Position ___ O'Clock 0 08/11/20 0900   Undermining Starts ___ O'Clock 0 08/11/20 0900   Undermining Ends___ O'Clock 0 08/11/20 0900   Undermining Maxium Distance (cm) 0 08/11/20 0900   Wound Assessment Intact;Dry;Pink;Yellow 08/11/20 0900   Drainage Amount None 08/11/20 0900   Drainage Description Serosanguinous 08/11/20 0754   Odor None 08/11/20 0900   Margins Attached edges 08/11/20 0900   Digna-wound Assessment Intact 08/11/20 0900   Non-staged Wound Description Not applicable 41/82/52 1010   [place]%Wound Bed 50 08/11/20 0900   Red%Wound Bed 50 07/29/20 1200   Yellow%Wound Bed 50 08/11/20 0900   Number of days: 15       Wound 07/27/20 Sacrum (Active)   Wound Pressure Stage  2 08/11/20 0900   Dressing Status Changed 08/11/20 0900   Dressing Changed Changed/New 08/11/20 0900   Dressing/Treatment Foam 08/11/20 0754   Wound Cleansed Rinsed/Irrigated with saline 08/11/20 0900   Dressing Change Due 08/12/20 08/11/20 0754   Wound Length (cm) 0.6 cm 08/11/20 0900   Wound Width (cm) 0.2 cm 08/11/20 0900   Wound Depth (cm) 0.1 cm 08/11/20 0900   Wound Surface Area (cm^2) 0.12 cm^2 08/11/20 0900   Change in Wound Size % (l*w) 90.4 08/11/20 0900   Wound Volume (cm^3) 0.01 cm^3 08/11/20 0900   Wound Healing % 92 08/11/20 0900   Distance Tunneling (cm) 0 cm 08/11/20 0900   Tunneling Position ___ O'Clock 0 08/11/20 0900   Undermining Starts ___ O'Clock 0 08/11/20 0900   Undermining Ends___ O'Clock 0 08/11/20 0900   Undermining Maxium Distance (cm) 0 08/11/20 0900   Wound Assessment Pink 08/11/20 0900   Drainage Amount Scant 08/11/20 0900   Drainage Description Serous 08/11/20 0900   Odor None 08/11/20 0900   Margins Defined edges 08/11/20 0900   Digna-wound Assessment Intact 08/11/20 0900   Non-staged Wound Description Partial thickness 08/11/20 0900   [place]%Wound Bed 100 08/11/20 0900   Red%Wound Bed 50 07/29/20 1200   Number of days: 15       Wound 08/04/20 Face Left small, open wound from pressure of the ETT raza (Active)   Wound Pressure Stage  1 08/11/20 0754   Dressing Status Clean;Dry; Intact 08/11/20 0754   Dressing Changed Changed/New 08/05/20 2000   Dressing/Treatment Open to air 08/11/20 0754   Wound Cleansed Soap and water 08/11/20 0754   Wound Assessment Dry;Pink;Red 08/11/20 0754   Drainage Amount None 08/11/20 0754   Odor None 08/11/20 0754   Margins Defined edges 08/11/20 0754   Number of days: 7       Wound 08/10/20 Perineum Right; Inner (Active)   Wound Other 08/11/20 0900   Dressing Status Changed 08/11/20 0900   Dressing Changed Changed/New 08/11/20 0900   Dressing/Treatment Alginate with Ag;Collagen 08/11/20 0000   Wound Cleansed Rinsed/Irrigated with saline 08/11/20 0900   Wound Length (cm) 2 cm 08/11/20 0900   Wound Width (cm) 1 cm 08/11/20 0900   Wound Depth (cm) 2.5 cm 08/11/20 0900   Wound Surface Area (cm^2) 2 cm^2 08/11/20 0900   Change in Wound Size % (l*w) 0 08/11/20 0900   Wound Volume (cm^3) 5 cm^3 08/11/20 0900   Distance Tunneling (cm) 0 cm 08/11/20 0900   Tunneling Position ___ O'Clock 0 08/11/20 0900   Undermining Starts ___ O'Clock 12 08/11/20 0900   Undermining Ends___ O'Clock 12 08/11/20 0900   Undermining Maxium Distance (cm) 2 08/11/20 0900   Wound Assessment Red 08/11/20 0900   Drainage Amount Small 08/11/20 0900   Drainage Description Yellow 08/11/20 0900   Odor None 08/11/20 0900   Margins Defined edges 08/11/20 0900   Digna-wound Assessment Intact 08/11/20 0900   Non-staged Wound Description Full thickness 08/11/20 0900   Red%Wound Bed 100 08/11/20 0900   Number of days: 0       Response to treatment:  Well tolerated by patient. Pain Assessment:  Severity:  none  Quality of pain: na  Wound Pain Timing/Severity: na  Premedicated: no    Plan:     Plan of Care: Incision Abdomen Mid-Dressing/Treatment: ABD  Wound 07/26/20 Arm Left; Lower-Dressing/Treatment: (xeroform, abd, kerlix)  Wound 07/26/20 Arm Right; Lower-Dressing/Treatment: (xeroform, abd, kerlix)  Wound 07/27/20 Shoulder Left-Dressing/Treatment: (optifoam border)  Wound 07/27/20 Hip Left-Dressing/Treatment: (optifoam border)  Wound 07/27/20

## 2020-08-11 NOTE — PROGRESS NOTES
Nephrology Progress Note        [address], [address] [facility], [facility] [address]  Phone: [phone]  Office Hours: 8:30AM - 4:30PM  Monday - Friday       [facility]  Malik Mehta MD  [address],   [address],  [place], [facility] [address]  PHONE: [phone]  FAX: [phone]    8/11/2020 9:01 AM  Subjective:   Admit Date: 7/24/2020  PCP: No primary care provider on file. Interval History: now with a trach on  anuric    Diet: Diet NPO Effective Now      Data:   Scheduled Meds:   midodrine  10 mg Oral Q MWF    sodium chloride  250 mL Intravenous Once    cefepime  2 g Intravenous Once per day on Mon Wed Fri    scopolamine  1 patch Transdermal Q72H    vancomycin (VANCOCIN) intermittent dosing (placeholder)   Other RX Placeholder    metroNIDAZOLE  500 mg Intravenous Q8H    pantoprazole  40 mg Intravenous Daily    thiamine (VITAMIN B1) IVPB  200 mg Intravenous Q12H    chlorhexidine  15 mL Mouth/Throat BID    albuterol sulfate HFA  4 puff Inhalation Q4H    And    ipratropium  4 puff Inhalation Q4H    aspirin  324 mg Oral Once    sodium chloride flush  10 mL Intravenous BID    sodium chloride flush  10 mL Intravenous 2 times per day    [Held by provider] enoxaparin  1 mg/kg Subcutaneous Daily    nicotine  1 patch Transdermal Daily     Continuous Infusions:   norepinephrine 1 mcg/min (08/11/20 0750)    dextrose      fentanyl 50 mcg/hr (08/11/20 0735)    propofol 25 mcg/kg/min (08/11/20 0829)     PRN Meds:glucose, dextrose, glucagon (rDNA), dextrose, heparin (porcine), ipratropium-albuterol, iopamidol, sodium chloride flush, acetaminophen **OR** acetaminophen, polyethylene glycol, promethazine **OR** ondansetron, promethazine, diphenhydrAMINE, dicyclomine, morphine  I/O last 3 completed shifts: In: 9255 [I.V.:359; IV Piggyback:450]  Out: 185 [Urine:75]  No intake/output data recorded.     Intake/Output Summary (Last 24 hours) at 8/11/2020 0901  Last data filed at 8/10/2020 2341  Gross per 24 hour   Intake 1309 ml   Output 185 ml   Net 1124 ml       CBC:   Recent Labs     08/09/20  2040 08/10/20  0600 08/11/20  0830   WBC 17.4* 27.1* 19.1*   HGB 7.0* 7.3* 6.6*    238 180       BMP:    Recent Labs     08/09/20  0600 08/09/20  2040 08/10/20  0600    135 136   K 4.0 4.4 4.2   CL 97* 96* 96*   CO2 19* 15* 18*   * 142* 157*   CREATININE 2.9* 3.2* 3.3*   GLUCOSE 144* 207* 127*     Hepatic:   Recent Labs     08/09/20  0600 08/10/20  0600   AST 38* 35   ALT 7* 10   BILITOT 1.0 1.1*   ALKPHOS 74 75     Troponin: No results for input(s): TROPONINI in the last 72 hours. BNP: No results for input(s): BNP in the last 72 hours. Lipids: No results for input(s): CHOL, HDL in the last 72 hours.     Invalid input(s): LDLCALCU  ABGs:   Lab Results   Component Value Date    PO2ART 76 08/11/2020    BOY8ACY 36.0 08/11/2020     INR:   Recent Labs     08/09/20  0600 08/10/20  0600 08/11/20  0624   INR 1.05 1.02 1.16       Objective:   Vitals: BP (!) 125/55   Pulse 81   Temp 97.9 °F (36.6 °C) (Rectal)   Resp 16   Ht 5' 7\" (1.702 m)   Wt 153 lb 3.5 oz (69.5 kg)   SpO2 100%   BMI 24.00 kg/m²   General appearance:  in no acute distress  HEENT: normocephalic, atraumatic,   Neck: supple, trachea midline  Lungs: , breathing comfortably on trach  Heart[de-identified] regular rate and rhythm,  Abdomen:  non distended,   Extremities: no ble edema      Assessment and Plan:      Leukocytosis      Sepsis ([address] 07/24/2020         - JERAMIE FROM ATN; CRRT FROM FROM 7/5 TO 7/8 and now on HD 3 times per week  - ANION GAP METABOLIC ACIDOSIS  - LACTIC ACIDOSIS  - ISCHEMIC BOWEL S/P SURGERY   - STAPH BACTEREMIA  - ACUTE HYPOXIC RESP FAILURE, now chronic with trach in place  - HEROIN AND COCAINE USE  - encephalopathy  - bilateral arm wounds      Suggest:  - No signs of renal recovery  - Recent blood cx did not grow any organisms for 5 days so will request tunnelled HD cath placement with IR and temp femoral HD cath removal  Thank you                            Electronically signed by Traci Rincon DO on 8/11/2020 at 9:01 MD Molly De Leon DO [address],  [place]  [facility], [place] [address]  PHONE: [phone]  FAX: [phone]

## 2020-08-11 NOTE — PLAN OF CARE
Problem: Falls - Risk of:  Goal: Will remain free from falls  Description: Will remain free from falls  Outcome: Ongoing  Goal: Absence of physical injury  Description: Absence of physical injury  Outcome: Ongoing     Problem: Pain:  Goal: Pain level will decrease  Description: Pain level will decrease  Outcome: Ongoing  Goal: Control of acute pain  Description: Control of acute pain  Outcome: Ongoing  Goal: Control of chronic pain  Description: Control of chronic pain  Outcome: Ongoing     Problem: Diarrhea:  Goal: Bowel elimination is within specified parameters  Description: Bowel elimination is within specified parameters  Outcome: Ongoing  Goal: Passage of soft, formed stool  Description: Passage of soft, formed stool  Outcome: Ongoing  Goal: Establishment of normal bowel function will improve to within specified parameters  Description: Establishment of normal bowel function will improve to within specified parameters  Outcome: Ongoing     Problem: Nausea/Vomiting:  Goal: Absence of nausea/vomiting  Description: Absence of nausea/vomiting  Outcome: Ongoing  Goal: Able to drink  Description: Able to drink  Outcome: Ongoing  Goal: Able to eat  Description: Able to eat  Outcome: Ongoing  Goal: Ability to achieve adequate nutritional intake will improve  Description: Ability to achieve adequate nutritional intake will improve  Outcome: Ongoing     Problem: Breathing Pattern - Ineffective:  Goal: Ability to achieve and maintain a regular respiratory rate will improve  Description: Ability to achieve and maintain a regular respiratory rate will improve  Outcome: Ongoing     Problem: Fluid Volume:  Goal: Ability to achieve a balanced intake and output will improve  Description: Ability to achieve a balanced intake and output will improve  Outcome: Ongoing  Goal: Will show no signs or symptoms of fluid imbalance  Description: Will show no signs or symptoms of fluid imbalance  Outcome: Ongoing     Problem: Physical Regulation:  Goal: Ability to maintain clinical measurements within normal limits will improve  Description: Ability to maintain clinical measurements within normal limits will improve  Outcome: Ongoing  Goal: Will show no signs and symptoms of electrolyte imbalance  Description: Will show no signs and symptoms of electrolyte imbalance  Outcome: Ongoing     Problem: Restraint Use - Nonviolent/Non-Self-Destructive Behavior:  Goal: Absence of restraint indications  Description: Absence of restraint indications  Outcome: Ongoing  Goal: Absence of restraint-related injury  Description: Absence of restraint-related injury  Outcome: Ongoing     Problem: Skin Integrity:  Goal: Will show no infection signs and symptoms  Description: Will show no infection signs and symptoms  Outcome: Ongoing  Goal: Absence of new skin breakdown  Description: Absence of new skin breakdown  Outcome: Ongoing  Goal: Status of oral mucous membranes will improve  Description: Status of oral mucous membranes will improve  Outcome: Ongoing  Goal: Skin integrity will be maintained  Description: Skin integrity will be maintained  Outcome: Ongoing     Problem: Discharge Planning:  Goal: Participates in care planning  Description: Participates in care planning  Outcome: Ongoing  Goal: Discharged to appropriate level of care  Description: Discharged to appropriate level of care  Outcome: Ongoing  Goal: Ability to perform activities of daily living will improve  Description: Ability to perform activities of daily living will improve  Outcome: Ongoing     Problem: Airway Clearance - Ineffective:  Goal: Ability to maintain a clear airway will improve  Description: Ability to maintain a clear airway will improve  Outcome: Ongoing     Problem: Anxiety/Stress:  Goal: Level of anxiety will decrease  Description: Level of anxiety will decrease  Outcome: Ongoing     Problem: Aspiration:  Goal: Absence of aspiration  Description: Absence of aspiration  Outcome: Ongoing     Problem:  Bowel Function - Altered:  Goal: Bowel elimination is within specified parameters  Description: Bowel elimination is within specified parameters  Outcome: Ongoing     Problem: Cardiac Output - Decreased:  Goal: Hemodynamic stability will improve  Description: Hemodynamic stability will improve  Outcome: Ongoing     Problem: Fluid Volume - Imbalance:  Goal: Absence of imbalanced fluid volume signs and symptoms  Description: Absence of imbalanced fluid volume signs and symptoms  Outcome: Ongoing     Problem: Gas Exchange - Impaired:  Goal: Levels of oxygenation will improve  Description: Levels of oxygenation will improve  Outcome: Ongoing     Problem: Mental Status - Impaired:  Goal: Mental status will be restored to baseline  Description: Mental status will be restored to baseline  Outcome: Ongoing     Problem: Nutrition Deficit:  Goal: Ability to achieve adequate nutritional intake will improve  Description: Ability to achieve adequate nutritional intake will improve  Outcome: Ongoing     Problem: Pain:  Goal: Pain level will decrease  Description: Pain level will decrease  Outcome: Ongoing  Goal: Recognizes and communicates pain  Description: Recognizes and communicates pain  Outcome: Ongoing  Goal: Control of acute pain  Description: Control of acute pain  Outcome: Ongoing  Goal: Control of chronic pain  Description: Control of chronic pain  Outcome: Ongoing     Problem: Serum Glucose Level - Abnormal:  Goal: Ability to maintain appropriate glucose levels will improve to within specified parameters  Description: Ability to maintain appropriate glucose levels will improve to within specified parameters  Outcome: Ongoing  Goal: Ability to maintain appropriate glucose levels will improve  Description: Ability to maintain appropriate glucose levels will improve  Outcome: Ongoing     Problem: Skin Integrity - Impaired:  Goal: Will show no infection signs and symptoms  Description: Will show no infection signs and symptoms  Outcome: Ongoing  Goal: Absence of new skin breakdown  Description: Absence of new skin breakdown  Outcome: Ongoing     Problem: Sleep Pattern Disturbance:  Goal: Appears well-rested  Description: Appears well-rested  Outcome: Ongoing     Problem: Tissue Perfusion, Altered:  Goal: Circulatory function within specified parameters  Description: Circulatory function within specified parameters  Outcome: Ongoing     Problem: Tissue Perfusion - Cardiopulmonary, Altered:  Goal: Absence of angina  Description: Absence of angina  Outcome: Ongoing  Goal: Hemodynamic stability will improve  Description: Hemodynamic stability will improve  Outcome: Ongoing     Problem: Infection - Ventilator-Associated Pneumonia:  Goal: Absence of pulmonary infection  Description: Absence of pulmonary infection  Outcome: Ongoing     Problem: Venous Thromboembolism:  Goal: Will show no signs or symptoms of venous thromboembolism  Description: Will show no signs or symptoms of venous thromboembolism  Outcome: Ongoing  Goal: Absence of signs or symptoms of impaired coagulation  Description: Absence of signs or symptoms of impaired coagulation  Outcome: Ongoing     Problem: Confusion - Acute:  Goal: Mental status will be restored to baseline  Description: Mental status will be restored to baseline  Outcome: Ongoing     Problem: Injury - Risk of, Physical Injury:  Goal: Will remain free from falls  Description: Will remain free from falls  Outcome: Ongoing  Goal: Absence of physical injury  Description: Absence of physical injury  Outcome: Ongoing

## 2020-08-11 NOTE — PROGRESS NOTES
General Surgery  Dr. Rdoo Feliz and Irvin Rondon, [facility] Day: 23    Chief Complaint on Admission: Sepsis      Subjective:     Keon Ruby is a 60 y.o. male with 1 Day Post-Op Trach and PEG. He is also s/p negative ex lap. Abdominal incision without drainage overnight. PEG draining to gravity. No significant bleeding around Trach. Patient intubated. Tolerating Diet NPO Effective Now. +ve BM via rectal tube    ROS:  Review of Systems   Unable to perform ROS: Intubated       Allergies  Patient has no known allergies. Diagnosis Date    Drug abuse and dependence ([facility] [address]     Hypertension        Objective:     Vitals:    20 0827   BP:    Pulse:    Resp: 16   Temp:    SpO2: 100%       TEMPERATURE:  Current - Temp: 97.9 °F (36.6 °C); Max - Temp  Av °F (36.7 °C)  Min: 96.6 °F (35.9 °C)  Max: 98.8 °F (37.1 °C)    No intake/output data recorded. I/O last 3 completed shifts: In: 9244 [I.V.:359; IV Piggyback:450]  Out: 185 [Urine:75]      Physical Exam:    Physical Exam  Constitutional:       Interventions: He is intubated. Comments:  Responding to stimuli   Pulmonary:      Effort: He is intubated. Abdominal:      Palpations: Abdomen is soft. Comments: PEG in place, draining to gravity. No Peritoneal signs  Midline incision well approximated with staples and a few sutures in place. No active drainage.  Dressing C, D, I           Scheduled Meds:   midodrine  10 mg Oral Q MWF    sodium chloride  250 mL Intravenous Once    cefepime  2 g Intravenous Once per day on     scopolamine  1 patch Transdermal Q72H    vancomycin (VANCOCIN) intermittent dosing (placeholder)   Other RX Placeholder    metroNIDAZOLE  500 mg Intravenous Q8H    pantoprazole  40 mg Intravenous Daily    thiamine (VITAMIN B1) IVPB  200 mg Intravenous Q12H    chlorhexidine  15 mL Mouth/Throat BID    albuterol sulfate HFA  4 puff Inhalation Q4H    And    ipratropium  4 puff Inhalation Q4H    aspirin  324 mg Oral Once    sodium chloride flush  10 mL Intravenous BID    sodium chloride flush  10 mL Intravenous 2 times per day    [Held by provider] enoxaparin  1 mg/kg Subcutaneous Daily    nicotine  1 patch Transdermal Daily     Continuous Infusions:   norepinephrine 1 mcg/min (08/11/20 0750)    dextrose      fentanyl 50 mcg/hr (08/11/20 0735)    propofol 25 mcg/kg/min (08/11/20 0829)     PRN Meds:glucose, dextrose, glucagon (rDNA), dextrose, heparin (porcine), ipratropium-albuterol, iopamidol, sodium chloride flush, acetaminophen **OR** acetaminophen, polyethylene glycol, promethazine **OR** ondansetron, promethazine, diphenhydrAMINE, dicyclomine, morphine      Labs/Imaging Results:   Lab Results   Component Value Date    WBC 19.1 (H) 08/11/2020    HGB 6.6 (LL) 08/11/2020    HCT 21.1 (L) 08/11/2020    MCV 91.7 08/11/2020     08/11/2020     Lab Results   Component Value Date     08/10/2020    K 4.2 08/10/2020    CL 96 (L) 08/10/2020    CO2 18 (L) 08/10/2020     (H) 08/10/2020    CREATININE 3.3 (H) 08/10/2020    GLUCOSE 127 (H) 08/10/2020    CALCIUM 8.5 08/10/2020    PROT 5.6 (L) 08/10/2020    LABALBU 2.3 (L) 08/10/2020    BILITOT 1.1 (H) 08/10/2020    ALKPHOS 75 08/10/2020    AST 35 08/10/2020    ALT 10 08/10/2020    LABGLOM 19 (L) 08/10/2020    GFRAA 23 (L) 08/10/2020       Assessment:     Patient Active Problem List:     Sepsis ([facility] [address]     Leukocytosis     Ischemic bowel disease ([facility] [address]     MSSA bacteremia     Chronic hepatitis C without hepatic coma (HCC)     Respiratory failure ([facility] [address]      Plan:     OK to begin TF via PEG. Discussed with bedside RN, [facility], who will contact dietary for TF orders and adjustment. Continue to monitor midline incision. Will leave staples and sutures in place for now. Routine trach care    Will continue to follow peripherally. Plan for likely staple removal next week.      Luciano Jain PA-C

## 2020-08-11 NOTE — PROGRESS NOTES
Comprehensive Nutrition Assessment    Type and Reason for Visit:  Reassess    Nutrition Recommendations/Plan:   · EN Order: semi-elemental at 50 mL/hr to provide the pt with 1440 kcal and 90 g of protein per day    Nutrition Assessment:  Pt has now had a trach and PEG placed. Will order EN of semi-elemental until the pt is more clinically stable and then change the formula to a long term standard. Malnutrition Assessment:  Malnutrition Status:  Insufficient data    Context:  Acute Illness       Estimated Daily Nutrient Needs:  Energy (kcal):  7584; Weight Used for Energy Requirements:  Current     Protein (g):  94+; Weight Used for Protein Requirements:  Ideal        Fluid (ml/day):  (fluid management per nephrology); Weight Used for Fluid Requirements:  (fluid management per nephrology)      Wounds:  Multiple, Pressure Ulcer, Stage II(+unstageable pressure ulcer noted)       Current Nutrition Therapies:    Diet NPO Effective Now    Anthropometric Measures:  · Height: (S) 5' 7\" (170.2 cm)  · Current Body Weight: 151 lb (68.5 kg)   · Admission Body Weight: 149 lb 4 oz (67.7 kg)    · Usual Body Weight: (AMBER)     · Ideal Body Weight: 148 lbs; % Ideal Body Weight 95.9 %   · BMI: 23.6   · BMI Categories: Normal Weight (BMI 18.5-24. 9)       Nutrition Diagnosis:   · Inadequate oral intake related to impaired respiratory funtion as evidenced by intubation      Nutrition Interventions:   Food and/or Nutrient Delivery:  Continue Current Tube Feeding  Nutrition Education/Counseling:  No recommendation at this time   Coordination of Nutrition Care:  Continued Inpatient Monitoring    Goals:  pt will meet greater than 75% of estimated nutrient needs via EN       Nutrition Monitoring and Evaluation:   Food/Nutrient Intake Outcomes:  Enteral Nutrition Intake/Tolerance  Physical Signs/Symptoms Outcomes:  Biochemical Data, Weight, GI Status, Hemodynamic Status     Discharge Planning:     Too soon to determine     Electronically

## 2020-08-11 NOTE — CARE COORDINATION
Cm anticipates need for LTACH at discharge. Cm attempted to contact pt's son with voice mail message left requesting return call to discuss discharge planning and LTACH.

## 2020-08-11 NOTE — PROGRESS NOTES
[facility]  consulted by Dr. Vidal  for monitoring and adjustment. Indication for treatment: MSSA bacteremia, GPB in blood   Goal trough: 10-15 mcg/mL     Pertinent Laboratory Values:   Temp Readings from Last 3 Encounters:   08/11/20 98.6 °F (37 °C) (Rectal)   08/10/20 97.3 °F (36.3 °C)   07/25/20 (!) 62.3 °F (16.8 °C)     Recent Labs     08/09/20  2040 08/10/20  0600 08/11/20  0830   WBC 17.4* 27.1* 19.1*     Recent Labs     08/09/20  2040 08/10/20  0600 08/11/20  0830   * 157* 75*   CREATININE 3.2* 3.3* 2.1*     Estimated Creatinine Clearance: 34 mL/min (A) (based on SCr of 2.1 mg/dL (H)). Intake/Output Summary (Last 24 hours) at 8/11/2020 1354  Last data filed at 8/11/2020 0800  Gross per 24 hour   Intake 1413.94 ml   Output 485 ml   Net 928.94 ml       Pertinent Cultures:  Date    Source    Results  7/24   Blood    MSSA  7/27   Blood    Bacillus (1 of 2)  7/29   Blood    NGTD   8/5   Sputum   Yeast   8/5   Blood    NGTD    Vancomycin level:   TROUGH:    No results for input(s): VANCOTROUGH in the last 72 hours. RANDOM:    Recent Labs     08/10/20  0600   VANCORANDOM 16.3       Assessment:  · WBC and temperature: WBC remains elevated but improved, Afebrile  · SCr, BUN, and urine output:   · HD Mon/Wed/Fri   · Previously receiving CRRT  · Days of therapy: 19  · Vancomycin level: none    Plan:  · Continue intermittent dosing based on levels 2/2 RRT   · Received last dose of vancomycin 500 mg on 08/10, post-HD  · Repeat next random level prior to next HD session  · Pharmacy will continue to monitor patient and adjust therapy as indicated.     VANCOMYCIN RANDOM LEVEL SCHEDULED FOR 8/12 @06:00    Thank you for the consult,  Feng Allen, [address]  8/11/2020 1:54 PM

## 2020-08-11 NOTE — CONSULTS
[facility]   Consult Note  [facility] 1 2 3 4 5    Date: 2020   Patient: Ben Harmon   : 1955   DOA: 2020   MRN: [id number]   ROOM#: [id number]     Reason for Consult:  Evaluate possible abscess right perineum   Requesting Physician:  Dr. Pa Diez  Collaborating Urologist on Call at time of admission: Dr. Levon Favre:  Fall with right hip pain    History Obtained From:  patient, electronic medical record    HISTORY OF PRESENT ILLNESS:                The patient is a 60 y.o. male with significant past medical history of heroin abuse and HTN who presented with sepsis on 20. Pt was found down and has multiple wounds on his skin. Pt c/o abdominal pain and CT revealed pneumatosis of the cecal area. He underwent exploratory laparotomy which was negative. Yesterday, he was found to have a ruptured abscess in his right perineum. We were consulted for further evaluation of abscess. ED Provider Note 20: Ben Harmon is a 60 y.o. male who presents with bilateral hip pain. Onset 2 days ago. Patient states he was using his walker when he tripped and fell. Patient states he was on the ground for 2 days until someone came to help him. Patient states he was having bowel movements on himself. Patient states he uses heroin daily and has not been able to use for the last 2 days and feels like he is going through withdrawal.  Patient is at associate vomiting diarrhea. Patient states he has history of cancer in his abdomen. Patient states he has chronic pain to abdomen and back with no new change. Patient denies head injury or loss consciousness. 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     Past Medical History:        Diagnosis Date    Drug abuse and dependence ([address]     Hypertension      Past Surgical History:        Procedure Laterality Date    BACK SURGERY      IR NONTUNNELED VASCULAR CATHETER  2020    IR PRESSURE RANGE:  Systolic (07RJF), RQN:090 , Min:78 , SIJ:677   ; Diastolic (97AUE), QKS:83, Min:49, Max:65    General appearance: appears older than stated age, cooperative, no distress and responding to stimuli, and intubated  Head: Normocephalic, without obvious abnormality, atraumatic  Abdomen: Soft, non-distended. PEG in place. Male genitalia: Circumcised phallus with ann catheter in place drainign clear yellow urine. Scrotum and scrotal contents normal with no lesions or masses noted. Rectal: Ruptured right perineal abscess noted without active drainage. No area of fluctuance noted and appears to have drained completely. Scrotum does not appear to be involved. Rectal tube in place. DATA:    WBC:    Lab Results   Component Value Date    WBC 19.1 08/11/2020     Hemoglobin/Hematocrit:    Lab Results   Component Value Date    HGB 6.6 08/11/2020    HCT 21.1 08/11/2020     BMP:    Lab Results   Component Value Date     08/11/2020    K 2.8 08/11/2020    CL 98 08/11/2020    CO2 23 08/11/2020    BUN 75 08/11/2020    LABALBU 1.9 08/11/2020    CREATININE 2.1 08/11/2020    CALCIUM 7.4 08/11/2020    GFRAA 39 08/11/2020    LABGLOM 32 08/11/2020     PT/INR:    Lab Results   Component Value Date    PROTIME 14.1 08/11/2020    INR 1.16 08/11/2020 8/8/20 Urine culture: No growth at 18 to 36 hours     Imaging:  Ct Abdomen Pelvis Wo Contrast Additional Contrast? None    Result Date: 8/4/2020  EXAMINATION: CT OF THE CHEST WITHOUT CONTRAST; CT OF THE ABDOMEN AND PELVIS WITHOUT CONTRAST 8/4/2020 2:54 pm TECHNIQUE: CT of the chest was performed without the administration of intravenous contrast. Multiplanar reformatted images are provided for review.  Dose modulation, iterative reconstruction, and/or weight based adjustment of the mA/kV was utilized to reduce the radiation dose to as low as reasonably achievable.; CT of the abdomen and pelvis was performed without the administration of intravenous contrast. Multiplanar reformatted images are provided for review. Dose modulation, iterative reconstruction, and/or weight based adjustment of the mA/kV was utilized to reduce the radiation dose to as low as reasonably achievable. COMPARISON: 07/31/2020, 07/25/2020 HISTORY: ORDERING SYSTEM PROVIDED HISTORY: respiratory failure TECHNOLOGIST PROVIDED HISTORY: Reason for exam:->respiratory failure Reason for Exam: respiratory failure Acuity: Acute Type of Exam: Initial; ORDERING SYSTEM PROVIDED HISTORY: follow up on hemoperitoneum, concerns of abscess TECHNOLOGIST PROVIDED HISTORY: Reason for exam:->follow up on hemoperitoneum, concerns of abscess Additional Contrast?->None Reason for Exam: follow up on hemoperitoneum, concerns of abscess Acuity: Acute Type of Exam: Initial FINDINGS: Chest: Mediastinum: Heart is normal in size. No mediastinal, hilar, or axillary lymphadenopathy. Satisfactory position of endotracheal and enteric tubes Lungs/pleura: Mild upper lobe predominant centrilobular emphysema. Bronchial wall thickening and centrilobular nodularity in the right lower lobe. No pleural effusion or pneumothorax. Soft Tissues/Bones: Similar disc space irregularity at T12-L1. Abdomen/Pelvis: Organs: Liver is normal in contour and attenuation. Pancreas is unremarkable. Adrenals are unremarkable. Spleen is normal in size. No renal calculi or hydronephrosis. Mild calcific atherosclerosis. Destiny Staggers GI/Bowel:  Similar to slightly improved mild diffuse colonic wall thickening. Pelvis: Unremarkable. Peritoneum/Retroperitoneum: No significant change in moderate volume hemoperitoneum. Similar small pneumoperitoneum, likely postoperative. No lymphadenopathy. Bones/Soft Tissues: Diffuse marrow heterogeneity with multilevel degenerative disc disease. Similar decubitus ulcer of right ischial region. 1. Bronchial wall thickening and centrilobular nodularity in the right lower lobe, likely infectious/inflammatory airways process such as aspiration.  2. No significant change in moderate volume hemoperitoneum. 3. Similar small pneumoperitoneum, likely postoperative. 4. Similar to slightly improved mild diffuse colonic wall thickening. Ct Abdomen Pelvis Wo Contrast Additional Contrast? None    Result Date: 7/31/2020  EXAMINATION: CT OF THE ABDOMEN AND PELVIS WITHOUT CONTRAST 7/31/2020 4:42 pm TECHNIQUE: CT of the abdomen and pelvis was performed without the administration of intravenous contrast. Multiplanar reformatted images are provided for review. Dose modulation, iterative reconstruction, and/or weight based adjustment of the mA/kV was utilized to reduce the radiation dose to as low as reasonably achievable. COMPARISON: 07/25/2020. HISTORY: ORDERING SYSTEM PROVIDED HISTORY: worsening anemia/rule out any occult hemorrhage TECHNOLOGIST PROVIDED HISTORY: Reason for exam:->worsening anemia/rule out any occult hemorrhage Additional Contrast?->None Reason for Exam: worsening anemia/rule out any occult hemorrhage Acuity: Unknown Type of Exam: Ongoing Additional signs and symptoms: fall/hip pain FINDINGS: Lower Chest: Unremarkable. Organs: Liver is normal in contour and attenuation. Gallbladder is absent. No biliary ductal dilatation. Pancreas is unremarkable. Adrenals are unremarkable. Spleen is normal in size. Left renal cyst. No further imaging is indicated for this finding. Punctate left calyceal calculus. No hydronephrosis. Vasculature is unremarkable. GI/Bowel:  Possible mild wall thickening of the colon. No evidence of obstruction. Enteric tube is in the gastric body. Pelvis: Unremarkable. Peritoneum/Retroperitoneum: Moderate volume hemoperitoneum. Small pneumoperitoneum. No organized fluid collection. No lymphadenopathy. Bones/Soft Tissues: Multilevel moderate degenerative disc disease. There is similar irregularity of the disc space at T12-L1. Similar decubitus ulcer in the right ischial region. 1. Moderate volume hemoperitoneum.  2. Small irregularity at T12-L1. Abdomen/Pelvis: Organs: Liver is normal in contour and attenuation. Pancreas is unremarkable. Adrenals are unremarkable. Spleen is normal in size. No renal calculi or hydronephrosis. Mild calcific atherosclerosis. Tyjacinto Lyle GI/Bowel:  Similar to slightly improved mild diffuse colonic wall thickening. Pelvis: Unremarkable. Peritoneum/Retroperitoneum: No significant change in moderate volume hemoperitoneum. Similar small pneumoperitoneum, likely postoperative. No lymphadenopathy. Bones/Soft Tissues: Diffuse marrow heterogeneity with multilevel degenerative disc disease. Similar decubitus ulcer of right ischial region. 1. Bronchial wall thickening and centrilobular nodularity in the right lower lobe, likely infectious/inflammatory airways process such as aspiration. 2. No significant change in moderate volume hemoperitoneum. 3. Similar small pneumoperitoneum, likely postoperative. 4. Similar to slightly improved mild diffuse colonic wall thickening. Ct Abdomen Pelvis W Iv Contrast    Result Date: 7/27/2020  EXAMINATION: CTA OF THE CHEST; CT OF THE ABDOMEN AND PELVIS WITH CONTRAST 7/25/2020 10:48 am TECHNIQUE: CTA of the chest was performed after the administration of intravenous contrast.  Multiplanar reformatted images are provided for review. MIP images are provided for review. Dose modulation, iterative reconstruction, and/or weight based adjustment of the mA/kV was utilized to reduce the radiation dose to as low as reasonably achievable.; CT of the abdomen and pelvis was performed with the administration of intravenous contrast. Multiplanar reformatted images are provided for review. Dose modulation, iterative reconstruction, and/or weight based adjustment of the mA/kV was utilized to reduce the radiation dose to as low as reasonably achievable.  COMPARISON: CT abdomen and pelvis dated 10/27/2010, portable chest radiograph dated 07/24/2010 HISTORY: ORDERING SYSTEM PROVIDED HISTORY: hypoxia TECHNOLOGIST PROVIDED HISTORY: Reason for exam:->hypoxia Reason for Exam: SHORTNESS OF BREATH/  R/O pe; ORDERING SYSTEM PROVIDED HISTORY: Abdominal pain TECHNOLOGIST PROVIDED HISTORY: Reason for exam:->Abdominal pain Reason for Exam: Abdominal pain FINDINGS: Pulmonary Arteries: There is adequate opacification of the pulmonary arteries. The pulmonary arteries are normal caliber. There are no pulmonary arterial filling defects. Streak artifact slightly limits the exam. Mediastinum: A right IJ catheter has its tip in the superior vena cava. The thoracic aorta is of normal caliber. There is no evidence of thoracic aortic dissection. The heart size is within normal limits. There is no pericardial effusion. Lungs/pleura: There is mild centrilobular emphysema. There is minor consolidation at the right lung base posteromedially. There is no pleural effusion, pneumothorax or evidence of edema. There is minimal atelectasis at the left lung base. Motion degrades the quality of the exam.  There are small foci of ground-glass opacity within the left lower lobe medially. There is mucous impaction within right lower lobe bronchi. Soft Tissues/Bones: 3rd spacing is noted. The bones are demineralized. There are endplate destructive changes at T12-L1. These are new when compared to the 2010 exam.  There is associated sclerosis of the vertebral bodies. No paravertebral fluid collection is appreciated. CT abdomen and pelvis: Organs: No focal hepatic abnormality is identified. No focal splenic abnormality is appreciated no focal pancreatic abnormality is identified. The adrenal glands are unremarkable. The gallbladder is absent. The kidneys are not obstructed. There are areas of cortical decreased attenuation within the right kidney. There is a rounded low-attenuation lesion within the left renal lower pole, likely a cyst.  There is nonobstructing left lower pole renal calculus.  There are small foci of low attenuation within the left renal cortex. Bowel: The bowel is not obstructed. The appendix is believed to be within normal limits. There are air foci within the posterior wall of the right colon which is most likely reflects pseudo pneumatosis. There is no mesenteric or portal venous gas. Pelvis: No free fluid is noted in the pelvis. A Singh catheter is in place. There is ectasia of the common iliac arteries. Retroperitoneum: The abdominal aorta is of normal caliber. There is no evidence of free intraperitoneal air. Bones and soft tissues: 3rd spacing is noted. There is a large decubitus ulcer involving the right buttock. A defined abscess is not identified. No bony destruction is appreciated. Postsurgical changes are noted in the lumbar spine. There is ankylosis L2-3. There is degenerative disc disease at L3-4.     1. No evidence of pulmonary embolic disease. Motion and streak artifact limits the exam. 2. Minimal left basilar ground-glass opacity. Mucous impaction involving right lower lobe bronchi. Consider aspiration pneumonitis. 3. Chronic appearing destructive change at the T12-L1 disc space. Consider prior infectious spondylitis. Correlation with lab values may be helpful to exclude acute discitis and osteomyelitis. 4. Minimal foci of hypoattenuation within the renal cortices, consider pyelonephritis or small infarcts. 5. Diffuse 3rd spacing. 6. Lucency within the right colonic wall, most likely pseudo pneumatosis. Correlation with lactate levels recommended to exclude bowel ischemia. 7. Large decubitus ulcer involving the right posterior thigh and buttock. No defined abscess or evidence of osteomyelitis. 8. Nonobstructing left renal calculus.      Xr Chest Portable    Result Date: 8/11/2020  EXAMINATION: ONE XRAY VIEW OF THE CHEST 8/11/2020 4:46 am COMPARISON: Chest radiograph dated 8/10/2020 HISTORY: ORDERING SYSTEM PROVIDED HISTORY: ET Tube placement verification TECHNOLOGIST PROVIDED Portable    Result Date: 8/3/2020  EXAMINATION: ONE XRAY VIEW OF THE CHEST 7/31/2020 5:41 am COMPARISON: 07/30/2020 HISTORY: ORDERING SYSTEM PROVIDED HISTORY: vent TECHNOLOGIST PROVIDED HISTORY: Reason for exam:->vent Reason for Exam: vent Acuity: Acute Type of Exam: Subsequent/Follow-up FINDINGS: The endotracheal tube, nasogastric tube and right IJ catheter are in place. There is no pneumothorax, edema or focal consolidation. The heart size is normal.  The bony thorax is grossly intact. No significant interval change. No new abnormality. No change in life support. Xr Chest Portable    Result Date: 8/1/2020  EXAMINATION: ONE XRAY VIEW OF THE CHEST 8/1/2020 5:06 am COMPARISON: 07/31/2020 HISTORY: ORDERING SYSTEM PROVIDED HISTORY: vent TECHNOLOGIST PROVIDED HISTORY: Reason for exam:->vent Reason for Exam: Vent Acuity: Acute Type of Exam: Ongoing FINDINGS: The right internal jugular catheter tip is in the superior vena cava. The endotracheal tube tip is 3 cm above the navid. The enteric tube is below the diaphragm. The lungs are clear. Pulmonary vascularity is normal.  The cardiomediastinal silhouette is normal.     No acute abnormality. Xr Chest Portable    Result Date: 7/30/2020  EXAMINATION: ONE XRAY VIEW OF THE CHEST 7/30/2020 4:01 am COMPARISON: Chest radiograph 07/29/2020. HISTORY: ORDERING SYSTEM PROVIDED HISTORY: vent TECHNOLOGIST PROVIDED HISTORY: Reason for exam:->vent Reason for Exam: vent Acuity: Unknown Type of Exam: Subsequent/Follow-up Additional signs and symptoms: vent Relevant Medical/Surgical History: vent FINDINGS: Single view provided. Severe rotation. Stable mediastinal and cardiac silhouettes. Endotracheal tube overlies the tracheal air column with the tip 4.8 cm above the navid. Stable right CVC with tip in the mid to distal superior vena cava. Enteric tube follows the course of the esophagus crossing the GE junction and extending inferiorly off the image.   The side port is located at the level of the cardia. Stable pulmonary hyperinflation and blunting of the costophrenic angles. No acute consolidation or interstitial edema. No pneumothorax. Improving free subdiaphragmatic air on the right. 1. Stable life-support devices. 2. Stable hyperinflation with no acute pulmonary process. 3. Improving right-sided free subdiaphragmatic air. Xr Chest Portable    Result Date: 7/29/2020  EXAMINATION: ONE XRAY VIEW OF THE CHEST 7/29/2020 4:55 am COMPARISON: July 28, 2020 HISTORY: ORDERING SYSTEM PROVIDED HISTORY: vent TECHNOLOGIST PROVIDED HISTORY: Reason for exam:->vent Reason for Exam: vent Acuity: Acute Type of Exam: Subsequent/Follow-up FINDINGS: The ETT is 6 cm above the navid. The feeding tube is inserted with its tip and side hole in the fundus of the stomach. There is a right IJ catheter with its tip in the superior vena cava the cardiomediastinal silhouette is stable. The lungs are grossly clear. There is no pleural effusion. There is no pneumothorax. There is no acute osseous abnormality. There is known free air under the diaphragm. No acute cardiopulmonary disease. Known free air under the diaphragm, likely postsurgical changes. Xr Chest Portable    Result Date: 7/28/2020  EXAMINATION: ONE XRAY VIEW OF THE CHEST 7/28/2020 4:59 am COMPARISON: 7/27/2020 HISTORY: ORDERING SYSTEM PROVIDED HISTORY: vent TECHNOLOGIST PROVIDED HISTORY: Reason for exam:->vent Reason for Exam: vent Acuity: Acute Type of Exam: Subsequent/Follow-up FINDINGS: ETT tip is approximately 3.2 cm above the navid. Enteric catheter tip overlies the body of the stomach. Side hole is distal to the GE junction. Right IJ catheter tip overlies the mid SVC. Lungs are clear. No focal consolidation, pleural effusion, or pneumothorax. There is free air under the diaphragm. Correlation to radiograph of the abdomen dated 7/26/2020 shows skin staples overlying the abdomen.      1. Free air under the diaphragm. Based on available records, patient had an exploratory laparotomy on 7/24/2020. 2. No acute pulmonary abnormality. Xr Chest Portable    Result Date: 7/27/2020  EXAMINATION: ONE XRAY VIEW OF THE CHEST 7/27/2020 5:32 am COMPARISON: 07/26/2020 HISTORY: ORDERING SYSTEM PROVIDED HISTORY: Hypoxia TECHNOLOGIST PROVIDED HISTORY: Reason for exam:->Hypoxia Reason for Exam: Hypoxia Acuity: Unknown Type of Exam: Subsequent/Follow-up Additional signs and symptoms: Hypoxia Relevant Medical/Surgical History: Hypoxia FINDINGS: Endotracheal tube, NG tube and right IJ central venous catheter remain in place. The heart and mediastinal structures are stable. There has been some improvement in aeration of the right lung base suggesting resolving atelectasis. Skin folds simulate lucencies along the lower left lung. Improving aeration right lung base, no acute process seen. Xr Chest Portable    Result Date: 7/26/2020  EXAMINATION: ONE XRAY VIEW OF THE CHEST 7/26/2020 1:27 am COMPARISON: 07/25/2020 HISTORY: ORDERING SYSTEM PROVIDED HISTORY: ETT advanced TECHNOLOGIST PROVIDED HISTORY: Reason for exam:->ETT advanced Reason for Exam: ETT advanced Acuity: Acute Type of Exam: Ongoing FINDINGS: Endotracheal tube tip is 3.5 cm above the navid. Right central venous catheter is stable in positioning. Enteric tube courses below the diaphragm. Slightly increasing right basilar airspace opacity. .  No focal consolidation, pleural effusion or pneumothorax. The cardiomediastinal silhouette is stable. No overt pulmonary edema. The osseous structures are stable. Endotracheal tube tip is 3.5 cm above the navid. Slightly increasing right basilar airspace opacity. Xr Chest Portable    Result Date: 7/25/2020  EXAMINATION: ONE XRAY VIEW OF THE CHEST 7/25/2020 7:05 pm COMPARISON: Chest radiograph 07/24/2020. CT chest angiogram 07/25/2020.  HISTORY: ORDERING SYSTEM PROVIDED HISTORY: ETT placement AND N/G placement - comment on tip of both pleaes TECHNOLOGIST PROVIDED HISTORY: Reason for exam:->ETT placement AND N/G placement - comment on tip of both pleaes Acuity: Acute Type of Exam: Subsequent/Follow-up FINDINGS: The tip and side port of the enteric tube are in the gastric body. The endotracheal to terminates approximately 4.5 cm above the navid. A right transjugular central venous catheter terminates in the mid superior vena cava. The cardiomediastinal silhouette is unchanged. No pneumothorax, vascular congestion, consolidation, or pleural effusion is identified. Anterior abdominal skin staples. Diffuse air-filled mildly distended loops of bowel throughout the abdomen. No acute osseous abnormality. 1. Endotracheal tube 4.5 cm above the navid. 2. Tip and side port of the enteric tube in the gastric body. 3. No acute process in the chest. 4. Bowel-gas pattern compatible with ileus. Xr Chest Portable    Result Date: 7/24/2020  EXAMINATION: ONE XRAY VIEW OF THE CHEST 7/24/2020 9:02 pm COMPARISON: 7/24/2020 HISTORY: ORDERING SYSTEM PROVIDED HISTORY: central line placement TECHNOLOGIST PROVIDED HISTORY: Reason for exam:->central line placement Reason for Exam: central line placement Acuity: Acute Type of Exam: Initial Additional signs and symptoms: SOB Relevant Medical/Surgical History: none FINDINGS: Left costophrenic angle clipped from field of view. Right IJ central venous catheter has been placed with tip in the distal SVC. No pneumothorax. Lungs remain clear. Cardiac and mediastinal silhouettes are stable. Stable mild blunting of right costophrenic angle compatible with trace to small right pleural effusion. Stable appearance to bony structures. Placement of right IJ central venous catheter with tip in the distal SVC. No pneumothorax. Otherwise stable exam with trace to small right pleural effusion.      Xr Chest Portable    Result Date: 7/24/2020  EXAMINATION: ONE XRAY VIEW OF THE CHEST 7/24/2020 4:55 pm COMPARISON: None. HISTORY: ORDERING SYSTEM PROVIDED HISTORY: fall TECHNOLOGIST PROVIDED HISTORY: Reason for exam:->fall Reason for Exam: fall Acuity: Acute Type of Exam: Initial Mechanism of Injury: patient fell in the kitchen, was down for 2 days Relevant Medical/Surgical History: na FINDINGS: No pneumothorax or pulmonary contusion or effusion is identified. The heart size is normal.     No acute abnormality detected. Cta Abdomen Pelvis W Contrast    Result Date: 7/31/2020  EXAMINATION: CTA OF THE ABDOMEN AND PELVIS WITH CONTRAST 7/31/2020 5:53 pm: TECHNIQUE: CTA of the abdomen and pelvis was performed with the administration of intravenous contrast. Multiplanar reformatted images are provided for review. MIP images are provided for review. Dose modulation, iterative reconstruction, and/or weight based adjustment of the mA/kV was utilized to reduce the radiation dose to as low as reasonably achievable. COMPARISON: 07/31/2020. HISTORY: ORDERING SYSTEM PROVIDED HISTORY: retroperitoneal hemorrhage TECHNOLOGIST PROVIDED HISTORY: Reason for exam:->retroperitoneal hemorrhage FINDINGS: CTA ABDOMEN AND PELVIS: Lower Chest: Unremarkable. Organs: Liver is normal in contour and attenuation. Gallbladder is absent. No biliary ductal dilatation. Pancreas is unremarkable. Adrenals are unremarkable. Spleen is normal in size. Left renal cyst. No further imaging is indicated for this finding. Punctate left calyceal calculus. No hydronephrosis. Vasculature is unremarkable. GI/Bowel: Mild apparent wall thickening of the colon. No evidence of obstruction. Enteric tube is in the gastric body. Pelvis: Unremarkable. Peritoneum/Retroperitoneum: Moderate volume hemoperitoneum. No active extravasation of contrast material.  Small pneumoperitoneum. No organized fluid collection. No lymphadenopathy. Bones/Soft Tissues: Multilevel moderate degenerative disc disease. There is similar irregularity of the disc space at T12-L1. Similar decubitus ulcer in the right ischial region. 1. Moderate volume hemoperitoneum without active extravasation of contrast material. 2. Small pneumoperitoneum, likely postoperative. 3.  Mild apparent wall thickening of the colon diffusely. Clinical correlation advised. 4. Similar decubitus ulcer in the right ischial region. 5. Similar irregularity of the disc space at T12-L1. 6. Nonobstructive left nephrolithiasis. Cta Pulmonary W Contrast    Result Date: 7/27/2020  EXAMINATION: CTA OF THE CHEST; CT OF THE ABDOMEN AND PELVIS WITH CONTRAST 7/25/2020 10:48 am TECHNIQUE: CTA of the chest was performed after the administration of intravenous contrast.  Multiplanar reformatted images are provided for review. MIP images are provided for review. Dose modulation, iterative reconstruction, and/or weight based adjustment of the mA/kV was utilized to reduce the radiation dose to as low as reasonably achievable.; CT of the abdomen and pelvis was performed with the administration of intravenous contrast. Multiplanar reformatted images are provided for review. Dose modulation, iterative reconstruction, and/or weight based adjustment of the mA/kV was utilized to reduce the radiation dose to as low as reasonably achievable. COMPARISON: CT abdomen and pelvis dated 10/27/2010, portable chest radiograph dated 07/24/2010 HISTORY: ORDERING SYSTEM PROVIDED HISTORY: hypoxia TECHNOLOGIST PROVIDED HISTORY: Reason for exam:->hypoxia Reason for Exam: SHORTNESS OF BREATH/  R/O pe; ORDERING SYSTEM PROVIDED HISTORY: Abdominal pain TECHNOLOGIST PROVIDED HISTORY: Reason for exam:->Abdominal pain Reason for Exam: Abdominal pain FINDINGS: Pulmonary Arteries: There is adequate opacification of the pulmonary arteries. The pulmonary arteries are normal caliber. There are no pulmonary arterial filling defects. Streak artifact slightly limits the exam. Mediastinum: A right IJ catheter has its tip in the superior vena cava.   The thoracic aorta is of normal caliber. There is no evidence of thoracic aortic dissection. The heart size is within normal limits. There is no pericardial effusion. Lungs/pleura: There is mild centrilobular emphysema. There is minor consolidation at the right lung base posteromedially. There is no pleural effusion, pneumothorax or evidence of edema. There is minimal atelectasis at the left lung base. Motion degrades the quality of the exam.  There are small foci of ground-glass opacity within the left lower lobe medially. There is mucous impaction within right lower lobe bronchi. Soft Tissues/Bones: 3rd spacing is noted. The bones are demineralized. There are endplate destructive changes at T12-L1. These are new when compared to the 2010 exam.  There is associated sclerosis of the vertebral bodies. No paravertebral fluid collection is appreciated. CT abdomen and pelvis: Organs: No focal hepatic abnormality is identified. No focal splenic abnormality is appreciated no focal pancreatic abnormality is identified. The adrenal glands are unremarkable. The gallbladder is absent. The kidneys are not obstructed. There are areas of cortical decreased attenuation within the right kidney. There is a rounded low-attenuation lesion within the left renal lower pole, likely a cyst.  There is nonobstructing left lower pole renal calculus. There are small foci of low attenuation within the left renal cortex. Bowel: The bowel is not obstructed. The appendix is believed to be within normal limits. There are air foci within the posterior wall of the right colon which is most likely reflects pseudo pneumatosis. There is no mesenteric or portal venous gas. Pelvis: No free fluid is noted in the pelvis. A Singh catheter is in place. There is ectasia of the common iliac arteries. Retroperitoneum: The abdominal aorta is of normal caliber. There is no evidence of free intraperitoneal air. Bones and soft tissues: 3rd spacing is noted. There is a large decubitus ulcer involving the right buttock. A defined abscess is not identified. No bony destruction is appreciated. Postsurgical changes are noted in the lumbar spine. There is ankylosis L2-3. There is degenerative disc disease at L3-4.     1. No evidence of pulmonary embolic disease. Motion and streak artifact limits the exam. 2. Minimal left basilar ground-glass opacity. Mucous impaction involving right lower lobe bronchi. Consider aspiration pneumonitis. 3. Chronic appearing destructive change at the T12-L1 disc space. Consider prior infectious spondylitis. Correlation with lab values may be helpful to exclude acute discitis and osteomyelitis. 4. Minimal foci of hypoattenuation within the renal cortices, consider pyelonephritis or small infarcts. 5. Diffuse 3rd spacing. 6. Lucency within the right colonic wall, most likely pseudo pneumatosis. Correlation with lactate levels recommended to exclude bowel ischemia. 7. Large decubitus ulcer involving the right posterior thigh and buttock. No defined abscess or evidence of osteomyelitis. 8. Nonobstructing left renal calculus. Ir Nontunneled Vascular Catheter > 5 Years    Result Date: 7/26/2020  PROCEDURE: ULTRASOUND GUIDED VASCULAR ACCESS. PLACEMENT OF A NON-TUNNELED CATHETER. 7/26/2020. HISTORY: ORDERING SYSTEM PROVIDED HISTORY: Renal insufficiency TECHNOLOGIST PROVIDED HISTORY: Reason for exam:->fem temp cath SEDATION: None FLUOROSCOPY DOSE AND TYPE OR TIME AND EXPOSURES: None TECHNIQUE: Informed consent was obtained after a detailed explanation of the procedure including risks, benefits, and alternatives. Universal protocol was observed. The right groin was prepped and draped in sterile fashion using maximum sterile barrier technique. Local anesthesia was achieved with lidocaine. A micropuncture needle was used to access the right common femoral vein using ultrasound guidance.   An ultrasound image demonstrating patency of the vein with needle tip located within it. An image was obtained and stored in PACs. A 0.035 guidewire was used to place a 12.5 [place] 24 cm Mahurkar triple-lumen temporary dialysis catheter  after fascial tract dilation. The catheter flushed easily and there was a good blood return. The catheter was sutured to the skin. The catheter was locked with heparinized saline. The patient tolerated the procedure well and there were no immediate complications. FINDINGS: Ultrasound evaluation of the right common femoral vein demonstrates that is widely patent and normally compressible without thrombus. 3 sonographic images were obtained. Postprocedure KUB reveals that the catheter tip is in good position. Successful ultrasound  guided non-tunneled right common femoral vein triple-lumen temporary dialysis catheter placement at the ICU bedside. Xr Hip 1 Vw W Pelvis Left    Result Date: 7/24/2020  EXAMINATION: ONE XRAY VIEW OF THE RIGHT HIP; ONE XRAY VIEW OF THE PELVIS AND TWO XRAY VIEWS LEFT HIP 7/24/2020 4:55 pm COMPARISON: None. HISTORY: ORDERING SYSTEM PROVIDED HISTORY: fall TECHNOLOGIST PROVIDED HISTORY: Reason for exam:->fall Reason for Exam: pain Acuity: Acute Type of Exam: Initial Mechanism of Injury: patient was in kitchen and fell, was down for 2 days Relevant Medical/Surgical History: na FINDINGS: Unremarkable appearance of the right and left hemipelvis, articulating normally at the SI joints and pubic symphysis. Visualized portions of the right and left femurs are intact align normally at the right and left acetabulum. Right and left hip joints appear well maintained. Unremarkable AP pelvis and bilateral hip radiographs. If pain persists or worsens, then additional evaluation with MRI is indicated to ensure no underlying radiographically occult process such as fracture, AVN or transient osteoporosis. Assessment & Plan:      Tomy Ricardo is a 60y.o. year old male admitted 7/24/2020 for sepsis.     1) Abscess in Right Perineum: Appears to have ruptured overnight   CT a/p 8/4/20: Unremarkable pelvis with no signs of abscess formation in perineum. WBC 19.1, afebrile   On IV Cefepime, Flagyl, and Vancomycin   No evidence of drainable abscess noted on PE   Recommend CT pelvis w contrast for further evaluation     Will continue to follow. Pt seen and examined with Dr. Elsy Gambino. Patient seen and examined, chart reviewed.      Electronically signed by Kristine Cochran PA-C on 8/11/2020 at 11:21 AM

## 2020-08-11 NOTE — PROGRESS NOTES
Pulmonary and Critical Care  Progress Note      VITALS:  BP (!) 125/55   Pulse 97   Temp 97.9 °F (36.6 °C) (Rectal)   Resp 21   Ht (S) 5' 7\" (1.702 m)   Wt (S) 151 lb 3.8 oz (68.6 kg)   SpO2 95%   BMI 23.69 kg/m²     Subjective:   CHIEF COMPLAINT :Fall with right hip pain     HPI:                The patient is a 60 y.o. male with significant past medical history of HTN,. Sacral decub, MSSA endocarditis, Septic PE, Hep C,  presents with complaints of fall and right hip pain. He was on the floor for more than 2 days. His CK is mildly elevated. He had mild lactic acidosis. His PBNPT is 11,000 with mildly elevated troponins. He is on 3 grams of Heroin per day. His U tocx was positive for Cocaine and Opiates. He had no fever, no headaches, no chest pain, no palpitations, no n/v, no diaphoresis. He has no sick exposure, no recent travel. His 1 blood culture set is positive for Staph. He is getting abx and IV fluids. He is on the vent and on HD     Objective:   PHYSICAL EXAM:    LUNGS:Occasional basal crackles  Abd-soft, BS+,NT  Ext - no pedal edema  CVS-s1s2, no murmurs      DATA:    CBC:  Recent Labs     08/09/20  0600 08/09/20  2040 08/10/20  0600 08/11/20  0830   WBC 22.1* 17.4* 27.1* 19.1*   RBC 2.52* 2.49* 2.51* 2.30*   HGB 7.1* 7.0* 7.3* 6.6*   HCT 22.2* 22.2* 22.3* 21.1*    223 238 180   MCV 88.1 89.2 88.8 91.7   MCH 28.2 28.1 29.1 28.7   MCHC 32.0 31.5* 32.7 31.3*   RDW 23.4* 23.2* 23.4* 24.1*   SEGSPCT 91.2*  --  92.7* 92.2*      BMP:  Recent Labs     08/09/20  2040 08/10/20  0600 08/11/20  0830    136 135   K 4.4 4.2 2.8*   CL 96* 96* 98*   CO2 15* 18* 23   * 157* 75*   CREATININE 3.2* 3.3* 2.1*   CALCIUM 8.1* 8.5 7.4*   GLUCOSE 207* 127* 98      ABG:  Recent Labs     08/09/20  2355 08/10/20  0600 08/11/20  0600   PH 7.40 7.34 7.45   PO2ART 177* 94 76   HGP9RDI 28.0* 34.0 36.0   O2SAT 95.4* 95.6* 95.3*     BNP  No results found for: BNP   D-Dimer:  No results found for: DDIMER 1. Radiology: None      Assessment/Plan     Patient Active Problem List    Diagnosis Date Noted    Respiratory failure ([facility] 08/10/2020    [facility] 08/03/2020    MSSA bacteremia 07/28/2020    Ischemic bowel disease ([facility]     Leukocytosis     Sepsis ([facility] 07/24/2020   Patient is on the vent and sedated. He is off pressors. His H&H has been stable     Septic shock- improved  Hemorrhagic shock- improved  Acute hypoxic resp failure  Mild coagulopathy- improved  Shock Liver- improved  Lactic acidosis- improved  VDRF  Anemia likely GI bleed  ? Staph Aureus bacteremia  Severe Pulmonary HTN  Occult blood positive  JERAMIE likely sec to ATN  Bilateral Arm wounds  Leukocytosis  Hemoperitoneum  Severe malnutrition  Hypokalemia       1. Kcl  2. Check Mg level  3. F/u H&H  4. Tube feeds  5. Trach collar trials as tolerated  6. LTAC eval  7. Prognosis guarded  8. C/w present management  No follow-ups on file.     Electronically signed by Dianne Frederick MD on 8/11/2020 at 10:34 AM

## 2020-08-11 NOTE — OP NOTE
Operative Report: Trach/PEG    Patient ID:  Toño Hernandez  [id number]  60 y.o.  1955    Indications: Respiratory Failure/ Malnutrition     Pre-operative Diagnosis: Respiratory Failure/Malnutrition     Post-operative Diagnosis: same    Procedure:   Tracheostomy / PEG placement    Surgeon: Fremont Denver, MD    Findings:  same    Estimated Blood Loss:  Minimal           Total IV Fluids: 500 ml            Complications:  None; patient tolerated the procedure well. Disposition: PACU - hemodynamically stable. Condition: stable      Procedure Details: The patientWas positioned supine and the neck was hyperextended. The neck and anterior chest were prepped and draped in the usual sterile fashion. The thyroid and the cricothyroid cartilage were palpated in the skin and the subcutaneous tissue in this area were anesthetized with 1% lidocaine with epinephrine. A vertical midline incision was made just superior to the sternal notch. A hemostat was used to bluntly dissect down to the cricothyroid membrane. The strap muscles were retracted laterally and the thyroid isthmus was retracted superiorly. The endotracheal tube was withdrawn to the level of the cords. The second tracheal ring was identified. A tracheostomy hook is placed between the first and second tracheal rings the trachea was retracted superiorly. A transverse incision was made between the second and third rings using #11 blade. The opening was dilated using a tracheal dilator. A #8 Shiley tracheostomy tube was inserted and advanced and the tracheal tube was withdrawn completely. The tracheostomy tube was sutured in place and tracheostomy ties placed and tied around the neck. Then PEG tube was placed in the following manner. A mouthpiece was placed in the patient's mouth and the endoscope was passed down into the stomach. No abnormalities were noted.   The stomach was insufflated with air and the endoscope positioned  in the midportion

## 2020-08-11 NOTE — PROGRESS NOTES
Hospitalist Progress Note      Name:  Matteo Anderson /Age/Sex: 1955  (60 y.o. male)   MRN & CSN:  [id number] & [id number] Admission Date/Time: 2020  3:22 PM   Location:  -A PCP: No primary care provider on file. Hospital Day: 19    Assessment and Plan:   Matteo Anderson is a 60 y.o.  male  who presents with    1) Septic shock with MSSA bacteremia and possible T12-L1 OM  2/2 bilateral upper extremity wound infection.  -Has background IVDU hx  -Was on pressor but currently off  -Initial BC growing MSSA; Last [facility] : NGTD  -Wound cx growing MSSA  -JASPAL neg or vegetation  -ID on board for abx management  -Patient is very critical and prognosis guarded  -palliative on board    2-Acute on chronic respiratory failure due to sepsis and suspected aspiration pneumonia  -On mechanical Vent  -s/p trach/peg on 8/10  -Pulmo on board or weaning    3-Probable staph UTI  -This is due to the bacteremia  -Continue abx    4) Rhabdomyolysis due to IVDU  -Improved    5-JERAMIE due to ATN from Rhabdo  - dialysis per nephro    6-Skin wounds/right post thigh/buttock and bilateral UE  -Wound culture growing MSSA  -Surgery and ID following    7-Pneumatosis intestinalis   -S/P Ex-lap on  negative for ischemic bowel, however CT on  showing hemoperitoneum and pneumoperitoneum  -General Surgery on board    8-Acute blood loss anemia due to above  -S/P transfusion- monitor H/H  -Updated Gen Surg of possible intra-abdominal bleed  -Will continue to monitor     Other chronic issues  -Hx of gastric cancer stage 4  -Polysubstance abuse- UDS positive for cocaine and opiate  -Hep C, chronic         Diet DIET TUBE FEED CONTINUOUS/CYCLIC NPO; Semi-elemental (Vital AF);  Gastrostomy; Continuous; 25; 50; 24   DVT Prophylaxis [] Lovenox, []  Heparin, [] SCDs, [] Ambulation   GI Prophylaxis [] PPI,  [] H2 Blocker,  [] Carafate,  [] Diet/Tube Feeds   Code Status Full Code   Disposition TBD   MDM      History of Present Illness:     Patient was seen and examined  On mechanical ventilation and sedation  RASS of -2       Ten point ROS reviewed negative, unless as noted above    Objective: Intake/Output Summary (Last 24 hours) at 8/11/2020 1337  Last data filed at 8/11/2020 0800  Gross per 24 hour   Intake 1413.94 ml   Output 485 ml   Net 928.94 ml      Vitals:   Vitals:    08/11/20 1320   BP: (!) 113/56   Pulse: 92   Resp: 21   Temp: 98.6 °F (37 °C)   SpO2: 98%     Physical Exam:   GEN On mechanical ventilation and sedation  EYES Pupils are equally round. No scleral erythema, discharge, or conjunctivitis. HENT Mucous membranes are moist. Oral pharynx without exudates, no evidence of thrush. NECK Supple, no apparent thyromegaly or masses. RESP Clear to auscultation, no wheezes, rales or rhonchi. Symmetric chest movement while on mechanical ventilation. CARDIO/VASC S1/S2 auscultated. Regular rate without appreciable murmurs, rubs, or gallops. No JVD or carotid bruits. Peripheral pulses equal bilaterally and palpable. No peripheral edema. GI Abdomen is soft without significant tenderness, masses, or guarding. Bowel sounds are normoactive. Rectal exam deferred.  No costovertebral angle tenderness. Normal appearing external genitalia. Singh catheter is present. HEME/LYMPH No palpable cervical lymphadenopathy and no hepatosplenomegaly. No petechiae or ecchymoses. MSK No gross joint deformities. SKIN Normal coloration, warm, dry.   NEURO RASS of -2    Medications:   Medications:    sodium chloride  20 mL Intravenous Once    calcium gluconate IVPB  2 g Intravenous Once    midodrine  10 mg Oral Q MWF    sodium chloride  250 mL Intravenous Once    cefepime  2 g Intravenous Once per day on Mon Wed Fri    scopolamine  1 patch Transdermal Q72H    vancomycin (VANCOCIN) intermittent dosing (placeholder)   Other RX Placeholder    metroNIDAZOLE  500 mg Intravenous Q8H    pantoprazole  40 mg Intravenous Daily    thiamine (VITAMIN B1) IVPB  200 mg Intravenous Q12H    chlorhexidine  15 mL Mouth/Throat BID    albuterol sulfate HFA  4 puff Inhalation Q4H    And    ipratropium  4 puff Inhalation Q4H    aspirin  324 mg Oral Once    sodium chloride flush  10 mL Intravenous BID    sodium chloride flush  10 mL Intravenous 2 times per day    [Held by provider] enoxaparin  1 mg/kg Subcutaneous Daily    nicotine  1 patch Transdermal Daily      Infusions:    norepinephrine 3 mcg/min (08/11/20 1328)    dextrose      fentanyl 50 mcg/hr (08/11/20 0735)    propofol 25 mcg/kg/min (08/11/20 0829)     PRN Meds: potassium chloride, 20 mEq, PRN  glucose, 15 g, PRN  dextrose, 12.5 g, PRN  glucagon (rDNA), 1 mg, PRN  dextrose, 100 mL/hr, PRN  heparin (porcine), 3,000 Units, PRN  ipratropium-albuterol, 1 ampule, Q4H PRN  iopamidol, 80 mL, ONCE PRN  sodium chloride flush, 10 mL, PRN  acetaminophen, 650 mg, Q6H PRN    Or  acetaminophen, 650 mg, Q6H PRN  polyethylene glycol, 17 g, Daily PRN  promethazine, 12.5 mg, Q6H PRN    Or  ondansetron, 4 mg, Q6H PRN  promethazine, 25 mg, Q6H PRN  diphenhydrAMINE, 12.5 mg, Nightly PRN  dicyclomine, 20 mg, Q6H PRN  morphine, 2 mg, Q4H PRN          Electronically signed by Stephany Montenegro MD on 8/11/2020 at 1:37 PM

## 2020-08-12 ENCOUNTER — APPOINTMENT (OUTPATIENT)
Dept: INTERVENTIONAL RADIOLOGY/VASCULAR | Age: 65
DRG: 004 | End: 2020-08-12
Payer: MEDICARE

## 2020-08-12 ENCOUNTER — APPOINTMENT (OUTPATIENT)
Dept: GENERAL RADIOLOGY | Age: 65
DRG: 004 | End: 2020-08-12
Payer: MEDICARE

## 2020-08-12 LAB
ABO/RH: NORMAL
ALBUMIN SERPL-MCNC: 2.2 GM/DL (ref 3.4–5)
ALP BLD-CCNC: 81 IU/L (ref 40–128)
ALT SERPL-CCNC: 13 U/L (ref 10–40)
ANION GAP SERPL CALCULATED.3IONS-SCNC: 15 MMOL/L (ref 4–16)
ANISOCYTOSIS: ABNORMAL
ANTIBODY SCREEN: NEGATIVE
APTT: 28.2 SECONDS (ref 25.1–37.1)
AST SERPL-CCNC: 45 IU/L (ref 15–37)
BASE EXCESS: 1 (ref 0–3.3)
BASE EXCESS: 2 (ref 0–3.3)
BASOPHILS ABSOLUTE: 0 K/CU MM
BASOPHILS RELATIVE PERCENT: 0.1 % (ref 0–1)
BILIRUB SERPL-MCNC: 1.8 MG/DL (ref 0–1)
BUN BLDV-MCNC: 88 MG/DL (ref 6–23)
CALCIUM IONIZED: 3.8 MG/DL (ref 4.48–5.28)
CALCIUM IONIZED: 3.88 MG/DL (ref 4.48–5.28)
CALCIUM SERPL-MCNC: 7.6 MG/DL (ref 8.3–10.6)
CARBON MONOXIDE, BLOOD: 2.8 % (ref 0–5)
CARBON MONOXIDE, BLOOD: 3 % (ref 0–5)
CHLORIDE BLD-SCNC: 103 MMOL/L (ref 99–110)
CO2 CONTENT: 21.3 MMOL/L (ref 19–24)
CO2 CONTENT: 22.5 MMOL/L (ref 19–24)
CO2: 21 MMOL/L (ref 21–32)
COMMENT: ABNORMAL
COMMENT: ABNORMAL
COMPONENT: NORMAL
CREAT SERPL-MCNC: 2.4 MG/DL (ref 0.9–1.3)
CROSSMATCH RESULT: NORMAL
DIFFERENTIAL TYPE: ABNORMAL
DOSE AMOUNT: NORMAL
DOSE TIME: NORMAL
EOSINOPHILS ABSOLUTE: 0 K/CU MM
EOSINOPHILS RELATIVE PERCENT: 0.2 % (ref 0–3)
GFR AFRICAN AMERICAN: 33 ML/MIN/1.73M2
GFR NON-AFRICAN AMERICAN: 28 ML/MIN/1.73M2
GLUCOSE BLD-MCNC: 104 MG/DL (ref 70–99)
GLUCOSE BLD-MCNC: 176 MG/DL (ref 70–99)
GLUCOSE BLD-MCNC: 89 MG/DL (ref 70–99)
HCO3 ARTERIAL: 20.4 MMOL/L (ref 18–23)
HCO3 ARTERIAL: 21.6 MMOL/L (ref 18–23)
HCT VFR BLD CALC: 26.6 % (ref 42–52)
HEMOGLOBIN: 8.3 GM/DL (ref 13.5–18)
IMMATURE NEUTROPHIL %: 0.6 % (ref 0–0.43)
INR BLD: 1.1 INDEX
IONIZED CA: 0.95 MMOL/L (ref 1.12–1.32)
IONIZED CA: 0.97 MMOL/L (ref 1.12–1.32)
LYMPHOCYTES ABSOLUTE: 0.6 K/CU MM
LYMPHOCYTES RELATIVE PERCENT: 3.5 % (ref 24–44)
MCH RBC QN AUTO: 27.7 PG (ref 27–31)
MCHC RBC AUTO-ENTMCNC: 31.2 % (ref 32–36)
MCV RBC AUTO: 88.7 FL (ref 78–100)
METHEMOGLOBIN ARTERIAL: 0.9 %
METHEMOGLOBIN ARTERIAL: 1.6 %
MONOCYTES ABSOLUTE: 0.5 K/CU MM
MONOCYTES RELATIVE PERCENT: 3.1 % (ref 0–4)
O2 SATURATION: 93.7 % (ref 96–97)
O2 SATURATION: 94.9 % (ref 96–97)
PCO2 ARTERIAL: 28 MMHG (ref 32–45)
PCO2 ARTERIAL: 29 MMHG (ref 32–45)
PDW BLD-RTO: 23.6 % (ref 11.7–14.9)
PH BLOOD: 7.47 (ref 7.34–7.45)
PH BLOOD: 7.48 (ref 7.34–7.45)
PLATELET # BLD: 165 K/CU MM (ref 140–440)
PMV BLD AUTO: 9.8 FL (ref 7.5–11.1)
PO2 ARTERIAL: 73 MMHG (ref 75–100)
PO2 ARTERIAL: 84 MMHG (ref 75–100)
POTASSIUM SERPL-SCNC: 3.4 MMOL/L (ref 3.5–5.1)
PROTHROMBIN TIME: 13.3 SECONDS (ref 11.7–14.5)
RBC # BLD: 3 M/CU MM (ref 4.6–6.2)
RBC # BLD: ABNORMAL 10*6/UL
SEGMENTED NEUTROPHILS ABSOLUTE COUNT: 14.8 K/CU MM
SEGMENTED NEUTROPHILS RELATIVE PERCENT: 92.5 % (ref 36–66)
SODIUM BLD-SCNC: 139 MMOL/L (ref 135–145)
STATUS: NORMAL
TOTAL CK: 76 IU/L (ref 38–174)
TOTAL IMMATURE NEUTOROPHIL: 0.1 K/CU MM
TOTAL PROTEIN: 5.1 GM/DL (ref 6.4–8.2)
TRANSFUSION STATUS: NORMAL
UNIT DIVISION: 0
UNIT NUMBER: NORMAL
VANCOMYCIN RANDOM: 12.3 UG/ML
VITAMIN D 25-HYDROXY: 8.43 NG/ML
WBC # BLD: 16 K/CU MM (ref 4–10.5)

## 2020-08-12 PROCEDURE — 76705 ECHO EXAM OF ABDOMEN: CPT

## 2020-08-12 PROCEDURE — 2000000000 HC ICU R&B

## 2020-08-12 PROCEDURE — 6370000000 HC RX 637 (ALT 250 FOR IP): Performed by: INTERNAL MEDICINE

## 2020-08-12 PROCEDURE — 82550 ASSAY OF CK (CPK): CPT

## 2020-08-12 PROCEDURE — 2709999900 HC NON-CHARGEABLE SUPPLY

## 2020-08-12 PROCEDURE — 90935 HEMODIALYSIS ONE EVALUATION: CPT

## 2020-08-12 PROCEDURE — 94003 VENT MGMT INPAT SUBQ DAY: CPT

## 2020-08-12 PROCEDURE — 2580000003 HC RX 258: Performed by: INTERNAL MEDICINE

## 2020-08-12 PROCEDURE — C1729 CATH, DRAINAGE: HCPCS

## 2020-08-12 PROCEDURE — 94640 AIRWAY INHALATION TREATMENT: CPT

## 2020-08-12 PROCEDURE — 2580000003 HC RX 258: Performed by: SURGERY

## 2020-08-12 PROCEDURE — 6360000002 HC RX W HCPCS: Performed by: FAMILY MEDICINE

## 2020-08-12 PROCEDURE — 85025 COMPLETE CBC W/AUTO DIFF WBC: CPT

## 2020-08-12 PROCEDURE — B5181ZA FLUOROSCOPY OF SUPERIOR VENA CAVA USING LOW OSMOLAR CONTRAST, GUIDANCE: ICD-10-PCS | Performed by: RADIOLOGY

## 2020-08-12 PROCEDURE — 80053 COMPREHEN METABOLIC PANEL: CPT

## 2020-08-12 PROCEDURE — 82330 ASSAY OF CALCIUM: CPT

## 2020-08-12 PROCEDURE — 80202 ASSAY OF VANCOMYCIN: CPT

## 2020-08-12 PROCEDURE — 85730 THROMBOPLASTIN TIME PARTIAL: CPT

## 2020-08-12 PROCEDURE — 71045 X-RAY EXAM CHEST 1 VIEW: CPT

## 2020-08-12 PROCEDURE — 82803 BLOOD GASES ANY COMBINATION: CPT

## 2020-08-12 PROCEDURE — C1881 DIALYSIS ACCESS SYSTEM: HCPCS

## 2020-08-12 PROCEDURE — 94750 HC PULMONARY COMPLIANCE STUDY: CPT

## 2020-08-12 PROCEDURE — 02H633Z INSERTION OF INFUSION DEVICE INTO RIGHT ATRIUM, PERCUTANEOUS APPROACH: ICD-10-PCS | Performed by: RADIOLOGY

## 2020-08-12 PROCEDURE — 99233 SBSQ HOSP IP/OBS HIGH 50: CPT | Performed by: INTERNAL MEDICINE

## 2020-08-12 PROCEDURE — 6360000002 HC RX W HCPCS: Performed by: INTERNAL MEDICINE

## 2020-08-12 PROCEDURE — 99024 POSTOP FOLLOW-UP VISIT: CPT | Performed by: PHYSICIAN ASSISTANT

## 2020-08-12 PROCEDURE — 6370000000 HC RX 637 (ALT 250 FOR IP): Performed by: SURGERY

## 2020-08-12 PROCEDURE — 82962 GLUCOSE BLOOD TEST: CPT

## 2020-08-12 PROCEDURE — 0JH63XZ INSERTION OF TUNNELED VASCULAR ACCESS DEVICE INTO CHEST SUBCUTANEOUS TISSUE AND FASCIA, PERCUTANEOUS APPROACH: ICD-10-PCS | Performed by: RADIOLOGY

## 2020-08-12 PROCEDURE — 2500000003 HC RX 250 WO HCPCS: Performed by: INTERNAL MEDICINE

## 2020-08-12 PROCEDURE — 76937 US GUIDE VASCULAR ACCESS: CPT

## 2020-08-12 PROCEDURE — 2580000003 HC RX 258: Performed by: FAMILY MEDICINE

## 2020-08-12 PROCEDURE — 89220 SPUTUM SPECIMEN COLLECTION: CPT

## 2020-08-12 PROCEDURE — C1750 CATH, HEMODIALYSIS,LONG-TERM: HCPCS

## 2020-08-12 PROCEDURE — C9113 INJ PANTOPRAZOLE SODIUM, VIA: HCPCS | Performed by: SPECIALIST

## 2020-08-12 PROCEDURE — 6360000002 HC RX W HCPCS: Performed by: SPECIALIST

## 2020-08-12 PROCEDURE — 85610 PROTHROMBIN TIME: CPT

## 2020-08-12 PROCEDURE — 36558 INSERT TUNNELED CV CATH: CPT

## 2020-08-12 PROCEDURE — C1894 INTRO/SHEATH, NON-LASER: HCPCS

## 2020-08-12 PROCEDURE — 77001 FLUOROGUIDE FOR VEIN DEVICE: CPT

## 2020-08-12 PROCEDURE — 82306 VITAMIN D 25 HYDROXY: CPT

## 2020-08-12 PROCEDURE — C1769 GUIDE WIRE: HCPCS

## 2020-08-12 RX ORDER — FENTANYL CITRATE 50 UG/ML
25 INJECTION, SOLUTION INTRAMUSCULAR; INTRAVENOUS EVERY 4 HOURS PRN
Status: DISCONTINUED | OUTPATIENT
Start: 2020-08-12 | End: 2020-08-14

## 2020-08-12 RX ORDER — ERGOCALCIFEROL (VITAMIN D2) 200 MCG/ML
8000 DROPS ORAL DAILY
Status: DISCONTINUED | OUTPATIENT
Start: 2020-08-12 | End: 2020-08-15 | Stop reason: HOSPADM

## 2020-08-12 RX ADMIN — Medication 50 MCG/HR: at 06:15

## 2020-08-12 RX ADMIN — PANTOPRAZOLE SODIUM 40 MG: 40 INJECTION, POWDER, FOR SOLUTION INTRAVENOUS at 11:14

## 2020-08-12 RX ADMIN — IPRATROPIUM BROMIDE 4 PUFF: 17 AEROSOL, METERED RESPIRATORY (INHALATION) at 20:13

## 2020-08-12 RX ADMIN — EPOETIN ALFA-EPBX 10000 UNITS: 10000 INJECTION, SOLUTION INTRAVENOUS; SUBCUTANEOUS at 11:57

## 2020-08-12 RX ADMIN — THIAMINE HYDROCHLORIDE 200 MG: 100 INJECTION, SOLUTION INTRAMUSCULAR; INTRAVENOUS at 12:56

## 2020-08-12 RX ADMIN — MIDODRINE HYDROCHLORIDE 10 MG: 5 TABLET ORAL at 09:10

## 2020-08-12 RX ADMIN — IPRATROPIUM BROMIDE 4 PUFF: 17 AEROSOL, METERED RESPIRATORY (INHALATION) at 07:05

## 2020-08-12 RX ADMIN — MAGNESIUM OXIDE 400 MG (241.3 MG MAGNESIUM) TABLET 400 MG: TABLET at 11:14

## 2020-08-12 RX ADMIN — SODIUM CHLORIDE, PRESERVATIVE FREE 10 ML: 5 INJECTION INTRAVENOUS at 19:06

## 2020-08-12 RX ADMIN — IPRATROPIUM BROMIDE 4 PUFF: 17 AEROSOL, METERED RESPIRATORY (INHALATION) at 11:55

## 2020-08-12 RX ADMIN — ALBUTEROL SULFATE 4 PUFF: 90 AEROSOL, METERED RESPIRATORY (INHALATION) at 04:02

## 2020-08-12 RX ADMIN — SODIUM CHLORIDE, PRESERVATIVE FREE 10 ML: 5 INJECTION INTRAVENOUS at 09:01

## 2020-08-12 RX ADMIN — HEPARIN SODIUM 3000 UNITS: 1000 INJECTION, SOLUTION INTRAVENOUS; SUBCUTANEOUS at 11:57

## 2020-08-12 RX ADMIN — CALCIUM GLUCONATE 1 G: 94 INJECTION, SOLUTION INTRAVENOUS at 17:47

## 2020-08-12 RX ADMIN — METRONIDAZOLE 500 MG: 500 INJECTION, SOLUTION INTRAVENOUS at 18:50

## 2020-08-12 RX ADMIN — ALBUTEROL SULFATE 4 PUFF: 90 AEROSOL, METERED RESPIRATORY (INHALATION) at 20:13

## 2020-08-12 RX ADMIN — IPRATROPIUM BROMIDE 4 PUFF: 17 AEROSOL, METERED RESPIRATORY (INHALATION) at 04:02

## 2020-08-12 RX ADMIN — CEFEPIME HYDROCHLORIDE 2 G: 2 INJECTION, POWDER, FOR SOLUTION INTRAVENOUS at 18:46

## 2020-08-12 RX ADMIN — Medication 8000 UNITS: at 18:53

## 2020-08-12 RX ADMIN — ALBUTEROL SULFATE 4 PUFF: 90 AEROSOL, METERED RESPIRATORY (INHALATION) at 11:56

## 2020-08-12 RX ADMIN — IPRATROPIUM BROMIDE 4 PUFF: 17 AEROSOL, METERED RESPIRATORY (INHALATION) at 23:16

## 2020-08-12 RX ADMIN — ALBUTEROL SULFATE 4 PUFF: 90 AEROSOL, METERED RESPIRATORY (INHALATION) at 07:05

## 2020-08-12 RX ADMIN — SODIUM CHLORIDE, PRESERVATIVE FREE 10 ML: 5 INJECTION INTRAVENOUS at 11:14

## 2020-08-12 RX ADMIN — METRONIDAZOLE 500 MG: 500 INJECTION, SOLUTION INTRAVENOUS at 06:30

## 2020-08-12 RX ADMIN — PROPOFOL 15 MCG/KG/MIN: 10 INJECTION, EMULSION INTRAVENOUS at 10:51

## 2020-08-12 RX ADMIN — ALBUTEROL SULFATE 4 PUFF: 90 AEROSOL, METERED RESPIRATORY (INHALATION) at 23:16

## 2020-08-12 RX ADMIN — VANCOMYCIN HYDROCHLORIDE 500 MG: 500 INJECTION, POWDER, LYOPHILIZED, FOR SOLUTION INTRAVENOUS at 14:49

## 2020-08-12 RX ADMIN — CHLORHEXIDINE GLUCONATE 0.12% ORAL RINSE 15 ML: 1.2 LIQUID ORAL at 11:14

## 2020-08-12 ASSESSMENT — PAIN SCALES - GENERAL
PAINLEVEL_OUTOF10: 0

## 2020-08-12 ASSESSMENT — PULMONARY FUNCTION TESTS
PIF_VALUE: 16
PIF_VALUE: 17
PIF_VALUE: 16
PIF_VALUE: 15
PIF_VALUE: 16
PIF_VALUE: 17
PIF_VALUE: 17
PIF_VALUE: 16
PIF_VALUE: 19
PIF_VALUE: 20
PIF_VALUE: 18
PIF_VALUE: 17
PIF_VALUE: 16
PIF_VALUE: 17
PIF_VALUE: 18
PIF_VALUE: 16
PIF_VALUE: 17
PIF_VALUE: 18
PIF_VALUE: 16

## 2020-08-12 NOTE — PROGRESS NOTES
Infectious Disease Progress Note  2020   Patient Name: Keon Ruby : 1955       Reason for visit: F/u MSSA bacteremia secondary to infected wounds. ? History:? Interval history noted. s/p tracheostomy and PEG  Trach to vent  Physical Exam:  Vital Signs: BP (!) 145/70   Pulse 94   Temp 97.9 °F (36.6 °C) (Rectal)   Resp 24   Ht (S) 5' 7\" (1.702 m)   Wt 148 lb 2.4 oz (67.2 kg)   SpO2 100%   BMI 23.20 kg/m²     Gen: Intubated, sedated and mechanically ventilated  Skin: no stigmata of endocarditis  Wounds: Bilateral forearm dressing C/D/I  HEMT: AT/NC, OG tube, trach to vent  Eyes: PERRL  Neck: Supple. Trachea midline. No LAD. Chest: no distress and CTA. Good air movement. Heart: RRR and no MRG. Abd:  PEG tube ,midline abdominal dressingCDI  Ext: no clubbing, cyanosis, or edema  Catheter Site: right IJ CVC, right femoral vein vascath without erythema or tenderness  Neuro: sedated     Radiologic / Imaging / TESTING  CT abd pelvis 2020  Impression:   1. Bronchial wall thickening and centrilobular nodularity in the right lower   lobe, likely infectious/inflammatory airways process such as aspiration. 2. No significant change in moderate volume hemoperitoneum. 3. Similar small pneumoperitoneum, likely postoperative. 4. Similar to slightly improved mild diffuse colonic wall thickening.         Labs:    Recent Results (from the past 24 hour(s))   Potassium    Collection Time: 20  4:45 PM   Result Value Ref Range    Potassium 3.6 3.5 - 5.1 MMOL/L   Hemoglobin and hematocrit, blood    Collection Time: 20  4:45 PM   Result Value Ref Range    Hemoglobin 8.3 (L) 13.5 - 18.0 GM/DL    Hematocrit 25.9 (L) 42 - 52 %   POCT Glucose    Collection Time: 20  5:08 PM   Result Value Ref Range    POC Glucose 96 70 - 99 MG/DL   Blood gas, arterial    Collection Time: 20  6:00 AM   Result Value Ref Range    pH, Bld 7.48 (H) 7.34 - 7.45    pCO2, Arterial 29.0 (L) 32 - 45 MMHG    pO2, Arterial 73 (L) 75 - 100 MMHG    Base Excess 1 0 - 3.3    HCO3, Arterial 21.6 18 - 23 MMOL/L    CO2 Content 22.5 19 - 24 MMOL/L    O2 Sat 93.7 (L) 96 - 97 %    Carbon Monoxide, Blood 3.0 0 - 5 %    Methemoglobin, Arterial 0.9 <1.5 %    Comment 12, 450, 24, 5    CBC auto differential    Collection Time: 08/12/20  6:20 AM   Result Value Ref Range    WBC 16.0 (H) 4.0 - 10.5 K/CU MM    RBC 3.00 (L) 4.6 - 6.2 M/CU MM    Hemoglobin 8.3 (L) 13.5 - 18.0 GM/DL    Hematocrit 26.6 (L) 42 - 52 %    MCV 88.7 78 - 100 FL    MCH 27.7 27 - 31 PG    MCHC 31.2 (L) 32.0 - 36.0 %    RDW 23.6 (H) 11.7 - 14.9 %    Platelets 424 169 - 634 K/CU MM    MPV 9.8 7.5 - 11.1 FL    Immature Neutrophil % 0.6 (H) 0 - 0.43 %    Segs Relative 92.5 (H) 36 - 66 %    Eosinophils % 0.2 0 - 3 %    Basophils % 0.1 0 - 1 %    Lymphocytes % 3.5 (L) 24 - 44 %    Monocytes % 3.1 0 - 4 %    Total Immature Neutrophil 0.10 K/CU MM    Segs Absolute 14.8 K/CU MM    Eosinophils Absolute 0.0 K/CU MM    Basophils Absolute 0.0 K/CU MM    Lymphocytes Absolute 0.6 K/CU MM    Monocytes Absolute 0.5 K/CU MM    Differential Type       AUTOMATED DIFF RESULTS CONFIRMED BY SMEAR REVIEW  AUTOMATED DIFFERENTIAL      RBC Morphology OCCASIONAL     Anisocytosis 1+    CK    Collection Time: 08/12/20  6:20 AM   Result Value Ref Range    Total CK 76 38 - 174 IU/L   PT    Collection Time: 08/12/20  6:20 AM   Result Value Ref Range    Protime 13.3 11.7 - 14.5 SECONDS    INR 1.10 INDEX   PTT    Collection Time: 08/12/20  6:20 AM   Result Value Ref Range    aPTT 28.2 25.1 - 37.1 SECONDS   Comprehensive Metabolic Panel    Collection Time: 08/12/20  6:20 AM   Result Value Ref Range    Sodium 139 135 - 145 MMOL/L    Potassium 3.4 (L) 3.5 - 5.1 MMOL/L    Chloride 103 99 - 110 mMol/L    CO2 21 21 - 32 MMOL/L    BUN 88 (H) 6 - 23 MG/DL    CREATININE 2.4 (H) 0.9 - 1.3 MG/DL    Glucose 104 (H) 70 - 99 MG/DL    Calcium 7.6 (L) 8.3 - 10.6 MG/DL    Alb 2.2 (L) 3.4 - 5.0 GM/DL    Total Protein 5.1 (L) 6.4 - 8.2 GM/DL    Total Bilirubin 1.8 (H) 0.0 - 1.0 MG/DL    ALT 13 10 - 40 U/L    AST 45 (H) 15 - 37 IU/L    Alkaline Phosphatase 81 40 - 128 IU/L    GFR Non- 28 (L) >60 mL/min/1.73m2    GFR  33 (L) >60 mL/min/1.73m2    Anion Gap 15 4 - 16   Vancomycin, random    Collection Time: 08/12/20  6:20 AM   Result Value Ref Range    Vancomycin Rm 12.3 UG/ML    DOSE AMOUNT DOSE AMT. GIVEN - 500 mg     DOSE TIME DOSE TIME GIVEN - x1 after HD 8/10/20    Calcium, ionized    Collection Time: 08/12/20  6:20 AM   Result Value Ref Range    Ionized Ca 0.95 (L) 1.12 - 1.32 mMOL/L    Calcium, Ion 3.80 (L) 4.48 - 5.28 MG/DL   Blood gas, arterial    Collection Time: 08/12/20  6:45 AM   Result Value Ref Range    pH, Bld 7.47 (H) 7.34 - 7.45    pCO2, Arterial 28.0 (L) 32 - 45 MMHG    pO2, Arterial 84 75 - 100 MMHG    Base Excess 2 0 - 3.3    HCO3, Arterial 20.4 18 - 23 MMOL/L    CO2 Content 21.3 19 - 24 MMOL/L    O2 Sat 94.9 (L) 96 - 97 %    Carbon Monoxide, Blood 2.8 0 - 5 %    Methemoglobin, Arterial 1.6 (H) <1.5 %    Comment AC 12, , O2 .24, PEEP 5    Calcium, ionized    Collection Time: 08/12/20  1:00 PM   Result Value Ref Range    Ionized Ca 0.97 (L) 1.12 - 1.32 mMOL/L    Calcium, Ion 3.88 (L) 4.48 - 5.28 MG/DL     CULTURE results: Invalid input(s): BLOOD CULTURE,  URINE CULTURE, SURGICAL CULTURE    Diagnosis:  Patient Active Problem List   Diagnosis    Seps[facility] ([facility]    Leukocytosis    Ischemic bowel disease (HCC)    MSSA bacteremia    Chronic hepatitis C without hepatic coma (HCC)    Respiratory failure (HCC)       Active Problems  Active Problems:    Sepsis (HCC)    Leukocytosis    MSSA bacteremia    Chronic hepatitis C without hepatic coma (HCC)    Respiratory failure (HCC)  Resolved Problems:    * No resolved hospital problems. *      Impression and plan   Clinical status: improving.      Therapeutic: continue vancomycin, cefepime and metronidazole   Diagnostic: repeat respiratory cx, pct, CRP   F/u: Blood culture (7/29/2020)   Other:   Summary: Would like to cover staph aureus alone.   Check CRP and procalcitonin      Electronically signed by: Electronically signed by Cindy Bain MD on 8/12/2020 at 2:23 PM

## 2020-08-12 NOTE — PROGRESS NOTES
Pulmonary and Critical Care  Progress Note      VITALS:  /65   Pulse 100   Temp 98.1 °F (36.7 °C)   Resp 20   Ht (S) 5' 7\" (1.702 m)   Wt 148 lb 2.4 oz (67.2 kg)   SpO2 100%   BMI 23.20 kg/m²     Subjective:   CHIEF COMPLAINT :Fall with right hip pain     HPI:                The patient is a 60 y.o. male with significant past medical history of HTN,. Sacral decub, MSSA endocarditis, Septic PE, Hep C,  presents with complaints of fall and right hip pain. He was on the floor for more than 2 days. His CK is mildly elevated. He had mild lactic acidosis. His PBNPT is 11,000 with mildly elevated troponins. He is on 3 grams of Heroin per day. His U tocx was positive for Cocaine and Opiates. He had no fever, no headaches, no chest pain, no palpitations, no n/v, no diaphoresis. He has no sick exposure, no recent travel. His 1 blood culture set is positive for Staph. He is getting abx and IV fluids. He is on the vent and on HD     Objective:   PHYSICAL EXAM:    LUNGS:Occasional basal crackles  Abd-soft, BS+,NT  Ext - no pedal edema  CVS-s1s2, no murmurs      DATA:    CBC:  Recent Labs     08/10/20  0600 08/11/20  0830 08/11/20  1645 08/12/20  0620   WBC 27.1* 19.1*  --  16.0*   RBC 2.51* 2.30*  --  3.00*   HGB 7.3* 6.6* 8.3* 8.3*   HCT 22.3* 21.1* 25.9* 26.6*    180  --  165   MCV 88.8 91.7  --  88.7   MCH 29.1 28.7  --  27.7   MCHC 32.7 31.3*  --  31.2*   RDW 23.4* 24.1*  --  23.6*   SEGSPCT 92.7* 92.2*  --  92.5*      BMP:  Recent Labs     08/10/20  0600 08/11/20  0830 08/11/20  1645 08/12/20  0620    135  --  139   K 4.2 2.8* 3.6 3.4*   CL 96* 98*  --  103   CO2 18* 23  --  21   * 75*  --  88*   CREATININE 3.3* 2.1*  --  2.4*   CALCIUM 8.5 7.4*  --  7.6*   GLUCOSE 127* 98  --  104*      ABG:  Recent Labs     08/11/20  0600 08/12/20  0600 08/12/20  0645   PH 7.45 7.48* 7.47*   PO2ART 76 73* 84   OSQ1WUY 36.0 29.0* 28.0*   O2SAT 95.3* 93.7* 94.9*     BNP  No results found for: BNP   D-Dimer:  No results found for: DDIMER   1. Radiology: None      Assessment/Plan     Patient Active Problem List    Diagnosis Date Noted    Respiratory failure ([facility] 08/10/2020    [facility] C without hepatic coma ([facility] 08/03/2020    MSSA bacteremia 07/28/2020    Ischemic bowel disease ([facility]     Leukocytosis     Sepsis ([facility] 07/24/2020   Patient is on the vent and sedated. He is off pressors. His H&H has been stable. He is more awake and following simple commands.     Septic shock- improved  Hemorrhagic shock- improved  Acute hypoxic resp failure  Mild coagulopathy- improved  Shock Liver- improved  Lactic acidosis- improved  VDRF  Anemia likely GI bleed  ? Staph Aureus bacteremia  Severe Pulmonary HTN  Occult blood positive  JERAMIE likely sec to ATN  Bilateral Arm wounds  Leukocytosis  Hemoperitoneum  Severe malnutrition  Hypokalemia       1. D/c Propofol and Fentanyl  2. Use Fentanyl 25 mcg Q 4hr PRN  3. Tube feeds  4. Trach collar trials as tolerated  5. LATC eval  6. PT/OT  7. Nutritional optimization  8. C/w present management  No follow-ups on file.     Electronically signed by Esteban Lujan MD on 8/12/2020 at 11:48 AM

## 2020-08-12 NOTE — PROGRESS NOTES
Pt arrived to IR for placement of a tunneled dialysis catheter. Pt has a new trach, vented, on sedation medications. Informed consent obtained from family and placed in soft chart. Pt prepped and draped to the left neck and chest. Dr Patrice Wayne at bedside. US images taken. 7937 Time Out  1635 Procedure started  Palindrome dual lumen catheter x 23 cm placed by Dr Patrice Wayne. Catheter sutured in place, biopatch, sterile dressing applied. Procedure complete. Pt tolerated the procedure well. Report called to Onesimo Villavicencio RN  Catheter ok to use for dialysis.

## 2020-08-12 NOTE — PROGRESS NOTES
Hospitalist Progress Note      Name:  Ross Man /Age/Sex: 1955  (60 y.o. male)   MRN & CSN:  [id number] & [id number] Admission Date/Time: 2020  3:22 PM   Location:  -A PCP: No primary care provider on file. Ross Man is a 60 y.o.  male  who presents with Fall (85% o2 sat in triage) and Hip Pain      Assessment and Plan:   1) Septic shock with MSSA bacteremia and possible T12-L1 OM  2/2 bilateral upper extremity wound infection.  -Has background IVDU hx  -Was on pressor but currently off  -Initial BC growing MSSA; Last [facility] : NGTD  -Wound cx growing MSSA  -JASPAL neg for vegetation  -ID on board for abx management, on IV vanc + cefepime + flagyl  -Patient is very critical and prognosis guarded  -palliative on board     2-Acute on chronic respiratory failure due to sepsis and suspected aspiration pneumonia  -On mechanical Vent  -s/p trach/peg on 8/10  -Pulmo on board     3) Rhabdomyolysis due to IVDU  - resolved     4)-JERAMIE due to ATN from [address]  - dialysis per nephro, had it today     5-Skin wounds/right post thigh/buttock and bilateral UE  -initial Wound culture growing MSSA, repeat wound cx NTD  -Surgery and ID following  - wound care team following     7-Pneumatosis intestinalis   -S/P Ex-lap on  negative for ischemic bowel, however CT on  showing hemoperitoneum and pneumoperitoneum  -General Surgery on board     8-Acute blood loss anemia due to above  - s/p 2 units PRBC to date  -S/P transfusion- monitor H/H  -Will continue to monitor    HypoCa  - replace    HypoK  - replaced with HD     IR placed tunnel HD cath, fem line was not taken out, will re-consult for it to be removed, d/w IR will be removed tomorrow    On TF via PEG     Other chronic issues  -Hx of gastric cancer stage 4  -Polysubstance abuse- UDS positive for cocaine and opiate  -Hep C, chronic            Diet DIET TUBE FEED CONTINUOUS/CYCLIC NPO; Semi-elemental (Vital AF);  Gastrostomy; Continuous; 25; 50; 24   Code Status Full Code     Medications:   Medications:    vancomycin  500 mg Intravenous Once    magnesium oxide  400 mg Oral Daily    midodrine  10 mg Oral Q MWF    sodium chloride  250 mL Intravenous Once    cefepime  2 g Intravenous Once per day on Mon Wed Fri    scopolamine  1 patch Transdermal Q72H    vancomycin (VANCOCIN) intermittent dosing (placeholder)   Other RX Placeholder    metroNIDAZOLE  500 mg Intravenous Q8H    pantoprazole  40 mg Intravenous Daily    thiamine (VITAMIN B1) IVPB  200 mg Intravenous Q12H    chlorhexidine  15 mL Mouth/Throat BID    albuterol sulfate HFA  4 puff Inhalation Q4H    And    ipratropium  4 puff Inhalation Q4H    aspirin  324 mg Oral Once    sodium chloride flush  10 mL Intravenous BID    sodium chloride flush  10 mL Intravenous 2 times per day    [Held by provider] enoxaparin  1 mg/kg Subcutaneous Daily    nicotine  1 patch Transdermal Daily      Infusions:    norepinephrine 1 mcg/min (08/11/20 2317)    dextrose      fentanyl 50 mcg/hr (08/12/20 0615)    propofol 15 mcg/kg/min (08/12/20 1051)     PRN Meds: potassium chloride, 20 mEq, PRN  glucose, 15 g, PRN  dextrose, 12.5 g, PRN  glucagon (rDNA), 1 mg, PRN  dextrose, 100 mL/hr, PRN  heparin (porcine), 3,000 Units, PRN  ipratropium-albuterol, 1 ampule, Q4H PRN  iopamidol, 80 mL, ONCE PRN  sodium chloride flush, 10 mL, PRN  acetaminophen, 650 mg, Q6H PRN    Or  acetaminophen, 650 mg, Q6H PRN  polyethylene glycol, 17 g, Daily PRN  promethazine, 12.5 mg, Q6H PRN    Or  ondansetron, 4 mg, Q6H PRN  promethazine, 25 mg, Q6H PRN  diphenhydrAMINE, 12.5 mg, Nightly PRN  dicyclomine, 20 mg, Q6H PRN  morphine, 2 mg, Q4H PRN      Subjective:     Had HD, no distress, alert and oriented  Objective:        Intake/Output Summary (Last 24 hours) at 8/12/2020 1317  Last data filed at 8/12/2020 1219  Gross per 24 hour   Intake 1559.35 ml   Output 2605 ml   Net -1045.65 ml      Vitals:   Vitals:    08/12/20 1300 BP: (!) 145/70   Pulse: 94   Resp: 24   Temp: 97.9 °F (36.6 °C)   SpO2: 100%     Physical Exam:   Gen:  awake, alert, no apparent distress  Head/Eyes:  Normocephalic atraumatic, EOMI   NECK:   symmetrical, trachea midline  LUNGS: Normal Effort   CARDIOVASCULAR:  Normal rate  ABDOMEN:  non distended + PEG  MUSCULOSKELETAL:  ROM limited  NEUROLOGIC: Alert and Oriented,  Cranial nerves II-XII are grossly intact.    SKIN:  no bruising or bleeding, normal skin color,  no redness      Data:       CBC   Recent Labs     08/10/20  0600 08/11/20  0830 08/11/20  1645 08/12/20  0620   WBC 27.1* 19.1*  --  16.0*   HGB 7.3* 6.6* 8.3* 8.3*   HCT 22.3* 21.1* 25.9* 26.6*    180  --  165      BMP   Recent Labs     08/09/20  2040 08/10/20  0600 08/11/20  0830 08/11/20  1645 08/12/20  0620    136 135  --  139   K 4.4 4.2 2.8* 3.6 3.4*   CL 96* 96* 98*  --  103   CO2 15* 18* 23  --  21   PHOS 9.6*  --   --   --   --    * 157* 75*  --  88*   CREATININE 3.2* 3.3* 2.1*  --  2.4*         Electronically signed by Joan Ryan MD on 8/12/2020 at 1:17 PM

## 2020-08-12 NOTE — PROGRESS NOTES
General Surgery  Dr. Casey Dumont and Trini Ibarra, [facility] Day: 20    Chief Complaint on Admission: Sepsis      Subjective:     Arline Silverio is a 60 y.o. male with 2 Days Post-Op Trach and PEG. He is also s/p negative ex lap. Abdominal incision with drainage that is typically noted when turning the pt. TF with PEG being tolerated well. No significant bleeding around Trach. Patient intubated. Tolerating DIET TUBE FEED CONTINUOUS/CYCLIC NPO; Semi-elemental (Vital AF); Gastrostomy; Continuous; 25; 50; 24. +ve BM via rectal tube    ROS:  Review of Systems   Unable to perform ROS: Intubated       Allergies  Patient has no known allergies. Diagnosis Date    Drug abuse and dependence ([facility]     Hypertension        Objective:     Vitals:    20 1030   BP: 121/66   Pulse: 91   Resp:    Temp:    SpO2:        TEMPERATURE:  Current - Temp: 98.1 °F (36.7 °C); Max - Temp  Av.5 °F (36.9 °C)  Min: 98.1 °F (36.7 °C)  Max: 99 °F (37.2 °C)    No intake/output data recorded. I/O last 3 completed shifts: In: 1364.3 [I.V.:932.2; Blood:352.1; NG/GT:80]  Out: 4807 [Urine:1655; Emesis/NG output:300; Stool:250]      Physical Exam:    Physical Exam  Constitutional:       Interventions: He is intubated. Comments:  Responding to stimuli   Pulmonary:      Effort: He is intubated. Abdominal:      Palpations: Abdomen is soft. Comments: PEG in place, draining to gravity. No Peritoneal signs  Midline incision well approximated with staples and a few sutures in place. No active drainage. Dressing saturated with bloody drainage from overnight.             Scheduled Meds:   epoetin matteo-epbx  10,000 Units Intravenous Once    magnesium oxide  400 mg Oral Daily    midodrine  10 mg Oral Q MWF    sodium chloride  250 mL Intravenous Once    cefepime  2 g Intravenous Once per day on     scopolamine  1 patch Transdermal Q72H    vancomycin (VANCOCIN) intermittent dosing (placeholder)   Other RX Placeholder  metroNIDAZOLE  500 mg Intravenous Q8H    pantoprazole  40 mg Intravenous Daily    thiamine (VITAMIN B1) IVPB  200 mg Intravenous Q12H    chlorhexidine  15 mL Mouth/Throat BID    albuterol sulfate HFA  4 puff Inhalation Q4H    And    ipratropium  4 puff Inhalation Q4H    aspirin  324 mg Oral Once    sodium chloride flush  10 mL Intravenous BID    sodium chloride flush  10 mL Intravenous 2 times per day    [Held by provider] enoxaparin  1 mg/kg Subcutaneous Daily    nicotine  1 patch Transdermal Daily     Continuous Infusions:   norepinephrine 1 mcg/min (08/11/20 2317)    dextrose      fentanyl 50 mcg/hr (08/12/20 0615)    propofol 15 mcg/kg/min (08/11/20 1817)     PRN Meds:potassium chloride, glucose, dextrose, glucagon (rDNA), dextrose, heparin (porcine), ipratropium-albuterol, iopamidol, sodium chloride flush, acetaminophen **OR** acetaminophen, polyethylene glycol, promethazine **OR** ondansetron, promethazine, diphenhydrAMINE, dicyclomine, morphine      Labs/Imaging Results:   Lab Results   Component Value Date    WBC 16.0 (H) 08/12/2020    HGB 8.3 (L) 08/12/2020    HCT 26.6 (L) 08/12/2020    MCV 88.7 08/12/2020     08/12/2020     Lab Results   Component Value Date     08/12/2020    K 3.4 (L) 08/12/2020     08/12/2020    CO2 21 08/12/2020    BUN 88 (H) 08/12/2020    CREATININE 2.4 (H) 08/12/2020    GLUCOSE 104 (H) 08/12/2020    CALCIUM 7.6 (L) 08/12/2020    PROT 5.1 (L) 08/12/2020    LABALBU 2.2 (L) 08/12/2020    BILITOT 1.8 (H) 08/12/2020    ALKPHOS 81 08/12/2020    AST 45 (H) 08/12/2020    ALT 13 08/12/2020    LABGLOM 28 (L) 08/12/2020    GFRAA 33 (L) 08/12/2020       Assessment:     Patient Active Problem List:     Sepsis ([facility]     Leukocytosis     Ischemic bowel disease ([facility]     MSSA bacteremia     Chronic hepatitis C without hepatic coma ([address]     Respiratory failure ([facility]      Plan:     Continue TF via PEG per dietician recs. Continue to monitor midline incision. Will leave staples and sutures in place for now. Change outer dressing daily and PRN if saturated. Routine trach care    Will continue to follow. Plan for likely staple removal next week.      Kim Love PA-C

## 2020-08-12 NOTE — CARE COORDINATION
Received return call from pt's son. CM discussed LTACH options. Son will discuss with family but since he works in [employer] and has family that lives in [place] will likely go with [facility]. Cm advised son that CM will contact [facility] today to initiate referral. CM contacted Julia Sethi in admissions at [address] [place] [address] and referral information faxed as requested due to Julia Sethi lack of epic access.

## 2020-08-12 NOTE — PROGRESS NOTES
[facility]  consulted by Dr. Madhav Morris for monitoring and adjustment. Indication for treatment: MSSA bacteremia, GPB in blood   Goal trough: 10-15 mcg/mL     Pertinent Laboratory Values:   Temp Readings from Last 3 Encounters:   08/12/20 98.1 °F (36.7 °C)   08/10/20 97.3 °F (36.3 °C)   07/25/20 (!) 62.3 °F (16.8 °C)     Recent Labs     08/10/20  0600 08/11/20  0830 08/12/20  0620   WBC 27.1* 19.1* 16.0*     Recent Labs     08/10/20  0600 08/11/20  0830 08/12/20  0620   * 75* 88*   CREATININE 3.3* 2.1* 2.4*     Estimated Creatinine Clearance: 30 mL/min (A) (based on SCr of 2.4 mg/dL (H)). Intake/Output Summary (Last 24 hours) at 8/12/2020 1125  Last data filed at 8/12/2020 0314  Gross per 24 hour   Intake 1059.35 ml   Output 1105 ml   Net -45.65 ml       Pertinent Cultures:  Date    Source    Results  7/24   Blood    MSSA  7/27   Blood    Bacillus (1 of 2)  7/29   Blood    Negative   8/5   Sputum   Yeast   8/5   Blood    Negative    Vancomycin level:   TROUGH:    No results for input(s): VANCOTROUGH in the last 72 hours. RANDOM:    Recent Labs     08/10/20  0600 08/12/20  0620   VANCORANDOM 16.3 12.3       Assessment:  · WBC and temperature: WBC remains elevated but improved, Afebrile  · SCr, BUN, and urine output:   · HD Mon/Wed/Fri   · Previously receiving CRRT  · Days of therapy: 20  · Vancomycin level: 12.3 on 8/12    Plan:  · Continue intermittent dosing based on levels 2/2 RRT   · Received last dose of vancomycin 500 mg on 08/10, post-HD  · Random level @ 12.3, OK to redose. · Ordered Vancomycin 500mg IV x 1 dose. · Pharmacy will continue to monitor patient and adjust therapy as indicated.     VANCOMYCIN RANDOM LEVEL SCHEDULED FOR 8/14 @06:00    Thank you for the consult,  Sal García RPh  8/12/2020 11:25 AM

## 2020-08-12 NOTE — PROGRESS NOTES
Treatment completed as ordered. No patient complications or complaints. 4k bath per Dr. Núñez [facility]. Anna as ordered. Patient Name: Rishi Grossman  Patient : 1955  MRN: [id number]     Acct: [de-identified]  Date of Admission: 2020  Room/Bed: [facility]/2108-A  Code Status:  Full Code  Allergies: No Known Allergies  Diagnosis:    Patient Active Problem List   Diagnosis    Sepsis ([facility]    Leukocytosis    Ischemic bowel disease ([facility]    MSSA bacteremia    Chronic hepatitis C without hepatic coma ([facility]    Respiratory failure ([facility]         Treatment:  Hemodialysis 1:1  Priority: Routine  Location: ICU    Diabetic: No  NPO: No  Isolation Precautions: Dialysis     Consent for Treatment Verified: Yes  Blood Consent Verified: Not Applicable     Safety Verified: Identify (I), Consent (C), Equipment (E), HepB Status (B), Orders Complete (O), Access Verified (A) and Timeliness (T)  Time out performed prior to access at 0910 hours. Report Received from Primary RN at 0830 hours. Primary RN (First Initial, Last Name, Title): Abhay Roberts RN  Incapacitated Nurse Education Completed: Yes     HBsAg ONLY:  Date Drawn: 2020       Results: Negative  HBsAb:  Date Drawn:  2020       Results: Susceptible <10    Order  Dialysis Bath  K+ (Potassium): 2  Ca+ (Calcium): 2.5  Na+ (Sodium): 138  HCO3 (Bicarb): 35  Citrate Bath  Ca+ (Calcium): 2.5 (20 0839)  K+ (Potassium): 4 (20 0839)  Bicarb: 35 (20 0839)  Na+ Modeling: Not Applicable  Dialyzer: P699  Dialysate Temperature (C):  36  Blood Flow Rate (BFR):  300   Dialysate Flow Rate (DFR):   800        Access to be Utilized   Access:  Tunneled Catheter  Location: Subclavian  Side: Left   Needle gauge:  Not Applicable  + Bruit/Thrill: Not Applicable    First Use X-ray Verified: Not Applicable  OK to use line order: Yes    Site Assessment:  Signs and Symptoms of Infection/Inflammation: None  If yes: Not Applicable  Dressing: Dry and Intact  Site Prep: Medical Aseptic Technique  Dressing Changed this Treatment: Yes  If yes, by whom: Special Procedures  Date of Last Dressing Change: August 12, 2020  Antimicrobial Patch in place?: Yes  Red Alcohol Caps in place?: Blue caps  Gauze Dressing?: No  Non Dialysis Use?: No  Comment:    Flows: Good, Patent  If access problem, who was notified:     Pre and Post-Assessment  Patient Vitals for the past 8 hrs:   Level of Consciousness Heart Rhythm Respiratory Quality/Effort O2 Device Bilateral Breath Sounds Skin Color Skin Condition/Temp Abdomen Inspection Bowel Sounds (All Quadrants) Edema RUE Edema LUE Edema RLE Edema LLE Edema Pain Level   08/12/20 0800 1 -- -- Ventilator -- -- -- -- -- -- -- -- -- -- --   08/12/20 0830 -- -- -- Ventilator -- -- -- -- -- -- -- -- -- -- --   08/12/20 0839 0 Regular Unlabored Ventilator Diminished Appropriate for ethnicity Warm;Dry Flat;Other (Comment) Active Right upper extremity; Left upper extremity;Right lower extremity; Left lower extremity +2 +2 +2 +2 0   08/12/20 0900 1 -- Unlabored -- -- Appropriate for ethnicity Warm;Dry Flat;Other (Comment) -- Right lower extremity; Left lower extremity;Right upper extremity; Left upper extremity Pitting;+2 Pitting;+2 +3;Pitting +3;Pitting --   08/12/20 1219 0 Regular Unlabored Ventilator Diminished Appropriate for ethnicity Warm;Dry Flat;Other (Comment) Active Right lower extremity; Left lower extremity;Right upper extremity; Left upper extremity Pitting;+2 Pitting;+2 +3;Pitting +3;Pitting 0     Labs  Recent Labs     08/10/20  0600 08/11/20  0830 08/11/20  1645 08/12/20  0620   WBC 27.1* 19.1*  --  16.0*   HGB 7.3* 6.6* 8.3* 8.3*   HCT 22.3* 21.1* 25.9* 26.6*    180  --  165                                                                  Recent Labs     08/10/20  0600 08/11/20  0830 08/11/20  1645 08/12/20  0620    135  --  139   K 4.2 2.8* 3.6 3.4*   CL 96* 98*  --  103   CO2 18* 23  --  21   * 75*  --  88* CREATININE 3.3* 2.1*  --  2.4*   GLUCOSE 127* 98  --  104*     IV Drips and Rate/Dose   norepinephrine 1 mcg/min (08/11/20 2317)    dextrose      fentanyl 50 mcg/hr (08/12/20 0615)    propofol 15 mcg/kg/min (08/12/20 1051)      Safety - Before each treatment:   Dialysis Machine No.: [id number]   Machine No.: [id number]  Dialyzer Lot No.: [id number]  RO Machine Log Sheet Completed: Yes  Machine Alarm Self Test: Completed; Passed (08/12/20 0839)  Machine Autotest: Completed, Passed  Air Foam Detector: Tested, Proper Function  Extracorporeal Circuit Tested for Integrity: Yes  Machine Conductivity: 14  Manual Conductivity: 14  Machine Ph: 7.5  Bicarbonate Concentrate Lot No.: [id number]  Acid Concentrate Lot No.: [id number]  Manual Ph: 7.5  Bleach Test (Neg): Yes  Bath Temperature: 96.8 °F (36 °C)  Tubing Lot#: [id number]  Conductivity Meter Serial #: [id number]  All Connections Secure?: Yes  Venous Parameters Set?: Yes  Arterial Parameters Set?: Yes  Saline Line Double Clamped?: Yes  Air Foam Detector Engaged?: Yes  Machine Functioning Alarm Free?  Yes  Prime Given: 200ml    Chlorine Testing - Before each treatment and every 4 hours:   Treatment  Treatment Number: 1  Time On: 9740  Time Off: 1219  Treatment Goal: 1kg if tolerated  Weight: 148 lb 2.4 oz (67.2 kg) (08/12/20 0600)  1st check: less than 0.1 ppm at: 0830 hours  2nd check: less than 0.1 ppm at: 1115 hours  3rd check: Not Applicable  (if greater than 0.1 ppm, then check every 30 minutes from secondary)    Access Flows and Pressures  Patient Vitals for the past 8 hrs:   ABP (Arterial line BP) Blood Flow Rate (ml/min) Ultrafiltration Rate (ml/hr) Ultrafiltration Total Arterial Pressure (mmHg) Venous Pressure (mmHg) TMP Hemodialysis Conductivity DFR Comments Access Visible   08/12/20 0447 129/59 -- -- -- -- -- -- -- -- -- --   08/12/20 0502 117/51 -- -- -- -- -- -- -- -- -- --   08/12/20 0517 129/59 -- -- -- -- -- -- -- -- -- --   08/12/20 0532 132/61 -- -- -- -- -- -- -- -- -- --   08/12/20 0547 133/61 -- -- -- -- -- -- -- -- -- --   08/12/20 0602 125/59 -- -- -- -- -- -- -- -- -- --   08/12/20 0617 126/57 -- -- -- -- -- -- -- -- -- --   08/12/20 0632 113/56 -- -- -- -- -- -- -- -- -- --   08/12/20 0647 108/55 -- -- -- -- -- -- -- -- -- --   08/12/20 0702 113/56 -- -- -- -- -- -- -- -- -- --   08/12/20 0717 120/58 -- -- -- -- -- -- -- -- -- --   08/12/20 0800 132/63 -- -- -- -- -- -- -- -- -- --   08/12/20 0917 133/57 300 ml/min 500 ml/hr 0 ml -120 mmHg 90 40 13.9 800 Treatment initiated, prime given Yes   08/12/20 0930 134/60 300 ml/min 500 ml/hr 98 ml -110 mmHg 90 40 13.9 800 no distress Yes   08/12/20 0938 130/60 -- -- -- -- -- -- -- -- -- --   08/12/20 0939 131/61 -- -- -- -- -- -- -- -- -- --   08/12/20 0940 134/63 -- -- -- -- -- -- -- -- -- --   08/12/20 0945 134/64 300 ml/min 500 ml/hr 25 ml -120 mmHg 90 40 13.9 800 no patient complaints. Yes   08/12/20 1000 125/64 300 ml/min 500 ml/hr 347 ml -120 mmHg 90 50 13.9 800 treatment continues as ordered. Yes   08/12/20 1015 122/64 300 ml/min 500 ml/hr 470 ml -130 mmHg 90 50 13.7 723 no complications.  Yes   08/12/20 1030 121/66 300 ml/min 500 ml/hr 595 ml -130 mmHg 100 50 13.9 800 Dr. Dustin Aragon at bedside rounding Yes   08/12/20 1045 126/68 300 ml/min 500 ml/hr 740 ml -130 mmHg 100 50 13.9 800 primary RN at bedside rounding Yes   08/12/20 1100 125/69 300 ml/min 500 ml/hr 843 ml -130 mmHg 100 50 13.9 800 patient face and access visable Yes   08/12/20 1115 116/65 300 ml/min 500 ml/hr 930 ml -130 mmHg 100 50 49.3 546 no complications Yes   65/55/57 1130 125/68 300 ml/min 500 ml/hr 1110 ml -130 mmHg 100 50 13.9 800 continue to monitor Yes   08/12/20 1145 121/65 300 ml/min 500 ml/hr 1210 ml -130 mmHg 100 50 13.9 800 treatment continues as ordered Yes   08/12/20 1200 122/67 300 ml/min 500 ml/hr 1350 ml -130 mmHg 100 50 13.9 687 no patient complications Yes   13/58/07 1215 118/66 300 ml/min 500 ml/hr 1460 ml -130 mmHg 100 50 13.9 800 patient face and access visable Yes   08/12/20 1219 111/63 -- -- 1500 ml -- -- -- -- -- treatment completed blood returned Yes   08/12/20 1230 132/69 -- -- -- -- -- -- -- -- -- --     Vital Signs  Patient Vitals for the past 8 hrs:   BP Temp Pulse Resp SpO2 Weight Percent Weight Change   08/12/20 0447 -- -- 94 24 99 % -- --   08/12/20 0502 -- -- 93 23 99 % -- --   08/12/20 0517 -- -- 92 22 100 % -- --   08/12/20 0532 -- -- 95 23 100 % -- --   08/12/20 0547 -- -- 99 26 99 % -- --   08/12/20 0600 -- -- -- -- -- 148 lb 2.4 oz (67.2 kg) 0   08/12/20 0602 -- -- 98 23 99 % -- --   08/12/20 0617 -- -- 96 22 100 % -- --   08/12/20 0632 -- -- 97 24 100 % -- --   08/12/20 0647 -- -- 100 25 100 % -- --   08/12/20 0702 -- -- 100 26 100 % -- --   08/12/20 0717 -- -- 101 26 100 % -- --   08/12/20 0800 132/63 99 °F (37.2 °C) 95 11 100 % -- --   08/12/20 0830 (!) 105/57 -- 92 15 100 % -- --   08/12/20 0831 -- -- -- -- 100 % -- --   08/12/20 0833 119/69 -- 93 13 100 % -- --   08/12/20 0839 119/69 -- 94 18 100 % -- --   08/12/20 0842 127/66 -- 93 14 100 % -- --   08/12/20 0917 (!) 129/57 98.1 °F (36.7 °C) 95 22 100 % -- --   08/12/20 0930 (!) 130/59 -- 93 -- -- -- --   08/12/20 0938 -- -- 92 22 -- -- --   08/12/20 0939 -- -- 93 21 -- -- --   08/12/20 0940 -- -- 93 20 100 % -- --   08/12/20 0945 134/64 -- 92 -- -- -- --   08/12/20 1000 125/64 -- 90 -- -- -- --   08/12/20 1015 122/64 -- 91 -- -- -- --   08/12/20 1030 121/66 -- 91 -- -- -- --   08/12/20 1045 126/68 -- 92 -- -- -- --   08/12/20 1100 125/69 -- 94 -- -- -- --   08/12/20 1115 116/65 -- 97 -- -- -- --   08/12/20 1130 125/68 -- 100 -- -- -- --   08/12/20 1145 121/65 -- -- -- -- -- --   08/12/20 1156 -- -- 104 25 -- -- --   08/12/20 1200 122/67 -- 100 -- -- -- --   08/12/20 1215 116/66 -- -- -- -- -- --   08/12/20 1219 111/63 97.9 °F (36.6 °C) 96 18 100 % -- --   08/12/20 1230 132/69 -- 98 26 -- -- --     Post-Dialysis  Arterial Catheter Locking Solution: Heparin (1000units:1ml)    Volume (ml): 1.9ml  Venous Catheter Locking Solution: Heparin (1000units:1ml)    Volume (ml): 1.9ml  Post-Treatment Procedures: Blood returned, Catheter capped, clamped and heparinized x 2 ports  Machine Disinfection Process: Acid/Vinegar Clean, Heat Disinfect  Rinseback Volume (ml): 300 ml  Total Liters Processed (l/min): 51.4 l/min  Dialyzer Clearance: Clear  Duration of Treatment (minutes): 180 minutes     Hemodialysis Intake (ml): 500 ml  Hemodialysis Output (ml): 1500 ml  NET Removed (ml): 1000 ml  Tolerated Treatment: Good  Patient Response to Treatment: No complications. Treatment as ordered. Physician Notified?: Yes       Provider Notification  Provider Notification  Reason for Communication: Critical Value (comment)(low b/p) (08/10/20 1345)  Provider Name: Fabien Cruz (08/10/20 [phone])  Provider Notification: Physician (08/10/20 1345)  Method of Communication: Call (08/10/20 1345)  Response: See orders(no UF, initiate levophed) (08/10/20 1345)  Notification Time: [phone] (08/10/20 1345)     Handoff complete and report given to Primary RN at 1230 hours. Primary RN (First Initial, Last Name, Title):   Geovanny Simms RN     Education  Person Educated: Patient   Knowledge Base: Minimal  Barriers to Learning?: Yes, including LOC  Preferred method of Learning: Oral  Topic(s): Access Care, Signs and Symptoms of Infection, Procedural and Potassium   Teaching Tools: Explanation   Response to Education: Requires Follow-up     Electronically signed by Janet Harrison RN on 8/12/2020 at 12:35 PM

## 2020-08-13 ENCOUNTER — APPOINTMENT (OUTPATIENT)
Dept: GENERAL RADIOLOGY | Age: 65
DRG: 004 | End: 2020-08-13
Payer: MEDICARE

## 2020-08-13 LAB
ALBUMIN SERPL-MCNC: 2.1 GM/DL (ref 3.4–5)
ALP BLD-CCNC: 93 IU/L (ref 40–128)
ALT SERPL-CCNC: 14 U/L (ref 10–40)
ANION GAP SERPL CALCULATED.3IONS-SCNC: 12 MMOL/L (ref 4–16)
APTT: 28.6 SECONDS (ref 25.1–37.1)
AST SERPL-CCNC: 43 IU/L (ref 15–37)
BASE EXCESS MIXED: 1.7 (ref 0–1.2)
BASOPHILS ABSOLUTE: 0 K/CU MM
BASOPHILS RELATIVE PERCENT: 0.1 % (ref 0–1)
BILIRUB SERPL-MCNC: 1.5 MG/DL (ref 0–1)
BUN BLDV-MCNC: 56 MG/DL (ref 6–23)
CALCIUM IONIZED: 4 MG/DL (ref 4.48–5.28)
CALCIUM SERPL-MCNC: 7.2 MG/DL (ref 8.3–10.6)
CARBON MONOXIDE, BLOOD: 2.8 % (ref 0–5)
CHLORIDE BLD-SCNC: 103 MMOL/L (ref 99–110)
CO2 CONTENT: 23.9 MMOL/L (ref 19–24)
CO2: 22 MMOL/L (ref 21–32)
COMMENT: ABNORMAL
CREAT SERPL-MCNC: 1.7 MG/DL (ref 0.9–1.3)
DIFFERENTIAL TYPE: ABNORMAL
EOSINOPHILS ABSOLUTE: 0.1 K/CU MM
EOSINOPHILS RELATIVE PERCENT: 0.5 % (ref 0–3)
GFR AFRICAN AMERICAN: 50 ML/MIN/1.73M2
GFR NON-AFRICAN AMERICAN: 41 ML/MIN/1.73M2
GLUCOSE BLD-MCNC: 145 MG/DL (ref 70–99)
GLUCOSE BLD-MCNC: 145 MG/DL (ref 70–99)
HCO3 ARTERIAL: 23.1 MMOL/L (ref 18–23)
HCT VFR BLD CALC: 26.2 % (ref 42–52)
HEMOGLOBIN: 8.2 GM/DL (ref 13.5–18)
IMMATURE NEUTROPHIL %: 0.5 % (ref 0–0.43)
INR BLD: 1.19 INDEX
IONIZED CA: 1 MMOL/L (ref 1.12–1.32)
LYMPHOCYTES ABSOLUTE: 0.5 K/CU MM
LYMPHOCYTES RELATIVE PERCENT: 3.5 % (ref 24–44)
MCH RBC QN AUTO: 28.2 PG (ref 27–31)
MCHC RBC AUTO-ENTMCNC: 31.3 % (ref 32–36)
MCV RBC AUTO: 90 FL (ref 78–100)
METHEMOGLOBIN ARTERIAL: 1.3 %
MONOCYTES ABSOLUTE: 0.5 K/CU MM
MONOCYTES RELATIVE PERCENT: 3.2 % (ref 0–4)
NUCLEATED RBC %: 0 %
O2 SATURATION: 92.2 % (ref 96–97)
PCO2 ARTERIAL: 27 MMHG (ref 32–45)
PDW BLD-RTO: 24.1 % (ref 11.7–14.9)
PH BLOOD: 7.54 (ref 7.34–7.45)
PLATELET # BLD: 153 K/CU MM (ref 140–440)
PMV BLD AUTO: 9.8 FL (ref 7.5–11.1)
PO2 ARTERIAL: 61 MMHG (ref 75–100)
POTASSIUM SERPL-SCNC: 2.9 MMOL/L (ref 3.5–5.1)
POTASSIUM SERPL-SCNC: 3.3 MMOL/L (ref 3.5–5.1)
PROTHROMBIN TIME: 14.4 SECONDS (ref 11.7–14.5)
RBC # BLD: 2.91 M/CU MM (ref 4.6–6.2)
SEGMENTED NEUTROPHILS ABSOLUTE COUNT: 14 K/CU MM
SEGMENTED NEUTROPHILS RELATIVE PERCENT: 92.2 % (ref 36–66)
SODIUM BLD-SCNC: 137 MMOL/L (ref 135–145)
TOTAL CK: 63 IU/L (ref 38–174)
TOTAL IMMATURE NEUTOROPHIL: 0.08 K/CU MM
TOTAL NUCLEATED RBC: 0 K/CU MM
TOTAL PROTEIN: 5 GM/DL (ref 6.4–8.2)
VITAMIN D 25-HYDROXY: 11.67 NG/ML
WBC # BLD: 15.2 K/CU MM (ref 4–10.5)

## 2020-08-13 PROCEDURE — 2500000003 HC RX 250 WO HCPCS: Performed by: INTERNAL MEDICINE

## 2020-08-13 PROCEDURE — 31720 CLEARANCE OF AIRWAYS: CPT

## 2020-08-13 PROCEDURE — 2580000003 HC RX 258: Performed by: FAMILY MEDICINE

## 2020-08-13 PROCEDURE — 6360000002 HC RX W HCPCS: Performed by: FAMILY MEDICINE

## 2020-08-13 PROCEDURE — 82803 BLOOD GASES ANY COMBINATION: CPT

## 2020-08-13 PROCEDURE — 2580000003 HC RX 258: Performed by: INTERNAL MEDICINE

## 2020-08-13 PROCEDURE — 2000000000 HC ICU R&B

## 2020-08-13 PROCEDURE — 6370000000 HC RX 637 (ALT 250 FOR IP): Performed by: NURSE PRACTITIONER

## 2020-08-13 PROCEDURE — 2580000003 HC RX 258: Performed by: SURGERY

## 2020-08-13 PROCEDURE — 6370000000 HC RX 637 (ALT 250 FOR IP): Performed by: INTERNAL MEDICINE

## 2020-08-13 PROCEDURE — 6360000002 HC RX W HCPCS: Performed by: INTERNAL MEDICINE

## 2020-08-13 PROCEDURE — 80053 COMPREHEN METABOLIC PANEL: CPT

## 2020-08-13 PROCEDURE — 82330 ASSAY OF CALCIUM: CPT

## 2020-08-13 PROCEDURE — 94003 VENT MGMT INPAT SUBQ DAY: CPT

## 2020-08-13 PROCEDURE — 6360000002 HC RX W HCPCS: Performed by: SURGERY

## 2020-08-13 PROCEDURE — 99233 SBSQ HOSP IP/OBS HIGH 50: CPT | Performed by: INTERNAL MEDICINE

## 2020-08-13 PROCEDURE — 94750 HC PULMONARY COMPLIANCE STUDY: CPT

## 2020-08-13 PROCEDURE — 82550 ASSAY OF CK (CPK): CPT

## 2020-08-13 PROCEDURE — 82306 VITAMIN D 25 HYDROXY: CPT

## 2020-08-13 PROCEDURE — 82962 GLUCOSE BLOOD TEST: CPT

## 2020-08-13 PROCEDURE — 84132 ASSAY OF SERUM POTASSIUM: CPT

## 2020-08-13 PROCEDURE — 85610 PROTHROMBIN TIME: CPT

## 2020-08-13 PROCEDURE — C9113 INJ PANTOPRAZOLE SODIUM, VIA: HCPCS | Performed by: SPECIALIST

## 2020-08-13 PROCEDURE — 94761 N-INVAS EAR/PLS OXIMETRY MLT: CPT

## 2020-08-13 PROCEDURE — 6360000002 HC RX W HCPCS: Performed by: SPECIALIST

## 2020-08-13 PROCEDURE — 89220 SPUTUM SPECIMEN COLLECTION: CPT

## 2020-08-13 PROCEDURE — 6370000000 HC RX 637 (ALT 250 FOR IP): Performed by: SURGERY

## 2020-08-13 PROCEDURE — 94640 AIRWAY INHALATION TREATMENT: CPT

## 2020-08-13 PROCEDURE — 85025 COMPLETE CBC W/AUTO DIFF WBC: CPT

## 2020-08-13 PROCEDURE — 85730 THROMBOPLASTIN TIME PARTIAL: CPT

## 2020-08-13 PROCEDURE — 2700000000 HC OXYGEN THERAPY PER DAY

## 2020-08-13 PROCEDURE — 2709999900 HC NON-CHARGEABLE SUPPLY

## 2020-08-13 PROCEDURE — 71045 X-RAY EXAM CHEST 1 VIEW: CPT

## 2020-08-13 RX ORDER — POTASSIUM CHLORIDE 1.5 G/1.77G
40 POWDER, FOR SOLUTION ORAL ONCE
Status: DISCONTINUED | OUTPATIENT
Start: 2020-08-13 | End: 2020-08-13 | Stop reason: CLARIF

## 2020-08-13 RX ADMIN — IPRATROPIUM BROMIDE 4 PUFF: 17 AEROSOL, METERED RESPIRATORY (INHALATION) at 23:47

## 2020-08-13 RX ADMIN — MORPHINE SULFATE 2 MG: 2 INJECTION, SOLUTION INTRAMUSCULAR; INTRAVENOUS at 23:16

## 2020-08-13 RX ADMIN — ALBUTEROL SULFATE 4 PUFF: 90 AEROSOL, METERED RESPIRATORY (INHALATION) at 04:20

## 2020-08-13 RX ADMIN — IPRATROPIUM BROMIDE 4 PUFF: 17 AEROSOL, METERED RESPIRATORY (INHALATION) at 04:21

## 2020-08-13 RX ADMIN — METRONIDAZOLE 500 MG: 500 INJECTION, SOLUTION INTRAVENOUS at 00:27

## 2020-08-13 RX ADMIN — CHLORHEXIDINE GLUCONATE 0.12% ORAL RINSE 15 ML: 1.2 LIQUID ORAL at 08:58

## 2020-08-13 RX ADMIN — SODIUM CHLORIDE, PRESERVATIVE FREE 10 ML: 5 INJECTION INTRAVENOUS at 21:13

## 2020-08-13 RX ADMIN — THIAMINE HYDROCHLORIDE 200 MG: 100 INJECTION, SOLUTION INTRAMUSCULAR; INTRAVENOUS at 11:36

## 2020-08-13 RX ADMIN — IPRATROPIUM BROMIDE 4 PUFF: 17 AEROSOL, METERED RESPIRATORY (INHALATION) at 15:55

## 2020-08-13 RX ADMIN — SODIUM CHLORIDE, PRESERVATIVE FREE 10 ML: 5 INJECTION INTRAVENOUS at 09:00

## 2020-08-13 RX ADMIN — POTASSIUM CHLORIDE 20 MEQ: 400 INJECTION, SOLUTION INTRAVENOUS at 11:15

## 2020-08-13 RX ADMIN — CALCIUM GLUCONATE 1 G: 98 INJECTION, SOLUTION INTRAVENOUS at 11:58

## 2020-08-13 RX ADMIN — SODIUM CHLORIDE, PRESERVATIVE FREE 10 ML: 5 INJECTION INTRAVENOUS at 21:14

## 2020-08-13 RX ADMIN — POTASSIUM BICARBONATE 40 MEQ: 782 TABLET, EFFERVESCENT ORAL at 08:58

## 2020-08-13 RX ADMIN — Medication 8000 UNITS: at 08:59

## 2020-08-13 RX ADMIN — THIAMINE HYDROCHLORIDE 200 MG: 100 INJECTION, SOLUTION INTRAMUSCULAR; INTRAVENOUS at 00:27

## 2020-08-13 RX ADMIN — ALBUTEROL SULFATE 4 PUFF: 90 AEROSOL, METERED RESPIRATORY (INHALATION) at 19:39

## 2020-08-13 RX ADMIN — ALBUTEROL SULFATE 4 PUFF: 90 AEROSOL, METERED RESPIRATORY (INHALATION) at 07:32

## 2020-08-13 RX ADMIN — MAGNESIUM OXIDE 400 MG (241.3 MG MAGNESIUM) TABLET 400 MG: TABLET at 08:58

## 2020-08-13 RX ADMIN — IPRATROPIUM BROMIDE 4 PUFF: 17 AEROSOL, METERED RESPIRATORY (INHALATION) at 19:39

## 2020-08-13 RX ADMIN — METRONIDAZOLE 500 MG: 500 INJECTION, SOLUTION INTRAVENOUS at 08:59

## 2020-08-13 RX ADMIN — FENTANYL CITRATE 25 MCG: 50 INJECTION INTRAMUSCULAR; INTRAVENOUS at 07:29

## 2020-08-13 RX ADMIN — ALBUTEROL SULFATE 4 PUFF: 90 AEROSOL, METERED RESPIRATORY (INHALATION) at 23:47

## 2020-08-13 RX ADMIN — ALBUTEROL SULFATE 4 PUFF: 90 AEROSOL, METERED RESPIRATORY (INHALATION) at 11:30

## 2020-08-13 RX ADMIN — THIAMINE HYDROCHLORIDE 200 MG: 100 INJECTION, SOLUTION INTRAMUSCULAR; INTRAVENOUS at 23:10

## 2020-08-13 RX ADMIN — FENTANYL CITRATE 25 MCG: 50 INJECTION INTRAMUSCULAR; INTRAVENOUS at 00:38

## 2020-08-13 RX ADMIN — FENTANYL CITRATE 25 MCG: 50 INJECTION INTRAMUSCULAR; INTRAVENOUS at 16:53

## 2020-08-13 RX ADMIN — PANTOPRAZOLE SODIUM 40 MG: 40 INJECTION, POWDER, FOR SOLUTION INTRAVENOUS at 08:58

## 2020-08-13 RX ADMIN — POTASSIUM CHLORIDE 20 MEQ: 400 INJECTION, SOLUTION INTRAVENOUS at 08:59

## 2020-08-13 RX ADMIN — POTASSIUM CHLORIDE 20 MEQ: 400 INJECTION, SOLUTION INTRAVENOUS at 10:11

## 2020-08-13 RX ADMIN — METRONIDAZOLE 500 MG: 500 INJECTION, SOLUTION INTRAVENOUS at 16:36

## 2020-08-13 RX ADMIN — SODIUM CHLORIDE, PRESERVATIVE FREE 10 ML: 5 INJECTION INTRAVENOUS at 08:59

## 2020-08-13 RX ADMIN — IPRATROPIUM BROMIDE 4 PUFF: 17 AEROSOL, METERED RESPIRATORY (INHALATION) at 11:31

## 2020-08-13 RX ADMIN — ALBUTEROL SULFATE 4 PUFF: 90 AEROSOL, METERED RESPIRATORY (INHALATION) at 15:54

## 2020-08-13 RX ADMIN — NYSTATIN 500000 UNITS: 100000 SUSPENSION ORAL at 22:58

## 2020-08-13 RX ADMIN — IPRATROPIUM BROMIDE 4 PUFF: 17 AEROSOL, METERED RESPIRATORY (INHALATION) at 07:33

## 2020-08-13 ASSESSMENT — PULMONARY FUNCTION TESTS
PIF_VALUE: 18
PIF_VALUE: 19
PIF_VALUE: 16
PIF_VALUE: 17
PIF_VALUE: 19
PIF_VALUE: 22
PIF_VALUE: 20
PIF_VALUE: 17
PIF_VALUE: 16
PIF_VALUE: 16
PIF_VALUE: 20
PIF_VALUE: 18
PIF_VALUE: 16
PIF_VALUE: 19
PIF_VALUE: 16
PIF_VALUE: 15
PIF_VALUE: 16
PIF_VALUE: 17

## 2020-08-13 ASSESSMENT — PAIN SCALES - GENERAL
PAINLEVEL_OUTOF10: 6
PAINLEVEL_OUTOF10: 10
PAINLEVEL_OUTOF10: 7
PAINLEVEL_OUTOF10: 5

## 2020-08-13 NOTE — PROGRESS NOTES
08/13/20  5:30 AM   Result Value Ref Range    WBC 15.2 (H) 4.0 - 10.5 K/CU MM    RBC 2.91 (L) 4.6 - 6.2 M/CU MM    Hemoglobin 8.2 (L) 13.5 - 18.0 GM/DL    Hematocrit 26.2 (L) 42 - 52 %    MCV 90.0 78 - 100 FL    MCH 28.2 27 - 31 PG    MCHC 31.3 (L) 32.0 - 36.0 %    RDW 24.1 (H) 11.7 - 14.9 %    Platelets 178 169 - 025 K/CU MM    MPV 9.8 7.5 - 11.1 FL    Differential Type AUTOMATED DIFFERENTIAL     Segs Relative 92.2 (H) 36 - 66 %    Lymphocytes % 3.5 (L) 24 - 44 %    Monocytes % 3.2 0 - 4 %    Eosinophils % 0.5 0 - 3 %    Basophils % 0.1 0 - 1 %    Segs Absolute 14.0 K/CU MM    Lymphocytes Absolute 0.5 K/CU MM    Monocytes Absolute 0.5 K/CU MM    Eosinophils Absolute 0.1 K/CU MM    Basophils Absolute 0.0 K/CU MM    Nucleated RBC % 0.0 %    Total Nucleated RBC 0.0 K/CU MM    Total Immature Neutrophil 0.08 K/CU MM    Immature Neutrophil % 0.5 (H) 0 - 0.43 %   CK    Collection Time: 08/13/20  5:30 AM   Result Value Ref Range    Total CK 63 38 - 174 IU/L   PT    Collection Time: 08/13/20  5:30 AM   Result Value Ref Range    Protime 14.4 11.7 - 14.5 SECONDS    INR 1.19 INDEX   PTT    Collection Time: 08/13/20  5:30 AM   Result Value Ref Range    aPTT 28.6 25.1 - 37.1 SECONDS   Comprehensive Metabolic Panel    Collection Time: 08/13/20  5:30 AM   Result Value Ref Range    Sodium 137 135 - 145 MMOL/L    Potassium 2.9 (LL) 3.5 - 5.1 MMOL/L    Chloride 103 99 - 110 mMol/L    CO2 22 21 - 32 MMOL/L    BUN 56 (H) 6 - 23 MG/DL    CREATININE 1.7 (H) 0.9 - 1.3 MG/DL    Glucose 145 (H) 70 - 99 MG/DL    Calcium 7.2 (L) 8.3 - 10.6 MG/DL    Alb 2.1 (L) 3.4 - 5.0 GM/DL    Total Protein 5.0 (L) 6.4 - 8.2 GM/DL    Total Bilirubin 1.5 (H) 0.0 - 1.0 MG/DL    ALT 14 10 - 40 U/L    AST 43 (H) 15 - 37 IU/L    Alkaline Phosphatase 93 40 - 128 IU/L    GFR Non- 41 (L) >60 mL/min/1.73m2    GFR  50 (L) >60 mL/min/1.73m2    Anion Gap 12 4 - 16   Calcium, ionized    Collection Time: 08/13/20  5:30 AM   Result Value Ref Range    Ionized Ca 1.00 (L) 1.12 - 1.32 mMOL/L    Calcium, Ion 4.00 (L) 4.48 - 5.28 MG/DL   Blood gas, arterial    Collection Time: 08/13/20  6:00 AM   Result Value Ref Range    pH, Bld 7.54 (H) 7.34 - 7.45    pCO2, Arterial 27.0 (L) 32 - 45 MMHG    pO2, Arterial 61 (L) 75 - 100 MMHG    Base Exc, Mixed 1.7 (H) 0 - 1.2    HCO3, Arterial 23.1 (H) 18 - 23 MMOL/L    CO2 Content 23.9 19 - 24 MMOL/L    O2 Sat 92.2 (L) 96 - 97 %    Carbon Monoxide, Blood 2.8 0 - 5 %    Methemoglobin, Arterial 1.3 <1.5 %    Comment AC 12, 450, 24, 5      CULTURE results: Invalid input(s): BLOOD CULTURE,  URINE CULTURE, SURGICAL CULTURE    Diagnosis:  Patient Active Problem List   Diagnosis    Sepsis ([facility] [address]    Leukocytosis    Ischemic bowel disease (HCC)    MSSA bacteremia    Chronic hepatitis C without hepatic coma (HCC)    Respiratory failure (HCC)       Active Problems  Active Problems:    Sepsis (HCC)    Leukocytosis    MSSA bacteremia    Chronic hepatitis C without hepatic coma (HCC)    Respiratory failure (HCC)  Resolved Problems:    * No resolved hospital problems. *      Impression and plan   Clinical status: improving.  Therapeutic: continue vancomycin, cefepime and metronidazole   Diagnostic: repeat respiratory cx, pct, CRP   F/u: Blood culture (7/29/2020)   Other:   Summary: Wound culture positive for pseudomonas aeruginosa, would now need to cover for staph aureus and Pseudomonas.       Electronically signed by: Electronically signed by Belinda Cleveland MD on 8/13/2020 at 10:53 AM

## 2020-08-13 NOTE — PROGRESS NOTES
D-Dimer:  No results found for: DDIMER   1. Radiology: Reviewed      Assessment/Plan     Patient Active Problem List    Diagnosis Date Noted    Respiratory failure ([facility] 08/10/2020    [facility] ([facility] 08/03/2020    MSSA bacteremia 07/28/2020    [facility] disease ([facility]     Leukocytosis     Sepsis ([facility] 07/24/2020   Patient is on the vent and not sedated. He is more awake and following simple commands    Septic shock- improved  Hemorrhagic shock- improved  Acute hypoxic resp failure  Mild coagulopathy- improved  Shock Liver- improved  Lactic acidosis- improved  VDRF  Anemia likely GI bleed  ? Staph Aureus bacteremia  Severe Pulmonary HTN  Occult blood positive  JERAMIE likely sec to ATN  Bilateral Arm wounds  Leukocytosis  Hemoperitoneum  Severe malnutrition  Hypokalemia       1. Tube feeds  2. HD per renal  3. Trach collar trials as tolerated  4. LTAC eval  5. PT/OT  6. Nutritional optimization  7. C/w present management  No follow-ups on file.     Electronically signed by Andrea Addison MD on 8/13/2020 at 12:03 PM

## 2020-08-13 NOTE — PROGRESS NOTES
Nephrology Progress Note        [address], [address] [facility], [place] [address]  Phone: [phone]  Office Hours: 8:30AM - 4:30PM  Monday - Friday       [facility]  Romaine Oppenheim, MD Jennelle Lambert, DO [address],  [address] [facility], [place] [address]  PHONE: [phone]  FAX: [phone]    8/13/2020 11:10 AM  Subjective:   Admit Date: 7/24/2020  PCP: No primary care provider on file. Interval History: alert and awake  Now making urine, after the tunnelled hd cath has been placed    Diet: DIET TUBE FEED CONTINUOUS/CYCLIC NPO; Semi-elemental (Vital AF);  Gastrostomy; Continuous; 25; 50; 24      Data:   Scheduled Meds:   calcium gluconate IVPB  1 g Intravenous Once    Ergocalciferol  8,000 Units Oral Daily    magnesium oxide  400 mg Oral Daily    midodrine  10 mg Oral Q MWF    sodium chloride  250 mL Intravenous Once    cefepime  2 g Intravenous Once per day on Mon Wed Fri    scopolamine  1 patch Transdermal Q72H    vancomycin (VANCOCIN) intermittent dosing (placeholder)   Other RX Placeholder    metroNIDAZOLE  500 mg Intravenous Q8H    pantoprazole  40 mg Intravenous Daily    thiamine (VITAMIN B1) IVPB  200 mg Intravenous Q12H    chlorhexidine  15 mL Mouth/Throat BID    albuterol sulfate HFA  4 puff Inhalation Q4H    And    ipratropium  4 puff Inhalation Q4H    aspirin  324 mg Oral Once    sodium chloride flush  10 mL Intravenous BID    sodium chloride flush  10 mL Intravenous 2 times per day    [Held by provider] enoxaparin  1 mg/kg Subcutaneous Daily    nicotine  1 patch Transdermal Daily     Continuous Infusions:   dextrose       PRN Meds:fentanNYL, calcium gluconate **OR** calcium gluconate **OR** calcium gluconate **OR** calcium gluconate, potassium chloride, glucose, dextrose, glucagon (rDNA), dextrose, heparin (porcine), ipratropium-albuterol, iopamidol, sodium chloride flush, acetaminophen **OR** acetaminophen, polyethylene glycol, Sepsis ([facility] [address] 07/24/2020         - JERAMIE FROM ATN; CRRT FROM FROM 7/5 TO 7/8 and now on HD 3 times per week  - ANION GAP METABOLIC ACIDOSIS  - LACTIC ACIDOSIS  - ISCHEMIC BOWEL S/P SURGERY   - STAPH BACTEREMIA  - ACUTE HYPOXIC RESP FAILURE, now chronic with trach in place  - HEROIN AND COCAINE USE  - encephalopathy  - bilateral arm wounds  - hypokalemia        Suggest:  - Now making urine  - replete K  - avoid nephrotoxins                     Electronically signed by Philomena Duggan DO on 8/13/2020 at 11:10 AM    MD Augusta Rivas DO [address],  [facility]  [address], [place] [address]  PHONE: [phone]  FAX: [phone]

## 2020-08-13 NOTE — PROGRESS NOTES
[address] [facility]  consulted by Dr. Yuliet Wayne for monitoring and adjustment. Indication for treatment: MSSA bacteremia, GPB in blood   Goal trough: 10-15 mcg/mL     Pertinent Laboratory Values:   Temp Readings from Last 3 Encounters:   08/13/20 99 °F (37.2 °C) (Rectal)   08/10/20 97.3 °F (36.3 °C)   07/25/20 (!) 62.3 °F (16.8 °C)     Recent Labs     08/11/20  0830 08/12/20  0620 08/13/20  0530   WBC 19.1* 16.0* 15.2*     Recent Labs     08/11/20  0830 08/12/20  0620 08/13/20  0530   BUN 75* 88* 56*   CREATININE 2.1* 2.4* 1.7*     Estimated Creatinine Clearance: 40 mL/min (A) (based on SCr of 1.7 mg/dL (H)). Intake/Output Summary (Last 24 hours) at 8/13/2020 1719  Last data filed at 8/13/2020 1630  Gross per 24 hour   Intake 2512 ml   Output 1875 ml   Net 637 ml       Pertinent Cultures:  Date    Source    Results  7/24   Blood    MSSA  7/27   Blood    Bacillus (1 of 2)  7/29   Blood    Negative   8/5   Sputum   Yeast   8/5   Blood    Negative    Vancomycin level:   TROUGH:    No results for input(s): VANCOTROUGH in the last 72 hours. RANDOM:    Recent Labs     08/12/20  0620   VANCORANDOM 12.3       Assessment:  · WBC and temperature: WBC remains elevated but improved, Afebrile  · SCr, BUN, and urine output:   · HD Mon/Wed/Fri   · Previously receiving CRRT  · Days of therapy: 21  · Vancomycin level: 12.3 on 8/12    Plan:  · Continue intermittent dosing based on levels 2/2 RRT   · Received last dose of vancomycin 500 mg on 08/12, post-HD  · Random level @ 12.3 on 8/12   · NO Vancomycin today  · Repeat random level 8/14 AM  · Pharmacy will continue to monitor patient and adjust therapy as indicated.     VANCOMYCIN RANDOM LEVEL SCHEDULED FOR 8/14 @06:00    Thank you for the consult,  Meagan Chandler [facility]  8/13/2020 5:19 PM

## 2020-08-13 NOTE — PROGRESS NOTES
Hospitalist Progress Note      Name:  Iron Fernandes /Age/Sex: 1955  (63 y.o. male)   MRN & CSN:  [id number] & [id number] Admission Date/Time: 2020  3:22 PM   Location:  -A PCP: No primary care provider on file. Iron Fernandes is a 63 y.o.  male  who presents with Fall (85% o2 sat in triage) and Hip Pain      Assessment and Plan:   1) Septic shock with MSSA bacteremia and possible T12-L1 OM  2/2 bilateral upper extremity wound infection.  -Has background IVDU hx  -Was on pressor but currently off  -Initial BC growing MSSA; Last [facility] : NGTD  -Wound cx growing MSSA  -JASPAL neg for vegetation  -ID on board for abx management, on IV vanc + cefepime + flagyl  -prognosis guarded  -palliative on board     2-Acute on chronic respiratory failure due to sepsis and suspected aspiration pneumonia  -On mechanical Vent  -s/p trach/peg on 8/10  -Pulmo on board     3) Rhabdomyolysis due to IVDU  - resolved     4)-JERAMIE due to ATN from [place]  - dialysis per nephro     5-Skin wounds/right post thigh/buttock and bilateral UE  -initial Wound culture growing MSSA, repeat wound cx +pseudomonas from perineum  - abx as above  -Surgery and ID following  - wound care team following     7-Pneumatosis intestinalis   -S/P Ex-lap on  negative for ischemic bowel, however CT on  showing hemoperitoneum and pneumoperitoneum  -General Surgery on board     8-Acute blood loss anemia due to above  - s/p 2 units PRBC to date  -S/P transfusion- monitor H/H  -Will continue to monitor     HypoCa  - replace     HypoK  - replace per protocol     IR placed tunnel HD cath     On TF via PEG     Other chronic issues  -Hx of gastric cancer stage 4  -Polysubstance abuse- UDS positive for cocaine and opiate  -Hep C, chronic    LTAC approved            Diet DIET TUBE FEED CONTINUOUS/CYCLIC NPO; Semi-elemental (Vital AF);  Gastrostomy; Continuous; 25; 50; 24   Code Status Full Code     Medications:   Medications:    Ergocalciferol  8,000 Units Oral Daily    magnesium oxide  400 mg Oral Daily    midodrine  10 mg Oral Q MWF    sodium chloride  250 mL Intravenous Once    cefepime  2 g Intravenous Once per day on Mon Wed Fri    scopolamine  1 patch Transdermal Q72H    vancomycin (VANCOCIN) intermittent dosing (placeholder)   Other RX Placeholder    metroNIDAZOLE  500 mg Intravenous Q8H    pantoprazole  40 mg Intravenous Daily    thiamine (VITAMIN B1) IVPB  200 mg Intravenous Q12H    chlorhexidine  15 mL Mouth/Throat BID    albuterol sulfate HFA  4 puff Inhalation Q4H    And    ipratropium  4 puff Inhalation Q4H    aspirin  324 mg Oral Once    sodium chloride flush  10 mL Intravenous BID    sodium chloride flush  10 mL Intravenous 2 times per day    [Held by provider] enoxaparin  1 mg/kg Subcutaneous Daily    nicotine  1 patch Transdermal Daily      Infusions:    dextrose       PRN Meds: fentanNYL, 25 mcg, Q4H PRN  calcium gluconate, 1 g, PRN    Or  calcium gluconate, 2 g, PRN    Or  calcium gluconate, 3 g, PRN    Or  calcium gluconate, 4 g, PRN  potassium chloride, 20 mEq, PRN  glucose, 15 g, PRN  dextrose, 12.5 g, PRN  glucagon (rDNA), 1 mg, PRN  dextrose, 100 mL/hr, PRN  heparin (porcine), 3,000 Units, PRN  ipratropium-albuterol, 1 ampule, Q4H PRN  iopamidol, 80 mL, ONCE PRN  sodium chloride flush, 10 mL, PRN  acetaminophen, 650 mg, Q6H PRN    Or  acetaminophen, 650 mg, Q6H PRN  polyethylene glycol, 17 g, Daily PRN  promethazine, 12.5 mg, Q6H PRN    Or  ondansetron, 4 mg, Q6H PRN  promethazine, 25 mg, Q6H PRN  diphenhydrAMINE, 12.5 mg, Nightly PRN  dicyclomine, 20 mg, Q6H PRN  morphine, 2 mg, Q4H PRN      Subjective:   No distress, states no pain    Objective:        Intake/Output Summary (Last 24 hours) at 8/13/2020 1052  Last data filed at 8/13/2020 1041  Gross per 24 hour   Intake 2583 ml   Output 3075 ml   Net -492 ml      Vitals:   Vitals:    08/13/20 1000   BP:    Pulse: 95   Resp: 23   Temp:    SpO2: 99% Physical Exam:   Gen:  awake, alert, no apparent distress  Head/Eyes:  Normocephalic atraumatic, EOMI   NECK:   symmetrical, trachea midline  LUNGS: Normal Effort +trach  CARDIOVASCULAR:  Normal rate  ABDOMEN:  non distended  MUSCULOSKELETAL:  ROM limited  NEUROLOGIC: Alert and Oriented,  Cranial nerves II-XII are grossly intact.    SKIN:  no bruising or bleeding, normal skin color,  no redness      Data:       CBC   Recent Labs     08/11/20  0830 08/11/20  1645 08/12/20  0620 08/13/20  0530   WBC 19.1*  --  16.0* 15.2*   HGB 6.6* 8.3* 8.3* 8.2*   HCT 21.1* 25.9* 26.6* 26.2*     --  165 153      BMP   Recent Labs     08/11/20  0830 08/11/20  1645 08/12/20  0620 08/13/20  0530     --  139 137   K 2.8* 3.6 3.4* 2.9*   CL 98*  --  103 103   CO2 23  --  21 22   BUN 75*  --  88* 56*   CREATININE 2.1*  --  2.4* 1.7*         Electronically signed by Joseluis Edwards MD on 8/13/2020 at 10:52 AM

## 2020-08-13 NOTE — PROGRESS NOTES
[facility] [place] [facility], [address]   Progress Note  [facility] 0 1 2      Date: 2020   Patient: Ross Man   : 1955   DOA: 2020   MRN: [id number]   ROOM#: [id number]     Admit Date: 2020     Collaborating Urologist on Call at time of admission: Dr. Swathi Javier  CC: Fall with right hip pain  Reason for Consult:  Evaluate possible abscess right perineum    Subjective:     Pain: mild, no nausea and no vomiting,   Bowel Movement/Flatus: Rectal tube in place  Voiding: Ann catheter in place draining clear yellow urine     Pt resting in bed, shakes his head when asked if he was in any pain. Objective:    Vitals:    /63   Pulse 93   Temp 97.9 °F (36.6 °C) (Rectal)   Resp 28   Ht (S) 5' 7\" (1.702 m)   Wt 139 lb 15.9 oz (63.5 kg)   SpO2 99%   BMI 21.93 kg/m²    Temp  Av.8 °F (37.1 °C)  Min: 97.9 °F (36.6 °C)  Max: 99.9 °F (37.7 °C)       Intake/Output Summary (Last 24 hours) at 2020 0946  Last data filed at 2020 0857  Gross per 24 hour   Intake 2483 ml   Output 2950 ml   Net -467 ml       Physical Exam:   General appearance: appears older than stated age, cooperative, no distress and more responsive than yesterday  Head: Normocephalic, without obvious abnormality, atraumatic  Abdomen: Soft, non-distended. PEG in place. Male genitalia: Circumcised phallus with ann catheter in place drainign clear yellow urine. Scrotum and scrotal contents normal with no lesions or masses noted. Rectal: Ruptured right perineal abscess noted without active drainage. No area of fluctuance noted and appears to have drained completely. Scrotum does not appear to be involved. Rectal tube in place and leaking.     Labs:   WBC:    Lab Results   Component Value Date    WBC 15.2 2020      Hemoglobin/Hematocrit:    Lab Results   Component Value Date    HGB 8.2 2020    HCT 26.2 2020      BMP:   Lab Results   Component Value Date     2020    K 2.9 2020  08/13/2020    CO2 22 08/13/2020    BUN 56 08/13/2020    LABALBU 2.1 08/13/2020    CREATININE 1.7 08/13/2020    CALCIUM 7.2 08/13/2020    GFRAA 50 08/13/2020    LABGLOM 41 08/13/2020      PT/INR:    Lab Results   Component Value Date    PROTIME 14.4 08/13/2020    INR 1.19 08/13/2020      PTT:    Lab Results   Component Value Date    APTT 28.6 08/13/2020     Imaging:  Ct Abdomen Pelvis Wo Contrast Additional Contrast? None    Result Date: 8/4/2020  EXAMINATION: CT OF THE CHEST WITHOUT CONTRAST; CT OF THE ABDOMEN AND PELVIS WITHOUT CONTRAST 8/4/2020 2:54 pm TECHNIQUE: CT of the chest was performed without the administration of intravenous contrast. Multiplanar reformatted images are provided for review. Dose modulation, iterative reconstruction, and/or weight based adjustment of the mA/kV was utilized to reduce the radiation dose to as low as reasonably achievable.; CT of the abdomen and pelvis was performed without the administration of intravenous contrast. Multiplanar reformatted images are provided for review. Dose modulation, iterative reconstruction, and/or weight based adjustment of the mA/kV was utilized to reduce the radiation dose to as low as reasonably achievable. COMPARISON: 07/31/2020, 07/25/2020 HISTORY: ORDERING SYSTEM PROVIDED HISTORY: respiratory failure TECHNOLOGIST PROVIDED HISTORY: Reason for exam:->respiratory failure Reason for Exam: respiratory failure Acuity: Acute Type of Exam: Initial; ORDERING SYSTEM PROVIDED HISTORY: follow up on hemoperitoneum, concerns of abscess TECHNOLOGIST PROVIDED HISTORY: Reason for exam:->follow up on hemoperitoneum, concerns of abscess Additional Contrast?->None Reason for Exam: follow up on hemoperitoneum, concerns of abscess Acuity: Acute Type of Exam: Initial FINDINGS: Chest: Mediastinum: Heart is normal in size. No mediastinal, hilar, or axillary lymphadenopathy.  Satisfactory position of endotracheal and enteric tubes Lungs/pleura: Mild upper lobe predominant centrilobular emphysema. Bronchial wall thickening and centrilobular nodularity in the right lower lobe. No pleural effusion or pneumothorax. Soft Tissues/Bones: Similar disc space irregularity at T12-L1. Abdomen/Pelvis: Organs: Liver is normal in contour and attenuation. Pancreas is unremarkable. Adrenals are unremarkable. Spleen is normal in size. No renal calculi or hydronephrosis. Mild calcific atherosclerosis. Avery Maria T GI/Bowel:  Similar to slightly improved mild diffuse colonic wall thickening. Pelvis: Unremarkable. Peritoneum/Retroperitoneum: No significant change in moderate volume hemoperitoneum. Similar small pneumoperitoneum, likely postoperative. No lymphadenopathy. Bones/Soft Tissues: Diffuse marrow heterogeneity with multilevel degenerative disc disease. Similar decubitus ulcer of right ischial region. 1. Bronchial wall thickening and centrilobular nodularity in the right lower lobe, likely infectious/inflammatory airways process such as aspiration. 2. No significant change in moderate volume hemoperitoneum. 3. Similar small pneumoperitoneum, likely postoperative. 4. Similar to slightly improved mild diffuse colonic wall thickening. Ct Abdomen Pelvis Wo Contrast Additional Contrast? None    Result Date: 7/31/2020  EXAMINATION: CT OF THE ABDOMEN AND PELVIS WITHOUT CONTRAST 7/31/2020 4:42 pm TECHNIQUE: CT of the abdomen and pelvis was performed without the administration of intravenous contrast. Multiplanar reformatted images are provided for review. Dose modulation, iterative reconstruction, and/or weight based adjustment of the mA/kV was utilized to reduce the radiation dose to as low as reasonably achievable. COMPARISON: 07/25/2020.  HISTORY: ORDERING SYSTEM PROVIDED HISTORY: worsening anemia/rule out any occult hemorrhage TECHNOLOGIST PROVIDED HISTORY: Reason for exam:->worsening anemia/rule out any occult hemorrhage Additional Contrast?->None Reason for Exam: worsening anemia/rule out any occult hemorrhage Acuity: Unknown Type of Exam: Ongoing Additional signs and symptoms: fall/hip pain FINDINGS: Lower Chest: Unremarkable. Organs: Liver is normal in contour and attenuation. Gallbladder is absent. No biliary ductal dilatation. Pancreas is unremarkable. Adrenals are unremarkable. Spleen is normal in size. Left renal cyst. No further imaging is indicated for this finding. Punctate left calyceal calculus. No hydronephrosis. Vasculature is unremarkable. GI/Bowel:  Possible mild wall thickening of the colon. No evidence of obstruction. Enteric tube is in the gastric body. Pelvis: Unremarkable. Peritoneum/Retroperitoneum: Moderate volume hemoperitoneum. Small pneumoperitoneum. No organized fluid collection. No lymphadenopathy. Bones/Soft Tissues: Multilevel moderate degenerative disc disease. There is similar irregularity of the disc space at T12-L1. Similar decubitus ulcer in the right ischial region. 1. Moderate volume hemoperitoneum. 2. Small pneumoperitoneum, likely postoperative. 3. Possible mild wall thickening of the colon diffusely. Clinical correlation advised. 4. Similar decubitus ulcer in the right ischial region. 5. Similar irregularity of the disc space at T12-L1. 6. Nonobstructive left nephrolithiasis. Critical results were called by Dr. Pee Piña to Alec Palma on 7/31/2020 at 17:09. Xr Hip Right (1 View)    Result Date: 7/24/2020  EXAMINATION: ONE XRAY VIEW OF THE RIGHT HIP; ONE XRAY VIEW OF THE PELVIS AND TWO XRAY VIEWS LEFT HIP 7/24/2020 4:55 pm COMPARISON: None. HISTORY: ORDERING SYSTEM PROVIDED HISTORY: fall TECHNOLOGIST PROVIDED HISTORY: Reason for exam:->fall Reason for Exam: pain Acuity: Acute Type of Exam: Initial Mechanism of Injury: patient was in kitchen and fell, was down for 2 days Relevant Medical/Surgical History: na FINDINGS: Unremarkable appearance of the right and left hemipelvis, articulating normally at the SI joints and pubic symphysis. Visualized portions of the right and left femurs are intact align normally at the right and left acetabulum. Right and left hip joints appear well maintained. Unremarkable AP pelvis and bilateral hip radiographs. If pain persists or worsens, then additional evaluation with MRI is indicated to ensure no underlying radiographically occult process such as fracture, AVN or transient osteoporosis. Xr Abdomen (kub) (single Ap View)    Result Date: 7/26/2020  EXAMINATION: ONE SUPINE XRAY VIEW(S) OF THE ABDOMEN 7/26/2020 2:57 pm COMPARISON: 07/25/2020. HISTORY: ORDERING SYSTEM PROVIDED HISTORY: post right femorial dialysis catheter insertion TECHNOLOGIST PROVIDED HISTORY: Reason for exam:->post right femorial dialysis catheter insertion Reason for Exam: post right femorial dialysis catheter insertion Acuity: Acute Type of Exam: Initial FINDINGS: A right femoral line has been inserted with tip at the L3-4 level, likely within the IVC. Visualized bowel gas pattern is nonspecific. Enteric catheter tip in the distal gastric body. Midline skin staple row overlying the lower abdomen and pelvis. Postoperative changes pan in the lower lumbar spine. Right femoral catheter tip in the IVC. Ct Chest Wo Contrast    Result Date: 8/4/2020  EXAMINATION: CT OF THE CHEST WITHOUT CONTRAST; CT OF THE ABDOMEN AND PELVIS WITHOUT CONTRAST 8/4/2020 2:54 pm TECHNIQUE: CT of the chest was performed without the administration of intravenous contrast. Multiplanar reformatted images are provided for review. Dose modulation, iterative reconstruction, and/or weight based adjustment of the mA/kV was utilized to reduce the radiation dose to as low as reasonably achievable.; CT of the abdomen and pelvis was performed without the administration of intravenous contrast. Multiplanar reformatted images are provided for review.  Dose modulation, iterative reconstruction, and/or weight based adjustment of the mA/kV was utilized to reduce the colonic wall thickening. Ct Abdomen Pelvis W Iv Contrast    Result Date: 7/27/2020  EXAMINATION: CTA OF THE CHEST; CT OF THE ABDOMEN AND PELVIS WITH CONTRAST 7/25/2020 10:48 am TECHNIQUE: CTA of the chest was performed after the administration of intravenous contrast.  Multiplanar reformatted images are provided for review. MIP images are provided for review. Dose modulation, iterative reconstruction, and/or weight based adjustment of the mA/kV was utilized to reduce the radiation dose to as low as reasonably achievable.; CT of the abdomen and pelvis was performed with the administration of intravenous contrast. Multiplanar reformatted images are provided for review. Dose modulation, iterative reconstruction, and/or weight based adjustment of the mA/kV was utilized to reduce the radiation dose to as low as reasonably achievable. COMPARISON: CT abdomen and pelvis dated 10/27/2010, portable chest radiograph dated 07/24/2010 HISTORY: ORDERING SYSTEM PROVIDED HISTORY: hypoxia TECHNOLOGIST PROVIDED HISTORY: Reason for exam:->hypoxia Reason for Exam: SHORTNESS OF BREATH/  R/O pe; ORDERING SYSTEM PROVIDED HISTORY: Abdominal pain TECHNOLOGIST PROVIDED HISTORY: Reason for exam:->Abdominal pain Reason for Exam: Abdominal pain FINDINGS: Pulmonary Arteries: There is adequate opacification of the pulmonary arteries. The pulmonary arteries are normal caliber. There are no pulmonary arterial filling defects. Streak artifact slightly limits the exam. Mediastinum: A right IJ catheter has its tip in the superior vena cava. The thoracic aorta is of normal caliber. There is no evidence of thoracic aortic dissection. The heart size is within normal limits. There is no pericardial effusion. Lungs/pleura: There is mild centrilobular emphysema. There is minor consolidation at the right lung base posteromedially. There is no pleural effusion, pneumothorax or evidence of edema.   There is minimal atelectasis at the left lung opacity. Mucous impaction involving right lower lobe bronchi. Consider aspiration pneumonitis. 3. Chronic appearing destructive change at the T12-L1 disc space. Consider prior infectious spondylitis. Correlation with lab values may be helpful to exclude acute discitis and osteomyelitis. 4. Minimal foci of hypoattenuation within the renal cortices, consider pyelonephritis or small infarcts. 5. Diffuse 3rd spacing. 6. Lucency within the right colonic wall, most likely pseudo pneumatosis. Correlation with lactate levels recommended to exclude bowel ischemia. 7. Large decubitus ulcer involving the right posterior thigh and buttock. No defined abscess or evidence of osteomyelitis. 8. Nonobstructing left renal calculus. Ir Tunneled Cvc Place Wo Sq Port/pump > 5 Years    Result Date: 8/12/2020  PROCEDURE: ULTRASOUND GUIDED VASCULAR ACCESS. FLUOROSCOPY GUIDED PLACEMENT OF A TUNNELED CATHETER. MODERATE CONSCIOUS SEDATION 8/12/2020. HISTORY: ORDERING SYSTEM PROVIDED HISTORY: Tunneled Cath Insertion TECHNOLOGIST PROVIDED HISTORY: Reason for exam:->Tunneled Cath Insertion How many lumens are being requested?->2 What side should this line be placed?->Right What site is the preferred site? ->Internal Jugular SEDATION: None. FLUOROSCOPY DOSE AND TYPE OR TIME AND EXPOSURES: 0.2 minutes with a single fluoroscopic image saved. AK 4 TECHNIQUE: Informed consent was obtained after a detailed explanation of the procedure including risks, benefits, and alternatives. Universal protocol was observed. The left neck and chest were prepped and draped in sterile fashion using maximum sterile barrier technique. Local anesthesia was achieved with lidocaine. A micropuncture needle was used to access the left internal jugular vein using ultrasound guidance. An ultrasound image demonstrating patency of the vein with needle tip located within it. An image was obtained and stored in PACs.   A 0.035 guidewire was used to place a peel-away evidence of obstruction. Enteric tube is in the gastric body. Pelvis: Unremarkable. Peritoneum/Retroperitoneum: Moderate volume hemoperitoneum. No active extravasation of contrast material.  Small pneumoperitoneum. No organized fluid collection. No lymphadenopathy. Bones/Soft Tissues: Multilevel moderate degenerative disc disease. There is similar irregularity of the disc space at T12-L1. Similar decubitus ulcer in the right ischial region. 1. Moderate volume hemoperitoneum without active extravasation of contrast material. 2. Small pneumoperitoneum, likely postoperative. 3.  Mild apparent wall thickening of the colon diffusely. Clinical correlation advised. 4. Similar decubitus ulcer in the right ischial region. 5. Similar irregularity of the disc space at T12-L1. 6. Nonobstructive left nephrolithiasis. Cta Pulmonary W Contrast    Result Date: 7/27/2020  EXAMINATION: CTA OF THE CHEST; CT OF THE ABDOMEN AND PELVIS WITH CONTRAST 7/25/2020 10:48 am TECHNIQUE: CTA of the chest was performed after the administration of intravenous contrast.  Multiplanar reformatted images are provided for review. MIP images are provided for review. Dose modulation, iterative reconstruction, and/or weight based adjustment of the mA/kV was utilized to reduce the radiation dose to as low as reasonably achievable.; CT of the abdomen and pelvis was performed with the administration of intravenous contrast. Multiplanar reformatted images are provided for review. Dose modulation, iterative reconstruction, and/or weight based adjustment of the mA/kV was utilized to reduce the radiation dose to as low as reasonably achievable.  COMPARISON: CT abdomen and pelvis dated 10/27/2010, portable chest radiograph dated 07/24/2010 HISTORY: ORDERING SYSTEM PROVIDED HISTORY: hypoxia TECHNOLOGIST PROVIDED HISTORY: Reason for exam:->hypoxia Reason for Exam: SHORTNESS OF BREATH/  R/O pe; ORDERING SYSTEM PROVIDED HISTORY: Abdominal pain TECHNOLOGIST PROVIDED HISTORY: Reason for exam:->Abdominal pain Reason for Exam: Abdominal pain FINDINGS: Pulmonary Arteries: There is adequate opacification of the pulmonary arteries. The pulmonary arteries are normal caliber. There are no pulmonary arterial filling defects. Streak artifact slightly limits the exam. Mediastinum: A right IJ catheter has its tip in the superior vena cava. The thoracic aorta is of normal caliber. There is no evidence of thoracic aortic dissection. The heart size is within normal limits. There is no pericardial effusion. Lungs/pleura: There is mild centrilobular emphysema. There is minor consolidation at the right lung base posteromedially. There is no pleural effusion, pneumothorax or evidence of edema. There is minimal atelectasis at the left lung base. Motion degrades the quality of the exam.  There are small foci of ground-glass opacity within the left lower lobe medially. There is mucous impaction within right lower lobe bronchi. Soft Tissues/Bones: 3rd spacing is noted. The bones are demineralized. There are endplate destructive changes at T12-L1. These are new when compared to the 2010 exam.  There is associated sclerosis of the vertebral bodies. No paravertebral fluid collection is appreciated. CT abdomen and pelvis: Organs: No focal hepatic abnormality is identified. No focal splenic abnormality is appreciated no focal pancreatic abnormality is identified. The adrenal glands are unremarkable. The gallbladder is absent. The kidneys are not obstructed. There are areas of cortical decreased attenuation within the right kidney. There is a rounded low-attenuation lesion within the left renal lower pole, likely a cyst.  There is nonobstructing left lower pole renal calculus. There are small foci of low attenuation within the left renal cortex. Bowel: The bowel is not obstructed. The appendix is believed to be within normal limits.   There are air foci within the posterior wall of the right colon which is most likely reflects pseudo pneumatosis. There is no mesenteric or portal venous gas. Pelvis: No free fluid is noted in the pelvis. A Singh catheter is in place. There is ectasia of the common iliac arteries. Retroperitoneum: The abdominal aorta is of normal caliber. There is no evidence of free intraperitoneal air. Bones and soft tissues: 3rd spacing is noted. There is a large decubitus ulcer involving the right buttock. A defined abscess is not identified. No bony destruction is appreciated. Postsurgical changes are noted in the lumbar spine. There is ankylosis L2-3. There is degenerative disc disease at L3-4.     1. No evidence of pulmonary embolic disease. Motion and streak artifact limits the exam. 2. Minimal left basilar ground-glass opacity. Mucous impaction involving right lower lobe bronchi. Consider aspiration pneumonitis. 3. Chronic appearing destructive change at the T12-L1 disc space. Consider prior infectious spondylitis. Correlation with lab values may be helpful to exclude acute discitis and osteomyelitis. 4. Minimal foci of hypoattenuation within the renal cortices, consider pyelonephritis or small infarcts. 5. Diffuse 3rd spacing. 6. Lucency within the right colonic wall, most likely pseudo pneumatosis. Correlation with lactate levels recommended to exclude bowel ischemia. 7. Large decubitus ulcer involving the right posterior thigh and buttock. No defined abscess or evidence of osteomyelitis. 8. Nonobstructing left renal calculus. Ir Us Guided Paracentesis    Result Date: 8/12/2020  PROCEDURE: ULTRASOUND GUIDED PARACENTESIS 8/12/2020 HISTORY: ORDERING SYSTEM PROVIDED HISTORY: [employer] TECHNOLOGIST PROVIDED HISTORY: Reason for exam:->[employer] TECHNIQUE: Informed consent was obtained after a detailed explanation of the procedure including risks, benefits, and alternatives. Universal protocol was followed.   A limited ultrasound of the VIEW OF THE PELVIS AND TWO XRAY VIEWS LEFT HIP 7/24/2020 4:55 pm COMPARISON: None. HISTORY: ORDERING SYSTEM PROVIDED HISTORY: fall TECHNOLOGIST PROVIDED HISTORY: Reason for exam:->fall Reason for Exam: pain Acuity: Acute Type of Exam: Initial Mechanism of Injury: patient was in kitchen and fell, was down for 2 days Relevant Medical/Surgical History: na FINDINGS: Unremarkable appearance of the right and left hemipelvis, articulating normally at the SI joints and pubic symphysis. Visualized portions of the right and left femurs are intact align normally at the right and left acetabulum. Right and left hip joints appear well maintained. Unremarkable AP pelvis and bilateral hip radiographs. If pain persists or worsens, then additional evaluation with MRI is indicated to ensure no underlying radiographically occult process such as fracture, AVN or transient osteoporosis. Assessment & Plan:      Janis Eddy is a 63y.o. year old male admitted 7/24/2020 for sepsis.    1) Abscess in Right Perineum: Appears to have ruptured overnight              CT a/p 8/4/20: Unremarkable pelvis with no signs of abscess formation in perineum. WBC 15.2, afebrile              On IV Cefepime, Flagyl, and Vancomycin              No evidence of drainable abscess noted on PE   Recommend keeping wound clean and dry              Recommend CT pelvis w contrast for further evaluation                Pt stable from a  standpoint. Will sign off, please call with any questions. Pt seen and examined with Dr. Gabe Corrales. Patient seen and examined, chart reviewed.      Electronically signed by Olivia Hodges PA-C on 8/13/2020 at 9:46 AM

## 2020-08-14 LAB
ALBUMIN SERPL-MCNC: 2.1 GM/DL (ref 3.4–5)
ALP BLD-CCNC: 99 IU/L (ref 40–128)
ALT SERPL-CCNC: 16 U/L (ref 10–40)
ANION GAP SERPL CALCULATED.3IONS-SCNC: 14 MMOL/L (ref 4–16)
APTT: 29.1 SECONDS (ref 25.1–37.1)
AST SERPL-CCNC: 42 IU/L (ref 15–37)
BASE EXCESS MIXED: 0.4 (ref 0–1.2)
BASOPHILS ABSOLUTE: 0 K/CU MM
BASOPHILS RELATIVE PERCENT: 0.2 % (ref 0–1)
BILIRUB SERPL-MCNC: 1.3 MG/DL (ref 0–1)
BUN BLDV-MCNC: 71 MG/DL (ref 6–23)
CALCIUM IONIZED: 4.04 MG/DL (ref 4.48–5.28)
CALCIUM SERPL-MCNC: 7.4 MG/DL (ref 8.3–10.6)
CARBON MONOXIDE, BLOOD: 2.8 % (ref 0–5)
CHLORIDE BLD-SCNC: 105 MMOL/L (ref 99–110)
CO2 CONTENT: 22.8 MMOL/L (ref 19–24)
CO2: 21 MMOL/L (ref 21–32)
COMMENT: ABNORMAL
CREAT SERPL-MCNC: 2 MG/DL (ref 0.9–1.3)
DIFFERENTIAL TYPE: ABNORMAL
DOSE AMOUNT: NORMAL
DOSE TIME: NORMAL
EOSINOPHILS ABSOLUTE: 0.2 K/CU MM
EOSINOPHILS RELATIVE PERCENT: 1.6 % (ref 0–3)
GFR AFRICAN AMERICAN: 41 ML/MIN/1.73M2
GFR NON-AFRICAN AMERICAN: 34 ML/MIN/1.73M2
GLUCOSE BLD-MCNC: 134 MG/DL (ref 70–99)
HCO3 ARTERIAL: 22 MMOL/L (ref 18–23)
HCT VFR BLD CALC: 26 % (ref 42–52)
HEMOGLOBIN: 8.2 GM/DL (ref 13.5–18)
IMMATURE NEUTROPHIL %: 0.9 % (ref 0–0.43)
INR BLD: 1.27 INDEX
IONIZED CA: 1.01 MMOL/L (ref 1.12–1.32)
LYMPHOCYTES ABSOLUTE: 0.7 K/CU MM
LYMPHOCYTES RELATIVE PERCENT: 5.4 % (ref 24–44)
MAGNESIUM: 1.6 MG/DL (ref 1.8–2.4)
MCH RBC QN AUTO: 28.6 PG (ref 27–31)
MCHC RBC AUTO-ENTMCNC: 31.5 % (ref 32–36)
MCV RBC AUTO: 90.6 FL (ref 78–100)
METHEMOGLOBIN ARTERIAL: 0.8 %
MONOCYTES ABSOLUTE: 0.6 K/CU MM
MONOCYTES RELATIVE PERCENT: 4.6 % (ref 0–4)
NUCLEATED RBC %: 0 %
O2 SATURATION: 95.3 % (ref 96–97)
PCO2 ARTERIAL: 27 MMHG (ref 32–45)
PDW BLD-RTO: 24.5 % (ref 11.7–14.9)
PH BLOOD: 7.52 (ref 7.34–7.45)
PHOSPHORUS: 4.1 MG/DL (ref 2.5–4.9)
PLATELET # BLD: 135 K/CU MM (ref 140–440)
PMV BLD AUTO: 10 FL (ref 7.5–11.1)
PO2 ARTERIAL: 72 MMHG (ref 75–100)
POTASSIUM SERPL-SCNC: 3 MMOL/L (ref 3.5–5.1)
PROTHROMBIN TIME: 15.4 SECONDS (ref 11.7–14.5)
RBC # BLD: 2.87 M/CU MM (ref 4.6–6.2)
SEGMENTED NEUTROPHILS ABSOLUTE COUNT: 11.1 K/CU MM
SEGMENTED NEUTROPHILS RELATIVE PERCENT: 87.3 % (ref 36–66)
SODIUM BLD-SCNC: 140 MMOL/L (ref 135–145)
TOTAL CK: 54 IU/L (ref 38–174)
TOTAL IMMATURE NEUTOROPHIL: 0.12 K/CU MM
TOTAL NUCLEATED RBC: 0 K/CU MM
TOTAL PROTEIN: 5.2 GM/DL (ref 6.4–8.2)
VANCOMYCIN RANDOM: 11.8 UG/ML
WBC # BLD: 12.7 K/CU MM (ref 4–10.5)

## 2020-08-14 PROCEDURE — 2000000000 HC ICU R&B

## 2020-08-14 PROCEDURE — 6360000002 HC RX W HCPCS: Performed by: INTERNAL MEDICINE

## 2020-08-14 PROCEDURE — 6360000002 HC RX W HCPCS: Performed by: SPECIALIST

## 2020-08-14 PROCEDURE — 2580000003 HC RX 258: Performed by: INTERNAL MEDICINE

## 2020-08-14 PROCEDURE — 89220 SPUTUM SPECIMEN COLLECTION: CPT

## 2020-08-14 PROCEDURE — 31720 CLEARANCE OF AIRWAYS: CPT

## 2020-08-14 PROCEDURE — 2500000003 HC RX 250 WO HCPCS: Performed by: INTERNAL MEDICINE

## 2020-08-14 PROCEDURE — 94003 VENT MGMT INPAT SUBQ DAY: CPT

## 2020-08-14 PROCEDURE — 6370000000 HC RX 637 (ALT 250 FOR IP): Performed by: INTERNAL MEDICINE

## 2020-08-14 PROCEDURE — C9113 INJ PANTOPRAZOLE SODIUM, VIA: HCPCS | Performed by: SPECIALIST

## 2020-08-14 PROCEDURE — 94761 N-INVAS EAR/PLS OXIMETRY MLT: CPT

## 2020-08-14 PROCEDURE — 6360000002 HC RX W HCPCS: Performed by: NURSE PRACTITIONER

## 2020-08-14 PROCEDURE — 94640 AIRWAY INHALATION TREATMENT: CPT

## 2020-08-14 PROCEDURE — 51702 INSERT TEMP BLADDER CATH: CPT

## 2020-08-14 PROCEDURE — 85025 COMPLETE CBC W/AUTO DIFF WBC: CPT

## 2020-08-14 PROCEDURE — 85730 THROMBOPLASTIN TIME PARTIAL: CPT

## 2020-08-14 PROCEDURE — 84100 ASSAY OF PHOSPHORUS: CPT

## 2020-08-14 PROCEDURE — 83735 ASSAY OF MAGNESIUM: CPT

## 2020-08-14 PROCEDURE — 6370000000 HC RX 637 (ALT 250 FOR IP): Performed by: HOSPITALIST

## 2020-08-14 PROCEDURE — 99233 SBSQ HOSP IP/OBS HIGH 50: CPT | Performed by: INTERNAL MEDICINE

## 2020-08-14 PROCEDURE — 6360000002 HC RX W HCPCS: Performed by: SURGERY

## 2020-08-14 PROCEDURE — 82550 ASSAY OF CK (CPK): CPT

## 2020-08-14 PROCEDURE — 6370000000 HC RX 637 (ALT 250 FOR IP): Performed by: NURSE PRACTITIONER

## 2020-08-14 PROCEDURE — 85610 PROTHROMBIN TIME: CPT

## 2020-08-14 PROCEDURE — 80053 COMPREHEN METABOLIC PANEL: CPT

## 2020-08-14 PROCEDURE — 2580000003 HC RX 258: Performed by: SURGERY

## 2020-08-14 PROCEDURE — 82803 BLOOD GASES ANY COMBINATION: CPT

## 2020-08-14 PROCEDURE — 6370000000 HC RX 637 (ALT 250 FOR IP): Performed by: SURGERY

## 2020-08-14 PROCEDURE — 2700000000 HC OXYGEN THERAPY PER DAY

## 2020-08-14 PROCEDURE — 82330 ASSAY OF CALCIUM: CPT

## 2020-08-14 PROCEDURE — 80202 ASSAY OF VANCOMYCIN: CPT

## 2020-08-14 RX ORDER — ERGOCALCIFEROL (VITAMIN D2) 200 MCG/ML
8000 DROPS ORAL DAILY
Qty: 30 ML | DISCHARGE
Start: 2020-08-15 | End: 2020-11-24

## 2020-08-14 RX ORDER — POTASSIUM CHLORIDE 1.5 G/1.77G
20 POWDER, FOR SOLUTION ORAL 3 TIMES DAILY
Status: DISCONTINUED | OUTPATIENT
Start: 2020-08-14 | End: 2020-08-14 | Stop reason: SDUPTHER

## 2020-08-14 RX ORDER — ONDANSETRON 2 MG/ML
4 INJECTION INTRAMUSCULAR; INTRAVENOUS EVERY 6 HOURS PRN
Qty: 84 ML | DISCHARGE
Start: 2020-08-14

## 2020-08-14 RX ORDER — NICOTINE 21 MG/24HR
1 PATCH, TRANSDERMAL 24 HOURS TRANSDERMAL DAILY
Qty: 30 PATCH | Refills: 3 | DISCHARGE
Start: 2020-08-15 | End: 2020-11-24

## 2020-08-14 RX ORDER — DICYCLOMINE HCL 20 MG
20 TABLET ORAL EVERY 6 HOURS PRN
Qty: 120 TABLET | Refills: 3 | DISCHARGE
Start: 2020-08-14

## 2020-08-14 RX ORDER — MIDODRINE HYDROCHLORIDE 10 MG/1
10 TABLET ORAL 3 TIMES DAILY
Qty: 90 TABLET | Refills: 3 | DISCHARGE
Start: 2020-08-14

## 2020-08-14 RX ORDER — MAGNESIUM OXIDE 400 MG/1
400 TABLET ORAL DAILY
Qty: 30 TABLET | Refills: 1 | DISCHARGE
Start: 2020-08-15 | End: 2020-11-24

## 2020-08-14 RX ORDER — POLYETHYLENE GLYCOL 3350 17 G/17G
17 POWDER, FOR SOLUTION ORAL DAILY PRN
Qty: 527 G | Refills: 1 | DISCHARGE
Start: 2020-08-14 | End: 2020-09-13

## 2020-08-14 RX ORDER — SCOLOPAMINE TRANSDERMAL SYSTEM 1 MG/1
1 PATCH, EXTENDED RELEASE TRANSDERMAL
DISCHARGE
Start: 2020-08-17

## 2020-08-14 RX ORDER — MAGNESIUM SULFATE 1 G/100ML
1 INJECTION INTRAVENOUS PRN
Status: DISCONTINUED | OUTPATIENT
Start: 2020-08-14 | End: 2020-08-15 | Stop reason: HOSPADM

## 2020-08-14 RX ORDER — ALBUTEROL SULFATE 90 UG/1
4 AEROSOL, METERED RESPIRATORY (INHALATION) EVERY 4 HOURS
Qty: 1 INHALER | Refills: 3 | DISCHARGE
Start: 2020-08-14

## 2020-08-14 RX ORDER — PANTOPRAZOLE SODIUM 40 MG/10ML
40 INJECTION, POWDER, LYOPHILIZED, FOR SOLUTION INTRAVENOUS DAILY
DISCHARGE
Start: 2020-08-15

## 2020-08-14 RX ORDER — CHLORHEXIDINE GLUCONATE 0.12 MG/ML
15 RINSE ORAL 2 TIMES DAILY
Qty: 420 ML | Refills: 0 | DISCHARGE
Start: 2020-08-14 | End: 2020-08-28

## 2020-08-14 RX ORDER — MORPHINE SULFATE 2 MG/ML
2 INJECTION, SOLUTION INTRAMUSCULAR; INTRAVENOUS EVERY 4 HOURS PRN
Qty: 1 ML | Refills: 0 | Status: SHIPPED | OUTPATIENT
Start: 2020-08-14 | End: 2020-08-17

## 2020-08-14 RX ORDER — IPRATROPIUM BROMIDE AND ALBUTEROL SULFATE 2.5; .5 MG/3ML; MG/3ML
3 SOLUTION RESPIRATORY (INHALATION) EVERY 4 HOURS PRN
Qty: 360 ML | DISCHARGE
Start: 2020-08-14

## 2020-08-14 RX ORDER — PROMETHAZINE HYDROCHLORIDE 12.5 MG/1
12.5 TABLET ORAL EVERY 6 HOURS PRN
DISCHARGE
Start: 2020-08-14 | End: 2020-08-21

## 2020-08-14 RX ORDER — HEPARIN SODIUM 1000 [USP'U]/ML
3000 INJECTION, SOLUTION INTRAVENOUS; SUBCUTANEOUS PRN
DISCHARGE
Start: 2020-08-14

## 2020-08-14 RX ADMIN — POTASSIUM CHLORIDE 20 MEQ: 400 INJECTION, SOLUTION INTRAVENOUS at 10:40

## 2020-08-14 RX ADMIN — METRONIDAZOLE 500 MG: 500 INJECTION, SOLUTION INTRAVENOUS at 22:39

## 2020-08-14 RX ADMIN — METRONIDAZOLE 500 MG: 500 INJECTION, SOLUTION INTRAVENOUS at 00:21

## 2020-08-14 RX ADMIN — MIDODRINE HYDROCHLORIDE 10 MG: 5 TABLET ORAL at 14:46

## 2020-08-14 RX ADMIN — SODIUM CHLORIDE, PRESERVATIVE FREE 10 ML: 5 INJECTION INTRAVENOUS at 21:00

## 2020-08-14 RX ADMIN — MORPHINE SULFATE 2 MG: 2 INJECTION, SOLUTION INTRAMUSCULAR; INTRAVENOUS at 19:01

## 2020-08-14 RX ADMIN — NYSTATIN 500000 UNITS: 100000 SUSPENSION ORAL at 19:00

## 2020-08-14 RX ADMIN — IPRATROPIUM BROMIDE 4 PUFF: 17 AEROSOL, METERED RESPIRATORY (INHALATION) at 19:05

## 2020-08-14 RX ADMIN — IPRATROPIUM BROMIDE 4 PUFF: 17 AEROSOL, METERED RESPIRATORY (INHALATION) at 23:45

## 2020-08-14 RX ADMIN — VANCOMYCIN HYDROCHLORIDE 750 MG: 5 INJECTION, POWDER, LYOPHILIZED, FOR SOLUTION INTRAVENOUS at 10:58

## 2020-08-14 RX ADMIN — SODIUM CHLORIDE, PRESERVATIVE FREE 10 ML: 5 INJECTION INTRAVENOUS at 09:13

## 2020-08-14 RX ADMIN — POTASSIUM BICARBONATE 20 MEQ: 782 TABLET, EFFERVESCENT ORAL at 09:41

## 2020-08-14 RX ADMIN — IPRATROPIUM BROMIDE 4 PUFF: 17 AEROSOL, METERED RESPIRATORY (INHALATION) at 07:37

## 2020-08-14 RX ADMIN — ALBUTEROL SULFATE 4 PUFF: 90 AEROSOL, METERED RESPIRATORY (INHALATION) at 07:39

## 2020-08-14 RX ADMIN — POTASSIUM CHLORIDE 20 MEQ: 400 INJECTION, SOLUTION INTRAVENOUS at 11:53

## 2020-08-14 RX ADMIN — PANTOPRAZOLE SODIUM 40 MG: 40 INJECTION, POWDER, FOR SOLUTION INTRAVENOUS at 09:13

## 2020-08-14 RX ADMIN — THIAMINE HYDROCHLORIDE 200 MG: 100 INJECTION, SOLUTION INTRAMUSCULAR; INTRAVENOUS at 10:58

## 2020-08-14 RX ADMIN — Medication 8000 UNITS: at 09:41

## 2020-08-14 RX ADMIN — IPRATROPIUM BROMIDE 4 PUFF: 17 AEROSOL, METERED RESPIRATORY (INHALATION) at 11:21

## 2020-08-14 RX ADMIN — METRONIDAZOLE 500 MG: 500 INJECTION, SOLUTION INTRAVENOUS at 10:05

## 2020-08-14 RX ADMIN — METRONIDAZOLE 500 MG: 500 INJECTION, SOLUTION INTRAVENOUS at 14:47

## 2020-08-14 RX ADMIN — IPRATROPIUM BROMIDE 4 PUFF: 17 AEROSOL, METERED RESPIRATORY (INHALATION) at 16:26

## 2020-08-14 RX ADMIN — CHLORHEXIDINE GLUCONATE 0.12% ORAL RINSE 15 ML: 1.2 LIQUID ORAL at 21:00

## 2020-08-14 RX ADMIN — HYDROMORPHONE HYDROCHLORIDE 1 MG: 1 INJECTION, SOLUTION INTRAMUSCULAR; INTRAVENOUS; SUBCUTANEOUS at 22:41

## 2020-08-14 RX ADMIN — IPRATROPIUM BROMIDE 4 PUFF: 17 AEROSOL, METERED RESPIRATORY (INHALATION) at 04:34

## 2020-08-14 RX ADMIN — CHLORHEXIDINE GLUCONATE 0.12% ORAL RINSE 15 ML: 1.2 LIQUID ORAL at 09:13

## 2020-08-14 RX ADMIN — ALBUTEROL SULFATE 4 PUFF: 90 AEROSOL, METERED RESPIRATORY (INHALATION) at 23:44

## 2020-08-14 RX ADMIN — ALBUTEROL SULFATE 4 PUFF: 90 AEROSOL, METERED RESPIRATORY (INHALATION) at 19:04

## 2020-08-14 RX ADMIN — THIAMINE HYDROCHLORIDE 200 MG: 100 INJECTION, SOLUTION INTRAMUSCULAR; INTRAVENOUS at 22:37

## 2020-08-14 RX ADMIN — NYSTATIN 500000 UNITS: 100000 SUSPENSION ORAL at 09:41

## 2020-08-14 RX ADMIN — NYSTATIN 500000 UNITS: 100000 SUSPENSION ORAL at 14:46

## 2020-08-14 RX ADMIN — POTASSIUM CHLORIDE 20 MEQ: 400 INJECTION, SOLUTION INTRAVENOUS at 09:12

## 2020-08-14 RX ADMIN — NYSTATIN 500000 UNITS: 100000 SUSPENSION ORAL at 21:00

## 2020-08-14 RX ADMIN — ALBUTEROL SULFATE 4 PUFF: 90 AEROSOL, METERED RESPIRATORY (INHALATION) at 16:25

## 2020-08-14 RX ADMIN — SODIUM CHLORIDE, PRESERVATIVE FREE 10 ML: 5 INJECTION INTRAVENOUS at 09:18

## 2020-08-14 RX ADMIN — ALBUTEROL SULFATE 4 PUFF: 90 AEROSOL, METERED RESPIRATORY (INHALATION) at 11:20

## 2020-08-14 RX ADMIN — CEFEPIME HYDROCHLORIDE 2 G: 2 INJECTION, POWDER, FOR SOLUTION INTRAVENOUS at 14:47

## 2020-08-14 RX ADMIN — ALBUTEROL SULFATE 4 PUFF: 90 AEROSOL, METERED RESPIRATORY (INHALATION) at 04:33

## 2020-08-14 RX ADMIN — MAGNESIUM OXIDE 400 MG (241.3 MG MAGNESIUM) TABLET 400 MG: TABLET at 09:46

## 2020-08-14 RX ADMIN — POTASSIUM BICARBONATE 20 MEQ: 782 TABLET, EFFERVESCENT ORAL at 21:00

## 2020-08-14 RX ADMIN — POTASSIUM BICARBONATE 20 MEQ: 782 TABLET, EFFERVESCENT ORAL at 14:46

## 2020-08-14 ASSESSMENT — PULMONARY FUNCTION TESTS
PIF_VALUE: 20
PIF_VALUE: 15
PIF_VALUE: 16
PIF_VALUE: 16
PIF_VALUE: 18
PIF_VALUE: 15
PIF_VALUE: 16
PIF_VALUE: 25
PIF_VALUE: 15
PIF_VALUE: 16
PIF_VALUE: 16
PEFR_L/MIN: 91
PIF_VALUE: 17
PIF_VALUE: 22
PIF_VALUE: 16
PIF_VALUE: 23
PIF_VALUE: 24

## 2020-08-14 ASSESSMENT — PAIN SCALES - GENERAL
PAINLEVEL_OUTOF10: 7
PAINLEVEL_OUTOF10: 8
PAINLEVEL_OUTOF10: 10
PAINLEVEL_OUTOF10: 10

## 2020-08-14 NOTE — DISCHARGE INSTR - COC
Continuity of Care Form    Patient Name: Ben Harmon   :  1955  MRN:  [id number]    Admit date:  2020  Discharge date:  2020    Code Status Order: Full Code   Advance Directives:   [facility] Documentation     Date/Time Healthcare Directive Type of Healthcare Directive Copy in [facility] [address] Agent's Name Healthcare Agent's Phone Number    20 [phone]  No, patient does not have an advance directive for healthcare treatment -- -- -- -- --          Admitting Physician:  Vaishnavi Steele MD  PCP: No primary care provider on file. Discharging Nurse: Nikky Mcclure RN  [address] Unit/Room#: 2108/2108-A  Discharging Unit Phone Number: [phone]    Emergency Contact:   Extended Emergency Contact Information  Primary Emergency Contact: Micah Simental [address], Rohan [address] Phone: [phone]  Mobile Phone: [phone]  Relation: Child  Secondary Emergency Contact: Rosa Elena Ham  Mobile Phone: [phone]  Relation: Other    Past Surgical History:  Past Surgical History:   Procedure Laterality Date    BACK SURGERY      GASTROSTOMY TUBE PLACEMENT N/A 8/10/2020    EGD PEG TUBE PLACEMENT performed by Aisha Rahman MD at [facility] IR NONTUNNELED VASCULAR CATHETER  2020    IR NONTUNNELED VASCULAR CATHETER 2020 [address] [place] [address] SPECIAL PROCEDURES    IR TUNNELED [address] 5 YEARS  2020    IR TUNNELED CATHETER PLACEMENT GREATER THAN 5 YEARS 2020 [address] [place] [address] SPECIAL PROCEDURES    LAPAROTOMY N/A 2020    LAPAROTOMY EXPLORATORY performed by Aisha Rahman MD at [address] [place] [address] 8/10/2020    TRACHEOTOMY performed by Aisha Rahman MD at [address] [place] [address] OR       Immunization History: There is no immunization history on file for this patient.     Active Problems:  Patient Active Problem List   Diagnosis Code    Sepsis ([facility] A41.9    Leukocytosis D72.829    Ischemic bowel disease ([facility] K55.9    MSSA bacteremia R78.81    Chronic hepatitis C without hepatic coma (HCC) B18.2    Respiratory failure (HCC) J96.90       Isolation/Infection:   Isolation          No Isolation        Patient Infection Status     Infection Onset Added Last Indicated Last Indicated By Review Planned Expiration Resolved Resolved By    None active    Resolved    C-diff Rule Out 08/01/20 08/01/20 08/03/20 C difficile Molecular/PCR (Ordered)   08/03/20 Rule-Out Test Resulted          Nurse Assessment:  Last Vital Signs: /75   Pulse 94   Temp 97.2 °F (36.2 °C) (Rectal)   Resp 29   Ht (S) 5' 7\" (1.702 m)   Wt 139 lb 15.9 oz (63.5 kg)   SpO2 99%   BMI 21.93 kg/m²     Last documented pain score (0-10 scale): Pain Level: 5  Last Weight:   Wt Readings from Last 1 Encounters:   08/13/20 139 lb 15.9 oz (63.5 kg)     Mental Status:  oriented and alert    IV Access:  - Central Venous Catheter  - site  right, insertion date: 07/24/202    Nursing Mobility/ADLs:  Walking   Dependent  Transfer  Dependent  Bathing  Dependent  Dressing  Dependent  Toileting  Dependent  Feeding  Dependent  Med Admin  Dependent  Med Delivery   Per PEG tube    Wound Care Documentation and Therapy:  Wound 07/26/20 Arm Left; Lower (Active)   Wound Traumatic 08/12/20 1400   Dressing Status Clean;Dry; Intact 08/14/20 0400   Dressing Changed Changed/New 08/13/20 1204   Dressing/Treatment ABD;Roll gauze; Alginate with Ag 08/14/20 0400   Wound Cleansed Rinsed/Irrigated with saline 08/12/20 1400   Dressing Change Due 08/14/20 08/14/20 0400   Wound Length (cm) 12.8 cm 08/11/20 0900   Wound Width (cm) 4.5 cm 08/11/20 0900   Wound Depth (cm) 0.2 cm 08/11/20 0900   Wound Surface Area (cm^2) 57.6 cm^2 08/11/20 0900   Change in Wound Size % (l*w) 11.38 08/11/20 0900   Wound Volume (cm^3) 11.52 cm^3 08/11/20 0900   Wound Healing % 41 08/11/20 0900   Distance Tunneling (cm) 0 cm 08/11/20 0900   Tunneling Position ___ O'Clock 0 08/11/20 0900   Undermining Starts ___ O'Clock 0 08/11/20 0900   Undermining Ends___ O'Clock 00 08/11/20 0900   Undermining Maxium Distance (cm) 0 08/11/20 0900   Wound Assessment Dark edges;Painful;Yellow; Red 08/14/20 0400   Drainage Amount Moderate 08/14/20 0400   Drainage Description Serosanguinous 08/14/20 0400   Odor None 08/14/20 0400   Margins Defined edges 08/13/20 0400   Exposed structure Muscle 08/13/20 0400   Digna-wound Assessment Fragile;Red; Tan 08/13/20 0400   Non-staged Wound Description Full thickness 08/13/20 0400   [place]%Wound Bed 60 08/11/20 0900   Red%Wound Bed 10 08/11/20 0900   Yellow%Wound Bed 30 08/11/20 0900   Number of days: 18       Wound 07/26/20 Arm Right; Lower (Active)   Wound Traumatic 08/12/20 1400   Dressing Status Clean;Dry; Intact 08/14/20 0400   Dressing Changed Changed/New 08/13/20 1204   Dressing/Treatment ABD;Roll gauze; Alginate with Ag 08/14/20 0400   Wound Cleansed Rinsed/Irrigated with saline 08/12/20 1400   Dressing Change Due 08/14/20 08/14/20 0400   Wound Length (cm) 17 cm 08/11/20 0900   Wound Width (cm) 8.5 cm 08/11/20 0900   Wound Depth (cm) 0.2 cm 08/11/20 0900   Wound Surface Area (cm^2) 144.5 cm^2 08/11/20 0900   Change in Wound Size % (l*w) 22.69 08/11/20 0900   Wound Volume (cm^3) 28.9 cm^3 08/11/20 0900   Wound Healing % 48 08/11/20 0900   Distance Tunneling (cm) 0 cm 08/11/20 0900   Tunneling Position ___ O'Clock 0 08/11/20 0900   Undermining Starts ___ O'Clock 0 08/11/20 0900   Undermining Ends___ O'Clock 0 08/11/20 0900   Undermining Maxium Distance (cm) 0 08/11/20 0900   Wound Assessment Dark edges;Red;Yellow;Painful 08/14/20 0400   Drainage Amount Moderate 08/14/20 0400   Drainage Description Serosanguinous 08/14/20 0400   Odor None 08/14/20 0400   Margins Defined edges 08/13/20 0400   Exposed structure Muscle 08/13/20 0400   Digna-wound Assessment Fragile; Red 08/13/20 0400   Non-staged Wound Description Full thickness 08/13/20 0400   [place]%Wound Bed 50 08/11/20 0900   Red%Wound Bed 10 08/11/20 0900   Yellow%Wound Bed 40 08/11/20 0900   Black%Wound Bed 20 07/29/20 1200   Number of days: 18       Wound 07/27/20 Shoulder Left (Active)   Wound Traumatic 08/11/20 0900   Dressing Status Clean;Dry; Intact 08/14/20 0400   Dressing Changed Changed/New 08/13/20 1204   Dressing/Treatment Foam 08/14/20 0400   Wound Cleansed Rinsed/Irrigated with saline 08/11/20 0900   Dressing Change Due 08/08/20 08/06/20 1241   Wound Length (cm) 0 cm 08/11/20 0900   Wound Width (cm) 0 cm 08/11/20 0900   Wound Depth (cm) 0 cm 08/11/20 0900   Wound Surface Area (cm^2) 0 cm^2 08/11/20 0900   Change in Wound Size % (l*w) 100 08/11/20 0900   Wound Volume (cm^3) 0 cm^3 08/11/20 0900   Wound Healing % 100 08/11/20 0900   Distance Tunneling (cm) 0 cm 08/11/20 0900   Tunneling Position ___ O'Clock 0 08/11/20 0900   Undermining Starts ___ O'Clock 0 08/11/20 0900   Undermining Ends___ O'Clock 0 08/11/20 0900   Undermining Maxium Distance (cm) 0 08/11/20 0900   Wound Assessment Intact;Dry;Pink 08/14/20 0400   Drainage Amount None 08/14/20 0400   Drainage Description Serous 08/14/20 0400   Odor None 08/14/20 0400   Margins Defined edges 08/13/20 0400   Digna-wound Assessment Clean;Brown; Intact 08/13/20 0400   Non-staged Wound Description Not applicable 12/12/71 6802   [place]%Wound Bed 100 07/29/20 1200   Number of days: 18       Wound 07/27/20 Hip Left (Active)   Wound Pressure Stage  2 08/11/20 0754   Dressing Status Clean;Dry; Intact 08/14/20 0400   Dressing Changed Dressing reinforced 08/13/20 1204   Dressing/Treatment Foam 08/14/20 0400   Wound Cleansed Rinsed/Irrigated with saline 08/11/20 0900   Dressing Change Due 08/08/20 08/06/20 1241   Wound Length (cm) 4 cm 08/11/20 0900   Wound Width (cm) 3 cm 08/11/20 0900   Wound Depth (cm) 0.1 cm 08/11/20 0900   Wound Surface Area (cm^2) 12 cm^2 08/11/20 0900   Change in Wound Size % (l*w) -30.43 08/11/20 0900   Wound Volume (cm^3) 1.2 cm^3 08/11/20 0900   Wound Healing % -30 08/11/20 0900   Distance Tunneling (cm) 0 cm 08/11/20 0900   Tunneling Position ___ O'Clock 0 08/11/20 0900   Undermining Starts ___ O'Clock 0 08/11/20 0900   Undermining Ends___ O'Clock 0 08/11/20 0900   Undermining Maxium Distance (cm) 0 08/11/20 0900   Wound Assessment Pink;Red 08/13/20 0845   Drainage Amount None 08/13/20 0845   Drainage Description Serosanguinous 08/13/20 0845   Odor None 08/13/20 0845   Margins Defined edges 08/13/20 0845   Digna-wound Assessment Pink; Intact 08/13/20 0845   Non-staged Wound Description Not applicable 54/15/63 8291   [place]%Wound Bed 50 08/11/20 0900   Red%Wound Bed 50 07/29/20 1200   Yellow%Wound Bed 50 08/11/20 0900   Number of days: 18       Wound 07/27/20 Sacrum (Active)   Wound Pressure Stage  2 08/11/20 0900   Dressing Status Clean;Dry; Intact 08/14/20 0400   Dressing Changed Dressing reinforced 08/13/20 1204   Dressing/Treatment Calmoseptine; Other (comment) 08/14/20 0400   Wound Cleansed Rinsed/Irrigated with saline 08/11/20 0900   Dressing Change Due 08/14/20 08/14/20 0400   Wound Length (cm) 0.6 cm 08/11/20 0900   Wound Width (cm) 0.2 cm 08/11/20 0900   Wound Depth (cm) 0.1 cm 08/11/20 0900   Wound Surface Area (cm^2) 0.12 cm^2 08/11/20 0900   Change in Wound Size % (l*w) 90.4 08/11/20 0900   Wound Volume (cm^3) 0.01 cm^3 08/11/20 0900   Wound Healing % 92 08/11/20 0900   Distance Tunneling (cm) 0 cm 08/11/20 0900   Tunneling Position ___ O'Clock 0 08/11/20 0900   Undermining Starts ___ O'Clock 0 08/11/20 0900   Undermining Ends___ O'Clock 0 08/11/20 0900   Undermining Maxium Distance (cm) 0 08/11/20 0900   Wound Assessment Pink;Dry;Clean;Edges well approximated 08/14/20 0400   Drainage Amount None 08/14/20 0400   Drainage Description Serosanguinous 08/14/20 0400   Odor None 08/13/20 0400   Margins Defined edges 08/13/20 0400   Digna-wound Assessment Intact 08/11/20 0900   Non-staged Wound Description Partial thickness 08/11/20 0900   [place]%Wound Bed 100 08/11/20 0900   Red%Wound Bed 50 07/29/20 Stable    Rehab Potential (if transferring to Rehab): Guarded    Recommended Labs or Other Treatments After Discharge:   HD on MWF  IV Vancomycin 750mg after HD sessions MWF., IV Cefepime 2g after HD sessions, IV Flagyl 500mg q8hrs. Pain meds:  IV morphine 2mg IV q4 hrs pain    Wound care: Right lower arm/left lower arm cleanse with NS, apply Xeroform, abd, kerlix change daily and prn. Sacrum cleanse with NS apply collagen (Puracol), Optifoam silicone border change every 3 days and prn. Left hip/left shoulder cleanse with NS apply Optifoam silicone border change every 3 days and prn. Right perineum cleanse with NS, apply NS moist gauze, Optifoam silicone border change daily and prn. Pt is at high risk for skin breakdown AEB Hugh. Follow Hugh orders. Wound care to reassess approximately 8/18/20. Physician Certification: I certify the above information and transfer of Kathie Espinoza  is necessary for the continuing treatment of the diagnosis listed and that he requires LTAC for greater 30 days.      Update Admission H&P: No change in H&P    PHYSICIAN SIGNATURE:  Electronically signed by Joan Ryan MD on 8/14/20 at 12:45 PM EDT

## 2020-08-14 NOTE — CARE COORDINATION
Received call from [facility]; spoke to [facility]. Patient precert received. Dr Frannie Corley notified. Nedra Leon RN    Attempted to secure transport -  [facility]- no travel vent (from   [employer]  -spoke to Robinson Rodriguez. . No working vent  [facility]- spoke to Melisa Carrillo; no availability until Monday  [facility] - spoke to [facility]; no availability  [facility] - spoke to Naz Jonesmins;  denied by criteria  [facility]- spoke to Beatriz Ramos. No availability until Monday [address]- spoke to [facility]. Will transport only [employer] patients. [address] to [facility]. No availability   [employer]- spoke to [facility]. Not transporting on [address] only. [employer]- spoke to [place]. No availability. [employer]- spoke to Jillian Marroquin. Needs to be a member of [employer] based insurance. [address] to [employer] Director [email or web address]. Updated her on situation. .   New contacts -   [employer]- spoke to [address]. Air transport only  [employer]- spoke to Silvana Terry; no longer [address] [place]. [place] (formerly EMT) - LVM as all dispatchers were busy. Critical Care - out of travel range. [address] [place] by  Director that [facility] staff is assisting with transportation issues. 1505 Received call CM director - [facility] will pick patient up at 9 am 8/15. [facility] notified. 1520 Spoke to [employer] ; updated on transportation 8/15 at 8 am. Nedra Leon RN    Greater than 2 hrs working on this patient.

## 2020-08-14 NOTE — PROGRESS NOTES
[facility]  consulted by Dr. Rose Payment for monitoring and adjustment. Indication for treatment: MSSA bacteremia, GPB in blood   Goal trough: 10-15 mcg/mL     Pertinent Laboratory Values:   Temp Readings from Last 3 Encounters:   08/14/20 97.2 °F (36.2 °C) (Rectal)   08/10/20 97.3 °F (36.3 °C)   07/25/20 (!) 62.3 °F (16.8 °C)     Recent Labs     08/12/20  0620 08/13/20  0530 08/14/20  0700   WBC 16.0* 15.2* 12.7*     Recent Labs     08/12/20  0620 08/13/20  0530 08/14/20  0700   BUN 88* 56* 71*   CREATININE 2.4* 1.7* 2.0*     Estimated Creatinine Clearance: 34 mL/min (A) (based on SCr of 2 mg/dL (H)). Intake/Output Summary (Last 24 hours) at 8/14/2020 0818  Last data filed at 8/14/2020 0700  Gross per 24 hour   Intake 1597 ml   Output 975 ml   Net 622 ml       Pertinent Cultures:  Date    Source    Results  7/24   Blood    MSSA  7/27   Blood    Bacillus (1 of 2)  7/29   Blood    Negative   8/5   Sputum   Yeast   8/5   Blood    Negative  8/8   Urine    NGTD  8/11   Wound    Pseudomonas (rare), Candida Glabrata (moderate), Angelina Dubliniensis  8/11   Wound (Anaerobic)  Pending    Vancomycin level:   TROUGH:    No results for input(s): VANCOTROUGH in the last 72 hours. RANDOM:    Recent Labs     08/12/20 0620 08/14/20  0700   VANCORANDOM 12.3 11.8       Assessment:  · WBC and temperature: WBC @ 12.7; Afebrile  · SCr, BUN, and urine output:   · HD Mon/Wed/Fri   · Previously receiving CRRT  · Days of therapy: 22  · Vancomycin level: 11.8, pre-HD, appropriate to re-dose    Plan:  · Continue intermittent dosing based on levels 2/2 RRT   · Based on pre-HD level, plan to re-dose with vancomycin 750 mg IVPB x1 after dialysis  · Repeat random level 8/17 AM, pre-HD  · Pharmacy will continue to monitor patient and adjust therapy as indicated.     VANCOMYCIN RANDOM LEVEL SCHEDULED FOR 8/17 @ 0600    Thank you for the consult,  Brenton Martinez [facility]  8/14/2020 8:18 AM

## 2020-08-14 NOTE — PROGRESS NOTES
Hospitalist Progress Note      Name:  Arline Silverio /Age/Sex: 1955  (63 y.o. male)   MRN & CSN:  [id number] & [id number] Admission Date/Time: 2020  3:22 PM   Location:  -A PCP: No primary care provider on file. Arline Silverio is a 63 y.o.  male  who presents with Fall (85% o2 sat in triage) and Hip Pain      Assessment and Plan:   1) Septic shock with MSSA bacteremia and possible T12-L1 OM  2/2 bilateral upper extremity wound infection. - stable  -Has background IVDU hx  -Was on pressor but currently off  -Initial BC growing MSSA; Last [facility] : NGTD  -Wound cx growing MSSA  -JASPAL neg for vegetation  -ID on board for abx management, on IV vanc + cefepime + flagyl  -prognosis guarded  -palliative on board     2-Acute on chronic respiratory failure due to sepsis and suspected aspiration pneumonia  - stable  -On mechanical Vent  -s/p trach/peg on 8/10  -Pulmo on board     3) Rhabdomyolysis due to IVDU  - resolved     4)-JERAMIE due to ATN from [place]  - dialysis per nephro     5-Skin wounds/right post thigh/buttock and bilateral UE  -initial Wound culture growing MSSA, repeat wound cx +pseudomonas + candida from perineum  - abx as above  -Surgery and ID following  - wound care team following     7-Pneumatosis intestinalis   -S/P Ex-lap on  negative for ischemic bowel, however CT on  showing hemoperitoneum and pneumoperitoneum  -General Surgery on board     8-Acute blood loss anemia due to above  - s/p 2 units PRBC to date  -S/P transfusion- monitor H/H  -Will continue to monitor     HypoCa  - replaced     HypoK  - replace per protocol     IR placed tunnel HD cath     On TF via PEG     Other chronic issues  -Hx of gastric cancer stage 4  -Polysubstance abuse- UDS positive for cocaine and opiate  -Hep C, chronic     LTAC pending for placement            Diet DIET TUBE FEED CONTINUOUS/CYCLIC NPO; Semi-elemental (Vital AF);  Gastrostomy; Continuous; 25; 50; 24   Code Status Full Code Medications:   Medications:    epoetin matteo-epbx  10,000 Units Intravenous Once in dialysis    potassium bicarb-citric acid  20 mEq Oral TID    nystatin  5 mL Oral 4x Daily    Ergocalciferol  8,000 Units Oral Daily    magnesium oxide  400 mg Oral Daily    midodrine  10 mg Oral Q MWF    sodium chloride  250 mL Intravenous Once    cefepime  2 g Intravenous Once per day on Mon Wed Fri    scopolamine  1 patch Transdermal Q72H    vancomycin (VANCOCIN) intermittent dosing (placeholder)   Other RX Placeholder    metroNIDAZOLE  500 mg Intravenous Q8H    pantoprazole  40 mg Intravenous Daily    thiamine (VITAMIN B1) IVPB  200 mg Intravenous Q12H    chlorhexidine  15 mL Mouth/Throat BID    albuterol sulfate HFA  4 puff Inhalation Q4H    And    ipratropium  4 puff Inhalation Q4H    aspirin  324 mg Oral Once    sodium chloride flush  10 mL Intravenous BID    sodium chloride flush  10 mL Intravenous 2 times per day    [Held by provider] enoxaparin  1 mg/kg Subcutaneous Daily    nicotine  1 patch Transdermal Daily      Infusions:    dextrose       PRN Meds: fentanNYL, 25 mcg, Q4H PRN  calcium gluconate, 1 g, PRN    Or  calcium gluconate, 2 g, PRN    Or  calcium gluconate, 3 g, PRN    Or  calcium gluconate, 4 g, PRN  potassium chloride, 20 mEq, PRN  glucose, 15 g, PRN  dextrose, 12.5 g, PRN  glucagon (rDNA), 1 mg, PRN  dextrose, 100 mL/hr, PRN  heparin (porcine), 3,000 Units, PRN  ipratropium-albuterol, 1 ampule, Q4H PRN  iopamidol, 80 mL, ONCE PRN  sodium chloride flush, 10 mL, PRN  acetaminophen, 650 mg, Q6H PRN    Or  acetaminophen, 650 mg, Q6H PRN  polyethylene glycol, 17 g, Daily PRN  promethazine, 12.5 mg, Q6H PRN    Or  ondansetron, 4 mg, Q6H PRN  promethazine, 25 mg, Q6H PRN  diphenhydrAMINE, 12.5 mg, Nightly PRN  dicyclomine, 20 mg, Q6H PRN  morphine, 2 mg, Q4H PRN      Subjective:   sleeping    Objective:        Intake/Output Summary (Last 24 hours) at 8/14/2020 6119  Last data filed at 8/14/2020

## 2020-08-14 NOTE — PROGRESS NOTES
Pulmonary and Critical Care  Progress Note      VITALS:  /75   Pulse 94   Temp 97.2 °F (36.2 °C) (Rectal)   Resp 29   Ht (S) 5' 7\" (1.702 m)   Wt 139 lb 15.9 oz (63.5 kg)   SpO2 99%   BMI 21.93 kg/m²     Subjective:   CHIEF COMPLAINT :Fall with right hip pain     HPI:                The patient is a 63 y.o. male with significant past medical history of HTN,. Sacral decub, MSSA endocarditis, Septic PE, Hep C,  presents with complaints of fall and right hip pain. He was on the floor for more than 2 days. His CK is mildly elevated. He had mild lactic acidosis. His PBNPT is 11,000 with mildly elevated troponins. He is on 3 grams of Heroin per day. His U tocx was positive for Cocaine and Opiates. He had no fever, no headaches, no chest pain, no palpitations, no n/v, no diaphoresis. He has no sick exposure, no recent travel. His 1 blood culture set is positive for Staph. He is getting abx and IV fluids. He is on the vent and on HD     Objective:   PHYSICAL EXAM:      LUNGS:Occasional basal crackles  Abd-soft, BS+,NT  Ext - no pedal edema  CVS-s1s2, no murmurs    DATA:    CBC:  Recent Labs     08/12/20  0620 08/13/20  0530 08/14/20  0700   WBC 16.0* 15.2* 12.7*   RBC 3.00* 2.91* 2.87*   HGB 8.3* 8.2* 8.2*   HCT 26.6* 26.2* 26.0*    153 135*   MCV 88.7 90.0 90.6   MCH 27.7 28.2 28.6   MCHC 31.2* 31.3* 31.5*   RDW 23.6* 24.1* 24.5*   SEGSPCT 92.5* 92.2* 87.3*      BMP:  Recent Labs     08/12/20  0620 08/13/20  0530 08/13/20  1842 08/14/20  0700    137  --  140   K 3.4* 2.9* 3.3* 3.0*    103  --  105   CO2 21 22  --  21   BUN 88* 56*  --  71*   CREATININE 2.4* 1.7*  --  2.0*   CALCIUM 7.6* 7.2*  --  7.4*   GLUCOSE 104* 145*  --  134*      ABG:  Recent Labs     08/12/20  0645 08/13/20  0600 08/14/20  0600   PH 7.47* 7.54* 7.52*   PO2ART 84 61* 72*   BHO3QCD 28.0* 27.0* 27.0*   O2SAT 94.9* 92.2* 95.3*     BNP  No results found for: BNP   D-Dimer:  No results found for: DDIMER   1.  Radiology: None      Assessment/Plan     Patient Active Problem List    Diagnosis Date Noted    Respiratory failure ([facility] 08/10/2020    Chronic hepatitis C without hepatic coma ([facility] 08/03/2020    MSSA bacteremia 07/28/2020    Ischemic bowel disease (HCC)     Leukocytosis     Sepsis ([facility] 07/24/2020   Patient is on the vent and not sedated. He is more awake and following simple commands     Septic shock- improved  Hemorrhagic shock- improved  Acute hypoxic resp failure  Mild coagulopathy- improved  Shock Liver- improved  Lactic acidosis- improved  VDRF  Anemia likely GI bleed  ? Staph Aureus bacteremia  Severe Pulmonary HTN  Occult blood positive  JERAMIE likely sec to ATN  Bilateral Arm wounds  Leukocytosis  Hemoperitoneum  Severe malnutrition  Hypokalemia  Hypocalcemia       1. Kcl  2. Calcium gluconate  3. Check , Mg and Phos level  4. Tube feeds  5. PT/OT  6. Trach collar as tolerated  7. Await LTAC  8. C/w present management  No follow-ups on file.     Electronically signed by Andrea Addison MD on 8/14/2020 at 10:45 AM

## 2020-08-14 NOTE — PROGRESS NOTES
Nephrology Progress Note        [address], [address] [facility], [facility] [address]  Phone: [phone]  Office Hours: 8:30AM - 4:30PM  Monday - Friday       [facility]  MD Lilia Nation, DO [address],  [place]  [place] [address]  PHONE: [phone]  FAX: [phone]    8/14/2020 9:08 AM  Subjective:   Admit Date: 7/24/2020  PCP: No primary care provider on file. Interval History: made 975ml urine today  On trach   Receiving tube feeds    Diet: DIET TUBE FEED CONTINUOUS/CYCLIC NPO; Semi-elemental (Vital AF);  Gastrostomy; Continuous; 25; 50; 24      Data:   Scheduled Meds:   epoetin matteo-epbx  10,000 Units Intravenous Once in dialysis    vancomycin  750 mg Intravenous Once in dialysis    nystatin  5 mL Oral 4x Daily    Ergocalciferol  8,000 Units Oral Daily    magnesium oxide  400 mg Oral Daily    midodrine  10 mg Oral Q MWF    sodium chloride  250 mL Intravenous Once    cefepime  2 g Intravenous Once per day on Mon Wed Fri    scopolamine  1 patch Transdermal Q72H    vancomycin (VANCOCIN) intermittent dosing (placeholder)   Other RX Placeholder    metroNIDAZOLE  500 mg Intravenous Q8H    pantoprazole  40 mg Intravenous Daily    thiamine (VITAMIN B1) IVPB  200 mg Intravenous Q12H    chlorhexidine  15 mL Mouth/Throat BID    albuterol sulfate HFA  4 puff Inhalation Q4H    And    ipratropium  4 puff Inhalation Q4H    aspirin  324 mg Oral Once    sodium chloride flush  10 mL Intravenous BID    sodium chloride flush  10 mL Intravenous 2 times per day    [Held by provider] enoxaparin  1 mg/kg Subcutaneous Daily    nicotine  1 patch Transdermal Daily     Continuous Infusions:   dextrose       PRN Meds:fentanNYL, calcium gluconate **OR** calcium gluconate **OR** calcium gluconate **OR** calcium gluconate, potassium chloride, glucose, dextrose, glucagon (rDNA), dextrose, heparin (porcine), ipratropium-albuterol, iopamidol, sodium chloride flush, acetaminophen **OR** acetaminophen, polyethylene glycol, promethazine **OR** ondansetron, promethazine, diphenhydrAMINE, dicyclomine, morphine  I/O last 3 completed shifts: In: 2227 [NG/GT:1397; IV Piggyback:200]  Out: 975 [Urine:975]  No intake/output data recorded. Intake/Output Summary (Last 24 hours) at 8/14/2020 0908  Last data filed at 8/14/2020 0700  Gross per 24 hour   Intake 1459 ml   Output 850 ml   Net 609 ml       CBC:   Recent Labs     08/12/20  0620 08/13/20  0530 08/14/20  0700   WBC 16.0* 15.2* 12.7*   HGB 8.3* 8.2* 8.2*    153 135*       BMP:    Recent Labs     08/12/20  0620 08/13/20  0530 08/13/20  1842 08/14/20  0700    137  --  140   K 3.4* 2.9* 3.3* 3.0*    103  --  105   CO2 21 22  --  21   BUN 88* 56*  --  71*   CREATININE 2.4* 1.7*  --  2.0*   GLUCOSE 104* 145*  --  134*     Hepatic:   Recent Labs     08/12/20 0620 08/13/20  0530 08/14/20  0700   AST 45* 43* 42*   ALT 13 14 16   BILITOT 1.8* 1.5* 1.3*   ALKPHOS 81 93 99     Troponin: No results for input(s): TROPONINI in the last 72 hours. BNP: No results for input(s): BNP in the last 72 hours. Lipids: No results for input(s): CHOL, HDL in the last 72 hours.     Invalid input(s): LDLCALCU  ABGs:   Lab Results   Component Value Date    PO2ART 61 08/13/2020    CFL3ZYA 27.0 08/13/2020     INR:   Recent Labs     08/12/20 0620 08/13/20  0530 08/14/20  0700   INR 1.10 1.19 1.27       Objective:   Vitals: /75   Pulse 94   Temp 97.2 °F (36.2 °C) (Rectal)   Resp 29   Ht (S) 5' 7\" (1.702 m)   Wt 139 lb 15.9 oz (63.5 kg)   SpO2 99%   BMI 21.93 kg/m²   General appearance: alert and cooperative with exam, in no acute distress  HEENT: normocephalic, atraumatic, trach in place  Neck: supple, trachea midline  Lungs: , breathing comfortably on mv  Heart[de-identified] regular rate and rhythm,   Abdomen:  non distended,   Extremities: no ble edema      Assessment and Plan:        Leukocytosis      Sepsis ([facility] [address] 07/24/2020         - JERAMIE FROM [facility]N; CRRT FROM FROM 7/5 TO 7/8 and now on HD 3 times per week  -Ese Fabian METABOLIC ACIDOSIS  - LACTIC ACIDOSIS  -[address], now chronic with trach in place  - HEROIN AND COCAINE USE  - encephalopathy  - bilateral arm wounds  - hypokalemia        Suggest:  - Now making urine, will hold HD today and monitor for renal recovery  - replete K  - avoid nephrotoxins                  Electronically signed by Jackson Blood DO on 8/14/2020 at 9:08 MD Lilia Shafer DO [address]  [place], [place] [address]  PHONE: [phone]  FAX: [phone]

## 2020-08-14 NOTE — CARE COORDINATION
Attempted [facility] for transport- no availability for ALS today. [facility] is going to call their sister company in [facility] to see if they have availability. [facility] returned call and stated that their sister company has no availability. Called Lilian Garcia @ [facility] and asked if they have any recommendations for transport. She is checking with [facility]'s CM team to have them assist w/obtaining transport. [facility] Garcia called back and provided [facility] as an option for [facility]. [phone]. Called and spoke to dispatch at [facility]. They are able to  pt at 9 am on Saturday. Booked transport w/[facility] for 9 am on Saturday morning. Called and let Lilian Garcia know that transport would be tomorrow morning and not tonight. Called and let NICOLE Shin know about transport time.

## 2020-08-14 NOTE — PROGRESS NOTES
Infectious Disease Progress Note  2020   Patient Name: Praveena Duarte : 1955       Reason for visit: F/u MSSA bacteremia secondary to infected wounds. ? History:? Interval history noted. s/p tracheostomy and PEG. Nephrology notes appreciated. There is some renal recovery and it appears that hemodialysis is being held while renal function is monitored. Trach to vent  Physical Exam:  Vital Signs: /75   Pulse 94   Temp 97.2 °F (36.2 °C) (Rectal)   Resp 29   Ht (S) 5' 7\" (1.702 m)   Wt 139 lb 15.9 oz (63.5 kg)   SpO2 99%   BMI 21.93 kg/m²     Gen: Tracheostomy to vent  Skin: no stigmata of endocarditis  Wounds: Bilateral forearm dressing C/D/I  HEMT: AT/NC, OG tube, trach to vent  Eyes: PERRL  Neck: Supple. Trachea midline. No LAD. Chest: no distress and CTA. Good air movement. Heart: RRR and no MRG. Abd:  PEG tube ,midline abdominal dressingCDI  Ext: no clubbing, cyanosis, or edema  Catheter Site: right IJ CVC, right femoral vein vascath without erythema or tenderness  Neuro: sedated     Radiologic / Imaging / TESTING  CT abd pelvis 2020  Impression:   1. Bronchial wall thickening and centrilobular nodularity in the right lower   lobe, likely infectious/inflammatory airways process such as aspiration. 2. No significant change in moderate volume hemoperitoneum. 3. Similar small pneumoperitoneum, likely postoperative. 4. Similar to slightly improved mild diffuse colonic wall thickening.         Labs:    Recent Results (from the past 24 hour(s))   Vitamin D 25 Hydroxy    Collection Time: 20 11:15 AM   Result Value Ref Range    Vit D, 25-Hydroxy 11.67 (L) >20 NG/ML   POCT Glucose    Collection Time: 20 12:17 PM   Result Value Ref Range    POC Glucose 145 (H) 70 - 99 MG/DL   Potassium    Collection Time: 20  6:42 PM   Result Value Ref Range    Potassium 3.3 (L) 3.5 - 5.1 MMOL/L   Blood gas, arterial    Collection Time: 20  6:00 AM   Result Value Ref Range pH, Bld 7.52 (H) 7.34 - 7.45    pCO2, Arterial 27.0 (L) 32 - 45 MMHG    pO2, Arterial 72 (L) 75 - 100 MMHG    Base Exc, Mixed . 4 0 - 1.2    HCO3, Arterial 22.0 18 - 23 MMOL/L    CO2 Content 22.8 19 - 24 MMOL/L    O2 Sat 95.3 (L) 96 - 97 %    Carbon Monoxide, Blood 2.8 0 - 5 %    Methemoglobin, Arterial 0.8 <1.5 %    Comment SPONT/PS 10, PEEP5, FIO2 24    CBC auto differential    Collection Time: 08/14/20  7:00 AM   Result Value Ref Range    WBC 12.7 (H) 4.0 - 10.5 K/CU MM    RBC 2.87 (L) 4.6 - 6.2 M/CU MM    Hemoglobin 8.2 (L) 13.5 - 18.0 GM/DL    Hematocrit 26.0 (L) 42 - 52 %    MCV 90.6 78 - 100 FL    MCH 28.6 27 - 31 PG    MCHC 31.5 (L) 32.0 - 36.0 %    RDW 24.5 (H) 11.7 - 14.9 %    Platelets 703 (L) 620 - 440 K/CU MM    MPV 10.0 7.5 - 11.1 FL    Differential Type AUTOMATED DIFFERENTIAL     Segs Relative 87.3 (H) 36 - 66 %    Lymphocytes % 5.4 (L) 24 - 44 %    Monocytes % 4.6 (H) 0 - 4 %    Eosinophils % 1.6 0 - 3 %    Basophils % 0.2 0 - 1 %    Segs Absolute 11.1 K/CU MM    Lymphocytes Absolute 0.7 K/CU MM    Monocytes Absolute 0.6 K/CU MM    Eosinophils Absolute 0.2 K/CU MM    Basophils Absolute 0.0 K/CU MM    Nucleated RBC % 0.0 %    Total Nucleated RBC 0.0 K/CU MM    Total Immature Neutrophil 0.12 K/CU MM    Immature Neutrophil % 0.9 (H) 0 - 0.43 %   CK    Collection Time: 08/14/20  7:00 AM   Result Value Ref Range    Total CK 54 38 - 174 IU/L   PT    Collection Time: 08/14/20  7:00 AM   Result Value Ref Range    Protime 15.4 (H) 11.7 - 14.5 SECONDS    INR 1.27 INDEX   PTT    Collection Time: 08/14/20  7:00 AM   Result Value Ref Range    aPTT 29.1 25.1 - 37.1 SECONDS   Comprehensive Metabolic Panel    Collection Time: 08/14/20  7:00 AM   Result Value Ref Range    Sodium 140 135 - 145 MMOL/L    Potassium 3.0 (LL) 3.5 - 5.1 MMOL/L    Chloride 105 99 - 110 mMol/L    CO2 21 21 - 32 MMOL/L    BUN 71 (H) 6 - 23 MG/DL    CREATININE 2.0 (H) 0.9 - 1.3 MG/DL    Glucose 134 (H) 70 - 99 MG/DL    Calcium 7.4 (L) 8.3 - 10.6 MG/DL    Alb 2.1 (L) 3.4 - 5.0 GM/DL    Total Protein 5.2 (L) 6.4 - 8.2 GM/DL    Total Bilirubin 1.3 (H) 0.0 - 1.0 MG/DL    ALT 16 10 - 40 U/L    AST 42 (H) 15 - 37 IU/L    Alkaline Phosphatase 99 40 - 128 IU/L    GFR Non- 34 (L) >60 mL/min/1.73m2    GFR  41 (L) >60 mL/min/1.73m2    Anion Gap 14 4 - 16   Calcium, ionized    Collection Time: 08/14/20  7:00 AM   Result Value Ref Range    Ionized Ca 1.01 (L) 1.12 - 1.32 mMOL/L    Calcium, Ion 4.04 (L) 4.48 - 5.28 MG/DL   Vancomycin, random    Collection Time: 08/14/20  7:00 AM   Result Value Ref Range    Vancomycin Rm 11.8 UG/ML    DOSE AMOUNT DOSE AMT. GIVEN - none, random level     DOSE TIME DOSE TIME GIVEN - none, random level      CULTURE results: Invalid input(s): BLOOD CULTURE,  URINE CULTURE, SURGICAL CULTURE    Diagnosis:  Patient Active Problem List   Diagnosis    Sepsis ([facility] [address]    Leukocytosis    Ischemic bowel disease ([address]    MSSA bacteremia    Chronic hepatitis C without hepatic coma ([address]    Respiratory failure (HCC)       Active Problems  Active Problems:    Sepsis ([address]    Leukocytosis    MSSA bacteremia    Chronic hepatitis C without hepatic coma ([address]    Respiratory failure (HCC)  Resolved Problems:    * No resolved hospital problems. *      Impression and plan   Clinical status: improving. Leukocytosis on downward trend.  Therapeutic: continue vancomycin (7/24-), cefepime (7/24-) and metronidazole (7/28-)   Diagnostic:    F/u: Blood culture (7/29/2020)   Other:  · Summary: Wound culture positive for pseudomonas aeruginosa, Candida glabrata, Angelina dubliniensis. Likely fecal contamination.     Electronically signed by: Electronically signed by Avinash Lucero MD on 8/14/2020 at 11:11 AM

## 2020-08-14 NOTE — PROGRESS NOTES
08/14/20 0434   Vent Information   Vent Type 980   Vent Mode AC/VC   Vt Ordered 450 mL   Rate Set 12 bmp   Peak Flow 60 L/min   Pressure Support 0 cmH20   FiO2  24 %   SpO2 99 %   SpO2/FiO2 ratio 412.5   Sensitivity 3   PEEP/CPAP 5   I Time/ I Time % 0 s   Humidification Source HME   Vent Patient Data   High Peep/I Pressure 0   Peak Inspiratory Pressure 16 cmH2O   Mean Airway Pressure 9 cmH20   Rate Measured 30 br/min   Vt Exhaled 442 mL   Minute Volume 14 Liters   I:E Ratio 1:1.50   Plateau Pressure 12 PBI44   Static Compliance 44 mL/cmH2O   Cough/Sputum   Sputum How Obtained Tracheal;Suctioned   $Obtained Sample $Nasotracheal Suction   Cough Productive   Sputum Amount Small   Sputum Color Creamy; Yellow; Tan   Tenacity Thick   Spontaneous Breathing Trial (SBT) RT Doc   Pulse 95   Breath Sounds   Right Upper Lobe Clear   Right Middle Lobe Clear   Right Lower Lobe Diminished   Left Upper Lobe Clear   Left Lower Lobe Diminished   Additional Respiratory  Assessments   Resp 29   Alarm Settings   High Pressure Alarm 40 cmH2O   Delay Alarm 20 sec(s)   Low Minute Volume Alarm 2.5 L/min   Apnea (secs) 20 secs   High Respiratory Rate 40 br/min   Low Exhaled Vt  250 mL   Surgical Airway (trach)   Placement Date/Time: 08/10/20 8183   Surgical Airway Type: Tracheostomy   Status Secured

## 2020-08-15 ENCOUNTER — APPOINTMENT (OUTPATIENT)
Dept: GENERAL RADIOLOGY | Age: 65
DRG: 004 | End: 2020-08-15
Payer: MEDICARE

## 2020-08-15 VITALS
RESPIRATION RATE: 28 BRPM | WEIGHT: 147.05 LBS | OXYGEN SATURATION: 100 % | TEMPERATURE: 98.3 F | BODY MASS INDEX: 23.08 KG/M2 | HEIGHT: 67 IN | SYSTOLIC BLOOD PRESSURE: 105 MMHG | HEART RATE: 96 BPM | DIASTOLIC BLOOD PRESSURE: 61 MMHG

## 2020-08-15 LAB
ALBUMIN SERPL-MCNC: 2.2 GM/DL (ref 3.4–5)
ALP BLD-CCNC: 106 IU/L (ref 40–128)
ALT SERPL-CCNC: 16 U/L (ref 10–40)
ANION GAP SERPL CALCULATED.3IONS-SCNC: 14 MMOL/L (ref 4–16)
APTT: 28.5 SECONDS (ref 25.1–37.1)
AST SERPL-CCNC: 33 IU/L (ref 15–37)
BASE EXCESS: 2 (ref 0–3.3)
BASOPHILS ABSOLUTE: 0 K/CU MM
BASOPHILS RELATIVE PERCENT: 0.3 % (ref 0–1)
BILIRUB SERPL-MCNC: 1.2 MG/DL (ref 0–1)
BUN BLDV-MCNC: 85 MG/DL (ref 6–23)
CALCIUM IONIZED: 4.08 MG/DL (ref 4.48–5.28)
CALCIUM SERPL-MCNC: 7.5 MG/DL (ref 8.3–10.6)
CARBON MONOXIDE, BLOOD: 2.6 % (ref 0–5)
CHLORIDE BLD-SCNC: 105 MMOL/L (ref 99–110)
CO2 CONTENT: 20.3 MMOL/L (ref 19–24)
CO2: 20 MMOL/L (ref 21–32)
COMMENT: ABNORMAL
CREAT SERPL-MCNC: 2.2 MG/DL (ref 0.9–1.3)
DIFFERENTIAL TYPE: ABNORMAL
EOSINOPHILS ABSOLUTE: 0.1 K/CU MM
EOSINOPHILS RELATIVE PERCENT: 0.8 % (ref 0–3)
GFR AFRICAN AMERICAN: 37 ML/MIN/1.73M2
GFR NON-AFRICAN AMERICAN: 30 ML/MIN/1.73M2
GLUCOSE BLD-MCNC: 135 MG/DL (ref 70–99)
HCO3 ARTERIAL: 19.5 MMOL/L (ref 18–23)
HCT VFR BLD CALC: 27.1 % (ref 42–52)
HEMOGLOBIN: 8.2 GM/DL (ref 13.5–18)
IMMATURE NEUTROPHIL %: 0.6 % (ref 0–0.43)
INR BLD: 1.29 INDEX
IONIZED CA: 1.02 MMOL/L (ref 1.12–1.32)
LYMPHOCYTES ABSOLUTE: 0.8 K/CU MM
LYMPHOCYTES RELATIVE PERCENT: 7.2 % (ref 24–44)
MCH RBC QN AUTO: 28.5 PG (ref 27–31)
MCHC RBC AUTO-ENTMCNC: 30.3 % (ref 32–36)
MCV RBC AUTO: 94.1 FL (ref 78–100)
METHEMOGLOBIN ARTERIAL: 1.3 %
MONOCYTES ABSOLUTE: 0.6 K/CU MM
MONOCYTES RELATIVE PERCENT: 5.8 % (ref 0–4)
NUCLEATED RBC %: 0 %
O2 SATURATION: 96.1 % (ref 96–97)
PCO2 ARTERIAL: 25 MMHG (ref 32–45)
PDW BLD-RTO: 25.2 % (ref 11.7–14.9)
PH BLOOD: 7.5 (ref 7.34–7.45)
PLATELET # BLD: 135 K/CU MM (ref 140–440)
PMV BLD AUTO: 10.5 FL (ref 7.5–11.1)
PO2 ARTERIAL: 101 MMHG (ref 75–100)
POTASSIUM SERPL-SCNC: 4.3 MMOL/L (ref 3.5–5.1)
PROTHROMBIN TIME: 15.6 SECONDS (ref 11.7–14.5)
RBC # BLD: 2.88 M/CU MM (ref 4.6–6.2)
SEGMENTED NEUTROPHILS ABSOLUTE COUNT: 9.1 K/CU MM
SEGMENTED NEUTROPHILS RELATIVE PERCENT: 85.3 % (ref 36–66)
SODIUM BLD-SCNC: 139 MMOL/L (ref 135–145)
TOTAL CK: 40 IU/L (ref 38–174)
TOTAL IMMATURE NEUTOROPHIL: 0.06 K/CU MM
TOTAL NUCLEATED RBC: 0 K/CU MM
TOTAL PROTEIN: 5.4 GM/DL (ref 6.4–8.2)
WBC # BLD: 10.6 K/CU MM (ref 4–10.5)

## 2020-08-15 PROCEDURE — 85730 THROMBOPLASTIN TIME PARTIAL: CPT

## 2020-08-15 PROCEDURE — 6360000002 HC RX W HCPCS: Performed by: NURSE PRACTITIONER

## 2020-08-15 PROCEDURE — 85025 COMPLETE CBC W/AUTO DIFF WBC: CPT

## 2020-08-15 PROCEDURE — 6370000000 HC RX 637 (ALT 250 FOR IP): Performed by: NURSE PRACTITIONER

## 2020-08-15 PROCEDURE — 6370000000 HC RX 637 (ALT 250 FOR IP): Performed by: SURGERY

## 2020-08-15 PROCEDURE — 80053 COMPREHEN METABOLIC PANEL: CPT

## 2020-08-15 PROCEDURE — 2500000003 HC RX 250 WO HCPCS: Performed by: INTERNAL MEDICINE

## 2020-08-15 PROCEDURE — 94640 AIRWAY INHALATION TREATMENT: CPT

## 2020-08-15 PROCEDURE — 6360000002 HC RX W HCPCS: Performed by: SPECIALIST

## 2020-08-15 PROCEDURE — 94761 N-INVAS EAR/PLS OXIMETRY MLT: CPT

## 2020-08-15 PROCEDURE — 6370000000 HC RX 637 (ALT 250 FOR IP): Performed by: INTERNAL MEDICINE

## 2020-08-15 PROCEDURE — 82803 BLOOD GASES ANY COMBINATION: CPT

## 2020-08-15 PROCEDURE — C9113 INJ PANTOPRAZOLE SODIUM, VIA: HCPCS | Performed by: SPECIALIST

## 2020-08-15 PROCEDURE — 85610 PROTHROMBIN TIME: CPT

## 2020-08-15 PROCEDURE — 36600 WITHDRAWAL OF ARTERIAL BLOOD: CPT

## 2020-08-15 PROCEDURE — 82330 ASSAY OF CALCIUM: CPT

## 2020-08-15 PROCEDURE — 82550 ASSAY OF CK (CPK): CPT

## 2020-08-15 PROCEDURE — 71045 X-RAY EXAM CHEST 1 VIEW: CPT

## 2020-08-15 PROCEDURE — 94003 VENT MGMT INPAT SUBQ DAY: CPT

## 2020-08-15 PROCEDURE — 89220 SPUTUM SPECIMEN COLLECTION: CPT

## 2020-08-15 PROCEDURE — 2580000003 HC RX 258: Performed by: SURGERY

## 2020-08-15 PROCEDURE — 2700000000 HC OXYGEN THERAPY PER DAY

## 2020-08-15 RX ORDER — LORAZEPAM 2 MG/ML
0.5 INJECTION INTRAMUSCULAR ONCE
Status: COMPLETED | OUTPATIENT
Start: 2020-08-15 | End: 2020-08-15

## 2020-08-15 RX ADMIN — POTASSIUM BICARBONATE 20 MEQ: 782 TABLET, EFFERVESCENT ORAL at 08:12

## 2020-08-15 RX ADMIN — Medication 8000 UNITS: at 08:12

## 2020-08-15 RX ADMIN — SODIUM CHLORIDE, PRESERVATIVE FREE 10 ML: 5 INJECTION INTRAVENOUS at 08:13

## 2020-08-15 RX ADMIN — ALBUTEROL SULFATE 4 PUFF: 90 AEROSOL, METERED RESPIRATORY (INHALATION) at 07:51

## 2020-08-15 RX ADMIN — LORAZEPAM 0.5 MG: 2 INJECTION, SOLUTION INTRAMUSCULAR; INTRAVENOUS at 01:18

## 2020-08-15 RX ADMIN — METRONIDAZOLE 500 MG: 500 INJECTION, SOLUTION INTRAVENOUS at 07:05

## 2020-08-15 RX ADMIN — IPRATROPIUM BROMIDE 4 PUFF: 17 AEROSOL, METERED RESPIRATORY (INHALATION) at 07:51

## 2020-08-15 RX ADMIN — IPRATROPIUM BROMIDE 4 PUFF: 17 AEROSOL, METERED RESPIRATORY (INHALATION) at 03:39

## 2020-08-15 RX ADMIN — NYSTATIN 500000 UNITS: 100000 SUSPENSION ORAL at 08:12

## 2020-08-15 RX ADMIN — MAGNESIUM OXIDE 400 MG (241.3 MG MAGNESIUM) TABLET 400 MG: TABLET at 08:12

## 2020-08-15 RX ADMIN — CHLORHEXIDINE GLUCONATE 0.12% ORAL RINSE 15 ML: 1.2 LIQUID ORAL at 08:12

## 2020-08-15 RX ADMIN — PANTOPRAZOLE SODIUM 40 MG: 40 INJECTION, POWDER, FOR SOLUTION INTRAVENOUS at 08:12

## 2020-08-15 RX ADMIN — ALBUTEROL SULFATE 4 PUFF: 90 AEROSOL, METERED RESPIRATORY (INHALATION) at 03:39

## 2020-08-15 ASSESSMENT — PAIN SCALES - GENERAL
PAINLEVEL_OUTOF10: 0

## 2020-08-15 ASSESSMENT — PULMONARY FUNCTION TESTS
PIF_VALUE: 17
PIF_VALUE: 17
PIF_VALUE: 16
PIF_VALUE: 15
PIF_VALUE: 16
PIF_VALUE: 15
PIF_VALUE: 18

## 2020-08-15 NOTE — PROGRESS NOTES
[facility] came to transport Pt to [facility] in [facility]. This RN gave the medic and EMT with [facility] report on Pt. RN also gave them Pt's belongings- a dark gray blanket, a light gray blanket, 2 pillows, his cell phone and cell phone , a gold necklace, and his clothes and shoes. This RN then called report to the Nursing Supervisor at [address] [place] [address] in [facility], [place], and gave her report on Pt. This RN also gave [place] the phone number here she can call to get a hold of this RN today if she or the nurse taking Pt has any questions.

## 2020-08-15 NOTE — DISCHARGE SUMMARY
Ngozi Gerard 1955 [id number]  PCP:  No primary care provider on file. Admit date: 7/24/2020  Admitting Physician: Mariia Keller MD    Discharge date: 8/15/20  Discharge Physician: Verónica Montilla MD         Hospital Course and Discharge Diagnoses Include:    PT WAS TRANSFERRED TO LT[facility] FOR RESUMPTION OF HIS CARE IN STABLE CONDITION.     1) Septic shock with MSSA bacteremia and possible T12-L1 OM  2/2 bilateral upper extremity wound infection.  -Has background IVDU hx  -Was on pressor but currently off  -Initial BC growing MSSA; Last [facility] 07/29: NGTD  -Wound cx growing MSSA  -JASPAL neg for vegetation  -ID on board for abx management, on IV vanc + cefepime + flagyl  -prognosis guarded  -palliative on board     2-Acute on chronic respiratory failure due to sepsis and suspected aspiration pneumonia  -On mechanical Vent  -s/p trach/peg on 8/10  -Pulmo on board     3) Rhabdomyolysis due to IVDU  - resolved     4)-JERAMIE due to ATN from Rhabdo  - dialysis per nephro     5-Skin wounds/right post thigh/buttock and bilateral UE  -initial Wound culture growing MSSA, repeat wound cx +pseudomonas from perineum  - abx as above  -Surgery and ID following  - wound care team following     7-Pneumatosis intestinalis   -S/P Ex-lap on 7/25 negative for ischemic bowel, however CT on 7/31 showing hemoperitoneum and pneumoperitoneum  -General Surgery on board     8-Acute blood loss anemia due to above  - s/p 2 units PRBC to date  -S/P transfusion- monitor H/H  -Will continue to monitor     HypoCa  - replaceD     HypoK  - replaced    hypoMG  - repalced     IR placed tunnel HD cath     On TF via PEG     Other chronic issues  -Hx of gastric cancer stage 4  -Polysubstance abuse- UDS positive for cocaine and opiate  -Hep C, chronic     LT[facility] pending            Procedures:  See above  Ct Abdomen Pelvis Wo Contrast Additional Contrast? None    Result Date: 8/4/2020  EXAMINATION: CT OF THE CHEST WITHOUT CONTRAST; CT OF THE ABDOMEN AND PELVIS WITHOUT CONTRAST 8/4/2020 2:54 pm TECHNIQUE: CT of the chest was performed without the administration of intravenous contrast. Multiplanar reformatted images are provided for review. Dose modulation, iterative reconstruction, and/or weight based adjustment of the mA/kV was utilized to reduce the radiation dose to as low as reasonably achievable.; CT of the abdomen and pelvis was performed without the administration of intravenous contrast. Multiplanar reformatted images are provided for review. Dose modulation, iterative reconstruction, and/or weight based adjustment of the mA/kV was utilized to reduce the radiation dose to as low as reasonably achievable. COMPARISON: 07/31/2020, 07/25/2020 HISTORY: ORDERING SYSTEM PROVIDED HISTORY: respiratory failure TECHNOLOGIST PROVIDED HISTORY: Reason for exam:->respiratory failure Reason for Exam: respiratory failure Acuity: Acute Type of Exam: Initial; ORDERING SYSTEM PROVIDED HISTORY: follow up on hemoperitoneum, concerns of abscess TECHNOLOGIST PROVIDED HISTORY: Reason for exam:->follow up on hemoperitoneum, concerns of abscess Additional Contrast?->None Reason for Exam: follow up on hemoperitoneum, concerns of abscess Acuity: Acute Type of Exam: Initial FINDINGS: Chest: Mediastinum: Heart is normal in size. No mediastinal, hilar, or axillary lymphadenopathy. Satisfactory position of endotracheal and enteric tubes Lungs/pleura: Mild upper lobe predominant centrilobular emphysema. Bronchial wall thickening and centrilobular nodularity in the right lower lobe. No pleural effusion or pneumothorax. Soft Tissues/Bones: Similar disc space irregularity at T12-L1. Abdomen/Pelvis: Organs: Liver is normal in contour and attenuation. Pancreas is unremarkable. Adrenals are unremarkable. Spleen is normal in size. No renal calculi or hydronephrosis. Mild calcific atherosclerosis. Ricka Distad GI/Bowel:  Similar to slightly improved mild diffuse colonic wall thickening.  Pelvis: Unremarkable. Peritoneum/Retroperitoneum: No significant change in moderate volume hemoperitoneum. Similar small pneumoperitoneum, likely postoperative. No lymphadenopathy. Bones/Soft Tissues: Diffuse marrow heterogeneity with multilevel degenerative disc disease. Similar decubitus ulcer of right ischial region. 1. Bronchial wall thickening and centrilobular nodularity in the right lower lobe, likely infectious/inflammatory airways process such as aspiration. 2. No significant change in moderate volume hemoperitoneum. 3. Similar small pneumoperitoneum, likely postoperative. 4. Similar to slightly improved mild diffuse colonic wall thickening. Ct Abdomen Pelvis Wo Contrast Additional Contrast? None    Result Date: 7/31/2020  EXAMINATION: CT OF THE ABDOMEN AND PELVIS WITHOUT CONTRAST 7/31/2020 4:42 pm TECHNIQUE: CT of the abdomen and pelvis was performed without the administration of intravenous contrast. Multiplanar reformatted images are provided for review. Dose modulation, iterative reconstruction, and/or weight based adjustment of the mA/kV was utilized to reduce the radiation dose to as low as reasonably achievable. COMPARISON: 07/25/2020. HISTORY: ORDERING SYSTEM PROVIDED HISTORY: worsening anemia/rule out any occult hemorrhage TECHNOLOGIST PROVIDED HISTORY: Reason for exam:->worsening anemia/rule out any occult hemorrhage Additional Contrast?->None Reason for Exam: worsening anemia/rule out any occult hemorrhage Acuity: Unknown Type of Exam: Ongoing Additional signs and symptoms: fall/hip pain FINDINGS: Lower Chest: Unremarkable. Organs: Liver is normal in contour and attenuation. Gallbladder is absent. No biliary ductal dilatation. Pancreas is unremarkable. Adrenals are unremarkable. Spleen is normal in size. Left renal cyst. No further imaging is indicated for this finding. Punctate left calyceal calculus. No hydronephrosis. Vasculature is unremarkable.  GI/Bowel:  Possible mild wall thickening of the colon. No evidence of obstruction. Enteric tube is in the gastric body. Pelvis: Unremarkable. Peritoneum/Retroperitoneum: Moderate volume hemoperitoneum. Small pneumoperitoneum. No organized fluid collection. No lymphadenopathy. Bones/Soft Tissues: Multilevel moderate degenerative disc disease. There is similar irregularity of the disc space at T12-L1. Similar decubitus ulcer in the right ischial region. 1. Moderate volume hemoperitoneum. 2. Small pneumoperitoneum, likely postoperative. 3. Possible mild wall thickening of the colon diffusely. Clinical correlation advised. 4. Similar decubitus ulcer in the right ischial region. 5. Similar irregularity of the disc space at T12-L1. 6. Nonobstructive left nephrolithiasis. Critical results were called by Dr. Марина Levi to Abdon Overton on 7/31/2020 at 17:09. Xr Hip Right (1 View)    Result Date: 7/24/2020  EXAMINATION: ONE XRAY VIEW OF THE RIGHT HIP; ONE XRAY VIEW OF THE PELVIS AND TWO XRAY VIEWS LEFT HIP 7/24/2020 4:55 pm COMPARISON: None. HISTORY: ORDERING SYSTEM PROVIDED HISTORY: fall TECHNOLOGIST PROVIDED HISTORY: Reason for exam:->fall Reason for Exam: pain Acuity: Acute Type of Exam: Initial Mechanism of Injury: patient was in kitchen and fell, was down for 2 days Relevant Medical/Surgical History: na FINDINGS: Unremarkable appearance of the right and left hemipelvis, articulating normally at the SI joints and pubic symphysis. Visualized portions of the right and left femurs are intact align normally at the right and left acetabulum. Right and left hip joints appear well maintained. Unremarkable AP pelvis and bilateral hip radiographs. If pain persists or worsens, then additional evaluation with MRI is indicated to ensure no underlying radiographically occult process such as fracture, AVN or transient osteoporosis.      Xr Abdomen (kub) (single Ap View)    Result Date: 7/26/2020  EXAMINATION: ONE SUPINE XRAY VIEW(S) OF THE ABDOMEN 7/26/2020 2:57 pm COMPARISON: 07/25/2020. HISTORY: ORDERING SYSTEM PROVIDED HISTORY: post right femorial dialysis catheter insertion TECHNOLOGIST PROVIDED HISTORY: Reason for exam:->post right femorial dialysis catheter insertion Reason for Exam: post right femorial dialysis catheter insertion Acuity: Acute Type of Exam: Initial FINDINGS: A right femoral line has been inserted with tip at the L3-4 level, likely within the IVC. Visualized bowel gas pattern is nonspecific. Enteric catheter tip in the distal gastric body. Midline skin staple row overlying the lower abdomen and pelvis. Postoperative changes pan in the lower lumbar spine. Right femoral catheter tip in the IVC. Ct Chest Wo Contrast    Result Date: 8/4/2020  EXAMINATION: CT OF THE CHEST WITHOUT CONTRAST; CT OF THE ABDOMEN AND PELVIS WITHOUT CONTRAST 8/4/2020 2:54 pm TECHNIQUE: CT of the chest was performed without the administration of intravenous contrast. Multiplanar reformatted images are provided for review. Dose modulation, iterative reconstruction, and/or weight based adjustment of the mA/kV was utilized to reduce the radiation dose to as low as reasonably achievable.; CT of the abdomen and pelvis was performed without the administration of intravenous contrast. Multiplanar reformatted images are provided for review. Dose modulation, iterative reconstruction, and/or weight based adjustment of the mA/kV was utilized to reduce the radiation dose to as low as reasonably achievable.  COMPARISON: 07/31/2020, 07/25/2020 HISTORY: ORDERING SYSTEM PROVIDED HISTORY: respiratory failure TECHNOLOGIST PROVIDED HISTORY: Reason for exam:->respiratory failure Reason for Exam: respiratory failure Acuity: Acute Type of Exam: Initial; ORDERING SYSTEM PROVIDED HISTORY: follow up on hemoperitoneum, concerns of abscess TECHNOLOGIST PROVIDED HISTORY: Reason for exam:->follow up on hemoperitoneum, concerns of abscess Additional Contrast?->None Reason for Exam: follow up on hemoperitoneum, concerns of abscess Acuity: Acute Type of Exam: Initial FINDINGS: Chest: Mediastinum: Heart is normal in size. No mediastinal, hilar, or axillary lymphadenopathy. Satisfactory position of endotracheal and enteric tubes Lungs/pleura: Mild upper lobe predominant centrilobular emphysema. Bronchial wall thickening and centrilobular nodularity in the right lower lobe. No pleural effusion or pneumothorax. Soft Tissues/Bones: Similar disc space irregularity at T12-L1. Abdomen/Pelvis: Organs: Liver is normal in contour and attenuation. Pancreas is unremarkable. Adrenals are unremarkable. Spleen is normal in size. No renal calculi or hydronephrosis. Mild calcific atherosclerosis. Mary Anne Long GI/Bowel:  Similar to slightly improved mild diffuse colonic wall thickening. Pelvis: Unremarkable. Peritoneum/Retroperitoneum: No significant change in moderate volume hemoperitoneum. Similar small pneumoperitoneum, likely postoperative. No lymphadenopathy. Bones/Soft Tissues: Diffuse marrow heterogeneity with multilevel degenerative disc disease. Similar decubitus ulcer of right ischial region. 1. Bronchial wall thickening and centrilobular nodularity in the right lower lobe, likely infectious/inflammatory airways process such as aspiration. 2. No significant change in moderate volume hemoperitoneum. 3. Similar small pneumoperitoneum, likely postoperative. 4. Similar to slightly improved mild diffuse colonic wall thickening. Ct Abdomen Pelvis W Iv Contrast    Result Date: 7/27/2020  EXAMINATION: CTA OF THE CHEST; CT OF THE ABDOMEN AND PELVIS WITH CONTRAST 7/25/2020 10:48 am TECHNIQUE: CTA of the chest was performed after the administration of intravenous contrast.  Multiplanar reformatted images are provided for review. MIP images are provided for review.  Dose modulation, iterative reconstruction, and/or weight based adjustment of the mA/kV was utilized to reduce the radiation dose to as low as reasonably achievable.; CT of the abdomen and pelvis was performed with the administration of intravenous contrast. Multiplanar reformatted images are provided for review. Dose modulation, iterative reconstruction, and/or weight based adjustment of the mA/kV was utilized to reduce the radiation dose to as low as reasonably achievable. COMPARISON: CT abdomen and pelvis dated 10/27/2010, portable chest radiograph dated 07/24/2010 HISTORY: ORDERING SYSTEM PROVIDED HISTORY: hypoxia TECHNOLOGIST PROVIDED HISTORY: Reason for exam:->hypoxia Reason for Exam: SHORTNESS OF BREATH/  R/O pe; ORDERING SYSTEM PROVIDED HISTORY: Abdominal pain TECHNOLOGIST PROVIDED HISTORY: Reason for exam:->Abdominal pain Reason for Exam: Abdominal pain FINDINGS: Pulmonary Arteries: There is adequate opacification of the pulmonary arteries. The pulmonary arteries are normal caliber. There are no pulmonary arterial filling defects. Streak artifact slightly limits the exam. Mediastinum: A right IJ catheter has its tip in the superior vena cava. The thoracic aorta is of normal caliber. There is no evidence of thoracic aortic dissection. The heart size is within normal limits. There is no pericardial effusion. Lungs/pleura: There is mild centrilobular emphysema. There is minor consolidation at the right lung base posteromedially. There is no pleural effusion, pneumothorax or evidence of edema. There is minimal atelectasis at the left lung base. Motion degrades the quality of the exam.  There are small foci of ground-glass opacity within the left lower lobe medially. There is mucous impaction within right lower lobe bronchi. Soft Tissues/Bones: 3rd spacing is noted. The bones are demineralized. There are endplate destructive changes at T12-L1. These are new when compared to the 2010 exam.  There is associated sclerosis of the vertebral bodies. No paravertebral fluid collection is appreciated.  CT abdomen and pelvis: Organs: No focal hepatic abnormality is identified. No focal splenic abnormality is appreciated no focal pancreatic abnormality is identified. The adrenal glands are unremarkable. The gallbladder is absent. The kidneys are not obstructed. There are areas of cortical decreased attenuation within the right kidney. There is a rounded low-attenuation lesion within the left renal lower pole, likely a cyst.  There is nonobstructing left lower pole renal calculus. There are small foci of low attenuation within the left renal cortex. Bowel: The bowel is not obstructed. The appendix is believed to be within normal limits. There are air foci within the posterior wall of the right colon which is most likely reflects pseudo pneumatosis. There is no mesenteric or portal venous gas. Pelvis: No free fluid is noted in the pelvis. A Singh catheter is in place. There is ectasia of the common iliac arteries. Retroperitoneum: The abdominal aorta is of normal caliber. There is no evidence of free intraperitoneal air. Bones and soft tissues: 3rd spacing is noted. There is a large decubitus ulcer involving the right buttock. A defined abscess is not identified. No bony destruction is appreciated. Postsurgical changes are noted in the lumbar spine. There is ankylosis L2-3. There is degenerative disc disease at L3-4.     1. No evidence of pulmonary embolic disease. Motion and streak artifact limits the exam. 2. Minimal left basilar ground-glass opacity. Mucous impaction involving right lower lobe bronchi. Consider aspiration pneumonitis. 3. Chronic appearing destructive change at the T12-L1 disc space. Consider prior infectious spondylitis. Correlation with lab values may be helpful to exclude acute discitis and osteomyelitis. 4. Minimal foci of hypoattenuation within the renal cortices, consider pyelonephritis or small infarcts. 5. Diffuse 3rd spacing. 6. Lucency within the right colonic wall, most likely pseudo pneumatosis.  Correlation with lactate levels recommended to exclude bowel ischemia. 7. Large decubitus ulcer involving the right posterior thigh and buttock. No defined abscess or evidence of osteomyelitis. 8. Nonobstructing left renal calculus. Xr Chest Portable    Result Date: 8/15/2020  EXAMINATION: ONE XRAY VIEW OF THE CHEST 8/15/2020 5:12 am COMPARISON: 08/13/2020 4:32 a.m. HISTORY: ORDERING SYSTEM PROVIDED HISTORY: ET Tube placement verification TECHNOLOGIST PROVIDED HISTORY: Please include cm above the navid. Reason for exam:->ET Tube placement verification Reason for Exam: ET Tube placement verification Acuity: Acute Type of Exam: Ongoing FINDINGS: The left lateral costophrenic angle is excluded from the field of view. Monitor wires project over the thorax. Tracheostomy tube tip well above the navid. Right IJ catheter and left subclavian catheter remain present. Hyperinflated lungs. No focal airspace opacification. No significant pleural effusion or pneumothorax. Heart size and mediastinal contours are normal.  The pulmonary vascularity is normal.     Stable chest.  Clear lungs. COPD. Xr Chest Portable    Result Date: 8/13/2020  EXAMINATION: ONE XRAY VIEW OF THE CHEST 8/13/2020 5:03 am COMPARISON: 08/12/2020 HISTORY: ORDERING SYSTEM PROVIDED HISTORY: ET Tube placement verification TECHNOLOGIST PROVIDED HISTORY: Please include cm above the navid. Reason for exam:->ET Tube placement verification Reason for Exam: ET Tube Acuity: Acute Type of Exam: Subsequent/Follow-up FINDINGS: A tracheostomy tube is noted. A right IJ central venous catheter is seen with the tip overlying the region the cavoatrial junction. A tunneled dialysis catheter is identified on the left with the tip in the right atrium in appropriate position. Cardiac mediastinal silhouettes appear stable. No pneumothorax is identified. No focal infiltrate is seen or pleural effusion. Osseous structures show degenerative changes in the spine and shoulders. Stable chest with COPD, though no acute cardiopulmonary process. New tunneled dialysis catheter in appropriate position. Xr Chest Portable    Result Date: 8/12/2020  EXAMINATION: ONE XRAY VIEW OF THE CHEST 8/12/2020 5:43 am COMPARISON: 08/11/2020 HISTORY: ORDERING SYSTEM PROVIDED HISTORY: ET Tube placement verification TECHNOLOGIST PROVIDED HISTORY: Please include cm above the navid. Reason for exam:->ET Tube placement verification Reason for Exam: ET Tube placement verification Acuity: Unknown FINDINGS: Tracheostomy tube and right IJ central venous catheter remain in place. The heart and mediastinal structures are stable. Emphysematous changes are present. COPD. Xr Chest Portable    Result Date: 8/11/2020  EXAMINATION: ONE XRAY VIEW OF THE CHEST 8/11/2020 4:46 am COMPARISON: Chest radiograph dated 8/10/2020 HISTORY: ORDERING SYSTEM PROVIDED HISTORY: ET Tube placement verification TECHNOLOGIST PROVIDED HISTORY: Please include cm above the navid. Reason for exam:->ET Tube placement verification Reason for Exam: ET Tube Acuity: Acute Type of Exam: Subsequent/Follow-up FINDINGS: Tracheostomy is noted. The endoluminal tip is 6.1 cm above the navid. The cardiomediastinal silhouette is stable. There is no focal consolidation, pleural effusion, or pneumothorax. There is no evidence of edema. A right-sided IJ line is seen with the tip overlying the distal SVC region. 1. Tracheostomy with the endoluminal tip 6.1 cm above the navid. 2. No acute findings within the chest.     Xr Chest Portable    Result Date: 8/10/2020  EXAMINATION: ONE XRAY VIEW OF THE CHEST 8/10/2020 4:42 am COMPARISON: 08/08/2020 HISTORY: ORDERING SYSTEM PROVIDED HISTORY: ET Tube placement verification TECHNOLOGIST PROVIDED HISTORY: Please include cm above the navid.  Reason for exam:->ET Tube placement verification Reason for Exam: vent Acuity: Acute Type of Exam: Subsequent/Follow-up FINDINGS: Endotracheal tube, NG tube and right IJ central venous catheter remain in place. The heart and mediastinal structures are stable. Lungs are clear. Endotracheal tube tip is approximately 5.4 cm above the navid. No acute cardiopulmonary disease. Xr Chest Portable    Result Date: 8/8/2020  EXAMINATION: ONE XRAY VIEW OF THE CHEST 8/8/2020 11:16 am COMPARISON: 08/08/2020 HISTORY: ORDERING SYSTEM PROVIDED HISTORY: ETT Repositioning TECHNOLOGIST PROVIDED HISTORY: Reason for exam:->ETT Repositioning Acuity: Acute Type of Exam: Subsequent/Follow-up FINDINGS: Tip of the endotracheal tube now terminates approximately 4 cm above the navid. The NG tube tip is not appreciated. Right IJ central catheter is stable. No consolidation, effusion or pneumothorax. Stable cardiomediastinal silhouette. ETT repositioning. Lungs are clear. Xr Chest Portable    Result Date: 8/8/2020  EXAMINATION: ONE XRAY VIEW OF THE CHEST 8/8/2020 8:33 am COMPARISON: 08/06/2020 HISTORY: ORDERING SYSTEM PROVIDED HISTORY: Ventilator managment TECHNOLOGIST PROVIDED HISTORY: Reason for exam:->Ventilator managment Reason for Exam: Ventilator managment Acuity: Acute Type of Exam: Subsequent/Follow-up FINDINGS: Endotracheal tube tip is approximately 5.7 cm above the navid. Enteric tube tip and side port project over the stomach. Right central venous catheter stable in positioning. No new focal consolidation, pleural effusion or pneumothorax. Stable cardiac silhouette. No overt pulmonary edema. Osseous structures are stable. 1. Enteric tube tip and side port project over the stomach. 2. Endotracheal tube tip is 5.7 cm above the navid. 3. No new acute cardiopulmonary findings.      Xr Chest Portable    Result Date: 8/6/2020  EXAMINATION: ONE XRAY VIEW OF THE CHEST 8/6/2020 5:01 am COMPARISON: Chest CT 08/04/2020, chest radiograph 08/04/2020 HISTORY: ORDERING SYSTEM PROVIDED HISTORY: Hypoxia TECHNOLOGIST PROVIDED HISTORY: Reason for exam:->Hypoxia Reason for Exam: Ventilator management Acuity: Acute Type of Exam: Subsequent/Follow-up FINDINGS: Unchanged endotracheal tube, gastric tube, and right internal jugular central venous catheter. Overlying heart monitor leads. Slight elevation of the right hemidiaphragm. Clear lungs. No definite findings of pneumothorax or pleural effusion. Normal mediastinal and cardiac contours. No acute cardiopulmonary process. Xr Chest Portable    Result Date: 8/4/2020  EXAMINATION: ONE XRAY VIEW OF THE CHEST 8/4/2020 11:47 am COMPARISON: None. HISTORY: ORDERING SYSTEM PROVIDED HISTORY: hypoxia TECHNOLOGIST PROVIDED HISTORY: Reason for exam:->hypoxia Reason for Exam: hypoxia FINDINGS: The lungs are without acute focal process. There is no effusion or pneumothorax. The cardiomediastinal silhouette is without acute process. The osseous structures are without acute process. Similar position of devices. No acute process. Xr Chest Portable    Result Date: 8/3/2020  EXAMINATION: ONE XRAY VIEW OF THE CHEST 7/31/2020 5:41 am COMPARISON: 07/30/2020 HISTORY: ORDERING SYSTEM PROVIDED HISTORY: vent TECHNOLOGIST PROVIDED HISTORY: Reason for exam:->vent Reason for Exam: vent Acuity: Acute Type of Exam: Subsequent/Follow-up FINDINGS: The endotracheal tube, nasogastric tube and right IJ catheter are in place. There is no pneumothorax, edema or focal consolidation. The heart size is normal.  The bony thorax is grossly intact. No significant interval change. No new abnormality. No change in life support. Xr Chest Portable    Result Date: 8/1/2020  EXAMINATION: ONE XRAY VIEW OF THE CHEST 8/1/2020 5:06 am COMPARISON: 07/31/2020 HISTORY: ORDERING SYSTEM PROVIDED HISTORY: vent TECHNOLOGIST PROVIDED HISTORY: Reason for exam:->vent Reason for Exam: Vent Acuity: Acute Type of Exam: Ongoing FINDINGS: The right internal jugular catheter tip is in the superior vena cava. The endotracheal tube tip is 3 cm above the navid.   The enteric tube is below the diaphragm. The lungs are clear. Pulmonary vascularity is normal.  The cardiomediastinal silhouette is normal.     No acute abnormality. Xr Chest Portable    Result Date: 7/30/2020  EXAMINATION: ONE XRAY VIEW OF THE CHEST 7/30/2020 4:01 am COMPARISON: Chest radiograph 07/29/2020. HISTORY: ORDERING SYSTEM PROVIDED HISTORY: vent TECHNOLOGIST PROVIDED HISTORY: Reason for exam:->vent Reason for Exam: vent Acuity: Unknown Type of Exam: Subsequent/Follow-up Additional signs and symptoms: vent Relevant Medical/Surgical History: vent FINDINGS: Single view provided. Severe rotation. Stable mediastinal and cardiac silhouettes. Endotracheal tube overlies the tracheal air column with the tip 4.8 cm above the navid. Stable right CVC with tip in the mid to distal superior vena cava. Enteric tube follows the course of the esophagus crossing the GE junction and extending inferiorly off the image. The side port is located at the level of the cardia. Stable pulmonary hyperinflation and blunting of the costophrenic angles. No acute consolidation or interstitial edema. No pneumothorax. Improving free subdiaphragmatic air on the right. 1. Stable life-support devices. 2. Stable hyperinflation with no acute pulmonary process. 3. Improving right-sided free subdiaphragmatic air. Xr Chest Portable    Result Date: 7/29/2020  EXAMINATION: ONE XRAY VIEW OF THE CHEST 7/29/2020 4:55 am COMPARISON: July 28, 2020 HISTORY: ORDERING SYSTEM PROVIDED HISTORY: vent TECHNOLOGIST PROVIDED HISTORY: Reason for exam:->vent Reason for Exam: vent Acuity: Acute Type of Exam: Subsequent/Follow-up FINDINGS: The ETT is 6 cm above the navid. The feeding tube is inserted with its tip and side hole in the fundus of the stomach. There is a right IJ catheter with its tip in the superior vena cava the cardiomediastinal silhouette is stable. The lungs are grossly clear. There is no pleural effusion. There is no pneumothorax.   There is no acute osseous abnormality. There is known free air under the diaphragm. No acute cardiopulmonary disease. Known free air under the diaphragm, likely postsurgical changes. Xr Chest Portable    Result Date: 7/28/2020  EXAMINATION: ONE XRAY VIEW OF THE CHEST 7/28/2020 4:59 am COMPARISON: 7/27/2020 HISTORY: ORDERING SYSTEM PROVIDED HISTORY: vent TECHNOLOGIST PROVIDED HISTORY: Reason for exam:->vent Reason for Exam: vent Acuity: Acute Type of Exam: Subsequent/Follow-up FINDINGS: ETT tip is approximately 3.2 cm above the navid. Enteric catheter tip overlies the body of the stomach. Side hole is distal to the GE junction. Right IJ catheter tip overlies the mid SVC. Lungs are clear. No focal consolidation, pleural effusion, or pneumothorax. There is free air under the diaphragm. Correlation to radiograph of the abdomen dated 7/26/2020 shows skin staples overlying the abdomen. 1. Free air under the diaphragm. Based on available records, patient had an exploratory laparotomy on 7/24/2020. 2. No acute pulmonary abnormality. Xr Chest Portable    Result Date: 7/27/2020  EXAMINATION: ONE XRAY VIEW OF THE CHEST 7/27/2020 5:32 am COMPARISON: 07/26/2020 HISTORY: ORDERING SYSTEM PROVIDED HISTORY: Hypoxia TECHNOLOGIST PROVIDED HISTORY: Reason for exam:->Hypoxia Reason for Exam: Hypoxia Acuity: Unknown Type of Exam: Subsequent/Follow-up Additional signs and symptoms: Hypoxia Relevant Medical/Surgical History: Hypoxia FINDINGS: Endotracheal tube, NG tube and right IJ central venous catheter remain in place. The heart and mediastinal structures are stable. There has been some improvement in aeration of the right lung base suggesting resolving atelectasis. Skin folds simulate lucencies along the lower left lung. Improving aeration right lung base, no acute process seen.      Xr Chest Portable    Result Date: 7/26/2020  EXAMINATION: ONE XRAY VIEW OF THE CHEST 7/26/2020 1:27 am COMPARISON: 07/25/2020 HISTORY: ORDERING SYSTEM PROVIDED HISTORY: ETT advanced TECHNOLOGIST PROVIDED HISTORY: Reason for exam:->ETT advanced Reason for Exam: ETT advanced Acuity: Acute Type of Exam: Ongoing FINDINGS: Endotracheal tube tip is 3.5 cm above the navid. Right central venous catheter is stable in positioning. Enteric tube courses below the diaphragm. Slightly increasing right basilar airspace opacity. .  No focal consolidation, pleural effusion or pneumothorax. The cardiomediastinal silhouette is stable. No overt pulmonary edema. The osseous structures are stable. Endotracheal tube tip is 3.5 cm above the nvaid. Slightly increasing right basilar airspace opacity. Xr Chest Portable    Result Date: 7/25/2020  EXAMINATION: ONE XRAY VIEW OF THE CHEST 7/25/2020 7:05 pm COMPARISON: Chest radiograph 07/24/2020. CT chest angiogram 07/25/2020. HISTORY: ORDERING SYSTEM PROVIDED HISTORY: ETT placement AND N/G placement - comment on tip of both pleaes TECHNOLOGIST PROVIDED HISTORY: Reason for exam:->ETT placement AND N/G placement - comment on tip of both pleaes Acuity: Acute Type of Exam: Subsequent/Follow-up FINDINGS: The tip and side port of the enteric tube are in the gastric body. The endotracheal to terminates approximately 4.5 cm above the navid. A right transjugular central venous catheter terminates in the mid superior vena cava. The cardiomediastinal silhouette is unchanged. No pneumothorax, vascular congestion, consolidation, or pleural effusion is identified. Anterior abdominal skin staples. Diffuse air-filled mildly distended loops of bowel throughout the abdomen. No acute osseous abnormality. 1. Endotracheal tube 4.5 cm above the navid. 2. Tip and side port of the enteric tube in the gastric body. 3. No acute process in the chest. 4. Bowel-gas pattern compatible with ileus.      Xr Chest Portable    Result Date: 7/24/2020  EXAMINATION: ONE XRAY VIEW OF THE CHEST 7/24/2020 9:02 pm COMPARISON: 7/24/2020 HISTORY: ORDERING SYSTEM PROVIDED HISTORY: central line placement TECHNOLOGIST PROVIDED HISTORY: Reason for exam:->central line placement Reason for Exam: central line placement Acuity: Acute Type of Exam: Initial Additional signs and symptoms: SOB Relevant Medical/Surgical History: none FINDINGS: Left costophrenic angle clipped from field of view. Right IJ central venous catheter has been placed with tip in the distal SVC. No pneumothorax. Lungs remain clear. Cardiac and mediastinal silhouettes are stable. Stable mild blunting of right costophrenic angle compatible with trace to small right pleural effusion. Stable appearance to bony structures. Placement of right IJ central venous catheter with tip in the distal SVC. No pneumothorax. Otherwise stable exam with trace to small right pleural effusion. Xr Chest Portable    Result Date: 7/24/2020  EXAMINATION: ONE XRAY VIEW OF THE CHEST 7/24/2020 4:55 pm COMPARISON: None. HISTORY: ORDERING SYSTEM PROVIDED HISTORY: fall TECHNOLOGIST PROVIDED HISTORY: Reason for exam:->fall Reason for Exam: fall Acuity: Acute Type of Exam: Initial Mechanism of Injury: patient fell in the kitchen, was down for 2 days Relevant Medical/Surgical History: na FINDINGS: No pneumothorax or pulmonary contusion or effusion is identified. The heart size is normal.     No acute abnormality detected. Ir Tunneled Cvc Place Wo Sq Port/pump > 5 Years    Result Date: 8/12/2020  PROCEDURE: ULTRASOUND GUIDED VASCULAR ACCESS. FLUOROSCOPY GUIDED PLACEMENT OF A TUNNELED CATHETER. MODERATE CONSCIOUS SEDATION 8/12/2020. HISTORY: ORDERING SYSTEM PROVIDED HISTORY: Tunneled Cath Insertion TECHNOLOGIST PROVIDED HISTORY: Reason for exam:->Tunneled Cath Insertion How many lumens are being requested?->2 What side should this line be placed?->Right What site is the preferred site? ->Internal Jugular SEDATION: None.  FLUOROSCOPY DOSE AND TYPE OR TIME AND EXPOSURES: 0.2 minutes with a single fluoroscopic abdomen and pelvis was performed with the administration of intravenous contrast. Multiplanar reformatted images are provided for review. Dose modulation, iterative reconstruction, and/or weight based adjustment of the mA/kV was utilized to reduce the radiation dose to as low as reasonably achievable. COMPARISON: CT abdomen and pelvis dated 10/27/2010, portable chest radiograph dated 07/24/2010 HISTORY: ORDERING SYSTEM PROVIDED HISTORY: hypoxia TECHNOLOGIST PROVIDED HISTORY: Reason for exam:->hypoxia Reason for Exam: SHORTNESS OF BREATH/  R/O pe; ORDERING SYSTEM PROVIDED HISTORY: Abdominal pain TECHNOLOGIST PROVIDED HISTORY: Reason for exam:->Abdominal pain Reason for Exam: Abdominal pain FINDINGS: Pulmonary Arteries: There is adequate opacification of the pulmonary arteries. The pulmonary arteries are normal caliber. There are no pulmonary arterial filling defects. Streak artifact slightly limits the exam. Mediastinum: A right IJ catheter has its tip in the superior vena cava. The thoracic aorta is of normal caliber. There is no evidence of thoracic aortic dissection. The heart size is within normal limits. There is no pericardial effusion. Lungs/pleura: There is mild centrilobular emphysema. There is minor consolidation at the right lung base posteromedially. There is no pleural effusion, pneumothorax or evidence of edema. There is minimal atelectasis at the left lung base. Motion degrades the quality of the exam.  There are small foci of ground-glass opacity within the left lower lobe medially. There is mucous impaction within right lower lobe bronchi. Soft Tissues/Bones: 3rd spacing is noted. The bones are demineralized. There are endplate destructive changes at T12-L1. These are new when compared to the 2010 exam.  There is associated sclerosis of the vertebral bodies. No paravertebral fluid collection is appreciated. CT abdomen and pelvis: Organs: No focal hepatic abnormality is identified.   No focal splenic abnormality is appreciated no focal pancreatic abnormality is identified. The adrenal glands are unremarkable. The gallbladder is absent. The kidneys are not obstructed. There are areas of cortical decreased attenuation within the right kidney. There is a rounded low-attenuation lesion within the left renal lower pole, likely a cyst.  There is nonobstructing left lower pole renal calculus. There are small foci of low attenuation within the left renal cortex. Bowel: The bowel is not obstructed. The appendix is believed to be within normal limits. There are air foci within the posterior wall of the right colon which is most likely reflects pseudo pneumatosis. There is no mesenteric or portal venous gas. Pelvis: No free fluid is noted in the pelvis. A Singh catheter is in place. There is ectasia of the common iliac arteries. Retroperitoneum: The abdominal aorta is of normal caliber. There is no evidence of free intraperitoneal air. Bones and soft tissues: 3rd spacing is noted. There is a large decubitus ulcer involving the right buttock. A defined abscess is not identified. No bony destruction is appreciated. Postsurgical changes are noted in the lumbar spine. There is ankylosis L2-3. There is degenerative disc disease at L3-4.     1. No evidence of pulmonary embolic disease. Motion and streak artifact limits the exam. 2. Minimal left basilar ground-glass opacity. Mucous impaction involving right lower lobe bronchi. Consider aspiration pneumonitis. 3. Chronic appearing destructive change at the T12-L1 disc space. Consider prior infectious spondylitis. Correlation with lab values may be helpful to exclude acute discitis and osteomyelitis. 4. Minimal foci of hypoattenuation within the renal cortices, consider pyelonephritis or small infarcts. 5. Diffuse 3rd spacing. 6. Lucency within the right colonic wall, most likely pseudo pneumatosis.  Correlation with lactate levels recommended to exclude bowel ischemia. 7. Large decubitus ulcer involving the right posterior thigh and buttock. No defined abscess or evidence of osteomyelitis. 8. Nonobstructing left renal calculus. Ir Us Guided Paracentesis    Result Date: 8/12/2020  PROCEDURE: ULTRASOUND GUIDED PARACENTESIS 8/12/2020 HISTORY: ORDERING SYSTEM PROVIDED HISTORY: Ascites TECHNOLOGIST PROVIDED HISTORY: Reason for exam:->Ascites TECHNIQUE: Informed consent was obtained after a detailed explanation of the procedure including risks, benefits, and alternatives. Universal protocol was followed. A limited ultrasound of the abdomen did not show any significant fluid for paracentesis. The procedure was aborted. FINDINGS: No fluid seen for paracentesis. No fluid seen for paracentesis. Ir Nontunneled Vascular Catheter > 5 Years    Result Date: 7/26/2020  PROCEDURE: ULTRASOUND GUIDED VASCULAR ACCESS. PLACEMENT OF A NON-TUNNELED CATHETER. 7/26/2020. HISTORY: ORDERING SYSTEM PROVIDED HISTORY: Renal insufficiency TECHNOLOGIST PROVIDED HISTORY: Reason for exam:->fem temp cath SEDATION: None FLUOROSCOPY DOSE AND TYPE OR TIME AND EXPOSURES: None TECHNIQUE: Informed consent was obtained after a detailed explanation of the procedure including risks, benefits, and alternatives. Universal protocol was observed. The right groin was prepped and draped in sterile fashion using maximum sterile barrier technique. Local anesthesia was achieved with lidocaine. A micropuncture needle was used to access the right common femoral vein using ultrasound guidance. An ultrasound image demonstrating patency of the vein with needle tip located within it. An image was obtained and stored in PACs. A 0.035 guidewire was used to place a 12.5 [facility] 24 cm Mahurkar triple-lumen temporary dialysis catheter  after fascial tract dilation. The catheter flushed easily and there was a good blood return. The catheter was sutured to the skin.   The catheter was locked with heparinized saline. The patient tolerated the procedure well and there were no immediate complications. FINDINGS: Ultrasound evaluation of the right common femoral vein demonstrates that is widely patent and normally compressible without thrombus. 3 sonographic images were obtained. Postprocedure KUB reveals that the catheter tip is in good position. Successful ultrasound  guided non-tunneled right common femoral vein triple-lumen temporary dialysis catheter placement at the ICU bedside. Xr Hip 1 Vw W Pelvis Left    Result Date: 7/24/2020  EXAMINATION: ONE XRAY VIEW OF THE RIGHT HIP; ONE XRAY VIEW OF THE PELVIS AND TWO XRAY VIEWS LEFT HIP 7/24/2020 4:55 pm COMPARISON: None. HISTORY: ORDERING SYSTEM PROVIDED HISTORY: fall TECHNOLOGIST PROVIDED HISTORY: Reason for exam:->fall Reason for Exam: pain Acuity: Acute Type of Exam: Initial Mechanism of Injury: patient was in kitchen and fell, was down for 2 days Relevant Medical/Surgical History: na FINDINGS: Unremarkable appearance of the right and left hemipelvis, articulating normally at the SI joints and pubic symphysis. Visualized portions of the right and left femurs are intact align normally at the right and left acetabulum. Right and left hip joints appear well maintained. Unremarkable AP pelvis and bilateral hip radiographs. If pain persists or worsens, then additional evaluation with MRI is indicated to ensure no underlying radiographically occult process such as fracture, AVN or transient osteoporosis.        Significant Diagnostic Studies at discharge:   CBC:   Lab Results   Component Value Date    WBC 10.6 08/15/2020    RBC 2.88 08/15/2020    HGB 8.2 08/15/2020    HCT 27.1 08/15/2020    MCV 94.1 08/15/2020    MCH 28.5 08/15/2020    MCHC 30.3 08/15/2020    RDW 25.2 08/15/2020     08/15/2020    MPV 10.5 08/15/2020       Patient Instructions:     Medication List      START taking these medications    albuterol sulfate  (90 Base) MCG/ACT inhaler  Inhale 4 puffs into the lungs every 4 hours     chlorhexidine 0.12 % solution  Commonly known as:  PERIDEX  Take 15 mLs by mouth 2 times daily for 14 days     dicyclomine 20 MG tablet  Commonly known as:  BENTYL  Take 1 tablet by mouth every 6 hours as needed (Abdominal Cramping)     Ergocalciferol 200 MCG/ML drops  Commonly known as:  Drisdol  Take 1 mL by mouth daily     heparin (porcine) 1000 UNIT/ML injection  Infuse 3 mLs intravenously as needed (CRRT cath maintenance, 1500 each accsess line)     ipratropium 17 MCG/ACT inhaler  Commonly known as:  ATROVENT HFA  Inhale 4 puffs into the lungs every 4 hours     ipratropium-albuterol 0.5-2.5 (3) MG/3ML Soln nebulizer solution  Commonly known as:  DUONEB  Inhale 3 mLs into the lungs every 4 hours as needed for Shortness of Breath     magnesium oxide 400 MG tablet  Commonly known as:  MAG-OX  Take 1 tablet by mouth daily     metroNIDAZOLE 5 mg/mL IVPB  Commonly known as:  FLAGYL  Infuse 500 mg intravenously every 8 hours     midodrine 10 MG tablet  Commonly known as:  PROAMATINE  Take 1 tablet by mouth 3 times daily     morphine 2 MG/ML injection  Infuse 1 mL intravenously every 4 hours as needed for Pain for up to 3 days.      nicotine 21 MG/24HR  Commonly known as:  [address] [facility] 1 patch onto the skin daily     nystatin 776706 UNIT/ML suspension  Commonly known as:  MYCOSTATIN  Take 5 mLs by mouth 4 times daily     ondansetron 4 MG/2ML injection  Commonly known as:  ZOFRAN  Infuse 2 mLs intravenously every 6 hours as needed for Nausea or Vomiting     pantoprazole 40 MG injection  Commonly known as:  PROTONIX  Infuse 40 mg intravenously daily     polyethylene glycol 17 g packet  Commonly known as:  GLYCOLAX  Take 17 g by mouth daily as needed for Constipation     promethazine 12.5 MG tablet  Commonly known as:  PHENERGAN  Take 1 tablet by mouth every 6 hours as needed for Nausea     scopolamine transdermal patch  Commonly known as: TRANSDERM-SCOP  Place 1 patch onto the skin every 72 hours  Start taking on:  August 17, 2020        STOP taking these medications    HYDROcodone-acetaminophen 5-325 MG per tablet  Commonly known as:  Norco     methadone 40 MG disintegrating tablet  Commonly known as:  METHADOSE           Where to Get Your Medications      You can get these medications from any pharmacy    Bring a paper prescription for each of these medications  · morphine 2 MG/ML injection     Information about where to get these medications is not yet available    Ask your nurse or doctor about these medications  · albuterol sulfate  (90 Base) MCG/ACT inhaler  · chlorhexidine 0.12 % solution  · dicyclomine 20 MG tablet  · Ergocalciferol 200 MCG/ML drops  · heparin (porcine) 1000 UNIT/ML injection  · ipratropium 17 MCG/ACT inhaler  · ipratropium-albuterol 0.5-2.5 (3) MG/3ML Soln nebulizer solution  · magnesium oxide 400 MG tablet  · metroNIDAZOLE 5 mg/mL IVPB  · midodrine 10 MG tablet  · nicotine 21 MG/24HR  · nystatin 316362 UNIT/ML suspension  · ondansetron 4 MG/2ML injection  · pantoprazole 40 MG injection  · polyethylene glycol 17 g packet  · promethazine 12.5 MG tablet  · scopolamine transdermal patch            Code Status: Full Code     Consults:   IP CONSULT TO CARDIOLOGY  IP CONSULT TO IV TEAM  IP CONSULT TO HOSPITALIST  PHARMACY TO DOSE VANCOMYCIN  IP CONSULT TO PULMONOLOGY  IP CONSULT TO CARDIOLOGY  IP CONSULT TO INFECTIOUS DISEASES  IP CONSULT TO GENERAL SURGERY  IP CONSULT TO DIETITIAN  IP CONSULT TO NEPHROLOGY  IP CONSULT TO PALLIATIVE CARE  IP CONSULT TO INTERVENTIONAL RADIOLOGY  IP CONSULT TO PHARMACY  PHARMACY TO DOSE VANCOMYCIN  IP CONSULT TO GI  IP CONSULT TO DIETITIAN  PHARMACY TO DOSE VANCOMYCIN  IP CONSULT TO ONCOLOGY  IP CONSULT TO INTERVENTIONAL RADIOLOGY  IP CONSULT TO INTERVENTIONAL RADIOLOGY  IP CONSULT TO UROLOGY  IP CONSULT TO INTERVENTIONAL RADIOLOGY  IP CONSULT TO INTERVENTIONAL RADIOLOGY  IP CONSULT TO INTERVENTIONAL RADIOLOGY    Diet: TF    Activity: activity as tolerated   Work:    Discharged Condition: fair    Prognosis: Guarded - Fair    Disposition: LTAC                 Discharge Physician Signed: Jeffrey Kelly M.D. The patient was seen and examined on day of discharge and this discharge summary is in conjunction with any daily progress note from day of discharge.   Time spent on discharge in the examination, evaluation, counseling and review of medications and discharge plan: 34 minutes

## 2020-08-16 LAB
CULTURE: ABNORMAL
Lab: ABNORMAL
SPECIMEN: ABNORMAL

## 2020-11-24 ENCOUNTER — HOSPITAL ENCOUNTER (INPATIENT)
Age: 65
LOS: 14 days | Discharge: NURSING, REHAB OR LONG-TERM CARE | DRG: 720 | End: 2020-12-08
Attending: EMERGENCY MEDICINE | Admitting: FAMILY MEDICINE
Payer: MEDICARE

## 2020-11-24 ENCOUNTER — APPOINTMENT (OUTPATIENT)
Dept: GENERAL RADIOLOGY | Age: 65
DRG: 720 | End: 2020-11-24
Payer: MEDICARE

## 2020-11-24 PROBLEM — J18.9 HCAP (HEALTHCARE-ASSOCIATED PNEUMONIA): Status: ACTIVE | Noted: 2020-11-24

## 2020-11-24 LAB
ALBUMIN SERPL-MCNC: 2.8 GM/DL (ref 3.4–5)
ALP BLD-CCNC: 151 IU/L (ref 40–129)
ALT SERPL-CCNC: 31 U/L (ref 10–40)
ANION GAP SERPL CALCULATED.3IONS-SCNC: 13 MMOL/L (ref 4–16)
AST SERPL-CCNC: 78 IU/L (ref 15–37)
BACTERIA: ABNORMAL /HPF
BASE EXCESS MIXED: 4.8 (ref 0–1.2)
BASOPHILS ABSOLUTE: 0.1 K/CU MM
BASOPHILS RELATIVE PERCENT: 0.4 % (ref 0–1)
BILIRUB SERPL-MCNC: 0.6 MG/DL (ref 0–1)
BILIRUBIN URINE: NEGATIVE MG/DL
BLOOD, URINE: ABNORMAL
BUN BLDV-MCNC: 28 MG/DL (ref 6–23)
CALCIUM SERPL-MCNC: 9.5 MG/DL (ref 8.3–10.6)
CHLORIDE BLD-SCNC: 99 MMOL/L (ref 99–110)
CLARITY: ABNORMAL
CO2: 26 MMOL/L (ref 21–32)
COLOR: YELLOW
COMMENT: ABNORMAL
CREAT SERPL-MCNC: 0.5 MG/DL (ref 0.9–1.3)
DIFFERENTIAL TYPE: ABNORMAL
EOSINOPHILS ABSOLUTE: 0 K/CU MM
EOSINOPHILS RELATIVE PERCENT: 0 % (ref 0–3)
GFR AFRICAN AMERICAN: >60 ML/MIN/1.73M2
GFR NON-AFRICAN AMERICAN: >60 ML/MIN/1.73M2
GLUCOSE BLD-MCNC: 174 MG/DL (ref 70–99)
GLUCOSE, URINE: NEGATIVE MG/DL
HCO3 VENOUS: 29.9 MMOL/L (ref 19–25)
HCT VFR BLD CALC: 34.3 % (ref 42–52)
HEMOGLOBIN: 10.9 GM/DL (ref 13.5–18)
IMMATURE NEUTROPHIL %: 0.4 % (ref 0–0.43)
KETONES, URINE: NEGATIVE MG/DL
LACTATE: 1.3 MMOL/L (ref 0.4–2)
LEUKOCYTE ESTERASE, URINE: ABNORMAL
LYMPHOCYTES ABSOLUTE: 1.2 K/CU MM
LYMPHOCYTES RELATIVE PERCENT: 6.3 % (ref 24–44)
MAGNESIUM: 1.7 MG/DL (ref 1.8–2.4)
MCH RBC QN AUTO: 28.5 PG (ref 27–31)
MCHC RBC AUTO-ENTMCNC: 31.8 % (ref 32–36)
MCV RBC AUTO: 89.6 FL (ref 78–100)
MONOCYTES ABSOLUTE: 2.1 K/CU MM
MONOCYTES RELATIVE PERCENT: 11.1 % (ref 0–4)
NITRITE URINE, QUANTITATIVE: NEGATIVE
NUCLEATED RBC %: 0 %
O2 SAT, VEN: 93.6 % (ref 50–70)
PCO2, VEN: 45 MMHG (ref 38–52)
PDW BLD-RTO: 16.7 % (ref 11.7–14.9)
PH VENOUS: 7.43 (ref 7.32–7.42)
PH, URINE: 6 (ref 5–8)
PHOSPHORUS: 3 MG/DL (ref 2.5–4.9)
PLATELET # BLD: 464 K/CU MM (ref 140–440)
PMV BLD AUTO: 9.4 FL (ref 7.5–11.1)
PO2, VEN: 150 MMHG (ref 28–48)
POTASSIUM SERPL-SCNC: 4.1 MMOL/L (ref 3.5–5.1)
PRO-BNP: 742.6 PG/ML
PROTEIN UA: 30 MG/DL
RBC # BLD: 3.83 M/CU MM (ref 4.6–6.2)
RBC URINE: 28 /HPF (ref 0–3)
SARS-COV-2, NAAT: NOT DETECTED
SEGMENTED NEUTROPHILS ABSOLUTE COUNT: 15.1 K/CU MM
SEGMENTED NEUTROPHILS RELATIVE PERCENT: 81.8 % (ref 36–66)
SODIUM BLD-SCNC: 138 MMOL/L (ref 135–145)
SOURCE: NORMAL
SPECIFIC GRAVITY UA: 1.02 (ref 1–1.03)
TOTAL IMMATURE NEUTOROPHIL: 0.07 K/CU MM
TOTAL NUCLEATED RBC: 0 K/CU MM
TOTAL PROTEIN: 9 GM/DL (ref 6.4–8.2)
TRANSITIONAL EPITHELIAL: <1 /HPF
TRICHOMONAS: ABNORMAL /HPF
TROPONIN T: 0.03 NG/ML
UROBILINOGEN, URINE: 2 MG/DL (ref 0.2–1)
WBC # BLD: 18.4 K/CU MM (ref 4–10.5)
WBC UA: 68 /HPF (ref 0–2)

## 2020-11-24 PROCEDURE — 83880 ASSAY OF NATRIURETIC PEPTIDE: CPT

## 2020-11-24 PROCEDURE — 84484 ASSAY OF TROPONIN QUANT: CPT

## 2020-11-24 PROCEDURE — 87205 SMEAR GRAM STAIN: CPT

## 2020-11-24 PROCEDURE — 87899 AGENT NOS ASSAY W/OPTIC: CPT

## 2020-11-24 PROCEDURE — 99284 EMERGENCY DEPT VISIT MOD MDM: CPT

## 2020-11-24 PROCEDURE — 71045 X-RAY EXAM CHEST 1 VIEW: CPT

## 2020-11-24 PROCEDURE — 6370000000 HC RX 637 (ALT 250 FOR IP): Performed by: FAMILY MEDICINE

## 2020-11-24 PROCEDURE — 36415 COLL VENOUS BLD VENIPUNCTURE: CPT

## 2020-11-24 PROCEDURE — 80053 COMPREHEN METABOLIC PANEL: CPT

## 2020-11-24 PROCEDURE — 96375 TX/PRO/DX INJ NEW DRUG ADDON: CPT

## 2020-11-24 PROCEDURE — 6370000000 HC RX 637 (ALT 250 FOR IP): Performed by: INTERNAL MEDICINE

## 2020-11-24 PROCEDURE — 83605 ASSAY OF LACTIC ACID: CPT

## 2020-11-24 PROCEDURE — 87086 URINE CULTURE/COLONY COUNT: CPT

## 2020-11-24 PROCEDURE — 6360000002 HC RX W HCPCS: Performed by: EMERGENCY MEDICINE

## 2020-11-24 PROCEDURE — 85025 COMPLETE CBC W/AUTO DIFF WBC: CPT

## 2020-11-24 PROCEDURE — 6370000000 HC RX 637 (ALT 250 FOR IP): Performed by: EMERGENCY MEDICINE

## 2020-11-24 PROCEDURE — 2700000000 HC OXYGEN THERAPY PER DAY

## 2020-11-24 PROCEDURE — 87081 CULTURE SCREEN ONLY: CPT

## 2020-11-24 PROCEDURE — 2580000003 HC RX 258: Performed by: EMERGENCY MEDICINE

## 2020-11-24 PROCEDURE — 81001 URINALYSIS AUTO W/SCOPE: CPT

## 2020-11-24 PROCEDURE — 2000000000 HC ICU R&B

## 2020-11-24 PROCEDURE — 93005 ELECTROCARDIOGRAM TRACING: CPT | Performed by: EMERGENCY MEDICINE

## 2020-11-24 PROCEDURE — 5A1955Z RESPIRATORY VENTILATION, GREATER THAN 96 CONSECUTIVE HOURS: ICD-10-PCS | Performed by: INTERNAL MEDICINE

## 2020-11-24 PROCEDURE — 87186 SC STD MICRODIL/AGAR DIL: CPT

## 2020-11-24 PROCEDURE — 87070 CULTURE OTHR SPECIMN AEROBIC: CPT

## 2020-11-24 PROCEDURE — 6360000002 HC RX W HCPCS: Performed by: FAMILY MEDICINE

## 2020-11-24 PROCEDURE — 94750 HC PULMONARY COMPLIANCE STUDY: CPT

## 2020-11-24 PROCEDURE — U0002 COVID-19 LAB TEST NON-CDC: HCPCS

## 2020-11-24 PROCEDURE — 94761 N-INVAS EAR/PLS OXIMETRY MLT: CPT

## 2020-11-24 PROCEDURE — 89220 SPUTUM SPECIMEN COLLECTION: CPT

## 2020-11-24 PROCEDURE — 51702 INSERT TEMP BLADDER CATH: CPT

## 2020-11-24 PROCEDURE — 83735 ASSAY OF MAGNESIUM: CPT

## 2020-11-24 PROCEDURE — 94002 VENT MGMT INPAT INIT DAY: CPT

## 2020-11-24 PROCEDURE — 87040 BLOOD CULTURE FOR BACTERIA: CPT

## 2020-11-24 PROCEDURE — 87449 NOS EACH ORGANISM AG IA: CPT

## 2020-11-24 PROCEDURE — 93010 ELECTROCARDIOGRAM REPORT: CPT | Performed by: INTERNAL MEDICINE

## 2020-11-24 PROCEDURE — 96365 THER/PROPH/DIAG IV INF INIT: CPT

## 2020-11-24 PROCEDURE — 84100 ASSAY OF PHOSPHORUS: CPT

## 2020-11-24 PROCEDURE — 2580000003 HC RX 258: Performed by: FAMILY MEDICINE

## 2020-11-24 PROCEDURE — 87077 CULTURE AEROBIC IDENTIFY: CPT

## 2020-11-24 PROCEDURE — 2500000003 HC RX 250 WO HCPCS: Performed by: FAMILY MEDICINE

## 2020-11-24 PROCEDURE — 82805 BLOOD GASES W/O2 SATURATION: CPT

## 2020-11-24 PROCEDURE — C9113 INJ PANTOPRAZOLE SODIUM, VIA: HCPCS | Performed by: FAMILY MEDICINE

## 2020-11-24 RX ORDER — LEVOFLOXACIN 5 MG/ML
750 INJECTION, SOLUTION INTRAVENOUS EVERY 24 HOURS
Status: DISCONTINUED | OUTPATIENT
Start: 2020-11-25 | End: 2020-11-24

## 2020-11-24 RX ORDER — BUMETANIDE 0.5 MG/1
0.5 TABLET ORAL 2 TIMES DAILY
COMMUNITY

## 2020-11-24 RX ORDER — GABAPENTIN 300 MG/1
300 CAPSULE ORAL 3 TIMES DAILY
Status: DISCONTINUED | OUTPATIENT
Start: 2020-11-24 | End: 2020-12-09 | Stop reason: HOSPADM

## 2020-11-24 RX ORDER — CLONAZEPAM 1 MG/1
1 TABLET ORAL 3 TIMES DAILY
Status: DISCONTINUED | OUTPATIENT
Start: 2020-11-24 | End: 2020-12-09 | Stop reason: HOSPADM

## 2020-11-24 RX ORDER — OXYCODONE HYDROCHLORIDE 5 MG/1
5 TABLET ORAL EVERY 6 HOURS PRN
Status: ON HOLD | COMMUNITY
End: 2020-12-08 | Stop reason: SDUPTHER

## 2020-11-24 RX ORDER — DEXAMETHASONE SODIUM PHOSPHATE 10 MG/ML
10 INJECTION, SOLUTION INTRAMUSCULAR; INTRAVENOUS ONCE
Status: COMPLETED | OUTPATIENT
Start: 2020-11-24 | End: 2020-11-24

## 2020-11-24 RX ORDER — HYDROXYZINE PAMOATE 50 MG/1
50 CAPSULE ORAL 3 TIMES DAILY PRN
Status: ON HOLD | COMMUNITY
End: 2020-12-08 | Stop reason: HOSPADM

## 2020-11-24 RX ORDER — CHLORHEXIDINE GLUCONATE 0.12 MG/ML
15 RINSE ORAL 2 TIMES DAILY
Status: DISCONTINUED | OUTPATIENT
Start: 2020-11-24 | End: 2020-12-09 | Stop reason: HOSPADM

## 2020-11-24 RX ORDER — MAGNESIUM SULFATE 1 G/100ML
1 INJECTION INTRAVENOUS PRN
Status: DISCONTINUED | OUTPATIENT
Start: 2020-11-24 | End: 2020-12-09 | Stop reason: HOSPADM

## 2020-11-24 RX ORDER — SODIUM CHLORIDE 0.9 % (FLUSH) 0.9 %
10 SYRINGE (ML) INJECTION EVERY 12 HOURS SCHEDULED
Status: DISCONTINUED | OUTPATIENT
Start: 2020-11-24 | End: 2020-12-09 | Stop reason: HOSPADM

## 2020-11-24 RX ORDER — HYDROMORPHONE HYDROCHLORIDE 2 MG/1
1 TABLET ORAL DAILY PRN
Status: ON HOLD | COMMUNITY
End: 2020-12-08 | Stop reason: SDUPTHER

## 2020-11-24 RX ORDER — SODIUM CHLORIDE 9 MG/ML
INJECTION, SOLUTION INTRAVENOUS CONTINUOUS
Status: DISCONTINUED | OUTPATIENT
Start: 2020-11-24 | End: 2020-11-26

## 2020-11-24 RX ORDER — OXYCODONE HYDROCHLORIDE 5 MG/1
5 TABLET ORAL EVERY 6 HOURS PRN
Status: DISCONTINUED | OUTPATIENT
Start: 2020-11-24 | End: 2020-12-09 | Stop reason: HOSPADM

## 2020-11-24 RX ORDER — ACETAMINOPHEN 650 MG/1
650 SUPPOSITORY RECTAL EVERY 6 HOURS PRN
Status: DISCONTINUED | OUTPATIENT
Start: 2020-11-24 | End: 2020-12-09 | Stop reason: HOSPADM

## 2020-11-24 RX ORDER — GABAPENTIN 300 MG/1
300 CAPSULE ORAL 3 TIMES DAILY
COMMUNITY

## 2020-11-24 RX ORDER — AMINO ACIDS/PROTEIN HYDROLYS 15G-100/30
LIQUID (ML) ORAL 2 TIMES DAILY
COMMUNITY

## 2020-11-24 RX ORDER — HYDROMORPHONE HYDROCHLORIDE 2 MG/1
1 TABLET ORAL DAILY PRN
Status: DISCONTINUED | OUTPATIENT
Start: 2020-11-24 | End: 2020-12-09 | Stop reason: HOSPADM

## 2020-11-24 RX ORDER — HYDROXYZINE HYDROCHLORIDE 25 MG/1
25 TABLET, FILM COATED ORAL EVERY 6 HOURS
Status: DISCONTINUED | OUTPATIENT
Start: 2020-11-24 | End: 2020-12-09 | Stop reason: HOSPADM

## 2020-11-24 RX ORDER — DULOXETIN HYDROCHLORIDE 60 MG/1
60 CAPSULE, DELAYED RELEASE ORAL DAILY
COMMUNITY

## 2020-11-24 RX ORDER — ONDANSETRON 2 MG/ML
4 INJECTION INTRAMUSCULAR; INTRAVENOUS EVERY 6 HOURS PRN
Status: DISCONTINUED | OUTPATIENT
Start: 2020-11-24 | End: 2020-12-09 | Stop reason: HOSPADM

## 2020-11-24 RX ORDER — PROMETHAZINE HYDROCHLORIDE 25 MG/1
12.5 TABLET ORAL EVERY 6 HOURS PRN
Status: DISCONTINUED | OUTPATIENT
Start: 2020-11-24 | End: 2020-12-09 | Stop reason: HOSPADM

## 2020-11-24 RX ORDER — ERGOCALCIFEROL (VITAMIN D2) 200 MCG/ML
8000 DROPS ORAL DAILY
Status: DISCONTINUED | OUTPATIENT
Start: 2020-11-25 | End: 2020-11-26 | Stop reason: CLARIF

## 2020-11-24 RX ORDER — POLYETHYLENE GLYCOL 3350 17 G/17G
17 POWDER, FOR SOLUTION ORAL DAILY PRN
Status: DISCONTINUED | OUTPATIENT
Start: 2020-11-24 | End: 2020-12-09 | Stop reason: HOSPADM

## 2020-11-24 RX ORDER — DULOXETIN HYDROCHLORIDE 30 MG/1
60 CAPSULE, DELAYED RELEASE ORAL DAILY
Status: DISCONTINUED | OUTPATIENT
Start: 2020-11-24 | End: 2020-12-09 | Stop reason: HOSPADM

## 2020-11-24 RX ORDER — 0.9 % SODIUM CHLORIDE 0.9 %
1000 INTRAVENOUS SOLUTION INTRAVENOUS ONCE
Status: COMPLETED | OUTPATIENT
Start: 2020-11-24 | End: 2020-11-24

## 2020-11-24 RX ORDER — MIDODRINE HYDROCHLORIDE 5 MG/1
10 TABLET ORAL 3 TIMES DAILY
Status: DISCONTINUED | OUTPATIENT
Start: 2020-11-24 | End: 2020-12-09 | Stop reason: HOSPADM

## 2020-11-24 RX ORDER — BUMETANIDE 1 MG/1
0.5 TABLET ORAL 2 TIMES DAILY
Status: DISCONTINUED | OUTPATIENT
Start: 2020-11-24 | End: 2020-12-09 | Stop reason: HOSPADM

## 2020-11-24 RX ORDER — SODIUM CHLORIDE 0.9 % (FLUSH) 0.9 %
10 SYRINGE (ML) INJECTION PRN
Status: DISCONTINUED | OUTPATIENT
Start: 2020-11-24 | End: 2020-12-09 | Stop reason: HOSPADM

## 2020-11-24 RX ORDER — LEVOFLOXACIN 5 MG/ML
750 INJECTION, SOLUTION INTRAVENOUS ONCE
Status: COMPLETED | OUTPATIENT
Start: 2020-11-24 | End: 2020-11-24

## 2020-11-24 RX ORDER — SCOLOPAMINE TRANSDERMAL SYSTEM 1 MG/1
1 PATCH, EXTENDED RELEASE TRANSDERMAL
Status: DISCONTINUED | OUTPATIENT
Start: 2020-11-26 | End: 2020-12-09 | Stop reason: HOSPADM

## 2020-11-24 RX ORDER — CLONAZEPAM 1 MG/1
1 TABLET ORAL 3 TIMES DAILY
Status: ON HOLD | COMMUNITY
End: 2020-12-08 | Stop reason: SDUPTHER

## 2020-11-24 RX ORDER — DICYCLOMINE HCL 20 MG
20 TABLET ORAL EVERY 6 HOURS PRN
Status: DISCONTINUED | OUTPATIENT
Start: 2020-11-24 | End: 2020-12-09 | Stop reason: HOSPADM

## 2020-11-24 RX ORDER — ACETAMINOPHEN 325 MG/1
650 TABLET ORAL EVERY 6 HOURS PRN
Status: DISCONTINUED | OUTPATIENT
Start: 2020-11-24 | End: 2020-12-09 | Stop reason: HOSPADM

## 2020-11-24 RX ORDER — PANTOPRAZOLE SODIUM 40 MG/10ML
40 INJECTION, POWDER, LYOPHILIZED, FOR SOLUTION INTRAVENOUS DAILY
Status: DISCONTINUED | OUTPATIENT
Start: 2020-11-24 | End: 2020-12-09 | Stop reason: HOSPADM

## 2020-11-24 RX ORDER — ALBUTEROL SULFATE 90 UG/1
4 AEROSOL, METERED RESPIRATORY (INHALATION) EVERY 4 HOURS
Status: DISCONTINUED | OUTPATIENT
Start: 2020-11-24 | End: 2020-12-09 | Stop reason: HOSPADM

## 2020-11-24 RX ORDER — IPRATROPIUM BROMIDE AND ALBUTEROL SULFATE 2.5; .5 MG/3ML; MG/3ML
1 SOLUTION RESPIRATORY (INHALATION)
Status: DISCONTINUED | OUTPATIENT
Start: 2020-11-24 | End: 2020-11-24

## 2020-11-24 RX ORDER — HYDROXYZINE HYDROCHLORIDE 25 MG/1
25 TABLET, FILM COATED ORAL EVERY 6 HOURS
COMMUNITY

## 2020-11-24 RX ORDER — ACETAMINOPHEN 650 MG/1
650 SUPPOSITORY RECTAL ONCE
Status: COMPLETED | OUTPATIENT
Start: 2020-11-24 | End: 2020-11-24

## 2020-11-24 RX ADMIN — GABAPENTIN 300 MG: 300 CAPSULE ORAL at 17:48

## 2020-11-24 RX ADMIN — ENOXAPARIN SODIUM 40 MG: 40 INJECTION SUBCUTANEOUS at 17:43

## 2020-11-24 RX ADMIN — CLONAZEPAM 1 MG: 1 TABLET ORAL at 17:49

## 2020-11-24 RX ADMIN — PANTOPRAZOLE SODIUM 40 MG: 40 INJECTION, POWDER, LYOPHILIZED, FOR SOLUTION INTRAVENOUS at 17:41

## 2020-11-24 RX ADMIN — BUMETANIDE 0.5 MG: 1 TABLET ORAL at 20:55

## 2020-11-24 RX ADMIN — LEVOFLOXACIN 750 MG: 5 INJECTION, SOLUTION INTRAVENOUS at 13:55

## 2020-11-24 RX ADMIN — DEXAMETHASONE SODIUM PHOSPHATE 10 MG: 10 INJECTION, SOLUTION INTRAMUSCULAR; INTRAVENOUS at 12:13

## 2020-11-24 RX ADMIN — GABAPENTIN 300 MG: 300 CAPSULE ORAL at 21:07

## 2020-11-24 RX ADMIN — NYSTATIN 500000 UNITS: 100000 SUSPENSION ORAL at 17:23

## 2020-11-24 RX ADMIN — MIDODRINE HYDROCHLORIDE 10 MG: 5 TABLET ORAL at 17:23

## 2020-11-24 RX ADMIN — NYSTATIN 500000 UNITS: 100000 SUSPENSION ORAL at 21:06

## 2020-11-24 RX ADMIN — SODIUM CHLORIDE 1000 ML: 9 INJECTION, SOLUTION INTRAVENOUS at 11:24

## 2020-11-24 RX ADMIN — HYDROXYZINE HYDROCHLORIDE 25 MG: 25 TABLET, FILM COATED ORAL at 20:56

## 2020-11-24 RX ADMIN — CEFEPIME 2 G: 2 INJECTION, POWDER, FOR SOLUTION INTRAVENOUS at 20:53

## 2020-11-24 RX ADMIN — SODIUM CHLORIDE, PRESERVATIVE FREE 10 ML: 5 INJECTION INTRAVENOUS at 21:06

## 2020-11-24 RX ADMIN — CEFEPIME 2 G: 2 INJECTION, POWDER, FOR SOLUTION INTRAVENOUS at 12:16

## 2020-11-24 RX ADMIN — CLONAZEPAM 1 MG: 1 TABLET ORAL at 21:07

## 2020-11-24 RX ADMIN — METRONIDAZOLE 500 MG: 500 INJECTION, SOLUTION INTRAVENOUS at 17:34

## 2020-11-24 RX ADMIN — ACETAMINOPHEN 650 MG: 650 SUPPOSITORY RECTAL at 15:10

## 2020-11-24 RX ADMIN — VANCOMYCIN HYDROCHLORIDE 1500 MG: 5 INJECTION, POWDER, LYOPHILIZED, FOR SOLUTION INTRAVENOUS at 18:27

## 2020-11-24 RX ADMIN — MAGNESIUM GLUCONATE 500 MG ORAL TABLET 400 MG: 500 TABLET ORAL at 18:17

## 2020-11-24 RX ADMIN — HYDROXYZINE HYDROCHLORIDE 25 MG: 25 TABLET, FILM COATED ORAL at 18:11

## 2020-11-24 RX ADMIN — SODIUM CHLORIDE: 9 INJECTION, SOLUTION INTRAVENOUS at 17:22

## 2020-11-24 RX ADMIN — CHLORHEXIDINE GLUCONATE 0.12% ORAL RINSE 15 ML: 1.2 LIQUID ORAL at 20:55

## 2020-11-24 RX ADMIN — HYDROXYZINE HYDROCHLORIDE 25 MG: 25 TABLET, FILM COATED ORAL at 18:09

## 2020-11-24 ASSESSMENT — PULMONARY FUNCTION TESTS
PIF_VALUE: 33
PIF_VALUE: 19
PIF_VALUE: 15
PIF_VALUE: 28
PIF_VALUE: 20
PIF_VALUE: 17
PIF_VALUE: 19
PIF_VALUE: 21
PIF_VALUE: 25
PIF_VALUE: 14
PIF_VALUE: 22
PIF_VALUE: 21

## 2020-11-24 ASSESSMENT — PAIN SCALES - GENERAL: PAINLEVEL_OUTOF10: 0

## 2020-11-24 NOTE — PROGRESS NOTES
Patient has a 6.0C [place]. He was placed on [place] II by this RT with the current settings; RR 12, Vte 450, Peep 5, Fio2 75%. He was sterile suctioned for large amount of thick creamy/brown secretions. Hr 112, RR 23, SpO2 100%. Will continue to monitor.

## 2020-11-24 NOTE — ED PROVIDER NOTES
Triage Chief Complaint:   Respiratory Distress (pt resident of [facility], trach patient, EMS called for low O2 sat and high respiratory and heart rate. pt reportedly full code, cancer patient)      Kashia:  Frederick Hobbs is a 63 y.o. male that presents to the emergency department from [facility]. Patient is a trach patient. Does appear that his trach and PEG were placed a few months ago. I was told that he has a terminal cancer patient yet remains a full code. Per chart review it does appear he has a stomach neoplasm. EMS was called for low oxygen sat, high respiratory rate and heart rate. He was being bagged by EMS. They stated the nurse at the [facility] was unable to provide any other information as she stated \"this is not my usual patient. \"  Patient is nonverbal secondary to trach. He does make good eye contact and follows commands that he is able to. Patient is extremely cachectic. Has decubitus wounds to both feet, arms. He does have a PICC to his left upper extremity. .    Past Medical History:   Diagnosis Date    Acute embolism and thrombosis of deep veins of left upper extremity (HCC)     Acute kidney failure with tubular necrosis (HCC)     Anemia, unspecified     Anxiety     Aphonia     Bacteremia     Drug abuse and dependence (HCC)     Dysphagia     Gastrostomy status ([address]     Hemoperitoneum     Hepatitis C without hepatic coma     Hypertension     Idiopathic hypotension     Malignant neoplasm of stomach, unspecified (HCC)     Metabolic encephalopathy     Methicillin susceptible Staphylococcus aureus infection as the cause of diseases classified elsewhere     Muscle weakness     Osteomyelitis (HCC)     Thrombocytopenia, unspecified ([address]     Tracheostomy status ([address]     Unspecified severe protein-calorie malnutrition ([address]      Past Surgical History:   Procedure Laterality Date    BACK SURGERY      GASTROSTOMY TUBE PLACEMENT N/A 8/10/2020    EGD PEG TUBE PLACEMENT performed by Silvano Canela MD at [facility] IR NONTUNNELED VASCULAR CATHETER  7/26/2020    IR NONTUNNELED VASCULAR CATHETER 7/26/2020 [facility] SPECIAL PROCEDURES    IR TUNNELED CATHETER PLACEMENT GREATER THAN 5 YEARS  8/12/2020    IR TUNNELED CATHETER PLACEMENT GREATER THAN 5 YEARS 8/12/2020 [facility] SPECIAL PROCEDURES    LAPAROTOMY N/A 7/25/2020    LAPAROTOMY EXPLORATORY performed by Silvano Canela MD at [facility] N/A 8/10/2020    TRACHEOTOMY performed by Silvano Canela MD at [place] [address] reviewed. No pertinent family history. Social History     Socioeconomic History    Marital status:       Spouse name: Not on file    Number of children: Not on file    Years of education: Not on file    Highest education level: Not on file   Occupational History    Not on file   Social Needs    Financial resource strain: Not on file    Food insecurity     Worry: Not on file     Inability: Not on file    Transportation needs     Medical: Not on file     Non-medical: Not on file   Tobacco Use    Smoking status: Former Smoker     Packs/day: 1.50     Types: Cigarettes   Substance and Sexual Activity    Alcohol use: No    Drug use: No    Sexual activity: Not on file   Lifestyle    Physical activity     Days per week: Not on file     Minutes per session: Not on file    Stress: Not on file   Relationships    Social connections     Talks on phone: Not on file     Gets together: Not on file     Attends Ep[employer] service: Not on file     Active member of club or organization: Not on file     Attends meetings of clubs or organizations: Not on file     Relationship status: Not on file    Intimate partner violence     Fear of current or ex partner: Not on file     Emotionally abused: Not on file     Physically abused: Not on file     Forced sexual activity: Not on file   Other Topics Concern    Not on file   Social History Narrative    Not on file     Current Facility-Administered Medications Medication Dose Route Frequency Provider Last Rate Last Dose    levoFLOXacin (LEVAQUIN) 750 MG/150ML infusion 750 mg  750 mg Intravenous Once Patience Sandifer, MD         Current Outpatient Medications   Medication Sig Dispense Refill    magnesium oxide (MAG-OX) 400 MG tablet Take 1 tablet by mouth daily 30 tablet 1    albuterol sulfate  (90 Base) MCG/ACT inhaler Inhale 4 puffs into the lungs every 4 hours 1 Inhaler 3    ipratropium (ATROVENT HFA) 17 MCG/ACT inhaler Inhale 4 puffs into the lungs every 4 hours 1 Inhaler 3    ipratropium-albuterol (DUONEB) 0.5-2.5 (3) MG/3ML SOLN nebulizer solution Inhale 3 mLs into the lungs every 4 hours as needed for Shortness of Breath 360 mL     Heparin Sodium, Porcine, (HEPARIN, PORCINE,) 1000 UNIT/ML injection Infuse 3 mLs intravenously as needed (CRRT cath maintenance, 1500 each accsess line)      scopolamine (TRANSDERM-SCOP) transdermal patch Place 1 patch onto the skin every 72 hours      ondansetron (ZOFRAN) 4 MG/2ML injection Infuse 2 mLs intravenously every 6 hours as needed for Nausea or Vomiting 84 mL     metroNIDAZOLE (FLAGYL) 5 mg/mL IVPB Infuse 500 mg intravenously every 8 hours      nicotine (NICODERM CQ) 21 MG/24HR Place 1 patch onto the skin daily 30 patch 3    nystatin (MYCOSTATIN) 590357 UNIT/ML suspension Take 5 mLs by mouth 4 times daily      midodrine (PROAMATINE) 10 MG tablet Take 1 tablet by mouth 3 times daily 90 tablet 3    pantoprazole (PROTONIX) 40 MG injection Infuse 40 mg intravenously daily      dicyclomine (BENTYL) 20 MG tablet Take 1 tablet by mouth every 6 hours as needed (Abdominal Cramping) 120 tablet 3    Ergocalciferol (DRISDOL) 200 MCG/ML drops Take 1 mL by mouth daily 30 mL      No Known Allergies  Nursing Notes Reviewed    ROS:  Unable to obtain secondary to trach     Physical Exam:  ED Triage Vitals   Enc Vitals Group      BP       Pulse       Resp       Temp       Temp src       SpO2       Weight       Height Head Circumference       Peak Flow       Pain Score       Pain Loc       Pain Edu? Excl. in [address]? My pulse oximetry interpretation is which is abnormal    GENERAL APPEARANCE: Awake and alert. Chronically ill-appearing. Cachectic. Being bagged through trach. Respiratory bedside and placing him on the vent currently. HEAD: Normocephalic. Atraumatic. EYES: EOM's grossly intact. Sclera anicteric. ENT: Mucous membranes are moist. Tolerates saliva. No trismus. NECK: Supple. No meningismus. Trachea midline. HEART: RRR. Radial pulses 2+. LUNGS: Respirations labored. Being bagged. ABDOMEN: Soft. Non-tender. No guarding or rebound. PEG tube to left mid abdomen. EXTREMITIES: No acute deformities. No edema. SKIN: Warm and dry. NEUROLOGICAL: No gross facial drooping. Moves all 4 extremities spontaneously. PSYCHIATRIC: Unable to obtain.     I have reviewed and interpreted all of the currently available lab results from this visit (if applicable):  Results for orders placed or performed during the hospital encounter of 11/24/20   CBC with Auto Diff   Result Value Ref Range    WBC 18.4 (H) 4.0 - 10.5 K/CU MM    RBC 3.83 (L) 4.6 - 6.2 M/CU MM    Hemoglobin 10.9 (L) 13.5 - 18.0 GM/DL    Hematocrit 34.3 (L) 42 - 52 %    MCV 89.6 78 - 100 FL    MCH 28.5 27 - 31 PG    MCHC 31.8 (L) 32.0 - 36.0 %    RDW 16.7 (H) 11.7 - 14.9 %    Platelets 715 (H) 930 - 440 K/CU MM    MPV 9.4 7.5 - 11.1 FL    Differential Type AUTOMATED DIFFERENTIAL     Segs Relative 81.8 (H) 36 - 66 %    Lymphocytes % 6.3 (L) 24 - 44 %    Monocytes % 11.1 (H) 0 - 4 %    Eosinophils % 0.0 0 - 3 %    Basophils % 0.4 0 - 1 %    Segs Absolute 15.1 K/CU MM    Lymphocytes Absolute 1.2 K/CU MM    Monocytes Absolute 2.1 K/CU MM    Eosinophils Absolute 0.0 K/CU MM    Basophils Absolute 0.1 K/CU MM    Nucleated RBC % 0.0 %    Total Nucleated RBC 0.0 K/CU MM    Total Immature Neutrophil 0.07 K/CU MM    Immature Neutrophil % 0.4 0 - 0.43 %   CMP   Result Value Ref Range    Sodium 138 135 - 145 MMOL/L    Potassium 4.1 3.5 - 5.1 MMOL/L    Chloride 99 99 - 110 mMol/L    CO2 26 21 - 32 MMOL/L    BUN 28 (H) 6 - 23 MG/DL    CREATININE 0.5 (L) 0.9 - 1.3 MG/DL    Glucose 174 (H) 70 - 99 MG/DL    Calcium 9.5 8.3 - 10.6 MG/DL    Alb 2.8 (L) 3.4 - 5.0 GM/DL    Total Protein 9.0 (H) 6.4 - 8.2 GM/DL    Total Bilirubin 0.6 0.0 - 1.0 MG/DL    ALT 31 10 - 40 U/L    AST 78 (H) 15 - 37 IU/L    Alkaline Phosphatase 151 (H) 40 - 129 IU/L    GFR Non-African American >60 >60 mL/min/1.73m2    GFR African American >60 >60 mL/min/1.73m2    Anion Gap 13 4 - 16   COVID-19    Specimen: Nasopharyngeal Swab   Result Value Ref Range    Source THROAT     SARS-CoV-2, NAAT NOT DETECTED    Brain Natriuretic Peptide   Result Value Ref Range    Pro-.6 (H) <300 PG/ML   Troponin   Result Value Ref Range    Troponin T 0.025 (H) <0.01 NG/ML   Lactic Acid, Plasma   Result Value Ref Range    Lactate 1.3 0.4 - 2.0 mMOL/L   Blood Gas, Venous   Result Value Ref Range    pH, Nir 7.43 (H) 7.32 - 7.42    pCO2, Nir 45 38 - 52 mmHG    pO2, Nir 150 (H) 28 - 48 mmHG    Base Exc, Mixed 4.8 (H) 0 - 1.2    HCO3, Venous 29.9 (H) 19 - 25 MMOL/L    O2 Sat, Nir 93.6 (H) 50 - 70 %    Comment VBG    EKG 12 Lead   Result Value Ref Range    Ventricular Rate 121 BPM    Atrial Rate 121 BPM    P-R Interval 152 ms    QRS Duration 80 ms    Q-T Interval 314 ms    QTc Calculation (Bazett) 445 ms    P Axis 67 degrees    R Axis 1 degrees    T Axis 67 degrees    Diagnosis       Sinus tachycardia  Biatrial enlargement  Septal infarct , age undetermined  Abnormal ECG  When compared with ECG of 24-JUL-2020 19:04,  Septal infarct is now present  ST no longer depressed in Lateral leads  Confirmed by [facility] Jonathan MATOS ([id number]) on 11/24/2020 11:22:30 AM          Radiographs:  [] Radiologist's Wet Read Report Reviewed:      XR CHEST PORTABLE (Final result)   Result time 11/24/20 11:23:53   Final result by Ursula Castañeda MD (11/24/20

## 2020-11-24 NOTE — H&P
History and Physical      Name:  Vanessa Arias /Age/Sex: 1955  (63 y.o. male)   MRN & CSN:  [id number] & [id number] Admission Date/Time: 2020 10:19 AM   Location:  [place] [address] PCP: No primary care provider on file. Vanessa Arias is a 63 y.o.  male  who presents with Respiratory Distress (pt resident of [facility], trach patient, EMS called for low O2 sat and high respiratory and heart rate.  pt reportedly full code, cancer patient)      Assessment and Plan:   Sepsis 2/2 HCAP, Possible Aspiration PNA  Chronic trach on Vent  - IV Vanc + Cefepime + Flagyl  - check blood cx  - check sputum cx  - check urine strep + legionella  - trach suctioning  - nebs  - consult Pulm      Palliative care for code status discussion    Dietician consulted for TF        Skin wounds/right post thigh/buttock and bilateral UE - wound care  Hx of gastric cancer stage 4  Polysubstance abuse history  Hep C, chronic               Diet No diet orders on file   Code Status Prior     Medications:   Medications:    levofloxacin  750 mg Intravenous Once    ipratropium-albuterol  1 ampule Inhalation Q4H WA      Infusions:   PRN Meds:      Current Facility-Administered Medications:     [COMPLETED] cefepime (MAXIPIME) 2 g IVPB minibag, 2 g, Intravenous, Once, Last Rate: 100 mL/hr at 20 1216, 2 g at 20 1216 **AND** levoFLOXacin (LEVAQUIN) 750 MG/150ML infusion 750 mg, 750 mg, Intravenous, Once, Glenford Cabot, MD    ipratropium-albuterol (DUONEB) nebulizer solution 1 ampule, 1 ampule, Inhalation, Q4H WA, Glenford Cabot, MD    Current Outpatient Medications:     magnesium oxide (MAG-OX) 400 MG tablet, Take 1 tablet by mouth daily, Disp: 30 tablet, Rfl: 1    albuterol sulfate  (90 Base) MCG/ACT inhaler, Inhale 4 puffs into the lungs every 4 hours, Disp: 1 Inhaler, Rfl: 3    ipratropium (ATROVENT HFA) 17 MCG/ACT inhaler, Inhale 4 puffs into the lungs every 4 hours, Disp: 1 Inhaler, Rfl: 3   ipratropium-albuterol (DUONEB) 0.5-2.5 (3) MG/3ML SOLN nebulizer solution, Inhale 3 mLs into the lungs every 4 hours as needed for Shortness of Breath, Disp: 360 mL, Rfl:     Heparin Sodium, Porcine, (HEPARIN, PORCINE,) 1000 UNIT/ML injection, Infuse 3 mLs intravenously as needed (CRRT cath maintenance, 1500 each accsess line), Disp:  , Rfl:     scopolamine (TRANSDERM-SCOP) transdermal patch, Place 1 patch onto the skin every 72 hours, Disp:  , Rfl:     ondansetron (ZOFRAN) 4 MG/2ML injection, Infuse 2 mLs intravenously every 6 hours as needed for Nausea or Vomiting, Disp: 84 mL, Rfl:     metroNIDAZOLE (FLAGYL) 5 mg/mL IVPB, Infuse 500 mg intravenously every 8 hours, Disp:  , Rfl:     nicotine (NICODERM CQ) 21 MG/24HR, Place 1 patch onto the skin daily, Disp: 30 patch, Rfl: 3    nystatin (MYCOSTATIN) 137770 UNIT/ML suspension, Take 5 mLs by mouth 4 times daily, Disp:  , Rfl:     midodrine (PROAMATINE) 10 MG tablet, Take 1 tablet by mouth 3 times daily, Disp: 90 tablet, Rfl: 3    pantoprazole (PROTONIX) 40 MG injection, Infuse 40 mg intravenously daily, Disp:  , Rfl:     dicyclomine (BENTYL) 20 MG tablet, Take 1 tablet by mouth every 6 hours as needed (Abdominal Cramping), Disp: 120 tablet, Rfl: 3    Ergocalciferol (DRISDOL) 200 MCG/ML drops, Take 1 mL by mouth daily, Disp: 30 mL, Rfl:     History of present illness     Chief Complaint: Respiratory Distress (pt resident of [facility], trach patient, EMS called for low O2 sat and high respiratory and heart rate. pt reportedly full code, cancer patient)      Alisa Mandel is a 63 y.o.  male  who presents with respiratory distress, elevated HR and low O2. Pt has chronic trach and PEG and hx of Cancer. Pt is nonverbal but is alert and makes eye contact. He has a complicated medical hx and was sent to [facility] in August from [facility] for a prolonged hospital course, has decub wounds. Imaging in ED shows pt has PNA on right side.       Review of Systems : Ten point ROS reviewed and negative, unless as noted above per HPI       Objective:   No intake or output data in the 24 hours ending 11/24/20 1335   Vitals:   Vitals:    11/24/20 1221   BP: (!) 114/91   Pulse: 115   Resp: 21   Temp:    SpO2: 100%     Physical Exam:   Gen:  awake, alert  Head/Eyes:  Normocephalic atraumatic, EOMI   NECK:   trach  LUNGS: Normal Effort on vent  CARDIOVASCULAR:  Normal rate  ABDOMEN:  non distended  MUSCULOSKELETAL:  ROM limited  NEUROLOGIC: Alert and Oriented  SKIN:  Wounds noted on arms, LE      Past Medical History:      Past Medical History:   Diagnosis Date    Acute embolism and thrombosis of deep veins of left upper extremity (HCC)     Acute kidney failure with tubular necrosis (HCC)     Anemia, unspecified     Anxiety     Aphonia     Bacteremia     Drug abuse and dependence ([address]     Dysphagia     Gastrostomy status ([address]     Hemoperitoneum     Hepatitis C without hepatic coma     Hypertension     Idiopathic hypotension     Malignant neoplasm of stomach, unspecified (HCC)     Metabolic encephalopathy     Methicillin susceptible Staphylococcus aureus infection as the cause of diseases classified elsewhere     Muscle weakness     Osteomyelitis ([address]     Thrombocytopenia, unspecified ([address]     Tracheostomy status ([address]     Unspecified severe protein-calorie malnutrition ([address]      PSHX:  has a past surgical history that includes back surgery; IR NONTUNNELED VASCULAR CATHETER > 5 YEARS (7/26/2020); laparotomy (N/A, 7/25/2020); tracheostomy (N/A, 8/10/2020); Gastrostomy tube placement (N/A, 8/10/2020); and IR TUNNELED CVC PLACE WO SQ PORT/PUMP > 5 YEARS (8/12/2020). Allergies: No Known Allergies    FAM HX: Reviewed and non-contributory  Soc HX:   Social History     Socioeconomic History    Marital status:       Spouse name: None    Number of children: None    Years of education: None    Highest education level: None   Occupational History    None   Social Needs  Financial resource strain: None    Food insecurity     Worry: None     Inability: None    Transportation needs     Medical: None     Non-medical: None   Tobacco Use    Smoking status: Former Smoker     Packs/day: 1.50     Types: Cigarettes   Substance and Sexual Activity    Alcohol use: No    Drug use: No    Sexual activity: None   Lifestyle    Physical activity     Days per week: None     Minutes per session: None    Stress: None   Relationships    Social connections     Talks on phone: None     Gets together: None     Attends [employer] service: None     Active member of club or organization: None     Attends meetings of clubs or organizations: None     Relationship status: None    Intimate partner violence     Fear of current or ex partner: None     Emotionally abused: None     Physically abused: None     Forced sexual activity: None   Other Topics Concern    None   Social History Narrative    None       LABS  Recent Labs     11/24/20  1016   WBC 18.4*   HGB 10.9*   HCT 34.3*   *      Recent Labs     11/24/20  1016      K 4.1   CL 99   CO2 26   BUN 28*   CREATININE 0.5*     Recent Labs     11/24/20  1016   AST 78*   ALT 31   BILITOT 0.6   ALKPHOS 151*     No results for input(s): INR in the last 72 hours. Recent Labs     11/24/20  1016   TROPONINT 0.025*        Xr Chest Portable    Result Date: 11/24/2020  EXAMINATION: ONE XRAY VIEW OF THE CHEST 11/24/2020 10:43 am COMPARISON: 08/15/2020 HISTORY: ORDERING SYSTEM PROVIDED HISTORY: SOB, tachycardia, trach patient TECHNOLOGIST PROVIDED HISTORY: Reason for exam:->SOB, tachycardia, trach patient Reason for Exam: resp distress    sob   tachy Acuity: Unknown Type of Exam: Unknown FINDINGS: A tracheostomy tube is in place. A left upper extremity PICC line terminates in the SVC. No pneumothorax is demonstrated. There are new airspace opacities within the right lower lung. No definite pleural effusion or pneumothorax.   The lungs are

## 2020-11-24 NOTE — PROGRESS NOTES
[id number]  consulted by Dr. Leonie Guo for monitoring and adjustment. Indication for treatment: Sepsis 2/2 HCAP/Possible aspiration PNA  Goal trough: 15 mcg/mL     Pertinent Laboratory Values:   Temp Readings from Last 3 Encounters:   11/24/20 101.4 °F (38.6 °C) (Oral)   08/15/20 98.3 °F (36.8 °C) (Oral)   08/10/20 97.3 °F (36.3 °C)     Recent Labs     11/24/20  1016   WBC 18.4*   LACTATE 1.3     Recent Labs     11/24/20  1016   BUN 28*   CREATININE 0.5*     Estimated Creatinine Clearance: 141 mL/min (A) (based on SCr of 0.5 mg/dL (L)). No intake or output data in the 24 hours ending 11/24/20 1600    Pertinent Cultures:  Date    Source    Results  11/24   Covid-19   Negative  11/24   Blood    Ordered  11/24   Urine    Ordered  11/24   Respiratory   Ordered  11/24   Strep/Legionella  Ordered  11/24   MRSA nasal   Ordered    Vancomycin level:   TROUGH:  No results for input(s): VANCOTROUGH in the last 72 hours. RANDOM:  No results for input(s): VANCORANDOM in the last 72 hours. Assessment:  · WBC and temperature: Elevated @ 18.4;  Tmax = 101.4F  · SCr, BUN, and urine output:   · Scr improved compared to levels obtained earlier this year  · Continue to follow renal trends closely  · During previous admission in 8/2020 required RRT 2/2 JERAMIE d/t rhabdo  · Chronic trach  · Day(s) of therapy: #1  · Vancomycin level: to be collected    Plan:  · Vancomycin 1500 mg IVPB x1 loading dose  · Continue patient on vancomycin 1000 mg IVPB q12h  · Level ordered prior to 3rd dose to ensure adequate clearance  · Pharmacy will continue to monitor patient and adjust therapy as indicated    VANCOMYCIN TROUGH SCHEDULED FOR 11/26 @ 5306    Thank you for the consult,  Faustina Winn, PharmD, [facility]  11/24/2020 4:00 PM

## 2020-11-24 NOTE — PROGRESS NOTES
Medication History  [facility]    Patient Name: Luis Manuel Wei 1955     Medication history has been completed by: Chitra Jacobo CPhT    Source(s) of information: Medication list from [facility]      Primary Care Physician: No primary care provider on file. Pharmacy:     Allergies as of 11/24/2020    (No Known Allergies)        Prior to Admission medications    Medication Sig Start Date End Date Taking? Authorizing Provider   bumetanide (BUMEX) 0.5 MG tablet 0.5 mg by Per G Tube route 2 times daily   Yes Historical Provider, MD   HYDROmorphone (DILAUDID) 2 MG tablet 1 mg by Per G Tube route daily as needed for Pain. 11/24/2020 Per nursing home given piror to wound care   Yes Historical Provider, MD   DULoxetine (CYMBALTA) 60 MG extended release capsule 60 mg by Per G Tube route daily   Yes Historical Provider, MD   gabapentin (NEURONTIN) 300 MG capsule 300 mg by Per G Tube route 3 times daily. Yes Historical Provider, MD   hydrOXYzine (ATARAX) 25 MG tablet 25 mg by Per G Tube route every 6 hours   Yes Historical Provider, MD   clonazePAM (KLONOPIN) 1 MG tablet 1 mg by Per G Tube route 3 times daily. Yes Historical Provider, MD   oxyCODONE (ROXICODONE) 5 MG immediate release tablet 5 mg by Per G Tube route every 6 hours as needed for Pain.    Yes Historical Provider, MD   potassium & sodium phosphates (PHOS-NAK) 280-160-250 MG PACK 1 packet by Per G Tube route daily   Yes Historical Provider, MD   Amino Acids-Protein Hydrolys (PRO-STAT) LIQD by Per G Tube route 2 times daily 11/24/2020 Receives 30 ml BID   Yes Historical Provider, MD   hydrOXYzine (VISTARIL) 50 MG capsule 50 mg by Per G Tube route 3 times daily as needed for Itching   Yes Historical Provider, MD   ipratropium (ATROVENT HFA) 17 MCG/ACT inhaler Inhale 4 puffs into the lungs every 4 hours 8/14/20  Yes Claudette Argueta MD   ipratropium-albuterol (DUONEB) 0.5-2.5 (3) MG/3ML SOLN nebulizer solution Inhale 3 mLs into the lungs every 4 hours as needed for Shortness of Breath 8/14/20  Yes Arsenio Granados MD   Heparin Sodium, Porcine, (HEPARIN, PORCINE,) 1000 UNIT/ML injection  Infuse 5,000 Units intravenously every 8 hours 11/24/2020 given for DVT 8/14/20  Yes Arsenio Granados MD   scopolamine (TRANSDERM-SCOP) transdermal patch Place 1 patch onto the skin every 72 hours 8/17/20  Yes Arsenio Granados MD   ondansetron [facility]) 4 MG/2ML injection Infuse 2 mLs intravenously every 6 hours as needed for Nausea or Vomiting 8/14/20  Yes Arsenio Granados MD   nystatin (MYCOSTATIN) 860482 UNIT/ML suspension Take 5 mLs by mouth 4 times daily 8/14/20  Yes Arsenio Granados MD   midodrine (PROAMATINE) 10 MG tablet  5 mg 3 times daily 11/24/2020 Given per G-tube 8/14/20  Yes Arsenio Granados MD   pantoprazole (PROTONIX) 40 MG injection Infuse 40 mg intravenously daily 8/15/20  Yes Arsenio Granados MD   dicyclomine (BENTYL) 20 MG tablet 10 mg by Per G Tube route 4 times daily  8/14/20  Yes Arsenio Granados MD   albuterol sulfate  (90 Base) MCG/ACT inhaler Inhale 4 puffs into the lungs every 4 hours 8/14/20   Arsenio Granados MD       Medications added or changed (ex.  new medication, dosage change, interval change, formulation change):  Dilaudid 1 mg daily before wound care  Duloxetine 60 mg daily (added)  Gabapentin 300 mg TID (added)  Hydroxyzine hcl 25 mg 4 times daily (added)  Hydroxyzine pamoate 50 mg BID (added) verified both meds with nursing home  Clonazepam 1 mg TID (added)  Midodrine dosage change from 10 mg to 5 mg (10 mg ordered)  Oxycodone 5 mg every 6 hours  Phos-Nak packet daily (added)  Pro-stat 30 ml BID (added)    Medications removed from list (include reason, ex. noncompliance, medication cost, therapy complete etc.):   Magnesium oxide patient not receiving per nursing home (ordered)  Ergocalciferol  patient not receiving per nursing home (ordered)  Metronidazole  patient not receiving per nursing home  Nicotine patch  patient not receiving per nursing home    Medications requiring reconciliation with provider:   Dilaudid 1 mg daily before wound care  Duloxetine 60 mg daily (added)  Gabapentin 300 mg TID (added)  Hydroxyzine hcl 25 mg 4 times daily (added)  Hydroxyzine pamoate 50 mg BID (added) verified both meds with nursing home  Clonazepam 1 mg TID (added)  Midodrine dosage change from 10 mg to 5 mg (10 mg ordered)  Oxycodone 5 mg every 6 hours  Phos-Nak packet daily (added)  Pro-stat 30 ml BID (added)   Magnesium oxide patient not receiving per nursing home (ordered)  Ergocalciferol  patient not receiving per nursing home (ordered)    Comments:  Medication list from [facility]   Last dose received marked per nursing staff   Patient on Heparin 5000 units every 8 hours last dose given 11/23/2020 per nursing patient refused 6 am dose.   All meds given per G-tube    To my knowledge the above medication history is accurate as of 11/24/2020 3:24 PM.   Feng Esquivel CPhT   11/24/2020 3:24 PM

## 2020-11-25 ENCOUNTER — APPOINTMENT (OUTPATIENT)
Dept: GENERAL RADIOLOGY | Age: 65
DRG: 720 | End: 2020-11-25
Payer: MEDICARE

## 2020-11-25 LAB
ANION GAP SERPL CALCULATED.3IONS-SCNC: 10 MMOL/L (ref 4–16)
BASE EXCESS MIXED: 1.9 (ref 0–1.2)
BUN BLDV-MCNC: 27 MG/DL (ref 6–23)
CALCIUM SERPL-MCNC: 9.1 MG/DL (ref 8.3–10.6)
CARBON MONOXIDE, BLOOD: 2.5 % (ref 0–5)
CHLORIDE BLD-SCNC: 101 MMOL/L (ref 99–110)
CO2 CONTENT: 27.9 MMOL/L (ref 19–24)
CO2: 25 MMOL/L (ref 21–32)
COMMENT: ABNORMAL
CREAT SERPL-MCNC: 0.4 MG/DL (ref 0.9–1.3)
CULTURE: NORMAL
GFR AFRICAN AMERICAN: >60 ML/MIN/1.73M2
GFR NON-AFRICAN AMERICAN: >60 ML/MIN/1.73M2
GLUCOSE BLD-MCNC: 119 MG/DL (ref 70–99)
HCO3 ARTERIAL: 26.6 MMOL/L (ref 18–23)
HCT VFR BLD CALC: 29.6 % (ref 42–52)
HEMOGLOBIN: 9.2 GM/DL (ref 13.5–18)
LEGIONELLA URINARY AG: NEGATIVE
Lab: NORMAL
MAGNESIUM: 1.8 MG/DL (ref 1.8–2.4)
MCH RBC QN AUTO: 28.3 PG (ref 27–31)
MCHC RBC AUTO-ENTMCNC: 31.1 % (ref 32–36)
MCV RBC AUTO: 91.1 FL (ref 78–100)
METHEMOGLOBIN ARTERIAL: 1.2 %
O2 SATURATION: 95.7 % (ref 96–97)
PCO2 ARTERIAL: 41 MMHG (ref 32–45)
PDW BLD-RTO: 16.6 % (ref 11.7–14.9)
PH BLOOD: 7.42 (ref 7.34–7.45)
PHOSPHORUS: 2.8 MG/DL (ref 2.5–4.9)
PLATELET # BLD: 359 K/CU MM (ref 140–440)
PMV BLD AUTO: 9.5 FL (ref 7.5–11.1)
PO2 ARTERIAL: 115 MMHG (ref 75–100)
POTASSIUM SERPL-SCNC: 4.2 MMOL/L (ref 3.5–5.1)
RBC # BLD: 3.25 M/CU MM (ref 4.6–6.2)
SODIUM BLD-SCNC: 136 MMOL/L (ref 135–145)
SPECIMEN: NORMAL
STREP PNEUMONIAE ANTIGEN: NORMAL
WBC # BLD: 16.1 K/CU MM (ref 4–10.5)

## 2020-11-25 PROCEDURE — 6370000000 HC RX 637 (ALT 250 FOR IP): Performed by: FAMILY MEDICINE

## 2020-11-25 PROCEDURE — 6360000002 HC RX W HCPCS: Performed by: NURSE PRACTITIONER

## 2020-11-25 PROCEDURE — 99213 OFFICE O/P EST LOW 20 MIN: CPT

## 2020-11-25 PROCEDURE — 6370000000 HC RX 637 (ALT 250 FOR IP): Performed by: INTERNAL MEDICINE

## 2020-11-25 PROCEDURE — 87205 SMEAR GRAM STAIN: CPT

## 2020-11-25 PROCEDURE — 85027 COMPLETE CBC AUTOMATED: CPT

## 2020-11-25 PROCEDURE — 6360000002 HC RX W HCPCS: Performed by: FAMILY MEDICINE

## 2020-11-25 PROCEDURE — 6360000002 HC RX W HCPCS: Performed by: INTERNAL MEDICINE

## 2020-11-25 PROCEDURE — 89220 SPUTUM SPECIMEN COLLECTION: CPT

## 2020-11-25 PROCEDURE — 2000000000 HC ICU R&B

## 2020-11-25 PROCEDURE — 36600 WITHDRAWAL OF ARTERIAL BLOOD: CPT

## 2020-11-25 PROCEDURE — C9113 INJ PANTOPRAZOLE SODIUM, VIA: HCPCS | Performed by: FAMILY MEDICINE

## 2020-11-25 PROCEDURE — 94761 N-INVAS EAR/PLS OXIMETRY MLT: CPT

## 2020-11-25 PROCEDURE — 82803 BLOOD GASES ANY COMBINATION: CPT

## 2020-11-25 PROCEDURE — 87086 URINE CULTURE/COLONY COUNT: CPT

## 2020-11-25 PROCEDURE — 94003 VENT MGMT INPAT SUBQ DAY: CPT

## 2020-11-25 PROCEDURE — 2500000003 HC RX 250 WO HCPCS: Performed by: FAMILY MEDICINE

## 2020-11-25 PROCEDURE — 2580000003 HC RX 258: Performed by: FAMILY MEDICINE

## 2020-11-25 PROCEDURE — 80048 BASIC METABOLIC PNL TOTAL CA: CPT

## 2020-11-25 PROCEDURE — 83735 ASSAY OF MAGNESIUM: CPT

## 2020-11-25 PROCEDURE — 36592 COLLECT BLOOD FROM PICC: CPT

## 2020-11-25 PROCEDURE — 71045 X-RAY EXAM CHEST 1 VIEW: CPT

## 2020-11-25 PROCEDURE — 2700000000 HC OXYGEN THERAPY PER DAY

## 2020-11-25 PROCEDURE — 94640 AIRWAY INHALATION TREATMENT: CPT

## 2020-11-25 PROCEDURE — 84100 ASSAY OF PHOSPHORUS: CPT

## 2020-11-25 PROCEDURE — 94750 HC PULMONARY COMPLIANCE STUDY: CPT

## 2020-11-25 RX ORDER — LORAZEPAM 2 MG/ML
0.5 INJECTION INTRAMUSCULAR ONCE
Status: COMPLETED | OUTPATIENT
Start: 2020-11-25 | End: 2020-11-25

## 2020-11-25 RX ORDER — METHYLPREDNISOLONE SODIUM SUCCINATE 40 MG/ML
40 INJECTION, POWDER, LYOPHILIZED, FOR SOLUTION INTRAMUSCULAR; INTRAVENOUS EVERY 12 HOURS
Status: DISCONTINUED | OUTPATIENT
Start: 2020-11-25 | End: 2020-11-28

## 2020-11-25 RX ORDER — MAGNESIUM OXIDE 400 MG/1
400 TABLET ORAL DAILY
Status: DISCONTINUED | OUTPATIENT
Start: 2020-11-26 | End: 2020-12-09 | Stop reason: HOSPADM

## 2020-11-25 RX ADMIN — CEFEPIME 2 G: 2 INJECTION, POWDER, FOR SOLUTION INTRAVENOUS at 04:27

## 2020-11-25 RX ADMIN — IPRATROPIUM BROMIDE 4 PUFF: 17 AEROSOL, METERED RESPIRATORY (INHALATION) at 04:44

## 2020-11-25 RX ADMIN — GABAPENTIN 300 MG: 300 CAPSULE ORAL at 09:13

## 2020-11-25 RX ADMIN — ENOXAPARIN SODIUM 40 MG: 40 INJECTION SUBCUTANEOUS at 09:25

## 2020-11-25 RX ADMIN — GABAPENTIN 300 MG: 300 CAPSULE ORAL at 20:41

## 2020-11-25 RX ADMIN — CHLORHEXIDINE GLUCONATE 0.12% ORAL RINSE 15 ML: 1.2 LIQUID ORAL at 09:13

## 2020-11-25 RX ADMIN — SODIUM CHLORIDE, PRESERVATIVE FREE 10 ML: 5 INJECTION INTRAVENOUS at 20:46

## 2020-11-25 RX ADMIN — ALBUTEROL SULFATE 4 PUFF: 90 AEROSOL, METERED RESPIRATORY (INHALATION) at 04:44

## 2020-11-25 RX ADMIN — METRONIDAZOLE 500 MG: 500 INJECTION, SOLUTION INTRAVENOUS at 16:25

## 2020-11-25 RX ADMIN — NYSTATIN 500000 UNITS: 100000 SUSPENSION ORAL at 16:17

## 2020-11-25 RX ADMIN — HYDROXYZINE HYDROCHLORIDE 25 MG: 25 TABLET, FILM COATED ORAL at 20:46

## 2020-11-25 RX ADMIN — IPRATROPIUM BROMIDE 4 PUFF: 17 AEROSOL, METERED RESPIRATORY (INHALATION) at 00:31

## 2020-11-25 RX ADMIN — CLONAZEPAM 1 MG: 1 TABLET ORAL at 13:27

## 2020-11-25 RX ADMIN — ALBUTEROL SULFATE 4 PUFF: 90 AEROSOL, METERED RESPIRATORY (INHALATION) at 07:27

## 2020-11-25 RX ADMIN — CLONAZEPAM 1 MG: 1 TABLET ORAL at 09:13

## 2020-11-25 RX ADMIN — METRONIDAZOLE 500 MG: 500 INJECTION, SOLUTION INTRAVENOUS at 02:19

## 2020-11-25 RX ADMIN — IPRATROPIUM BROMIDE 4 PUFF: 17 AEROSOL, METERED RESPIRATORY (INHALATION) at 11:09

## 2020-11-25 RX ADMIN — IPRATROPIUM BROMIDE 4 PUFF: 17 AEROSOL, METERED RESPIRATORY (INHALATION) at 20:05

## 2020-11-25 RX ADMIN — DULOXETINE HYDROCHLORIDE 60 MG: 30 CAPSULE, DELAYED RELEASE ORAL at 09:13

## 2020-11-25 RX ADMIN — IPRATROPIUM BROMIDE 4 PUFF: 17 AEROSOL, METERED RESPIRATORY (INHALATION) at 15:35

## 2020-11-25 RX ADMIN — CEFEPIME 2 G: 2 INJECTION, POWDER, FOR SOLUTION INTRAVENOUS at 19:21

## 2020-11-25 RX ADMIN — HYDROXYZINE HYDROCHLORIDE 25 MG: 25 TABLET, FILM COATED ORAL at 09:25

## 2020-11-25 RX ADMIN — HYDROXYZINE HYDROCHLORIDE 25 MG: 25 TABLET, FILM COATED ORAL at 04:25

## 2020-11-25 RX ADMIN — MIDODRINE HYDROCHLORIDE 10 MG: 5 TABLET ORAL at 13:28

## 2020-11-25 RX ADMIN — MIDODRINE HYDROCHLORIDE 10 MG: 5 TABLET ORAL at 16:24

## 2020-11-25 RX ADMIN — NYSTATIN 500000 UNITS: 100000 SUSPENSION ORAL at 13:27

## 2020-11-25 RX ADMIN — OXYCODONE HYDROCHLORIDE 5 MG: 5 TABLET ORAL at 13:28

## 2020-11-25 RX ADMIN — NYSTATIN 500000 UNITS: 100000 SUSPENSION ORAL at 09:13

## 2020-11-25 RX ADMIN — METHYLPREDNISOLONE SODIUM SUCCINATE 40 MG: 40 INJECTION, POWDER, FOR SOLUTION INTRAMUSCULAR; INTRAVENOUS at 22:56

## 2020-11-25 RX ADMIN — VANCOMYCIN HYDROCHLORIDE 1000 MG: 1 INJECTION, POWDER, LYOPHILIZED, FOR SOLUTION INTRAVENOUS at 16:06

## 2020-11-25 RX ADMIN — CHLORHEXIDINE GLUCONATE 0.12% ORAL RINSE 15 ML: 1.2 LIQUID ORAL at 20:45

## 2020-11-25 RX ADMIN — NYSTATIN 500000 UNITS: 100000 SUSPENSION ORAL at 20:42

## 2020-11-25 RX ADMIN — ALBUTEROL SULFATE 4 PUFF: 90 AEROSOL, METERED RESPIRATORY (INHALATION) at 11:09

## 2020-11-25 RX ADMIN — OXYCODONE HYDROCHLORIDE 5 MG: 5 TABLET ORAL at 02:19

## 2020-11-25 RX ADMIN — GABAPENTIN 300 MG: 300 CAPSULE ORAL at 13:28

## 2020-11-25 RX ADMIN — BUMETANIDE 0.5 MG: 1 TABLET ORAL at 20:42

## 2020-11-25 RX ADMIN — ALBUTEROL SULFATE 4 PUFF: 90 AEROSOL, METERED RESPIRATORY (INHALATION) at 15:35

## 2020-11-25 RX ADMIN — MIDODRINE HYDROCHLORIDE 10 MG: 5 TABLET ORAL at 09:25

## 2020-11-25 RX ADMIN — Medication 8000 UNITS: at 13:28

## 2020-11-25 RX ADMIN — SODIUM CHLORIDE: 9 INJECTION, SOLUTION INTRAVENOUS at 16:01

## 2020-11-25 RX ADMIN — LORAZEPAM 0.5 MG: 2 INJECTION INTRAMUSCULAR; INTRAVENOUS at 22:56

## 2020-11-25 RX ADMIN — ACETAMINOPHEN 650 MG: 325 TABLET ORAL at 21:38

## 2020-11-25 RX ADMIN — PROMETHAZINE HYDROCHLORIDE 12.5 MG: 25 TABLET ORAL at 20:41

## 2020-11-25 RX ADMIN — ALBUTEROL SULFATE 4 PUFF: 90 AEROSOL, METERED RESPIRATORY (INHALATION) at 00:31

## 2020-11-25 RX ADMIN — HYDROXYZINE HYDROCHLORIDE 25 MG: 25 TABLET, FILM COATED ORAL at 16:17

## 2020-11-25 RX ADMIN — OXYCODONE HYDROCHLORIDE 5 MG: 5 TABLET ORAL at 20:40

## 2020-11-25 RX ADMIN — ALBUTEROL SULFATE 4 PUFF: 90 AEROSOL, METERED RESPIRATORY (INHALATION) at 20:04

## 2020-11-25 RX ADMIN — CLONAZEPAM 1 MG: 1 TABLET ORAL at 20:42

## 2020-11-25 RX ADMIN — IPRATROPIUM BROMIDE 4 PUFF: 17 AEROSOL, METERED RESPIRATORY (INHALATION) at 07:27

## 2020-11-25 RX ADMIN — BUMETANIDE 0.5 MG: 1 TABLET ORAL at 09:25

## 2020-11-25 ASSESSMENT — PULMONARY FUNCTION TESTS
PIF_VALUE: 22
PIF_VALUE: 17
PIF_VALUE: 21
PIF_VALUE: 33
PIF_VALUE: 20
PIF_VALUE: 21
PIF_VALUE: 19
PIF_VALUE: 20
PIF_VALUE: 23
PIF_VALUE: 21
PIF_VALUE: 32
PIF_VALUE: 19
PIF_VALUE: 21
PIF_VALUE: 28
PIF_VALUE: 26
PIF_VALUE: 22
PIF_VALUE: 23
PIF_VALUE: 27
PIF_VALUE: 21
PIF_VALUE: 22
PIF_VALUE: 17
PIF_VALUE: 22
PIF_VALUE: 32
PIF_VALUE: 19
PIF_VALUE: 20
PIF_VALUE: 17
PIF_VALUE: 18
PIF_VALUE: 37

## 2020-11-25 ASSESSMENT — PAIN SCALES - GENERAL
PAINLEVEL_OUTOF10: 0
PAINLEVEL_OUTOF10: 7
PAINLEVEL_OUTOF10: 7

## 2020-11-25 NOTE — PROGRESS NOTES
11/25/20 0447   Vent Information   Vent Type 980   Vent Mode AC/VC   Vt Ordered 450 mL   Rate Set 12 bmp   Peak Flow 39 L/min   Pressure Support 0 cmH20   FiO2  40 %   Sensitivity 3   PEEP/CPAP 5   I Time/ I Time % 0 s   Humidification Source HME   Vent Patient Data   High Peep/I Pressure 0   Peak Inspiratory Pressure 18 cmH2O   Mean Airway Pressure 10 cmH20   Rate Measured 22 br/min   Vt Exhaled 312 mL   Minute Volume 9.83 Liters   I:E Ratio 1.70:1   Cough/Sputum   Sputum How Obtained Suctioned;Tracheal   $Obtained Sample $Induced Sputum   Cough Productive   Sputum Amount Moderate   Sputum Color Yellow   Tenacity Thick   Spontaneous Breathing Trial (SBT) RT Doc   Pulse 92   Additional Respiratory  Assessments   Resp 23   Alarm Settings   High Pressure Alarm 40 cmH2O   Delay Alarm 20 sec(s)   Low Minute Volume Alarm 2.5 L/min   Apnea (secs) 20 secs   High Respiratory Rate 40 br/min   Low Exhaled Vt  205 mL

## 2020-11-25 NOTE — CONSULTS
[address] [place] [address], [facility]                                  CONSULTATION    PATIENT NAME: Ibrahima Preciado                      :        1955  MED REC NO:   [id number]                          ROOM:       2124  ACCOUNT NO:   [de-identified]                           ADMIT DATE: 2020  PROVIDER:     Cleo Hall MD    CONSULT DATE:  2020    HISTORY OF PRESENT ILLNESS:  The patient is a 61-year-old gentleman who  is a resident of the nursing [place], [facility], who was admitted  through the emergency room with complaints of increasing shortness of  breath and respiratory distress. The patient was hypoxic. He was given  breathing treatment, started on antibiotics. He had a chest x-ray,  which showed right basilar infiltrate, consistent with pneumonia. He  was admitted to the intensive care unit. There is no history of  hemoptysis, hematemesis, nausea, or vomiting. No history of chest  pains. PAST MEDICAL HISTORY:  Significant for chronic respiratory failure, on  the ventilator, CA of the stomach, hypertension, PEG tube placement, and  anxiety disorder. PAST SURGICAL HISTORY:  Remarkable for tracheostomy, tunneled venous  catheter placement, G-tube placement, and back surgery. FAMILY HISTORY:  Noncontributory. SOCIAL HISTORY:  Reveals that he quit smoking several years ago, but has  a history of smoking in the past.  No history of alcohol or drug abuse. MEDICATIONS:  Reviewed. ALLERGIES:  He has no known allergies. REVIEW OF SYSTEMS:  Unobtainable at this time. PHYSICAL EXAMINATION:  GENERAL:  The patient is sedated on the vent, in no acute respiratory  distress. VITAL SIGNS:  His blood pressure is 126/85 mmHg, pulse of 97 per minute,  and respiratory rate of 18 per minute. He is afebrile, his T-max was  101.4 degrees Fahrenheit. HEENT:  Reveals presence of tracheostomy. NECK:  No JVD. The neck is supple. LUNGS:  Revealed diminished breath sounds and basilar crackles. HEART:  Showed normal S1 and S2. There was no S3 or S4 noted. ABDOMEN:  Benign. There is no evidence of any organomegaly. The bowel  sounds are present. NEUROLOGIC:  Reveals that he is sedated on the vent. LABORATORY AND DIAGNOSTIC DATA:  His chest x-ray showed right basilar  infiltrate. His electrolytes were unremarkable. His CBC showed a white  count of 16.1, hemoglobin 9.2, hematocrit of 26.6. His sputum culture  is growing Gram-negative bacilli and Gram-positive cocci. His magnesium  was 1.7. His COVID-19 was negative. His venous blood gases showed a pH  of 7.43, pCO2 of 45, pO2 of 150 with 93% saturation. IMPRESSION:  1. Acute-on-chronic respiratory failure secondary to right lower lobe  pneumonia. 2.  Right lower lobe pneumonia. 3.  COPD. 4.  Chronic respiratory failure, on the ventilator. PLAN:  1. Continue full ventilator support. 2.  Continue broad-spectrum antibiotic coverage. 3.  Sputum for culture and sensitivity. 4.  Solu-Medrol 40 mg q.12 h.  5.  Check sensitivities on sputum. 6.  Supplement magnesium. 7.  Check phosphorous. 8.  As per orders.         Albert Arriaga MD    D: 11/25/2020 10:34:39       T: 11/25/2020 11:20:02     /[email or web address]  Job#: [id number]     Doc#: [id number]    CC:

## 2020-11-25 NOTE — DISCHARGE INSTR - COC
Continuity of Care Form    Patient Name: Luis Manuel Wei   :  1955  MRN:  [id number]    Admit date:  2020  Discharge date:  2020    Code Status Order: Full Code   Advance Directives:   [facility] Documentation       Date/Time Healthcare Directive Type of Healthcare Directive Copy in [address] Agent's Name Healthcare Agent's Phone Number    20 1800  No, patient does not have an advance directive for healthcare treatment -- -- -- -- --            Admitting Physician:  Claudette Argueta MD  PCP: No primary care provider on file. Discharging Nurse: Vasiliy Payan RN  [address] Unit/Room#: 2263/1900-V  Discharging Unit Phone Number: [phone]    Emergency Contact:   Extended Emergency Contact Information  Primary Emergency Contact: Micah Simental [address], [facility] [address] Phone: [phone]  Mobile Phone: [phone]  Relation: Child  Secondary Emergency Contact: [address] [place] [address] Phone: [phone]  Mobile Phone: [phone]  Relation: Other    Past Surgical History:  Past Surgical History:   Procedure Laterality Date    BACK SURGERY      GASTROSTOMY TUBE PLACEMENT N/A 8/10/2020    EGD PEG TUBE PLACEMENT performed by Chidi Ziegler MD at [facility] IR NONTUNNELED VASCULAR CATHETER  2020    IR NONTUNNELED VASCULAR CATHETER 2020 [facility] PROCEDURES    IR TUNNELED [address] 5 YEARS  2020    IR TUNNELED CATHETER PLACEMENT GREATER THAN 5 YEARS 2020 [facility] SPECIAL PROCEDURES    LAPAROTOMY N/A 2020    LAPAROTOMY EXPLORATORY performed by Chidi Ziegler MD at [address] [place] [address] 8/10/2020    TRACHEOTOMY performed by Chidi Ziegler MD at [facility] OR       Immunization History: There is no immunization history on file for this patient.     Active Problems:  Patient Active Problem List   Diagnosis Code    Sepsis ([facility] [address] A41.9    Leukocytosis D72.829    Ischemic bowel disease ([facility] [address] K55.9    MSSA bacteremia R78.81, B95.61    Chronic hepatitis C without hepatic coma (HCC) B18.2    Respiratory failure (HCC) J96.90    HCAP (healthcare-associated pneumonia) J18.9       Isolation/Infection:   Isolation            No Isolation          Patient Infection Status       Infection Onset Added Last Indicated Last Indicated By Review Planned Expiration Resolved Resolved By    None active    Resolved    COVID-19 Rule Out 11/24/20 11/24/20 11/24/20 COVID-19 (Ordered)   11/24/20 Rule-Out Test Resulted    C-diff Rule Out 08/01/20 08/01/20 08/03/20 C difficile Molecular/PCR (Ordered)   08/03/20 Rule-Out Test Resulted            Nurse Assessment:  Last Vital Signs: /85   Pulse 97   Temp 98.1 °F (36.7 °C) (Rectal)   Resp 21   Ht 5' 7\" (1.702 m)   Wt 147 lb (66.7 kg)   SpO2 97%   BMI 23.02 kg/m²     Last documented pain score (0-10 scale): Pain Level: 7  Last Weight:   Wt Readings from Last 1 Encounters:   11/24/20 147 lb (66.7 kg)     Mental Status:  disoriented    IV Access:  - PICC - site  L Upper Arm, insertion date: ***    Nursing Mobility/ADLs:  Walking   Dependent  Transfer  Dependent  Bathing  Dependent  Dressing  Dependent  Toileting  Dependent  Feeding  Dependent  Med Admin  Dependent  Med Delivery   PEG tube    Wound Care Documentation and Therapy:  Wound 07/26/20 Arm Left; Lower (Active)   Number of days: 121       Wound 07/26/20 Arm Right; Lower (Active)   Number of days: 121       Wound 07/27/20 Shoulder Left (Active)   Number of days: 121       Wound 07/27/20 Hip Left (Active)   Number of days: 121       Wound 07/27/20 Sacrum (Active)   Number of days: 121       Wound 08/04/20 Face Left small, open wound from pressure of the ETT raza (Active)   Number of days: 113       Wound 08/10/20 Perineum Right; Inner (Active)   Number of days: 106        Elimination:  Continence:   · Bowel: No  · Bladder: No  Urinary Catheter:  Insertion Date: 11/24/2020 Colostomy/Ileostomy/Ileal Conduit: No       Date of Last BM: 12/8/2020    Intake/Output Summary (Last 24 hours) at 11/25/2020 1055  Last data filed at 11/25/2020 0642  Gross per 24 hour   Intake 1944.75 ml   Output 725 ml   Net 1219.75 ml     I/O last 3 completed shifts: In: 1944.8 [I.V.:1258.8; IV Piggyback:686]  Out: 725 [Urine:725]    Safety Concerns:     [employer] Safety Concerns:[id number]}    Impairments/Disabilities:      [employer] Impairments/Disabilities:[id number]}    Patient's personal belongings (please select all that are sent with patient):  {CHP DME Belongings:[id number]}    RN SIGNATURE:  {Esignature:[id number]}    CASE MANAGEMENT/SOCIAL WORK SECTION    Inpatient Status Date: ***    Readmission Risk Assessment Score:  Readmission Risk              Risk of Unplanned Readmission:        27         Discharging to Facility/ Agency   · Name: [facility]  · Address:  · XYVTJ:[phone]  · Fax:      / signature: Electronically signed by Terell Orellana RN on 12/8/20 at 9:31 AM EST    PHYSICIAN SECTION    Nutrition Therapy:  Current Nutrition Therapy:   - Tube Feedings:   vital 1.2 60cc/hr    Routes of Feeding: Gastrostomy Tube  Liquids: No Restrictions  Daily Fluid Restriction: no  Last Modified Barium Swallow with Video (Video Swallowing Test): not done    Treatments at the Time of Hospital Discharge:   Respiratory Treatments: duonebs q4hrs prn, trach suctioning prn  Oxygen Therapy:  is not on home oxygen therapy. Ventilator:    - Ventilator Settings:    Vt Ordered: 450 mL  Rate Set: 12 bmp  FiO2 : 40 %    PEEP/CPAP: 5  Pressure Support: 0 cmH20    Rehab Therapies: Speech/Language Therapy  Weight Bearing Status/Restrictions: No weight bearing restirctions      Prognosis: Fair    Condition at Discharge: Stable    Rehab Potential (if transferring to Rehab):  Fair    Recommended Labs or Other Treatments After Discharge: IV Cefepime 2g q12hrs x 5 days    Wound Care: Wound care: Right arm/left arm cleanse with NS, apply Xeroform, abd, kerlix change daily and prn. Right toes/foot cluster and left toes/foot cluster cleanse with NS, apply Aquacel AG between toes, Optifoam AG to wounds, cover with abd, kerlix change every 2 days and prn. Sacrum cluster/mid back/right back cluster/ cleanse with NS, apply Aquacel AG, silicone border gauze change daily and prn. Left hip cleanse with NS apply santyl ointment NS moist gauze optifoam border change daily and prn. Bilateral lower legs/feet wash with Alphabath, rinse and dry every 2 days with dressing change to feet. Pt is at high risk for skin breakdown AEB Hugh. Follow Hugh orders. Physician Certification: I certify the above information and transfer of Vanessa Arias  is necessary for the continuing treatment of the diagnosis listed and that he requires [facility] for greater 30 days.      Update Admission H&P: No change in H&P    PHYSICIAN SIGNATURE:  Electronically signed by Luis Felipe Roberts MD on 12/8/20 at 9:41 AM EST

## 2020-11-25 NOTE — CARE COORDINATION
Pt is a resident of [address]. Pt is currently intubated and on the vent. Pt had trach and peg at admissions. Pt's insurance is expected to need precert for return to NH at discharge therefore advance notice of expected discharge is appreciated. Cm attempted to contact pt's son with voice mail message left requesting return call to confirm plan for pt return to [address] at [address] [facility]. [facility] Serve message to Dr Chantel Dupree requesting possible palliative care consult.

## 2020-11-25 NOTE — CONSULTS
LUE PICC returns blood briskly - 10ml wasted per protocol, lab specimen collected/sent, turbulent flush performed, new LuerLock cap applied, and infusion reinitiated. No other needs noted/reported.

## 2020-11-25 NOTE — PROGRESS NOTES
D/W Pt son (POA), he is interested in Hospice care but still wants to d/w rest of family to decide if they want to take pt off ventilator. They will think about it and let us now.

## 2020-11-25 NOTE — PROGRESS NOTES
Hospitalist Progress Note      Name:  Rajinder Ramirez /Age/Sex: 1955  (63 y.o. male)   MRN & CSN:  [id number] & [id number] Admission Date/Time: 2020 10:19 AM   Location:  -A PCP: No primary care provider on file. Rajinder Ramirez is a 63 y.o.  male  who presents with Respiratory Distress (pt resident of [facility], trach patient, EMS called for low O2 sat and high respiratory and heart rate.  pt reportedly full code, cancer patient)      Assessment and Plan:   Sepsis 2/2 HCAP, Possible Aspiration PNA  Chronic trach on Vent  - IV Vanc + Cefepime + Flagyl  - check blood cx  - check sputum cx  - check urine strep + legionella; negative  - trach suctioning prn  - nebs  - IVF decrease  - consult Pulm    Possible UTI  - UA noted  - check urine cx  - abx as above        Palliative care for code status discussion     Dietician consulted for TF           Skin wounds/right post thigh/buttock and bilateral UE - wound care  Hx of gastric cancer stage 4  Polysubstance abuse history  Hep C, chronic               Diet DIET TUBE FEED CONTINUOUS/CYCLIC NPO; Semi-elemental (Vital AF 1.2 Waldo); Gastrostomy; Continuous; 20; 60; 24   Code Status Full Code     Medications:   Medications:    methylPREDNISolone  40 mg Intravenous Q12H    [START ON 2020] scopolamine  1 patch Transdermal Q72H    pantoprazole  40 mg Intravenous Daily    nystatin  5 mL Oral 4x Daily    midodrine  10 mg Oral TID    magnesium oxide  400 mg Oral Daily    Ergocalciferol  8,000 Units Oral Daily    sodium chloride flush  10 mL Intravenous 2 times per day    enoxaparin  40 mg Subcutaneous Daily    vancomycin  15 mg/kg Intravenous Q18H    cefepime  2 g Intravenous Q8H    clonazePAM  1 mg Per G Tube TID    bumetanide  0.5 mg Per G Tube BID    DULoxetine  60 mg Per G Tube Daily    gabapentin  300 mg Per G Tube TID    hydrOXYzine  25 mg Per G Tube Q6H    metroNIDAZOLE  500 mg Intravenous Q8H    albuterol sulfate HFA  4 puff Inhalation Q4H    And    ipratropium  4 puff Inhalation Q4H    chlorhexidine  15 mL Mouth/Throat BID      Infusions:    sodium chloride 75 mL/hr at 11/24/20 1722     PRN Meds: dicyclomine, 20 mg, Q6H PRN  sodium chloride flush, 10 mL, PRN  acetaminophen, 650 mg, Q6H PRN    Or  acetaminophen, 650 mg, Q6H PRN  polyethylene glycol, 17 g, Daily PRN  promethazine, 12.5 mg, Q6H PRN    Or  ondansetron, 4 mg, Q6H PRN  oxyCODONE, 5 mg, Q6H PRN  HYDROmorphone, 1 mg, Daily PRN  magnesium sulfate, 1 g, PRN      Subjective:   No distress awake and alert    Objective: Intake/Output Summary (Last 24 hours) at 11/25/2020 1118  Last data filed at 11/25/2020 6392  Gross per 24 hour   Intake 1944.75 ml   Output 725 ml   Net 1219.75 ml      Vitals:   Vitals:    11/25/20 1112   BP:    Pulse:    Resp: 23   Temp:    SpO2: 99%     Physical Exam:   Gen:  awake, alert, no apparent distress  Head/Eyes:  Normocephalic atraumatic, EOMI   NECK:   On trach  LUNGS: Normal Effort   CARDIOVASCULAR:  Normal rate  ABDOMEN:  non distended  MUSCULOSKELETAL:  ROM limited  NEUROLOGIC: Alert and Oriented,  Cranial nerves II-XII are grossly intact.    SKIN:  no bruising or bleeding, normal skin color,  no redness      Data:       CBC   Recent Labs     11/24/20  1016 11/25/20  0505   WBC 18.4* 16.1*   HGB 10.9* 9.2*   HCT 34.3* 29.6*   * 359      BMP   Recent Labs     11/24/20  1016 11/24/20  1650 11/25/20  0505     --  136   K 4.1  --  4.2   CL 99  --  101   CO2 26  --  25   PHOS  --  3.0 2.8   BUN 28*  --  27*   CREATININE 0.5*  --  0.4*         Electronically signed by Mei Travis MD on 11/25/2020 at 11:18 AM

## 2020-11-25 NOTE — CONSULTS
Comprehensive Nutrition Assessment    Type and Reason for Visit:  Initial, Consult(TF order/mgt)    Nutrition Recommendations/Plan:   · Begin EN with semi-elemental formula (Vital AF 1.2 Waldo) progressed to a goal of 60 ml/hr to provide 1728 kcal and 108 gm protein  · No Kefir   · Nutrition focused physical exam for malnutrition at reassess    Nutrition Assessment:  Pt non verbal with trach/PEG, h/o stg 4 gastric CA, dysphagia, admitted with PNA and chronic pressure injury/DTI from SNF. Called [facility] for recent EN regimen, Osmolite 1.5 Waldo at 60 ml/hr plus Pro-Stat Liquid Protein 30 ml bid via PEG. Will order higher protein formula EN here to meet increased needs for wound healing  and continue to follow as high risk. Malnutrition Assessment:  Malnutrition Status:  Insufficient data    Context:  (Pt appears malnourished, receiving care during visit)       Estimated Daily Nutrient Needs:  Energy (kcal):  0605-7137 (25-30 kcal/kg IBW); Weight Used for Energy Requirements:  Ideal     Protein (g):   (1.2-1.5 g/kg IBW); Weight Used for Protein Requirements:  Ideal        Fluid (ml/day):  3417-0481 (1 ml/kcal);  Method Used for Fluid Requirements:  1 ml/kcal      Wounds:  Multiple, Pressure Injury, Deep Tissue Injury, Wound Consult Pending       Current Nutrition Therapies:    Diet NPO Effective Now    Anthropometric Measures:  · Height: 5' 7\" (170.2 cm)  · Current Body Weight: 147 lb (66.7 kg)(?)   · Usual Body Weight: 147 lb 0.8 oz (66.7 kg)(7/24/20)     · Ideal Body Weight: 148 lbs; % Ideal Body Weight 99.3 %   · BMI: 23  · BMI Categories: Normal Weight (BMI 22.0 to 24.9) age over 72       Nutrition Diagnosis:   · Inadequate oral intake related to swallowing difficulty as evidenced by nutrition support - enteral nutrition    Nutrition Interventions:   Food and/or Nutrient Delivery:  Start Tube Feeding  Nutrition Education/Counseling:  Education not appropriate   Coordination of Nutrition Care:  Continue to monitor while inpatient    Goals:  Pt will meet greater than 75% of estimated nutrient needs via EN       Nutrition Monitoring and Evaluation:   Behavioral-Environmental Outcomes:  None Identified   Food/Nutrient Intake Outcomes:  Enteral Nutrition Intake/Tolerance, IVF Intake  Physical Signs/Symptoms Outcomes:  Biochemical Data, GI Status, Nausea or Vomiting, Nutrition Focused Physical Findings, Skin, Weight     Discharge Planning:    Enteral Nutrition     Electronically signed by Margo Castañeda RD, NORMAN on 11/25/20 at 9:18 AM EST    Contact: [phone]

## 2020-11-25 NOTE — CONSULTS
[address] Continence Nurse  Consult Note       Karley Licona  AGE: 63 y.o.    GENDER: male  : 1955  TODAY'S DATE:  2020    Subjective:     Reason for  Evaluation and Assessment: wound assessment      Karley Licona is a 63 y.o. male referred by:   [x] Physician  [] Nursing  [] Other:     Wound Identification:  Wound Type: pressure, traumatic and undetermined  Contributing Factors: chronic pressure, decreased mobility, decreased tissue oxygenation, malnutrition and incontinence of stool        PAST MEDICAL HISTORY        Diagnosis Date    Acute embolism and thrombosis of deep veins of left upper extremity (HCC)     Acute kidney failure with tubular necrosis (HCC)     Anemia, unspecified     Anxiety     Aphonia     Bacteremia     Drug abuse and dependence ([address]     Dysphagia     Gastrostomy status ([address]     Hemoperitoneum     Hepatitis C without hepatic coma     Hypertension     Idiopathic hypotension     Malignant neoplasm of stomach, unspecified (HCC)     Metabolic encephalopathy     Methicillin susceptible Staphylococcus aureus infection as the cause of diseases classified elsewhere     Muscle weakness     Osteomyelitis (HCC)     Thrombocytopenia, unspecified ([address]     Tracheostomy status ([address]     Unspecified severe protein-calorie malnutrition ([address]        PAST SURGICAL HISTORY    Past Surgical History:   Procedure Laterality Date    BACK SURGERY      GASTROSTOMY TUBE PLACEMENT N/A 8/10/2020    EGD PEG TUBE PLACEMENT performed by Ary Hinojosa MD at [place] [address] IR NONTUNNELED VASCULAR CATHETER  2020    [facility]ED[facility]    IR TUNNELED CATHETER PLACEMENT GREATER THAN 5 YEARS  2020    IR TUNNELED CATHETER PLACEMENT GREATER THAN 5 YEARS 2020 [facility] SPECIAL PROCEDURES    LAPAROTOMY N/A 2020    LAPAROTOMY EXPLORATORY performed by Ary Hinojosa MD at [address] [place] [address] 8/10/2020    TRACHEOTOMY performed by Darlin Becerril MD at [address] [place] [address]    History reviewed. No pertinent family history. SOCIAL HISTORY    Social History     Tobacco Use    Smoking status: Former Smoker     Packs/day: 1.50     Types: Cigarettes   Substance Use Topics    Alcohol use: No    Drug use: No       ALLERGIES    No Known Allergies    MEDICATIONS    No current facility-administered medications on file prior to encounter. Current Outpatient Medications on File Prior to Encounter   Medication Sig Dispense Refill    bumetanide (BUMEX) 0.5 MG tablet 0.5 mg by Per G Tube route 2 times daily      HYDROmorphone (DILAUDID) 2 MG tablet 1 mg by Per G Tube route daily as needed for Pain. 11/24/2020 Per nursing home given piror to wound care      DULoxetine (CYMBALTA) 60 MG extended release capsule 60 mg by Per G Tube route daily      gabapentin (NEURONTIN) 300 MG capsule 300 mg by Per G Tube route 3 times daily.  hydrOXYzine (ATARAX) 25 MG tablet 25 mg by Per G Tube route every 6 hours      clonazePAM (KLONOPIN) 1 MG tablet 1 mg by Per G Tube route 3 times daily.  oxyCODONE (ROXICODONE) 5 MG immediate release tablet 5 mg by Per G Tube route every 6 hours as needed for Pain.       potassium & sodium phosphates (PHOS-NAK) 280-160-250 MG PACK 1 packet by Per G Tube route daily      Amino Acids-Protein Hydrolys (PRO-STAT) LIQD by Per G Tube route 2 times daily 11/24/2020 Receives 30 ml BID      hydrOXYzine (VISTARIL) 50 MG capsule 50 mg by Per G Tube route 3 times daily as needed for Itching      ipratropium (ATROVENT HFA) 17 MCG/ACT inhaler Inhale 4 puffs into the lungs every 4 hours 1 Inhaler 3    ipratropium-albuterol (DUONEB) 0.5-2.5 (3) MG/3ML SOLN nebulizer solution Inhale 3 mLs into the lungs every 4 hours as needed for Shortness of Breath 360 mL     Heparin Sodium, Porcine, (HEPARIN, PORCINE,) 1000 UNIT/ML injection Infuse 3 mLs intravenously as needed (CRRT cath maintenance, 1500 each accsess line) (Patient taking differently: Infuse 5,000 Units intravenously every 8 hours 11/24/2020 given for DVT)      scopolamine (TRANSDERM-SCOP) transdermal patch Place 1 patch onto the skin every 72 hours      ondansetron (ZOFRAN) 4 MG/2ML injection Infuse 2 mLs intravenously every 6 hours as needed for Nausea or Vomiting 84 mL     nystatin (MYCOSTATIN) 377804 UNIT/ML suspension Take 5 mLs by mouth 4 times daily      midodrine (PROAMATINE) 10 MG tablet Take 1 tablet by mouth 3 times daily (Patient taking differently: 5 mg 3 times daily 11/24/2020 Given per G-tube) 90 tablet 3    pantoprazole (PROTONIX) 40 MG injection Infuse 40 mg intravenously daily      dicyclomine (BENTYL) 20 MG tablet Take 1 tablet by mouth every 6 hours as needed (Abdominal Cramping) (Patient taking differently: 10 mg by Per G Tube route 4 times daily ) 120 tablet 3    albuterol sulfate  (90 Base) MCG/ACT inhaler Inhale 4 puffs into the lungs every 4 hours 1 Inhaler 3         Objective:      /85   Pulse 95   Temp 98.1 °F (36.7 °C) (Rectal)   Resp 23   Ht 5' 7\" (1.702 m)   Wt 147 lb (66.7 kg)   SpO2 99%   BMI 23.02 kg/m²   Hugh Risk Score: Hugh Scale Score: 12    LABS    CBC:   Lab Results   Component Value Date    WBC 16.1 11/25/2020    RBC 3.25 11/25/2020    HGB 9.2 11/25/2020    HCT 29.6 11/25/2020    MCV 91.1 11/25/2020    MCH 28.3 11/25/2020    MCHC 31.1 11/25/2020    RDW 16.6 11/25/2020     11/25/2020    MPV 9.5 11/25/2020     CMP:    Lab Results   Component Value Date     11/25/2020    K 4.2 11/25/2020     11/25/2020    CO2 25 11/25/2020    BUN 27 11/25/2020    CREATININE 0.4 11/25/2020    GFRAA >60 11/25/2020    LABGLOM >60 11/25/2020    GLUCOSE 119 11/25/2020    PROT 9.0 11/24/2020    PROT 8.4 11/06/2010    LABALBU 2.8 11/24/2020    CALCIUM 9.1 11/25/2020    BILITOT 0.6 11/24/2020    ALKPHOS 151 11/24/2020    AST 78 11/24/2020    ALT 31 11/24/2020     Albumin:    Lab Results   Component Value Date    LABALBU 2.8 11/24/2020     PT/INR:    Lab Results   Component Value Date    PROTIME 15.6 08/15/2020    INR 1.29 08/15/2020     HgBA1c:  No results found for: LABA1C      Assessment:     Patient Active Problem List   Diagnosis    Sepsis ([address]    Leukocytosis    Ischemic bowel disease ([address]    MSSA bacteremia    Chronic hepatitis C without hepatic coma ([address]    Respiratory failure ([address]    HCAP (healthcare-associated pneumonia)       Measurements:  Wound 07/26/20 Arm Left; Lower (Active)   Number of days: 121       Wound 07/26/20 Arm Right; Lower (Active)   Number of days: 121       Wound 07/27/20 Shoulder Left (Active)   Number of days: 121       Wound 07/27/20 Hip Left (Active)   Number of days: 121       Wound 07/27/20 Sacrum (Active)   Number of days: 121       Wound 08/04/20 Face Left small, open wound from pressure of the ETT raza (Active)   Number of days: 113       Wound 08/10/20 Perineum Right; Inner (Active)   Number of days: 106       Wound 11/25/20 Radial Left (Active)   Number of days: 0       Wound 11/25/20 Radial Right (Active)   Number of days: 0       Wound 11/25/20 Hip Left (Active)   Number of days: 0       Wound 11/25/20 Foot Left great toe to 5th toe cluster (Active)   Number of days: 0       Wound 11/25/20 Foot Anterior;Right great toe to 5th toe cluster (Active)   Number of days: 0       Wound 11/25/20 Back Mid (Active)   Number of days: 0       Wound 11/25/20 Back Lateral;Right cluster (Active)   Number of days: 0       Wound 11/25/20 Coccyx cluster (Active)   Number of days: 0       Response to treatment:  Well tolerated by patient. Pain Assessment:  Severity:  none  Quality of pain: na  Wound Pain Timing/Severity: na  Premedicated: no    Plan:     Plan of Care:       Pt in bed. Trach. Last seen by inpatient wound care on 8/11/20. From [facility]. Bilateral arm wounds improved. Traumatic from IV drug use. Pressure wounds noted to left hip/sacrum/mid back/right back.  Bilateral lower legs and feet with dry and scaly skin peeling off. Both feet cool. Pedal pulses palpable bilaterally. Toes with ecchymosis. Left great toenail close to falling off. Cluster of wounds noted and moist between toes. Undetermined etiology. Nurse saw and will relay to MD. All wounds pictured and measured. Bilateral legs and feet washed with Alphabath, rinsed, and dried. Aquacel AG applied between toes and Optifoam AG applied to wounds. Covered with abd, kerlix. Bilateral arms treated with collagen, Xeroform, abd, kerlix. Left hip NS moist gauze and optifoam border. Sacrum/mid back/right back Aquacel AG filled into wounds and covered with silicone border gauzes. Positioned to right side with heels floated with pillow support. Recommend surgeon evaluate toes if treatment pursued. Possible palliative/hospice per nurse report. Pt is at high risk for skin breakdown AEB Hugh. Follow Hugh orders. Specialty Bed Required : yes  [x] Low Air Loss   [x] Pressure Redistribution  [] Fluid Immersion  [] Bariatric  [] Total Pressure Relief  [] Other:     Discharge Plan:  Placement for patient upon discharge: tbd  Hospice Care: tbd  Patient appropriate for Outpatient [address]: [facility]    Patient/Caregiver Teaching:  Level of patient/caregiver understanding able to:   No evidence of learning.         Electronically signed by Adán Lennon RN,  on 11/25/2020 at 12:59 PM

## 2020-11-26 ENCOUNTER — APPOINTMENT (OUTPATIENT)
Dept: GENERAL RADIOLOGY | Age: 65
DRG: 720 | End: 2020-11-26
Payer: MEDICARE

## 2020-11-26 LAB
ANION GAP SERPL CALCULATED.3IONS-SCNC: 9 MMOL/L (ref 4–16)
BASE EXCESS: 1 (ref 0–3.3)
BUN BLDV-MCNC: 39 MG/DL (ref 6–23)
CALCIUM SERPL-MCNC: 9.2 MG/DL (ref 8.3–10.6)
CARBON MONOXIDE, BLOOD: 1.8 % (ref 0–5)
CHLORIDE BLD-SCNC: 108 MMOL/L (ref 99–110)
CO2 CONTENT: 24.9 MMOL/L (ref 19–24)
CO2: 25 MMOL/L (ref 21–32)
COMMENT: ABNORMAL
CREAT SERPL-MCNC: 0.5 MG/DL (ref 0.9–1.3)
CULTURE: ABNORMAL
CULTURE: NORMAL
DOSE AMOUNT: NORMAL
DOSE TIME: NORMAL
GFR AFRICAN AMERICAN: >60 ML/MIN/1.73M2
GFR NON-AFRICAN AMERICAN: >60 ML/MIN/1.73M2
GLUCOSE BLD-MCNC: 160 MG/DL (ref 70–99)
GRAM SMEAR: ABNORMAL
HCO3 ARTERIAL: 23.7 MMOL/L (ref 18–23)
HCT VFR BLD CALC: 26.9 % (ref 42–52)
HEMOGLOBIN: 8.3 GM/DL (ref 13.5–18)
Lab: ABNORMAL
Lab: NORMAL
MAGNESIUM: 1.9 MG/DL (ref 1.8–2.4)
MCH RBC QN AUTO: 27.9 PG (ref 27–31)
MCHC RBC AUTO-ENTMCNC: 30.9 % (ref 32–36)
MCV RBC AUTO: 90.6 FL (ref 78–100)
METHEMOGLOBIN ARTERIAL: 1.2 %
O2 SATURATION: 95.2 % (ref 96–97)
PCO2 ARTERIAL: 40 MMHG (ref 32–45)
PDW BLD-RTO: 16.4 % (ref 11.7–14.9)
PH BLOOD: 7.38 (ref 7.34–7.45)
PHOSPHORUS: 2.7 MG/DL (ref 2.5–4.9)
PLATELET # BLD: 396 K/CU MM (ref 140–440)
PMV BLD AUTO: 9.5 FL (ref 7.5–11.1)
PO2 ARTERIAL: 96 MMHG (ref 75–100)
POTASSIUM SERPL-SCNC: 3.7 MMOL/L (ref 3.5–5.1)
PRO-BNP: 530.3 PG/ML
PROCALCITONIN: 0.81
RBC # BLD: 2.97 M/CU MM (ref 4.6–6.2)
SODIUM BLD-SCNC: 142 MMOL/L (ref 135–145)
SPECIMEN: ABNORMAL
SPECIMEN: NORMAL
VANCOMYCIN TROUGH: 12.2 UG/ML (ref 10–20)
WBC # BLD: 14.7 K/CU MM (ref 4–10.5)

## 2020-11-26 PROCEDURE — 6360000002 HC RX W HCPCS: Performed by: FAMILY MEDICINE

## 2020-11-26 PROCEDURE — 83735 ASSAY OF MAGNESIUM: CPT

## 2020-11-26 PROCEDURE — 94750 HC PULMONARY COMPLIANCE STUDY: CPT

## 2020-11-26 PROCEDURE — C9113 INJ PANTOPRAZOLE SODIUM, VIA: HCPCS | Performed by: FAMILY MEDICINE

## 2020-11-26 PROCEDURE — 94003 VENT MGMT INPAT SUBQ DAY: CPT

## 2020-11-26 PROCEDURE — 2000000000 HC ICU R&B

## 2020-11-26 PROCEDURE — 36600 WITHDRAWAL OF ARTERIAL BLOOD: CPT

## 2020-11-26 PROCEDURE — 80048 BASIC METABOLIC PNL TOTAL CA: CPT

## 2020-11-26 PROCEDURE — 85027 COMPLETE CBC AUTOMATED: CPT

## 2020-11-26 PROCEDURE — 89220 SPUTUM SPECIMEN COLLECTION: CPT

## 2020-11-26 PROCEDURE — 6370000000 HC RX 637 (ALT 250 FOR IP): Performed by: INTERNAL MEDICINE

## 2020-11-26 PROCEDURE — 2500000003 HC RX 250 WO HCPCS: Performed by: FAMILY MEDICINE

## 2020-11-26 PROCEDURE — 94761 N-INVAS EAR/PLS OXIMETRY MLT: CPT

## 2020-11-26 PROCEDURE — 6360000002 HC RX W HCPCS: Performed by: INTERNAL MEDICINE

## 2020-11-26 PROCEDURE — 71045 X-RAY EXAM CHEST 1 VIEW: CPT

## 2020-11-26 PROCEDURE — 82803 BLOOD GASES ANY COMBINATION: CPT

## 2020-11-26 PROCEDURE — 2700000000 HC OXYGEN THERAPY PER DAY

## 2020-11-26 PROCEDURE — 80202 ASSAY OF VANCOMYCIN: CPT

## 2020-11-26 PROCEDURE — 6370000000 HC RX 637 (ALT 250 FOR IP): Performed by: FAMILY MEDICINE

## 2020-11-26 PROCEDURE — 84100 ASSAY OF PHOSPHORUS: CPT

## 2020-11-26 PROCEDURE — 2580000003 HC RX 258: Performed by: FAMILY MEDICINE

## 2020-11-26 PROCEDURE — 94640 AIRWAY INHALATION TREATMENT: CPT

## 2020-11-26 PROCEDURE — 36415 COLL VENOUS BLD VENIPUNCTURE: CPT

## 2020-11-26 PROCEDURE — 83880 ASSAY OF NATRIURETIC PEPTIDE: CPT

## 2020-11-26 PROCEDURE — 84145 PROCALCITONIN (PCT): CPT

## 2020-11-26 RX ORDER — ERGOCALCIFEROL (VITAMIN D2) 200 MCG/ML
8000 DROPS ORAL DAILY
Status: DISCONTINUED | OUTPATIENT
Start: 2020-11-26 | End: 2020-12-09 | Stop reason: HOSPADM

## 2020-11-26 RX ADMIN — IPRATROPIUM BROMIDE 4 PUFF: 17 AEROSOL, METERED RESPIRATORY (INHALATION) at 00:32

## 2020-11-26 RX ADMIN — HYDROXYZINE HYDROCHLORIDE 25 MG: 25 TABLET, FILM COATED ORAL at 04:05

## 2020-11-26 RX ADMIN — IPRATROPIUM BROMIDE 4 PUFF: 17 AEROSOL, METERED RESPIRATORY (INHALATION) at 09:06

## 2020-11-26 RX ADMIN — CLONAZEPAM 1 MG: 1 TABLET ORAL at 12:44

## 2020-11-26 RX ADMIN — GABAPENTIN 300 MG: 300 CAPSULE ORAL at 19:53

## 2020-11-26 RX ADMIN — ALBUTEROL SULFATE 4 PUFF: 90 AEROSOL, METERED RESPIRATORY (INHALATION) at 19:48

## 2020-11-26 RX ADMIN — BUMETANIDE 1 MG: 1 TABLET ORAL at 19:50

## 2020-11-26 RX ADMIN — CHLORHEXIDINE GLUCONATE 0.12% ORAL RINSE 15 ML: 1.2 LIQUID ORAL at 08:59

## 2020-11-26 RX ADMIN — DULOXETINE HYDROCHLORIDE 60 MG: 30 CAPSULE, DELAYED RELEASE ORAL at 08:58

## 2020-11-26 RX ADMIN — CHLORHEXIDINE GLUCONATE 0.12% ORAL RINSE 15 ML: 1.2 LIQUID ORAL at 19:55

## 2020-11-26 RX ADMIN — NYSTATIN 500000 UNITS: 100000 SUSPENSION ORAL at 12:43

## 2020-11-26 RX ADMIN — MIDODRINE HYDROCHLORIDE 10 MG: 5 TABLET ORAL at 20:24

## 2020-11-26 RX ADMIN — METRONIDAZOLE 500 MG: 500 INJECTION, SOLUTION INTRAVENOUS at 08:59

## 2020-11-26 RX ADMIN — NYSTATIN 500000 UNITS: 100000 SUSPENSION ORAL at 08:59

## 2020-11-26 RX ADMIN — METHYLPREDNISOLONE SODIUM SUCCINATE 40 MG: 40 INJECTION, POWDER, FOR SOLUTION INTRAMUSCULAR; INTRAVENOUS at 22:22

## 2020-11-26 RX ADMIN — ALBUTEROL SULFATE 4 PUFF: 90 AEROSOL, METERED RESPIRATORY (INHALATION) at 16:16

## 2020-11-26 RX ADMIN — CEFEPIME 2 G: 2 INJECTION, POWDER, FOR SOLUTION INTRAVENOUS at 01:19

## 2020-11-26 RX ADMIN — MAGNESIUM OXIDE 400 MG (241.3 MG MAGNESIUM) TABLET 400 MG: TABLET at 09:04

## 2020-11-26 RX ADMIN — IPRATROPIUM BROMIDE 4 PUFF: 17 AEROSOL, METERED RESPIRATORY (INHALATION) at 16:16

## 2020-11-26 RX ADMIN — HYDROXYZINE HYDROCHLORIDE 25 MG: 25 TABLET, FILM COATED ORAL at 09:05

## 2020-11-26 RX ADMIN — MEROPENEM 1 G: 1 INJECTION, POWDER, FOR SOLUTION INTRAVENOUS at 13:00

## 2020-11-26 RX ADMIN — METHYLPREDNISOLONE SODIUM SUCCINATE 40 MG: 40 INJECTION, POWDER, FOR SOLUTION INTRAMUSCULAR; INTRAVENOUS at 12:43

## 2020-11-26 RX ADMIN — VANCOMYCIN HYDROCHLORIDE 1000 MG: 1 INJECTION, POWDER, LYOPHILIZED, FOR SOLUTION INTRAVENOUS at 10:59

## 2020-11-26 RX ADMIN — SODIUM CHLORIDE, PRESERVATIVE FREE 10 ML: 5 INJECTION INTRAVENOUS at 20:17

## 2020-11-26 RX ADMIN — ALBUTEROL SULFATE 4 PUFF: 90 AEROSOL, METERED RESPIRATORY (INHALATION) at 00:32

## 2020-11-26 RX ADMIN — OXYCODONE HYDROCHLORIDE 5 MG: 5 TABLET ORAL at 09:04

## 2020-11-26 RX ADMIN — ALBUTEROL SULFATE 4 PUFF: 90 AEROSOL, METERED RESPIRATORY (INHALATION) at 12:21

## 2020-11-26 RX ADMIN — METRONIDAZOLE 500 MG: 500 INJECTION, SOLUTION INTRAVENOUS at 01:55

## 2020-11-26 RX ADMIN — ALBUTEROL SULFATE 4 PUFF: 90 AEROSOL, METERED RESPIRATORY (INHALATION) at 04:20

## 2020-11-26 RX ADMIN — NYSTATIN 500000 UNITS: 100000 SUSPENSION ORAL at 20:18

## 2020-11-26 RX ADMIN — CLONAZEPAM 1 MG: 1 TABLET ORAL at 19:49

## 2020-11-26 RX ADMIN — CEFEPIME 2 G: 2 INJECTION, POWDER, FOR SOLUTION INTRAVENOUS at 08:59

## 2020-11-26 RX ADMIN — MIDODRINE HYDROCHLORIDE 10 MG: 5 TABLET ORAL at 08:58

## 2020-11-26 RX ADMIN — GABAPENTIN 300 MG: 300 CAPSULE ORAL at 08:58

## 2020-11-26 RX ADMIN — HYDROXYZINE HYDROCHLORIDE 25 MG: 25 TABLET, FILM COATED ORAL at 22:19

## 2020-11-26 RX ADMIN — ENOXAPARIN SODIUM 40 MG: 40 INJECTION SUBCUTANEOUS at 09:00

## 2020-11-26 RX ADMIN — HYDROXYZINE HYDROCHLORIDE 25 MG: 25 TABLET, FILM COATED ORAL at 19:52

## 2020-11-26 RX ADMIN — OXYCODONE HYDROCHLORIDE 5 MG: 5 TABLET ORAL at 20:25

## 2020-11-26 RX ADMIN — GABAPENTIN 300 MG: 300 CAPSULE ORAL at 12:44

## 2020-11-26 RX ADMIN — ALBUTEROL SULFATE 4 PUFF: 90 AEROSOL, METERED RESPIRATORY (INHALATION) at 09:07

## 2020-11-26 RX ADMIN — IPRATROPIUM BROMIDE 4 PUFF: 17 AEROSOL, METERED RESPIRATORY (INHALATION) at 19:48

## 2020-11-26 RX ADMIN — BUMETANIDE 0.5 MG: 1 TABLET ORAL at 09:04

## 2020-11-26 RX ADMIN — IPRATROPIUM BROMIDE 4 PUFF: 17 AEROSOL, METERED RESPIRATORY (INHALATION) at 04:20

## 2020-11-26 RX ADMIN — IPRATROPIUM BROMIDE 4 PUFF: 17 AEROSOL, METERED RESPIRATORY (INHALATION) at 12:20

## 2020-11-26 RX ADMIN — PANTOPRAZOLE SODIUM 40 MG: 40 INJECTION, POWDER, LYOPHILIZED, FOR SOLUTION INTRAVENOUS at 08:58

## 2020-11-26 RX ADMIN — CLONAZEPAM 1 MG: 1 TABLET ORAL at 08:58

## 2020-11-26 RX ADMIN — MIDODRINE HYDROCHLORIDE 10 MG: 5 TABLET ORAL at 12:43

## 2020-11-26 RX ADMIN — MEROPENEM 1 G: 1 INJECTION, POWDER, FOR SOLUTION INTRAVENOUS at 22:10

## 2020-11-26 RX ADMIN — Medication 8000 UNITS: at 12:44

## 2020-11-26 ASSESSMENT — PULMONARY FUNCTION TESTS
PIF_VALUE: 35
PIF_VALUE: 27
PIF_VALUE: 18
PIF_VALUE: 23
PIF_VALUE: 17
PIF_VALUE: 23
PIF_VALUE: 20
PIF_VALUE: 17
PIF_VALUE: 24
PIF_VALUE: 20
PIF_VALUE: 25
PIF_VALUE: 19
PIF_VALUE: 19
PIF_VALUE: 22
PIF_VALUE: 21
PIF_VALUE: 21
PIF_VALUE: 19
PIF_VALUE: 21
PIF_VALUE: 21
PIF_VALUE: 37.2
PIF_VALUE: 27
PIF_VALUE: 22
PIF_VALUE: 30
PIF_VALUE: 26
PIF_VALUE: 23

## 2020-11-26 ASSESSMENT — PAIN SCALES - GENERAL
PAINLEVEL_OUTOF10: 8
PAINLEVEL_OUTOF10: 6

## 2020-11-26 NOTE — PROGRESS NOTES
11/26/20 0422   Vent Information   Vent Type 980   Vent Mode AC/VC   Vt Ordered 450 mL   Rate Set 12 bmp   Peak Flow 39 L/min   Pressure Support 0 cmH20   FiO2  35 %   SpO2 100 %   SpO2/FiO2 ratio 285.71   Sensitivity 3   PEEP/CPAP 5   I Time/ I Time % 0 s   Humidification Source HME   Vent Patient Data   High Peep/I Pressure 0   Peak Inspiratory Pressure 21 cmH2O   Mean Airway Pressure 14 cmH20   Rate Measured 21 br/min   Vt Exhaled 345 mL   Minute Volume 8.35 Liters   I:E Ratio 1.00:1   Cough/Sputum   Sputum How Obtained Suctioned;Tracheal   $Obtained Sample $Induced Sputum   Cough Productive   Sputum Amount Small   Sputum Color Yellow   Tenacity Thick   Spontaneous Breathing Trial (SBT) RT Doc   Pulse 94   Additional Respiratory  Assessments   Resp 20   Alarm Settings   High Pressure Alarm 40 cmH2O   Delay Alarm 20 sec(s)   Low Minute Volume Alarm 2.5 L/min   Apnea (secs) 20 secs   High Respiratory Rate 40 br/min   Low Exhaled Vt  205 mL

## 2020-11-26 NOTE — PROGRESS NOTES
Hospitalist Progress Note      Name:  Noah Mejia /Age/Sex: 1955  (63 y.o. male)   MRN & CSN:  [id number] & [id number] Admission Date/Time: 2020 10:19 AM   Location:  -A PCP: No primary care provider on file. Noah Mejia is a 63 y.o.  male  who presents with Respiratory Distress (pt resident of [facility], trach patient, EMS called for low O2 sat and high respiratory and heart rate.  pt reportedly full code, cancer patient)      Assessment and Plan:   Sepsis 2/2 HCAP, Possible Aspiration PNA  Chronic trach on Vent  - IV Vanc,  Cefepime + Flagyl d/c and add IV merrem  - check blood cx NTD  - check sputum cx + E.coli ESBL  - check urine strep + legionella; negative  - trach suctioning prn  - nebs  - IVF d/c   - consult Pulm     Possible UTI  - UA noted  - check urine cx NTD  - abx as above        Palliative care for code status discussion    Son is considering Hsopice     Dietician consulted for TF           Skin wounds/right post thigh/buttock and bilateral UE - wound care  Hx of gastric cancer stage 4  Polysubstance abuse history  Hep C, chronic               Diet DIET TUBE FEED CONTINUOUS/CYCLIC NPO; Semi-elemental (Vital AF 1.2 Waldo); Gastrostomy; Continuous; 20; 60; 24   Code Status Full Code     Medications:   Medications:    methylPREDNISolone  40 mg Intravenous Q12H    magnesium oxide  400 mg Oral Daily    scopolamine  1 patch Transdermal Q72H    pantoprazole  40 mg Intravenous Daily    nystatin  5 mL Oral 4x Daily    midodrine  10 mg Oral TID    Ergocalciferol  8,000 Units Oral Daily    sodium chloride flush  10 mL Intravenous 2 times per day    enoxaparin  40 mg Subcutaneous Daily    vancomycin  15 mg/kg Intravenous Q18H    cefepime  2 g Intravenous Q8H    clonazePAM  1 mg Per G Tube TID    bumetanide  0.5 mg Per G Tube BID    DULoxetine  60 mg Per G Tube Daily    gabapentin  300 mg Per G Tube TID    hydrOXYzine  25 mg Per G Tube Q6H    metroNIDAZOLE  500 mg Intravenous Q8H    albuterol sulfate HFA  4 puff Inhalation Q4H    And    ipratropium  4 puff Inhalation Q4H    chlorhexidine  15 mL Mouth/Throat BID      Infusions:    sodium chloride 50 mL/hr at 11/25/20 1601     PRN Meds: dicyclomine, 20 mg, Q6H PRN  sodium chloride flush, 10 mL, PRN  acetaminophen, 650 mg, Q6H PRN    Or  acetaminophen, 650 mg, Q6H PRN  polyethylene glycol, 17 g, Daily PRN  promethazine, 12.5 mg, Q6H PRN    Or  ondansetron, 4 mg, Q6H PRN  oxyCODONE, 5 mg, Q6H PRN  HYDROmorphone, 1 mg, Daily PRN  magnesium sulfate, 1 g, PRN      Subjective:   States he wants tube out    Objective: Intake/Output Summary (Last 24 hours) at 11/26/2020 0949  Last data filed at 11/26/2020 0640  Gross per 24 hour   Intake 1164 ml   Output 1050 ml   Net 114 ml      Vitals:   Vitals:    11/26/20 0803   BP: 124/87   Pulse: 108   Resp: 18   Temp: 99 °F (37.2 °C)   SpO2: 100%     Physical Exam:   Gen:  awake, alert, no apparent distress  Head/Eyes:  Normocephalic atraumatic, EOMI   NECK:   symmetrical, trachea midline  LUNGS: Normal Effort on vent  CARDIOVASCULAR:  Normal rate  ABDOMEN:  non distended  MUSCULOSKELETAL:  ROM limited  NEUROLOGIC: Alert and Oriented,  Cranial nerves II-XII are grossly intact.    SKIN:  no bruising or bleeding, normal skin color,  no redness      Data:       CBC   Recent Labs     11/24/20  1016 11/25/20  0505   WBC 18.4* 16.1*   HGB 10.9* 9.2*   HCT 34.3* 29.6*   * 359      BMP   Recent Labs     11/24/20  1016 11/24/20  1650 11/25/20  0505 11/26/20  0600     --  136 142   K 4.1  --  4.2 3.7   CL 99  --  101 108   CO2 26  --  25 25   PHOS  --  3.0 2.8 2.7   BUN 28*  --  27* 39*   CREATININE 0.5*  --  0.4* 0.5*         Electronically signed by Daniel Hawkins MD on 11/26/2020 at 9:49 AM

## 2020-11-26 NOTE — PROGRESS NOTES
Intake 1164 ml   Output 1050 ml   Net 114 ml       CONSTITUTIONAL:  awake  LUNGS:  decreased breath sounds, basilar crackles. CARDIOVASCULAR:  normal S1 and S2 and negative JVD  ABD:Abdomen soft, non-tender. BS normal. No masses,  No organomegaly. DATA:    CBC:  Recent Labs     11/24/20  1016 11/25/20  0505   WBC 18.4* 16.1*   RBC 3.83* 3.25*   HGB 10.9* 9.2*   HCT 34.3* 29.6*   * 359   MCV 89.6 91.1   MCH 28.5 28.3   MCHC 31.8* 31.1*   RDW 16.7* 16.6*   SEGSPCT 81.8*  --       BMP:  Recent Labs     11/24/20  1016 11/25/20  0505 11/26/20  0600    136 142   K 4.1 4.2 3.7   CL 99 101 108   CO2 26 25 25   BUN 28* 27* 39*   CREATININE 0.5* 0.4* 0.5*   CALCIUM 9.5 9.1 9.2   GLUCOSE 174* 119* 160*      ABG:  Recent Labs     11/25/20  0800 11/26/20  0800   PH 7.42 7.38   PO2ART 115* 96   SEU8CYT 41.0 40.0   O2SAT 95.7* 95.2*     Lab Results   Component Value Date    PROBNP 530.3 (H) 11/26/2020    PROBNP 742.6 (H) 11/24/2020    PROBNP 11,142 (H) 07/24/2020     No results found for: [facility]    Radiology Review:  Pertinent images / reports were reviewed as a part of this visit. Assessment:     Patient Active Problem List   Diagnosis    Sepsis ([address]    Leukocytosis    Ischemic bowel disease ([address]    MSSA bacteremia    Chronic hepatitis C without hepatic coma ([address]    Respiratory failure ([address]    HCAP (healthcare-associated pneumonia)       Plan:   1. Overall the patient has improved. 2. Cont full vent support.       Sheryl Lakhani MD  11/26/2020  1:08 PM

## 2020-11-26 NOTE — PROGRESS NOTES
[facility]  consulted by Dr. Leonie Guo for monitoring and adjustment. Indication for treatment: Pneumonia  Goal trough: 15 mcg/mL     Pertinent Laboratory Values:   Temp Readings from Last 3 Encounters:   11/26/20 99 °F (37.2 °C) (Rectal)   08/15/20 98.3 °F (36.8 °C) (Oral)   08/10/20 97.3 °F (36.3 °C)     Recent Labs     11/24/20  1016 11/25/20  0505   WBC 18.4* 16.1*   LACTATE 1.3  --      Recent Labs     11/24/20  1016 11/25/20  0505 11/26/20  0600   BUN 28* 27* 39*   CREATININE 0.5* 0.4* 0.5*     Estimated Creatinine Clearance: 141 mL/min (A) (based on SCr of 0.5 mg/dL (L)). Intake/Output Summary (Last 24 hours) at 11/26/2020 1416  Last data filed at 11/26/2020 0640  Gross per 24 hour   Intake 1164 ml   Output 1050 ml   Net 114 ml       Pertinent Cultures:  Date    Source    Results  11/24   Covid-19   Negative  11/24   Blood    NGTD  11/24   Urine    NGTD  11/24   Respiratory   ESBL e coli   11/24   Strep/Legionella  Negative  11/24   MRSA nasal   Negative    Vancomycin level:   TROUGH:    Recent Labs     11/26/20  0931   VANCOTROUGH 12.2     RANDOM:  No results for input(s): VANCORANDOM in the last 72 hours.     Assessment:  · WBC and temperature: elevated WBC; afebrile   · SCr, BUN, and urine output: at baseline   · Day(s) of therapy: #3  · Vancomycin level: 12.2    Plan:  · Vancomycin 1500 mg x 1 followed by 1000 mg q18h  · Trough prior to third total dose is acceptable, anticipate accumulation once steady state is reached   · Pharmacy will continue to monitor patient and adjust therapy as indicated    Faye Roe FOR 11/28 @ 3716     Thank you for the consult,  Kasey Mancuso [place]  11/26/2020 2:16 PM

## 2020-11-27 ENCOUNTER — APPOINTMENT (OUTPATIENT)
Dept: GENERAL RADIOLOGY | Age: 65
DRG: 720 | End: 2020-11-27
Payer: MEDICARE

## 2020-11-27 LAB
ANION GAP SERPL CALCULATED.3IONS-SCNC: 10 MMOL/L (ref 4–16)
BASE EXCESS MIXED: 0.3 (ref 0–1.2)
BUN BLDV-MCNC: 36 MG/DL (ref 6–23)
CALCIUM SERPL-MCNC: 8.8 MG/DL (ref 8.3–10.6)
CARBON MONOXIDE, BLOOD: 2.2 % (ref 0–5)
CHLORIDE BLD-SCNC: 105 MMOL/L (ref 99–110)
CO2 CONTENT: 28 MMOL/L (ref 19–24)
CO2: 25 MMOL/L (ref 21–32)
COMMENT: ABNORMAL
CREAT SERPL-MCNC: 0.4 MG/DL (ref 0.9–1.3)
CULTURE: NORMAL
GFR AFRICAN AMERICAN: >60 ML/MIN/1.73M2
GFR NON-AFRICAN AMERICAN: >60 ML/MIN/1.73M2
GLUCOSE BLD-MCNC: 190 MG/DL (ref 70–99)
HCO3 ARTERIAL: 26.5 MMOL/L (ref 18–23)
HCT VFR BLD CALC: 26.8 % (ref 42–52)
HEMOGLOBIN: 7.9 GM/DL (ref 13.5–18)
Lab: NORMAL
MCH RBC QN AUTO: 26.6 PG (ref 27–31)
MCHC RBC AUTO-ENTMCNC: 29.5 % (ref 32–36)
MCV RBC AUTO: 90.2 FL (ref 78–100)
METHEMOGLOBIN ARTERIAL: 1.8 %
O2 SATURATION: 95 % (ref 96–97)
PCO2 ARTERIAL: 48 MMHG (ref 32–45)
PDW BLD-RTO: 16.4 % (ref 11.7–14.9)
PH BLOOD: 7.35 (ref 7.34–7.45)
PLATELET # BLD: 397 K/CU MM (ref 140–440)
PMV BLD AUTO: 9.3 FL (ref 7.5–11.1)
PO2 ARTERIAL: 112 MMHG (ref 75–100)
POTASSIUM SERPL-SCNC: 3.4 MMOL/L (ref 3.5–5.1)
PROCALCITONIN: 0.55
RBC # BLD: 2.97 M/CU MM (ref 4.6–6.2)
SODIUM BLD-SCNC: 140 MMOL/L (ref 135–145)
SPECIMEN: NORMAL
WBC # BLD: 15.7 K/CU MM (ref 4–10.5)

## 2020-11-27 PROCEDURE — 80048 BASIC METABOLIC PNL TOTAL CA: CPT

## 2020-11-27 PROCEDURE — 89220 SPUTUM SPECIMEN COLLECTION: CPT

## 2020-11-27 PROCEDURE — 94761 N-INVAS EAR/PLS OXIMETRY MLT: CPT

## 2020-11-27 PROCEDURE — 6370000000 HC RX 637 (ALT 250 FOR IP): Performed by: INTERNAL MEDICINE

## 2020-11-27 PROCEDURE — C9113 INJ PANTOPRAZOLE SODIUM, VIA: HCPCS | Performed by: FAMILY MEDICINE

## 2020-11-27 PROCEDURE — 94640 AIRWAY INHALATION TREATMENT: CPT

## 2020-11-27 PROCEDURE — 85027 COMPLETE CBC AUTOMATED: CPT

## 2020-11-27 PROCEDURE — 6360000002 HC RX W HCPCS: Performed by: INTERNAL MEDICINE

## 2020-11-27 PROCEDURE — 2580000003 HC RX 258: Performed by: FAMILY MEDICINE

## 2020-11-27 PROCEDURE — 6360000002 HC RX W HCPCS: Performed by: FAMILY MEDICINE

## 2020-11-27 PROCEDURE — 84145 PROCALCITONIN (PCT): CPT

## 2020-11-27 PROCEDURE — 6370000000 HC RX 637 (ALT 250 FOR IP): Performed by: FAMILY MEDICINE

## 2020-11-27 PROCEDURE — 2700000000 HC OXYGEN THERAPY PER DAY

## 2020-11-27 PROCEDURE — 71045 X-RAY EXAM CHEST 1 VIEW: CPT

## 2020-11-27 PROCEDURE — 36600 WITHDRAWAL OF ARTERIAL BLOOD: CPT

## 2020-11-27 PROCEDURE — 2000000000 HC ICU R&B

## 2020-11-27 PROCEDURE — 82803 BLOOD GASES ANY COMBINATION: CPT

## 2020-11-27 PROCEDURE — 94003 VENT MGMT INPAT SUBQ DAY: CPT

## 2020-11-27 RX ORDER — POTASSIUM CHLORIDE 7.45 MG/ML
10 INJECTION INTRAVENOUS ONCE
Status: COMPLETED | OUTPATIENT
Start: 2020-11-27 | End: 2020-11-27

## 2020-11-27 RX ORDER — POTASSIUM CHLORIDE 7.45 MG/ML
10 INJECTION INTRAVENOUS PRN
Status: DISCONTINUED | OUTPATIENT
Start: 2020-11-27 | End: 2020-12-09 | Stop reason: HOSPADM

## 2020-11-27 RX ORDER — POTASSIUM CHLORIDE 20 MEQ/1
40 TABLET, EXTENDED RELEASE ORAL PRN
Status: DISCONTINUED | OUTPATIENT
Start: 2020-11-27 | End: 2020-12-09 | Stop reason: HOSPADM

## 2020-11-27 RX ADMIN — ALBUTEROL SULFATE 4 PUFF: 90 AEROSOL, METERED RESPIRATORY (INHALATION) at 01:23

## 2020-11-27 RX ADMIN — GABAPENTIN 300 MG: 300 CAPSULE ORAL at 08:54

## 2020-11-27 RX ADMIN — SODIUM CHLORIDE, PRESERVATIVE FREE 10 ML: 5 INJECTION INTRAVENOUS at 08:56

## 2020-11-27 RX ADMIN — MEROPENEM 1 G: 1 INJECTION, POWDER, FOR SOLUTION INTRAVENOUS at 05:01

## 2020-11-27 RX ADMIN — IPRATROPIUM BROMIDE 4 PUFF: 17 AEROSOL, METERED RESPIRATORY (INHALATION) at 05:09

## 2020-11-27 RX ADMIN — MIDODRINE HYDROCHLORIDE 10 MG: 5 TABLET ORAL at 17:30

## 2020-11-27 RX ADMIN — METHYLPREDNISOLONE SODIUM SUCCINATE 40 MG: 40 INJECTION, POWDER, FOR SOLUTION INTRAMUSCULAR; INTRAVENOUS at 22:56

## 2020-11-27 RX ADMIN — CHLORHEXIDINE GLUCONATE 0.12% ORAL RINSE 15 ML: 1.2 LIQUID ORAL at 08:54

## 2020-11-27 RX ADMIN — HYDROXYZINE HYDROCHLORIDE 25 MG: 25 TABLET, FILM COATED ORAL at 17:30

## 2020-11-27 RX ADMIN — Medication 8000 UNITS: at 10:56

## 2020-11-27 RX ADMIN — GABAPENTIN 300 MG: 300 CAPSULE ORAL at 21:39

## 2020-11-27 RX ADMIN — CLONAZEPAM 1 MG: 1 TABLET ORAL at 08:54

## 2020-11-27 RX ADMIN — BUMETANIDE 0.5 MG: 1 TABLET ORAL at 08:55

## 2020-11-27 RX ADMIN — HYDROXYZINE HYDROCHLORIDE 25 MG: 25 TABLET, FILM COATED ORAL at 21:40

## 2020-11-27 RX ADMIN — SODIUM CHLORIDE, PRESERVATIVE FREE 10 ML: 5 INJECTION INTRAVENOUS at 21:41

## 2020-11-27 RX ADMIN — IPRATROPIUM BROMIDE 4 PUFF: 17 AEROSOL, METERED RESPIRATORY (INHALATION) at 01:24

## 2020-11-27 RX ADMIN — CLONAZEPAM 1 MG: 1 TABLET ORAL at 21:39

## 2020-11-27 RX ADMIN — ENOXAPARIN SODIUM 40 MG: 40 INJECTION SUBCUTANEOUS at 08:54

## 2020-11-27 RX ADMIN — IPRATROPIUM BROMIDE 4 PUFF: 17 AEROSOL, METERED RESPIRATORY (INHALATION) at 15:33

## 2020-11-27 RX ADMIN — OXYCODONE HYDROCHLORIDE 5 MG: 5 TABLET ORAL at 02:13

## 2020-11-27 RX ADMIN — CHLORHEXIDINE GLUCONATE 0.12% ORAL RINSE 15 ML: 1.2 LIQUID ORAL at 21:38

## 2020-11-27 RX ADMIN — CLONAZEPAM 1 MG: 1 TABLET ORAL at 14:29

## 2020-11-27 RX ADMIN — IPRATROPIUM BROMIDE 4 PUFF: 17 AEROSOL, METERED RESPIRATORY (INHALATION) at 20:55

## 2020-11-27 RX ADMIN — HYDROXYZINE HYDROCHLORIDE 25 MG: 25 TABLET, FILM COATED ORAL at 03:44

## 2020-11-27 RX ADMIN — HYDROXYZINE HYDROCHLORIDE 25 MG: 25 TABLET, FILM COATED ORAL at 10:57

## 2020-11-27 RX ADMIN — ALBUTEROL SULFATE 4 PUFF: 90 AEROSOL, METERED RESPIRATORY (INHALATION) at 08:55

## 2020-11-27 RX ADMIN — MAGNESIUM OXIDE 400 MG (241.3 MG MAGNESIUM) TABLET 400 MG: TABLET at 08:55

## 2020-11-27 RX ADMIN — ALBUTEROL SULFATE 4 PUFF: 90 AEROSOL, METERED RESPIRATORY (INHALATION) at 15:33

## 2020-11-27 RX ADMIN — METHYLPREDNISOLONE SODIUM SUCCINATE 40 MG: 40 INJECTION, POWDER, FOR SOLUTION INTRAMUSCULAR; INTRAVENOUS at 10:57

## 2020-11-27 RX ADMIN — IPRATROPIUM BROMIDE 4 PUFF: 17 AEROSOL, METERED RESPIRATORY (INHALATION) at 08:55

## 2020-11-27 RX ADMIN — NYSTATIN 500000 UNITS: 100000 SUSPENSION ORAL at 21:39

## 2020-11-27 RX ADMIN — POTASSIUM CHLORIDE 10 MEQ: 7.46 INJECTION, SOLUTION INTRAVENOUS at 10:56

## 2020-11-27 RX ADMIN — NYSTATIN 500000 UNITS: 100000 SUSPENSION ORAL at 17:30

## 2020-11-27 RX ADMIN — ALBUTEROL SULFATE 4 PUFF: 90 AEROSOL, METERED RESPIRATORY (INHALATION) at 20:55

## 2020-11-27 RX ADMIN — PANTOPRAZOLE SODIUM 40 MG: 40 INJECTION, POWDER, LYOPHILIZED, FOR SOLUTION INTRAVENOUS at 08:54

## 2020-11-27 RX ADMIN — MEROPENEM 1 G: 1 INJECTION, POWDER, FOR SOLUTION INTRAVENOUS at 14:29

## 2020-11-27 RX ADMIN — BUMETANIDE 0.5 MG: 1 TABLET ORAL at 21:40

## 2020-11-27 RX ADMIN — OXYCODONE HYDROCHLORIDE 5 MG: 5 TABLET ORAL at 21:39

## 2020-11-27 RX ADMIN — MIDODRINE HYDROCHLORIDE 10 MG: 5 TABLET ORAL at 14:32

## 2020-11-27 RX ADMIN — NYSTATIN 500000 UNITS: 100000 SUSPENSION ORAL at 08:54

## 2020-11-27 RX ADMIN — VANCOMYCIN HYDROCHLORIDE 1000 MG: 1 INJECTION, POWDER, LYOPHILIZED, FOR SOLUTION INTRAVENOUS at 03:44

## 2020-11-27 RX ADMIN — DULOXETINE HYDROCHLORIDE 60 MG: 30 CAPSULE, DELAYED RELEASE ORAL at 08:54

## 2020-11-27 RX ADMIN — IPRATROPIUM BROMIDE 4 PUFF: 17 AEROSOL, METERED RESPIRATORY (INHALATION) at 11:20

## 2020-11-27 RX ADMIN — GABAPENTIN 300 MG: 300 CAPSULE ORAL at 14:29

## 2020-11-27 RX ADMIN — ALBUTEROL SULFATE 4 PUFF: 90 AEROSOL, METERED RESPIRATORY (INHALATION) at 11:20

## 2020-11-27 RX ADMIN — MIDODRINE HYDROCHLORIDE 10 MG: 5 TABLET ORAL at 08:54

## 2020-11-27 RX ADMIN — ALBUTEROL SULFATE 4 PUFF: 90 AEROSOL, METERED RESPIRATORY (INHALATION) at 05:08

## 2020-11-27 RX ADMIN — HYDROMORPHONE HYDROCHLORIDE 1 MG: 2 TABLET ORAL at 17:30

## 2020-11-27 RX ADMIN — NYSTATIN 500000 UNITS: 100000 SUSPENSION ORAL at 14:29

## 2020-11-27 RX ADMIN — MEROPENEM 1 G: 1 INJECTION, POWDER, FOR SOLUTION INTRAVENOUS at 21:39

## 2020-11-27 ASSESSMENT — PULMONARY FUNCTION TESTS
PIF_VALUE: 40
PIF_VALUE: 40
PIF_VALUE: 17
PIF_VALUE: 18
PIF_VALUE: 17
PIF_VALUE: 18
PIF_VALUE: 17
PIF_VALUE: 18
PIF_VALUE: 17
PIF_VALUE: 19
PIF_VALUE: 20
PIF_VALUE: 13
PIF_VALUE: 19
PIF_VALUE: 19
PIF_VALUE: 24
PIF_VALUE: 16
PIF_VALUE: 17
PIF_VALUE: 18

## 2020-11-27 NOTE — PROGRESS NOTES
Speech Language Pathology  Adriane Hall  [birth year]  [phone]      Noted bedside swallowing evaluation order and chart reviewed 11/27/20. ST spoke with a nurse from [address] [place] [address] who reported pt is NPO at baseline with PEG tube feedings only. ST will discontinue order at this time.     Yunior Kunz [address], CCC-SLP, 11/27/2020

## 2020-11-27 NOTE — PROGRESS NOTES
Hospitalist Progress Note      Name:  Bucky Sheppard /Age/Sex: 1955  (63 y.o. male)   MRN & CSN:  [id number] & [id number] Admission Date/Time: 2020 10:19 AM   Location:  -A PCP: No primary care provider on file. Bucky Sheppard is a 63 y.o.  male  who presents with Respiratory Distress (pt resident of [facility], trach patient, EMS called for low O2 sat and high respiratory and heart rate.  pt reportedly full code, cancer patient)      Assessment and Plan:   Sepsis 2/2 HCAP, Possible Aspiration PNA  Chronic trach on Vent  - IV Vanc d/c as MRSA screen neg continue IV Merrem  - MRSA nares neg  - check blood cx NTD  - check sputum cx + E.coli ESBL  - check urine strep + legionella; negative  - trach suctioning prn  - nebs  - IVF d/c   - consult Pulm     Possible UTI  - ruled out, urine cx NTD    HypoK  - replace        Palliative care for code status discussion     Son is considering Hospice     Dietician consulted for TF           Skin wounds/right post thigh/buttock and bilateral UE - wound care  Hx of gastric cancer stage 4  Polysubstance abuse history  Hep C, chronic               Diet DIET TUBE FEED CONTINUOUS/CYCLIC NPO; Semi-elemental (Vital AF 1.2 Waldo); Gastrostomy; Continuous; 20; 60; 24   Code Status Full Code     Medications:   Medications:    meropenem  1 g Intravenous Q8H    Ergocalciferol  8,000 Units Oral Daily    methylPREDNISolone  40 mg Intravenous Q12H    magnesium oxide  400 mg Oral Daily    scopolamine  1 patch Transdermal Q72H    pantoprazole  40 mg Intravenous Daily    nystatin  5 mL Oral 4x Daily    midodrine  10 mg Oral TID    sodium chloride flush  10 mL Intravenous 2 times per day    enoxaparin  40 mg Subcutaneous Daily    vancomycin  15 mg/kg Intravenous Q18H    clonazePAM  1 mg Per G Tube TID    bumetanide  0.5 mg Per G Tube BID    DULoxetine  60 mg Per G Tube Daily    gabapentin  300 mg Per G Tube TID    hydrOXYzine  25 mg Per G Tube Q6H    albuterol sulfate HFA  4 puff Inhalation Q4H    And    ipratropium  4 puff Inhalation Q4H    chlorhexidine  15 mL Mouth/Throat BID      Infusions:   PRN Meds: dicyclomine, 20 mg, Q6H PRN  sodium chloride flush, 10 mL, PRN  acetaminophen, 650 mg, Q6H PRN    Or  acetaminophen, 650 mg, Q6H PRN  polyethylene glycol, 17 g, Daily PRN  promethazine, 12.5 mg, Q6H PRN    Or  ondansetron, 4 mg, Q6H PRN  oxyCODONE, 5 mg, Q6H PRN  HYDROmorphone, 1 mg, Daily PRN  magnesium sulfate, 1 g, PRN      Subjective:     No distress or pain this am  Objective: Intake/Output Summary (Last 24 hours) at 11/27/2020 0851  Last data filed at 11/27/2020 5460  Gross per 24 hour   Intake 3128.67 ml   Output 1750 ml   Net 1378.67 ml      Vitals:   Vitals:    11/27/20 0511   BP:    Pulse: 76   Resp: 19   Temp:    SpO2: 100%     Physical Exam:   Gen:  awake, alert, no apparent distress  Head/Eyes:  Normocephalic atraumatic, EOMI   NECK:   +trach  LUNGS: Normal Effort on vent  CARDIOVASCULAR:  Normal rate  ABDOMEN:  non distended  MUSCULOSKELETAL:  ROM limited  NEUROLOGIC: Alert and Oriented,  Cranial nerves II-XII are grossly intact.    SKIN:  no bruising or bleeding, normal skin color,  no redness      Data:       CBC   Recent Labs     11/25/20  0505 11/26/20  1554 11/27/20  0555   WBC 16.1* 14.7* 15.7*   HGB 9.2* 8.3* 7.9*   HCT 29.6* 26.9* 26.8*    396 397      BMP   Recent Labs     11/24/20  1650 11/25/20  0505 11/26/20  0600 11/27/20  0555   NA  --  136 142 140   K  --  4.2 3.7 3.4*   CL  --  101 108 105   CO2  --  25 25 25   PHOS 3.0 2.8 2.7  --    BUN  --  27* 39* 36*   CREATININE  --  0.4* 0.5* 0.4*         Electronically signed by Daniel Najera MD on 11/27/2020 at 8:51 AM

## 2020-11-27 NOTE — PLAN OF CARE
Problem: Tissue Perfusion:  Goal: Peripheral tissue perfusion will improve  Description: Peripheral tissue perfusion will improve  Outcome: Ongoing     Problem: Falls - Risk of:  Goal: Will remain free from falls  Description: Will remain free from falls  Outcome: Ongoing  Goal: Absence of physical injury  Description: Absence of physical injury  Outcome: Ongoing     Problem: Skin Integrity:  Goal: Will show no infection signs and symptoms  Description: Will show no infection signs and symptoms  Outcome: Ongoing  Goal: Absence of new skin breakdown  Description: Absence of new skin breakdown  Outcome: Ongoing

## 2020-11-27 NOTE — PROGRESS NOTES
Pulmonary and Critical Care  Progress Note    Subjective: The patient is better. Shortness of breath none  Chest pain none  Addressing respiratory complaints Patient is negative for  hemoptysis and cyanosis  CONSTITUTIONAL:  negative for fevers and chills      Past Medical History:     has a past medical history of Acute embolism and thrombosis of deep veins of left upper extremity ([address] Acute kidney failure with tubular necrosis ([address] Anemia, unspecified, Anxiety, Aphonia, Bacteremia, Drug abuse and dependence ([address] Dysphagia, Gastrostomy status ([address] Hemoperitoneum, Hepatitis C without hepatic coma, Hypertension, Idiopathic hypotension, Malignant neoplasm of stomach, unspecified ([address] Metabolic encephalopathy, Methicillin susceptible Staphylococcus aureus infection as the cause of diseases classified elsewhere, Muscle weakness, Osteomyelitis ([address] Thrombocytopenia, unspecified ([address] Tracheostomy status ([address] and Unspecified severe protein-calorie malnutrition ([address] has a past surgical history that includes back surgery; IR NONTUNNELED VASCULAR CATHETER > 5 YEARS (7/26/2020); laparotomy (N/A, 7/25/2020); tracheostomy (N/A, 8/10/2020); Gastrostomy tube placement (N/A, 8/10/2020); and IR TUNNELED CVC PLACE WO SQ PORT/PUMP > 5 YEARS (8/12/2020). reports that he has quit smoking. His smoking use included cigarettes. He smoked 1.50 packs per day. He does not have any smokeless tobacco history on file. He reports that he does not drink alcohol or use drugs. Family history:  family history is not on file.     No Known Allergies  Social History:    Reviewed; no changes    Objective:   PHYSICAL EXAM:        VITALS:  /89   Pulse 79   Temp 97.5 °F (36.4 °C) (Rectal)   Resp 23   Ht 5' 7\" (1.702 m)   Wt 147 lb (66.7 kg)   SpO2 100%   BMI 23.02 kg/m²     24HR INTAKE/OUTPUT:      Intake/Output Summary (Last 24 hours) at 11/27/2020 1213  Last data filed at 11/27/2020 0657  Gross per 24 hour   Intake 3128.67 ml   Output 1750 ml   Net 1378.67 ml       CONSTITUTIONAL:  Awake. LUNGS:  decreased breath sounds, basilar crackles. CARDIOVASCULAR:  normal S1 and S2 and negative JVD  ABD:Abdomen soft, non-tender. BS normal. No masses,  No organomegaly. DATA:    CBC:  Recent Labs     11/25/20  0505 11/26/20  1554 11/27/20  0555   WBC 16.1* 14.7* 15.7*   RBC 3.25* 2.97* 2.97*   HGB 9.2* 8.3* 7.9*   HCT 29.6* 26.9* 26.8*    396 397   MCV 91.1 90.6 90.2   MCH 28.3 27.9 26.6*   MCHC 31.1* 30.9* 29.5*   RDW 16.6* 16.4* 16.4*      BMP:  Recent Labs     11/25/20  0505 11/26/20  0600 11/27/20  0555    142 140   K 4.2 3.7 3.4*    108 105   CO2 25 25 25   BUN 27* 39* 36*   CREATININE 0.4* 0.5* 0.4*   CALCIUM 9.1 9.2 8.8   GLUCOSE 119* 160* 190*      ABG:  Recent Labs     11/25/20  0800 11/26/20  0800 11/27/20  0800   PH 7.42 7.38 7.35   PO2ART 115* 96 112*   QSL2UPT 41.0 40.0 48.0*   O2SAT 95.7* 95.2* 95.0*     Lab Results   Component Value Date    PROBNP 530.3 (H) 11/26/2020    PROBNP 742.6 (H) 11/24/2020    PROBNP 11,142 (H) 07/24/2020     No results found for: [facility]    Radiology Review:  Pertinent images / reports were reviewed as a part of this visit. Assessment:     Patient Active Problem List   Diagnosis    Sepsis ([address]    Leukocytosis    Ischemic bowel disease ([address]    MSSA bacteremia    Chronic hepatitis C without hepatic coma ([address]    Respiratory failure ([address]    HCAP (healthcare-associated pneumonia)       Plan:   1. Overall the patient has improved. 2. Cont full vent support. 3. Supp. kcl.     Adriane Corral MD  11/27/2020  12:13 PM

## 2020-11-28 ENCOUNTER — APPOINTMENT (OUTPATIENT)
Dept: GENERAL RADIOLOGY | Age: 65
DRG: 720 | End: 2020-11-28
Payer: MEDICARE

## 2020-11-28 LAB
ANION GAP SERPL CALCULATED.3IONS-SCNC: 7 MMOL/L (ref 4–16)
BASE EXCESS MIXED: 4.4 (ref 0–1.2)
BUN BLDV-MCNC: 44 MG/DL (ref 6–23)
CALCIUM SERPL-MCNC: 9.1 MG/DL (ref 8.3–10.6)
CARBON MONOXIDE, BLOOD: 2 % (ref 0–5)
CHLORIDE BLD-SCNC: 104 MMOL/L (ref 99–110)
CO2 CONTENT: 30.5 MMOL/L (ref 19–24)
CO2: 28 MMOL/L (ref 21–32)
COMMENT: ABNORMAL
CREAT SERPL-MCNC: 0.4 MG/DL (ref 0.9–1.3)
GFR AFRICAN AMERICAN: >60 ML/MIN/1.73M2
GFR NON-AFRICAN AMERICAN: >60 ML/MIN/1.73M2
GLUCOSE BLD-MCNC: 119 MG/DL (ref 70–99)
GLUCOSE BLD-MCNC: 159 MG/DL (ref 70–99)
HCO3 ARTERIAL: 29.2 MMOL/L (ref 18–23)
HCT VFR BLD CALC: 28 % (ref 42–52)
HEMOGLOBIN: 8.7 GM/DL (ref 13.5–18)
MCH RBC QN AUTO: 28 PG (ref 27–31)
MCHC RBC AUTO-ENTMCNC: 31.1 % (ref 32–36)
MCV RBC AUTO: 90 FL (ref 78–100)
METHEMOGLOBIN ARTERIAL: 1.3 %
O2 SATURATION: 95.8 % (ref 96–97)
PCO2 ARTERIAL: 43 MMHG (ref 32–45)
PDW BLD-RTO: 16.7 % (ref 11.7–14.9)
PH BLOOD: 7.44 (ref 7.34–7.45)
PLATELET # BLD: 434 K/CU MM (ref 140–440)
PMV BLD AUTO: 9.5 FL (ref 7.5–11.1)
PO2 ARTERIAL: 90 MMHG (ref 75–100)
POTASSIUM SERPL-SCNC: 4 MMOL/L (ref 3.5–5.1)
RBC # BLD: 3.11 M/CU MM (ref 4.6–6.2)
SODIUM BLD-SCNC: 139 MMOL/L (ref 135–145)
WBC # BLD: 12.6 K/CU MM (ref 4–10.5)

## 2020-11-28 PROCEDURE — 6360000002 HC RX W HCPCS: Performed by: FAMILY MEDICINE

## 2020-11-28 PROCEDURE — 87181 SC STD AGAR DILUTION PER AGT: CPT

## 2020-11-28 PROCEDURE — 36600 WITHDRAWAL OF ARTERIAL BLOOD: CPT

## 2020-11-28 PROCEDURE — 6370000000 HC RX 637 (ALT 250 FOR IP): Performed by: FAMILY MEDICINE

## 2020-11-28 PROCEDURE — 87070 CULTURE OTHR SPECIMN AEROBIC: CPT

## 2020-11-28 PROCEDURE — 6370000000 HC RX 637 (ALT 250 FOR IP): Performed by: INTERNAL MEDICINE

## 2020-11-28 PROCEDURE — 2000000000 HC ICU R&B

## 2020-11-28 PROCEDURE — 82962 GLUCOSE BLOOD TEST: CPT

## 2020-11-28 PROCEDURE — 71045 X-RAY EXAM CHEST 1 VIEW: CPT

## 2020-11-28 PROCEDURE — 87077 CULTURE AEROBIC IDENTIFY: CPT

## 2020-11-28 PROCEDURE — 89220 SPUTUM SPECIMEN COLLECTION: CPT

## 2020-11-28 PROCEDURE — 2700000000 HC OXYGEN THERAPY PER DAY

## 2020-11-28 PROCEDURE — 80048 BASIC METABOLIC PNL TOTAL CA: CPT

## 2020-11-28 PROCEDURE — 94750 HC PULMONARY COMPLIANCE STUDY: CPT

## 2020-11-28 PROCEDURE — 94640 AIRWAY INHALATION TREATMENT: CPT

## 2020-11-28 PROCEDURE — 87205 SMEAR GRAM STAIN: CPT

## 2020-11-28 PROCEDURE — 82803 BLOOD GASES ANY COMBINATION: CPT

## 2020-11-28 PROCEDURE — C9113 INJ PANTOPRAZOLE SODIUM, VIA: HCPCS | Performed by: FAMILY MEDICINE

## 2020-11-28 PROCEDURE — 85027 COMPLETE CBC AUTOMATED: CPT

## 2020-11-28 PROCEDURE — 2580000003 HC RX 258: Performed by: FAMILY MEDICINE

## 2020-11-28 PROCEDURE — 87186 SC STD MICRODIL/AGAR DIL: CPT

## 2020-11-28 PROCEDURE — 94003 VENT MGMT INPAT SUBQ DAY: CPT

## 2020-11-28 PROCEDURE — 94761 N-INVAS EAR/PLS OXIMETRY MLT: CPT

## 2020-11-28 RX ORDER — METHYLPREDNISOLONE SODIUM SUCCINATE 40 MG/ML
40 INJECTION, POWDER, LYOPHILIZED, FOR SOLUTION INTRAMUSCULAR; INTRAVENOUS DAILY
Status: DISCONTINUED | OUTPATIENT
Start: 2020-11-29 | End: 2020-12-09 | Stop reason: HOSPADM

## 2020-11-28 RX ORDER — SODIUM CHLORIDE 9 MG/ML
INJECTION, SOLUTION INTRAVENOUS CONTINUOUS
Status: DISCONTINUED | OUTPATIENT
Start: 2020-11-28 | End: 2020-11-30

## 2020-11-28 RX ADMIN — ALBUTEROL SULFATE 4 PUFF: 90 AEROSOL, METERED RESPIRATORY (INHALATION) at 20:34

## 2020-11-28 RX ADMIN — CLONAZEPAM 1 MG: 1 TABLET ORAL at 10:19

## 2020-11-28 RX ADMIN — ALBUTEROL SULFATE 4 PUFF: 90 AEROSOL, METERED RESPIRATORY (INHALATION) at 15:30

## 2020-11-28 RX ADMIN — HYDROXYZINE HYDROCHLORIDE 25 MG: 25 TABLET, FILM COATED ORAL at 16:07

## 2020-11-28 RX ADMIN — HYDROMORPHONE HYDROCHLORIDE 1 MG: 2 TABLET ORAL at 16:07

## 2020-11-28 RX ADMIN — CHLORHEXIDINE GLUCONATE 0.12% ORAL RINSE 15 ML: 1.2 LIQUID ORAL at 10:20

## 2020-11-28 RX ADMIN — SODIUM CHLORIDE, PRESERVATIVE FREE 10 ML: 5 INJECTION INTRAVENOUS at 10:20

## 2020-11-28 RX ADMIN — MEROPENEM 1 G: 1 INJECTION, POWDER, FOR SOLUTION INTRAVENOUS at 21:32

## 2020-11-28 RX ADMIN — CHLORHEXIDINE GLUCONATE 0.12% ORAL RINSE 15 ML: 1.2 LIQUID ORAL at 20:47

## 2020-11-28 RX ADMIN — MEROPENEM 1 G: 1 INJECTION, POWDER, FOR SOLUTION INTRAVENOUS at 13:20

## 2020-11-28 RX ADMIN — ALBUTEROL SULFATE 4 PUFF: 90 AEROSOL, METERED RESPIRATORY (INHALATION) at 00:14

## 2020-11-28 RX ADMIN — BUMETANIDE 0.5 MG: 1 TABLET ORAL at 20:49

## 2020-11-28 RX ADMIN — Medication 8000 UNITS: at 13:19

## 2020-11-28 RX ADMIN — GABAPENTIN 300 MG: 300 CAPSULE ORAL at 10:20

## 2020-11-28 RX ADMIN — MIDODRINE HYDROCHLORIDE 10 MG: 5 TABLET ORAL at 10:25

## 2020-11-28 RX ADMIN — IPRATROPIUM BROMIDE 4 PUFF: 17 AEROSOL, METERED RESPIRATORY (INHALATION) at 00:14

## 2020-11-28 RX ADMIN — SODIUM CHLORIDE, PRESERVATIVE FREE 10 ML: 5 INJECTION INTRAVENOUS at 20:48

## 2020-11-28 RX ADMIN — MAGNESIUM OXIDE 400 MG (241.3 MG MAGNESIUM) TABLET 400 MG: TABLET at 10:34

## 2020-11-28 RX ADMIN — BUMETANIDE 0.5 MG: 1 TABLET ORAL at 10:25

## 2020-11-28 RX ADMIN — IPRATROPIUM BROMIDE 4 PUFF: 17 AEROSOL, METERED RESPIRATORY (INHALATION) at 15:30

## 2020-11-28 RX ADMIN — HYDROXYZINE HYDROCHLORIDE 25 MG: 25 TABLET, FILM COATED ORAL at 10:34

## 2020-11-28 RX ADMIN — OXYCODONE HYDROCHLORIDE 5 MG: 5 TABLET ORAL at 04:17

## 2020-11-28 RX ADMIN — HYDROXYZINE HYDROCHLORIDE 25 MG: 25 TABLET, FILM COATED ORAL at 22:18

## 2020-11-28 RX ADMIN — HYDROXYZINE HYDROCHLORIDE 25 MG: 25 TABLET, FILM COATED ORAL at 04:19

## 2020-11-28 RX ADMIN — ALBUTEROL SULFATE 4 PUFF: 90 AEROSOL, METERED RESPIRATORY (INHALATION) at 11:23

## 2020-11-28 RX ADMIN — NYSTATIN 500000 UNITS: 100000 SUSPENSION ORAL at 10:19

## 2020-11-28 RX ADMIN — CLONAZEPAM 1 MG: 1 TABLET ORAL at 13:15

## 2020-11-28 RX ADMIN — MIDODRINE HYDROCHLORIDE 10 MG: 5 TABLET ORAL at 13:15

## 2020-11-28 RX ADMIN — DULOXETINE HYDROCHLORIDE 60 MG: 30 CAPSULE, DELAYED RELEASE ORAL at 10:19

## 2020-11-28 RX ADMIN — IPRATROPIUM BROMIDE 4 PUFF: 17 AEROSOL, METERED RESPIRATORY (INHALATION) at 20:35

## 2020-11-28 RX ADMIN — ALBUTEROL SULFATE 4 PUFF: 90 AEROSOL, METERED RESPIRATORY (INHALATION) at 04:10

## 2020-11-28 RX ADMIN — GABAPENTIN 300 MG: 300 CAPSULE ORAL at 20:47

## 2020-11-28 RX ADMIN — OXYCODONE HYDROCHLORIDE 5 MG: 5 TABLET ORAL at 13:15

## 2020-11-28 RX ADMIN — MEROPENEM 1 G: 1 INJECTION, POWDER, FOR SOLUTION INTRAVENOUS at 06:06

## 2020-11-28 RX ADMIN — IPRATROPIUM BROMIDE 4 PUFF: 17 AEROSOL, METERED RESPIRATORY (INHALATION) at 08:01

## 2020-11-28 RX ADMIN — CLONAZEPAM 1 MG: 1 TABLET ORAL at 20:47

## 2020-11-28 RX ADMIN — SODIUM CHLORIDE: 9 INJECTION, SOLUTION INTRAVENOUS at 10:17

## 2020-11-28 RX ADMIN — NYSTATIN 500000 UNITS: 100000 SUSPENSION ORAL at 13:20

## 2020-11-28 RX ADMIN — PANTOPRAZOLE SODIUM 40 MG: 40 INJECTION, POWDER, LYOPHILIZED, FOR SOLUTION INTRAVENOUS at 10:20

## 2020-11-28 RX ADMIN — ENOXAPARIN SODIUM 40 MG: 40 INJECTION SUBCUTANEOUS at 10:21

## 2020-11-28 RX ADMIN — OXYCODONE HYDROCHLORIDE 5 MG: 5 TABLET ORAL at 21:02

## 2020-11-28 RX ADMIN — IPRATROPIUM BROMIDE 4 PUFF: 17 AEROSOL, METERED RESPIRATORY (INHALATION) at 04:10

## 2020-11-28 RX ADMIN — IPRATROPIUM BROMIDE 4 PUFF: 17 AEROSOL, METERED RESPIRATORY (INHALATION) at 11:23

## 2020-11-28 RX ADMIN — NYSTATIN 500000 UNITS: 100000 SUSPENSION ORAL at 20:47

## 2020-11-28 RX ADMIN — ALBUTEROL SULFATE 4 PUFF: 90 AEROSOL, METERED RESPIRATORY (INHALATION) at 08:01

## 2020-11-28 RX ADMIN — GABAPENTIN 300 MG: 300 CAPSULE ORAL at 13:15

## 2020-11-28 RX ADMIN — NYSTATIN 500000 UNITS: 100000 SUSPENSION ORAL at 16:08

## 2020-11-28 ASSESSMENT — PAIN SCALES - GENERAL
PAINLEVEL_OUTOF10: 5
PAINLEVEL_OUTOF10: 1
PAINLEVEL_OUTOF10: 4
PAINLEVEL_OUTOF10: 9
PAINLEVEL_OUTOF10: 2
PAINLEVEL_OUTOF10: 7
PAINLEVEL_OUTOF10: 2
PAINLEVEL_OUTOF10: 8
PAINLEVEL_OUTOF10: 8
PAINLEVEL_OUTOF10: 9
PAINLEVEL_OUTOF10: 4
PAINLEVEL_OUTOF10: 7

## 2020-11-28 ASSESSMENT — PULMONARY FUNCTION TESTS
PIF_VALUE: 17
PIF_VALUE: 17
PIF_VALUE: 16
PIF_VALUE: 18
PIF_VALUE: 12
PIF_VALUE: 14
PIF_VALUE: 28
PIF_VALUE: 23
PIF_VALUE: 15
PIF_VALUE: 18
PIF_VALUE: 16
PIF_VALUE: 17
PIF_VALUE: 18
PIF_VALUE: 18
PIF_VALUE: 15
PIF_VALUE: 19
PIF_VALUE: 20

## 2020-11-28 ASSESSMENT — PAIN DESCRIPTION - DESCRIPTORS
DESCRIPTORS: ACHING

## 2020-11-28 ASSESSMENT — PAIN DESCRIPTION - ORIENTATION
ORIENTATION: MID;LOWER
ORIENTATION: LOWER;MID
ORIENTATION: MID;LOWER
ORIENTATION: MID;LOWER
ORIENTATION: LEFT;LOWER
ORIENTATION: LOWER;MID

## 2020-11-28 ASSESSMENT — PAIN DESCRIPTION - PAIN TYPE
TYPE: CHRONIC PAIN

## 2020-11-28 ASSESSMENT — PAIN DESCRIPTION - FREQUENCY
FREQUENCY: INTERMITTENT
FREQUENCY: CONTINUOUS
FREQUENCY: INTERMITTENT
FREQUENCY: INTERMITTENT

## 2020-11-28 ASSESSMENT — PAIN DESCRIPTION - LOCATION
LOCATION: BACK
LOCATION: BACK;HIP
LOCATION: BACK

## 2020-11-28 NOTE — PROGRESS NOTES
Pulmonary and Critical Care  Progress Note    Subjective: The patient has improved. Shortness of breath none  Chest pain none  Addressing respiratory complaints Patient is negative for  hemoptysis and cyanosis  CONSTITUTIONAL:  negative for fevers and chills      Past Medical History:     has a past medical history of Acute embolism and thrombosis of deep veins of left upper extremity ([address] Acute kidney failure with tubular necrosis ([address] Anemia, unspecified, Anxiety, Aphonia, Bacteremia, Drug abuse and dependence ([address] Dysphagia, Gastrostomy status ([address] Hemoperitoneum, Hepatitis C without hepatic coma, Hypertension, Idiopathic hypotension, Malignant neoplasm of stomach, unspecified ([address] Metabolic encephalopathy, Methicillin susceptible Staphylococcus aureus infection as the cause of diseases classified elsewhere, Muscle weakness, Osteomyelitis ([address] Thrombocytopenia, unspecified ([address] Tracheostomy status ([address] and Unspecified severe protein-calorie malnutrition ([address] has a past surgical history that includes back surgery; IR NONTUNNELED VASCULAR CATHETER > 5 YEARS (7/26/2020); laparotomy (N/A, 7/25/2020); tracheostomy (N/A, 8/10/2020); Gastrostomy tube placement (N/A, 8/10/2020); and IR TUNNELED CVC PLACE WO SQ PORT/PUMP > 5 YEARS (8/12/2020). reports that he has quit smoking. His smoking use included cigarettes. He smoked 1.50 packs per day. He does not have any smokeless tobacco history on file. He reports that he does not drink alcohol or use drugs. Family history:  family history is not on file.     No Known Allergies  Social History:    Reviewed; no changes    Objective:   PHYSICAL EXAM:        VITALS:  BP (!) 139/110   Pulse 101   Temp 98.4 °F (36.9 °C) (Rectal)   Resp 21   Ht 5' 7\" (1.702 m)   Wt 121 lb 11.1 oz (55.2 kg)   SpO2 100%   BMI 19.06 kg/m²     24HR INTAKE/OUTPUT:      Intake/Output Summary (Last 24 hours) at 11/28/2020 1245  Last data filed at 11/28/2020 1001  Gross per 24 hour   Intake 949 ml   Output 1510 ml   Net -561 ml       CONSTITUTIONAL:  awake  LUNGS:  decreased breath sounds, basilar crackles. CARDIOVASCULAR:  normal S1 and S2 and negative JVD  ABD:Abdomen soft, non-tender. BS normal. No masses,  No organomegaly. DATA:    CBC:  Recent Labs     11/26/20  1554 11/27/20  0555 11/28/20  0600   WBC 14.7* 15.7* 12.6*   RBC 2.97* 2.97* 3.11*   HGB 8.3* 7.9* 8.7*   HCT 26.9* 26.8* 28.0*    397 434   MCV 90.6 90.2 90.0   MCH 27.9 26.6* 28.0   MCHC 30.9* 29.5* 31.1*   RDW 16.4* 16.4* 16.7*      BMP:  Recent Labs     11/26/20  0600 11/27/20  0555 11/28/20  0600    140 139   K 3.7 3.4* 4.0    105 104   CO2 25 25 28   BUN 39* 36* 44*   CREATININE 0.5* 0.4* 0.4*   CALCIUM 9.2 8.8 9.1   GLUCOSE 160* 190* 159*      ABG:  Recent Labs     11/26/20  0800 11/27/20  0800 11/28/20  0800   PH 7.38 7.35 7.44   PO2ART 96 112* 90   [id number] 40.0 48.0* 43.0   O2SAT 95.2* 95.0* 95.8*     Lab Results   Component Value Date    PROBNP 530.3 (H) 11/26/2020    PROBNP 742.6 (H) 11/24/2020    PROBNP 11,142 (H) 07/24/2020     No results found for: [facility]    Radiology Review:  Pertinent images / reports were reviewed as a part of this visit. Assessment:     Patient Active Problem List   Diagnosis    Sepsis ([address]    Leukocytosis    Ischemic bowel disease ([address]    MSSA bacteremia    Chronic hepatitis C without hepatic coma ([address]    Res[facility] failure ([facility]    HCAP (healthcare-associated pneumonia)       Plan:   1. Overall the patient has improved. 2. Cont full vent support. 3. Family considering hospice.     Lolis Argueta MD  11/28/2020  12:45 PM

## 2020-11-28 NOTE — CARE COORDINATION
Pt on weekend follow up list.  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 Called Dalia/Kiana. She informed  that pt will requires a pre-cert to return. Dr Leonie Guo is waiting for son to decide if he wants Hospice or not per his note on 11/27.   TE

## 2020-11-28 NOTE — PROGRESS NOTES
Called to patients room due to patient mucus plugging. This RT lavaged patient with saline and produced copious amounts of thick creamy secretions. Patient tolerated well.   This RT changed HME, Corrigated tubing and will change inline suction cath

## 2020-11-28 NOTE — PLAN OF CARE
Problem: Tissue Perfusion:  Goal: Peripheral tissue perfusion will improve  Description: Peripheral tissue perfusion will improve  Outcome: Ongoing     Problem: Falls - Risk of:  Goal: Will remain free from falls  Description: Will remain free from falls  Outcome: Ongoing  Goal: Absence of physical injury  Description: Absence of physical injury  Outcome: Ongoing     Problem: Skin Integrity:  Goal: Will show no infection signs and symptoms  Description: Will show no infection signs and symptoms  Outcome: Ongoing  Goal: Absence of new skin breakdown  Description: Absence of new skin breakdown  Outcome: Ongoing     Problem: Infection:  Goal: Will remain free from infection  Description: Will remain free from infection  Outcome: Ongoing     Problem: Safety:  Goal: Free from accidental physical injury  Description: Free from accidental physical injury  Outcome: Ongoing  Goal: Free from intentional harm  Description: Free from intentional harm  Outcome: Ongoing     Problem: Daily Care:  Goal: Daily care needs are met  Description: Daily care needs are met  Outcome: Ongoing     Problem: Pain:  Goal: Patient's pain/discomfort is manageable  Description: Patient's pain/discomfort is manageable  Outcome: Ongoing     Problem: Discharge Planning:  Goal: Patients continuum of care needs are met  Description: Patients continuum of care needs are met  Outcome: Ongoing

## 2020-11-28 NOTE — PROGRESS NOTES
Hospitalist Progress Note      Name:  Kendrick Shore /Age/Sex: 1955  (63 y.o. male)   MRN & CSN:  [id number] & [id number] Admission Date/Time: 2020 10:19 AM   Location:  -A PCP: No primary care provider on file. Kendrick Shore is a 63 y.o.  male  who presents with Respiratory Distress (pt resident of [facility], trach patient, EMS called for low O2 sat and high respiratory and heart rate.  pt reportedly full code, cancer patient)      Assessment and Plan:   Sepsis 2/2 HCAP, Possible Aspiration PNA  Chronic trach on Vent  - IV Merrem  - MRSA nares neg  - check blood cx NTD  - check sputum cx + E.coli ESBL  - check urine strep + legionella; negative  - trach suctioning prn  - nebs  - IVF d/c   - consult Pulm     Possible UTI  - ruled out, urine cx NTD     HypoK  - replaced        Palliative care for code status discussion     Son is considering Hospice, tried calling him today 2x no answer.     Dietician consulted for TF           Skin wounds/right post thigh/buttock and bilateral UE - wound care  Hx of gastric cancer stage 4  Polysubstance abuse history  Hep C, chronic               Diet DIET TUBE FEED CONTINUOUS/CYCLIC NPO; Semi-elemental (Vital AF 1.2 Waldo); Gastrostomy; Continuous; 20; 60; 24   Code Status Full Code     Medications:   Medications:    meropenem  1 g Intravenous Q8H    Ergocalciferol  8,000 Units Oral Daily    methylPREDNISolone  40 mg Intravenous Q12H    magnesium oxide  400 mg Oral Daily    scopolamine  1 patch Transdermal Q72H    pantoprazole  40 mg Intravenous Daily    nystatin  5 mL Oral 4x Daily    midodrine  10 mg Oral TID    sodium chloride flush  10 mL Intravenous 2 times per day    enoxaparin  40 mg Subcutaneous Daily    clonazePAM  1 mg Per G Tube TID    bumetanide  0.5 mg Per G Tube BID    DULoxetine  60 mg Per G Tube Daily    gabapentin  300 mg Per G Tube TID    hydrOXYzine  25 mg Per G Tube Q6H    albuterol sulfate HFA  4 puff Inhalation Q4H And    ipratropium  4 puff Inhalation Q4H    chlorhexidine  15 mL Mouth/Throat BID      Infusions:   PRN Meds: potassium chloride, 40 mEq, PRN    Or  potassium alternative oral replacement, 40 mEq, PRN    Or  potassium chloride, 10 mEq, PRN  dicyclomine, 20 mg, Q6H PRN  sodium chloride flush, 10 mL, PRN  acetaminophen, 650 mg, Q6H PRN    Or  acetaminophen, 650 mg, Q6H PRN  polyethylene glycol, 17 g, Daily PRN  promethazine, 12.5 mg, Q6H PRN    Or  ondansetron, 4 mg, Q6H PRN  oxyCODONE, 5 mg, Q6H PRN  HYDROmorphone, 1 mg, Daily PRN  magnesium sulfate, 1 g, PRN      Subjective:   Awake alert no distress    Objective: Intake/Output Summary (Last 24 hours) at 11/28/2020 5102  Last data filed at 11/28/2020 0603  Gross per 24 hour   Intake 899 ml   Output 1510 ml   Net -611 ml      Vitals:   Vitals:    11/28/20 0700   BP: (!) 135/99   Pulse: 88   Resp: 20   Temp:    SpO2: 100%     Physical Exam:   Gen:  awake, alert, no apparent distress  Head/Eyes:  Normocephalic atraumatic, EOMI   NECK:   symmetrical, trachea midline  LUNGS: Normal Effort on vent  CARDIOVASCULAR:  Normal rate  ABDOMEN:  non distended  MUSCULOSKELETAL:  ROM limited  NEUROLOGIC: Alert and Oriented,  Cranial nerves II-XII are grossly intact.    SKIN:  no bruising or bleeding, normal skin color,  no redness      Data:       CBC   Recent Labs     11/26/20  1554 11/27/20  0555 11/28/20  0600   WBC 14.7* 15.7* 12.6*   HGB 8.3* 7.9* 8.7*   HCT 26.9* 26.8* 28.0*    397 434      BMP   Recent Labs     11/26/20  0600 11/27/20  0555 11/28/20  0600    140 139   K 3.7 3.4* 4.0    105 104   CO2 25 25 28   PHOS 2.7  --   --    BUN 39* 36* 44*   CREATININE 0.5* 0.4* 0.4*         Electronically signed by Brandy To MD on 11/28/2020 at 9:09 AM

## 2020-11-29 ENCOUNTER — APPOINTMENT (OUTPATIENT)
Dept: GENERAL RADIOLOGY | Age: 65
DRG: 720 | End: 2020-11-29
Payer: MEDICARE

## 2020-11-29 LAB
ANION GAP SERPL CALCULATED.3IONS-SCNC: 11 MMOL/L (ref 4–16)
BASE EXCESS MIXED: 8.2 (ref 0–1.2)
BUN BLDV-MCNC: 36 MG/DL (ref 6–23)
CALCIUM SERPL-MCNC: 9.3 MG/DL (ref 8.3–10.6)
CARBON MONOXIDE, BLOOD: 2.1 % (ref 0–5)
CHLORIDE BLD-SCNC: 107 MMOL/L (ref 99–110)
CO2 CONTENT: 34.2 MMOL/L (ref 19–24)
CO2: 30 MMOL/L (ref 21–32)
COMMENT: ABNORMAL
CREAT SERPL-MCNC: 0.3 MG/DL (ref 0.9–1.3)
CULTURE: NORMAL
CULTURE: NORMAL
GFR AFRICAN AMERICAN: >60 ML/MIN/1.73M2
GFR NON-AFRICAN AMERICAN: >60 ML/MIN/1.73M2
GLUCOSE BLD-MCNC: 128 MG/DL (ref 70–99)
HCO3 ARTERIAL: 32.8 MMOL/L (ref 18–23)
HCT VFR BLD CALC: 30.7 % (ref 42–52)
HEMOGLOBIN: 9.2 GM/DL (ref 13.5–18)
Lab: NORMAL
Lab: NORMAL
MAGNESIUM: 1.9 MG/DL (ref 1.8–2.4)
MAGNESIUM: 1.9 MG/DL (ref 1.8–2.4)
MCH RBC QN AUTO: 27.2 PG (ref 27–31)
MCHC RBC AUTO-ENTMCNC: 30 % (ref 32–36)
MCV RBC AUTO: 90.8 FL (ref 78–100)
METHEMOGLOBIN ARTERIAL: 1.2 %
O2 SATURATION: 95.2 % (ref 96–97)
PCO2 ARTERIAL: 44 MMHG (ref 32–45)
PDW BLD-RTO: 16.8 % (ref 11.7–14.9)
PH BLOOD: 7.48 (ref 7.34–7.45)
PHOSPHORUS: 1.5 MG/DL (ref 2.5–4.9)
PLATELET # BLD: 477 K/CU MM (ref 140–440)
PMV BLD AUTO: 9.2 FL (ref 7.5–11.1)
PO2 ARTERIAL: 82 MMHG (ref 75–100)
POTASSIUM SERPL-SCNC: 3.5 MMOL/L (ref 3.5–5.1)
RBC # BLD: 3.38 M/CU MM (ref 4.6–6.2)
SODIUM BLD-SCNC: 148 MMOL/L (ref 135–145)
SPECIMEN: NORMAL
SPECIMEN: NORMAL
WBC # BLD: 19.3 K/CU MM (ref 4–10.5)

## 2020-11-29 PROCEDURE — 83735 ASSAY OF MAGNESIUM: CPT

## 2020-11-29 PROCEDURE — 2500000003 HC RX 250 WO HCPCS: Performed by: FAMILY MEDICINE

## 2020-11-29 PROCEDURE — 2000000000 HC ICU R&B

## 2020-11-29 PROCEDURE — 0B21XFZ CHANGE TRACHEOSTOMY DEVICE IN TRACHEA, EXTERNAL APPROACH: ICD-10-PCS | Performed by: INTERNAL MEDICINE

## 2020-11-29 PROCEDURE — 6360000002 HC RX W HCPCS: Performed by: FAMILY MEDICINE

## 2020-11-29 PROCEDURE — 94750 HC PULMONARY COMPLIANCE STUDY: CPT

## 2020-11-29 PROCEDURE — 6370000000 HC RX 637 (ALT 250 FOR IP): Performed by: FAMILY MEDICINE

## 2020-11-29 PROCEDURE — 80048 BASIC METABOLIC PNL TOTAL CA: CPT

## 2020-11-29 PROCEDURE — 85027 COMPLETE CBC AUTOMATED: CPT

## 2020-11-29 PROCEDURE — 6360000002 HC RX W HCPCS: Performed by: HOSPITALIST

## 2020-11-29 PROCEDURE — 82803 BLOOD GASES ANY COMBINATION: CPT

## 2020-11-29 PROCEDURE — 2580000003 HC RX 258: Performed by: FAMILY MEDICINE

## 2020-11-29 PROCEDURE — 89220 SPUTUM SPECIMEN COLLECTION: CPT

## 2020-11-29 PROCEDURE — 2700000000 HC OXYGEN THERAPY PER DAY

## 2020-11-29 PROCEDURE — 6370000000 HC RX 637 (ALT 250 FOR IP): Performed by: INTERNAL MEDICINE

## 2020-11-29 PROCEDURE — 71045 X-RAY EXAM CHEST 1 VIEW: CPT

## 2020-11-29 PROCEDURE — 94761 N-INVAS EAR/PLS OXIMETRY MLT: CPT

## 2020-11-29 PROCEDURE — 31502 CHANGE OF WINDPIPE AIRWAY: CPT

## 2020-11-29 PROCEDURE — C9113 INJ PANTOPRAZOLE SODIUM, VIA: HCPCS | Performed by: FAMILY MEDICINE

## 2020-11-29 PROCEDURE — 6360000002 HC RX W HCPCS

## 2020-11-29 PROCEDURE — 94640 AIRWAY INHALATION TREATMENT: CPT

## 2020-11-29 PROCEDURE — 84100 ASSAY OF PHOSPHORUS: CPT

## 2020-11-29 PROCEDURE — 94003 VENT MGMT INPAT SUBQ DAY: CPT

## 2020-11-29 RX ORDER — LORAZEPAM 2 MG/ML
INJECTION INTRAMUSCULAR
Status: COMPLETED
Start: 2020-11-29 | End: 2020-11-29

## 2020-11-29 RX ORDER — MORPHINE SULFATE 2 MG/ML
2 INJECTION, SOLUTION INTRAMUSCULAR; INTRAVENOUS ONCE
Status: COMPLETED | OUTPATIENT
Start: 2020-11-29 | End: 2020-11-29

## 2020-11-29 RX ORDER — PROPOFOL 10 MG/ML
10 INJECTION, EMULSION INTRAVENOUS
Status: DISCONTINUED | OUTPATIENT
Start: 2020-11-29 | End: 2020-11-29

## 2020-11-29 RX ORDER — PROPOFOL 10 MG/ML
10 INJECTION, EMULSION INTRAVENOUS
Status: DISCONTINUED | OUTPATIENT
Start: 2020-11-29 | End: 2020-12-03

## 2020-11-29 RX ORDER — FENTANYL CITRATE 50 UG/ML
50 INJECTION, SOLUTION INTRAMUSCULAR; INTRAVENOUS ONCE
Status: COMPLETED | OUTPATIENT
Start: 2020-11-29 | End: 2020-11-29

## 2020-11-29 RX ORDER — MIDAZOLAM HYDROCHLORIDE 2 MG/2ML
2 INJECTION, SOLUTION INTRAMUSCULAR; INTRAVENOUS ONCE
Status: COMPLETED | OUTPATIENT
Start: 2020-11-29 | End: 2020-11-29

## 2020-11-29 RX ORDER — MIDAZOLAM HYDROCHLORIDE 1 MG/ML
INJECTION INTRAMUSCULAR; INTRAVENOUS
Status: COMPLETED
Start: 2020-11-29 | End: 2020-11-29

## 2020-11-29 RX ORDER — MORPHINE SULFATE 2 MG/ML
INJECTION, SOLUTION INTRAMUSCULAR; INTRAVENOUS
Status: COMPLETED
Start: 2020-11-29 | End: 2020-11-29

## 2020-11-29 RX ORDER — LORAZEPAM 2 MG/ML
1 INJECTION INTRAMUSCULAR ONCE
Status: COMPLETED | OUTPATIENT
Start: 2020-11-29 | End: 2020-11-29

## 2020-11-29 RX ORDER — FENTANYL CITRATE 50 UG/ML
INJECTION, SOLUTION INTRAMUSCULAR; INTRAVENOUS
Status: COMPLETED
Start: 2020-11-29 | End: 2020-11-29

## 2020-11-29 RX ADMIN — PANTOPRAZOLE SODIUM 40 MG: 40 INJECTION, POWDER, LYOPHILIZED, FOR SOLUTION INTRAVENOUS at 09:55

## 2020-11-29 RX ADMIN — FENTANYL CITRATE 50 MCG: 50 INJECTION INTRAMUSCULAR; INTRAVENOUS at 16:55

## 2020-11-29 RX ADMIN — MIDAZOLAM HYDROCHLORIDE 2 MG: 2 INJECTION, SOLUTION INTRAMUSCULAR; INTRAVENOUS at 16:56

## 2020-11-29 RX ADMIN — MEROPENEM 1 G: 1 INJECTION, POWDER, FOR SOLUTION INTRAVENOUS at 05:44

## 2020-11-29 RX ADMIN — CLONAZEPAM 1 MG: 1 TABLET ORAL at 21:16

## 2020-11-29 RX ADMIN — NYSTATIN 500000 UNITS: 100000 SUSPENSION ORAL at 09:54

## 2020-11-29 RX ADMIN — FENTANYL CITRATE 50 MCG: 50 INJECTION, SOLUTION INTRAMUSCULAR; INTRAVENOUS at 16:55

## 2020-11-29 RX ADMIN — ALBUTEROL SULFATE 4 PUFF: 90 AEROSOL, METERED RESPIRATORY (INHALATION) at 04:06

## 2020-11-29 RX ADMIN — OXYCODONE HYDROCHLORIDE 5 MG: 5 TABLET ORAL at 03:04

## 2020-11-29 RX ADMIN — CHLORHEXIDINE GLUCONATE 0.12% ORAL RINSE 15 ML: 1.2 LIQUID ORAL at 09:55

## 2020-11-29 RX ADMIN — POTASSIUM BICARBONATE 40 MEQ: 782 TABLET, EFFERVESCENT ORAL at 12:45

## 2020-11-29 RX ADMIN — ENOXAPARIN SODIUM 40 MG: 40 INJECTION SUBCUTANEOUS at 09:55

## 2020-11-29 RX ADMIN — IPRATROPIUM BROMIDE 4 PUFF: 17 AEROSOL, METERED RESPIRATORY (INHALATION) at 04:06

## 2020-11-29 RX ADMIN — LORAZEPAM 1 MG: 2 INJECTION INTRAMUSCULAR at 16:31

## 2020-11-29 RX ADMIN — BUMETANIDE 0.5 MG: 1 TABLET ORAL at 21:16

## 2020-11-29 RX ADMIN — MORPHINE SULFATE 2 MG: 2 INJECTION, SOLUTION INTRAMUSCULAR; INTRAVENOUS at 16:36

## 2020-11-29 RX ADMIN — HYDROXYZINE HYDROCHLORIDE 25 MG: 25 TABLET, FILM COATED ORAL at 04:32

## 2020-11-29 RX ADMIN — IPRATROPIUM BROMIDE 4 PUFF: 17 AEROSOL, METERED RESPIRATORY (INHALATION) at 20:10

## 2020-11-29 RX ADMIN — SODIUM PHOSPHATE, MONOBASIC, MONOHYDRATE AND SODIUM PHOSPHATE, DIBASIC, ANHYDROUS 30 MMOL: 276; 142 INJECTION, SOLUTION INTRAVENOUS at 11:17

## 2020-11-29 RX ADMIN — GABAPENTIN 300 MG: 300 CAPSULE ORAL at 09:54

## 2020-11-29 RX ADMIN — IPRATROPIUM BROMIDE 4 PUFF: 17 AEROSOL, METERED RESPIRATORY (INHALATION) at 00:24

## 2020-11-29 RX ADMIN — SODIUM CHLORIDE, PRESERVATIVE FREE 10 ML: 5 INJECTION INTRAVENOUS at 10:03

## 2020-11-29 RX ADMIN — HYDROXYZINE HYDROCHLORIDE 25 MG: 25 TABLET, FILM COATED ORAL at 21:16

## 2020-11-29 RX ADMIN — MAGNESIUM OXIDE 400 MG (241.3 MG MAGNESIUM) TABLET 400 MG: TABLET at 09:54

## 2020-11-29 RX ADMIN — MEROPENEM 1 G: 1 INJECTION, POWDER, FOR SOLUTION INTRAVENOUS at 16:54

## 2020-11-29 RX ADMIN — CLONAZEPAM 1 MG: 1 TABLET ORAL at 13:24

## 2020-11-29 RX ADMIN — GABAPENTIN 300 MG: 300 CAPSULE ORAL at 13:25

## 2020-11-29 RX ADMIN — ALBUTEROL SULFATE 4 PUFF: 90 AEROSOL, METERED RESPIRATORY (INHALATION) at 11:53

## 2020-11-29 RX ADMIN — HYDROXYZINE HYDROCHLORIDE 25 MG: 25 TABLET, FILM COATED ORAL at 09:53

## 2020-11-29 RX ADMIN — IPRATROPIUM BROMIDE 4 PUFF: 17 AEROSOL, METERED RESPIRATORY (INHALATION) at 11:54

## 2020-11-29 RX ADMIN — CHLORHEXIDINE GLUCONATE 0.12% ORAL RINSE 15 ML: 1.2 LIQUID ORAL at 21:16

## 2020-11-29 RX ADMIN — Medication 8000 UNITS: at 12:48

## 2020-11-29 RX ADMIN — GABAPENTIN 300 MG: 300 CAPSULE ORAL at 21:16

## 2020-11-29 RX ADMIN — BUMETANIDE 0.5 MG: 1 TABLET ORAL at 09:55

## 2020-11-29 RX ADMIN — ALBUTEROL SULFATE 4 PUFF: 90 AEROSOL, METERED RESPIRATORY (INHALATION) at 00:24

## 2020-11-29 RX ADMIN — MIDAZOLAM 2 MG: 1 INJECTION INTRAMUSCULAR; INTRAVENOUS at 16:56

## 2020-11-29 RX ADMIN — CLONAZEPAM 1 MG: 1 TABLET ORAL at 09:54

## 2020-11-29 RX ADMIN — ALBUTEROL SULFATE 4 PUFF: 90 AEROSOL, METERED RESPIRATORY (INHALATION) at 20:10

## 2020-11-29 RX ADMIN — IPRATROPIUM BROMIDE 4 PUFF: 17 AEROSOL, METERED RESPIRATORY (INHALATION) at 08:42

## 2020-11-29 RX ADMIN — MEROPENEM 1 G: 1 INJECTION, POWDER, FOR SOLUTION INTRAVENOUS at 21:15

## 2020-11-29 RX ADMIN — ALBUTEROL SULFATE 4 PUFF: 90 AEROSOL, METERED RESPIRATORY (INHALATION) at 08:42

## 2020-11-29 RX ADMIN — DULOXETINE HYDROCHLORIDE 60 MG: 30 CAPSULE, DELAYED RELEASE ORAL at 09:54

## 2020-11-29 RX ADMIN — PROPOFOL 10 MCG/KG/MIN: 10 INJECTION, EMULSION INTRAVENOUS at 19:01

## 2020-11-29 RX ADMIN — METHYLPREDNISOLONE SODIUM SUCCINATE 40 MG: 40 INJECTION, POWDER, FOR SOLUTION INTRAMUSCULAR; INTRAVENOUS at 10:03

## 2020-11-29 RX ADMIN — NYSTATIN 500000 UNITS: 100000 SUSPENSION ORAL at 13:24

## 2020-11-29 RX ADMIN — LORAZEPAM 1 MG: 2 INJECTION INTRAMUSCULAR; INTRAVENOUS at 16:31

## 2020-11-29 RX ADMIN — PROPOFOL 10 MCG/KG/MIN: 10 INJECTION, EMULSION INTRAVENOUS at 17:07

## 2020-11-29 RX ADMIN — OXYCODONE HYDROCHLORIDE 5 MG: 5 TABLET ORAL at 10:28

## 2020-11-29 RX ADMIN — HYDROMORPHONE HYDROCHLORIDE 1 MG: 2 TABLET ORAL at 12:54

## 2020-11-29 ASSESSMENT — PAIN DESCRIPTION - PAIN TYPE
TYPE: CHRONIC PAIN

## 2020-11-29 ASSESSMENT — PAIN DESCRIPTION - DESCRIPTORS
DESCRIPTORS: ACHING
DESCRIPTORS: ACHING

## 2020-11-29 ASSESSMENT — PULMONARY FUNCTION TESTS
PIF_VALUE: 30
PIF_VALUE: 18
PIF_VALUE: 20
PIF_VALUE: 40
PIF_VALUE: 17
PIF_VALUE: 20
PIF_VALUE: 18
PIF_VALUE: 34
PIF_VALUE: 18
PIF_VALUE: 17
PIF_VALUE: 17
PIF_VALUE: 18
PIF_VALUE: 30
PIF_VALUE: 23

## 2020-11-29 ASSESSMENT — PAIN DESCRIPTION - FREQUENCY: FREQUENCY: INTERMITTENT

## 2020-11-29 ASSESSMENT — PAIN SCALES - GENERAL
PAINLEVEL_OUTOF10: 6
PAINLEVEL_OUTOF10: 3
PAINLEVEL_OUTOF10: 0
PAINLEVEL_OUTOF10: 0
PAINLEVEL_OUTOF10: 5
PAINLEVEL_OUTOF10: 3
PAINLEVEL_OUTOF10: 0
PAINLEVEL_OUTOF10: 3

## 2020-11-29 ASSESSMENT — PAIN DESCRIPTION - LOCATION
LOCATION: BACK

## 2020-11-29 ASSESSMENT — PAIN DESCRIPTION - ORIENTATION: ORIENTATION: MID;LOWER

## 2020-11-29 NOTE — PROGRESS NOTES
11/29/20 0408   Vent Information   Vent Type 980   Vent Mode AC/VC   Vt Ordered 450 mL   Rate Set 12 bmp   Peak Flow 39 L/min   Pressure Support 0 cmH20   FiO2  35 %   SpO2 100 %   SpO2/FiO2 ratio 285.71   Sensitivity 3   PEEP/CPAP 5   I Time/ I Time % 0 s   Humidification Source HME   Vent Patient Data   High Peep/I Pressure 0   Peak Inspiratory Pressure 18 cmH2O   Mean Airway Pressure 11 cmH20   Rate Measured 22 br/min   Vt Exhaled 434 mL   Minute Volume 10.19 Liters   I:E Ratio 1.10:1   Cough/Sputum   Sputum How Obtained Suctioned;Tracheal   $Obtained Sample $Induced Sputum   Cough Productive   Sputum Amount Moderate   Sputum Color Creamy; Yellow   Tenacity Thick   Spontaneous Breathing Trial (SBT) RT Doc   Pulse 100   Additional Respiratory  Assessments   Resp 22   Alarm Settings   High Pressure Alarm 40 cmH2O   Delay Alarm 20 sec(s)   Low Minute Volume Alarm 2.5 L/min   Apnea (secs) 20 secs   High Respiratory Rate 40 br/min   Low Exhaled Vt  205 mL

## 2020-11-29 NOTE — PLAN OF CARE
Problem: Tissue Perfusion:  Goal: Peripheral tissue perfusion will improve  Description: Peripheral tissue perfusion will improve  Outcome: Ongoing     Problem: Falls - Risk of:  Goal: Will remain free from falls  Description: Will remain free from falls  Outcome: Ongoing  Goal: Absence of physical injury  Description: Absence of physical injury  Outcome: Ongoing     Problem: Skin Integrity:  Goal: Will show no infection signs and symptoms  Description: Will show no infection signs and symptoms  Outcome: Ongoing  Goal: Absence of new skin breakdown  Description: Absence of new skin breakdown  Outcome: Ongoing     Problem: Infection:  Goal: Will remain free from infection  Description: Will remain free from infection  Outcome: Ongoing     Problem: Safety:  Goal: Free from accidental physical injury  Description: Free from accidental physical injury  Outcome: Ongoing  Goal: Free from intentional harm  Description: Free from intentional harm  Outcome: Ongoing     Problem: Daily Care:  Goal: Daily care needs are met  Description: Daily care needs are met  Outcome: Ongoing     Problem: Pain:  Description: Pain management should include both nonpharmacologic and pharmacologic interventions.   Goal: Patient's pain/discomfort is manageable  Description: Patient's pain/discomfort is manageable  Outcome: Ongoing  Goal: Pain level will decrease  Description: Pain level will decrease  Outcome: Ongoing  Goal: Control of acute pain  Description: Control of acute pain  Outcome: Ongoing  Goal: Control of chronic pain  Description: Control of chronic pain  Outcome: Ongoing     Problem: Discharge Planning:  Goal: Patients continuum of care needs are met  Description: Patients continuum of care needs are met  Outcome: Ongoing     Problem: Gas Exchange - Impaired:  Goal: Levels of oxygenation will improve  Description: Levels of oxygenation will improve  Outcome: Ongoing     Problem: Discharge Planning:  Goal: Discharged to appropriate level of care  Description: Discharged to appropriate level of care  Outcome: Ongoing

## 2020-11-29 NOTE — CARE COORDINATION
Dr Mateus Escobar called pt's son 2 times yesterday for Hospice decision. Son did not answer. Pt remains on Vent.   TE

## 2020-11-29 NOTE — PROGRESS NOTES
Hospitalist Progress Note      Name:  Marcio Mane /Age/Sex: 1955  (63 y.o. male)   MRN & CSN:  [id number] & [id number] Admission Date/Time: 2020 10:19 AM   Location:  -A PCP: No primary care provider on file. Marcio Mane is a 63 y.o.  male  who presents with Respiratory Distress (pt resident of [facility], trach patient, EMS called for low O2 sat and high respiratory and heart rate. pt reportedly full code, cancer patient)      Assessment and Plan:   Sepsis 2/2 HCAP, Possible Aspiration PNA  Chronic trach on Vent  - IV Merrem  - MRSA nares neg  - check blood cx NTD  - check sputum cx + E.coli ESBL  - check urine strep + legionella; negative  - trach suctioning prn  - nebs  - consult Pulm     HypoK  - replaced    HypoPhos  - replace        Palliative care for code status discussion     Tried calling son today again and no answer straight to voicemail, I did speak with pt about Hospice and he is interested in getting information about their services, when I ask if he wants to come off the trach/vent he nods yes.  Will consult Hospice to d/w pt and son and provide information so they can decide.     Dietician consulted for TF           Skin wounds/right post thigh/buttock and bilateral UE - wound care  Hx of gastric cancer stage 4  Polysubstance abuse history  Hep C, chronic            Diet DIET TUBE FEED CONTINUOUS/CYCLIC NPO; Semi-elemental (Vital AF 1.2 Waldo); Gastrostomy; Continuous; 20; 60; 24   Code Status Full Code     Medications:   Medications:    sodium phosphate IVPB  30 mmol Intravenous Once    methylPREDNISolone  40 mg Intravenous Daily    meropenem  1 g Intravenous Q8H    Ergocalciferol  8,000 Units Oral Daily    magnesium oxide  400 mg Oral Daily    scopolamine  1 patch Transdermal Q72H    pantoprazole  40 mg Intravenous Daily    nystatin  5 mL Oral 4x Daily    midodrine  10 mg Oral TID    sodium chloride flush  10 mL Intravenous 2 times per day    enoxaparin 40 mg Subcutaneous Daily    clonazePAM  1 mg Per G Tube TID    bumetanide  0.5 mg Per G Tube BID    DULoxetine  60 mg Per G Tube Daily    gabapentin  300 mg Per G Tube TID    hydrOXYzine  25 mg Per G Tube Q6H    albuterol sulfate HFA  4 puff Inhalation Q4H    And    ipratropium  4 puff Inhalation Q4H    chlorhexidine  15 mL Mouth/Throat BID      Infusions:    sodium chloride 10 mL/hr at 11/28/20 1017     PRN Meds: potassium chloride, 40 mEq, PRN    Or  potassium alternative oral replacement, 40 mEq, PRN    Or  potassium chloride, 10 mEq, PRN  dicyclomine, 20 mg, Q6H PRN  sodium chloride flush, 10 mL, PRN  acetaminophen, 650 mg, Q6H PRN    Or  acetaminophen, 650 mg, Q6H PRN  polyethylene glycol, 17 g, Daily PRN  promethazine, 12.5 mg, Q6H PRN    Or  ondansetron, 4 mg, Q6H PRN  oxyCODONE, 5 mg, Q6H PRN  HYDROmorphone, 1 mg, Daily PRN  magnesium sulfate, 1 g, PRN      Subjective:   Awake alert no distress    Objective: Intake/Output Summary (Last 24 hours) at 11/29/2020 0947  Last data filed at 11/29/2020 8473  Gross per 24 hour   Intake 1117 ml   Output 2490 ml   Net -1373 ml      Vitals:   Vitals:    11/29/20 0900   BP: (!) 126/95   Pulse: 108   Resp: 21   Temp:    SpO2: 100%     Physical Exam:   Gen:  awake, alert, no apparent distress  Head/Eyes:  Normocephalic atraumatic, EOMI   NECK:   On trach  LUNGS: Normal Effort   CARDIOVASCULAR:  Normal rate  ABDOMEN:  non distended  MUSCULOSKELETAL:  ROM limited  NEUROLOGIC: Alert and Oriented,  Cranial nerves II-XII are grossly intact.    SKIN:  no bruising or bleeding, normal skin color,  no redness      Data:       CBC   Recent Labs     11/27/20  0555 11/28/20  0600 11/29/20  0535   WBC 15.7* 12.6* 19.3*   HGB 7.9* 8.7* 9.2*   HCT 26.8* 28.0* 30.7*    434 477*      BMP   Recent Labs     11/27/20  0555 11/28/20  0600 11/29/20  0535    139  --    K 3.4* 4.0  --     104  --    CO2 25 28  --    PHOS  --   --  1.5*   BUN 36* 44*  -- CREATININE 0.4* 0.4*  --          Electronically signed by Sammie Arias MD on 11/29/2020 at 9:47 AM

## 2020-11-29 NOTE — PROGRESS NOTES
Pulmonary and Critical Care  Progress Note    Subjective: The patient is better. Shortness of breath none  Chest pain none  Addressing respiratory complaints Patient is negative for  hemoptysis and cyanosis  CONSTITUTIONAL:  negative for fevers and chills      Past Medical History:     has a past medical history of Acute embolism and thrombosis of deep veins of left upper extremity ([address] Acute kidney failure with tubular necrosis ([address] Anemia, unspecified, Anxiety, Aphonia, Bacteremia, Drug abuse and dependence ([address] Dysphagia, Gastrostomy status ([address] Hemoperitoneum, Hepatitis C without hepatic coma, Hypertension, Idiopathic hypotension, Malignant neoplasm of stomach, unspecified ([address] Metabolic encephalopathy, Methicillin susceptible Staphylococcus aureus infection as the cause of diseases classified elsewhere, Muscle weakness, Osteomyelitis ([address] Thrombocytopenia, unspecified ([address] Tracheostomy status ([address] and Unspecified severe protein-calorie malnutrition ([address] has a past surgical history that includes back surgery; IR NONTUNNELED VASCULAR CATHETER > 5 YEARS (7/26/2020); laparotomy (N/A, 7/25/2020); tracheostomy (N/A, 8/10/2020); Gastrostomy tube placement (N/A, 8/10/2020); and IR TUNNELED CVC PLACE WO SQ PORT/PUMP > 5 YEARS (8/12/2020). reports that he has quit smoking. His smoking use included cigarettes. He smoked 1.50 packs per day. He does not have any smokeless tobacco history on file. He reports that he does not drink alcohol or use drugs. Family history:  family history is not on file.     No Known Allergies  Social History:    Reviewed; no changes    Objective:   PHYSICAL EXAM:        VITALS:  BP (!) 118/90   Pulse 100   Temp 99.9 °F (37.7 °C) (Rectal)   Resp 21   Ht 5' 7\" (1.702 m)   Wt 115 lb 11.9 oz (52.5 kg)   SpO2 100%   BMI 18.13 kg/m²     24HR INTAKE/OUTPUT:      Intake/Output Summary (Last 24 hours) at 11/29/2020 1545  Last data filed at 11/29/2020 1232  Gross per 24 hour   Intake 1027 ml   Output 2540 ml   Net -1513 ml       CONSTITUTIONAL:  Awake. LUNGS:  decreased breath sounds, basilar crackles. CARDIOVASCULAR:  normal S1 and S2 and negative JVD  ABD:Abdomen soft, non-tender. BS normal. No masses,  No organomegaly. DATA:    CBC:  Recent Labs     11/27/20  0555 11/28/20  0600 11/29/20  0535   WBC 15.7* 12.6* 19.3*   RBC 2.97* 3.11* 3.38*   HGB 7.9* 8.7* 9.2*   HCT 26.8* 28.0* 30.7*    434 477*   MCV 90.2 90.0 90.8   MCH 26.6* 28.0 27.2   MCHC 29.5* 31.1* 30.0*   RDW 16.4* 16.7* 16.8*      BMP:  Recent Labs     11/27/20  0555 11/28/20  0600 11/29/20  1049    139 148*   K 3.4* 4.0 3.5    104 107   CO2 25 28 30   BUN 36* 44* 36*   CREATININE 0.4* 0.4* 0.3*   CALCIUM 8.8 9.1 9.3   GLUCOSE 190* 159* 128*      ABG:  Recent Labs     11/27/20  0800 11/28/20  0800 11/29/20  0800   PH 7.35 7.44 7.48*   PO2ART 112* 90 82   MBL0OBY 48.0* 43.0 44.0   O2SAT 95.0* 95.8* 95.2*     Lab Results   Component Value Date    PROBNP 530.3 (H) 11/26/2020    PROBNP 742.6 (H) 11/24/2020    PROBNP 11,142 (H) 07/24/2020     No results found for: CULTRESP    Radiology Review:  Pertinent images / reports were reviewed as a part of this visit. Assessment:     Patient Active Problem List   Diagnosis    Sepsis ([address]    Leukocytosis    [facility] disease [facility]    MSSA bacteremia    Chronic hepatitis C without hepatic coma ([facility]    Respiratory failure ([facility]    HCAP (healthcare-associated pneumonia)       Plan:   1. Overall the patient has improved. 2. Cont full vent support. 3. Supp. kphos. 4. Family considering hospice.   Renetta Lofts MD  11/29/2020  3:45 PM

## 2020-11-29 NOTE — CARE COORDINATION
Received consult for Hospice referral.  Referral made to [facility].   TE

## 2020-11-29 NOTE — PLAN OF CARE
Problem: Tissue Perfusion:  Goal: Peripheral tissue perfusion will improve  Description: Peripheral tissue perfusion will improve  11/29/2020 1856 by Tyrell Woodall RN  Outcome: Ongoing  11/29/2020 0457 by Luba Dunlap RN  Outcome: Ongoing     Problem: Falls - Risk of:  Goal: Will remain free from falls  Description: Will remain free from falls  11/29/2020 1856 by Tyrell Woodall RN  Outcome: Ongoing  11/29/2020 0457 by Luba Dunlap RN  Outcome: Ongoing  Goal: Absence of physical injury  Description: Absence of physical injury  11/29/2020 1856 by Tyrell Woodall RN  Outcome: Ongoing  11/29/2020 0457 by Luba Dunlap RN  Outcome: Ongoing     Problem: Skin Integrity:  Goal: Will show no infection signs and symptoms  Description: Will show no infection signs and symptoms  11/29/2020 1856 by Tyrell Woodall RN  Outcome: Ongoing  11/29/2020 0457 by Luba Dunlap RN  Outcome: Ongoing  Goal: Absence of new skin breakdown  Description: Absence of new skin breakdown  11/29/2020 1856 by Tyrell Woodall RN  Outcome: Ongoing  11/29/2020 0457 by Luba Dunlap RN  Outcome: Ongoing     Problem: Infection:  Goal: Will remain free from infection  Description: Will remain free from infection  11/29/2020 1856 by Tyrell Woodall RN  Outcome: Ongoing  11/29/2020 0457 by Luba Dunlap RN  Outcome: Ongoing     Problem: Safety:  Goal: Free from accidental physical injury  Description: Free from accidental physical injury  11/29/2020 1856 by Tyrell Woodall RN  Outcome: Ongoing  11/29/2020 0457 by Luba Dunlap RN  Outcome: Ongoing  Goal: Free from intentional harm  Description: Free from intentional harm  11/29/2020 1856 by Tyrell Woodall RN  Outcome: Ongoing  11/29/2020 0457 by Luba Dunlap RN  Outcome: Ongoing     Problem: Daily Care:  Goal: Daily care needs are met  Description: Daily care needs are met  11/29/2020 1856 by Tyrell Woodall RN  Outcome: Ongoing  11/29/2020 0457 by Doug Medina NICOLE Anaya  Outcome: Ongoing     Problem: Pain:  Goal: Patient's pain/discomfort is manageable  Description: Patient's pain/discomfort is manageable  11/29/2020 1856 by Terrance Francisco RN  Outcome: Ongoing  11/29/2020 0457 by Compa Lynne RN  Outcome: Ongoing  Goal: Pain level will decrease  Description: Pain level will decrease  11/29/2020 1856 by Terrance Francisco RN  Outcome: Ongoing  11/29/2020 0457 by Compa Lynne RN  Outcome: Ongoing  Goal: Control of acute pain  Description: Control of acute pain  11/29/2020 1856 by Terrance Francisco RN  Outcome: Ongoing  11/29/2020 0457 by Compa Lynne RN  Outcome: Ongoing  Goal: Control of chronic pain  Description: Control of chronic pain  11/29/2020 1856 by Terrance Francisco RN  Outcome: Ongoing  11/29/2020 0457 by Compa Lynne RN  Outcome: Ongoing     Problem: Discharge Planning:  Goal: Patients continuum of care needs are met  Description: Patients continuum of care needs are met  11/29/2020 1856 by Terrance Francisco RN  Outcome: Ongoing  11/29/2020 0457 by Compa Lynne RN  Outcome: Ongoing     Problem: Gas Exchange - Impaired:  Goal: Levels of oxygenation will improve  Description: Levels of oxygenation will improve  11/29/2020 1856 by Terrance Francisco RN  Outcome: Ongoing  11/29/2020 0457 by Compa Lynne RN  Outcome: Ongoing     Problem: Discharge Planning:  Goal: Discharged to appropriate level of care  Description: Discharged to appropriate level of care  11/29/2020 1856 by Terrance Francisco RN  Outcome: Ongoing  11/29/2020 0457 by Compa Lynne RN  Outcome: Ongoing

## 2020-11-30 ENCOUNTER — APPOINTMENT (OUTPATIENT)
Dept: GENERAL RADIOLOGY | Age: 65
DRG: 720 | End: 2020-11-30
Payer: MEDICARE

## 2020-11-30 LAB
ANION GAP SERPL CALCULATED.3IONS-SCNC: 15 MMOL/L (ref 4–16)
BASE EXCESS MIXED: 10.4 (ref 0–1.2)
BUN BLDV-MCNC: 40 MG/DL (ref 6–23)
CALCIUM SERPL-MCNC: 9.5 MG/DL (ref 8.3–10.6)
CARBON MONOXIDE, BLOOD: 2.2 % (ref 0–5)
CHLORIDE BLD-SCNC: 105 MMOL/L (ref 99–110)
CO2 CONTENT: 31.7 MMOL/L (ref 19–24)
CO2: 29 MMOL/L (ref 21–32)
COMMENT: ABNORMAL
CREAT SERPL-MCNC: 0.3 MG/DL (ref 0.9–1.3)
GFR AFRICAN AMERICAN: >60 ML/MIN/1.73M2
GFR NON-AFRICAN AMERICAN: >60 ML/MIN/1.73M2
GLUCOSE BLD-MCNC: 129 MG/DL (ref 70–99)
HCO3 ARTERIAL: 30.8 MMOL/L (ref 18–23)
HCT VFR BLD CALC: 31.7 % (ref 42–52)
HEMOGLOBIN: 9.9 GM/DL (ref 13.5–18)
MAGNESIUM: 2 MG/DL (ref 1.8–2.4)
MCH RBC QN AUTO: 28 PG (ref 27–31)
MCHC RBC AUTO-ENTMCNC: 31.2 % (ref 32–36)
MCV RBC AUTO: 89.5 FL (ref 78–100)
METHEMOGLOBIN ARTERIAL: 1 %
O2 SATURATION: 91.4 % (ref 96–97)
PCO2 ARTERIAL: 28 MMHG (ref 32–45)
PDW BLD-RTO: 17 % (ref 11.7–14.9)
PH BLOOD: 7.65 (ref 7.34–7.45)
PLATELET # BLD: 500 K/CU MM (ref 140–440)
PMV BLD AUTO: 9.6 FL (ref 7.5–11.1)
PO2 ARTERIAL: 62 MMHG (ref 75–100)
POTASSIUM SERPL-SCNC: 3 MMOL/L (ref 3.5–5.1)
POTASSIUM SERPL-SCNC: 4.1 MMOL/L (ref 3.5–5.1)
RBC # BLD: 3.54 M/CU MM (ref 4.6–6.2)
SODIUM BLD-SCNC: 149 MMOL/L (ref 135–145)
WBC # BLD: 18.9 K/CU MM (ref 4–10.5)

## 2020-11-30 PROCEDURE — 82803 BLOOD GASES ANY COMBINATION: CPT

## 2020-11-30 PROCEDURE — 36592 COLLECT BLOOD FROM PICC: CPT

## 2020-11-30 PROCEDURE — 2580000003 HC RX 258: Performed by: FAMILY MEDICINE

## 2020-11-30 PROCEDURE — 83735 ASSAY OF MAGNESIUM: CPT

## 2020-11-30 PROCEDURE — 2500000003 HC RX 250 WO HCPCS: Performed by: FAMILY MEDICINE

## 2020-11-30 PROCEDURE — 85027 COMPLETE CBC AUTOMATED: CPT

## 2020-11-30 PROCEDURE — 2000000000 HC ICU R&B

## 2020-11-30 PROCEDURE — 84132 ASSAY OF SERUM POTASSIUM: CPT

## 2020-11-30 PROCEDURE — 6360000002 HC RX W HCPCS: Performed by: INTERNAL MEDICINE

## 2020-11-30 PROCEDURE — 94761 N-INVAS EAR/PLS OXIMETRY MLT: CPT

## 2020-11-30 PROCEDURE — 36600 WITHDRAWAL OF ARTERIAL BLOOD: CPT

## 2020-11-30 PROCEDURE — 87324 CLOSTRIDIUM AG IA: CPT

## 2020-11-30 PROCEDURE — 80048 BASIC METABOLIC PNL TOTAL CA: CPT

## 2020-11-30 PROCEDURE — 94003 VENT MGMT INPAT SUBQ DAY: CPT

## 2020-11-30 PROCEDURE — 6370000000 HC RX 637 (ALT 250 FOR IP): Performed by: FAMILY MEDICINE

## 2020-11-30 PROCEDURE — 89220 SPUTUM SPECIMEN COLLECTION: CPT

## 2020-11-30 PROCEDURE — 71045 X-RAY EXAM CHEST 1 VIEW: CPT

## 2020-11-30 PROCEDURE — 94640 AIRWAY INHALATION TREATMENT: CPT

## 2020-11-30 PROCEDURE — 6360000002 HC RX W HCPCS: Performed by: HOSPITALIST

## 2020-11-30 PROCEDURE — C9113 INJ PANTOPRAZOLE SODIUM, VIA: HCPCS | Performed by: FAMILY MEDICINE

## 2020-11-30 PROCEDURE — 2700000000 HC OXYGEN THERAPY PER DAY

## 2020-11-30 PROCEDURE — 6370000000 HC RX 637 (ALT 250 FOR IP): Performed by: INTERNAL MEDICINE

## 2020-11-30 PROCEDURE — 6360000002 HC RX W HCPCS: Performed by: FAMILY MEDICINE

## 2020-11-30 RX ORDER — DEXTROSE, SODIUM CHLORIDE, AND POTASSIUM CHLORIDE 5; .45; .15 G/100ML; G/100ML; G/100ML
INJECTION INTRAVENOUS CONTINUOUS
Status: DISCONTINUED | OUTPATIENT
Start: 2020-11-30 | End: 2020-12-03

## 2020-11-30 RX ADMIN — NYSTATIN 500000 UNITS: 100000 SUSPENSION ORAL at 17:41

## 2020-11-30 RX ADMIN — NYSTATIN 500000 UNITS: 100000 SUSPENSION ORAL at 20:55

## 2020-11-30 RX ADMIN — CLONAZEPAM 1 MG: 1 TABLET ORAL at 20:54

## 2020-11-30 RX ADMIN — ALBUTEROL SULFATE 4 PUFF: 90 AEROSOL, METERED RESPIRATORY (INHALATION) at 00:17

## 2020-11-30 RX ADMIN — ALBUTEROL SULFATE 4 PUFF: 90 AEROSOL, METERED RESPIRATORY (INHALATION) at 11:48

## 2020-11-30 RX ADMIN — IPRATROPIUM BROMIDE 4 PUFF: 17 AEROSOL, METERED RESPIRATORY (INHALATION) at 00:17

## 2020-11-30 RX ADMIN — PROPOFOL 10 MCG/KG/MIN: 10 INJECTION, EMULSION INTRAVENOUS at 19:03

## 2020-11-30 RX ADMIN — POTASSIUM CHLORIDE 10 MEQ: 7.46 INJECTION, SOLUTION INTRAVENOUS at 08:42

## 2020-11-30 RX ADMIN — HYDROXYZINE HYDROCHLORIDE 25 MG: 25 TABLET, FILM COATED ORAL at 17:41

## 2020-11-30 RX ADMIN — BUMETANIDE 0.5 MG: 1 TABLET ORAL at 20:54

## 2020-11-30 RX ADMIN — ALBUTEROL SULFATE 4 PUFF: 90 AEROSOL, METERED RESPIRATORY (INHALATION) at 21:05

## 2020-11-30 RX ADMIN — GABAPENTIN 300 MG: 300 CAPSULE ORAL at 20:54

## 2020-11-30 RX ADMIN — CHLORHEXIDINE GLUCONATE 0.12% ORAL RINSE 15 ML: 1.2 LIQUID ORAL at 20:53

## 2020-11-30 RX ADMIN — POTASSIUM CHLORIDE, DEXTROSE MONOHYDRATE AND SODIUM CHLORIDE: 150; 5; 450 INJECTION, SOLUTION INTRAVENOUS at 11:04

## 2020-11-30 RX ADMIN — ENOXAPARIN SODIUM 40 MG: 40 INJECTION SUBCUTANEOUS at 08:42

## 2020-11-30 RX ADMIN — CHLORHEXIDINE GLUCONATE 0.12% ORAL RINSE 15 ML: 1.2 LIQUID ORAL at 08:41

## 2020-11-30 RX ADMIN — IPRATROPIUM BROMIDE 4 PUFF: 17 AEROSOL, METERED RESPIRATORY (INHALATION) at 07:55

## 2020-11-30 RX ADMIN — OXYCODONE HYDROCHLORIDE 5 MG: 5 TABLET ORAL at 11:12

## 2020-11-30 RX ADMIN — IPRATROPIUM BROMIDE 4 PUFF: 17 AEROSOL, METERED RESPIRATORY (INHALATION) at 11:48

## 2020-11-30 RX ADMIN — HYDROXYZINE HYDROCHLORIDE 25 MG: 25 TABLET, FILM COATED ORAL at 11:16

## 2020-11-30 RX ADMIN — ALBUTEROL SULFATE 4 PUFF: 90 AEROSOL, METERED RESPIRATORY (INHALATION) at 07:55

## 2020-11-30 RX ADMIN — ALBUTEROL SULFATE 4 PUFF: 90 AEROSOL, METERED RESPIRATORY (INHALATION) at 15:54

## 2020-11-30 RX ADMIN — GABAPENTIN 300 MG: 300 CAPSULE ORAL at 08:43

## 2020-11-30 RX ADMIN — HYDROXYZINE HYDROCHLORIDE 25 MG: 25 TABLET, FILM COATED ORAL at 05:59

## 2020-11-30 RX ADMIN — SODIUM CHLORIDE, PRESERVATIVE FREE 10 ML: 5 INJECTION INTRAVENOUS at 20:55

## 2020-11-30 RX ADMIN — METHYLPREDNISOLONE SODIUM SUCCINATE 40 MG: 40 INJECTION, POWDER, FOR SOLUTION INTRAMUSCULAR; INTRAVENOUS at 08:42

## 2020-11-30 RX ADMIN — IPRATROPIUM BROMIDE 4 PUFF: 17 AEROSOL, METERED RESPIRATORY (INHALATION) at 15:54

## 2020-11-30 RX ADMIN — MEROPENEM 1 G: 1 INJECTION, POWDER, FOR SOLUTION INTRAVENOUS at 13:30

## 2020-11-30 RX ADMIN — POTASSIUM CHLORIDE 10 MEQ: 7.46 INJECTION, SOLUTION INTRAVENOUS at 09:52

## 2020-11-30 RX ADMIN — MEROPENEM 1 G: 1 INJECTION, POWDER, FOR SOLUTION INTRAVENOUS at 20:53

## 2020-11-30 RX ADMIN — MEROPENEM 1 G: 1 INJECTION, POWDER, FOR SOLUTION INTRAVENOUS at 06:05

## 2020-11-30 RX ADMIN — Medication 8000 UNITS: at 11:12

## 2020-11-30 RX ADMIN — SODIUM CHLORIDE, PRESERVATIVE FREE 10 ML: 5 INJECTION INTRAVENOUS at 08:42

## 2020-11-30 RX ADMIN — IPRATROPIUM BROMIDE 4 PUFF: 17 AEROSOL, METERED RESPIRATORY (INHALATION) at 21:06

## 2020-11-30 RX ADMIN — POTASSIUM CHLORIDE 10 MEQ: 7.46 INJECTION, SOLUTION INTRAVENOUS at 13:26

## 2020-11-30 RX ADMIN — POTASSIUM CHLORIDE 10 MEQ: 7.46 INJECTION, SOLUTION INTRAVENOUS at 14:30

## 2020-11-30 RX ADMIN — HYDROXYZINE HYDROCHLORIDE 25 MG: 25 TABLET, FILM COATED ORAL at 20:54

## 2020-11-30 RX ADMIN — BUMETANIDE 0.5 MG: 1 TABLET ORAL at 08:43

## 2020-11-30 RX ADMIN — NYSTATIN 500000 UNITS: 100000 SUSPENSION ORAL at 08:41

## 2020-11-30 RX ADMIN — NYSTATIN 500000 UNITS: 100000 SUSPENSION ORAL at 13:26

## 2020-11-30 RX ADMIN — DULOXETINE HYDROCHLORIDE 60 MG: 30 CAPSULE, DELAYED RELEASE ORAL at 08:44

## 2020-11-30 RX ADMIN — CLONAZEPAM 1 MG: 1 TABLET ORAL at 13:30

## 2020-11-30 RX ADMIN — POTASSIUM CHLORIDE 10 MEQ: 7.46 INJECTION, SOLUTION INTRAVENOUS at 11:04

## 2020-11-30 RX ADMIN — CLONAZEPAM 1 MG: 1 TABLET ORAL at 08:43

## 2020-11-30 RX ADMIN — ALBUTEROL SULFATE 4 PUFF: 90 AEROSOL, METERED RESPIRATORY (INHALATION) at 04:08

## 2020-11-30 RX ADMIN — GABAPENTIN 300 MG: 300 CAPSULE ORAL at 13:30

## 2020-11-30 RX ADMIN — MAGNESIUM OXIDE 400 MG (241.3 MG MAGNESIUM) TABLET 400 MG: TABLET at 08:43

## 2020-11-30 RX ADMIN — PANTOPRAZOLE SODIUM 40 MG: 40 INJECTION, POWDER, LYOPHILIZED, FOR SOLUTION INTRAVENOUS at 08:42

## 2020-11-30 RX ADMIN — POTASSIUM CHLORIDE 10 MEQ: 7.46 INJECTION, SOLUTION INTRAVENOUS at 12:12

## 2020-11-30 RX ADMIN — OXYCODONE HYDROCHLORIDE 5 MG: 5 TABLET ORAL at 21:08

## 2020-11-30 RX ADMIN — IPRATROPIUM BROMIDE 4 PUFF: 17 AEROSOL, METERED RESPIRATORY (INHALATION) at 04:08

## 2020-11-30 ASSESSMENT — PAIN SCALES - GENERAL
PAINLEVEL_OUTOF10: 4
PAINLEVEL_OUTOF10: 0
PAINLEVEL_OUTOF10: 1

## 2020-11-30 ASSESSMENT — PULMONARY FUNCTION TESTS
PIF_VALUE: 30

## 2020-11-30 ASSESSMENT — PAIN DESCRIPTION - ONSET: ONSET: ON-GOING

## 2020-11-30 ASSESSMENT — PAIN DESCRIPTION - PROGRESSION: CLINICAL_PROGRESSION: NOT CHANGED

## 2020-11-30 ASSESSMENT — PAIN DESCRIPTION - FREQUENCY: FREQUENCY: CONTINUOUS

## 2020-11-30 ASSESSMENT — PAIN DESCRIPTION - PAIN TYPE: TYPE: CHRONIC PAIN

## 2020-11-30 ASSESSMENT — PAIN DESCRIPTION - DESCRIPTORS: DESCRIPTORS: PATIENT UNABLE TO DESCRIBE

## 2020-11-30 ASSESSMENT — PAIN DESCRIPTION - LOCATION: LOCATION: ABDOMEN;BACK

## 2020-11-30 NOTE — PROGRESS NOTES
DULoxetine  60 mg Per G Tube Daily    gabapentin  300 mg Per G Tube TID    hydrOXYzine  25 mg Per G Tube Q6H    albuterol sulfate HFA  4 puff Inhalation Q4H    And    ipratropium  4 puff Inhalation Q4H    chlorhexidine  15 mL Mouth/Throat BID      Infusions:    dextrose 5% and 0.45% NaCl with KCl 20 mEq      propofol 10 mcg/kg/min (11/30/20 0835)     PRN Meds: potassium chloride, 40 mEq, PRN    Or  potassium alternative oral replacement, 40 mEq, PRN    Or  potassium chloride, 10 mEq, PRN  dicyclomine, 20 mg, Q6H PRN  sodium chloride flush, 10 mL, PRN  acetaminophen, 650 mg, Q6H PRN    Or  acetaminophen, 650 mg, Q6H PRN  polyethylene glycol, 17 g, Daily PRN  promethazine, 12.5 mg, Q6H PRN    Or  ondansetron, 4 mg, Q6H PRN  oxyCODONE, 5 mg, Q6H PRN  HYDROmorphone, 1 mg, Daily PRN  magnesium sulfate, 1 g, PRN      Subjective:     Awake alert no distress  Objective: Intake/Output Summary (Last 24 hours) at 11/30/2020 0931  Last data filed at 11/30/2020 0636  Gross per 24 hour   Intake 2645.5 ml   Output 2800 ml   Net -154.5 ml      Vitals:   Vitals:    11/30/20 0900   BP: (!) 117/91   Pulse: 86   Resp: 12   Temp:    SpO2:      Physical Exam:   Gen:  awake, alert, no apparent distress  Head/Eyes:  Normocephalic atraumatic, EOMI   NECK:   +trach  LUNGS: Normal Effort   CARDIOVASCULAR:  Normal rate  ABDOMEN:  non distended  MUSCULOSKELETAL:  ROM limited  NEUROLOGIC: Alert and Oriented,  Cranial nerves II-XII are grossly intact.    SKIN:  no bruising or bleeding, normal skin color,  no redness      Data:       CBC   Recent Labs     11/28/20  0600 11/29/20  0535 11/30/20  0444   WBC 12.6* 19.3* 18.9*   HGB 8.7* 9.2* 9.9*   HCT 28.0* 30.7* 31.7*    477* 500*      BMP   Recent Labs     11/28/20  0600 11/29/20  0535 11/29/20  1049 11/30/20  0444     --  148* 149*   K 4.0  --  3.5 3.0*     --  107 105   CO2 28  --  30 29   PHOS  --  1.5*  --   --    BUN 44*  --  36* 40*   CREATININE 0.4*  --  0.3* 0.3*         Electronically signed by Silvio Graves MD on 11/30/2020 at 9:31 AM

## 2020-11-30 NOTE — PROGRESS NOTES
Pulmonary and Critical Care  Progress Note    Subjective: The patient had issues with trach and was not getting vols yesterday. Doing better today. Shortness of breath none  Chest pain none  Addressing respiratory complaints Patient is negative for  hemoptysis and cyanosis  CONSTITUTIONAL:  negative for fevers and chills      Past Medical History:     has a past medical history of Acute embolism and thrombosis of deep veins of left upper extremity ([address] Acute kidney failure with tubular necrosis ([address] Anemia, unspecified, Anxiety, Aphonia, Bacteremia, Drug abuse and dependence ([address] Dysphagia, Gastrostomy status ([address] Hemoperitoneum, Hepatitis C without hepatic coma, Hypertension, Idiopathic hypotension, Malignant neoplasm of stomach, unspecified ([address] Metabolic encephalopathy, Methicillin susceptible Staphylococcus aureus infection as the cause of diseases classified elsewhere, Muscle weakness, Osteomyelitis ([address] Thrombocytopenia, unspecified ([address] Tracheostomy status ([address] and Unspecified severe protein-calorie malnutrition ([address] has a past surgical history that includes back surgery; IR NONTUNNELED VASCULAR CATHETER > 5 YEARS (7/26/2020); laparotomy (N/A, 7/25/2020); tracheostomy (N/A, 8/10/2020); Gastrostomy tube placement (N/A, 8/10/2020); and IR TUNNELED CVC PLACE WO SQ PORT/PUMP > 5 YEARS (8/12/2020). reports that he has quit smoking. His smoking use included cigarettes. He smoked 1.50 packs per day. He does not have any smokeless tobacco history on file. He reports that he does not drink alcohol or use drugs. Family history:  family history is not on file.     No Known Allergies  Social History:    Reviewed; no changes    Objective:   PHYSICAL EXAM:        VITALS:  BP (!) 130/95   Pulse 89   Temp 98.1 °F (36.7 °C) (Rectal)   Resp 20   Ht 5' 7\" (1.702 m)   Wt 109 lb 5.6 oz (49.6 kg)   SpO2 100%   BMI 17.13 kg/m²     24HR INTAKE/OUTPUT:      Intake/Output Summary (Last 24 hours) at 11/30/2020 1141  Last data filed at 11/30/2020 1108  Gross per 24 hour   Intake 2691.5 ml   Output 2800 ml   Net -108.5 ml       CONSTITUTIONAL:  Awake. LUNGS:  decreased breath sounds, basilar crackles. CARDIOVASCULAR:  normal S1 and S2 and negative JVD  ABD:Abdomen soft, non-tender. BS normal. No masses,  No organomegaly. DATA:    CBC:  Recent Labs     11/28/20  0600 11/29/20  0535 11/30/20  0444   WBC 12.6* 19.3* 18.9*   RBC 3.11* 3.38* 3.54*   HGB 8.7* 9.2* 9.9*   HCT 28.0* 30.7* 31.7*    477* 500*   MCV 90.0 90.8 89.5   MCH 28.0 27.2 28.0   MCHC 31.1* 30.0* 31.2*   RDW 16.7* 16.8* 17.0*      BMP:  Recent Labs     11/28/20  0600 11/29/20  1049 11/30/20  0444    148* 149*   K 4.0 3.5 3.0*    107 105   CO2 28 30 29   BUN 44* 36* 40*   CREATININE 0.4* 0.3* 0.3*   CALCIUM 9.1 9.3 9.5   GLUCOSE 159* 128* 129*      ABG:  Recent Labs     11/28/20  0800 11/29/20  0800 11/30/20  0800   PH 7.44 7.48* 7.65*   PO2ART 90 82 62*   CRO0KQF 43.0 44.0 28.0*   O2SAT 95.8* 95.2* 91.4*     Lab Results   Component Value Date    PROBNP 530.3 (H) 11/26/2020    PROBNP 742.6 (H) 11/24/2020    PROBNP 11,142 (H) 07/24/2020     No results found for: CULTRESP    Radiology Review:  Pertinent images / reports were reviewed as a part of this visit. Assessment:     Patient Active Problem List   Diagnosis    S[facility]    Leukocytosis    Ischemic bowel disease ([facility]    MSSA bacteremia    Chronic hepatitis C without hepatic coma ([facility]    Respiratory failure ([facility]    HCAP (healthcare-associated pneumonia)       Plan:   1. Overall the patient has improved. 2. Cont full vent support. 3. Supp. kcl. 4. Family considering hospice.   Laquita Duarte MD  11/30/2020  11:41 AM

## 2020-11-30 NOTE — PROGRESS NOTES
11/30/20 0411   Vent Information   Vent Type 980   Vent Mode AC/PC   Vt Ordered 0 mL   Rate Set 12 bmp   Peak Flow 0 L/min   Pressure Support 0 cmH20   FiO2  35 %   Sensitivity 3   PEEP/CPAP 5   I Time/ I Time % 1 s   Humidification Source HME   Vent Patient Data   High Peep/I Pressure 25   Peak Inspiratory Pressure 30 cmH2O   Mean Airway Pressure 11 cmH20   Rate Measured 14 br/min   Vt Exhaled 600 mL   Minute Volume 7.1 Liters   I:E Ratio 1:4.00   Cough/Sputum   Sputum How Obtained Suctioned;Tracheal   $Obtained Sample $Induced Sputum   Cough Productive   Sputum Amount Small   Sputum Color Tan   Tenacity Thick   Spontaneous Breathing Trial (SBT) RT Doc   Pulse 86   Additional Respiratory  Assessments   Resp 20   Alarm Settings   High Pressure Alarm 40 cmH2O   Delay Alarm 20 sec(s)   Low Minute Volume Alarm 2.5 L/min   Apnea (secs) 20 secs   High Respiratory Rate 40 br/min   Low Exhaled Vt  250 mL

## 2020-11-30 NOTE — CONSULTS
Facility-Administered Medications:     dextrose 5 % and 0.45 % NaCl with KCl 20 mEq infusion, , Intravenous, Continuous, Radha Pal MD, Last Rate: 75 mL/hr at 11/30/20 1104    propofol injection, 10 mcg/kg/min, Intravenous, Titrated, Chasidy Noel MD, Last Rate: 3.2 mL/hr at 11/30/20 0835, 10 mcg/kg/min at 11/30/20 0835    methylPREDNISolone sodium (SOLU-MEDROL) injection 40 mg, 40 mg, Intravenous, Daily, Radha Pal MD, 40 mg at 11/30/20 0842    potassium chloride (KLOR-CON M) extended release tablet 40 mEq, 40 mEq, Oral, PRN **OR** potassium bicarb-citric acid (EFFER-K) effervescent tablet 40 mEq, 40 mEq, Oral, PRN, 40 mEq at 11/29/20 1245 **OR** potassium chloride 10 mEq/100 mL IVPB (Peripheral Line), 10 mEq, Intravenous, PRN, Fernando Marks MD, Stopped at 11/30/20 1554    meropenem (MERREM) 1 g in sodium chloride 0.9 % 100 mL IVPB (mini-bag), 1 g, Intravenous, Q8H, Radha Pal MD, Stopped at 11/30/20 1405    Ergocalciferol (Drisdol) 200 MCG/ML drops 8,000 Units, 8,000 Units, Oral, Daily, Radha Pal MD, 8,000 Units at 11/30/20 1112    magnesium oxide (MAG-OX) tablet 400 mg, 400 mg, Oral, Daily, Radha aPl MD, 400 mg at 11/30/20 0843    scopolamine (TRANSDERM-SCOP) transdermal patch 1 patch, 1 patch, Transdermal, Q72H, Radha Pal MD, 1 patch at 11/29/20 0959    pantoprazole (PROTONIX) injection 40 mg, 40 mg, Intravenous, Daily, Radha Pal MD, 40 mg at 11/30/20 0842    nystatin (MYCOSTATIN) 002610 UNIT/ML suspension 500,000 Units, 5 mL, Oral, 4x Daily, Radha Pal MD, 500,000 Units at 11/30/20 1741    midodrine (PROAMATINE) tablet 10 mg, 10 mg, Oral, TID, Radha Pal MD, 10 mg at 11/28/20 1315    dicyclomine (BENTYL) tablet 20 mg, 20 mg, Oral, Q6H PRN, Radha Pal MD    sodium chloride flush 0.9 % injection 10 mL, 10 mL, Intravenous, 2 times per day, Radha Pal MD, 10 mL at 11/30/20 0842    sodium chloride flush 0.9 % injection 10 mL, 10 mL, Intravenous, PRN, Roberto Calderon MD    acetaminophen (TYLENOL) tablet 650 mg, 650 mg, Oral, Q6H PRN, 650 mg at 11/25/20 2138 **OR** acetaminophen (TYLENOL) suppository 650 mg, 650 mg, Rectal, Q6H PRN, Roberto Calderon MD    polyethylene glycol (GLYCOLAX) packet 17 g, 17 g, Oral, Daily PRN, Roberto Calderon MD    promethazine (PHENERGAN) tablet 12.5 mg, 12.5 mg, Oral, Q6H PRN, 12.5 mg at 11/25/20 2041 **OR** ondansetron (ZOFRAN) injection 4 mg, 4 mg, Intravenous, Q6H PRN, Roberto Calderon MD    enoxaparin (LOVENOX) injection 40 mg, 40 mg, Subcutaneous, Daily, Roberto Calderon MD, 40 mg at 11/30/20 8806    clonazePAM (KLONOPIN) tablet 1 mg, 1 mg, Per G Tube, TID, Roberto Calderon MD, 1 mg at 11/30/20 1330    bumetanide (BUMEX) tablet 0.5 mg, 0.5 mg, Per G Tube, BID, Roberto Calderon MD, 0.5 mg at 11/30/20 0843    DULoxetine (CYMBALTA) extended release capsule 60 mg, 60 mg, Per G Tube, Daily, Roberto Calderon MD, 60 mg at 11/30/20 0844    gabapentin (NEURONTIN) capsule 300 mg, 300 mg, Per G Tube, TID, Roberto Calderon MD, 300 mg at 11/30/20 1330    hydrOXYzine (ATARAX) tablet 25 mg, 25 mg, Per G Tube, Q6H, Roberto Calderon MD, 25 mg at 11/30/20 1741    oxyCODONE (ROXICODONE) immediate release tablet 5 mg, 5 mg, Per G Tube, Q6H PRN, Roberto Calderon MD, 5 mg at 11/30/20 1112    HYDROmorphone (DILAUDID) tablet 1 mg, 1 mg, Per G Tube, Daily PRN, Roberto Calderon MD, 1 mg at 11/29/20 1254    magnesium sulfate 1 g in dextrose 5% 100 mL IVPB, 1 g, Intravenous, PRN, Perla Sheriff PA-C    albuterol sulfate  (90 Base) MCG/ACT inhaler 4 puff, 4 puff, Inhalation, Q4H, 4 puff at 11/30/20 1554 **AND** ipratropium (ATROVENT HFA) 17 MCG/ACT inhaler 4 puff, 4 puff, Inhalation, Q4H, 4 puff at 11/30/20 1554 **AND** MDI Treatment, , , Q4H, Fernando Powell MD    chlorhexidine (PERIDEX) 0.12 % solution 15 mL, 15 mL, Mouth/Throat, BID, Fernando Powell MD, 15 mL at 11/30/20 0841    Exam:  Gen: Patient on vent with trach in place  Sheila Bartolome is secure without apparent cuff leak. Cuff is appropriately inflated. Minimal secretions. Able to pass suction catheter - initially hits edge of trach tube but easily passes after passing the proximal portion of the trach tube. No secretions or blood with suction. Neck is supple without fluctuance or crepitus. A: Long-term trach with HCAP. No evidence of issues with the trach tube at this time. Rec: Continue respiratory management. No intervention necessary regarding the trach at this time.   If there is again concern regarding possible trach tube dysfunction, then consider fiberoptic tracheoscopy - would require a negative SARS-CoV-2 test.

## 2020-11-30 NOTE — CONSULTS
[address] [place] [address] Consultation Note    Date: 11/30/2020  Name: Marcio Mane  MRN: [id number]  YOB: 1955   Patient's PCP: at [facility]  Referring Physician: Dr. Evi Vasquez care admit date: 11/24/2020 and 7/24 to 8/15/20 at [facility] with septic shock and MSSA bacteremia and possible T-12 and L-1 osteomyelitis, then 8/15 to 10/2/20 at [place] [address] in [facility] [address]: Chart reviewed, discussed with Dr. Radha Pal, the patient's nurse, the hospice nurse liaison. I examined the patient who has a tracheostomy and is on mechanical ventilation and Propofol. He does open his eyes, and responds some, but provides no information. CC: chronic respiratory failure    [place]: This is a 63year old patient with a history of polysubstance abuse and Injectable drug abuse with a prolonged hospitalization from July to October 2020 with sepsis, Methicillin Sensitive Staphylococcus aureus bacteremia and concerns for vertebral osteomyelitis. The patient had respiratory failure, and ultimately had a tracheostomy and PEG per Dr Shwetha Hobbs prior to being transferred to an [place] [address] in [place]. The patient was not able to be weaned, and went to [facility] on mechanical ventilation. The patient was admitted on 11/24/2020 from the [address] with shortness of breath, tachycardia and concern for [address] Acquired pneumonia. Other medical illnesses include Hepatitis C, Severe protein calorie malnutrition, polysubstance abuse. There is a notation that the patient has \"Stage IV gastric cancer\" although I can't find any documentation of that. I reviewed the Care Everywhere records and do not find that documented, but it is listed in the Nursing Home summary. Review of Dr Power Null note from PEG placement does not mention any abnormality.  The hospice nurse liaison asked the patient's son, and he said that once there was a concern for cancer and the patient was sent to the hospital but no cancer was found. The patient has been treated for pneumonia and sepsis, and has been followed by Pulmonary. On 11/29/20, the patient developed some respiratory distress and had a drop in the tidal volume, and the tracheostomy was changed, chest X Ray repeated, and the patient was placed on low dose Propofol. This morning, the patient has no respiratory distress. Dr. Silvio Graves had talked with the patient's son who asked about possible hospice care. Dr. Arben Cerda also mentioned to the patient. I saw the patient this morning, and he is on Propofol at 10 mcg/kg/min. The patient awakens to my voice. I told him that I was a hospice physician, and I am not sure how much he understood. I asked him about the tracheostomy and ventilatory, and he pointed to it, with no attempt to remove. He did shake his head no when I asked him what he thought about it. I asked him if he understood that without the ventilator, he may not be able to breathe and may die, and I did not get an answer. I do not believe that the patient understands the implications of this. The hospice nurse liaison met with the patient's only son, Delfino Alexander, to discuss hospice philosophy. The son accepted the information and notes that he would like to talk with other family members, and would like to visit with the patient prior to any decisions. The patient is currently a full code. Also noted, is that the patient has had 38 pounds of involuntary weight loss since 8/15/20, and serum albumin is 2.8 gm/dl.       Past Medical History:   Diagnosis Date    Acute embolism and thrombosis of deep veins of left upper extremity (HCC)     Acute kidney failure with tubular necrosis (HCC)     Anemia, unspecified     Anxiety     Aphonia     Bacteremia     Drug abuse and dependence (HCC)     Dysphagia     [facility]astrostomy status [facility] [address] [facility]     Hemoperitoneum     Hepatitis C without hepatic coma     Hypertension     Idiopathic hypotension     Malignant neoplasm of stomach, unspecified (HCC)     Metabolic encephalopathy     Methicillin susceptible Staphylococcus aureus infection as the cause of diseases classified elsewhere     Muscle weakness     Osteomyelitis (HCC)     Thrombocytopenia, unspecified (HCC)     Tracheostomy status ([facility] [address]     Unspecified severe protein-calorie malnutrition ([facility] [address] [facility]        Past Surgical History:   Procedure Laterality Date    BACK SURGERY      GASTROSTOMY TUBE PLACEMENT N/A 8/10/2020    EGD PEG TUBE PLACEMENT performed by Jacob Wolff MD at [facility] IR NONTUNNELED VASCULAR CATHETER  7/26/2020    IR NONTUNNELED VASCULAR CATHETER 7/26/2020 [facility] SPECIAL PROCEDURES    IR TUNNELED CATHETER PLACEMENT GREATER THAN 5 YEARS  8/12/2020    IR TUNNELED CATHETER PLACEMENT GREATER THAN 5 YEARS 8/12/2020 [facility] SPECIAL PROCEDURES    LAPAROTOMY N/A 7/25/2020    LAPAROTOMY EXPLORATORY performed by Jacob Wolff MD at [facility] N/A 8/10/2020    TRACHEOTOMY performed by Jacob Wolff MD at [address] [place] [address] S History     Socioeconomic History    Marital status:       Spouse name: Not on file    Number of children: Not on file    Years of education: Not on file    Highest education level: Not on file   Occupational History    Not on file   Social Needs    Financial resource strain: Not on file    Food insecurity     Worry: Not on file     Inability: Not on file    Transportation needs     Medical: Not on file     Non-medical: Not on file   Tobacco Use    Smoking status: Former Smoker     Packs/day: 1.50     Types: Cigarettes   Substance and Sexual Activity    Alcohol use: No    Drug use: No    Sexual activity: Not on file   Lifestyle    Physical activity     Days per week: Not on file     Minutes per session: Not on file    Stress: Not on file   Relationships    Social connections     Talks on phone: Not on file Gets together: Not on file     Attends Congregational service: Not on file     Active member of club or organization: Not on file     Attends meetings of clubs or organizations: Not on file     Relationship status: Not on file    Intimate partner violence     Fear of current or ex partner: Not on file     Emotionally abused: Not on file     Physically abused: Not on file     Forced sexual activity: Not on file   Other Topics Concern    Not on file   Social History Narrative    Not on file       History reviewed. No pertinent family history. No Known Allergies    Medications reviewed  Prior to Admission medications    Medication Sig Start Date End Date Taking? Authorizing Provider   bumetanide (BUMEX) 0.5 MG tablet 0.5 mg by Per G Tube route 2 times daily   Yes Historical Provider, MD   HYDROmorphone (DILAUDID) 2 MG tablet 1 mg by Per G Tube route daily as needed for Pain. 11/24/2020 Per nursing home given piror to wound care   Yes Historical Provider, MD   DULoxetine (CYMBALTA) 60 MG extended release capsule 60 mg by Per G Tube route daily   Yes Historical Provider, MD   gabapentin (NEURONTIN) 300 MG capsule 300 mg by Per G Tube route 3 times daily. Yes Historical Provider, MD   hydrOXYzine (ATARAX) 25 MG tablet 25 mg by Per G Tube route every 6 hours   Yes Historical Provider, MD   clonazePAM (KLONOPIN) 1 MG tablet 1 mg by Per G Tube route 3 times daily. Yes Historical Provider, MD   oxyCODONE (ROXICODONE) 5 MG immediate release tablet 5 mg by Per G Tube route every 6 hours as needed for Pain.    Yes Historical Provider, MD   potassium & sodium phosphates (PHOS-NAK) 280-160-250 MG PACK 1 packet by Per G Tube route daily   Yes Historical Provider, MD   Amino Acids-Protein Hydrolys (PRO-STAT) LIQD by Per G Tube route 2 times daily 11/24/2020 Receives 30 ml BID   Yes Historical Provider, MD   hydrOXYzine (VISTARIL) 50 MG capsule 50 mg by Per G Tube route 3 times daily as needed for Itching   Yes Historical MD Radha   ipratropium (ATROVENT HFA) 17 MCG/ACT inhaler Inhale 4 puffs into the lungs every 4 hours 8/14/20  Yes Garima Santos MD   ipratropium-albuterol (DUONEB) 0.5-2.5 (3) MG/3ML SOLN nebulizer solution Inhale 3 mLs into the lungs every 4 hours as needed for Shortness of Breath 8/14/20  Yes Garima Santos MD   Heparin Sodium, Porcine, (HEPARIN, PORCINE,) 1000 UNIT/ML injection Infuse 3 mLs intravenously as needed (CRRT cath maintenance, 1500 each accsess line)  Patient taking differently: Infuse 5,000 Units intravenously every 8 hours 11/24/2020 given for DVT 8/14/20  Yes Garima Santos MD   scopolamine (TRANSDERM-SCOP) transdermal patch Place 1 patch onto the skin every 72 hours 8/17/20  Yes Garima Santos MD   [facility]) 4 MG/2ML injection Infuse 2 mLs intravenously every 6 hours as needed for Nausea or Vomiting 8/14/20  Yes Garima Santso MD   nystatin (MYCOSTATIN) 975109 UNIT/ML suspension Take 5 mLs by mouth 4 times daily 8/14/20  Yes Garima Santos MD   midodrine (PROAMATINE) 10 MG tablet Take 1 tablet by mouth 3 times daily  Patient taking differently: 5 mg 3 times daily 11/24/2020 Given per G-tube 8/14/20  Yes Garima Santos MD   pantoprazole (PROTONIX) 40 MG injection Infuse 40 mg intravenously daily 8/15/20  Yes Garima Santos MD   dicyclomine (BENTYL) 20 MG tablet Take 1 tablet by mouth every 6 hours as needed (Abdominal Cramping)  Patient taking differently: 10 mg by Per G Tube route 4 times daily  8/14/20  Yes Garima Santos MD   albuterol sulfate  (90 Base) MCG/ACT inhaler Inhale 4 puffs into the lungs every 4 hours 8/14/20   Garima Santos MD       ROS: As noted in [address], all other systems are unobtainable due to the patient's clinical condition    Weight:    Wt Readings from Last 3 Encounters:   11/30/20 109 lb 5.6 oz (49.6 kg)   08/15/20 147 lb 0.8 oz (66.7 kg)       Data reviewed 11/30/2020:  Chest X-ray 11/29/20:  1.  No focal infiltrate or consolidation evident. 2. Pulmonary sequela typical of that seen with smoking, including possible   COPD. 3. Calcific atherosclerotic disease aorta. 4. Life support appliances appear appropriately positioned. No appreciable   change in tracheostomy tube, tip overlying the mid trachea level. Transthoracic Echocardiogram 7/24/2020:   Technically difficult study due to patient's intubation and tachycardia. Left ventricular systolic function is hyperdynamic. Ejection fraction is visually estimated at >60%. Small left ventricular cavity consistent with hypovolemia. Mild left ventricular hypertrophy. Moderate to severe tricuspid regurgitation. RVSP is 67 mmHg, consistent with severe PHTN. No evidence of any pericardial effusion. No obvious vegetations.       Respiratory culture 11/24/20:  SETUP DATE/TIME:  11/24/2020 1928    Susceptibility     Escherichia coli (esbl) (2)     Antibiotic  Interpretation  ROBSON  Status    ampicillin  Resistant  >=32  Final    ceFAZolin  Resistant  >=64  Final    ciprofloxacin  Resistant  >=4  Final    ertapenem  Sensitive  <=0.12  Final    cefepime  Resistant  RESISTANT  Final    gentamicin  Sensitive  <=1  Final    levofloxacin  Resistant  >=8  Final    piperacillin-tazobactam  Sensitive  <=4  Final    trimethoprim-sulfamethoxazole  Resistant  >=320  Final        Lab Results   Component Value Date    ALKPHOS 151 11/24/2020    ALT 31 11/24/2020    AST 78 11/24/2020    PROT 9.0 11/24/2020    PROT 8.4 11/06/2010    BILITOT 0.6 11/24/2020    BILIDIR 1.6 07/30/2020    IBILI 0.5 07/30/2020    LABALBU 2.8 11/24/2020     CBC:   Recent Labs     11/28/20  0600 11/29/20  0535 11/30/20  0444   WBC 12.6* 19.3* 18.9*   HGB 8.7* 9.2* 9.9*   HCT 28.0* 30.7* 31.7*   MCV 90.0 90.8 89.5    477* 500*     BMP:   Recent Labs     11/28/20  0600 11/29/20  1049 11/30/20  0444    148* 149*   K 4.0 3.5 3.0*    107 105   CO2 28 30 29   BUN 44* 36* 40*   CREATININE 0.4* 0.3* 0.3*   GLUCOSE 159* 128* 129*       Physical Exam:   BP (!) 117/91   Pulse 86   Temp 98.1 °F (36.7 °C) (Rectal)   Resp 12   Ht 5' 7\" (1.702 m)   Wt 109 lb 5.6 oz (49.6 kg)   SpO2 98%   BMI 17.13 kg/m²   General: Comfortable, drowsy but arousable, in no distress. chronically ill appearing  Skin: scattered wounds  HEENT: Mucous membranes are dry, sclerae are clear, mild bitemporal wasting    Neck: tracheostomy and on mechanical ventilation  Heart: distant tones, RRR, S1S2, no murmurs  Lungs:  Coarse breath sounds bilaterally, diminished at the bases, with basilar rales, scattered rhonchi   Abdomen: PEG tube in place, the abdomen is soft, bowel sounds present, no tenderness, nondistended  Extremities:  No mottling, left upper extremity PICC, long nails with mild clubbing   Neurologic: drowsy but arousable, generally weak    Assessment/Plan:  1. Acute superimposed on chronic respiratory failure with tracheostomy and [address] Acquired pneumonia, on treatment and full vent support. 2. Chronic Hepatitis C   3. History of polysubstance abuse and suspected vertebral osteomyelitis July 2020  4. Hospice information is provided to the patient's son who appropriately requests to consult with other family members. Will follow peripherally for plan of care. Patient Active Problem List   Diagnosis Code    Sepsis ([address] A41.9    Leukocytosis D72.829    Ischemic bowel disease ([address] K55.9    MSSA bacteremia R78.81, B95.61    Chronic hepatitis C without hepatic coma ([address] B18.2    Respiratory failure ([address] J96.90    HCAP (healthcare-associated pneumonia) J18.9       DEMETRICE Pendleton MD, [facility]

## 2020-11-30 NOTE — PROGRESS NOTES
Son would like to visit patient tomorrow 12/1 to determine plan of care/code status change with patient 89

## 2020-11-30 NOTE — PROGRESS NOTES
Comprehensive Nutrition Assessment    Type and Reason for Visit:  Reassess    Nutrition Recommendations/Plan:   Correct depleted K+ levels  Continue current EN regimen as ordered  Will continue to monitor GI tolerance  Will follow nutriton status, poc    Nutrition Assessment:  +chronic trach/PEG, +vent settings, EN infusing @ 60 ml/hr via PEG, pt non-verbal, possible plan for hospice, pt at high nutrition risk    Malnutrition Assessment:  Malnutrition Status:  Insufficient data    Context:  (Pt appears malnourished, receiving care during visit)       Estimated Daily Nutrient Needs:  Energy (kcal):  1798-6274 (25-30 kcal/kg IBW); Weight Used for Energy Requirements:  Ideal     Protein (g):   (1.2-1.5 g/kg IBW); Weight Used for Protein Requirements:  Ideal        Fluid (ml/day):  6591-4119 (1 ml/kcal);  Method Used for Fluid Requirements:  1 ml/kcal      Nutrition Related Findings:  K+ 3.0      Wounds:  Multiple(cluster wounds noted)       Current Nutrition Therapies:    Current Tube Feeding (TF) Orders:  · Feeding Route: PEG  · Formula: Semi-Elemental(Vital AF 1.2)  · Schedule: Continuous @ 60 ml/hr  · Current TF & Flush Orders Provides: 1728 kcal and 108 g protein    Anthropometric Measures:  · Height: 5' 7.01\" (170.2 cm)  · Current Body Weight: 109 lb 5.6 oz (49.6 kg)   · Usual Body Weight: 147 lb 0.8 oz (66.7 kg)(7/24/20)     · Ideal Body Weight: 148 lbs; % Ideal Body Weight 73.9 %   · BMI: 17.1  · BMI Categories: Underweight (BMI less than 22) age over 72       Nutrition Diagnosis:   · Inadequate oral intake related to swallowing difficulty as evidenced by nutrition support - enteral nutrition    Nutrition Interventions:   Food and/or Nutrient Delivery:  Continue Current Tube Feeding  Nutrition Education/Counseling:  No recommendation at this time   Coordination of Nutrition Care:  Continue to monitor while inpatient    Goals:  pt will meet greater than 75% of estimated nutrient needs via EN Nutrition Monitoring and Evaluation:   Behavioral-Environmental Outcomes:  None Identified   Food/Nutrient Intake Outcomes:  Enteral Nutrition Intake/Tolerance  Physical Signs/Symptoms Outcomes:  Biochemical Data, Weight, GI Status, Hemodynamic Status, Fluid Status or Edema     Discharge Planning:     Too soon to determine     Electronically signed by Letha Levine, MS, RD, LD on 11/30/20 at 2:33 PM EST    Contact: [phone]

## 2020-12-01 ENCOUNTER — APPOINTMENT (OUTPATIENT)
Dept: GENERAL RADIOLOGY | Age: 65
DRG: 720 | End: 2020-12-01
Payer: MEDICARE

## 2020-12-01 LAB
ANION GAP SERPL CALCULATED.3IONS-SCNC: 10 MMOL/L (ref 4–16)
BASE EXCESS MIXED: 6.6 (ref 0–1.2)
BUN BLDV-MCNC: 36 MG/DL (ref 6–23)
CALCIUM SERPL-MCNC: 9.2 MG/DL (ref 8.3–10.6)
CARBON MONOXIDE, BLOOD: 2.3 % (ref 0–5)
CHLORIDE BLD-SCNC: 108 MMOL/L (ref 99–110)
CLOSTRIDIUM DIFFICILE, PCR: ABNORMAL
CO2 CONTENT: 29.9 MMOL/L (ref 19–24)
CO2: 28 MMOL/L (ref 21–32)
COMMENT: ABNORMAL
CREAT SERPL-MCNC: 0.3 MG/DL (ref 0.9–1.3)
GFR AFRICAN AMERICAN: >60 ML/MIN/1.73M2
GFR NON-AFRICAN AMERICAN: >60 ML/MIN/1.73M2
GLUCOSE BLD-MCNC: 139 MG/DL (ref 70–99)
HCO3 ARTERIAL: 28.9 MMOL/L (ref 18–23)
HCT VFR BLD CALC: 30.5 % (ref 42–52)
HEMOGLOBIN: 9.8 GM/DL (ref 13.5–18)
MCH RBC QN AUTO: 28.2 PG (ref 27–31)
MCHC RBC AUTO-ENTMCNC: 32.1 % (ref 32–36)
MCV RBC AUTO: 87.6 FL (ref 78–100)
METHEMOGLOBIN ARTERIAL: 1 %
O2 SATURATION: 95.1 % (ref 96–97)
PCO2 ARTERIAL: 33 MMHG (ref 32–45)
PDW BLD-RTO: 17.5 % (ref 11.7–14.9)
PH BLOOD: 7.55 (ref 7.34–7.45)
PLATELET # BLD: 472 K/CU MM (ref 140–440)
PMV BLD AUTO: 9.3 FL (ref 7.5–11.1)
PO2 ARTERIAL: 81 MMHG (ref 75–100)
POTASSIUM SERPL-SCNC: 3.6 MMOL/L (ref 3.5–5.1)
RBC # BLD: 3.48 M/CU MM (ref 4.6–6.2)
SODIUM BLD-SCNC: 146 MMOL/L (ref 135–145)
WBC # BLD: 16.5 K/CU MM (ref 4–10.5)

## 2020-12-01 PROCEDURE — 94761 N-INVAS EAR/PLS OXIMETRY MLT: CPT

## 2020-12-01 PROCEDURE — 6360000002 HC RX W HCPCS: Performed by: FAMILY MEDICINE

## 2020-12-01 PROCEDURE — 36592 COLLECT BLOOD FROM PICC: CPT

## 2020-12-01 PROCEDURE — 94003 VENT MGMT INPAT SUBQ DAY: CPT

## 2020-12-01 PROCEDURE — 2700000000 HC OXYGEN THERAPY PER DAY

## 2020-12-01 PROCEDURE — 2580000003 HC RX 258: Performed by: FAMILY MEDICINE

## 2020-12-01 PROCEDURE — C9113 INJ PANTOPRAZOLE SODIUM, VIA: HCPCS | Performed by: FAMILY MEDICINE

## 2020-12-01 PROCEDURE — 94750 HC PULMONARY COMPLIANCE STUDY: CPT

## 2020-12-01 PROCEDURE — 6370000000 HC RX 637 (ALT 250 FOR IP): Performed by: FAMILY MEDICINE

## 2020-12-01 PROCEDURE — 85027 COMPLETE CBC AUTOMATED: CPT

## 2020-12-01 PROCEDURE — 2000000000 HC ICU R&B

## 2020-12-01 PROCEDURE — 6360000002 HC RX W HCPCS: Performed by: HOSPITALIST

## 2020-12-01 PROCEDURE — 89220 SPUTUM SPECIMEN COLLECTION: CPT

## 2020-12-01 PROCEDURE — 82803 BLOOD GASES ANY COMBINATION: CPT

## 2020-12-01 PROCEDURE — 94640 AIRWAY INHALATION TREATMENT: CPT

## 2020-12-01 PROCEDURE — 6370000000 HC RX 637 (ALT 250 FOR IP): Performed by: INTERNAL MEDICINE

## 2020-12-01 PROCEDURE — 36600 WITHDRAWAL OF ARTERIAL BLOOD: CPT

## 2020-12-01 PROCEDURE — 80048 BASIC METABOLIC PNL TOTAL CA: CPT

## 2020-12-01 PROCEDURE — 2500000003 HC RX 250 WO HCPCS: Performed by: FAMILY MEDICINE

## 2020-12-01 PROCEDURE — 71045 X-RAY EXAM CHEST 1 VIEW: CPT

## 2020-12-01 RX ADMIN — GABAPENTIN 300 MG: 300 CAPSULE ORAL at 20:14

## 2020-12-01 RX ADMIN — POTASSIUM CHLORIDE, DEXTROSE MONOHYDRATE AND SODIUM CHLORIDE: 150; 5; 450 INJECTION, SOLUTION INTRAVENOUS at 16:17

## 2020-12-01 RX ADMIN — Medication 125 MG: at 16:16

## 2020-12-01 RX ADMIN — ALBUTEROL SULFATE 4 PUFF: 90 AEROSOL, METERED RESPIRATORY (INHALATION) at 11:19

## 2020-12-01 RX ADMIN — CLONAZEPAM 1 MG: 1 TABLET ORAL at 20:14

## 2020-12-01 RX ADMIN — GABAPENTIN 300 MG: 300 CAPSULE ORAL at 08:33

## 2020-12-01 RX ADMIN — IPRATROPIUM BROMIDE 4 PUFF: 17 AEROSOL, METERED RESPIRATORY (INHALATION) at 04:50

## 2020-12-01 RX ADMIN — ALBUTEROL SULFATE 4 PUFF: 90 AEROSOL, METERED RESPIRATORY (INHALATION) at 15:36

## 2020-12-01 RX ADMIN — POTASSIUM CHLORIDE, DEXTROSE MONOHYDRATE AND SODIUM CHLORIDE: 150; 5; 450 INJECTION, SOLUTION INTRAVENOUS at 03:49

## 2020-12-01 RX ADMIN — MEROPENEM 1 G: 1 INJECTION, POWDER, FOR SOLUTION INTRAVENOUS at 22:11

## 2020-12-01 RX ADMIN — IPRATROPIUM BROMIDE 4 PUFF: 17 AEROSOL, METERED RESPIRATORY (INHALATION) at 11:18

## 2020-12-01 RX ADMIN — HYDROXYZINE HYDROCHLORIDE 25 MG: 25 TABLET, FILM COATED ORAL at 16:16

## 2020-12-01 RX ADMIN — BUMETANIDE 0.5 MG: 1 TABLET ORAL at 08:38

## 2020-12-01 RX ADMIN — HYDROXYZINE HYDROCHLORIDE 25 MG: 25 TABLET, FILM COATED ORAL at 03:49

## 2020-12-01 RX ADMIN — MAGNESIUM OXIDE 400 MG (241.3 MG MAGNESIUM) TABLET 400 MG: TABLET at 08:38

## 2020-12-01 RX ADMIN — MEROPENEM 1 G: 1 INJECTION, POWDER, FOR SOLUTION INTRAVENOUS at 14:30

## 2020-12-01 RX ADMIN — IPRATROPIUM BROMIDE 4 PUFF: 17 AEROSOL, METERED RESPIRATORY (INHALATION) at 15:36

## 2020-12-01 RX ADMIN — IPRATROPIUM BROMIDE 4 PUFF: 17 AEROSOL, METERED RESPIRATORY (INHALATION) at 07:35

## 2020-12-01 RX ADMIN — ALBUTEROL SULFATE 4 PUFF: 90 AEROSOL, METERED RESPIRATORY (INHALATION) at 04:50

## 2020-12-01 RX ADMIN — METHYLPREDNISOLONE SODIUM SUCCINATE 40 MG: 40 INJECTION, POWDER, FOR SOLUTION INTRAMUSCULAR; INTRAVENOUS at 08:33

## 2020-12-01 RX ADMIN — CHLORHEXIDINE GLUCONATE 0.12% ORAL RINSE 15 ML: 1.2 LIQUID ORAL at 20:14

## 2020-12-01 RX ADMIN — IPRATROPIUM BROMIDE 4 PUFF: 17 AEROSOL, METERED RESPIRATORY (INHALATION) at 00:47

## 2020-12-01 RX ADMIN — NYSTATIN 500000 UNITS: 100000 SUSPENSION ORAL at 16:17

## 2020-12-01 RX ADMIN — ENOXAPARIN SODIUM 40 MG: 40 INJECTION SUBCUTANEOUS at 08:33

## 2020-12-01 RX ADMIN — Medication 125 MG: at 23:45

## 2020-12-01 RX ADMIN — NYSTATIN 500000 UNITS: 100000 SUSPENSION ORAL at 08:33

## 2020-12-01 RX ADMIN — GABAPENTIN 300 MG: 300 CAPSULE ORAL at 14:37

## 2020-12-01 RX ADMIN — PROPOFOL 10 MCG/KG/MIN: 10 INJECTION, EMULSION INTRAVENOUS at 22:18

## 2020-12-01 RX ADMIN — NYSTATIN 500000 UNITS: 100000 SUSPENSION ORAL at 20:14

## 2020-12-01 RX ADMIN — ALBUTEROL SULFATE 4 PUFF: 90 AEROSOL, METERED RESPIRATORY (INHALATION) at 21:10

## 2020-12-01 RX ADMIN — ALBUTEROL SULFATE 4 PUFF: 90 AEROSOL, METERED RESPIRATORY (INHALATION) at 07:35

## 2020-12-01 RX ADMIN — CHLORHEXIDINE GLUCONATE 0.12% ORAL RINSE 15 ML: 1.2 LIQUID ORAL at 08:33

## 2020-12-01 RX ADMIN — IPRATROPIUM BROMIDE 4 PUFF: 17 AEROSOL, METERED RESPIRATORY (INHALATION) at 21:10

## 2020-12-01 RX ADMIN — ALBUTEROL SULFATE 4 PUFF: 90 AEROSOL, METERED RESPIRATORY (INHALATION) at 00:48

## 2020-12-01 RX ADMIN — PANTOPRAZOLE SODIUM 40 MG: 40 INJECTION, POWDER, LYOPHILIZED, FOR SOLUTION INTRAVENOUS at 08:33

## 2020-12-01 RX ADMIN — OXYCODONE HYDROCHLORIDE 5 MG: 5 TABLET ORAL at 20:14

## 2020-12-01 RX ADMIN — HYDROMORPHONE HYDROCHLORIDE 1 MG: 2 TABLET ORAL at 14:37

## 2020-12-01 RX ADMIN — NYSTATIN 500000 UNITS: 100000 SUSPENSION ORAL at 14:38

## 2020-12-01 RX ADMIN — CLONAZEPAM 1 MG: 1 TABLET ORAL at 14:37

## 2020-12-01 RX ADMIN — Medication 8000 UNITS: at 11:01

## 2020-12-01 RX ADMIN — CLONAZEPAM 1 MG: 1 TABLET ORAL at 08:33

## 2020-12-01 RX ADMIN — HYDROXYZINE HYDROCHLORIDE 25 MG: 25 TABLET, FILM COATED ORAL at 22:10

## 2020-12-01 RX ADMIN — HYDROXYZINE HYDROCHLORIDE 25 MG: 25 TABLET, FILM COATED ORAL at 11:01

## 2020-12-01 RX ADMIN — BUMETANIDE 0.5 MG: 1 TABLET ORAL at 22:10

## 2020-12-01 RX ADMIN — DULOXETINE HYDROCHLORIDE 60 MG: 30 CAPSULE, DELAYED RELEASE ORAL at 08:33

## 2020-12-01 ASSESSMENT — PULMONARY FUNCTION TESTS
PIF_VALUE: 30

## 2020-12-01 ASSESSMENT — PAIN SCALES - GENERAL: PAINLEVEL_OUTOF10: 6

## 2020-12-01 NOTE — PROGRESS NOTES
Pulmonary and Critical Care  Progress Note    Subjective: The patient is better. Dr Elvi Marr note appreciated. Shortness of breath none. Chest pain none  Addressing respiratory complaints Patient is negative for  hemoptysis and cyanosis  CONSTITUTIONAL:  negative for fevers and chills      Past Medical History:     has a past medical history of Acute embolism and thrombosis of deep veins of left upper extremity ([address] Acute kidney failure with tubular necrosis ([address] Anemia, unspecified, Anxiety, Aphonia, Bacteremia, Drug abuse and dependence ([address] Dysphagia, Gastrostomy status ([address] Hemoperitoneum, Hepatitis C without hepatic coma, Hypertension, Idiopathic hypotension, Malignant neoplasm of stomach, unspecified ([address] Metabolic encephalopathy, Methicillin susceptible Staphylococcus aureus infection as the cause of diseases classified elsewhere, Muscle weakness, Osteomyelitis ([address] Thrombocytopenia, unspecified ([address] Tracheostomy status ([address] and Unspecified severe protein-calorie malnutrition ([address] has a past surgical history that includes back surgery; IR NONTUNNELED VASCULAR CATHETER > 5 YEARS (7/26/2020); laparotomy (N/A, 7/25/2020); tracheostomy (N/A, 8/10/2020); Gastrostomy tube placement (N/A, 8/10/2020); and IR TUNNELED CVC PLACE WO SQ PORT/PUMP > 5 YEARS (8/12/2020). reports that he has quit smoking. His smoking use included cigarettes. He smoked 1.50 packs per day. He does not have any smokeless tobacco history on file. He reports that he does not drink alcohol or use drugs. Family history:  family history is not on file.     No Known Allergies  Social History:    Reviewed; no changes    Objective:   PHYSICAL EXAM:        VITALS:  BP (!) 141/99   Pulse 88   Temp 97.5 °F (36.4 °C) (Rectal)   Resp 15   Ht 5' 7.01\" (1.702 m)   Wt 110 lb 10.7 oz (50.2 kg)   SpO2 99%   BMI 17.33 kg/m²     24HR INTAKE/OUTPUT:      Intake/Output Summary (Last 24 hours) at 12/1/2020 1307  Last data filed at 12/1/2020 0558  Gross per 24 hour   Intake 3637.39 ml   Output 1000 ml   Net 2637.39 ml       CONSTITUTIONAL:  Awake. LUNGS:  decreased breath sounds, basilar crackles. CARDIOVASCULAR:  normal S1 and S2 and negative JVD  ABD:Abdomen soft, non-tender. BS normal. No masses,  No organomegaly. DATA:    CBC:  Recent Labs     11/29/20  0535 11/30/20  0444 12/01/20  0600   WBC 19.3* 18.9* 16.5*   RBC 3.38* 3.54* 3.48*   HGB 9.2* 9.9* 9.8*   HCT 30.7* 31.7* 30.5*   * 500* 472*   MCV 90.8 89.5 87.6   MCH 27.2 28.0 28.2   MCHC 30.0* 31.2* 32.1   RDW 16.8* 17.0* 17.5*      BMP:  Recent Labs     11/29/20  1049 11/30/20  0444 11/30/20  1730 12/01/20  0600   * 149*  --  146*   K 3.5 3.0* 4.1 3.6    105  --  108   CO2 30 29  --  28   BUN 36* 40*  --  36*   CREATININE 0.3* 0.3*  --  0.3*   CALCIUM 9.3 9.5  --  9.2   GLUCOSE 128* 129*  --  139*      ABG:  Recent Labs     11/29/20  0800 11/30/20  0800 12/01/20  0800   PH 7.48* 7.65* 7.55*   PO2ART 82 62* 81   ULT9JXY 44.0 28.0* 33.0   O2SAT 95.2* 91.4* 95.1*     Lab Results   Component Value Date    PROBNP 530.3 (H) 11/26/2020    PROBNP 742.6 (H) 11/24/2020    PROBNP 11,142 (H) 07/24/2020     No results found for: CULTRESP    Radiology Review:  Pertinent images / reports were reviewed as a part of this visit. Assessment:     Patient Active Problem List   Diagnosis    S[facility]is [facility]    Leukocytosis    Ischemic bowel disease ([facility]    MSSA bacteremia    Chronic hepatitis C without hepatic coma ([facility]    Respiratory failure ([facility]    HCAP (healthcare-associated pneumonia)       Plan:   1. Overall the patient has improved. 2. Reduce A/C to 10.   Hue Berkowitz MD  12/1/2020  1:07 PM

## 2020-12-01 NOTE — PROGRESS NOTES
[address]    Hospitalist Progress Note      Name:  Marcos Diaz /Age/Sex: 1955  (63 y.o. male)   MRN & CSN:  [id number] & [id number] Admission Date/Time: 2020 10:19 AM   Location:  -A PCP: No primary care provider on file. Hospital Day: 8    Assessment and Plan:   Marcos Diaz is a 63 y.o.  male  who presents with sepsis    Sepsis due to C-diff     C. difficile PCR positive  Started on p.o. vancomycin via PEG  We will hold IV meropenem as it may worsen diarrhea    Chronic respiratory fialure on Trach    Unlikely to be pneumonia or aspiration given chest x-ray features  Holding IV meropenem which may worsen C.  Difficile  Continue to monitor blood cultures which were no growth to date  Sputum culture grew ESBL E. Coli-was on meropenem for the last 6 days  Pulmonary service following    Tracheostomy malfunction -resolved after trach exchange    ENT input appreciated    Electrolyte imbalances -monitor and replace per protocol     HypoK - replace with protocol and IVF  HypoPhos - replaced  HyperNa - on d5+0.45 with 20meqK - monitor closely      Chronic medical conditions:      Skin wounds/right post thigh/buttock and bilateral UE - wound care  Polysubstance abuse history  Hep C, chronic    Palliative care/hospice consulted for code status goal of care discussion     Diet DIET TUBE FEED CONTINUOUS/CYCLIC NPO; Semi-elemental (Vital AF 1.2 Waldo); Gastrostomy; Continuous; 20; 60; 24   DVT Prophylaxis [] Lovenox, []  Heparin, [] SCDs, []No VTE prophylaxis, patient ambulating   GI Prophylaxis [] PPI, [] H2 Blocker, [] No GI prophylaxis, patient is receiving diet/Tube Feeds   Code Status Full Code   Disposition Patient requires continued admission due to sepsis   MDM [] Low, [] Moderate,[x]  High     History of Present Illness: Subjective     Patient Seen & Examined at the bedside      Patient is on trach and vent -nonverbal and unable to give any proper information except nodding for yes and no    Ten point ROS not be reviewed due to patient's mental status     Objective: Intake/Output Summary (Last 24 hours) at 12/1/2020 1112  Last data filed at 12/1/2020 0558  Gross per 24 hour   Intake 3637.39 ml   Output 1000 ml   Net 2637.39 ml      Vitals:   Vitals:    12/01/20 0902   BP: (!) 134/104   Pulse: 85   Resp: 12   Temp: 97.5 °F (36.4 °C)   SpO2:      Physical Exam:    GEN chronically sick looking and emaciated, awake but nonverbal male, resting in bed in no apparent distress. Appears given age. HENT tracheostomy in place  RESP diffuse rhonchi with scattered wheezes  CARDIO/VASC -S1/S2 auscultated. Regular rate without appreciable murmurs, rubs, or gallops. Peripheral pulses equal bilaterally and palpable. No peripheral edema.     Medications:   Medications:    methylPREDNISolone  40 mg Intravenous Daily    meropenem  1 g Intravenous Q8H    Ergocalciferol  8,000 Units Oral Daily    magnesium oxide  400 mg Oral Daily    scopolamine  1 patch Transdermal Q72H    pantoprazole  40 mg Intravenous Daily    nystatin  5 mL Oral 4x Daily    midodrine  10 mg Oral TID    sodium chloride flush  10 mL Intravenous 2 times per day    enoxaparin  40 mg Subcutaneous Daily    clonazePAM  1 mg Per G Tube TID    bumetanide  0.5 mg Per G Tube BID    DULoxetine  60 mg Per G Tube Daily    gabapentin  300 mg Per G Tube TID    hydrOXYzine  25 mg Per G Tube Q6H    albuterol sulfate HFA  4 puff Inhalation Q4H    And    ipratropium  4 puff Inhalation Q4H    chlorhexidine  15 mL Mouth/Throat BID      Infusions:    dextrose 5% and 0.45% NaCl with KCl 20 mEq 75 mL/hr at 12/01/20 0349    propofol 10 mcg/kg/min (11/30/20 1903)     PRN Meds: potassium chloride, 40 mEq, PRN    Or  potassium alternative oral replacement, 40 mEq, PRN    Or  potassium chloride, 10 mEq, PRN  dicyclomine, 20 mg, Q6H PRN  sodium chloride flush, 10 mL, PRN  acetaminophen, 650 mg, Q6H PRN    Or  acetaminophen, 650 mg, Q6H PRN  polyethylene glycol, 17 g, Daily PRN  promethazine, 12.5 mg, Q6H PRN    Or  ondansetron, 4 mg, Q6H PRN  oxyCODONE, 5 mg, Q6H PRN  HYDROmorphone, 1 mg, Daily PRN  magnesium sulfate, 1 g, PRN          Electronically signed by Deepthi Schultz MD on 12/1/2020 at 11:12 AM

## 2020-12-02 LAB
ANION GAP SERPL CALCULATED.3IONS-SCNC: 9 MMOL/L (ref 4–16)
BASE EXCESS MIXED: 5.2 (ref 0–1.2)
BUN BLDV-MCNC: 40 MG/DL (ref 6–23)
CALCIUM SERPL-MCNC: 9.4 MG/DL (ref 8.3–10.6)
CARBON MONOXIDE, BLOOD: 1.7 % (ref 0–5)
CHLORIDE BLD-SCNC: 108 MMOL/L (ref 99–110)
CO2 CONTENT: 27.8 MMOL/L (ref 19–24)
CO2: 28 MMOL/L (ref 21–32)
COMMENT: ABNORMAL
CREAT SERPL-MCNC: 0.3 MG/DL (ref 0.9–1.3)
GFR AFRICAN AMERICAN: >60 ML/MIN/1.73M2
GFR NON-AFRICAN AMERICAN: >60 ML/MIN/1.73M2
GLUCOSE BLD-MCNC: 119 MG/DL (ref 70–99)
HCO3 ARTERIAL: 26.9 MMOL/L (ref 18–23)
HCT VFR BLD CALC: 32.3 % (ref 42–52)
HEMOGLOBIN: 9.6 GM/DL (ref 13.5–18)
MCH RBC QN AUTO: 26.7 PG (ref 27–31)
MCHC RBC AUTO-ENTMCNC: 29.7 % (ref 32–36)
MCV RBC AUTO: 89.7 FL (ref 78–100)
METHEMOGLOBIN ARTERIAL: 1 %
O2 SATURATION: 96.2 % (ref 96–97)
PCO2 ARTERIAL: 30 MMHG (ref 32–45)
PDW BLD-RTO: 17.9 % (ref 11.7–14.9)
PH BLOOD: 7.56 (ref 7.34–7.45)
PLATELET # BLD: 479 K/CU MM (ref 140–440)
PMV BLD AUTO: 9.5 FL (ref 7.5–11.1)
PO2 ARTERIAL: 95 MMHG (ref 75–100)
POTASSIUM SERPL-SCNC: 4.1 MMOL/L (ref 3.5–5.1)
RBC # BLD: 3.6 M/CU MM (ref 4.6–6.2)
SODIUM BLD-SCNC: 145 MMOL/L (ref 135–145)
WBC # BLD: 15.3 K/CU MM (ref 4–10.5)

## 2020-12-02 PROCEDURE — 2000000000 HC ICU R&B

## 2020-12-02 PROCEDURE — 2500000003 HC RX 250 WO HCPCS: Performed by: FAMILY MEDICINE

## 2020-12-02 PROCEDURE — 6360000002 HC RX W HCPCS: Performed by: FAMILY MEDICINE

## 2020-12-02 PROCEDURE — 80048 BASIC METABOLIC PNL TOTAL CA: CPT

## 2020-12-02 PROCEDURE — 6370000000 HC RX 637 (ALT 250 FOR IP): Performed by: INTERNAL MEDICINE

## 2020-12-02 PROCEDURE — 94761 N-INVAS EAR/PLS OXIMETRY MLT: CPT

## 2020-12-02 PROCEDURE — 6370000000 HC RX 637 (ALT 250 FOR IP): Performed by: FAMILY MEDICINE

## 2020-12-02 PROCEDURE — 2580000003 HC RX 258: Performed by: FAMILY MEDICINE

## 2020-12-02 PROCEDURE — 36592 COLLECT BLOOD FROM PICC: CPT

## 2020-12-02 PROCEDURE — 94003 VENT MGMT INPAT SUBQ DAY: CPT

## 2020-12-02 PROCEDURE — 85027 COMPLETE CBC AUTOMATED: CPT

## 2020-12-02 PROCEDURE — 2700000000 HC OXYGEN THERAPY PER DAY

## 2020-12-02 PROCEDURE — C9113 INJ PANTOPRAZOLE SODIUM, VIA: HCPCS | Performed by: FAMILY MEDICINE

## 2020-12-02 PROCEDURE — 82803 BLOOD GASES ANY COMBINATION: CPT

## 2020-12-02 PROCEDURE — 94640 AIRWAY INHALATION TREATMENT: CPT

## 2020-12-02 PROCEDURE — 99213 OFFICE O/P EST LOW 20 MIN: CPT

## 2020-12-02 PROCEDURE — 94750 HC PULMONARY COMPLIANCE STUDY: CPT

## 2020-12-02 RX ADMIN — CLONAZEPAM 1 MG: 1 TABLET ORAL at 08:51

## 2020-12-02 RX ADMIN — ALBUTEROL SULFATE 4 PUFF: 90 AEROSOL, METERED RESPIRATORY (INHALATION) at 21:06

## 2020-12-02 RX ADMIN — HYDROXYZINE HYDROCHLORIDE 25 MG: 25 TABLET, FILM COATED ORAL at 18:12

## 2020-12-02 RX ADMIN — MEROPENEM 1 G: 1 INJECTION, POWDER, FOR SOLUTION INTRAVENOUS at 20:28

## 2020-12-02 RX ADMIN — CLONAZEPAM 1 MG: 1 TABLET ORAL at 20:25

## 2020-12-02 RX ADMIN — ALBUTEROL SULFATE 4 PUFF: 90 AEROSOL, METERED RESPIRATORY (INHALATION) at 11:49

## 2020-12-02 RX ADMIN — HYDROXYZINE HYDROCHLORIDE 25 MG: 25 TABLET, FILM COATED ORAL at 04:02

## 2020-12-02 RX ADMIN — GABAPENTIN 300 MG: 300 CAPSULE ORAL at 20:25

## 2020-12-02 RX ADMIN — IPRATROPIUM BROMIDE 4 PUFF: 17 AEROSOL, METERED RESPIRATORY (INHALATION) at 05:13

## 2020-12-02 RX ADMIN — DULOXETINE HYDROCHLORIDE 60 MG: 30 CAPSULE, DELAYED RELEASE ORAL at 08:51

## 2020-12-02 RX ADMIN — CHLORHEXIDINE GLUCONATE 0.12% ORAL RINSE 15 ML: 1.2 LIQUID ORAL at 08:51

## 2020-12-02 RX ADMIN — MEROPENEM 1 G: 1 INJECTION, POWDER, FOR SOLUTION INTRAVENOUS at 05:45

## 2020-12-02 RX ADMIN — ALBUTEROL SULFATE 4 PUFF: 90 AEROSOL, METERED RESPIRATORY (INHALATION) at 00:26

## 2020-12-02 RX ADMIN — IPRATROPIUM BROMIDE 4 PUFF: 17 AEROSOL, METERED RESPIRATORY (INHALATION) at 15:45

## 2020-12-02 RX ADMIN — Medication 8000 UNITS: at 12:14

## 2020-12-02 RX ADMIN — ENOXAPARIN SODIUM 40 MG: 40 INJECTION SUBCUTANEOUS at 08:51

## 2020-12-02 RX ADMIN — METHYLPREDNISOLONE SODIUM SUCCINATE 40 MG: 40 INJECTION, POWDER, FOR SOLUTION INTRAMUSCULAR; INTRAVENOUS at 08:51

## 2020-12-02 RX ADMIN — MEROPENEM 1 G: 1 INJECTION, POWDER, FOR SOLUTION INTRAVENOUS at 14:24

## 2020-12-02 RX ADMIN — ALBUTEROL SULFATE 4 PUFF: 90 AEROSOL, METERED RESPIRATORY (INHALATION) at 07:22

## 2020-12-02 RX ADMIN — NYSTATIN 500000 UNITS: 100000 SUSPENSION ORAL at 12:14

## 2020-12-02 RX ADMIN — GABAPENTIN 300 MG: 300 CAPSULE ORAL at 08:51

## 2020-12-02 RX ADMIN — Medication 125 MG: at 12:14

## 2020-12-02 RX ADMIN — NYSTATIN 500000 UNITS: 100000 SUSPENSION ORAL at 18:12

## 2020-12-02 RX ADMIN — NYSTATIN 500000 UNITS: 100000 SUSPENSION ORAL at 08:51

## 2020-12-02 RX ADMIN — BUMETANIDE 0.5 MG: 1 TABLET ORAL at 20:26

## 2020-12-02 RX ADMIN — POTASSIUM CHLORIDE, DEXTROSE MONOHYDRATE AND SODIUM CHLORIDE: 150; 5; 450 INJECTION, SOLUTION INTRAVENOUS at 20:29

## 2020-12-02 RX ADMIN — NYSTATIN 500000 UNITS: 100000 SUSPENSION ORAL at 20:28

## 2020-12-02 RX ADMIN — CHLORHEXIDINE GLUCONATE 0.12% ORAL RINSE 15 ML: 1.2 LIQUID ORAL at 20:28

## 2020-12-02 RX ADMIN — MAGNESIUM OXIDE 400 MG (241.3 MG MAGNESIUM) TABLET 400 MG: TABLET at 08:53

## 2020-12-02 RX ADMIN — IPRATROPIUM BROMIDE 4 PUFF: 17 AEROSOL, METERED RESPIRATORY (INHALATION) at 21:08

## 2020-12-02 RX ADMIN — ALBUTEROL SULFATE 4 PUFF: 90 AEROSOL, METERED RESPIRATORY (INHALATION) at 05:13

## 2020-12-02 RX ADMIN — ALBUTEROL SULFATE 4 PUFF: 90 AEROSOL, METERED RESPIRATORY (INHALATION) at 15:44

## 2020-12-02 RX ADMIN — SODIUM CHLORIDE, PRESERVATIVE FREE 10 ML: 5 INJECTION INTRAVENOUS at 08:54

## 2020-12-02 RX ADMIN — PANTOPRAZOLE SODIUM 40 MG: 40 INJECTION, POWDER, LYOPHILIZED, FOR SOLUTION INTRAVENOUS at 08:51

## 2020-12-02 RX ADMIN — COLLAGENASE SANTYL: 250 OINTMENT TOPICAL at 18:16

## 2020-12-02 RX ADMIN — Medication 125 MG: at 04:02

## 2020-12-02 RX ADMIN — GABAPENTIN 300 MG: 300 CAPSULE ORAL at 14:24

## 2020-12-02 RX ADMIN — OXYCODONE HYDROCHLORIDE 5 MG: 5 TABLET ORAL at 20:25

## 2020-12-02 RX ADMIN — IPRATROPIUM BROMIDE 4 PUFF: 17 AEROSOL, METERED RESPIRATORY (INHALATION) at 11:49

## 2020-12-02 RX ADMIN — IPRATROPIUM BROMIDE 4 PUFF: 17 AEROSOL, METERED RESPIRATORY (INHALATION) at 07:22

## 2020-12-02 RX ADMIN — Medication 125 MG: at 23:59

## 2020-12-02 RX ADMIN — HYDROXYZINE HYDROCHLORIDE 25 MG: 25 TABLET, FILM COATED ORAL at 08:52

## 2020-12-02 RX ADMIN — Medication 125 MG: at 18:12

## 2020-12-02 RX ADMIN — CLONAZEPAM 1 MG: 1 TABLET ORAL at 14:24

## 2020-12-02 RX ADMIN — HYDROXYZINE HYDROCHLORIDE 25 MG: 25 TABLET, FILM COATED ORAL at 21:31

## 2020-12-02 RX ADMIN — IPRATROPIUM BROMIDE 4 PUFF: 17 AEROSOL, METERED RESPIRATORY (INHALATION) at 00:26

## 2020-12-02 RX ADMIN — BUMETANIDE 0.5 MG: 1 TABLET ORAL at 08:53

## 2020-12-02 ASSESSMENT — PULMONARY FUNCTION TESTS
PIF_VALUE: 30

## 2020-12-02 ASSESSMENT — PAIN DESCRIPTION - ONSET: ONSET: ON-GOING

## 2020-12-02 ASSESSMENT — PAIN SCALES - GENERAL
PAINLEVEL_OUTOF10: 0
PAINLEVEL_OUTOF10: 0

## 2020-12-02 ASSESSMENT — PAIN DESCRIPTION - ORIENTATION: ORIENTATION: LOWER

## 2020-12-02 ASSESSMENT — PAIN DESCRIPTION - LOCATION: LOCATION: ABDOMEN;BACK

## 2020-12-02 ASSESSMENT — PAIN DESCRIPTION - FREQUENCY: FREQUENCY: CONTINUOUS

## 2020-12-02 ASSESSMENT — PAIN DESCRIPTION - PAIN TYPE: TYPE: CHRONIC PAIN

## 2020-12-02 ASSESSMENT — PAIN DESCRIPTION - DESCRIPTORS: DESCRIPTORS: PATIENT UNABLE TO DESCRIBE

## 2020-12-02 NOTE — PROGRESS NOTES
[address]    Hospitalist Progress Note      Name:  Kristine Sherman /Age/Sex: 1955  (63 y.o. male)   MRN & CSN:  [id number] & [id number] Admission Date/Time: 2020 10:19 AM   Location:  -A PCP: No primary care provider on file. Hospital Day: 9    Assessment and Plan:   Kristine Sherman is a 63 y.o.  male  who presents with sepsis    Sepsis due to C-diff     C. difficile PCR positive  Continue p.o. vancomycin via PEG  Hold IV meropenem as it may worsen diarrhea    Chronic respiratory fialure on Trach    Unlikely to be pneumonia or aspiration given chest x-ray features  Holding IV meropenem which may worsen C.  Difficile  Continue to monitor blood cultures which were no growth to date  Sputum culture grew ESBL E. Coli-was on meropenem for the last 6 days  Pulmonary service following    Tracheostomy malfunction -resolved after trach exchange    ENT input appreciated    Electrolyte imbalances -monitor and replace per protocol     HypoK - replace with protocol and IVF  HypoPhos - replaced  HyperNa - on d5+0.45 with 20meqK - monitor closely      Chronic medical conditions:      Skin wounds/right post thigh/buttock and bilateral UE - wound care  Polysubstance abuse history  Hep C, chronic    Palliative care/hospice consulted for code status goal of care discussion     Diet DIET TUBE FEED CONTINUOUS/CYCLIC NPO; Semi-elemental (Vital AF 1.2 Waldo); Gastrostomy; Continuous; 20; 60; 24   DVT Prophylaxis [] Lovenox, []  Heparin, [] SCDs, []No VTE prophylaxis, patient ambulating   GI Prophylaxis [] PPI, [] H2 Blocker, [] No GI prophylaxis, patient is receiving diet/Tube Feeds   Code Status Full Code   Disposition Patient requires continued admission due to sepsis   MDM [] Low, [] Moderate,[x]  High     History of Present Illness: Subjective     Patient Seen & Examined at the bedside      Patient is on trach and vent -nonverbal but able to respond with head nodding     Ten point ROS not be reviewed due to patient's mental status     Objective: Intake/Output Summary (Last 24 hours) at 12/2/2020 1445  Last data filed at 12/2/2020 1424  Gross per 24 hour   Intake 3372.37 ml   Output 2250 ml   Net 1122.37 ml      Vitals:   Vitals:    12/02/20 1200   BP: (!) 136/106   Pulse: 96   Resp: 11   Temp: 97.7 °F (36.5 °C)   SpO2:      Physical Exam:    GEN chronically sick looking and emaciated, awake but nonverbal male, resting in bed in no apparent distress. Appears given age. HENT tracheostomy in place  RESP diffuse rhonchi with scattered wheezes  CARDIO/VASC -S1/S2 auscultated. Regular rate without appreciable murmurs, rubs, or gallops. Peripheral pulses equal bilaterally and palpable. No peripheral edema.     Medications:   Medications:    vancomycin  125 mg Per G Tube 4 times per day    methylPREDNISolone  40 mg Intravenous Daily    meropenem  1 g Intravenous Q8H    Ergocalciferol  8,000 Units Oral Daily    magnesium oxide  400 mg Oral Daily    scopolamine  1 patch Transdermal Q72H    pantoprazole  40 mg Intravenous Daily    nystatin  5 mL Oral 4x Daily    midodrine  10 mg Oral TID    sodium chloride flush  10 mL Intravenous 2 times per day    enoxaparin  40 mg Subcutaneous Daily    clonazePAM  1 mg Per G Tube TID    bumetanide  0.5 mg Per G Tube BID    DULoxetine  60 mg Per G Tube Daily    gabapentin  300 mg Per G Tube TID    hydrOXYzine  25 mg Per G Tube Q6H    albuterol sulfate HFA  4 puff Inhalation Q4H    And    ipratropium  4 puff Inhalation Q4H    chlorhexidine  15 mL Mouth/Throat BID      Infusions:    dextrose 5% and 0.45% NaCl with KCl 20 mEq 75 mL/hr at 12/01/20 1617    propofol 10 mcg/kg/min (12/01/20 2218)     PRN Meds: potassium chloride, 40 mEq, PRN    Or  potassium alternative oral replacement, 40 mEq, PRN    Or  potassium chloride, 10 mEq, PRN  dicyclomine, 20 mg, Q6H PRN  sodium chloride flush, 10 mL, PRN  acetaminophen, 650 mg, Q6H PRN    Or  acetaminophen, 650 mg, Q6H PRN  polyethylene glycol, 17 g, Daily PRN  promethazine, 12.5 mg, Q6H PRN    Or  ondansetron, 4 mg, Q6H PRN  oxyCODONE, 5 mg, Q6H PRN  HYDROmorphone, 1 mg, Daily PRN  magnesium sulfate, 1 g, PRN          Electronically signed by Valerie Escalante MD on 12/2/2020 at 2:45 PM

## 2020-12-02 NOTE — CONSULTS
[facility] Continence Nurse  Consult Note       Marcos Diaz  AGE: 63 y.o.    GENDER: male  : 1955  TODAY'S DATE:  2020    Subjective:     Reason for  Evaluation and Assessment: wound care eval.      Marcos Diaz is a 63 y.o. male referred by:   [x] Physician  [] Nursing  [] Other:     Wound Identification:  Wound Type: pressure, non-healing surgical and feet undetermined  Contributing Factors: chronic pressure, decreased mobility, malnutrition, incontinence of stool and substance abuse        PAST MEDICAL HISTORY        Diagnosis Date    Acute embolism and thrombosis of deep veins of left upper extremity (HCC)     Acute kidney failure with tubular necrosis (HCC)     Anemia, unspecified     Anxiety     Aphonia     Bacteremia     Drug abuse and dependence ([address]     Dysphagia     Gastrostomy status ([address]     Hemoperitoneum     Hepatitis C without hepatic coma     Hypertension     Idiopathic hypotension     Malignant neoplasm of stomach, unspecified (HCC)     Metabolic encephalopathy     Methicillin susceptible Staphylococcus aureus infection as the cause of diseases classified elsewhere     Muscle weakness     Osteomyelitis ([address]     Thrombocytopenia, unspecified ([address]     Tracheostomy status ([address]     Unspecified severe protein-calorie malnutrition ([address]        PAST SURGICAL HISTORY    Past Surgical History:   Procedure Laterality Date    BACK SURGERY      GASTROSTOMY TUBE PLACEMENT N/A 8/10/2020    EGD PEG TUBE PLACEMENT performed by Elbert Lake MD at [place] [address] IR NONTUNNELED VASCULAR CATHETER  2020    IR NONTUNNELED VASCULAR CATHETER 2020 [facility] SPECIAL PROCEDURES    IR TUNNELED CATHETER PLACEMENT GREATER THAN 5 YEARS  2020    IR TUNNELED CATHETER PLAC[facility] GREATER THAN 5 YEARS [facility]ED[facility]    LAPAROTOMY N/A 2020    LAPAROTOMY EXPLORATORY performed by Elbert Lake MD at [address] [place] [address] 8/10/2020    TRACHEOTOMY performed by Keila Alcantara MD at [address] [place] [address]    History reviewed. No pertinent family history. SOCIAL HISTORY    Social History     Tobacco Use    Smoking status: Former Smoker     Packs/day: 1.50     Types: Cigarettes   Substance Use Topics    Alcohol use: No    Drug use: No       ALLERGIES    No Known Allergies    MEDICATIONS    No current facility-administered medications on file prior to encounter. Current Outpatient Medications on File Prior to Encounter   Medication Sig Dispense Refill    bumetanide (BUMEX) 0.5 MG tablet 0.5 mg by Per G Tube route 2 times daily      HYDROmorphone (DILAUDID) 2 MG tablet 1 mg by Per G Tube route daily as needed for Pain. 11/24/2020 Per nursing home given piror to wound care      DULoxetine (CYMBALTA) 60 MG extended release capsule 60 mg by Per G Tube route daily      gabapentin (NEURONTIN) 300 MG capsule 300 mg by Per G Tube route 3 times daily.  hydrOXYzine (ATARAX) 25 MG tablet 25 mg by Per G Tube route every 6 hours      clonazePAM (KLONOPIN) 1 MG tablet 1 mg by Per G Tube route 3 times daily.  oxyCODONE (ROXICODONE) 5 MG immediate release tablet 5 mg by Per G Tube route every 6 hours as needed for Pain.       potassium & sodium phosphates (PHOS-NAK) 280-160-250 MG PACK 1 packet by Per G Tube route daily      Amino Acids-Protein Hydrolys (PRO-STAT) LIQD by Per G Tube route 2 times daily 11/24/2020 Receives 30 ml BID      hydrOXYzine (VISTARIL) 50 MG capsule 50 mg by Per G Tube route 3 times daily as needed for Itching      ipratropium (ATROVENT HFA) 17 MCG/ACT inhaler Inhale 4 puffs into the lungs every 4 hours 1 Inhaler 3    ipratropium-albuterol (DUONEB) 0.5-2.5 (3) MG/3ML SOLN nebulizer solution Inhale 3 mLs into the lungs every 4 hours as needed for Shortness of Breath 360 mL     Heparin Sodium, Porcine, (HEPARIN, PORCINE,) 1000 UNIT/ML injection Infuse 3 mLs intravenously as needed (CRRT cath maintenance, 1500 each accsess None 12/02/20 1530   Digna-wound Assessment Intact 12/02/20 1530   Margins Defined edges 12/02/20 1530   Wound Thickness Description not for Pressure Injury Full thickness 12/02/20 1530   Number of days: 7       Wound 11/25/20 Radial Right (Active)   Wound Etiology Other 12/02/20 1530   Dressing Status New dressing applied 12/02/20 1530   Wound Cleansed Cleansed with saline 12/02/20 1530   Dressing/Treatment Dry dressing 12/02/20 1227   Dressing Change Due 12/02/20 12/02/20 1227   Wound Length (cm) 1.8 cm 12/02/20 1530   Wound Width (cm) 0.7 cm 12/02/20 1530   Wound Depth (cm) 0.1 cm 12/02/20 1530   Wound Surface Area (cm^2) 1.26 cm^2 12/02/20 1530   Change in Wound Size % (l*w) 93 12/02/20 1530   Wound Volume (cm^3) 0.13 cm^3 12/02/20 1530   Wound Healing % 93 12/02/20 1530   Distance Tunneling (cm) 0 cm 12/02/20 1530   Tunneling Position ___ O'Clock 0 12/02/20 1530   Undermining Starts ___ O'Clock 0 12/02/20 1530   Undermining Ends___ O'Clock 0 12/02/20 1530   Undermining Maxium Distance (cm) 0 12/02/20 1530   Wound Assessment Pink/red 12/02/20 1530   Drainage Amount Small 12/02/20 1530   Drainage Description Serosanguinous 12/02/20 1530   Odor None 12/02/20 1530   Digna-wound Assessment Intact 12/02/20 1530   Margins Defined edges 12/02/20 1530   Wound Thickness Description not for Pressure Injury Full thickness 12/02/20 1530   Number of days: 7       Wound 11/25/20 Hip Left (Active)   Wound Etiology Pressure Unstageable 12/02/20 1530   Dressing Status New dressing applied 12/02/20 1530   Wound Cleansed Cleansed with saline 12/02/20 1530   Dressing/Treatment Silicone border;Foam;Alginate with Ag 12/02/20 1227   Dressing Change Due 12/03/20 12/02/20 1227   Wound Length (cm) 3.5 cm 12/02/20 1530   Wound Width (cm) 1 cm 12/02/20 1530   Wound Depth (cm) 0.4 cm 12/02/20 1530   Wound Surface Area (cm^2) 3.5 cm^2 12/02/20 1530   Change in Wound Size % (l*w) -6.06 12/02/20 1530   Wound Volume (cm^3) 1.4 cm^3 12/02/20 1530 Wound Healing % 29 12/02/20 1530   Distance Tunneling (cm) 0 cm 12/02/20 1530   Tunneling Position ___ O'Clock 0 12/02/20 1530   Undermining Starts ___ O'Clock 0 12/02/20 1530   Undermining Ends___ O'Clock 0 12/02/20 1530   Undermining Maxium Distance (cm) 0 12/02/20 1530   Wound Assessment Slough 12/02/20 1227   Drainage Amount Moderate 12/02/20 1530   Drainage Description Yellow 12/02/20 1530   Odor None 12/02/20 1530   Digna-wound Assessment Intact 12/02/20 1530   Margins Defined edges 12/02/20 1530   Wound Thickness Description not for Pressure Injury Full thickness 12/02/20 1530   Number of days: 7       Wound 11/25/20 Foot Left great toe to 5th toe cluster (Active)   Wound Etiology Other 12/02/20 1530   Dressing Status New dressing applied 12/02/20 1530   Wound Cleansed Cleansed with saline; Other (Comment) 12/02/20 1530   Dressing/Treatment Dry dressing 12/02/20 1227   Dressing Change Due 12/03/20 12/02/20 1227   Wound Length (cm) 7 cm 12/02/20 1530   Wound Width (cm) 10 cm 12/02/20 1530   Wound Depth (cm) 0.1 cm 12/02/20 1530   Wound Surface Area (cm^2) 70 cm^2 12/02/20 1530   Change in Wound Size % (l*w) -22.81 12/02/20 1530   Wound Volume (cm^3) 7 cm^3 12/02/20 1530   Wound Healing % -23 12/02/20 1530   Distance Tunneling (cm) 0 cm 12/02/20 1530   Tunneling Position ___ O'Clock 0 12/02/20 1530   Undermining Starts ___ O'Clock 0 12/02/20 1530   Undermining Ends___ O'Clock 0 12/02/20 1530   Undermining Maxium Distance (cm) 0 12/02/20 1530   Wound Assessment Other (Comment) 12/02/20 1227   Drainage Amount Small 12/02/20 1530   Drainage Description Serosanguinous 12/02/20 1530   Odor None 12/02/20 1530   Digna-wound Assessment Intact 12/02/20 1530   Margins Defined edges 12/02/20 1530   Wound Thickness Description not for Pressure Injury Full thickness 12/02/20 1530   Number of days: 7       Wound 11/25/20 Foot Anterior;Right great toe to 5th toe cluster (Active)   Wound Etiology Other 12/02/20 1530   Dressing Status New dressing applied 12/02/20 1530   Wound Cleansed Cleansed with saline; Other (Comment) 12/02/20 1530   Dressing/Treatment Dry dressing 12/02/20 1227   Dressing Change Due 12/03/20 12/02/20 1227   Wound Length (cm) 4 cm 12/02/20 1530   Wound Width (cm) 8 cm 12/02/20 1530   Wound Depth (cm) 0.1 cm 12/02/20 1530   Wound Surface Area (cm^2) 32 cm^2 12/02/20 1530   Change in Wound Size % (l*w) 36 12/02/20 1530   Wound Volume (cm^3) 3.2 cm^3 12/02/20 1530   Wound Healing % 36 12/02/20 1530   Distance Tunneling (cm) 0 cm 12/02/20 1530   Tunneling Position ___ O'Clock 0 12/02/20 1530   Undermining Starts ___ O'Clock 0 12/02/20 1530   Undermining Ends___ O'Clock 0 12/02/20 1530   Undermining Maxium Distance (cm) 0 12/02/20 1530   Wound Assessment Other (Comment) 12/02/20 1227   Drainage Amount Small 12/02/20 1530   Drainage Description Serosanguinous 12/02/20 1530   Odor None 12/02/20 1530   Digna-wound Assessment Intact 12/02/20 1530   Margins Defined edges 12/02/20 1530   Wound Thickness Description not for Pressure Injury Full thickness 12/02/20 1530   Number of days: 7       Wound 11/25/20 Back Mid (Active)   Wound Etiology Pressure Stage  3 12/02/20 1530   Dressing Status New dressing applied 12/02/20 1530   Wound Cleansed Cleansed with saline 12/02/20 1530   Dressing/Treatment Silicone border;Foam;Alginate with Ag 12/02/20 1227   Dressing Change Due 12/03/20 12/02/20 1227   Wound Length (cm) 2.1 cm 12/02/20 1530   Wound Width (cm) 2.5 cm 12/02/20 1530   Wound Depth (cm) 0.7 cm 12/02/20 1530   Wound Surface Area (cm^2) 5.25 cm^2 12/02/20 1530   Change in Wound Size % (l*w) -40 12/02/20 1530   Wound Volume (cm^3) 3.68 cm^3 12/02/20 1530   Wound Healing % -23 12/02/20 1530   Distance Tunneling (cm) 0 cm 12/02/20 1530   Tunneling Position ___ O'Clock 0 12/02/20 1530   Undermining Starts ___ O'Clock 11 12/02/20 1530   Undermining Ends___ O'Clock 2 12/02/20 1530   Undermining Maxium Distance (cm) 1 12/02/20 1530 Wound Assessment Pink/red 12/02/20 1530   Drainage Amount Moderate 12/02/20 1530   Drainage Description Serosanguinous 12/02/20 1530   Odor None 12/02/20 1530   Digna-wound Assessment Intact 12/02/20 1530   Margins Defined edges 12/02/20 1530   Wound Thickness Description not for Pressure Injury Full thickness 12/02/20 1530   Number of days: 7       Wound 11/25/20 Back Lateral;Right cluster (Active)   Wound Etiology Pressure Stage  3 12/02/20 1530   Dressing Status New dressing applied 12/02/20 1530   Wound Cleansed Cleansed with saline 12/02/20 1530   Dressing/Treatment Silicone border;Foam;Alginate with Ag 12/02/20 1227   Dressing Change Due 12/03/20 12/02/20 1227   Wound Length (cm) 2.9 cm 12/02/20 1530   Wound Width (cm) 2.2 cm 12/02/20 1530   Wound Depth (cm) 0.4 cm 12/02/20 1530   Wound Surface Area (cm^2) 6.38 cm^2 12/02/20 1530   Change in Wound Size % (l*w) 24.94 12/02/20 1530   Wound Volume (cm^3) 2.55 cm^3 12/02/20 1530   Wound Healing % 25 12/02/20 1530   Distance Tunneling (cm) 0 cm 12/02/20 1530   Tunneling Position ___ O'Clock 0 12/02/20 1530   Undermining Starts ___ O'Clock 0 12/02/20 1530   Undermining Ends___ O'Clock 0 12/02/20 1530   Undermining Maxium Distance (cm) 0 12/02/20 1530   Wound Assessment Pink/red 12/02/20 1530   Drainage Amount Moderate 12/02/20 1530   Drainage Description Serosanguinous 12/02/20 1530   Odor None 12/02/20 1530   Digna-wound Assessment Intact 12/02/20 1530   Margins Defined edges 12/02/20 1530   Wound Thickness Description not for Pressure Injury Full thickness 12/02/20 1530   Number of days: 7       Wound 11/25/20 Coccyx cluster (Active)   Wound Etiology Pressure Stage  3 12/02/20 1530   Dressing Status New dressing applied 12/02/20 1530   Wound Cleansed Cleansed with saline 12/02/20 1530   Dressing/Treatment Silicone border;Foam;Alginate with Ag 12/02/20 1227   Dressing Change Due 12/03/20 12/02/20 1227   Wound Length (cm) 7.5 cm 12/02/20 1530   Wound Width (cm) 5.5 dressings as above. Recommended santyl for left hip wound. Rt and lt foot with dark purple/red/pink areas appear better washed with alphabath rinsed and dried. Measured and pictured dressing as above. Heels intact. Rt and lt arm wounds cleansed with NS measured and pictured dressing as above they do look better. Pt is at high risk for skin breakdown AEB tiffanie score. Observe tiffanie orders. Pt turned to lt side. Heels floated. Specialty Bed Required : yes  [x] Low Air Loss   [] Pressure Redistribution  [] Fluid Immersion  [] Bariatric  [] Total Pressure Relief  [] Other:     Discharge Plan:  Placement for patient upon discharge: tbd  Hospice Care: no  Patient appropriate for Outpatient [address]: no pt may be palliative     Patient/Caregiver Teaching:  Level of patient/caregiver understanding able to:  Cares explained as given. Electronically signed by Kaylene Rosario.  Michelle RN,  on 12/2/2020 at 4:36 PM

## 2020-12-02 NOTE — PROGRESS NOTES
Pulmonary and Critical Care  Progress Note    Subjective: The patient is better. Shortness of breath none  Chest pain none  Addressing respiratory complaints Patient is negative for  hemoptysis and cyanosis  CONSTITUTIONAL:  negative for fevers and chills      Past Medical History:     has a past medical history of Acute embolism and thrombosis of deep veins of left upper extremity ([address] Acute kidney failure with tubular necrosis ([address] Anemia, unspecified, Anxiety, Aphonia, Bacteremia, Drug abuse and dependence ([address] Dysphagia, Gastrostomy status ([address] Hemoperitoneum, Hepatitis C without hepatic coma, Hypertension, Idiopathic hypotension, Malignant neoplasm of stomach, unspecified ([address] Metabolic encephalopathy, Methicillin susceptible Staphylococcus aureus infection as the cause of diseases classified elsewhere, Muscle weakness, Osteomyelitis ([address] Thrombocytopenia, unspecified ([address] Tracheostomy status ([address] and Unspecified severe protein-calorie malnutrition ([address] has a past surgical history that includes back surgery; IR NONTUNNELED VASCULAR CATHETER > 5 YEARS (7/26/2020); laparotomy (N/A, 7/25/2020); tracheostomy (N/A, 8/10/2020); Gastrostomy tube placement (N/A, 8/10/2020); and IR TUNNELED CVC PLACE WO SQ PORT/PUMP > 5 YEARS (8/12/2020). reports that he has quit smoking. His smoking use included cigarettes. He smoked 1.50 packs per day. He does not have any smokeless tobacco history on file. He reports that he does not drink alcohol or use drugs. Family history:  family history is not on file.     No Known Allergies  Social History:    Reviewed; no changes    Objective:   PHYSICAL EXAM:        VITALS:  BP (!) 136/106   Pulse 96   Temp 97.7 °F (36.5 °C) (Rectal)   Resp 11   Ht 5' 7.01\" (1.702 m)   Wt 113 lb 1.5 oz (51.3 kg)   SpO2 99%   BMI 17.71 kg/m²     24HR INTAKE/OUTPUT:      Intake/Output Summary (Last 24 hours) at 12/2/2020 1310  Last data filed at 12/2/2020 1226  Gross per 24 hour Intake 3322.37 ml   Output 2250 ml   Net 1072.37 ml       CONSTITUTIONAL:  Awake. LUNGS:  decreased breath sounds, basilar crackles. CARDIOVASCULAR:  normal S1 and S2 and negative JVD  ABD:Abdomen soft, non-tender. BS normal. No masses,  No organomegaly. DATA:    CBC:  Recent Labs     11/30/20  0444 12/01/20  0600 12/02/20  0410   WBC 18.9* 16.5* 15.3*   RBC 3.54* 3.48* 3.60*   HGB 9.9* 9.8* 9.6*   HCT 31.7* 30.5* 32.3*   * 472* 479*   MCV 89.5 87.6 89.7   MCH 28.0 28.2 26.7*   MCHC 31.2* 32.1 29.7*   RDW 17.0* 17.5* 17.9*      BMP:  Recent Labs     11/30/20  0444 11/30/20  1730 12/01/20  0600 12/02/20  0410   *  --  146* 145   K 3.0* 4.1 3.6 4.1     --  108 108   CO2 29  --  28 28   BUN 40*  --  36* 40*   CREATININE 0.3*  --  0.3* 0.3*   CALCIUM 9.5  --  9.2 9.4   GLUCOSE 129*  --  139* 119*      ABG:  Recent Labs     11/30/20  0800 12/01/20  0800 12/02/20  0800   PH 7.65* 7.55* 7.56*   PO2ART 62* 81 95   ORX4AXC 28.0* 33.0 30.0*   O2SAT 91.4* 95.1* 96.2     Lab Results   Component Value Date    PROBNP 530.3 (H) 11/26/2020    PROBNP 742.6 (H) 11/24/2020    PROBNP 11,142 (H) 07/24/2020     No results found for: CULTRESP    Radiology Review:  Pertinent images / reports were reviewed as a part of this visit. Assessment:     Patient Active Problem List   Diagnosis    [facility]    Leukocytosis    Ischemic bowel disease ([facility]    MSSA bacteremia    Chronic hepatitis C without hepatic coma ([facility]    Respiratory failure ([facility]    HCAP (healthcare-associated pneumonia)       Plan:   1. Overall the patient has improved. 2. Cont full vent support.   Marivel Woods MD  12/2/2020  1:10 PM

## 2020-12-03 LAB
ANION GAP SERPL CALCULATED.3IONS-SCNC: 9 MMOL/L (ref 4–16)
BASE EXCESS MIXED: 2.2 (ref 0–1.2)
BUN BLDV-MCNC: 36 MG/DL (ref 6–23)
CALCIUM SERPL-MCNC: 9.2 MG/DL (ref 8.3–10.6)
CARBON MONOXIDE, BLOOD: 2.1 % (ref 0–5)
CHLORIDE BLD-SCNC: 108 MMOL/L (ref 99–110)
CO2 CONTENT: 26.3 MMOL/L (ref 19–24)
CO2: 26 MMOL/L (ref 21–32)
COMMENT: ABNORMAL
CREAT SERPL-MCNC: 0.3 MG/DL (ref 0.9–1.3)
GFR AFRICAN AMERICAN: >60 ML/MIN/1.73M2
GFR NON-AFRICAN AMERICAN: >60 ML/MIN/1.73M2
GLUCOSE BLD-MCNC: 110 MG/DL (ref 70–99)
HCO3 ARTERIAL: 25.3 MMOL/L (ref 18–23)
HCT VFR BLD CALC: 30.7 % (ref 42–52)
HEMOGLOBIN: 9.4 GM/DL (ref 13.5–18)
MCH RBC QN AUTO: 27.3 PG (ref 27–31)
MCHC RBC AUTO-ENTMCNC: 30.6 % (ref 32–36)
MCV RBC AUTO: 89.2 FL (ref 78–100)
METHEMOGLOBIN ARTERIAL: 1.4 %
O2 SATURATION: 95.4 % (ref 96–97)
PCO2 ARTERIAL: 34 MMHG (ref 32–45)
PDW BLD-RTO: 17.9 % (ref 11.7–14.9)
PH BLOOD: 7.48 (ref 7.34–7.45)
PLATELET # BLD: 465 K/CU MM (ref 140–440)
PMV BLD AUTO: 9.5 FL (ref 7.5–11.1)
PO2 ARTERIAL: 111 MMHG (ref 75–100)
POTASSIUM SERPL-SCNC: 4.3 MMOL/L (ref 3.5–5.1)
RBC # BLD: 3.44 M/CU MM (ref 4.6–6.2)
SODIUM BLD-SCNC: 143 MMOL/L (ref 135–145)
WBC # BLD: 16 K/CU MM (ref 4–10.5)

## 2020-12-03 PROCEDURE — 94750 HC PULMONARY COMPLIANCE STUDY: CPT

## 2020-12-03 PROCEDURE — 2500000003 HC RX 250 WO HCPCS: Performed by: FAMILY MEDICINE

## 2020-12-03 PROCEDURE — 2000000000 HC ICU R&B

## 2020-12-03 PROCEDURE — 2580000003 HC RX 258: Performed by: INTERNAL MEDICINE

## 2020-12-03 PROCEDURE — 2580000003 HC RX 258: Performed by: FAMILY MEDICINE

## 2020-12-03 PROCEDURE — 94640 AIRWAY INHALATION TREATMENT: CPT

## 2020-12-03 PROCEDURE — 89220 SPUTUM SPECIMEN COLLECTION: CPT

## 2020-12-03 PROCEDURE — 94003 VENT MGMT INPAT SUBQ DAY: CPT

## 2020-12-03 PROCEDURE — 6370000000 HC RX 637 (ALT 250 FOR IP): Performed by: INTERNAL MEDICINE

## 2020-12-03 PROCEDURE — 80048 BASIC METABOLIC PNL TOTAL CA: CPT

## 2020-12-03 PROCEDURE — 82803 BLOOD GASES ANY COMBINATION: CPT

## 2020-12-03 PROCEDURE — C9113 INJ PANTOPRAZOLE SODIUM, VIA: HCPCS | Performed by: FAMILY MEDICINE

## 2020-12-03 PROCEDURE — 2700000000 HC OXYGEN THERAPY PER DAY

## 2020-12-03 PROCEDURE — 94761 N-INVAS EAR/PLS OXIMETRY MLT: CPT

## 2020-12-03 PROCEDURE — 6360000002 HC RX W HCPCS: Performed by: FAMILY MEDICINE

## 2020-12-03 PROCEDURE — 6370000000 HC RX 637 (ALT 250 FOR IP): Performed by: FAMILY MEDICINE

## 2020-12-03 PROCEDURE — 36592 COLLECT BLOOD FROM PICC: CPT

## 2020-12-03 PROCEDURE — 85027 COMPLETE CBC AUTOMATED: CPT

## 2020-12-03 PROCEDURE — 6360000002 HC RX W HCPCS: Performed by: INTERNAL MEDICINE

## 2020-12-03 PROCEDURE — 6360000002 HC RX W HCPCS: Performed by: HOSPITALIST

## 2020-12-03 PROCEDURE — 36600 WITHDRAWAL OF ARTERIAL BLOOD: CPT

## 2020-12-03 RX ORDER — LORAZEPAM 2 MG/ML
0.5 INJECTION INTRAMUSCULAR EVERY 6 HOURS PRN
Status: DISCONTINUED | OUTPATIENT
Start: 2020-12-03 | End: 2020-12-09 | Stop reason: HOSPADM

## 2020-12-03 RX ORDER — CLONIDINE HYDROCHLORIDE 0.1 MG/1
0.1 TABLET ORAL ONCE
Status: COMPLETED | OUTPATIENT
Start: 2020-12-03 | End: 2020-12-03

## 2020-12-03 RX ADMIN — ALBUTEROL SULFATE 4 PUFF: 90 AEROSOL, METERED RESPIRATORY (INHALATION) at 15:41

## 2020-12-03 RX ADMIN — PANTOPRAZOLE SODIUM 40 MG: 40 INJECTION, POWDER, LYOPHILIZED, FOR SOLUTION INTRAVENOUS at 09:24

## 2020-12-03 RX ADMIN — OXYCODONE HYDROCHLORIDE 5 MG: 5 TABLET ORAL at 11:32

## 2020-12-03 RX ADMIN — POTASSIUM CHLORIDE, DEXTROSE MONOHYDRATE AND SODIUM CHLORIDE: 150; 5; 450 INJECTION, SOLUTION INTRAVENOUS at 11:39

## 2020-12-03 RX ADMIN — CLONAZEPAM 1 MG: 1 TABLET ORAL at 20:11

## 2020-12-03 RX ADMIN — ENOXAPARIN SODIUM 40 MG: 40 INJECTION SUBCUTANEOUS at 09:23

## 2020-12-03 RX ADMIN — NYSTATIN 500000 UNITS: 100000 SUSPENSION ORAL at 20:12

## 2020-12-03 RX ADMIN — BUMETANIDE 0.5 MG: 1 TABLET ORAL at 09:23

## 2020-12-03 RX ADMIN — ALBUTEROL SULFATE 4 PUFF: 90 AEROSOL, METERED RESPIRATORY (INHALATION) at 21:24

## 2020-12-03 RX ADMIN — Medication 125 MG: at 19:28

## 2020-12-03 RX ADMIN — HYDROXYZINE HYDROCHLORIDE 25 MG: 25 TABLET, FILM COATED ORAL at 23:07

## 2020-12-03 RX ADMIN — HYDROXYZINE HYDROCHLORIDE 25 MG: 25 TABLET, FILM COATED ORAL at 04:05

## 2020-12-03 RX ADMIN — GABAPENTIN 300 MG: 300 CAPSULE ORAL at 09:23

## 2020-12-03 RX ADMIN — Medication 8000 UNITS: at 12:46

## 2020-12-03 RX ADMIN — Medication 125 MG: at 06:50

## 2020-12-03 RX ADMIN — COLLAGENASE SANTYL: 250 OINTMENT TOPICAL at 09:25

## 2020-12-03 RX ADMIN — IPRATROPIUM BROMIDE 4 PUFF: 17 AEROSOL, METERED RESPIRATORY (INHALATION) at 12:08

## 2020-12-03 RX ADMIN — BUMETANIDE 0.5 MG: 1 TABLET ORAL at 20:11

## 2020-12-03 RX ADMIN — NYSTATIN 500000 UNITS: 100000 SUSPENSION ORAL at 09:23

## 2020-12-03 RX ADMIN — IPRATROPIUM BROMIDE 4 PUFF: 17 AEROSOL, METERED RESPIRATORY (INHALATION) at 15:41

## 2020-12-03 RX ADMIN — NYSTATIN 500000 UNITS: 100000 SUSPENSION ORAL at 12:46

## 2020-12-03 RX ADMIN — OXYCODONE HYDROCHLORIDE 5 MG: 5 TABLET ORAL at 17:32

## 2020-12-03 RX ADMIN — SODIUM CHLORIDE, PRESERVATIVE FREE 10 ML: 5 INJECTION INTRAVENOUS at 09:22

## 2020-12-03 RX ADMIN — ALBUTEROL SULFATE 4 PUFF: 90 AEROSOL, METERED RESPIRATORY (INHALATION) at 12:09

## 2020-12-03 RX ADMIN — IPRATROPIUM BROMIDE 4 PUFF: 17 AEROSOL, METERED RESPIRATORY (INHALATION) at 07:43

## 2020-12-03 RX ADMIN — METHYLPREDNISOLONE SODIUM SUCCINATE 40 MG: 40 INJECTION, POWDER, FOR SOLUTION INTRAMUSCULAR; INTRAVENOUS at 09:23

## 2020-12-03 RX ADMIN — CHLORHEXIDINE GLUCONATE 0.12% ORAL RINSE 15 ML: 1.2 LIQUID ORAL at 09:23

## 2020-12-03 RX ADMIN — SODIUM CHLORIDE, PRESERVATIVE FREE 10 ML: 5 INJECTION INTRAVENOUS at 09:24

## 2020-12-03 RX ADMIN — ALBUTEROL SULFATE 4 PUFF: 90 AEROSOL, METERED RESPIRATORY (INHALATION) at 00:10

## 2020-12-03 RX ADMIN — CEFEPIME HYDROCHLORIDE 2 G: 2 INJECTION, POWDER, FOR SOLUTION INTRAVENOUS at 23:07

## 2020-12-03 RX ADMIN — CLONAZEPAM 1 MG: 1 TABLET ORAL at 09:23

## 2020-12-03 RX ADMIN — DULOXETINE HYDROCHLORIDE 60 MG: 30 CAPSULE, DELAYED RELEASE ORAL at 09:23

## 2020-12-03 RX ADMIN — MAGNESIUM OXIDE 400 MG (241.3 MG MAGNESIUM) TABLET 400 MG: TABLET at 09:24

## 2020-12-03 RX ADMIN — CLONIDINE HYDROCHLORIDE 0.1 MG: 0.1 TABLET ORAL at 16:03

## 2020-12-03 RX ADMIN — CEFEPIME HYDROCHLORIDE 2 G: 2 INJECTION, POWDER, FOR SOLUTION INTRAVENOUS at 10:46

## 2020-12-03 RX ADMIN — PROPOFOL 10 MCG/KG/MIN: 10 INJECTION, EMULSION INTRAVENOUS at 04:05

## 2020-12-03 RX ADMIN — ALBUTEROL SULFATE 4 PUFF: 90 AEROSOL, METERED RESPIRATORY (INHALATION) at 07:44

## 2020-12-03 RX ADMIN — GABAPENTIN 300 MG: 300 CAPSULE ORAL at 20:11

## 2020-12-03 RX ADMIN — HYDROXYZINE HYDROCHLORIDE 25 MG: 25 TABLET, FILM COATED ORAL at 15:52

## 2020-12-03 RX ADMIN — CLONAZEPAM 1 MG: 1 TABLET ORAL at 14:00

## 2020-12-03 RX ADMIN — GABAPENTIN 300 MG: 300 CAPSULE ORAL at 12:51

## 2020-12-03 RX ADMIN — Medication 125 MG: at 12:51

## 2020-12-03 RX ADMIN — MEROPENEM 1 G: 1 INJECTION, POWDER, FOR SOLUTION INTRAVENOUS at 06:51

## 2020-12-03 RX ADMIN — SODIUM CHLORIDE, PRESERVATIVE FREE 10 ML: 5 INJECTION INTRAVENOUS at 20:11

## 2020-12-03 RX ADMIN — CHLORHEXIDINE GLUCONATE 0.12% ORAL RINSE 15 ML: 1.2 LIQUID ORAL at 20:11

## 2020-12-03 RX ADMIN — HYDROXYZINE HYDROCHLORIDE 25 MG: 25 TABLET, FILM COATED ORAL at 10:46

## 2020-12-03 RX ADMIN — NYSTATIN 500000 UNITS: 100000 SUSPENSION ORAL at 17:32

## 2020-12-03 RX ADMIN — IPRATROPIUM BROMIDE 4 PUFF: 17 AEROSOL, METERED RESPIRATORY (INHALATION) at 21:24

## 2020-12-03 ASSESSMENT — PULMONARY FUNCTION TESTS
PIF_VALUE: 30

## 2020-12-03 ASSESSMENT — PAIN SCALES - GENERAL
PAINLEVEL_OUTOF10: 8
PAINLEVEL_OUTOF10: 8

## 2020-12-03 ASSESSMENT — PAIN SCALES - WONG BAKER
WONGBAKER_NUMERICALRESPONSE: 0
WONGBAKER_NUMERICALRESPONSE: 8
WONGBAKER_NUMERICALRESPONSE: 2

## 2020-12-03 ASSESSMENT — PAIN DESCRIPTION - LOCATION
LOCATION: BUTTOCKS
LOCATION: BACK;BUTTOCKS;FOOT

## 2020-12-03 NOTE — CARE COORDINATION
Cm spoke with Dr Milly Myles re; discharge plan for pt. Physician anticipates possible discharge to nursing facility early next week. Cm contacted Beckham Funmilayo in admissions at [facility] of [address] [place] [address] to advise of anticipated discharge early next week and to request precert be initiated. Cm advised admissions that per physician pt is + for cdiff and may need IVAB at discharge. Cm attempted to contact pt's son with voice mail message left requesting return call to follow up, discuss anticipate discharge date and outcome of son's appt with hospice.

## 2020-12-03 NOTE — PLAN OF CARE
Problem: Tissue Perfusion:  Goal: Peripheral tissue perfusion will improve  Description: Peripheral tissue perfusion will improve  12/2/2020 2048 by Marsha Carrion RN  Outcome: Ongoing  12/2/2020 1842 by Breana Thomas RN  Outcome: Ongoing     Problem: Falls - Risk of:  Goal: Will remain free from falls  Description: Will remain free from falls  12/2/2020 2048 by Marsha Carrion RN  Outcome: Ongoing  12/2/2020 1842 by Breana Thomas RN  Outcome: Ongoing  Goal: Absence of physical injury  Description: Absence of physical injury  12/2/2020 2048 by Marsha Carrion RN  Outcome: Ongoing  12/2/2020 1842 by Breana Thomas RN  Outcome: Ongoing     Problem: Skin Integrity:  Goal: Will show no infection signs and symptoms  Description: Will show no infection signs and symptoms  12/2/2020 2048 by Marsha Carrion RN  Outcome: Ongoing  12/2/2020 1842 by Breana Thomas RN  Outcome: Ongoing  Goal: Absence of new skin breakdown  Description: Absence of new skin breakdown  12/2/2020 2048 by Marsha Carrion RN  Outcome: Ongoing  12/2/2020 1842 by Breana Thomas RN  Outcome: Ongoing     Problem: Infection:  Goal: Will remain free from infection  Description: Will remain free from infection  12/2/2020 2048 by Marsha Carrion RN  Outcome: Ongoing  12/2/2020 1842 by Breana Thomas RN  Outcome: Ongoing     Problem: Safety:  Goal: Free from accidental physical injury  Description: Free from accidental physical injury  12/2/2020 2048 by Marsha Carrion RN  Outcome: Ongoing  12/2/2020 1842 by Breana Thomas RN  Outcome: Ongoing  Goal: Free from intentional harm  Description: Free from intentional harm  12/2/2020 2048 by Marsha Carrion RN  Outcome: Ongoing  12/2/2020 1842 by Breana Thomas RN  Outcome: Ongoing     Problem: Daily Care:  Goal: Daily care needs are met  Description: Daily care needs are met  12/2/2020 2048 by Marsha Carrion RN  Outcome: Ongoing  12/2/2020 1842 by Peter Harrison Cruzito Zarco RN  Outcome: Ongoing     Problem: Pain:  Description: Pain management should include both nonpharmacologic and pharmacologic interventions.   Goal: Patient's pain/discomfort is manageable  Description: Patient's pain/discomfort is manageable  12/2/2020 2048 by Parvin Can RN  Outcome: Ongoing  12/2/2020 1842 by Nancy Torres RN  Outcome: Ongoing  Goal: Pain level will decrease  Description: Pain level will decrease  12/2/2020 2048 by Parvin Can RN  Outcome: Ongoing  12/2/2020 1842 by Nancy Torres RN  Outcome: Ongoing  Goal: Control of acute pain  Description: Control of acute pain  12/2/2020 2048 by Parvin Can RN  Outcome: Ongoing  12/2/2020 1842 by Nancy Torres RN  Outcome: Ongoing  Goal: Control of chronic pain  Description: Control of chronic pain  12/2/2020 2048 by Parvin Can RN  Outcome: Ongoing  12/2/2020 1842 by Nancy Torres RN  Outcome: Ongoing     Problem: Discharge Planning:  Goal: Patients continuum of care needs are met  Description: Patients continuum of care needs are met  12/2/2020 2048 by Parvin Can RN  Outcome: Ongoing  12/2/2020 1842 by Nancy Torres RN  Outcome: Ongoing     Problem: Gas Exchange - Impaired:  Goal: Levels of oxygenation will improve  Description: Levels of oxygenation will improve  12/2/2020 2048 by Parvin Can RN  Outcome: Ongoing  12/2/2020 1842 by Nancy Torres RN  Outcome: Ongoing     Problem: Discharge Planning:  Goal: Discharged to appropriate level of care  Description: Discharged to appropriate level of care  12/2/2020 2048 by Parvin Can RN  Outcome: Ongoing  12/2/2020 1842 by Nancy Torres RN  Outcome: Ongoing

## 2020-12-03 NOTE — PROGRESS NOTES
[address]    Hospitalist Progress Note      Name:  Luma Ferrara /Age/Sex: 1955  (63 y.o. male)   MRN & CSN:  [id number] & [id number] Admission Date/Time: 2020 10:19 AM   Location:  -A PCP: No primary care provider on file.          Hospital Day: 10    Assessment and Plan:   Luma Ferrara is a 63 y.o.  male  who presents with sepsis    Sepsis due to C-diff     C. difficile PCR positive - rectal tube in place - less diarrhea   Continue p.o. vancomycin via PEG    Chronic respiratory fialure on Trach  Pseudomonas pneumonia     Respiratory culture from  grew pseudomonas - sensitive to cefepime  Sputum culture grew ESBL E. Coli-  was on meropenem for 6 days  Continue to monitor blood cultures which were no growth to date  Continue with IV Cefepime start date 12/3  Pulmonary service following    Tracheostomy malfunction -resolved after trach exchange    ENT input appreciated    Electrolyte imbalances -monitor and replace per protocol     HypoK -resolved  HypoPhos - replaced  HyperNa - on d5+0.45 with 20meqK - monitor closely      Chronic medical conditions:      Stage III sacrum and Unstageable left HIP, right and left feet - wound care  Hx of Polysubstance use disoreder   Hep C, chronic  Severe PEM - BMI 17 - consult Nutrition     Palliative care/hospice consulted for code status goal of care discussion     Diet DIET TUBE FEED CONTINUOUS/CYCLIC NPO; Semi-elemental (Vital AF 1.2 Waldo); Gastrostomy; Continuous; 20; 60; 24   DVT Prophylaxis [] Lovenox, []  Heparin, [] SCDs, []No VTE prophylaxis, patient ambulating   GI Prophylaxis [] PPI, [] H2 Blocker, [] No GI prophylaxis, patient is receiving diet/Tube Feeds   Code Status Full Code   Disposition Patient requires continued admission due to sepsis   MDM [] Low, [] Moderate,[x]  High     History of Present Illness: Subjective     Patient Seen & Examined at the bedside      Patient is on trach and vent -nonverbal but able to respond with shaking chloride flush, 10 mL, PRN  acetaminophen, 650 mg, Q6H PRN    Or  acetaminophen, 650 mg, Q6H PRN  polyethylene glycol, 17 g, Daily PRN  promethazine, 12.5 mg, Q6H PRN    Or  ondansetron, 4 mg, Q6H PRN  oxyCODONE, 5 mg, Q6H PRN  HYDROmorphone, 1 mg, Daily PRN  magnesium sulfate, 1 g, PRN          Electronically signed by Seema Urrutia MD on 12/3/2020 at 12:36 PM

## 2020-12-03 NOTE — PLAN OF CARE
Problem: Tissue Perfusion:  Goal: Peripheral tissue perfusion will improve  Description: Peripheral tissue perfusion will improve  Outcome: Ongoing     Problem: Falls - Risk of:  Goal: Will remain free from falls  Description: Will remain free from falls  Outcome: Ongoing  Goal: Absence of physical injury  Description: Absence of physical injury  Outcome: Ongoing     Problem: Skin Integrity:  Goal: Will show no infection signs and symptoms  Description: Will show no infection signs and symptoms  Outcome: Ongoing  Goal: Absence of new skin breakdown  Description: Absence of new skin breakdown  Outcome: Ongoing     Problem: Infection:  Goal: Will remain free from infection  Description: Will remain free from infection  Outcome: Ongoing     Problem: Skin Integrity:  Goal: Will show no infection signs and symptoms  Description: Will show no infection signs and symptoms  Outcome: Ongoing  Goal: Absence of new skin breakdown  Description: Absence of new skin breakdown  Outcome: Ongoing     Problem: Infection:  Goal: Will remain free from infection  Description: Will remain free from infection  Outcome: Ongoing     Problem: Safety:  Goal: Free from accidental physical injury  Description: Free from accidental physical injury  Outcome: Ongoing  Goal: Free from intentional harm  Description: Free from intentional harm  Outcome: Ongoing     Problem: Daily Care:  Goal: Daily care needs are met  Description: Daily care needs are met  Outcome: Ongoing     Problem: Pain:  Goal: Patient's pain/discomfort is manageable  Description: Patient's pain/discomfort is manageable  Outcome: Ongoing  Goal: Pain level will decrease  Description: Pain level will decrease  Outcome: Ongoing  Goal: Control of acute pain  Description: Control of acute pain  Outcome: Ongoing  Goal: Control of chronic pain  Description: Control of chronic pain  Outcome: Ongoing     Problem: Discharge Planning:  Goal: Patients continuum of care needs are met  Description: Patients continuum of care needs are met  Outcome: Ongoing     Problem: Gas Exchange - Impaired:  Goal: Levels of oxygenation will improve  Description: Levels of oxygenation will improve  Outcome: Ongoing     Problem: Discharge Planning:  Goal: Discharged to appropriate level of care  Description: Discharged to appropriate level of care  Outcome: Ongoing

## 2020-12-03 NOTE — PROGRESS NOTES
Pulmonary and Critical Care  Progress Note    Subjective: The patient is better. A/B changed based on C/S. Shortness of breath none. Chest pain none  Addressing respiratory complaints Patient is negative for  hemoptysis and cyanosis  CONSTITUTIONAL:  negative for fevers and chills      Past Medical History:     has a past medical history of Acute embolism and thrombosis of deep veins of left upper extremity ([address] Acute kidney failure with tubular necrosis ([address] Anemia, unspecified, Anxiety, Aphonia, Bacteremia, Drug abuse and dependence ([address] Dysphagia, Gastrostomy status ([address] Hemoperitoneum, Hepatitis C without hepatic coma, Hypertension, Idiopathic hypotension, Malignant neoplasm of stomach, unspecified ([address] Metabolic encephalopathy, Methicillin susceptible Staphylococcus aureus infection as the cause of diseases classified elsewhere, Muscle weakness, Osteomyelitis ([address] Thrombocytopenia, unspecified ([address] Tracheostomy status ([address] and Unspecified severe protein-calorie malnutrition ([address] has a past surgical history that includes back surgery; IR NONTUNNELED VASCULAR CATHETER > 5 YEARS (7/26/2020); laparotomy (N/A, 7/25/2020); tracheostomy (N/A, 8/10/2020); Gastrostomy tube placement (N/A, 8/10/2020); and IR TUNNELED CVC PLACE WO SQ PORT/PUMP > 5 YEARS (8/12/2020). reports that he has quit smoking. His smoking use included cigarettes. He smoked 1.50 packs per day. He does not have any smokeless tobacco history on file. He reports that he does not drink alcohol or use drugs. Family history:  family history is not on file.     No Known Allergies  Social History:    Reviewed; no changes    Objective:   PHYSICAL EXAM:        VITALS:  BP (!) 130/98   Pulse 95   Temp 98.1 °F (36.7 °C) (Rectal)   Resp 11   Ht 5' 7.01\" (1.702 m)   Wt 114 lb 10.2 oz (52 kg)   SpO2 100%   BMI 17.95 kg/m²     24HR INTAKE/OUTPUT:      Intake/Output Summary (Last 24 hours) at 12/3/2020 1149  Last data filed at 12/3/2020 0934  Gross per 24 hour   Intake 2636.82 ml   Output 2525 ml   Net 111.82 ml       CONSTITUTIONAL:  Awake. LUNGS:  decreased breath sounds, basilar crackles. CARDIOVASCULAR:  normal S1 and S2 and negative JVD  ABD:Abdomen soft, non-tender. BS normal. No masses,  No organomegaly. DATA:    CBC:  Recent Labs     12/01/20  0600 12/02/20  0410 12/03/20  0405   WBC 16.5* 15.3* 16.0*   RBC 3.48* 3.60* 3.44*   HGB 9.8* 9.6* 9.4*   HCT 30.5* 32.3* 30.7*   * 479* 465*   MCV 87.6 89.7 89.2   MCH 28.2 26.7* 27.3   MCHC 32.1 29.7* 30.6*   RDW 17.5* 17.9* 17.9*      BMP:  Recent Labs     12/01/20  0600 12/02/20  0410 12/03/20  0405   * 145 143   K 3.6 4.1 4.3    108 108   CO2 28 28 26   BUN 36* 40* 36*   CREATININE 0.3* 0.3* 0.3*   CALCIUM 9.2 9.4 9.2   GLUCOSE 139* 119* 110*      ABG:  Recent Labs     12/01/20  0800 12/02/20  0800 12/03/20  0800   PH 7.55* 7.56* 7.48*   PO2ART 81 95 111*   THH1LZD 33.0 30.0* 34.0   O2SAT 95.1* 96.2 95.4*     Lab Results   Component Value Date    PROBNP 530.3 (H) 11/26/2020    PROBNP 742.6 (H) 11/24/2020    PROBNP 11,142 (H) 07/24/2020     No results found for: CULTRESP    Radiology Review:  Pertinent images / reports were reviewed as a part of this visit. Assessment:     Patient Active Problem List   Diagnosis    [facility]    Leukocytosis    Ischemic bowel disease ([facility]    MSSA bacteremia    Chronic hepatitis C without hepatic coma ([facility]    Respiratory failure ([facility]    HCAP (healthcare-associated pneumonia)       Plan:   1. Overall the patient has improved. 2. Cont present vent settings.   Jeneal Nap MD  12/3/2020  11:49 AM

## 2020-12-04 LAB
ANION GAP SERPL CALCULATED.3IONS-SCNC: 10 MMOL/L (ref 4–16)
BASE EXCESS MIXED: 3 (ref 0–1.2)
BUN BLDV-MCNC: 40 MG/DL (ref 6–23)
CALCIUM SERPL-MCNC: 9.4 MG/DL (ref 8.3–10.6)
CARBON MONOXIDE, BLOOD: 2.5 % (ref 0–5)
CHLORIDE BLD-SCNC: 103 MMOL/L (ref 99–110)
CO2 CONTENT: 26.5 MMOL/L (ref 19–24)
CO2: 25 MMOL/L (ref 21–32)
COMMENT: ABNORMAL
CREAT SERPL-MCNC: 0.4 MG/DL (ref 0.9–1.3)
GFR AFRICAN AMERICAN: >60 ML/MIN/1.73M2
GFR NON-AFRICAN AMERICAN: >60 ML/MIN/1.73M2
GLUCOSE BLD-MCNC: 130 MG/DL (ref 70–99)
HCO3 ARTERIAL: 25.5 MMOL/L (ref 18–23)
HCT VFR BLD CALC: 32.7 % (ref 42–52)
HEMOGLOBIN: 10.2 GM/DL (ref 13.5–18)
MCH RBC QN AUTO: 27.9 PG (ref 27–31)
MCHC RBC AUTO-ENTMCNC: 31.2 % (ref 32–36)
MCV RBC AUTO: 89.3 FL (ref 78–100)
METHEMOGLOBIN ARTERIAL: 1.3 %
O2 SATURATION: 92.1 % (ref 96–97)
PCO2 ARTERIAL: 32 MMHG (ref 32–45)
PDW BLD-RTO: 18.5 % (ref 11.7–14.9)
PH BLOOD: 7.51 (ref 7.34–7.45)
PLATELET # BLD: 497 K/CU MM (ref 140–440)
PMV BLD AUTO: 10 FL (ref 7.5–11.1)
PO2 ARTERIAL: 64 MMHG (ref 75–100)
POTASSIUM SERPL-SCNC: 3.8 MMOL/L (ref 3.5–5.1)
RBC # BLD: 3.66 M/CU MM (ref 4.6–6.2)
SODIUM BLD-SCNC: 138 MMOL/L (ref 135–145)
WBC # BLD: 22.2 K/CU MM (ref 4–10.5)

## 2020-12-04 PROCEDURE — 94640 AIRWAY INHALATION TREATMENT: CPT

## 2020-12-04 PROCEDURE — 6370000000 HC RX 637 (ALT 250 FOR IP): Performed by: INTERNAL MEDICINE

## 2020-12-04 PROCEDURE — 6370000000 HC RX 637 (ALT 250 FOR IP): Performed by: FAMILY MEDICINE

## 2020-12-04 PROCEDURE — 2000000000 HC ICU R&B

## 2020-12-04 PROCEDURE — 94750 HC PULMONARY COMPLIANCE STUDY: CPT

## 2020-12-04 PROCEDURE — 85027 COMPLETE CBC AUTOMATED: CPT

## 2020-12-04 PROCEDURE — 2700000000 HC OXYGEN THERAPY PER DAY

## 2020-12-04 PROCEDURE — C9113 INJ PANTOPRAZOLE SODIUM, VIA: HCPCS | Performed by: FAMILY MEDICINE

## 2020-12-04 PROCEDURE — 94003 VENT MGMT INPAT SUBQ DAY: CPT

## 2020-12-04 PROCEDURE — 6360000002 HC RX W HCPCS: Performed by: INTERNAL MEDICINE

## 2020-12-04 PROCEDURE — 2580000003 HC RX 258: Performed by: FAMILY MEDICINE

## 2020-12-04 PROCEDURE — 6360000002 HC RX W HCPCS: Performed by: FAMILY MEDICINE

## 2020-12-04 PROCEDURE — 94761 N-INVAS EAR/PLS OXIMETRY MLT: CPT

## 2020-12-04 PROCEDURE — 80048 BASIC METABOLIC PNL TOTAL CA: CPT

## 2020-12-04 PROCEDURE — 89220 SPUTUM SPECIMEN COLLECTION: CPT

## 2020-12-04 PROCEDURE — 82803 BLOOD GASES ANY COMBINATION: CPT

## 2020-12-04 PROCEDURE — 2580000003 HC RX 258: Performed by: INTERNAL MEDICINE

## 2020-12-04 RX ADMIN — HYDROXYZINE HYDROCHLORIDE 25 MG: 25 TABLET, FILM COATED ORAL at 17:08

## 2020-12-04 RX ADMIN — Medication 8000 UNITS: at 12:52

## 2020-12-04 RX ADMIN — ALBUTEROL SULFATE 4 PUFF: 90 AEROSOL, METERED RESPIRATORY (INHALATION) at 05:14

## 2020-12-04 RX ADMIN — MAGNESIUM OXIDE 400 MG (241.3 MG MAGNESIUM) TABLET 400 MG: TABLET at 09:26

## 2020-12-04 RX ADMIN — GABAPENTIN 300 MG: 300 CAPSULE ORAL at 12:51

## 2020-12-04 RX ADMIN — GABAPENTIN 300 MG: 300 CAPSULE ORAL at 08:35

## 2020-12-04 RX ADMIN — SODIUM CHLORIDE, PRESERVATIVE FREE 10 ML: 5 INJECTION INTRAVENOUS at 21:19

## 2020-12-04 RX ADMIN — IPRATROPIUM BROMIDE 4 PUFF: 17 AEROSOL, METERED RESPIRATORY (INHALATION) at 11:23

## 2020-12-04 RX ADMIN — HYDROXYZINE HYDROCHLORIDE 25 MG: 25 TABLET, FILM COATED ORAL at 09:26

## 2020-12-04 RX ADMIN — CLONAZEPAM 1 MG: 1 TABLET ORAL at 08:36

## 2020-12-04 RX ADMIN — DULOXETINE HYDROCHLORIDE 60 MG: 30 CAPSULE, DELAYED RELEASE ORAL at 08:35

## 2020-12-04 RX ADMIN — Medication 125 MG: at 17:08

## 2020-12-04 RX ADMIN — CHLORHEXIDINE GLUCONATE 0.12% ORAL RINSE 15 ML: 1.2 LIQUID ORAL at 08:36

## 2020-12-04 RX ADMIN — ALBUTEROL SULFATE 4 PUFF: 90 AEROSOL, METERED RESPIRATORY (INHALATION) at 00:47

## 2020-12-04 RX ADMIN — NYSTATIN 500000 UNITS: 100000 SUSPENSION ORAL at 12:51

## 2020-12-04 RX ADMIN — CLONAZEPAM 1 MG: 1 TABLET ORAL at 21:13

## 2020-12-04 RX ADMIN — NYSTATIN 500000 UNITS: 100000 SUSPENSION ORAL at 21:13

## 2020-12-04 RX ADMIN — IPRATROPIUM BROMIDE 4 PUFF: 17 AEROSOL, METERED RESPIRATORY (INHALATION) at 00:47

## 2020-12-04 RX ADMIN — METHYLPREDNISOLONE SODIUM SUCCINATE 40 MG: 40 INJECTION, POWDER, FOR SOLUTION INTRAMUSCULAR; INTRAVENOUS at 08:35

## 2020-12-04 RX ADMIN — IPRATROPIUM BROMIDE 4 PUFF: 17 AEROSOL, METERED RESPIRATORY (INHALATION) at 05:14

## 2020-12-04 RX ADMIN — ALBUTEROL SULFATE 4 PUFF: 90 AEROSOL, METERED RESPIRATORY (INHALATION) at 07:52

## 2020-12-04 RX ADMIN — SODIUM CHLORIDE, PRESERVATIVE FREE 10 ML: 5 INJECTION INTRAVENOUS at 08:37

## 2020-12-04 RX ADMIN — OXYCODONE HYDROCHLORIDE 5 MG: 5 TABLET ORAL at 05:27

## 2020-12-04 RX ADMIN — Medication 125 MG: at 06:55

## 2020-12-04 RX ADMIN — BUMETANIDE 0.5 MG: 1 TABLET ORAL at 21:13

## 2020-12-04 RX ADMIN — IPRATROPIUM BROMIDE 4 PUFF: 17 AEROSOL, METERED RESPIRATORY (INHALATION) at 07:52

## 2020-12-04 RX ADMIN — GABAPENTIN 300 MG: 300 CAPSULE ORAL at 21:13

## 2020-12-04 RX ADMIN — ALBUTEROL SULFATE 4 PUFF: 90 AEROSOL, METERED RESPIRATORY (INHALATION) at 16:09

## 2020-12-04 RX ADMIN — CEFEPIME HYDROCHLORIDE 2 G: 2 INJECTION, POWDER, FOR SOLUTION INTRAVENOUS at 08:46

## 2020-12-04 RX ADMIN — CLONAZEPAM 1 MG: 1 TABLET ORAL at 12:51

## 2020-12-04 RX ADMIN — COLLAGENASE SANTYL: 250 OINTMENT TOPICAL at 08:37

## 2020-12-04 RX ADMIN — ALBUTEROL SULFATE 4 PUFF: 90 AEROSOL, METERED RESPIRATORY (INHALATION) at 20:50

## 2020-12-04 RX ADMIN — OXYCODONE HYDROCHLORIDE 5 MG: 5 TABLET ORAL at 19:40

## 2020-12-04 RX ADMIN — ALBUTEROL SULFATE 4 PUFF: 90 AEROSOL, METERED RESPIRATORY (INHALATION) at 11:24

## 2020-12-04 RX ADMIN — BUMETANIDE 0.5 MG: 1 TABLET ORAL at 09:26

## 2020-12-04 RX ADMIN — NYSTATIN 500000 UNITS: 100000 SUSPENSION ORAL at 08:35

## 2020-12-04 RX ADMIN — Medication 125 MG: at 01:25

## 2020-12-04 RX ADMIN — IPRATROPIUM BROMIDE 4 PUFF: 17 AEROSOL, METERED RESPIRATORY (INHALATION) at 16:09

## 2020-12-04 RX ADMIN — LORAZEPAM 0.5 MG: 2 INJECTION INTRAMUSCULAR; INTRAVENOUS at 05:27

## 2020-12-04 RX ADMIN — PANTOPRAZOLE SODIUM 40 MG: 40 INJECTION, POWDER, LYOPHILIZED, FOR SOLUTION INTRAVENOUS at 08:36

## 2020-12-04 RX ADMIN — HYDROXYZINE HYDROCHLORIDE 25 MG: 25 TABLET, FILM COATED ORAL at 22:44

## 2020-12-04 RX ADMIN — CEFEPIME HYDROCHLORIDE 2 G: 2 INJECTION, POWDER, FOR SOLUTION INTRAVENOUS at 22:45

## 2020-12-04 RX ADMIN — NYSTATIN 500000 UNITS: 100000 SUSPENSION ORAL at 17:08

## 2020-12-04 RX ADMIN — Medication 125 MG: at 12:52

## 2020-12-04 RX ADMIN — ENOXAPARIN SODIUM 40 MG: 40 INJECTION SUBCUTANEOUS at 08:35

## 2020-12-04 RX ADMIN — CHLORHEXIDINE GLUCONATE 0.12% ORAL RINSE 15 ML: 1.2 LIQUID ORAL at 21:19

## 2020-12-04 RX ADMIN — IPRATROPIUM BROMIDE 4 PUFF: 17 AEROSOL, METERED RESPIRATORY (INHALATION) at 20:51

## 2020-12-04 ASSESSMENT — PAIN DESCRIPTION - ONSET: ONSET: ON-GOING

## 2020-12-04 ASSESSMENT — PAIN DESCRIPTION - LOCATION: LOCATION: BUTTOCKS

## 2020-12-04 ASSESSMENT — PAIN SCALES - WONG BAKER
WONGBAKER_NUMERICALRESPONSE: 0
WONGBAKER_NUMERICALRESPONSE: 0

## 2020-12-04 ASSESSMENT — PAIN SCALES - GENERAL
PAINLEVEL_OUTOF10: 2
PAINLEVEL_OUTOF10: 0
PAINLEVEL_OUTOF10: 6
PAINLEVEL_OUTOF10: 6
PAINLEVEL_OUTOF10: 0
PAINLEVEL_OUTOF10: 5

## 2020-12-04 ASSESSMENT — PULMONARY FUNCTION TESTS
PIF_VALUE: 30

## 2020-12-04 ASSESSMENT — PAIN DESCRIPTION - PAIN TYPE: TYPE: CHRONIC PAIN

## 2020-12-04 ASSESSMENT — PAIN DESCRIPTION - FREQUENCY: FREQUENCY: CONTINUOUS

## 2020-12-04 NOTE — PROGRESS NOTES
[address]    Hospitalist Progress Note      Name:  Karley Licona /Age/Sex: 1955  (63 y.o. male)   MRN & CSN:  [id number] & [id number] Admission Date/Time: 2020 10:19 AM   Location:  -A PCP: No primary care provider on file.          Hospital Day: 11    Assessment and Plan:   Karley Licona is a 63 y.o.  male  who presents with sepsis    Sepsis due to C-diff     C. difficile PCR positive - rectal tube in place - less diarrhea   Continue p.o. vancomycin via PEG    Pseudomonas pneumonia   Chronic respiratory fialure on Trach    Respiratory culture from  grew pseudomonas - sensitive to cefepime  Sputum culture grew ESBL E. Coli-  was on meropenem for 6 days  Continue to monitor blood cultures which were no growth to date  WBC increasing 16>.22  Continue with IV Cefepime start date 12/3  Pulmonary service following    Tracheostomy malfunction -resolved after trach exchange    ENT input appreciated    Electrolyte imbalances -monitor and replace per protocol     HypoK -resolved  HypoPhos - replaced  HyperNa - on d5+0.45 with 20meqK - monitor closely      Chronic medical conditions:      Stage III sacrum and Unstageable left HIP, right and left feet - wound care  Hx of Polysubstance use disoreder   Hep C, chronic  Severe PEM - BMI 17 - consult Nutrition     Palliative care/hospice consulted for code status goal of care discussion     Diet DIET TUBE FEED CONTINUOUS/CYCLIC NPO; Semi-elemental (Vital AF 1.2 Waldo); Gastrostomy; Continuous; 20; 60; 24   DVT Prophylaxis [] Lovenox, []  Heparin, [] SCDs, []No VTE prophylaxis, patient ambulating   GI Prophylaxis [] PPI, [] H2 Blocker, [] No GI prophylaxis, patient is receiving diet/Tube Feeds   Code Status Full Code   Disposition Patient requires continued admission due to sepsis   MDM [] Low, [] Moderate,[x]  High     History of Present Illness: Subjective     Patient Seen & Examined at the bedside      Patient is on trach and vent -nonverbal but able to respond with shaking hands, nodding and mouthing words - signaling with hand etc     Ten point ROS not be reviewed due to patient's mental status     Objective: Intake/Output Summary (Last 24 hours) at 12/4/2020 0912  Last data filed at 12/4/2020 0702  Gross per 24 hour   Intake 1501 ml   Output 2850 ml   Net -1349 ml      Vitals:   Vitals:    12/04/20 0802   BP:    Pulse:    Resp: 15   Temp:    SpO2: 97%     Physical Exam:    GEN chronically sick looking and emaciated, awake but nonverbal male, resting in bed in no apparent distress. Appears given age. HENT tracheostomy in place > to Vent   RESP diffuse rhonchi with scattered wheezes  CARDIO/VASC -S1/S2 auscultated. Regular rate without appreciable murmurs, rubs, or gallops. Peripheral pulses equal bilaterally and palpable. No peripheral edema.   Severe loss of muscle mass     Medications:   Medications:    cefepime  2 g Intravenous Q12H    collagenase   Topical Daily    vancomycin  125 mg Per G Tube 4 times per day    methylPREDNISolone  40 mg Intravenous Daily    Ergocalciferol  8,000 Units Oral Daily    magnesium oxide  400 mg Oral Daily    scopolamine  1 patch Transdermal Q72H    pantoprazole  40 mg Intravenous Daily    nystatin  5 mL Oral 4x Daily    midodrine  10 mg Oral TID    sodium chloride flush  10 mL Intravenous 2 times per day    enoxaparin  40 mg Subcutaneous Daily    clonazePAM  1 mg Per G Tube TID    bumetanide  0.5 mg Per G Tube BID    DULoxetine  60 mg Per G Tube Daily    gabapentin  300 mg Per G Tube TID    hydrOXYzine  25 mg Per G Tube Q6H    albuterol sulfate HFA  4 puff Inhalation Q4H    And    ipratropium  4 puff Inhalation Q4H    chlorhexidine  15 mL Mouth/Throat BID      Infusions:     PRN Meds: LORazepam, 0.5 mg, Q6H PRN  potassium chloride, 40 mEq, PRN    Or  potassium alternative oral replacement, 40 mEq, PRN    Or  potassium chloride, 10 mEq, PRN  dicyclomine, 20 mg, Q6H PRN  sodium chloride flush, 10 mL, PRN  acetaminophen, 650 mg, Q6H PRN    Or  acetaminophen, 650 mg, Q6H PRN  polyethylene glycol, 17 g, Daily PRN  promethazine, 12.5 mg, Q6H PRN    Or  ondansetron, 4 mg, Q6H PRN  oxyCODONE, 5 mg, Q6H PRN  HYDROmorphone, 1 mg, Daily PRN  magnesium sulfate, 1 g, PRN          Electronically signed by Pepper Cain MD on 12/4/2020 at 9:12 AM

## 2020-12-04 NOTE — PROGRESS NOTES
Pulmonary and Critical Care  Progress Note    Subjective: The patient is comfortable in bed. Shortness of breath none. Chest pain none  Addressing respiratory complaints Patient is negative for  hemoptysis and cyanosis  CONSTITUTIONAL:  negative for fevers and chills      Past Medical History:     has a past medical history of Acute embolism and thrombosis of deep veins of left upper extremity ([address] Acute kidney failure with tubular necrosis ([address] Anemia, unspecified, Anxiety, Aphonia, Bacteremia, Drug abuse and dependence ([address] Dysphagia, Gastrostomy status ([address] Hemoperitoneum, Hepatitis C without hepatic coma, Hypertension, Idiopathic hypotension, Malignant neoplasm of stomach, unspecified ([address] Metabolic encephalopathy, Methicillin susceptible Staphylococcus aureus infection as the cause of diseases classified elsewhere, Muscle weakness, Osteomyelitis ([address] Thrombocytopenia, unspecified ([address] Tracheostomy status ([address] and Unspecified severe protein-calorie malnutrition ([address] has a past surgical history that includes back surgery; IR NONTUNNELED VASCULAR CATHETER > 5 YEARS (7/26/2020); laparotomy (N/A, 7/25/2020); tracheostomy (N/A, 8/10/2020); Gastrostomy tube placement (N/A, 8/10/2020); and IR TUNNELED CVC PLACE WO SQ PORT/PUMP > 5 YEARS (8/12/2020). reports that he has quit smoking. His smoking use included cigarettes. He smoked 1.50 packs per day. He does not have any smokeless tobacco history on file. He reports that he does not drink alcohol or use drugs. Family history:  family history is not on file.     No Known Allergies  Social History:    Reviewed; no changes    Objective:   PHYSICAL EXAM:        VITALS:  BP (!) 124/97   Pulse 90   Temp 99.1 °F (37.3 °C) (Rectal)   Resp 10   Ht 5' 7.01\" (1.702 m)   Wt 114 lb 10.2 oz (52 kg)   SpO2 99%   BMI 17.95 kg/m²     24HR INTAKE/OUTPUT:      Intake/Output Summary (Last 24 hours) at 12/4/2020 1211  Last data filed at 12/4/2020 9095  Gross per 24 hour   Intake 1431 ml   Output 2850 ml   Net -1419 ml       CONSTITUTIONAL:  Awake. LUNGS:  decreased breath sounds, basilar crackles. CARDIOVASCULAR:  normal S1 and S2 and negative JVD  ABD:Abdomen soft, non-tender. BS normal. No masses,  No organomegaly. DATA:    CBC:  Recent Labs     12/02/20  0410 12/03/20  0405 12/04/20  0900   WBC 15.3* 16.0* 22.2*   RBC 3.60* 3.44* 3.66*   HGB 9.6* 9.4* 10.2*   HCT 32.3* 30.7* 32.7*   * 465* 497*   MCV 89.7 89.2 89.3   MCH 26.7* 27.3 27.9   MCHC 29.7* 30.6* 31.2*   RDW 17.9* 17.9* 18.5*      BMP:  Recent Labs     12/02/20  0410 12/03/20  0405 12/04/20  0900    143 138   K 4.1 4.3 3.8    108 103   CO2 28 26 25   BUN 40* 36* 40*   CREATININE 0.3* 0.3* 0.4*   CALCIUM 9.4 9.2 9.4   GLUCOSE 119* 110* 130*      ABG:  Recent Labs     12/02/20  0800 12/03/20  0800 12/04/20  0800   PH 7.56* 7.48* 7.51*   PO2ART 95 111* 64*   UTO0BPN 30.0* 34.0 32.0   O2SAT 96.2 95.4* 92.1*     Lab Results   Component Value Date    PROBNP 530.3 (H) 11/26/2020    PROBNP 742.6 (H) 11/24/2020    PROBNP 11,142 (H) 07/24/2020     No results found for: CULTRESP    Radiology Review:  Pertinent images / reports were reviewed as a part of this visit. Assessment:     Patient Active Problem List   Diagnosis    Sepsis ([address]    Leukocytosis    Ischemic bowel disease ([address]    MSSA bacteremia    Chronic hepatitis C without hepatic coma ([address]    Respiratory failure ([address]    HCAP (healthcare-associated pneumonia)       Plan:   1. Overall the patient has improved. 2. Cont present vent settings.   Beatris Vega MD  12/4/2020  12:11 PM

## 2020-12-04 NOTE — PROGRESS NOTES
12/04/20 0510   Vent Information   Vent Type 980   Vent Mode AC/PC   Vt Ordered 0 mL   Pressure Ordered 25   Rate Set 10 bmp   Peak Flow 0 L/min   Pressure Support 0 cmH20   FiO2  35 %   SpO2 98 %   SpO2/FiO2 ratio 280   Sensitivity 3   PEEP/CPAP 5   I Time/ I Time % 1 s   Humidification Source HME   Vent Patient Data   High Peep/I Pressure 25   Peak Inspiratory Pressure 30 cmH2O   Mean Airway Pressure 11 cmH20   Rate Measured 14 br/min   Vt Exhaled 454 mL   Minute Volume 6.68 Liters   I:E Ratio 1:3.10   Cough/Sputum   Sputum How Obtained Suctioned;Tracheal   $Obtained Sample $Induced Sputum   Cough Productive   Sputum Amount Small   Sputum Color Creamy   Tenacity Thick   Spontaneous Breathing Trial (SBT) RT Doc   Pulse 101   Additional Respiratory  Assessments   Resp 15   Alarm Settings   High Pressure Alarm 40 cmH2O   Delay Alarm 20 sec(s)   Low Minute Volume Alarm 2.5 L/min   Apnea (secs) 20 secs   High Respiratory Rate 40 br/min   Low Exhaled Vt  250 mL

## 2020-12-04 NOTE — PROGRESS NOTES
Comprehensive Nutrition Assessment    Type and Reason for Visit:  Reassess    Nutrition Recommendations/Plan:   · Continue EN as ordered    Nutrition Assessment:  Pt continues on EN at goal and is tolerating the formula. Please continue current EN as ordered    Malnutrition Assessment:  Malnutrition Status:  Insufficient data    Context:  (Pt appears malnourished, receiving care during visit)       Estimated Daily Nutrient Needs:  Energy (kcal):  8467-8526 (25-30 kcal/kg IBW); Weight Used for Energy Requirements:  Ideal     Protein (g):   (1.2-1.5 g/kg IBW); Weight Used for Protein Requirements:  Ideal        Fluid (ml/day):  4606-1003 (1 ml/kcal);  Method Used for Fluid Requirements:  1 ml/kcal      Wounds:  Multiple(cluster wounds noted)       Current Nutrition Therapies:    Current Tube Feeding (TF) Orders:  · Feeding Route: PEG  · Formula: Semi-Elemental(Vital AF 1.2)  · Schedule: Continuous   · Current TF & Flush Orders Provides: 1728 kcal and 108 g protein    Anthropometric Measures:  · Height: 5' 7.01\" (170.2 cm)  · Current Body Weight: 114 lb (51.7 kg)   · Admission Body Weight: 109 lb (49.4 kg)    · Usual Body Weight: 147 lb 0.8 oz (66.7 kg)(7/24/20)     · Ideal Body Weight: 148 lbs; % Ideal Body Weight 73.9 %   · BMI: 17.9   · BMI Categories: Underweight (BMI less than 22) age over 72       Nutrition Diagnosis:   · Inadequate oral intake related to swallowing difficulty as evidenced by nutrition support - enteral nutrition      Nutrition Interventions:   Food and/or Nutrient Delivery:  Continue Current Tube Feeding  Nutrition Education/Counseling:  No recommendation at this time   Coordination of Nutrition Care:  Continue to monitor while inpatient    Goals:  pt will meet greater than 75% of estimated nutrient needs via EN       Nutrition Monitoring and Evaluation:   Behavioral-Environmental Outcomes:  None Identified   Food/Nutrient Intake Outcomes:  Enteral Nutrition Intake/Tolerance  Physical Signs/Symptoms Outcomes:  Biochemical Data, Weight, GI Status, Hemodynamic Status, Fluid Status or Edema     Discharge Planning:     Too soon to determine     Electronically signed by Kayla Figueroa RD, LD on 83/4/86 at 6:23 PM EST    Contact: [phone]

## 2020-12-05 LAB
ANION GAP SERPL CALCULATED.3IONS-SCNC: 11 MMOL/L (ref 4–16)
BASE EXCESS MIXED: 2.4 (ref 0–1.2)
BUN BLDV-MCNC: 47 MG/DL (ref 6–23)
CALCIUM SERPL-MCNC: 9.4 MG/DL (ref 8.3–10.6)
CARBON MONOXIDE, BLOOD: 2.2 % (ref 0–5)
CHLORIDE BLD-SCNC: 105 MMOL/L (ref 99–110)
CO2 CONTENT: 25.4 MMOL/L (ref 19–24)
CO2: 26 MMOL/L (ref 21–32)
COMMENT: ABNORMAL
CREAT SERPL-MCNC: 0.4 MG/DL (ref 0.9–1.3)
GFR AFRICAN AMERICAN: >60 ML/MIN/1.73M2
GFR NON-AFRICAN AMERICAN: >60 ML/MIN/1.73M2
GLUCOSE BLD-MCNC: 133 MG/DL (ref 70–99)
HCO3 ARTERIAL: 24.5 MMOL/L (ref 18–23)
HCT VFR BLD CALC: 34.2 % (ref 42–52)
HEMOGLOBIN: 10.1 GM/DL (ref 13.5–18)
MCH RBC QN AUTO: 26.7 PG (ref 27–31)
MCHC RBC AUTO-ENTMCNC: 29.5 % (ref 32–36)
MCV RBC AUTO: 90.5 FL (ref 78–100)
METHEMOGLOBIN ARTERIAL: 1 %
O2 SATURATION: 96.6 % (ref 96–97)
PCO2 ARTERIAL: 30 MMHG (ref 32–45)
PDW BLD-RTO: 18.6 % (ref 11.7–14.9)
PH BLOOD: 7.52 (ref 7.34–7.45)
PLATELET # BLD: 466 K/CU MM (ref 140–440)
PMV BLD AUTO: 9.6 FL (ref 7.5–11.1)
PO2 ARTERIAL: 110 MMHG (ref 75–100)
POTASSIUM SERPL-SCNC: 4.1 MMOL/L (ref 3.5–5.1)
RBC # BLD: 3.78 M/CU MM (ref 4.6–6.2)
SODIUM BLD-SCNC: 142 MMOL/L (ref 135–145)
WBC # BLD: 17.6 K/CU MM (ref 4–10.5)

## 2020-12-05 PROCEDURE — 2700000000 HC OXYGEN THERAPY PER DAY

## 2020-12-05 PROCEDURE — 80048 BASIC METABOLIC PNL TOTAL CA: CPT

## 2020-12-05 PROCEDURE — 85027 COMPLETE CBC AUTOMATED: CPT

## 2020-12-05 PROCEDURE — 6370000000 HC RX 637 (ALT 250 FOR IP): Performed by: INTERNAL MEDICINE

## 2020-12-05 PROCEDURE — C9113 INJ PANTOPRAZOLE SODIUM, VIA: HCPCS | Performed by: FAMILY MEDICINE

## 2020-12-05 PROCEDURE — 2580000003 HC RX 258: Performed by: INTERNAL MEDICINE

## 2020-12-05 PROCEDURE — 82803 BLOOD GASES ANY COMBINATION: CPT

## 2020-12-05 PROCEDURE — 94003 VENT MGMT INPAT SUBQ DAY: CPT

## 2020-12-05 PROCEDURE — 2580000003 HC RX 258: Performed by: FAMILY MEDICINE

## 2020-12-05 PROCEDURE — 6370000000 HC RX 637 (ALT 250 FOR IP): Performed by: FAMILY MEDICINE

## 2020-12-05 PROCEDURE — 6360000002 HC RX W HCPCS: Performed by: INTERNAL MEDICINE

## 2020-12-05 PROCEDURE — 6360000002 HC RX W HCPCS: Performed by: FAMILY MEDICINE

## 2020-12-05 PROCEDURE — 94761 N-INVAS EAR/PLS OXIMETRY MLT: CPT

## 2020-12-05 PROCEDURE — 36600 WITHDRAWAL OF ARTERIAL BLOOD: CPT

## 2020-12-05 PROCEDURE — 89220 SPUTUM SPECIMEN COLLECTION: CPT

## 2020-12-05 PROCEDURE — 2000000000 HC ICU R&B

## 2020-12-05 PROCEDURE — 94640 AIRWAY INHALATION TREATMENT: CPT

## 2020-12-05 RX ADMIN — GABAPENTIN 300 MG: 300 CAPSULE ORAL at 12:50

## 2020-12-05 RX ADMIN — MIDODRINE HYDROCHLORIDE 10 MG: 5 TABLET ORAL at 12:51

## 2020-12-05 RX ADMIN — CLONAZEPAM 1 MG: 1 TABLET ORAL at 08:03

## 2020-12-05 RX ADMIN — ALBUTEROL SULFATE 4 PUFF: 90 AEROSOL, METERED RESPIRATORY (INHALATION) at 08:40

## 2020-12-05 RX ADMIN — IPRATROPIUM BROMIDE 4 PUFF: 17 AEROSOL, METERED RESPIRATORY (INHALATION) at 08:40

## 2020-12-05 RX ADMIN — CHLORHEXIDINE GLUCONATE 0.12% ORAL RINSE 15 ML: 1.2 LIQUID ORAL at 20:21

## 2020-12-05 RX ADMIN — Medication 8000 UNITS: at 12:50

## 2020-12-05 RX ADMIN — CHLORHEXIDINE GLUCONATE 0.12% ORAL RINSE 15 ML: 1.2 LIQUID ORAL at 08:04

## 2020-12-05 RX ADMIN — HYDROXYZINE HYDROCHLORIDE 25 MG: 25 TABLET, FILM COATED ORAL at 21:48

## 2020-12-05 RX ADMIN — NYSTATIN 500000 UNITS: 100000 SUSPENSION ORAL at 17:58

## 2020-12-05 RX ADMIN — HYDROXYZINE HYDROCHLORIDE 25 MG: 25 TABLET, FILM COATED ORAL at 06:03

## 2020-12-05 RX ADMIN — BUMETANIDE 0.5 MG: 1 TABLET ORAL at 09:06

## 2020-12-05 RX ADMIN — IPRATROPIUM BROMIDE 4 PUFF: 17 AEROSOL, METERED RESPIRATORY (INHALATION) at 04:38

## 2020-12-05 RX ADMIN — GABAPENTIN 300 MG: 300 CAPSULE ORAL at 08:09

## 2020-12-05 RX ADMIN — ALBUTEROL SULFATE 4 PUFF: 90 AEROSOL, METERED RESPIRATORY (INHALATION) at 12:01

## 2020-12-05 RX ADMIN — MIDODRINE HYDROCHLORIDE 10 MG: 5 TABLET ORAL at 17:58

## 2020-12-05 RX ADMIN — PANTOPRAZOLE SODIUM 40 MG: 40 INJECTION, POWDER, LYOPHILIZED, FOR SOLUTION INTRAVENOUS at 08:02

## 2020-12-05 RX ADMIN — ALBUTEROL SULFATE 4 PUFF: 90 AEROSOL, METERED RESPIRATORY (INHALATION) at 20:38

## 2020-12-05 RX ADMIN — Medication 125 MG: at 00:33

## 2020-12-05 RX ADMIN — COLLAGENASE SANTYL: 250 OINTMENT TOPICAL at 08:04

## 2020-12-05 RX ADMIN — NYSTATIN 500000 UNITS: 100000 SUSPENSION ORAL at 20:22

## 2020-12-05 RX ADMIN — HYDROXYZINE HYDROCHLORIDE 25 MG: 25 TABLET, FILM COATED ORAL at 09:06

## 2020-12-05 RX ADMIN — IPRATROPIUM BROMIDE 4 PUFF: 17 AEROSOL, METERED RESPIRATORY (INHALATION) at 20:38

## 2020-12-05 RX ADMIN — GABAPENTIN 300 MG: 300 CAPSULE ORAL at 20:22

## 2020-12-05 RX ADMIN — Medication 125 MG: at 17:57

## 2020-12-05 RX ADMIN — Medication 125 MG: at 06:04

## 2020-12-05 RX ADMIN — NYSTATIN 500000 UNITS: 100000 SUSPENSION ORAL at 08:03

## 2020-12-05 RX ADMIN — IPRATROPIUM BROMIDE 4 PUFF: 17 AEROSOL, METERED RESPIRATORY (INHALATION) at 00:28

## 2020-12-05 RX ADMIN — IPRATROPIUM BROMIDE 4 PUFF: 17 AEROSOL, METERED RESPIRATORY (INHALATION) at 12:01

## 2020-12-05 RX ADMIN — ALBUTEROL SULFATE 4 PUFF: 90 AEROSOL, METERED RESPIRATORY (INHALATION) at 00:28

## 2020-12-05 RX ADMIN — ENOXAPARIN SODIUM 40 MG: 40 INJECTION SUBCUTANEOUS at 08:01

## 2020-12-05 RX ADMIN — CEFEPIME HYDROCHLORIDE 2 G: 2 INJECTION, POWDER, FOR SOLUTION INTRAVENOUS at 20:22

## 2020-12-05 RX ADMIN — SODIUM CHLORIDE, PRESERVATIVE FREE 10 ML: 5 INJECTION INTRAVENOUS at 08:04

## 2020-12-05 RX ADMIN — OXYCODONE HYDROCHLORIDE 5 MG: 5 TABLET ORAL at 06:03

## 2020-12-05 RX ADMIN — Medication 125 MG: at 12:50

## 2020-12-05 RX ADMIN — HYDROXYZINE HYDROCHLORIDE 25 MG: 25 TABLET, FILM COATED ORAL at 17:58

## 2020-12-05 RX ADMIN — BUMETANIDE 0.5 MG: 1 TABLET ORAL at 20:22

## 2020-12-05 RX ADMIN — CLONAZEPAM 1 MG: 1 TABLET ORAL at 12:50

## 2020-12-05 RX ADMIN — METHYLPREDNISOLONE SODIUM SUCCINATE 40 MG: 40 INJECTION, POWDER, FOR SOLUTION INTRAMUSCULAR; INTRAVENOUS at 08:02

## 2020-12-05 RX ADMIN — SODIUM CHLORIDE, PRESERVATIVE FREE 10 ML: 5 INJECTION INTRAVENOUS at 20:23

## 2020-12-05 RX ADMIN — ALBUTEROL SULFATE 4 PUFF: 90 AEROSOL, METERED RESPIRATORY (INHALATION) at 04:38

## 2020-12-05 RX ADMIN — NYSTATIN 500000 UNITS: 100000 SUSPENSION ORAL at 12:50

## 2020-12-05 RX ADMIN — MAGNESIUM OXIDE 400 MG (241.3 MG MAGNESIUM) TABLET 400 MG: TABLET at 09:06

## 2020-12-05 RX ADMIN — DULOXETINE HYDROCHLORIDE 60 MG: 30 CAPSULE, DELAYED RELEASE ORAL at 08:02

## 2020-12-05 RX ADMIN — CLONAZEPAM 1 MG: 1 TABLET ORAL at 20:22

## 2020-12-05 RX ADMIN — CEFEPIME HYDROCHLORIDE 2 G: 2 INJECTION, POWDER, FOR SOLUTION INTRAVENOUS at 08:09

## 2020-12-05 ASSESSMENT — PULMONARY FUNCTION TESTS
PIF_VALUE: 30

## 2020-12-05 ASSESSMENT — PAIN SCALES - GENERAL
PAINLEVEL_OUTOF10: 6
PAINLEVEL_OUTOF10: 4
PAINLEVEL_OUTOF10: 0
PAINLEVEL_OUTOF10: 0

## 2020-12-05 ASSESSMENT — PAIN SCALES - WONG BAKER
WONGBAKER_NUMERICALRESPONSE: 0

## 2020-12-05 NOTE — PROGRESS NOTES
12/05/20 0440   Vent Information   Vent Type 980   Vent Mode AC/PC   Vt Ordered 0 mL   Pressure Ordered 25   Rate Set 10 bmp   Peak Flow 0 L/min   Pressure Support 0 cmH20   FiO2  35 %   SpO2 99 %   SpO2/FiO2 ratio 282.86   Sensitivity 3   PEEP/CPAP 5   I Time/ I Time % 1 s   Humidification Source HME   Vent Patient Data   High Peep/I Pressure 25   Peak Inspiratory Pressure 30 cmH2O   Mean Airway Pressure 10 cmH20   Rate Measured 11 br/min   Vt Exhaled 577 mL   Minute Volume 5.89 Liters   I:E Ratio 1:5.00   Cough/Sputum   Sputum How Obtained Suctioned;Tracheal   $Obtained Sample $Induced Sputum   Cough Productive   Sputum Amount Small   Sputum Color Tan   Tenacity Thick   Spontaneous Breathing Trial (SBT) RT Doc   Pulse 85   Additional Respiratory  Assessments   Resp 10   Alarm Settings   High Pressure Alarm 40 cmH2O   Delay Alarm 20 sec(s)   Low Minute Volume Alarm 2.5 L/min   Apnea (secs) 20 secs   High Respiratory Rate 40 br/min   Low Exhaled Vt  205 mL

## 2020-12-05 NOTE — PROGRESS NOTES
Pulmonary and Critical Care  Progress Note    Subjective: The patient is better. WBC down. Shortness of breath none. Chest pain none  Addressing respiratory complaints Patient is negative for  hemoptysis and cyanosis  CONSTITUTIONAL:  negative for fevers and chills      Past Medical History:     has a past medical history of Acute embolism and thrombosis of deep veins of left upper extremity ([address] Acute kidney failure with tubular necrosis ([address] Anemia, unspecified, Anxiety, Aphonia, Bacteremia, Drug abuse and dependence ([address] Dysphagia, Gastrostomy status ([address] Hemoperitoneum, Hepatitis C without hepatic coma, Hypertension, Idiopathic hypotension, Malignant neoplasm of stomach, unspecified ([address] Metabolic encephalopathy, Methicillin susceptible Staphylococcus aureus infection as the cause of diseases classified elsewhere, Muscle weakness, Osteomyelitis ([address] Thrombocytopenia, unspecified ([address] Tracheostomy status ([address] and Unspecified severe protein-calorie malnutrition ([address] has a past surgical history that includes back surgery; IR NONTUNNELED VASCULAR CATHETER > 5 YEARS (7/26/2020); laparotomy (N/A, 7/25/2020); tracheostomy (N/A, 8/10/2020); Gastrostomy tube placement (N/A, 8/10/2020); and IR TUNNELED CVC PLACE WO SQ PORT/PUMP > 5 YEARS (8/12/2020). reports that he has quit smoking. His smoking use included cigarettes. He smoked 1.50 packs per day. He does not have any smokeless tobacco history on file. He reports that he does not drink alcohol or use drugs. Family history:  family history is not on file.     No Known Allergies  Social History:    Reviewed; no changes    Objective:   PHYSICAL EXAM:        VITALS:  /88   Pulse 88   Temp 98.2 °F (36.8 °C) (Rectal)   Resp 10   Ht 5' 7.01\" (1.702 m)   Wt 114 lb 10.2 oz (52 kg)   SpO2 100%   BMI 17.95 kg/m²     24HR INTAKE/OUTPUT:      Intake/Output Summary (Last 24 hours) at 12/5/2020 1309  Last data filed at 12/5/2020 1201  Gross per 24 hour   Intake 1754 ml   Output 1515 ml   Net 239 ml       CONSTITUTIONAL:  Awake. LUNGS:  decreased breath sounds, basilar crackles. CARDIOVASCULAR:  normal S1 and S2 and negative JVD  ABD:Abdomen soft, non-tender. BS normal. No masses,  No organomegaly. DATA:    CBC:  Recent Labs     12/03/20  0405 12/04/20  0900 12/05/20  0630   WBC 16.0* 22.2* 17.6*   RBC 3.44* 3.66* 3.78*   HGB 9.4* 10.2* 10.1*   HCT 30.7* 32.7* 34.2*   * 497* 466*   MCV 89.2 89.3 90.5   MCH 27.3 27.9 26.7*   MCHC 30.6* 31.2* 29.5*   RDW 17.9* 18.5* 18.6*      BMP:  Recent Labs     12/03/20  0405 12/04/20  0900 12/05/20  0630    138 142   K 4.3 3.8 4.1    103 105   CO2 26 25 26   BUN 36* 40* 47*   CREATININE 0.3* 0.4* 0.4*   CALCIUM 9.2 9.4 9.4   GLUCOSE 110* 130* 133*      ABG:  Recent Labs     12/03/20  0800 12/04/20  0800 12/05/20  0800   PH 7.48* 7.51* 7.52*   PO2ART 111* 64* 110*   UQN7UEH 34.0 32.0 30.0*   O2SAT 95.4* 92.1* 96.6     Lab Results   Component Value Date    PROBNP 530.3 (H) 11/26/2020    PROBNP 742.6 (H) 11/24/2020    PROBNP 11,142 (H) 07/24/2020     No results found for: CULTRESP    Radiology Review:  Pertinent images / reports were reviewed as a part of this visit. Assessment:     Patient Active Problem List   Diagnosis    Sepsis ([address]    Leukocytosis    Ischemic bowel disease ([address]    MSSA bacteremia    Chronic hepatitis C without hepatic coma ([address]    Respiratory failure ([address]    HCAP (healthcare-associated pneumonia)       Plan:   1. Overall the patient has improved. 2. Cont present vent settings.   Laquita Duarte MD  12/5/2020  1:09 PM

## 2020-12-05 NOTE — PROGRESS NOTES
[address]    Hospitalist Progress Note      Name:  Adam Acosta /Age/Sex: 1955  (63 y.o. male)   MRN & CSN:  [id number] & [id number] Admission Date/Time: 2020 10:19 AM   Location:  -A PCP: No primary care provider on file.          Hospital Day: 12    Assessment and Plan:   Adam Acosta is a 63 y.o.  male  who presents with sepsis    Sepsis due to C-diff     C. difficile PCR positive - rectal tube in place - less diarrhea   Continue p.o. vancomycin via PEG    Pseudomonas pneumonia   Chronic respiratory fialure on Trach    Respiratory culture from  grew pseudomonas - sensitive to cefepime  Sputum culture grew ESBL E. Coli-  was on meropenem for 6 days  Continue to monitor blood cultures which were no growth to date  WBC trend 16>.22>17  Continue with IV Cefepime start date 12/3  Pulmonary service following    Tracheostomy malfunction -resolved after trach exchange    ENT input appreciated    Electrolyte imbalances -monitor and replace per protocol     HypoK -resolved  HypoPhos - replaced  HyperNa - on d5+0.45 with 20meqK - monitor closely      Chronic medical conditions:      Stage III sacrum and Unstageable left HIP, right and left feet - wound care  Hx of Polysubstance use disoreder   Hep C, chronic  Severe PEM - BMI 17 - consult Nutrition     Palliative care/hospice consulted for code status goal of care discussion     Diet DIET TUBE FEED CONTINUOUS/CYCLIC NPO; Semi-elemental (Vital AF 1.2 Waldo); Gastrostomy; Continuous; 20; 60; 24   DVT Prophylaxis [] Lovenox, []  Heparin, [] SCDs, []No VTE prophylaxis, patient ambulating   GI Prophylaxis [] PPI, [] H2 Blocker, [] No GI prophylaxis, patient is receiving diet/Tube Feeds   Code Status Full Code   Disposition Patient requires continued admission due to sepsis   MDM [] Low, [] Moderate,[x]  High     History of Present Illness: Subjective     Patient Seen & Examined at the bedside      Patient is on trach and vent -nonverbal but able to respond with shaking hands, nodding and mouthing words - signaling with hand etc -  He was trying to converse by mouthing words emphatically but could not understand him    Ten point ROS not be reviewed due to patient's mental status     Objective: Intake/Output Summary (Last 24 hours) at 12/5/2020 0924  Last data filed at 12/5/2020 0809  Gross per 24 hour   Intake 1979 ml   Output 2140 ml   Net -161 ml      Vitals:   Vitals:    12/05/20 0901   BP: (!) 123/96   Pulse: 93   Resp: 10   Temp:    SpO2: 99%     Physical Exam:    GEN chronically sick looking and emaciated, awake but nonverbal male, resting in bed in no apparent distress. Appears given age. HENT tracheostomy in place > to Vent   RESP diffuse rhonchi with scattered wheezes  CARDIO/VASC -S1/S2 auscultated. Regular rate without appreciable murmurs, rubs, or gallops. Peripheral pulses equal bilaterally and palpable. No peripheral edema.   Severe loss of muscle mass     Medications:   Medications:    cefepime  2 g Intravenous Q12H    collagenase   Topical Daily    vancomycin  125 mg Per G Tube 4 times per day    methylPREDNISolone  40 mg Intravenous Daily    Ergocalciferol  8,000 Units Oral Daily    magnesium oxide  400 mg Oral Daily    scopolamine  1 patch Transdermal Q72H    pantoprazole  40 mg Intravenous Daily    nystatin  5 mL Oral 4x Daily    midodrine  10 mg Oral TID    sodium chloride flush  10 mL Intravenous 2 times per day    enoxaparin  40 mg Subcutaneous Daily    clonazePAM  1 mg Per G Tube TID    bumetanide  0.5 mg Per G Tube BID    DULoxetine  60 mg Per G Tube Daily    gabapentin  300 mg Per G Tube TID    hydrOXYzine  25 mg Per G Tube Q6H    albuterol sulfate HFA  4 puff Inhalation Q4H    And    ipratropium  4 puff Inhalation Q4H    chlorhexidine  15 mL Mouth/Throat BID      Infusions:     PRN Meds: LORazepam, 0.5 mg, Q6H PRN  potassium chloride, 40 mEq, PRN    Or  potassium alternative oral replacement, 40 mEq, PRN Or  potassium chloride, 10 mEq, PRN  dicyclomine, 20 mg, Q6H PRN  sodium chloride flush, 10 mL, PRN  acetaminophen, 650 mg, Q6H PRN    Or  acetaminophen, 650 mg, Q6H PRN  polyethylene glycol, 17 g, Daily PRN  promethazine, 12.5 mg, Q6H PRN    Or  ondansetron, 4 mg, Q6H PRN  oxyCODONE, 5 mg, Q6H PRN  HYDROmorphone, 1 mg, Daily PRN  magnesium sulfate, 1 g, PRN          Electronically signed by Yogesh Sarkar MD on 12/5/2020 at 9:24 AM

## 2020-12-06 LAB
ANION GAP SERPL CALCULATED.3IONS-SCNC: 13 MMOL/L (ref 4–16)
BASE EXCESS MIXED: 3.1 (ref 0–1.2)
BUN BLDV-MCNC: 55 MG/DL (ref 6–23)
CALCIUM SERPL-MCNC: 9.8 MG/DL (ref 8.3–10.6)
CARBON MONOXIDE, BLOOD: 2.3 % (ref 0–5)
CHLORIDE BLD-SCNC: 104 MMOL/L (ref 99–110)
CO2 CONTENT: 28.2 MMOL/L (ref 19–24)
CO2: 25 MMOL/L (ref 21–32)
COMMENT: ABNORMAL
CREAT SERPL-MCNC: 0.4 MG/DL (ref 0.9–1.3)
GFR AFRICAN AMERICAN: >60 ML/MIN/1.73M2
GFR NON-AFRICAN AMERICAN: >60 ML/MIN/1.73M2
GLUCOSE BLD-MCNC: 145 MG/DL (ref 70–99)
HCO3 ARTERIAL: 27 MMOL/L (ref 18–23)
HCT VFR BLD CALC: 35.5 % (ref 42–52)
HEMOGLOBIN: 10.6 GM/DL (ref 13.5–18)
MCH RBC QN AUTO: 26.8 PG (ref 27–31)
MCHC RBC AUTO-ENTMCNC: 29.9 % (ref 32–36)
MCV RBC AUTO: 89.9 FL (ref 78–100)
METHEMOGLOBIN ARTERIAL: 1.3 %
O2 SATURATION: 95.1 % (ref 96–97)
PCO2 ARTERIAL: 38 MMHG (ref 32–45)
PDW BLD-RTO: 18.5 % (ref 11.7–14.9)
PH BLOOD: 7.46 (ref 7.34–7.45)
PLATELET # BLD: 518 K/CU MM (ref 140–440)
PMV BLD AUTO: 10.3 FL (ref 7.5–11.1)
PO2 ARTERIAL: 92 MMHG (ref 75–100)
POTASSIUM SERPL-SCNC: 4.1 MMOL/L (ref 3.5–5.1)
RBC # BLD: 3.95 M/CU MM (ref 4.6–6.2)
SODIUM BLD-SCNC: 142 MMOL/L (ref 135–145)
WBC # BLD: 21.6 K/CU MM (ref 4–10.5)

## 2020-12-06 PROCEDURE — 6360000002 HC RX W HCPCS

## 2020-12-06 PROCEDURE — 2000000000 HC ICU R&B

## 2020-12-06 PROCEDURE — 89220 SPUTUM SPECIMEN COLLECTION: CPT

## 2020-12-06 PROCEDURE — 82803 BLOOD GASES ANY COMBINATION: CPT

## 2020-12-06 PROCEDURE — 2580000003 HC RX 258: Performed by: FAMILY MEDICINE

## 2020-12-06 PROCEDURE — 85027 COMPLETE CBC AUTOMATED: CPT

## 2020-12-06 PROCEDURE — 94640 AIRWAY INHALATION TREATMENT: CPT

## 2020-12-06 PROCEDURE — 2580000003 HC RX 258: Performed by: INTERNAL MEDICINE

## 2020-12-06 PROCEDURE — 6370000000 HC RX 637 (ALT 250 FOR IP): Performed by: FAMILY MEDICINE

## 2020-12-06 PROCEDURE — 94761 N-INVAS EAR/PLS OXIMETRY MLT: CPT

## 2020-12-06 PROCEDURE — 6370000000 HC RX 637 (ALT 250 FOR IP): Performed by: INTERNAL MEDICINE

## 2020-12-06 PROCEDURE — 2700000000 HC OXYGEN THERAPY PER DAY

## 2020-12-06 PROCEDURE — 6360000002 HC RX W HCPCS: Performed by: INTERNAL MEDICINE

## 2020-12-06 PROCEDURE — C9113 INJ PANTOPRAZOLE SODIUM, VIA: HCPCS | Performed by: FAMILY MEDICINE

## 2020-12-06 PROCEDURE — 36600 WITHDRAWAL OF ARTERIAL BLOOD: CPT

## 2020-12-06 PROCEDURE — 80048 BASIC METABOLIC PNL TOTAL CA: CPT

## 2020-12-06 PROCEDURE — 94003 VENT MGMT INPAT SUBQ DAY: CPT

## 2020-12-06 PROCEDURE — 6360000002 HC RX W HCPCS: Performed by: STUDENT IN AN ORGANIZED HEALTH CARE EDUCATION/TRAINING PROGRAM

## 2020-12-06 PROCEDURE — 0B21XFZ CHANGE TRACHEOSTOMY DEVICE IN TRACHEA, EXTERNAL APPROACH: ICD-10-PCS | Performed by: OTOLARYNGOLOGY

## 2020-12-06 PROCEDURE — 6360000002 HC RX W HCPCS: Performed by: FAMILY MEDICINE

## 2020-12-06 RX ORDER — FENTANYL CITRATE 50 UG/ML
INJECTION, SOLUTION INTRAMUSCULAR; INTRAVENOUS
Status: COMPLETED
Start: 2020-12-06 | End: 2020-12-06

## 2020-12-06 RX ORDER — FENTANYL CITRATE 50 UG/ML
50 INJECTION, SOLUTION INTRAMUSCULAR; INTRAVENOUS EVERY 10 MIN PRN
Status: COMPLETED | OUTPATIENT
Start: 2020-12-06 | End: 2020-12-06

## 2020-12-06 RX ADMIN — OXYCODONE HYDROCHLORIDE 5 MG: 5 TABLET ORAL at 20:57

## 2020-12-06 RX ADMIN — CEFEPIME HYDROCHLORIDE 2 G: 2 INJECTION, POWDER, FOR SOLUTION INTRAVENOUS at 22:14

## 2020-12-06 RX ADMIN — FENTANYL CITRATE 50 MCG: 50 INJECTION INTRAMUSCULAR; INTRAVENOUS at 23:59

## 2020-12-06 RX ADMIN — ALBUTEROL SULFATE 4 PUFF: 90 AEROSOL, METERED RESPIRATORY (INHALATION) at 20:33

## 2020-12-06 RX ADMIN — CLONAZEPAM 1 MG: 1 TABLET ORAL at 13:22

## 2020-12-06 RX ADMIN — ALBUTEROL SULFATE 4 PUFF: 90 AEROSOL, METERED RESPIRATORY (INHALATION) at 00:37

## 2020-12-06 RX ADMIN — BUMETANIDE 0.5 MG: 1 TABLET ORAL at 20:02

## 2020-12-06 RX ADMIN — ENOXAPARIN SODIUM 40 MG: 40 INJECTION SUBCUTANEOUS at 09:49

## 2020-12-06 RX ADMIN — CLONAZEPAM 1 MG: 1 TABLET ORAL at 20:01

## 2020-12-06 RX ADMIN — Medication 125 MG: at 12:20

## 2020-12-06 RX ADMIN — SODIUM CHLORIDE, PRESERVATIVE FREE 10 ML: 5 INJECTION INTRAVENOUS at 20:02

## 2020-12-06 RX ADMIN — GABAPENTIN 300 MG: 300 CAPSULE ORAL at 09:48

## 2020-12-06 RX ADMIN — FENTANYL CITRATE 50 MCG: 50 INJECTION INTRAMUSCULAR; INTRAVENOUS at 23:49

## 2020-12-06 RX ADMIN — HYDROXYZINE HYDROCHLORIDE 25 MG: 25 TABLET, FILM COATED ORAL at 16:00

## 2020-12-06 RX ADMIN — IPRATROPIUM BROMIDE 4 PUFF: 17 AEROSOL, METERED RESPIRATORY (INHALATION) at 04:18

## 2020-12-06 RX ADMIN — MAGNESIUM OXIDE 400 MG (241.3 MG MAGNESIUM) TABLET 400 MG: TABLET at 09:49

## 2020-12-06 RX ADMIN — HYDROXYZINE HYDROCHLORIDE 25 MG: 25 TABLET, FILM COATED ORAL at 09:49

## 2020-12-06 RX ADMIN — MIDODRINE HYDROCHLORIDE 10 MG: 5 TABLET ORAL at 17:02

## 2020-12-06 RX ADMIN — IPRATROPIUM BROMIDE 4 PUFF: 17 AEROSOL, METERED RESPIRATORY (INHALATION) at 20:33

## 2020-12-06 RX ADMIN — IPRATROPIUM BROMIDE 4 PUFF: 17 AEROSOL, METERED RESPIRATORY (INHALATION) at 00:37

## 2020-12-06 RX ADMIN — MIDODRINE HYDROCHLORIDE 10 MG: 5 TABLET ORAL at 09:49

## 2020-12-06 RX ADMIN — Medication 125 MG: at 00:00

## 2020-12-06 RX ADMIN — LORAZEPAM 0.5 MG: 2 INJECTION INTRAMUSCULAR; INTRAVENOUS at 23:10

## 2020-12-06 RX ADMIN — COLLAGENASE SANTYL: 250 OINTMENT TOPICAL at 09:59

## 2020-12-06 RX ADMIN — GABAPENTIN 300 MG: 300 CAPSULE ORAL at 20:01

## 2020-12-06 RX ADMIN — CHLORHEXIDINE GLUCONATE 0.12% ORAL RINSE 15 ML: 1.2 LIQUID ORAL at 09:49

## 2020-12-06 RX ADMIN — BUMETANIDE 0.5 MG: 1 TABLET ORAL at 09:49

## 2020-12-06 RX ADMIN — ALBUTEROL SULFATE 4 PUFF: 90 AEROSOL, METERED RESPIRATORY (INHALATION) at 04:18

## 2020-12-06 RX ADMIN — ALBUTEROL SULFATE 4 PUFF: 90 AEROSOL, METERED RESPIRATORY (INHALATION) at 07:54

## 2020-12-06 RX ADMIN — ALBUTEROL SULFATE 4 PUFF: 90 AEROSOL, METERED RESPIRATORY (INHALATION) at 15:20

## 2020-12-06 RX ADMIN — CHLORHEXIDINE GLUCONATE 0.12% ORAL RINSE 15 ML: 1.2 LIQUID ORAL at 20:01

## 2020-12-06 RX ADMIN — SODIUM CHLORIDE, PRESERVATIVE FREE 10 ML: 5 INJECTION INTRAVENOUS at 09:50

## 2020-12-06 RX ADMIN — MIDODRINE HYDROCHLORIDE 10 MG: 5 TABLET ORAL at 13:24

## 2020-12-06 RX ADMIN — NYSTATIN 500000 UNITS: 100000 SUSPENSION ORAL at 09:48

## 2020-12-06 RX ADMIN — DULOXETINE HYDROCHLORIDE 60 MG: 30 CAPSULE, DELAYED RELEASE ORAL at 09:49

## 2020-12-06 RX ADMIN — PANTOPRAZOLE SODIUM 40 MG: 40 INJECTION, POWDER, LYOPHILIZED, FOR SOLUTION INTRAVENOUS at 09:50

## 2020-12-06 RX ADMIN — IPRATROPIUM BROMIDE 4 PUFF: 17 AEROSOL, METERED RESPIRATORY (INHALATION) at 11:32

## 2020-12-06 RX ADMIN — NYSTATIN 500000 UNITS: 100000 SUSPENSION ORAL at 13:24

## 2020-12-06 RX ADMIN — IPRATROPIUM BROMIDE 4 PUFF: 17 AEROSOL, METERED RESPIRATORY (INHALATION) at 07:54

## 2020-12-06 RX ADMIN — ALBUTEROL SULFATE 4 PUFF: 90 AEROSOL, METERED RESPIRATORY (INHALATION) at 11:32

## 2020-12-06 RX ADMIN — Medication 8000 UNITS: at 12:20

## 2020-12-06 RX ADMIN — Medication 125 MG: at 05:49

## 2020-12-06 RX ADMIN — HYDROXYZINE HYDROCHLORIDE 25 MG: 25 TABLET, FILM COATED ORAL at 03:50

## 2020-12-06 RX ADMIN — NYSTATIN 500000 UNITS: 100000 SUSPENSION ORAL at 20:01

## 2020-12-06 RX ADMIN — GABAPENTIN 300 MG: 300 CAPSULE ORAL at 13:24

## 2020-12-06 RX ADMIN — NYSTATIN 500000 UNITS: 100000 SUSPENSION ORAL at 15:59

## 2020-12-06 RX ADMIN — IPRATROPIUM BROMIDE 4 PUFF: 17 AEROSOL, METERED RESPIRATORY (INHALATION) at 15:21

## 2020-12-06 RX ADMIN — Medication 125 MG: at 17:02

## 2020-12-06 RX ADMIN — METHYLPREDNISOLONE SODIUM SUCCINATE 40 MG: 40 INJECTION, POWDER, FOR SOLUTION INTRAMUSCULAR; INTRAVENOUS at 09:50

## 2020-12-06 RX ADMIN — CEFEPIME HYDROCHLORIDE 2 G: 2 INJECTION, POWDER, FOR SOLUTION INTRAVENOUS at 09:53

## 2020-12-06 RX ADMIN — CLONAZEPAM 1 MG: 1 TABLET ORAL at 09:48

## 2020-12-06 RX ADMIN — HYDROXYZINE HYDROCHLORIDE 25 MG: 25 TABLET, FILM COATED ORAL at 22:14

## 2020-12-06 ASSESSMENT — PULMONARY FUNCTION TESTS
PIF_VALUE: 30
PIF_VALUE: 31
PIF_VALUE: 30

## 2020-12-06 ASSESSMENT — PAIN SCALES - GENERAL
PAINLEVEL_OUTOF10: 0
PAINLEVEL_OUTOF10: 5
PAINLEVEL_OUTOF10: 0
PAINLEVEL_OUTOF10: 7
PAINLEVEL_OUTOF10: 0
PAINLEVEL_OUTOF10: 6
PAINLEVEL_OUTOF10: 0

## 2020-12-06 NOTE — PLAN OF CARE
Problem: Tissue Perfusion:  Goal: Peripheral tissue perfusion will improve  Description: Peripheral tissue perfusion will improve  Outcome: Ongoing     Problem: Falls - Risk of:  Goal: Will remain free from falls  Description: Will remain free from falls  Outcome: Ongoing     Problem: Falls - Risk of:  Goal: Absence of physical injury  Description: Absence of physical injury  Outcome: Ongoing     Problem: Skin Integrity:  Goal: Will show no infection signs and symptoms  Description: Will show no infection signs and symptoms  Outcome: Ongoing     Problem: Skin Integrity:  Goal: Absence of new skin breakdown  Description: Absence of new skin breakdown  Outcome: Ongoing     Problem: Infection:  Goal: Will remain free from infection  Description: Will remain free from infection  Outcome: Ongoing     Problem: Safety:  Goal: Free from accidental physical injury  Description: Free from accidental physical injury  Outcome: Ongoing     Problem: Safety:  Goal: Free from intentional harm  Description: Free from intentional harm  Outcome: Ongoing     Problem: Daily Care:  Goal: Daily care needs are met  Description: Daily care needs are met  Outcome: Ongoing     Problem: Pain:  Description: Pain management should include both nonpharmacologic and pharmacologic interventions. Goal: Patient's pain/discomfort is manageable  Description: Patient's pain/discomfort is manageable  Outcome: Ongoing     Problem: Pain:  Description: Pain management should include both nonpharmacologic and pharmacologic interventions. Goal: Pain level will decrease  Description: Pain level will decrease  Outcome: Ongoing     Problem: Pain:  Description: Pain management should include both nonpharmacologic and pharmacologic interventions. Goal: Control of acute pain  Description: Control of acute pain  Outcome: Ongoing     Problem: Pain:  Description: Pain management should include both nonpharmacologic and pharmacologic interventions.   Goal: Control of chronic pain  Description: Control of chronic pain  Outcome: Ongoing     Problem: Discharge Planning:  Goal: Patients continuum of care needs are met  Description: Patients continuum of care needs are met  Outcome: Ongoing     Problem: Gas Exchange - Impaired:  Goal: Levels of oxygenation will improve  Description: Levels of oxygenation will improve  Outcome: Ongoing

## 2020-12-06 NOTE — PROGRESS NOTES
[address]    Hospitalist Progress Note      Name:  Karie Rojas /Age/Sex: 1955  (63 y.o. male)   MRN & CSN:  [id number] & [id number] Admission Date/Time: 2020 10:19 AM   Location:  -A PCP: No primary care provider on file.          Hospital Day: 13    Assessment and Plan:   Karie Rojas is a 63 y.o.  male  who presents with sepsis    Sepsis due to C-diff     C. difficile PCR positive - rectal tube in place - less diarrhea   Continue p.o. vancomycin via PEG    Pseudomonas pneumonia   Chronic respiratory fialure on Trach    Respiratory culture from  grew pseudomonas - sensitive to cefepime  Sputum culture grew ESBL E. Coli-  was on meropenem for 6 days  Continue to monitor blood cultures which were no growth to date  WBC trend 16> 22>17 >21  Continue with IV Cefepime start date 12/3  Pulmonary service following    Tracheostomy malfunction -resolved after trach exchange    ENT input appreciated    Electrolyte imbalances -monitor and replace per protocol     HypoK -resolved  HypoPhos - replaced  HyperNa - on d5+0.45 with 20meqK - monitor closely      Chronic medical conditions:      Stage III sacrum and Unstageable left HIP, right and left feet - wound care  Hx of Polysubstance use disoreder   Hep C, Chronic  Severe PEM - BMI 17 - consult Nutrition     Palliative care/hospice consulted for code status goal of care discussion     Diet DIET TUBE FEED CONTINUOUS/CYCLIC NPO; Semi-elemental (Vital AF 1.2 Waldo); Gastrostomy; Continuous; 20; 60; 24   DVT Prophylaxis [] Lovenox, []  Heparin, [] SCDs, []No VTE prophylaxis, patient ambulating   GI Prophylaxis [] PPI, [] H2 Blocker, [] No GI prophylaxis, patient is receiving diet/Tube Feeds   Code Status Full Code   Disposition Patient requires continued admission due to sepsis   MDM [] Low, [] Moderate,[x]  High     History of Present Illness: Subjective     Patient Seen & Examined at the bedside      Patient is on trach and vent - nonverbal but able to respond with shaking hands, nodding and mouthing words - signaling with hand etc   Asking for suctioning of his mouth     Ten point ROS not be reviewed due to patient's mental status     Objective: Intake/Output Summary (Last 24 hours) at 12/6/2020 0905  Last data filed at 12/6/2020 0646  Gross per 24 hour   Intake 968 ml   Output 1775 ml   Net -807 ml      Vitals:   Vitals:    12/06/20 0759   BP:    Pulse: 99   Resp: 23   Temp:    SpO2: 99%     Physical Exam:    GEN chronically sick looking and emaciated, awake but nonverbal male, resting in bed in no apparent distress. Appears given age. HENT tracheostomy in place > to Vent   RESP diffuse rhonchi with scattered wheezes  CARDIO/VASC -S1/S2 auscultated. Regular rate without appreciable murmurs, rubs, or gallops. Peripheral pulses equal bilaterally and palpable. No peripheral edema.   Severe loss of muscle mass     Medications:   Medications:    cefepime  2 g Intravenous Q12H    collagenase   Topical Daily    vancomycin  125 mg Per G Tube 4 times per day    methylPREDNISolone  40 mg Intravenous Daily    Ergocalciferol  8,000 Units Oral Daily    magnesium oxide  400 mg Oral Daily    scopolamine  1 patch Transdermal Q72H    pantoprazole  40 mg Intravenous Daily    nystatin  5 mL Oral 4x Daily    midodrine  10 mg Oral TID    sodium chloride flush  10 mL Intravenous 2 times per day    enoxaparin  40 mg Subcutaneous Daily    clonazePAM  1 mg Per G Tube TID    bumetanide  0.5 mg Per G Tube BID    DULoxetine  60 mg Per G Tube Daily    gabapentin  300 mg Per G Tube TID    hydrOXYzine  25 mg Per G Tube Q6H    albuterol sulfate HFA  4 puff Inhalation Q4H    And    ipratropium  4 puff Inhalation Q4H    chlorhexidine  15 mL Mouth/Throat BID      Infusions:     PRN Meds: LORazepam, 0.5 mg, Q6H PRN  potassium chloride, 40 mEq, PRN    Or  potassium alternative oral replacement, 40 mEq, PRN    Or  potassium chloride, 10 mEq, PRN  dicyclomine, 20 mg, Q6H PRN  sodium chloride flush, 10 mL, PRN  acetaminophen, 650 mg, Q6H PRN    Or  acetaminophen, 650 mg, Q6H PRN  polyethylene glycol, 17 g, Daily PRN  promethazine, 12.5 mg, Q6H PRN    Or  ondansetron, 4 mg, Q6H PRN  oxyCODONE, 5 mg, Q6H PRN  HYDROmorphone, 1 mg, Daily PRN  magnesium sulfate, 1 g, PRN          Electronically signed by January Velazquez MD on 12/6/2020 at 9:05 AM

## 2020-12-06 NOTE — PROGRESS NOTES
pulmonary      SUBJECTIVE:  Having difficulty for deep suction     OBJECTIVE    VITALS:  BP (!) 138/100   Pulse 100   Temp 98.4 °F (36.9 °C) (Rectal)   Resp 18   Ht 5' 7.01\" (1.702 m)   Wt 114 lb 10.2 oz (52 kg)   SpO2 98%   BMI 17.95 kg/m²   HEAD AND FACE EXAM:  No throat injection, no active exudate,no thrush  NECK EXAM;No JVD, no masses, symmetrical  CHEST EXAM; Expansion equal and symmetrical, no masses  LUNG EXAM; Good breath sounds bilaterally. There are expiratory wheezes both lungs, there are crackles at both lung bases  CARDIOVASCULAR EXAM: Positive S1 and S2, no S3 or S4, no clicks ,no murmurs  RIGHT AND LEFT LOWER EXTRIMITY EXAM: No edema, no swelling, no inflamation            LABS   Lab Results   Component Value Date    WBC 21.6 (H) 12/06/2020    HGB 10.6 (L) 12/06/2020    HCT 35.5 (L) 12/06/2020    MCV 89.9 12/06/2020     (H) 12/06/2020     Lab Results   Component Value Date    CREATININE 0.4 (L) 12/06/2020    BUN 55 (H) 12/06/2020     12/06/2020    K 4.1 12/06/2020     12/06/2020    CO2 25 12/06/2020     Lab Results   Component Value Date    INR 1.29 08/15/2020    PROTIME 15.6 (H) 08/15/2020          Lab Results   Component Value Date    PHOS 1.5 11/29/2020    PHOS 2.7 11/26/2020    PHOS 2.8 11/25/2020        Recent Labs     12/04/20  0800 12/05/20  0800 12/06/20  0800   PH 7.51* 7.52* 7.46*   PO2ART 64* 110* 92   [id number] 32.0 30.0* 38.0   O2SAT 92.1* 96.6 95.1*         Wt Readings from Last 3 Encounters:   12/03/20 114 lb 10.2 oz (52 kg)   08/15/20 147 lb 0.8 oz (66.7 kg)               ASSESMENT  Ac resp failure  pneumonia        PLAN  1. cpm  2. Cont antibx  3.  Will continue to try suctioning    12/6/2020  Hudson Addison M.D.

## 2020-12-07 ENCOUNTER — APPOINTMENT (OUTPATIENT)
Dept: GENERAL RADIOLOGY | Age: 65
DRG: 720 | End: 2020-12-07
Payer: MEDICARE

## 2020-12-07 LAB
ANION GAP SERPL CALCULATED.3IONS-SCNC: 14 MMOL/L (ref 4–16)
BASE EXCESS MIXED: 1.8 (ref 0–1.2)
BUN BLDV-MCNC: 75 MG/DL (ref 6–23)
C-REACTIVE PROTEIN, HIGH SENSITIVITY: 15.4 MG/L
CALCIUM SERPL-MCNC: 9.9 MG/DL (ref 8.3–10.6)
CARBON MONOXIDE, BLOOD: 2.4 % (ref 0–5)
CHLORIDE BLD-SCNC: 109 MMOL/L (ref 99–110)
CO2 CONTENT: 26.8 MMOL/L (ref 19–24)
CO2: 24 MMOL/L (ref 21–32)
COMMENT: ABNORMAL
CREAT SERPL-MCNC: 0.5 MG/DL (ref 0.9–1.3)
GFR AFRICAN AMERICAN: >60 ML/MIN/1.73M2
GFR NON-AFRICAN AMERICAN: >60 ML/MIN/1.73M2
GLUCOSE BLD-MCNC: 156 MG/DL (ref 70–99)
HCO3 ARTERIAL: 25.7 MMOL/L (ref 18–23)
HCT VFR BLD CALC: 35.9 % (ref 42–52)
HEMOGLOBIN: 10.8 GM/DL (ref 13.5–18)
MCH RBC QN AUTO: 27.2 PG (ref 27–31)
MCHC RBC AUTO-ENTMCNC: 30.1 % (ref 32–36)
MCV RBC AUTO: 90.4 FL (ref 78–100)
METHEMOGLOBIN ARTERIAL: 1.3 %
O2 SATURATION: 94.8 % (ref 96–97)
PCO2 ARTERIAL: 37 MMHG (ref 32–45)
PDW BLD-RTO: 18.4 % (ref 11.7–14.9)
PH BLOOD: 7.45 (ref 7.34–7.45)
PLATELET # BLD: 484 K/CU MM (ref 140–440)
PMV BLD AUTO: 10.1 FL (ref 7.5–11.1)
PO2 ARTERIAL: 87 MMHG (ref 75–100)
POTASSIUM SERPL-SCNC: 4.2 MMOL/L (ref 3.5–5.1)
PROCALCITONIN: 0.4
RBC # BLD: 3.97 M/CU MM (ref 4.6–6.2)
SODIUM BLD-SCNC: 147 MMOL/L (ref 135–145)
WBC # BLD: 24.6 K/CU MM (ref 4–10.5)

## 2020-12-07 PROCEDURE — 31645 BRNCHSC W/THER ASPIR 1ST: CPT

## 2020-12-07 PROCEDURE — 6370000000 HC RX 637 (ALT 250 FOR IP): Performed by: INTERNAL MEDICINE

## 2020-12-07 PROCEDURE — 2580000003 HC RX 258: Performed by: FAMILY MEDICINE

## 2020-12-07 PROCEDURE — 2000000000 HC ICU R&B

## 2020-12-07 PROCEDURE — 86140 C-REACTIVE PROTEIN: CPT

## 2020-12-07 PROCEDURE — 2580000003 HC RX 258: Performed by: INTERNAL MEDICINE

## 2020-12-07 PROCEDURE — 94003 VENT MGMT INPAT SUBQ DAY: CPT

## 2020-12-07 PROCEDURE — 84145 PROCALCITONIN (PCT): CPT

## 2020-12-07 PROCEDURE — 0BJ18ZZ INSPECTION OF TRACHEA, VIA NATURAL OR ARTIFICIAL OPENING ENDOSCOPIC: ICD-10-PCS | Performed by: OTOLARYNGOLOGY

## 2020-12-07 PROCEDURE — 6370000000 HC RX 637 (ALT 250 FOR IP): Performed by: FAMILY MEDICINE

## 2020-12-07 PROCEDURE — C9113 INJ PANTOPRAZOLE SODIUM, VIA: HCPCS | Performed by: FAMILY MEDICINE

## 2020-12-07 PROCEDURE — 87205 SMEAR GRAM STAIN: CPT

## 2020-12-07 PROCEDURE — 89220 SPUTUM SPECIMEN COLLECTION: CPT

## 2020-12-07 PROCEDURE — 6360000002 HC RX W HCPCS: Performed by: INTERNAL MEDICINE

## 2020-12-07 PROCEDURE — 31502 CHANGE OF WINDPIPE AIRWAY: CPT

## 2020-12-07 PROCEDURE — 94761 N-INVAS EAR/PLS OXIMETRY MLT: CPT

## 2020-12-07 PROCEDURE — 85027 COMPLETE CBC AUTOMATED: CPT

## 2020-12-07 PROCEDURE — 87077 CULTURE AEROBIC IDENTIFY: CPT

## 2020-12-07 PROCEDURE — 71045 X-RAY EXAM CHEST 1 VIEW: CPT

## 2020-12-07 PROCEDURE — 2720000010 HC SURG SUPPLY STERILE

## 2020-12-07 PROCEDURE — 6360000002 HC RX W HCPCS: Performed by: FAMILY MEDICINE

## 2020-12-07 PROCEDURE — 31622 DX BRONCHOSCOPE/WASH: CPT

## 2020-12-07 PROCEDURE — 36600 WITHDRAWAL OF ARTERIAL BLOOD: CPT

## 2020-12-07 PROCEDURE — 2700000000 HC OXYGEN THERAPY PER DAY

## 2020-12-07 PROCEDURE — 87081 CULTURE SCREEN ONLY: CPT

## 2020-12-07 PROCEDURE — 31624 DX BRONCHOSCOPE/LAVAGE: CPT

## 2020-12-07 PROCEDURE — 36592 COLLECT BLOOD FROM PICC: CPT

## 2020-12-07 PROCEDURE — 80048 BASIC METABOLIC PNL TOTAL CA: CPT

## 2020-12-07 PROCEDURE — 87186 SC STD MICRODIL/AGAR DIL: CPT

## 2020-12-07 PROCEDURE — 82803 BLOOD GASES ANY COMBINATION: CPT

## 2020-12-07 PROCEDURE — 87070 CULTURE OTHR SPECIMN AEROBIC: CPT

## 2020-12-07 PROCEDURE — 94640 AIRWAY INHALATION TREATMENT: CPT

## 2020-12-07 RX ADMIN — ALBUTEROL SULFATE 4 PUFF: 90 AEROSOL, METERED RESPIRATORY (INHALATION) at 15:14

## 2020-12-07 RX ADMIN — CHLORHEXIDINE GLUCONATE 0.12% ORAL RINSE 15 ML: 1.2 LIQUID ORAL at 08:41

## 2020-12-07 RX ADMIN — COLLAGENASE SANTYL: 250 OINTMENT TOPICAL at 08:53

## 2020-12-07 RX ADMIN — SODIUM CHLORIDE, PRESERVATIVE FREE 10 ML: 5 INJECTION INTRAVENOUS at 21:43

## 2020-12-07 RX ADMIN — ALBUTEROL SULFATE 4 PUFF: 90 AEROSOL, METERED RESPIRATORY (INHALATION) at 19:31

## 2020-12-07 RX ADMIN — GABAPENTIN 300 MG: 300 CAPSULE ORAL at 08:42

## 2020-12-07 RX ADMIN — ENOXAPARIN SODIUM 40 MG: 40 INJECTION SUBCUTANEOUS at 08:42

## 2020-12-07 RX ADMIN — DULOXETINE HYDROCHLORIDE 60 MG: 30 CAPSULE, DELAYED RELEASE ORAL at 08:42

## 2020-12-07 RX ADMIN — NYSTATIN 500000 UNITS: 100000 SUSPENSION ORAL at 21:46

## 2020-12-07 RX ADMIN — NYSTATIN 500000 UNITS: 100000 SUSPENSION ORAL at 08:41

## 2020-12-07 RX ADMIN — CLONAZEPAM 1 MG: 1 TABLET ORAL at 08:41

## 2020-12-07 RX ADMIN — HYDROXYZINE HYDROCHLORIDE 25 MG: 25 TABLET, FILM COATED ORAL at 05:51

## 2020-12-07 RX ADMIN — IPRATROPIUM BROMIDE 4 PUFF: 17 AEROSOL, METERED RESPIRATORY (INHALATION) at 05:15

## 2020-12-07 RX ADMIN — GABAPENTIN 300 MG: 300 CAPSULE ORAL at 21:46

## 2020-12-07 RX ADMIN — CEFEPIME HYDROCHLORIDE 2 G: 2 INJECTION, POWDER, FOR SOLUTION INTRAVENOUS at 08:43

## 2020-12-07 RX ADMIN — CHLORHEXIDINE GLUCONATE 0.12% ORAL RINSE 15 ML: 1.2 LIQUID ORAL at 21:46

## 2020-12-07 RX ADMIN — MAGNESIUM OXIDE 400 MG (241.3 MG MAGNESIUM) TABLET 400 MG: TABLET at 08:44

## 2020-12-07 RX ADMIN — NYSTATIN 500000 UNITS: 100000 SUSPENSION ORAL at 17:45

## 2020-12-07 RX ADMIN — MIDODRINE HYDROCHLORIDE 10 MG: 5 TABLET ORAL at 08:42

## 2020-12-07 RX ADMIN — CLONAZEPAM 1 MG: 1 TABLET ORAL at 14:15

## 2020-12-07 RX ADMIN — SODIUM CHLORIDE, PRESERVATIVE FREE 10 ML: 5 INJECTION INTRAVENOUS at 08:42

## 2020-12-07 RX ADMIN — IPRATROPIUM BROMIDE 4 PUFF: 17 AEROSOL, METERED RESPIRATORY (INHALATION) at 23:34

## 2020-12-07 RX ADMIN — IPRATROPIUM BROMIDE 4 PUFF: 17 AEROSOL, METERED RESPIRATORY (INHALATION) at 19:31

## 2020-12-07 RX ADMIN — METHYLPREDNISOLONE SODIUM SUCCINATE 40 MG: 40 INJECTION, POWDER, FOR SOLUTION INTRAMUSCULAR; INTRAVENOUS at 08:42

## 2020-12-07 RX ADMIN — ALBUTEROL SULFATE 4 PUFF: 90 AEROSOL, METERED RESPIRATORY (INHALATION) at 11:17

## 2020-12-07 RX ADMIN — BUMETANIDE 0.5 MG: 1 TABLET ORAL at 08:43

## 2020-12-07 RX ADMIN — ALBUTEROL SULFATE 4 PUFF: 90 AEROSOL, METERED RESPIRATORY (INHALATION) at 07:28

## 2020-12-07 RX ADMIN — CEFEPIME HYDROCHLORIDE 2 G: 2 INJECTION, POWDER, FOR SOLUTION INTRAVENOUS at 21:43

## 2020-12-07 RX ADMIN — Medication 125 MG: at 17:53

## 2020-12-07 RX ADMIN — ALBUTEROL SULFATE 4 PUFF: 90 AEROSOL, METERED RESPIRATORY (INHALATION) at 05:14

## 2020-12-07 RX ADMIN — IPRATROPIUM BROMIDE 4 PUFF: 17 AEROSOL, METERED RESPIRATORY (INHALATION) at 07:30

## 2020-12-07 RX ADMIN — IPRATROPIUM BROMIDE 4 PUFF: 17 AEROSOL, METERED RESPIRATORY (INHALATION) at 00:36

## 2020-12-07 RX ADMIN — NYSTATIN 500000 UNITS: 100000 SUSPENSION ORAL at 14:15

## 2020-12-07 RX ADMIN — IPRATROPIUM BROMIDE 4 PUFF: 17 AEROSOL, METERED RESPIRATORY (INHALATION) at 15:16

## 2020-12-07 RX ADMIN — Medication 125 MG: at 14:21

## 2020-12-07 RX ADMIN — Medication 125 MG: at 05:51

## 2020-12-07 RX ADMIN — PANTOPRAZOLE SODIUM 40 MG: 40 INJECTION, POWDER, LYOPHILIZED, FOR SOLUTION INTRAVENOUS at 08:41

## 2020-12-07 RX ADMIN — BUMETANIDE 0.5 MG: 1 TABLET ORAL at 21:47

## 2020-12-07 RX ADMIN — LORAZEPAM 0.5 MG: 2 INJECTION INTRAMUSCULAR; INTRAVENOUS at 06:18

## 2020-12-07 RX ADMIN — ALBUTEROL SULFATE 4 PUFF: 90 AEROSOL, METERED RESPIRATORY (INHALATION) at 00:36

## 2020-12-07 RX ADMIN — CLONAZEPAM 1 MG: 1 TABLET ORAL at 21:46

## 2020-12-07 RX ADMIN — ALBUTEROL SULFATE 4 PUFF: 90 AEROSOL, METERED RESPIRATORY (INHALATION) at 23:34

## 2020-12-07 RX ADMIN — Medication 8000 UNITS: at 14:15

## 2020-12-07 RX ADMIN — HYDROXYZINE HYDROCHLORIDE 25 MG: 25 TABLET, FILM COATED ORAL at 10:20

## 2020-12-07 RX ADMIN — HYDROXYZINE HYDROCHLORIDE 25 MG: 25 TABLET, FILM COATED ORAL at 21:46

## 2020-12-07 RX ADMIN — HYDROXYZINE HYDROCHLORIDE 25 MG: 25 TABLET, FILM COATED ORAL at 17:45

## 2020-12-07 RX ADMIN — IPRATROPIUM BROMIDE 4 PUFF: 17 AEROSOL, METERED RESPIRATORY (INHALATION) at 11:19

## 2020-12-07 RX ADMIN — Medication 125 MG: at 01:43

## 2020-12-07 RX ADMIN — GABAPENTIN 300 MG: 300 CAPSULE ORAL at 14:15

## 2020-12-07 ASSESSMENT — PULMONARY FUNCTION TESTS
PIF_VALUE: 17
PIF_VALUE: 16
PIF_VALUE: 20
PIF_VALUE: 35
PIF_VALUE: 25
PIF_VALUE: 19
PIF_VALUE: 20
PIF_VALUE: 18
PIF_VALUE: 20
PIF_VALUE: 21

## 2020-12-07 ASSESSMENT — PAIN SCALES - GENERAL: PAINLEVEL_OUTOF10: 0

## 2020-12-07 NOTE — PROGRESS NOTES
Respiratory at bedside with Dr. Lata Harrell, ENT, who performed bronch to verify new trach patient. Patient has a 6 shiley XLT distal.  Dr. Lata Harrell is satisfied with position of new trach. Will continue to monitor patient.

## 2020-12-07 NOTE — PROGRESS NOTES
RT called to pt room. RN unable to pass suction catheter and pt not getting his tidal volumes on ventilator. Assessed pt, still unable to pass sx catheter. Took pt off of ventilator and used BMV. Pt easy to bag. Requested 6XLT from nursing supervisor and with another RT changed trach. Now able to sx pt for copious amounts of thick secretions. Pt now getting tidal volumes on ventilator and no audible leak.

## 2020-12-07 NOTE — PROGRESS NOTES
Have had this pt last night and tonight. Last evening when suctioning pt about half way down met resistance. This was painful to pt. I changed the inner canula and lavaged the pt. I had the same results. Last night and again tonight pt is refusing to let me sx him. If you move the trach at all he winces in pain.

## 2020-12-07 NOTE — PROGRESS NOTES
Overnight events discussed with Dr. Wagner Brock. Per MD ENT called and updated on second trach exchange. Dr. Terrance Bergeron to come to bedside later on today to evaluate. Bridger RT updated, to have bedside bronchial scope equipment. Will continue to monitor.

## 2020-12-07 NOTE — PROGRESS NOTES
Pulmonary and Critical Care  Progress Note    Subjective: The patient is better. Pts trach had to be changed last night. Shortness of breath none  Chest pain none  Addressing respiratory complaints Patient is negative for  hemoptysis and cyanosis  CONSTITUTIONAL:  negative for fevers and chills      Past Medical History:     has a past medical history of Acute embolism and thrombosis of deep veins of left upper extremity ([address] Acute kidney failure with tubular necrosis ([address] Anemia, unspecified, Anxiety, Aphonia, Bacteremia, Drug abuse and dependence ([address] Dysphagia, Gastrostomy status ([address] Hemoperitoneum, Hepatitis C without hepatic coma, Hypertension, Idiopathic hypotension, Malignant neoplasm of stomach, unspecified ([address] Metabolic encephalopathy, Methicillin susceptible Staphylococcus aureus infection as the cause of diseases classified elsewhere, Muscle weakness, Osteomyelitis ([address] Thrombocytopenia, unspecified ([address] Tracheostomy status ([address] and Unspecified severe protein-calorie malnutrition ([address] has a past surgical history that includes back surgery; IR NONTUNNELED VASCULAR CATHETER > 5 YEARS (7/26/2020); laparotomy (N/A, 7/25/2020); tracheostomy (N/A, 8/10/2020); Gastrostomy tube placement (N/A, 8/10/2020); and IR TUNNELED CVC PLACE WO SQ PORT/PUMP > 5 YEARS (8/12/2020). reports that he has quit smoking. His smoking use included cigarettes. He smoked 1.50 packs per day. He does not have any smokeless tobacco history on file. He reports that he does not drink alcohol or use drugs. Family history:  family history is not on file.     No Known Allergies  Social History:    Reviewed; no changes    Objective:   PHYSICAL EXAM:        VITALS:  BP (!) 117/90   Pulse 105   Temp 100 °F (37.8 °C) (Rectal)   Resp 13   Ht 5' 7.01\" (1.702 m)   Wt 114 lb 10.2 oz (52 kg)   SpO2 99%   BMI 17.95 kg/m²     24HR INTAKE/OUTPUT:      Intake/Output Summary (Last 24 hours) at 12/7/2020 1305  Last data filed at 12/7/2020 1132  Gross per 24 hour   Intake 1558 ml   Output 2070 ml   Net -512 ml       CONSTITUTIONAL:  Awake. LUNGS:  decreased breath sounds, basilar crackles. CARDIOVASCULAR:  normal S1 and S2 and negative JVD  ABD:Abdomen soft, non-tender. BS normal. No masses,  No organomegaly. DATA:    CBC:  Recent Labs     12/05/20  0630 12/06/20  0451 12/07/20  0510   WBC 17.6* 21.6* 24.6*   RBC 3.78* 3.95* 3.97*   HGB 10.1* 10.6* 10.8*   HCT 34.2* 35.5* 35.9*   * 518* 484*   MCV 90.5 89.9 90.4   MCH 26.7* 26.8* 27.2   MCHC 29.5* 29.9* 30.1*   RDW 18.6* 18.5* 18.4*      BMP:  Recent Labs     12/05/20  0630 12/06/20  0451 12/07/20  0510    142 147*   K 4.1 4.1 4.2    104 109   CO2 26 25 24   BUN 47* 55* 75*   CREATININE 0.4* 0.4* 0.5*   CALCIUM 9.4 9.8 9.9   GLUCOSE 133* 145* 156*      ABG:  Recent Labs     12/05/20  0800 12/06/20  0800 12/07/20  0800   PH 7.52* 7.46* 7.45   PO2ART 110* 92 87   [id number] 30.0* 38.0 37.0   O2SAT 96.6 95.1* 94.8*     Lab Results   Component Value Date    PROBNP 530.3 (H) 11/26/2020    PROBNP 742.6 (H) 11/24/2020    PROBNP 11,142 (H) 07/24/2020     No results found for: CULTRESP    Radiology Review:  Pertinent images / reports were reviewed as a part of this visit. Assessment:     Patient Active Problem List   Diagnosis    [facility]    Leukocytosis    Ischemic bowel disease [facility]    MSSA bacteremia    Chronic hepatitis C without hepatic coma ([facility]    Respiratory failure ([facility] [facility]    HCAP (healthcare-associated pneumonia)       Plan:   1. Overall the patient has improved. 2. Cont full vent support. 3. Dr Marques Pete will re evaluate today re trach.   Blake Haines MD  12/7/2020  1:05 PM

## 2020-12-07 NOTE — PROGRESS NOTES
Asked to reevaluate the patient due to difficulty with the trach tube last evening. Overnight patient had low tidal volumes. RN and RT unable to pass suction catheter via the trach tube. Trach tube was changed to a 6 XLT with normalization of tidal volumes. No reported problems with the trach/vent since that time. Exam:  #6XLT distal extension tube in place and secure. Procedure:  Bedside fiberoptic tracheoscopy was performed via tracheotomy tube. The tip of the trach tube is in the navid, sitting approx. 2.5 cm above the navid. A: Likely dislodged trach tube. Mitch #6 XLT distal extension now in place and in good position. Rec: Maintain the current tube during this hospitalization and following discharge. I am available for any further concerns.

## 2020-12-07 NOTE — PROGRESS NOTES
[address]    Hospitalist Progress Note      Name:  Marcio Mane /Age/Sex: 1955  (63 y.o. male)   MRN & CSN:  [id number] & [id number] Admission Date/Time: 2020 10:19 AM   Location:  /-A PCP: No primary care provider on file.          Hospital Day: 14    Assessment and Plan:   Marcio Mane is a 63 y.o.  male  who presents with sepsis    Sepsis due to C-diff     C. difficile PCR positive - rectal tube in place - less diarrhea   Continue p.o. vancomycin via PEG    Pseudomonas pneumonia   Chronic respiratory fialure on Trach    Respiratory culture from  grew pseudomonas - sensitive to cefepime  Sputum culture grew ESBL E. Coli-  was on meropenem for 6 days  Continue to monitor blood cultures which were no growth to date  WBC trend 16> 22>17 >21>24 but CRP 15 and Procal lower 0.39 from previous 0.54  Continue with IV Cefepime start date 12/3  Pulmonary service following  May consider ID consult if WBC worsens and or develops fever   Repeat Resp culture     Tracheostomy malfunction -resolved after trach exchange    ENT input appreciated    Electrolyte imbalances -monitor and replace per protocol     HypoK -resolved  HypoPhos - replaced  HyperNa - on d5+0.45 with 20meqK - monitor closely      Chronic medical conditions:      Stage III sacrum and Unstageable left HIP, right and left feet - wound care  Hx of Polysubstance use disoreder   Hep C, Chronic  Severe PEM - BMI 17 - consult Nutrition     Palliative care/hospice consulted for code status goal of care discussion    Initial process for hospice held off as patient was showing some improvement      Diet DIET TUBE FEED CONTINUOUS/CYCLIC NPO; Semi-elemental (Vital AF 1.2 Waldo); Gastrostomy; Continuous; 20; 60; 24   DVT Prophylaxis [] Lovenox, []  Heparin, [] SCDs, []No VTE prophylaxis, patient ambulating   GI Prophylaxis [] PPI, [] H2 Blocker, [] No GI prophylaxis, patient is receiving diet/Tube Feeds   Code Status Full Code   Disposition Patient requires continued admission due to sepsis   MDM [] Low, [] Moderate,[x]  High     History of Present Illness: Subjective     Patient Seen & Examined at the bedside      Patient is on trach and vent - nonverbal but able to respond with shaking hands, nodding and mouthing words - signaling with hand etc   Drowsy as he received Ativan this morning     Ten point ROS not be reviewed due to patient's mental status     Objective: Intake/Output Summary (Last 24 hours) at 12/7/2020 0911  Last data filed at 12/7/2020 0600  Gross per 24 hour   Intake 1628 ml   Output 1870 ml   Net -242 ml      Vitals:   Vitals:    12/07/20 0732   BP:    Pulse: 111   Resp: 19   Temp:    SpO2: 97%     Physical Exam:    GEN chronically sick looking and emaciated, awake but nonverbal male, resting in bed in no apparent distress. Appears given age. HENT tracheostomy in place > to Vent   RESP diffuse rhonchi with scattered wheezes  CARDIO/VASC -S1/S2 auscultated. Regular rate without appreciable murmurs, rubs, or gallops. Peripheral pulses equal bilaterally and palpable. No peripheral edema.   Severe loss of muscle mass     Medications:   Medications:    cefepime  2 g Intravenous Q12H    collagenase   Topical Daily    vancomycin  125 mg Per G Tube 4 times per day    methylPREDNISolone  40 mg Intravenous Daily    Ergocalciferol  8,000 Units Oral Daily    magnesium oxide  400 mg Oral Daily    scopolamine  1 patch Transdermal Q72H    pantoprazole  40 mg Intravenous Daily    nystatin  5 mL Oral 4x Daily    midodrine  10 mg Oral TID    sodium chloride flush  10 mL Intravenous 2 times per day    enoxaparin  40 mg Subcutaneous Daily    clonazePAM  1 mg Per G Tube TID    bumetanide  0.5 mg Per G Tube BID    DULoxetine  60 mg Per G Tube Daily    gabapentin  300 mg Per G Tube TID    hydrOXYzine  25 mg Per G Tube Q6H    albuterol sulfate HFA  4 puff Inhalation Q4H    And    ipratropium  4 puff Inhalation Q4H    chlorhexidine 15 mL Mouth/Throat BID      Infusions:     PRN Meds: LORazepam, 0.5 mg, Q6H PRN  potassium chloride, 40 mEq, PRN    Or  potassium alternative oral replacement, 40 mEq, PRN    Or  potassium chloride, 10 mEq, PRN  dicyclomine, 20 mg, Q6H PRN  sodium chloride flush, 10 mL, PRN  acetaminophen, 650 mg, Q6H PRN    Or  acetaminophen, 650 mg, Q6H PRN  polyethylene glycol, 17 g, Daily PRN  promethazine, 12.5 mg, Q6H PRN    Or  ondansetron, 4 mg, Q6H PRN  oxyCODONE, 5 mg, Q6H PRN  HYDROmorphone, 1 mg, Daily PRN  magnesium sulfate, 1 g, PRN          Electronically signed by Albino Moss MD on 12/7/2020 at 9:11 AM

## 2020-12-08 VITALS
SYSTOLIC BLOOD PRESSURE: 128 MMHG | HEIGHT: 67 IN | RESPIRATION RATE: 14 BRPM | DIASTOLIC BLOOD PRESSURE: 104 MMHG | TEMPERATURE: 98.1 F | OXYGEN SATURATION: 100 % | HEART RATE: 96 BPM | BODY MASS INDEX: 16.09 KG/M2 | WEIGHT: 102.51 LBS

## 2020-12-08 LAB
ANION GAP SERPL CALCULATED.3IONS-SCNC: 14 MMOL/L (ref 4–16)
BASE EXCESS MIXED: 2.3 (ref 0–1.2)
BUN BLDV-MCNC: 86 MG/DL (ref 6–23)
C-REACTIVE PROTEIN, HIGH SENSITIVITY: 44.8 MG/L
CALCIUM SERPL-MCNC: 9.8 MG/DL (ref 8.3–10.6)
CARBON MONOXIDE, BLOOD: 1.9 % (ref 0–5)
CHLORIDE BLD-SCNC: 109 MMOL/L (ref 99–110)
CO2 CONTENT: 27.7 MMOL/L (ref 19–24)
CO2: 24 MMOL/L (ref 21–32)
COMMENT: ABNORMAL
CREAT SERPL-MCNC: 0.5 MG/DL (ref 0.9–1.3)
GFR AFRICAN AMERICAN: >60 ML/MIN/1.73M2
GFR NON-AFRICAN AMERICAN: >60 ML/MIN/1.73M2
GLUCOSE BLD-MCNC: 146 MG/DL (ref 70–99)
HCO3 ARTERIAL: 26.5 MMOL/L (ref 18–23)
HCT VFR BLD CALC: 34.6 % (ref 42–52)
HEMOGLOBIN: 10.3 GM/DL (ref 13.5–18)
MCH RBC QN AUTO: 27.4 PG (ref 27–31)
MCHC RBC AUTO-ENTMCNC: 29.8 % (ref 32–36)
MCV RBC AUTO: 92 FL (ref 78–100)
METHEMOGLOBIN ARTERIAL: 1.1 %
O2 SATURATION: 96.4 % (ref 96–97)
PCO2 ARTERIAL: 39 MMHG (ref 32–45)
PDW BLD-RTO: 19.1 % (ref 11.7–14.9)
PH BLOOD: 7.44 (ref 7.34–7.45)
PLATELET # BLD: 479 K/CU MM (ref 140–440)
PMV BLD AUTO: 10 FL (ref 7.5–11.1)
PO2 ARTERIAL: 122 MMHG (ref 75–100)
POTASSIUM SERPL-SCNC: 4.1 MMOL/L (ref 3.5–5.1)
PROCALCITONIN: 0.44
RBC # BLD: 3.76 M/CU MM (ref 4.6–6.2)
SARS-COV-2, NAAT: NOT DETECTED
SODIUM BLD-SCNC: 147 MMOL/L (ref 135–145)
WBC # BLD: 21.2 K/CU MM (ref 4–10.5)

## 2020-12-08 PROCEDURE — 94003 VENT MGMT INPAT SUBQ DAY: CPT

## 2020-12-08 PROCEDURE — U0002 COVID-19 LAB TEST NON-CDC: HCPCS

## 2020-12-08 PROCEDURE — 6370000000 HC RX 637 (ALT 250 FOR IP): Performed by: FAMILY MEDICINE

## 2020-12-08 PROCEDURE — 84145 PROCALCITONIN (PCT): CPT

## 2020-12-08 PROCEDURE — 94640 AIRWAY INHALATION TREATMENT: CPT

## 2020-12-08 PROCEDURE — 6360000002 HC RX W HCPCS: Performed by: FAMILY MEDICINE

## 2020-12-08 PROCEDURE — C9113 INJ PANTOPRAZOLE SODIUM, VIA: HCPCS | Performed by: FAMILY MEDICINE

## 2020-12-08 PROCEDURE — 2580000003 HC RX 258: Performed by: FAMILY MEDICINE

## 2020-12-08 PROCEDURE — 80048 BASIC METABOLIC PNL TOTAL CA: CPT

## 2020-12-08 PROCEDURE — 6360000002 HC RX W HCPCS: Performed by: INTERNAL MEDICINE

## 2020-12-08 PROCEDURE — 6370000000 HC RX 637 (ALT 250 FOR IP): Performed by: INTERNAL MEDICINE

## 2020-12-08 PROCEDURE — 2000000000 HC ICU R&B

## 2020-12-08 PROCEDURE — 86140 C-REACTIVE PROTEIN: CPT

## 2020-12-08 PROCEDURE — 36592 COLLECT BLOOD FROM PICC: CPT

## 2020-12-08 PROCEDURE — 82803 BLOOD GASES ANY COMBINATION: CPT

## 2020-12-08 PROCEDURE — 85027 COMPLETE CBC AUTOMATED: CPT

## 2020-12-08 PROCEDURE — 2580000003 HC RX 258: Performed by: INTERNAL MEDICINE

## 2020-12-08 PROCEDURE — 89220 SPUTUM SPECIMEN COLLECTION: CPT

## 2020-12-08 RX ORDER — CLONAZEPAM 1 MG/1
1 TABLET ORAL 3 TIMES DAILY
Qty: 9 TABLET | Refills: 0 | Status: SHIPPED | OUTPATIENT
Start: 2020-12-08 | End: 2020-12-11

## 2020-12-08 RX ORDER — OXYCODONE HYDROCHLORIDE 5 MG/1
5 TABLET ORAL EVERY 6 HOURS PRN
Qty: 10 TABLET | Refills: 0 | Status: SHIPPED | OUTPATIENT
Start: 2020-12-08 | End: 2020-12-11

## 2020-12-08 RX ORDER — HYDROMORPHONE HYDROCHLORIDE 2 MG/1
1 TABLET ORAL DAILY PRN
Qty: 10 TABLET | Refills: 0 | Status: SHIPPED | OUTPATIENT
Start: 2020-12-08 | End: 2020-12-28

## 2020-12-08 RX ADMIN — PANTOPRAZOLE SODIUM 40 MG: 40 INJECTION, POWDER, LYOPHILIZED, FOR SOLUTION INTRAVENOUS at 08:08

## 2020-12-08 RX ADMIN — ENOXAPARIN SODIUM 40 MG: 40 INJECTION SUBCUTANEOUS at 08:09

## 2020-12-08 RX ADMIN — Medication 125 MG: at 06:18

## 2020-12-08 RX ADMIN — GABAPENTIN 300 MG: 300 CAPSULE ORAL at 13:15

## 2020-12-08 RX ADMIN — Medication 8000 UNITS: at 13:16

## 2020-12-08 RX ADMIN — ALBUTEROL SULFATE 4 PUFF: 90 AEROSOL, METERED RESPIRATORY (INHALATION) at 04:31

## 2020-12-08 RX ADMIN — HYDROXYZINE HYDROCHLORIDE 25 MG: 25 TABLET, FILM COATED ORAL at 19:47

## 2020-12-08 RX ADMIN — MAGNESIUM OXIDE 400 MG (241.3 MG MAGNESIUM) TABLET 400 MG: TABLET at 08:12

## 2020-12-08 RX ADMIN — GABAPENTIN 300 MG: 300 CAPSULE ORAL at 08:09

## 2020-12-08 RX ADMIN — NYSTATIN 500000 UNITS: 100000 SUSPENSION ORAL at 13:15

## 2020-12-08 RX ADMIN — IPRATROPIUM BROMIDE 4 PUFF: 17 AEROSOL, METERED RESPIRATORY (INHALATION) at 04:31

## 2020-12-08 RX ADMIN — Medication 125 MG: at 01:07

## 2020-12-08 RX ADMIN — CHLORHEXIDINE GLUCONATE 0.12% ORAL RINSE 15 ML: 1.2 LIQUID ORAL at 08:08

## 2020-12-08 RX ADMIN — SODIUM CHLORIDE, PRESERVATIVE FREE 10 ML: 5 INJECTION INTRAVENOUS at 08:11

## 2020-12-08 RX ADMIN — COLLAGENASE SANTYL: 250 OINTMENT TOPICAL at 08:25

## 2020-12-08 RX ADMIN — CEFEPIME HYDROCHLORIDE 2 G: 2 INJECTION, POWDER, FOR SOLUTION INTRAVENOUS at 08:11

## 2020-12-08 RX ADMIN — CLONAZEPAM 1 MG: 1 TABLET ORAL at 13:15

## 2020-12-08 RX ADMIN — Medication 125 MG: at 13:21

## 2020-12-08 RX ADMIN — NYSTATIN 500000 UNITS: 100000 SUSPENSION ORAL at 19:41

## 2020-12-08 RX ADMIN — NYSTATIN 500000 UNITS: 100000 SUSPENSION ORAL at 08:09

## 2020-12-08 RX ADMIN — HYDROXYZINE HYDROCHLORIDE 25 MG: 25 TABLET, FILM COATED ORAL at 04:56

## 2020-12-08 RX ADMIN — DULOXETINE HYDROCHLORIDE 60 MG: 30 CAPSULE, DELAYED RELEASE ORAL at 08:11

## 2020-12-08 RX ADMIN — HYDROXYZINE HYDROCHLORIDE 25 MG: 25 TABLET, FILM COATED ORAL at 10:30

## 2020-12-08 RX ADMIN — CHLORHEXIDINE GLUCONATE 0.12% ORAL RINSE 15 ML: 1.2 LIQUID ORAL at 19:48

## 2020-12-08 RX ADMIN — CLONAZEPAM 1 MG: 1 TABLET ORAL at 08:09

## 2020-12-08 RX ADMIN — BUMETANIDE 0.5 MG: 1 TABLET ORAL at 08:10

## 2020-12-08 RX ADMIN — METHYLPREDNISOLONE SODIUM SUCCINATE 40 MG: 40 INJECTION, POWDER, FOR SOLUTION INTRAMUSCULAR; INTRAVENOUS at 08:10

## 2020-12-08 ASSESSMENT — PULMONARY FUNCTION TESTS
PIF_VALUE: 20
PIF_VALUE: 20
PIF_VALUE: 16
PIF_VALUE: 20

## 2020-12-08 NOTE — PROGRESS NOTES
Pulmonary and Critical Care  Progress Note    Subjective: The patient is better. Shortness of breath none. Chest pain none  Addressing respiratory complaints Patient is negative for  hemoptysis and cyanosis  CONSTITUTIONAL:  negative for fevers and chills      Past Medical History:     has a past medical history of Acute embolism and thrombosis of deep veins of left upper extremity ([address] Acute kidney failure with tubular necrosis ([address] Anemia, unspecified, Anxiety, Aphonia, Bacteremia, Drug abuse and dependence ([address] Dysphagia, Gastrostomy status ([address] Hemoperitoneum, Hepatitis C without hepatic coma, Hypertension, Idiopathic hypotension, Malignant neoplasm of stomach, unspecified ([address] Metabolic encephalopathy, Methicillin susceptible Staphylococcus aureus infection as the cause of diseases classified elsewhere, Muscle weakness, Osteomyelitis ([address] Thrombocytopenia, unspecified ([address] Tracheostomy status ([address] and Unspecified severe protein-calorie malnutrition ([address] has a past surgical history that includes back surgery; IR NONTUNNELED VASCULAR CATHETER > 5 YEARS (7/26/2020); laparotomy (N/A, 7/25/2020); tracheostomy (N/A, 8/10/2020); Gastrostomy tube placement (N/A, 8/10/2020); and IR TUNNELED CVC PLACE WO SQ PORT/PUMP > 5 YEARS (8/12/2020). reports that he has quit smoking. His smoking use included cigarettes. He smoked 1.50 packs per day. He does not have any smokeless tobacco history on file. He reports that he does not drink alcohol or use drugs. Family history:  family history is not on file.     No Known Allergies  Social History:    Reviewed; no changes    Objective:   PHYSICAL EXAM:        VITALS:  BP (!) 115/91   Pulse 95   Temp 99.7 °F (37.6 °C) (Rectal)   Resp 14   Ht 5' 7.01\" (1.702 m)   Wt 102 lb 8.2 oz (46.5 kg)   SpO2 99%   BMI 16.05 kg/m²     24HR INTAKE/OUTPUT:      Intake/Output Summary (Last 24 hours) at 12/8/2020 1143  Last data filed at 12/8/2020 0800  Gross per 24 hour Intake 1484 ml   Output 850 ml   Net 634 ml       CONSTITUTIONAL:  Awake. LUNGS:  decreased breath sounds, basilar crackles. CARDIOVASCULAR:  normal S1 and S2 and negative JVD  ABD:Abdomen soft, non-tender. BS normal. No masses,  No organomegaly. DATA:    CBC:  Recent Labs     12/06/20  0451 12/07/20  0510 12/08/20  0455   WBC 21.6* 24.6* 21.2*   RBC 3.95* 3.97* 3.76*   HGB 10.6* 10.8* 10.3*   HCT 35.5* 35.9* 34.6*   * 484* 479*   MCV 89.9 90.4 92.0   MCH 26.8* 27.2 27.4   MCHC 29.9* 30.1* 29.8*   RDW 18.5* 18.4* 19.1*      BMP:  Recent Labs     12/06/20  0451 12/07/20  0510 12/08/20  0455    147* 147*   K 4.1 4.2 4.1    109 109   CO2 25 24 24   BUN 55* 75* 86*   CREATININE 0.4* 0.5* 0.5*   CALCIUM 9.8 9.9 9.8   GLUCOSE 145* 156* 146*      ABG:  Recent Labs     12/06/20  0800 12/07/20  0800 12/08/20  0800   PH 7.46* 7.45 7.44   PO2ART 92 87 122*   [id number] 38.0 37.0 39.0   O2SAT 95.1* 94.8* 96.4     Lab Results   Component Value Date    PROBNP 530.3 (H) 11/26/2020    PROBNP 742.6 (H) 11/24/2020    PROBNP 11,142 (H) 07/24/2020     No results found for: CULTRESP    Radiology Review:  Pertinent images / reports were reviewed as a part of this visit. Assessment:     Patient Active Problem List   Diagnosis    Sepsis ([address]    Leukocytosis    Ischemic bowel disease ([address]    MSSA bacteremia    Chronic hepatitis C without hepatic coma ([address]    Respiratory failure ([address]    HCAP (healthcare-associated pneumonia)       Plan:   1. Overall the patient has improved. 2. Cont present vent settings. 3. D/C today.   Meena Saunders MD  12/8/2020  11:43 AM

## 2020-12-08 NOTE — CARE COORDINATION
Spoke to Dr Hershel Halsted. Patient is ready for discharge. Contacted [facility]; spoke to [facility]. She is aware of ATB need- Cefepime 2,000mg q 12 hrs x 5 days. [facility] is asking for rapid Covid test. Dr Hershel Halsted notified thru PS of need. Saúl Patrick RN    1200 Rapid covid negative. Transport with QCT at 7 PM.  Contacted [facility]; spoke to [facility].  Saúl Patrick RN

## 2020-12-08 NOTE — DISCHARGE SUMMARY
Silvio Santos 1955 [id number]  PCP:  No primary care provider on file. Admit date: 11/24/2020  Admitting Physician: Benitez Valdez MD    Discharge date: 12/8/2020 Discharge Physician: Benitez Valdez MD         Hospital Course and Discharge Diagnoses Include:    Sepsis due to C-diff and PNA     C. difficile PCR positive - rectal tube in place - less diarrhea   Continue p.o. vancomycin via PEG, tx for 3 more days to complete 10 day course     Pseudomonas pneumonia   Chronic respiratory fialure on Trach     Respiratory culture from 11/28 grew pseudomonas - sensitive to cefepime  Sputum culture grew ESBL E. Coli- 11/ 24 was on meropenem for 6 days  Continue to monitor blood cultures which were no growth to date  WBC elevation can be from steroids as well has been on IV steroids since admission  Continue with IV Cefepime,tx for 5 more days   Pulmonary service following  Repeat Resp culture pending     Tracheostomy malfunction -resolved after trach exchange     ENT input appreciated     Electrolyte imbalances -monitor and replace per protocol     HypoK -resolved  HypoPhos - replaced  HyperNa -  Stable now     Chronic medical conditions:      Stage III sacrum and Unstageable left HIP, right and left feet - wound care  Hx of Polysubstance use disoreder   Hep C, Chronic  Severe PEM - BMI 17 - consult Nutrition                 Physical Exam on Discharge date: 12/08/20  Gen:  awake, alert, no apparent distress  Head/Eyes:  Normocephalic atraumatic, EOMI   NECK:   +trach  LUNGS: Normal Effort   CARDIOVASCULAR:  Normal rate  ABDOMEN:  non distended  MUSCULOSKELETAL:  ROM limited  NEUROLOGIC: Alert and Oriented,  Cranial nerves II-XII are grossly intact. SKIN:  no bruising or bleeding, normal skin color,  no redness    Procedures:  See above  Xr Chest Portable    Result Date: 12/8/2020  EXAMINATION: ONE XRAY VIEW OF THE CHEST 12/7/2020 7:43 am COMPARISON: 12/01/2017.  HISTORY: ORDERING SYSTEM PROVIDED HISTORY: 11/29/2020  EXAMINATION: ONE XRAY VIEW OF THE CHEST 11/29/2020 4:22 pm COMPARISON: Chest 11/29/2020 at 5:09 a.m. HISTORY: ORDERING SYSTEM PROVIDED HISTORY: possible trach change position not getting 02 Reason for Exam: check trach position Acuity: Acute Type of Exam: Subsequent/Follow-up FINDINGS: 2 images are presented. Calcifications involving the aorta reflect atherosclerosis. The cardiomediastinal and hilar silhouettes appear otherwise unremarkable. Chronic appearing coarse interstitial densities predominate perihilar regions and lung bases, typical of sequela from smoking or other previous infectious/inflammatory process. The lungs appear otherwise clear. No pleural fluid evident. The lungs are hyperinflated with increased translucency both lung apices and tapering of the vasculature in the upper lungs. Asymmetric elevation right hemidiaphragm likely reflecting eventration. No pneumothorax is seen. No acute osseous abnormality is identified. No appreciable change in tracheostomy tube, tip overlying the mid trachea level. Left upper extremity PICC tip is at the mid superior vena cava level. Gastrostomy tube projects over the gastric bubble left upper quadrant. 1. No focal infiltrate or consolidation evident. 2. Pulmonary sequela typical of that seen with smoking, including possible COPD. 3. Calcific atherosclerotic disease aorta. 4. Life support appliances appear appropriately positioned. No appreciable change in tracheostomy tube, tip overlying the mid trachea level. Xr Chest Portable    Result Date: 11/29/2020  EXAMINATION: ONE XRAY VIEW OF THE CHEST 11/29/2020 6:19 am COMPARISON: 11/28/2020 HISTORY: ORDERING SYSTEM PROVIDED HISTORY: vent TECHNOLOGIST PROVIDED HISTORY: Reason for exam:->vent Reason for Exam: vent Acuity: Unknown Type of Exam: Unknown Follow-up exam FINDINGS: Tracheostomy is stable in positioning. Left PICC line is stable in positioning. Emphysematous changes.   No pleural effusion or pneumothorax. Bibasilar airspace opacities are unchanged. Stable cardiac silhouette. No overt pulmonary edema. The osseous structures are stable. No significant interval change in bibasilar airspace opacities. Xr Chest Portable    Result Date: 11/28/2020  EXAMINATION: ONE XRAY VIEW OF THE CHEST 11/28/2020 6:50 am COMPARISON: 11/27/2020 HISTORY: ORDERING SYSTEM PROVIDED HISTORY: vent TECHNOLOGIST PROVIDED HISTORY: Reason for exam:->vent Reason for Exam: vent Acuity: Acute Type of Exam: Initial FINDINGS: Lines and tubes remain in place. No change in mild streaky right basilar airspace opacity. No new airspace disease is seen. No pneumothoraces. Cardiac and mediastinal silhouettes are stable. Stable appearance to bony structures. Stable exam with mild streaky right basilar airspace opacity which may relate to atelectasis or pneumonia. Xr Chest Portable    Result Date: 11/27/2020  EXAMINATION: ONE XRAY VIEW OF THE CHEST 11/27/2020 4:14 am COMPARISON: 11/26/2020 HISTORY: ORDERING SYSTEM PROVIDED HISTORY: vent TECHNOLOGIST PROVIDED HISTORY: Reason for exam:->vent Reason for Exam: vent Acuity: Unknown Type of Exam: Subsequent/Follow-up Additional signs and symptoms: vent Relevant Medical/Surgical History: vent FINDINGS: PICC tip overlies the SVC. Tracheostomy tube. Mild right basilar opacities. No pneumothorax. No effusion. Heart size is within normal limits. Unchanged right basilar opacities concerning for pneumonia. Xr Chest Portable    Result Date: 11/26/2020  EXAMINATION: ONE XRAY VIEW OF THE CHEST 11/26/2020 5:47 am COMPARISON: 11/25/2020 HISTORY: ORDERING SYSTEM PROVIDED HISTORY: vent TECHNOLOGIST PROVIDED HISTORY: Reason for exam:->vent Reason for Exam: vent Acuity: Acute Type of Exam: Subsequent/Follow-up Additional signs and symptoms: n/a FINDINGS: Tracheostomy tube remains in place. A left upper extremity PICC is unchanged with the tip projecting over the SVC. Right-sided airspace opacities are not significantly changed. No effusion or pneumothorax. The cardiac silhouette and osseous structures are stable. No significant interval change. Xr Chest Portable    Result Date: 11/25/2020  EXAMINATION: ONE XRAY VIEW OF THE CHEST 11/25/2020 6:02 am COMPARISON: 11/24/2020 HISTORY: ORDERING SYSTEM PROVIDED HISTORY: vent TECHNOLOGIST PROVIDED HISTORY: Reason for exam:->vent Reason for Exam: vent Acuity: Acute Type of Exam: Subsequent/Follow-up FINDINGS: Tracheostomy tube. Possible emphysematous changes. Stable right basilar opacity. No pleural effusion or pneumothorax. Heart size is within normal limits. Stable right basilar opacity, concerning for pneumonia. Xr Chest Portable    Result Date: 11/24/2020  EXAMINATION: ONE XRAY VIEW OF THE CHEST 11/24/2020 10:43 am COMPARISON: 08/15/2020 HISTORY: ORDERING SYSTEM PROVIDED HISTORY: SOB, tachycardia, trach patient TECHNOLOGIST PROVIDED HISTORY: Reason for exam:->SOB, tachycardia, trach patient Reason for Exam: resp distress    sob   tachy Acuity: Unknown Type of Exam: Unknown FINDINGS: A tracheostomy tube is in place. A left upper extremity PICC line terminates in the SVC. No pneumothorax is demonstrated. There are new airspace opacities within the right lower lung. No definite pleural effusion or pneumothorax. The lungs are hyperinflated and there is evidence of COPD. The heart is normal in size. No acute osseous abnormality is seen. 1. New right basilar infiltrate, concerning for aspiration pneumonia. 2. COPD. 3. Tracheostomy tube in place.        Significant Diagnostic Studies at discharge:   CBC:   Lab Results   Component Value Date    WBC 21.2 12/08/2020    RBC 3.76 12/08/2020    HGB 10.3 12/08/2020    HCT 34.6 12/08/2020    MCV 92.0 12/08/2020    MCH 27.4 12/08/2020    MCHC 29.8 12/08/2020    RDW 19.1 12/08/2020     12/08/2020    MPV 10.0 12/08/2020       Patient Instructions:     Medication List      START taking these medications    cefepime  infusion  Commonly known as:  MAXIPIME  Infuse 2,000 mg intravenously every 12 hours for 5 days Compound per protocol     vancomycin 50 mg/mL oral solution  Commonly known as:  VANCOCIN  2.5 mLs by Per G Tube route every 6 hours for 3 days        CHANGE how you take these medications    dicyclomine 20 MG tablet  Commonly known as:  BENTYL  Take 1 tablet by mouth every 6 hours as needed (Abdominal Cramping)  What changed:    · how much to take  · how to take this  · when to take this     heparin (porcine) 1000 UNIT/ML injection  Infuse 3 mLs intravenously as needed (CRRT cath maintenance, 1500 each accsess line)  What changed:    · how much to take  · when to take this  · additional instructions     midodrine 10 MG tablet  Commonly known as:  PROAMATINE  Take 1 tablet by mouth 3 times daily  What changed:    · how much to take  · how to take this  · additional instructions        CONTINUE taking these medications    albuterol sulfate  (90 Base) MCG/ACT inhaler  Inhale 4 puffs into the lungs every 4 hours     bumetanide 0.5 MG tablet  Commonly known as:  BUMEX     clonazePAM 1 MG tablet  Commonly known as:  KLONOPIN  1 tablet by Per G Tube route 3 times daily for 3 days. DULoxetine 60 MG extended release capsule  Commonly known as:  CYMBALTA     gabapentin 300 MG capsule  Commonly known as:  NEURONTIN     HYDROmorphone 2 MG tablet  Commonly known as:  DILAUDID  0.5 tablets by Per G Tube route daily as needed for Pain for up to 20 days.  11/24/2020 Per nursing home given piror to wound care     hydrOXYzine 25 MG tablet  Commonly known as:  ATARAX     ipratropium 17 MCG/ACT inhaler  Commonly known as:  ATROVENT HFA  Inhale 4 puffs into the lungs every 4 hours     ipratropium-albuterol 0.5-2.5 (3) MG/3ML Soln nebulizer solution  Commonly known as:  DUONEB  Inhale 3 mLs into the lungs every 4 hours as needed for Shortness of Breath     nystatin 789510 UNIT/ML suspension  Commonly known as:  MYCOSTATIN  Take 5 mLs by mouth 4 times daily     ondansetron 4 MG/2ML injection  Commonly known as:  ZOFRAN  Infuse 2 mLs intravenously every 6 hours as needed for Nausea or Vomiting     oxyCODONE 5 MG immediate release tablet  Commonly known as:  ROXICODONE  1 tablet by Per G Tube route every 6 hours as needed for Pain for up to 3 days. pantoprazole 40 MG injection  Commonly known as:  PROTONIX  Infuse 40 mg intravenously daily     potassium & sodium phosphates 280-160-250 MG Pack  Commonly known as:  PHOS-NAK     Pro-Stat Liqd     scopolamine transdermal patch  Commonly known as:  TRANSDERM-SCOP  Place 1 patch onto the skin every 72 hours        STOP taking these medications    Ergocalciferol 200 MCG/ML drops  Commonly known as:  Drisdol     hydrOXYzine 50 MG capsule  Commonly known as:  VISTARIL     magnesium oxide 400 MG tablet  Commonly known as:  MAG-OX     metroNIDAZOLE 5 mg/mL IVPB  Commonly known as:  FLAGYL     nicotine 21 MG/24HR  Commonly known as:  NICODERM CQ           Where to Get Your Medications      You can get these medications from any pharmacy    Bring a paper prescription for each of these medications  · cefepime  infusion  · clonazePAM 1 MG tablet  · HYDROmorphone 2 MG tablet  · oxyCODONE 5 MG immediate release tablet  · vancomycin 50 mg/mL oral solution            Code Status: Full Code     Consults:   IP CONSULT TO HOSPITALIST  IP CONSULT TO PULMONOLOGY  IP CONSULT TO DIETITIAN  IP CONSULT TO DIETITIAN  IP CONSULT TO DIETITIAN  IP CONSULT TO IV TEAM  IP CONSULT TO HOSPICE  IP CONSULT TO IV TEAM  IP CONSULT TO HOSPICE  IP CONSULT TO OTOLARYNGOLOGY  IP CONSULT TO PALLIATIVE CARE    Diet: TF    Activity: activity as tolerated   Work:    Discharged Condition: good    Prognosis: Fair    Disposition: SNF      Follow-up with   1. PCP within   5-7    Days    Follow up labs: none       Discharge Physician Signed: Janeen Chaudhary M.D.     The patient was seen and examined on day of discharge and this discharge summary is in conjunction with any daily progress note from day of discharge.   Time spent on discharge in the examination, evaluation, counseling and review of medications and discharge plan: 34 minutes

## 2020-12-09 LAB
CULTURE: NORMAL
Lab: NORMAL
SPECIMEN: NORMAL

## 2020-12-09 NOTE — FLOWSHEET NOTE
Ongoing SW/CM Assessment/Plan of Care Note     See SW/CM flowsheets for goals and other objective data.    Patient/Family discharge goal (s):  Goal #1: Psychosocial needs assessed  Goal #2: Extended Care Facility discharge arranged  Goal #3: Transportation arranged or issues addressed    PT Recommendation:  Recommendation for Discharge: PT: 24 Hour assist (consider memory care - wanderer, walks 3mi/ day)    OT Recommendation:       SLP Recommendation:       Disposition:  Planned Discharge Destination: Assisted living    Progress note:   Patient with Alzheimer's disease with behavioral disturbance. Patient has been without sitter or IV medications to control behavior for >48 hours. Continue donepezil and memantine.Started  maintenance Seroquel. Physical therapy, occupational therapy evaluation completed, patient functioning at baseline, Ambulatory in [place] with supervision.   Anticipate approval will be complete to admit to [facility] assisted living tomorrow. Family aware if approval does not come thru would need to admit to alternate accepting facility [facility]. [facility] is awaiting administrative approval. Faxed patient's [facility] and Incapacitation statements to [facility](fax [phone]).       Transport has taken to [place]. . Patient remained stable during transition from bed to cot. Sats 98 on portable vent from [facility].  No belongings

## 2020-12-10 LAB
EKG ATRIAL RATE: 121 BPM
EKG DIAGNOSIS: NORMAL
EKG P AXIS: 67 DEGREES
EKG P-R INTERVAL: 152 MS
EKG Q-T INTERVAL: 314 MS
EKG QRS DURATION: 80 MS
EKG QTC CALCULATION (BAZETT): 445 MS
EKG R AXIS: 1 DEGREES
EKG T AXIS: 67 DEGREES
EKG VENTRICULAR RATE: 121 BPM

## 2020-12-21 LAB
CULTURE: ABNORMAL
GRAM SMEAR: ABNORMAL
Lab: ABNORMAL
Lab: ABNORMAL
SPECIMEN: ABNORMAL
SPECIMEN: ABNORMAL

## 2021-09-16 NOTE — PROGRESS NOTES
While giving this pt a bath and changing dressings, once turned to the right side the abdominal incision started to drain a large amount of blood. Once the pt was turned onto his back, the bleeding ceased and dressing was changed again.      Electronically signed by Simon Robbins RN on 8/4/2020 at 3:33 AM sensory intact

## 2021-11-06 NOTE — ED NOTES
Bed: 02TR-02  Expected date: 11/24/20  Expected time: 9:59 AM  Means of arrival:   Comments:  Pt from [employer], RN  11/24/20 [id number]
Bedside report given to Mere Greene RN  11/24/20 [id number]
Pt suctioned d/t large amt of thick yellow sputum noted coming from trach. Pt hospital gown changed, linens changed, depends changed. Pt repositioned in bed for comfort. Pt noted to have open decub ulcer wound noted to coccyx. Pt also noted to have multiple other wounds on his body with dressings intact to hips as well as bilateral feet wrapped in a dressing from nursing home.       Carolina Mcallister RN  11/24/20 [id number]
positive for exertional chest pain & SOB, but no peripheral edema.

## 2023-02-28 NOTE — PROGRESS NOTES
While turning patient for wound care, noted to have large amount of bright red blood coming from midline abdomen incision soaking gown and linen underneath. Changed all linen, redressed and clean site. 37

## 2023-10-05 NOTE — PROGRESS NOTES
Dr. Marisa Essex called and updated. Family currently leaning towards terminal wean. Patient's son and cousin at bedside. Approval given by manager for patient's grandchildren to visit prior to time of terminal wean in shifts. Will continue to monitor. Patient was prescribed atorvastatin but was requesting replacement for metformin. Please advise
